# Patient Record
Sex: FEMALE | Race: WHITE | NOT HISPANIC OR LATINO | Employment: FULL TIME | URBAN - METROPOLITAN AREA
[De-identification: names, ages, dates, MRNs, and addresses within clinical notes are randomized per-mention and may not be internally consistent; named-entity substitution may affect disease eponyms.]

---

## 2017-01-03 ENCOUNTER — CLINICAL SUPPORT (OUTPATIENT)
Dept: REHABILITATION | Facility: HOSPITAL | Age: 62
End: 2017-01-03
Attending: INTERNAL MEDICINE
Payer: COMMERCIAL

## 2017-01-03 ENCOUNTER — TELEPHONE (OUTPATIENT)
Dept: INTERNAL MEDICINE | Facility: CLINIC | Age: 62
End: 2017-01-03

## 2017-01-03 DIAGNOSIS — M79.604 LEG PAIN, INFERIOR, RIGHT: ICD-10-CM

## 2017-01-03 DIAGNOSIS — G89.29 CHRONIC MIDLINE LOW BACK PAIN WITH RIGHT-SIDED SCIATICA: ICD-10-CM

## 2017-01-03 DIAGNOSIS — M62.81 MUSCLE WEAKNESS: ICD-10-CM

## 2017-01-03 DIAGNOSIS — M54.41 CHRONIC MIDLINE LOW BACK PAIN WITH RIGHT-SIDED SCIATICA: ICD-10-CM

## 2017-01-03 DIAGNOSIS — M54.89 OTHER BACK PAIN: Primary | ICD-10-CM

## 2017-01-03 PROCEDURE — 97110 THERAPEUTIC EXERCISES: CPT | Performed by: PHYSICAL THERAPIST

## 2017-01-03 PROCEDURE — 97014 ELECTRIC STIMULATION THERAPY: CPT | Performed by: PHYSICAL THERAPIST

## 2017-01-03 NOTE — PROGRESS NOTES
Physical Therapy  Daily Note     Name: Danuta Santos  [facility] Number: [id number]  Diagnosis:   Encounter Diagnoses   Name Primary?    Chronic midline low back pain with right-sided sciatica     Leg pain, inferior, right     Muscle weakness      Physician: Erica Khan MD  Treatment Orders: PT Eval and Treat  Past Medical History   Diagnosis Date    IBS (irritable bowel syndrome)        Precautions: as per diagnosis    Evaluation Date: 12-31-15  Visit # authorized:1/1 on 1-3-17  Authorization period: 12-31-17  Plan of care expiration: 2-8-17    Subjective     Pt reports: she had a massage and the therapist pushed on her back from the side while she was on her stomach.  Pt states she pushed down and to the side and felt the muscles tighten up immediately and pain in sacral region.  Pt states she is having sciatica pain in R     Pain scale: 7/10 at start at end 4.5/10  Objective     12-29-16   Lower trap strength 3+/5 at last test 3/5 B  Hip extension strength 5/5 at last test  4/5    Lumbar active range of motion in standing is: 1-3-17  - flexion - to toes                     - extension -  50%                         - left side bending -  To knee, feels pulling on R         - right side bending -  To knee             Lumbar active range of motion in standing is: 12-29-16  - flexion - toes                     - extension -  50%                         - left side bending -  To knee         - right side bending -  To knee          .    TREATMENT:  Therapeutic exercises were performed to improve stabilization for 25  min   Pt with slight dysfunction at L5 with FSR R and able to self mobilize with trunk rotation to L and contract relax to R hip abductors  Verbally reviewed and had pt perform lumbar stretching prior to manual therapy HS Stretch x3 , piriformis stretch x 10 and SLR X 20, hip abd sidelying x 20 hip ext prone bent knee x 20 arm and leg lift prone x  20 GSS standing and soleus stretch standing also added sitting with FB and SB to stretch lumbar region with legs in abd position with one leg straight and other bent to isolate QL and lumbar paraspinals.  Performed SB stretching to LB sidelying L with contract relax.         Patient received pre-mod electrical stimulation to decrease muscle tightness and pain to lumbar paraspinals for 15 minutes with MH with cycle time: continuous, beat frequency: , CC/CV: CV.    Pt demo good understanding of the education provided. Danuta demonstrated good return demonstration of activities.     Pt. education:  · Posture reeducation, body mechanics, HEP, reinforcing performing contract relax technique at onset of symptoms Instructed pt in increased awareness of symptoms.   · No spiritual or educational barriers to learning provided  · Pt has no cultural, educational or language barriers to learning provided.    Assessment   Pt had a flare up aggravating L5 after getting a massage.  Pt was able to self mobilize the area and will have more techniques to try as she develops more independence in self management of her back symptoms. Pt will continue to benefit from skilled outpatient physical therapy to address the remaining functional deficits, provide pt/family education, and to maximize pt's level of independence in the home and community environment. .     Anticipated barriers to physical therapy: none    · Pt's spiritual, cultural and educational needs considered and pt agreeable to plan of care and goals as stated below:     GOALS:   Short Term Goals: 3 weeks  MET STG's  Increase range of motion 25%  Increase strength 1/2 muscle grade  Improve postural awareness of pelvis to independently identify dysfunction with min assist from PT  Be able to perform HEP with minimal cueing required      Long Term Goals: 6 weeks  PARTIALLY MET LTG's  Increase range of motion to 75% to 100% full   Improve muscle strength 1 muscle  grade  Improve muscle strength with MMT to 4+/5 to 5/5  Improve and stabilize proper pelvic positioning  Walking for ADL and work will be restored without increased pain  Restore ability to stand for ADL and work without increased pain  Restore ability to bend for ADL and work without increased pain  Restore ability to lift for ADL and work without increased pain  Restore ability to perform ADL's and household activities independently and without increased pain       Plan   Continue with established plan of care towards PT goals.

## 2017-01-03 NOTE — TELEPHONE ENCOUNTER
Please advise pt would like to have PT orders sent to [facility] for her back pain. She is trying to go today.

## 2017-01-03 NOTE — TELEPHONE ENCOUNTER
----- Message from Beryl Moeller sent at 1/3/2017  8:24 AM CST -----  Contact: Patient  Type: Orders Request    What orders/ testing are being requested? Physical Therapy     Is there a future appointment scheduled for the patient with PCP? yes    When? 1/31/17    Comments:  Physical Therapy at [facility].  The patient needs to go to therapy today.  The physical therapist is Lilo Escobar. The patient does not have the fax or phone number with her.  If you need, you can reach the patient at [phone].

## 2017-01-26 ENCOUNTER — CLINICAL SUPPORT (OUTPATIENT)
Dept: REHABILITATION | Facility: HOSPITAL | Age: 62
End: 2017-01-26
Attending: INTERNAL MEDICINE
Payer: COMMERCIAL

## 2017-01-26 DIAGNOSIS — M79.604 LEG PAIN, INFERIOR, RIGHT: ICD-10-CM

## 2017-01-26 DIAGNOSIS — M54.41 CHRONIC MIDLINE LOW BACK PAIN WITH RIGHT-SIDED SCIATICA: ICD-10-CM

## 2017-01-26 DIAGNOSIS — M62.81 MUSCLE WEAKNESS: ICD-10-CM

## 2017-01-26 DIAGNOSIS — G89.29 CHRONIC MIDLINE LOW BACK PAIN WITH RIGHT-SIDED SCIATICA: ICD-10-CM

## 2017-01-26 PROCEDURE — 97110 THERAPEUTIC EXERCISES: CPT | Performed by: PHYSICAL THERAPIST

## 2017-01-26 NOTE — PROGRESS NOTES
Physical Therapy  Daily Note     Name: Danuta Santos  Clinic Number: [id number]  Diagnosis:   Encounter Diagnoses   Name Primary?    Chronic midline low back pain with right-sided sciatica     Leg pain, inferior, right     Muscle weakness      Physician: Erica Khan MD  Treatment Orders: PT Eval and Treat  Past Medical History   Diagnosis Date    IBS (irritable bowel syndrome)        Precautions: as per diagnosis    Evaluation Date: 12-31-15  Visit # authorized:2/25 on 1-26-17  Authorization period: 12-31-17  Plan of care expiration: 2-8-17    Subjective     Pt reports: she has been working on exercises, stretching and has good days and bad days.  Pt states was lifting weights and aggravated back a little.  Pt states TENS unit is helping.  Pt states was not doing push/pull as much as she should have  Pain scale: N/T  Objective     12-29-16   Lower trap strength 3+/5 at last test 3/5 B  Hip extension strength 5/5 at last test  4/5    Lumbar active range of motion in standing is: 1-3-17  - flexion - to toes                     - extension -  50%                         - left side bending -  To knee, feels pulling on R         - right side bending -  To knee             Lumbar active range of motion in standing is: 12-29-16  - flexion - toes                     - extension -  50%                         - left side bending -  To knee         - right side bending -  To knee          .    TREATMENT:  Therapeutic exercises were performed to improve stabilization for 25  min   Instructed pt further in finding ways and places to self mobilize at onset of dysfunction.  Pt with slight pelvic dysfunction and able to self mobilize  Verbally reviewed and had pt perform lumbar stretching prior to manual therapy HS Stretch x3 , piriformis stretch x 10 and SLR X 20, hip abd sidelying x 20 hip ext prone bent knee x 20 arm and leg lift prone x 20 GSS standing and soleus  stretch standing also added sitting with FB and SB to stretch lumbar region with legs in abd position with one leg straight and other bent to isolate QL and lumbar paraspinals.  Performed SB stretching to LB sidelying L with contract relax.         Patient received pre-mod electrical stimulation to decrease muscle tightness and pain to lumbar paraspinals for 15 minutes with MH with cycle time: continuous, beat frequency: , CC/CV: CV.    Pt demo good understanding of the education provided. Danuta demonstrated good return demonstration of activities.     Pt. education:  · Posture reeducation, body mechanics, HEP, reinforcing performing contract relax technique at onset of symptoms Instructed pt in increased awareness of symptoms.   · No spiritual or educational barriers to learning provided  · Pt has no cultural, educational or language barriers to learning provided.    Assessment   Pt appears to have improved understanding of need to address dysfunction ASAP and will come up with find other places at school she can do self mob PRN Pt will continue to benefit from skilled outpatient physical therapy to address the remaining functional deficits, provide pt/family education, and to maximize pt's level of independence in the home and community environment. .     Anticipated barriers to physical therapy: none    · Pt's spiritual, cultural and educational needs considered and pt agreeable to plan of care and goals as stated below:     GOALS:   Short Term Goals: 3 weeks  MET STG's  Increase range of motion 25%  Increase strength 1/2 muscle grade  Improve postural awareness of pelvis to independently identify dysfunction with min assist from PT  Be able to perform HEP with minimal cueing required      Long Term Goals: 6 weeks  PARTIALLY MET LTG's  Increase range of motion to 75% to 100% full   Improve muscle strength 1 muscle grade  Improve muscle strength with MMT to 4+/5 to 5/5  Improve and stabilize proper  pelvic positioning  Walking for ADL and work will be restored without increased pain  Restore ability to stand for ADL and work without increased pain  Restore ability to bend for ADL and work without increased pain  Restore ability to lift for ADL and work without increased pain  Restore ability to perform ADL's and household activities independently and without increased pain       Plan   Continue with established plan of care towards PT goals.

## 2017-03-01 ENCOUNTER — OFFICE VISIT (OUTPATIENT)
Dept: INTERNAL MEDICINE | Facility: CLINIC | Age: 62
End: 2017-03-01
Payer: COMMERCIAL

## 2017-03-01 ENCOUNTER — CLINICAL SUPPORT (OUTPATIENT)
Dept: REHABILITATION | Facility: HOSPITAL | Age: 62
End: 2017-03-01
Attending: INTERNAL MEDICINE
Payer: COMMERCIAL

## 2017-03-01 VITALS
DIASTOLIC BLOOD PRESSURE: 65 MMHG | SYSTOLIC BLOOD PRESSURE: 119 MMHG | HEIGHT: 66 IN | WEIGHT: 133 LBS | BODY MASS INDEX: 21.38 KG/M2 | HEART RATE: 77 BPM

## 2017-03-01 DIAGNOSIS — M54.89 MIDLINE BACK PAIN, UNSPECIFIED BACK LOCATION, UNSPECIFIED CHRONICITY: ICD-10-CM

## 2017-03-01 DIAGNOSIS — E78.00 ELEVATED LDL CHOLESTEROL LEVEL: ICD-10-CM

## 2017-03-01 DIAGNOSIS — M54.41 CHRONIC MIDLINE LOW BACK PAIN WITH RIGHT-SIDED SCIATICA: ICD-10-CM

## 2017-03-01 DIAGNOSIS — K59.00 CONSTIPATION, UNSPECIFIED CONSTIPATION TYPE: Primary | ICD-10-CM

## 2017-03-01 DIAGNOSIS — M79.604 LEG PAIN, INFERIOR, RIGHT: ICD-10-CM

## 2017-03-01 DIAGNOSIS — G89.29 CHRONIC MIDLINE LOW BACK PAIN WITH RIGHT-SIDED SCIATICA: ICD-10-CM

## 2017-03-01 DIAGNOSIS — M62.81 MUSCLE WEAKNESS: ICD-10-CM

## 2017-03-01 DIAGNOSIS — Z71.84 TRAVEL ADVICE ENCOUNTER: ICD-10-CM

## 2017-03-01 PROCEDURE — 97110 THERAPEUTIC EXERCISES: CPT | Performed by: PHYSICAL THERAPIST

## 2017-03-01 PROCEDURE — 99999 PR PBB SHADOW E&M-EST. PATIENT-LVL III: CPT | Mod: PBBFAC,,, | Performed by: INTERNAL MEDICINE

## 2017-03-01 PROCEDURE — 99214 OFFICE O/P EST MOD 30 MIN: CPT | Mod: S$GLB,,, | Performed by: INTERNAL MEDICINE

## 2017-03-01 NOTE — PROGRESS NOTES
Physical Therapy  Discharge Note     Name: Danuta Santos  Clinic Number: [id number]  Diagnosis:   Encounter Diagnoses   Name Primary?    Chronic midline low back pain with right-sided sciatica     Leg pain, inferior, right     Muscle weakness      Physician: Erica Khan MD  Treatment Orders: PT Eval and Treat  Past Medical History:   Diagnosis Date    IBS (irritable bowel syndrome)        Precautions: as per diagnosis    Evaluation Date: 12-31-15  Visit # authorized:3/25 on 3-1-17  Authorization period: 12-31-17  Plan of care expiration: 2-8-17    Subjective     Pt reports: has been doing ok.  Pt did ex this AM and had some pain.  Pt states she has been sick, so back was secondary.  Pain scale: 4/10 burning pain, which is normal pain  Objective       Hip extension strength 5/5 previous  4/5    Lumbar active range of motion in standing is: 3-1-17  - flexion - to toes                     - extension -  75%                         - left side bending -  To knee,      - right side bending -  To knee             Lumbar active range of motion in standing is: 12-29-16  - flexion - toes                     - extension -  50%                         - left side bending -  To knee         - right side bending -  To knee          .    TREATMENT:  Therapeutic exercises were performed to improve stabilization for 25  min   Pt with level pelvis to start Verbally reviewed and had pt perform lumbar stretching prior to manual therapy HS Stretch x3 , piriformis stretch x 10 and SLR X 20, hip abd sidelying x 20 hip ext prone bent knee x 20 arm and leg lift prone x 20 GSS standing and soleus stretch standing also added sitting with FB and SB to stretch lumbar region with legs in abd position with one leg straight and other bent to isolate QL and lumbar paraspinals.      Provided pt with Home TENS/IFC unit for home use.  Instructed pt in use, care and precautions.        Pt  adri good understanding of the education provided. Danuta demonstrated good return demonstration of activities.     Pt. education:  · Posture reeducation, body mechanics, HEP, reinforcing performing contract relax technique at onset of symptoms Instructed pt in increased awareness of symptoms.   · No spiritual or educational barriers to learning provided  · Pt has no cultural, educational or language barriers to learning provided.    Assessment   Patient demonstrates improvement in range of motion, strength, stabilization and function.  Patient appears independent in the prescribed HEP and ready for discharge after fully achieving the established goals.      Pt appears to have improved understanding of need to address dysfunction ASAP and will come up with find other places at school she can do self mob PRN      GOALS:   Short Term Goals: 3 weeks  MET STG's  Increase range of motion 25%  Increase strength 1/2 muscle grade  Improve postural awareness of pelvis to independently identify dysfunction with min assist from PT  Be able to perform HEP with minimal cueing required      Long Term Goals: 6 weeks   MET LTG's  Increase range of motion to 75% to 100% full   Improve muscle strength 1 muscle grade  Improve muscle strength with MMT to 4+/5 to 5/5  Improve and stabilize proper pelvic positioning  Walking for ADL and work will be restored without increased pain  Restore ability to stand for ADL and work without increased pain  Restore ability to bend for ADL and work without increased pain  Restore ability to lift for ADL and work without increased pain  Restore ability to perform ADL's and household activities independently and without increased pain       Plan   Patient is discharged from physical therapy after fully achieving the established goals.  Thank you for allowing us to assist in the care of your patient.

## 2017-03-01 NOTE — MR AVS SNAPSHOT
[facility]  [address]  [facility] [address]  Phone: [phone]  Fax: [phone]                  Danuta Santos   3/1/2017 1:30 PM   Office Visit    Description:  Female : 1955   Provider:  Erica Khan MD   Department:  [facility]           Reason for Visit     Follow-up           Diagnoses this Visit        Comments    Constipation, unspecified constipation type    -  Primary     Travel advice encounter         Elevated LDL cholesterol level         Midline back pain, unspecified back location, unspecified chronicity                To Do List           Future Appointments        Provider Department Dept Phone    3/2/2017 10:00 AM NOM MAMMO2 SCREEN [facility] [phone]      Goals (5 Years of Data)     None      Follow-Up and Disposition     Return in about 6 months (around 2017) for Annual exam.      [facility]     [facility] Care [facility] -  Assistance  Registered nurses in the [facility] provide clinical advisement, health education, appointment booking, and other advisory services.  Call for this free service at [phone].             Medications           Message regarding Medications     Verify the changes and/or additions to your medication regime listed below are the same as discussed with your clinician today.  If any of these changes or additions are incorrect, please notify your healthcare provider.             Verify that the below list of medications is an accurate representation of the medications you are currently taking.  If none reported, the list may be blank. If incorrect, please contact your healthcare provider. Carry this list with you in case of emergency.           Current Medications     cyanocobalamin (VITAMIN B-12) 500 MCG tablet Take 1,000 mcg by mouth once daily.      estradiol (VAGIFEM) 10 mcg Tab Place 1 tablet (10 mcg total) vaginally twice a week.     flaxseed oil 1,000 mg Cap Take by mouth.      LACTOBACILLUS COMBO NO.6 (PROBIOTIC COMPLEX ORAL) Take 1 capsule by mouth once.     oxymetazoline (AFRIN) 0.05 % nasal spray 2 sprays by Nasal route 2 (two) times daily.      vitamin D 185 MG Tab Take 2,000 mg by mouth once daily.            Clinical Reference Information           Your Vitals Were     BP                   119/65           Blood Pressure          Most Recent Value    BP  119/65      Allergies as of 3/1/2017     Wheat Containing Prod    Alcohol    Milk Containing Products      Immunizations Administered on Date of Encounter - 3/1/2017     None      Orders Placed During Today's Visit      Normal Orders This Visit    Ambulatory consult to Infectious Disease     Future Labs/Procedures Expected by Expires    CBC auto differential  3/1/2017 4/30/2017    Comprehensive metabolic panel  3/1/2017 (Approximate) 4/30/2017    Lipid panel  3/1/2017 (Approximate) 4/30/2017    TSH  3/1/2017 (Approximate) 4/30/2017      Language Assistance Services     ATTENTION: Language assistance services are available, free of charge. Please call [phone].      ATENCIÓN: Si habla betina, tiene a blackmon disposición servicios gratuitos de asistencia lingüística. Llame al [phone].     SHEILA Ý: N?u b?n nói Ti?ng Vi?t, có các d?ch v? h? tr? ngôn ng? mi?n phí dành cho b?n. G?i s? [phone].         [facility] complies with applicable Federal civil rights laws and does not discriminate on the basis of race, color, national origin, age, disability, or sex.

## 2017-03-01 NOTE — PROGRESS NOTES
Subjective:       Patient ID: Danuta Santos is a 61 y.o. female.    Chief Complaint: Follow-up    HPI   Patient has had what is suspected to be 2 viral infections that are now resolving.    Patient is in physical therapy for back pain:mechanical back pain that ay any time she may need PT.She goes to [facility] 1-2 times a weeks for 6 weeks and will need this periodically throughout the year.    Patient is going to [place] in April: discussed ID consults    Constipation: Takes Miralax daily, Fleets daily and 2 ducolax prn; Colonoscopies are done by Dr. Cordova.    Trial of 4 oz of Miralax 4 times a day and then 400 mg magnesium once per day and reduce fleets enema.    Scheduled for mammogram:tomorrow    Review of Systems   Constitutional: Negative for chills, fever and unexpected weight change.   HENT: Negative for trouble swallowing.    Respiratory: Negative for cough, shortness of breath and wheezing.    Cardiovascular: Negative for chest pain and palpitations.   Gastrointestinal: Positive for constipation. Negative for abdominal distention, abdominal pain, blood in stool and vomiting.   Musculoskeletal: Negative for back pain.       Objective:      Physical Exam   Constitutional: She is oriented to person, place, and time. She appears well-developed and well-nourished. No distress.   Neck: Carotid bruit is not present. No thyromegaly present.   Cardiovascular: Normal rate, regular rhythm and normal heart sounds.  PMI is not displaced.    Pulmonary/Chest: Effort normal and breath sounds normal. No respiratory distress.   Abdominal: Soft. Bowel sounds are normal. She exhibits no distension. There is no tenderness.   Musculoskeletal: She exhibits no edema.   Neurological: She is alert and oriented to person, place, and time.       Assessment:       1. Constipation, unspecified constipation type    2. Travel advice encounter    3. Elevated LDL cholesterol level    4. Midline back pain, unspecified back  location, unspecified chronicity        Plan:       Danuta was seen today for follow-up.    Diagnoses and all orders for this visit:    Constipation, unspecified constipation type  -     CBC auto differential; Future  -     Comprehensive metabolic panel; Future  -     TSH; Future    Travel advice encounter  -     Ambulatory consult to Infectious Disease    Elevated LDL cholesterol level  -     Lipid panel; Future    Midline back pain, unspecified back location, unspecified chronicity      Return in about 6 months (around 9/1/2017) for Annual exam.    New Prescriptions    No medications on file       Modified Medications    No medications on file       Orders Placed This Encounter   Procedures    CBC auto differential     Standing Status:   Future     Standing Expiration Date:   4/30/2017    Comprehensive metabolic panel     Standing Status:   Future     Standing Expiration Date:   4/30/2017    TSH     Standing Status:   Future     Standing Expiration Date:   4/30/2017    Lipid panel     Standing Status:   Future     Standing Expiration Date:   4/30/2017    Ambulatory consult to Infectious Disease     Referral Priority:   Routine     Referral Type:   Consultation     Referral Reason:   Specialty Services Required     Requested Specialty:   Infectious Diseases     Number of Visits Requested:   1       Labs, studies and consults associated with this visit were reviewed

## 2017-03-02 ENCOUNTER — HOSPITAL ENCOUNTER (OUTPATIENT)
Dept: RADIOLOGY | Facility: HOSPITAL | Age: 62
Discharge: HOME | End: 2017-03-02
Attending: INTERNAL MEDICINE
Payer: COMMERCIAL

## 2017-03-02 ENCOUNTER — TELEPHONE (OUTPATIENT)
Dept: INTERNAL MEDICINE | Facility: CLINIC | Age: 62
End: 2017-03-02

## 2017-03-02 DIAGNOSIS — Z12.31 ENCOUNTER FOR SCREENING MAMMOGRAM FOR BREAST CANCER: ICD-10-CM

## 2017-03-02 PROCEDURE — 77063 BREAST TOMOSYNTHESIS BI: CPT | Mod: 26,,, | Performed by: RADIOLOGY

## 2017-03-02 PROCEDURE — 77067 SCR MAMMO BI INCL CAD: CPT | Mod: TC

## 2017-03-02 PROCEDURE — 77067 SCR MAMMO BI INCL CAD: CPT | Mod: 26,,, | Performed by: RADIOLOGY

## 2017-03-02 NOTE — TELEPHONE ENCOUNTER
----- Message from Elizabeth Stephens sent at 3/2/2017  9:33 AM CST -----  Contact: [facility]/Emre/[phone]    fax/ [phone]  Emre said that he is calling in regards to needing to check the status of a Medical Necessity form that he faxed to the office on yesterday he stated that all the doctor needs to do is sign and date the form and fax to the number above. Please call and advise            Thank you

## 2017-03-15 ENCOUNTER — OFFICE VISIT (OUTPATIENT)
Dept: INFECTIOUS DISEASES | Facility: CLINIC | Age: 62
End: 2017-03-15
Payer: COMMERCIAL

## 2017-03-15 VITALS
SYSTOLIC BLOOD PRESSURE: 132 MMHG | TEMPERATURE: 98 F | HEART RATE: 51 BPM | HEIGHT: 66 IN | DIASTOLIC BLOOD PRESSURE: 76 MMHG

## 2017-03-15 DIAGNOSIS — Z71.84 TRAVEL ADVICE ENCOUNTER: Primary | ICD-10-CM

## 2017-03-15 PROCEDURE — 90472 IMMUNIZATION ADMIN EACH ADD: CPT | Mod: S$GLB,,, | Performed by: INTERNAL MEDICINE

## 2017-03-15 PROCEDURE — 99402 PREV MED CNSL INDIV APPRX 30: CPT | Mod: 25,S$GLB,, | Performed by: INTERNAL MEDICINE

## 2017-03-15 PROCEDURE — 90632 HEPA VACCINE ADULT IM: CPT | Mod: S$GLB,,, | Performed by: INTERNAL MEDICINE

## 2017-03-15 PROCEDURE — 90471 IMMUNIZATION ADMIN: CPT | Mod: S$GLB,,, | Performed by: INTERNAL MEDICINE

## 2017-03-15 PROCEDURE — 99999 PR PBB SHADOW E&M-EST. PATIENT-LVL III: CPT | Mod: PBBFAC,,, | Performed by: INTERNAL MEDICINE

## 2017-03-15 PROCEDURE — 90691 TYPHOID VACCINE IM: CPT | Mod: S$GLB,,, | Performed by: INTERNAL MEDICINE

## 2017-03-15 RX ORDER — CIPROFLOXACIN 500 MG/1
500 TABLET ORAL 2 TIMES DAILY
Qty: 6 TABLET | Refills: 0 | Status: SHIPPED | OUTPATIENT
Start: 2017-03-15 | End: 2017-03-18

## 2017-03-15 RX ORDER — ACETAZOLAMIDE 125 MG/1
125 TABLET ORAL 2 TIMES DAILY
Qty: 18 TABLET | Refills: 0 | Status: SHIPPED | OUTPATIENT
Start: 2017-03-15 | End: 2017-05-11

## 2017-03-15 NOTE — PROGRESS NOTES
Subjective:      Chief Complaint:   Chief Complaint   Patient presents with    Travel Consult     [place]  April 8-April 17     History of Present Illness    Patient  61 y.o. female who presents today for routine pretravel consultation.  The patient reports no past medical history.  The patient reports the following medication allergies; aspirin/NSAIDS (pain in stomach).  The patient reports the following food allergies; vinegar (sinus), wheat (sinus), milk (sinus), alcohol (sinus).  The patient will be traveling to  [place] on April 8.  The patient will be at this destination for 9 days.  She will fly into [place] but will primarily be in [facility] and [facility].  The patient will be lodging at hotels.  The patient has travelled to the following other countries in the past; [place] countries.  The patient reports that they received all their childhood vaccinations.  The patient reports receipt of the following travel related vaccinations; hepatitis b, influenza.  The purpose of this trip is vacation.    Review of Systems   Constitutional: Negative for chills, fever, malaise/fatigue and weight loss.   HENT: Positive for congestion. Negative for hearing loss, sore throat and tinnitus.    Eyes: Negative for blurred vision.   Respiratory: Negative for cough, sputum production, shortness of breath and wheezing.    Cardiovascular: Negative for chest pain, palpitations and leg swelling.   Gastrointestinal: Positive for constipation. Negative for abdominal pain, blood in stool, diarrhea, heartburn and nausea.   Genitourinary: Negative for dysuria, flank pain, frequency, hematuria and urgency.   Musculoskeletal: Positive for back pain. Negative for joint pain, myalgias and neck pain.   Skin: Negative for itching and rash.   Neurological: Negative for dizziness, tingling, weakness and headaches.   Endo/Heme/Allergies: Does not bruise/bleed easily.   Psychiatric/Behavioral: Negative for depression and memory loss. The patient is  not nervous/anxious and does not have insomnia.        Objective:   Physical Exam   Assessment:     Pre-Travel clinic assessment    Plan:   Patient specific risks:      Patient has no medical problems that would limit her ability to travel.    Destination specific risks:      -Infectious Disease risks:       Mosquito Borne pathogens:  Reviewed basic mosquito avoidance precautions including wearing long sleeve clothing and insect repellant.  She is not traveling to a part of [place] that is endemic for malaria or yellow fever.  Risk of dengue, zika and chikungunya viruses was discussed.     Food Borne pathogens:  Reviewed basic hand, food and water sanitation precautions.  Patient instructed to take hand  on their trip.  Will give hepatitis a #1 and typhoid IM vaccines.     Blood Borne Pathogens:  She has been vaccinated for hepatitis b.     Routine:  She received Tdap in 2016.  She reports being up to date on influenza vaccination.    -Environmental risks:     Precautions to minimize risk/exposure to crime and motor vehicle accidents were reviewed with the patient.      Acetazolamide prescribed for altitude sickness.

## 2017-03-15 NOTE — MR AVS SNAPSHOT
[facility] - Infectious Diseases  [address] [facility] [facility] [address]  Phone: [phone]  Fax: [phone]                  Danuta Santos   3/15/2017 9:00 AM   Office Visit    Description:  Female : 1955   Provider:  Robel Alford MD   Department:  [facility] - Infectious Diseases           Reason for Visit     Travel Consult           Diagnoses this Visit        Comments    Travel advice encounter    -  Primary            To Do List           Future Appointments        Provider Department Dept Phone    3/29/2017 3:30 PM Jeana Maria MD [facility] - OB/GYN [address] [phone]      Goals (5 Years of Data)     None       These Medications        Disp Refills Start End    ciprofloxacin HCl (CIPRO) 500 MG tablet 6 tablet 0 3/15/2017 3/18/2017    Take 1 tablet (500 mg total) by mouth 2 (two) times daily. 1 tablet twice a day for severe diarrhea - Oral    Pharmacy: [address] Ph #: [phone]       acetaZOLAMIDE (DIAMOX) 125 MG Tab 18 tablet 0 3/15/2017 3/24/2017    Take 1 tablet (125 mg total) by mouth 2 (two) times daily. Start 1 day before going to altitude. Continue for 3 days at altitude - Oral    Pharmacy: [address] Ph #: [phone]         [facility] On Call     [facility] On Call Nurse Care Line -  Assistance  Registered nurses in the [facility] provide clinical advisement, health education, appointment booking, and other advisory services.  Call for this free service at [phone].             Medications           Message regarding Medications     Verify the changes and/or additions to your medication regime listed below are the same as discussed with your clinician today.  If any of these changes or additions are incorrect, please notify your healthcare provider.        START taking these NEW medications        Refills    ciprofloxacin HCl (CIPRO) 500 MG tablet 0    Sig: Take 1  "tablet (500 mg total) by mouth 2 (two) times daily. 1 tablet twice a day for severe diarrhea    Class: Normal    Route: Oral    acetaZOLAMIDE (DIAMOX) 125 MG Tab 0    Sig: Take 1 tablet (125 mg total) by mouth 2 (two) times daily. Start 1 day before going to altitude. Continue for 3 days at altitude    Class: Normal    Route: Oral           Verify that the below list of medications is an accurate representation of the medications you are currently taking.  If none reported, the list may be blank. If incorrect, please contact your healthcare provider. Carry this list with you in case of emergency.           Current Medications     cyanocobalamin (VITAMIN B-12) 500 MCG tablet Take 1,000 mcg by mouth once daily.      flaxseed oil 1,000 mg Cap Take by mouth.      LACTOBACILLUS COMBO NO.6 (PROBIOTIC COMPLEX ORAL) Take 1 capsule by mouth once.     oxymetazoline (AFRIN) 0.05 % nasal spray 2 sprays by Nasal route 2 (two) times daily.      vitamin D 185 MG Tab Take 2,000 mg by mouth once daily.     acetaZOLAMIDE (DIAMOX) 125 MG Tab Take 1 tablet (125 mg total) by mouth 2 (two) times daily. Start 1 day before going to altitude. Continue for 3 days at altitude    ciprofloxacin HCl (CIPRO) 500 MG tablet Take 1 tablet (500 mg total) by mouth 2 (two) times daily. 1 tablet twice a day for severe diarrhea    estradiol (VAGIFEM) 10 mcg Tab Place 1 tablet (10 mcg total) vaginally twice a week.           Clinical Reference Information           Your Vitals Were     BP Pulse Temp Height          132/76 (BP Location: Left arm, Patient Position: Sitting, BP Method: Automatic) 51 97.8 °F (36.6 °C) (Oral) 5' 6" (1.676 m)        Blood Pressure          Most Recent Value    BP  132/76      Allergies as of 3/15/2017     Wheat Containing Prod    Alcohol    Milk Containing Products      Immunizations Administered on Date of Encounter - 3/15/2017     None      Orders Placed During Today's Visit     Future Labs/Procedures Expected by Expires    " Hepatitis A Vaccine (Adult) (IM)  3/15/2017 3/15/2018    Typhoid Vaccine (ViCPs) (IM)  3/15/2017 3/15/2018      Instructions    1.  Take colace (docusate sodium) 100mg twice a day for constipation.    2.  Take immodium for simple watery diarrhea.      3.  Take mosquito repellant that contains 20 to 40% DEET.       Language Assistance Services     ATTENTION: Language assistance services are available, free of charge. Please call [phone].      ATENCIÓN: Si josiahla betina, tiene a blackmon disposición servicios gratuitos de asistencia lingüística. Llame al [phone].     SHEILA Ý: N?u b?n nói Ti?ng Vi?t, có các d?ch v? h? tr? ngôn ng? mi?n phí dành cho b?n. G?i s? [phone].         [facility] complies with applicable Federal civil rights laws and does not discriminate on the basis of race, color, national origin, age, disability, or sex.

## 2017-03-15 NOTE — PATIENT INSTRUCTIONS
1.  Take colace (docusate sodium) 100mg twice a day for constipation.    2.  Take immodium for simple watery diarrhea.      3.  Take mosquito repellant that contains 20 to 40% DEET.

## 2017-03-15 NOTE — LETTER
March 15, 2017      Erica Khan MD  [address]  [facility] [address]           [facility] - [facility]  [address]  [facility] [address]  Phone: [phone]  Fax: [phone]          Patient: Danuta Santos   MR Number: [id number]   YOB: 1955   Date of Visit: 3/15/2017       Dear Dr. Erica Khan:    Thank you for referring Danuta Santos to me for evaluation. Attached you will find relevant portions of my assessment and plan of care.    If you have questions, please do not hesitate to call me. I look forward to following Danuta Santos along with you.    Sincerely,    Krystian Klein RN    Enclosure  CC:  No Recipients    If you would like to receive this communication electronically, please contact [email or web address] or [phone] to request more information on [employer] Link access.    For providers and/or their staff who would like to refer a patient to [facility], please contact us through our one-stop-shop provider referral line, [facility], at [phone].    If you feel you have received this communication in error or would no longer like to receive these types of communications, please e-mail [email or web address]

## 2017-03-21 ENCOUNTER — TELEPHONE (OUTPATIENT)
Dept: INTERNAL MEDICINE | Facility: CLINIC | Age: 62
End: 2017-03-21

## 2017-03-21 NOTE — TELEPHONE ENCOUNTER
----- Message from Yoly Josue sent at 3/21/2017  8:46 AM CDT -----  Contact: Self/ [phone]   Type: Rx    Name of medication(s): alprazolam (XANAX) 0.25 MG tablet     Is this a refill? New rx? Refill     Who prescribed medication? Dr. Khan     Pharmacy Name, Phone, & Location: [facility]    Comments: pt is calling to have a refill on the medication above. Please call and advise     Thank you

## 2017-03-23 ENCOUNTER — CLINICAL SUPPORT (OUTPATIENT)
Dept: REHABILITATION | Facility: HOSPITAL | Age: 62
End: 2017-03-23
Attending: INTERNAL MEDICINE
Payer: COMMERCIAL

## 2017-03-23 DIAGNOSIS — M54.41 CHRONIC MIDLINE LOW BACK PAIN WITH RIGHT-SIDED SCIATICA: ICD-10-CM

## 2017-03-23 DIAGNOSIS — G89.29 CHRONIC MIDLINE LOW BACK PAIN WITH RIGHT-SIDED SCIATICA: ICD-10-CM

## 2017-03-23 DIAGNOSIS — M79.604 PAIN IN RIGHT LEG: Primary | ICD-10-CM

## 2017-03-23 PROCEDURE — 97161 PT EVAL LOW COMPLEX 20 MIN: CPT | Performed by: PHYSICAL THERAPIST

## 2017-03-23 PROCEDURE — 97140 MANUAL THERAPY 1/> REGIONS: CPT | Performed by: PHYSICAL THERAPIST

## 2017-03-23 NOTE — PROGRESS NOTES
"Name:Danuta Santos  Physician:Erica Khan MD  Date of eval:3/23/2017  Orders:  Physical Therapy evaluate and treat  Clinic: [id number]  Diagnosis:  1. Pain In Right Leg     2. Chronic midline low back pain with right-sided sciatica         Precautions: chronic low back pain with radiculopathy  Evaluation date: 3/23/2017  Visit # authorized: 1/22  Authorization period: 12-31-17  Plan of care expiration: 5-3-17  MD Follow up appt: none scheduled    Subjective     Chief complaint: R leg pain which is different from normal sciatica pain  Onset of pain : 3-22-17   Mechanism of onset :  No known injury  Last night she woke up with R leg pain and felt like she had a "charley horse, right before it would cramp"  Yesterday she did ex in AM and did not walk for exercise. Nothing abnormal at work    Radicular symptoms:to foot   Bowel and Bladder incontinence:neg    Aggravating factors: none  Easing factors: none  Sleep is not disturbed. 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    Allergies:    Review of patient's allergies indicates:   Allergen Reactions    Wheat containing prod      Sinus      Alcohol Other (See Comments)     Post nasal drip    Milk containing products Other (See Comments)     Post nasal drip       Medical history:   Past Medical History:   Diagnosis Date    IBS (irritable bowel syndrome)        Medication:   Current Outpatient Prescriptions on File Prior to Visit   Medication Sig Dispense Refill    acetaZOLAMIDE (DIAMOX) 125 MG Tab Take 1 tablet (125 mg total) by mouth 2 (two) times daily. Start 1 day before going to altitude. Continue for 3 days at altitude 18 tablet 0    cyanocobalamin " "(VITAMIN B-12) 500 MCG tablet Take 1,000 mcg by mouth once daily.        estradiol (VAGIFEM) 10 mcg Tab Place 1 tablet (10 mcg total) vaginally twice a week. 15 tablet 0    flaxseed oil 1,000 mg Cap Take by mouth.        LACTOBACILLUS COMBO NO.6 (PROBIOTIC COMPLEX ORAL) Take 1 capsule by mouth once.       oxymetazoline (AFRIN) 0.05 % nasal spray 2 sprays by Nasal route 2 (two) times daily.        vitamin D 185 MG Tab Take 2,000 mg by mouth once daily.        No current facility-administered medications on file prior to visit.      Special tests: none    Pain level with 0 being the lowest and 10 being the highest presently: 3  Pain level with 0 being the lowest and 10 being the highest at worst: 7  Pain level with 0 being the lowest and 10 being the highest at best: 3     Patient Goals: "see what I can do to get rid of leg pain and ex I could do to help"    Objective     Postural examination in standing:  - normal lumbar lordosis  - forward head  - forward shoulder  - winging scapula  - level hip high  - L shoulder high    Postural examination in sitting:   - normal lumbar lordosis  - forward head  - forward shoulders      Functional assessment:   - walking: No gait deficits were noted.  Patient able to toe and heel walk.    - sit to stand: no deficits noted  - sit to supine: no deficits       - supine to sit: no deficits  - supine to prone: no deficits    Pelvic positioning: level    Lumbar active range of motion in standing is:  - flexion - to floor                     - extension -  50%                         - left side bending -  To knee         - right side bending -  To knee           Flexibility testing:  - hamstrings:     90/90 test R 10 L 10           - gastrocnemius:   DF ankle R 5 degrees L 5 degrees  - IT Bands: + imani test      Muscle Strength  MMT R L   Hip flexion 5/5 5/5   Hip abduction 5/5 5/5   Hip extension 5/5 5/5   Hip ER 5/5 5/5   Hip IR 5/5 5/5   Knee extension 5/5 5/5   Knee flexion 5/5 " 5/5   Ankle dorsiflexion 5/5 5/5       Endurance is good .    Lumbar Special tests:  SLR neg    Palpation: very tight and tender in R IT band and peroneal region    Sensation: Intact  Reflexes: +1 ankle and knee    PT Evaluation Completed? Yes  Discussed Plan of Care with patient: Yes      TREATMENT:  Manual therapy techniques were performed to improve joint and soft tissue mobility, decrease muscle tightness and pain to R IT Band and lateral calf in peroneals  for 10 minutes.   Pt on time constraints so held cups, pt to perform at home.   Instructed pt in use of roller on IT band and use of cups along IT band into peroneal region    Pt. Education: Instructed pt. regarding:proper technique with all exercises. Pt. to demonstrate good understanding of the education provided. Danuta demonstrated good return demonstration of activities. No cultural, environmental, or spiritual barriers identified to treatment or learning.  Assessment   This is a 61 y.o. female referred to outpatient physical therapy and presents with a medical diagnosis of chronic LBP and PT diagnosis/findings of R leg pain that appears to be due to tightness from IT band demonstrating limitations as described in the problem list. Patient was in agreement with set goals and plan of care. Pt was given a written HEP along with posture education, instruction on body mechanics, activity modification/avoidance, and core/lumbar/LE strengthening regimen. Pt. verbally understood instructions and demonstrated proper form/technique. Pt was advised to perform these exercises free of pain, and discontinue use if symptoms persist/worsen. Pt will benefit from physical therapy services in order to maximize pain free functional independence. Rehab potential is good.    History  Co-morbidities and personal factors that may impact the plan of care Examination  Body Structures and Functions, activity limitations and participation restrictions that may impact the plan  of care Clinical Presentation   Decision Making/ Complexity Score   Co-morbidities:     Chronic LBP            Personal Factors:    Body Regions: LE    Body Systems:   musculoskeletal        Activity limitations:   Sitting and lying    Participation Restrictions: walking for ex         stable low       Medical necessity is demonstrated by the following IMPAIRMENTS/PROBLEM LIST:  Decreased flexibility  Unable to walk for exercise  Increased pain with prolonged sitting  Increased pain with lying down alek sidelying  Disturbed sleep    GOALS:   Short Term Goals:  3 weeks  Increase flexibility 25%  Be able to perform HEP with minimal cueing required    Long Term Goals: 6 weeks  Increase flexibility to normal  Walking for ADL and exercise will be restored without increased pain  Restore ability to sit for ADL without increased pain  Restore ability to lie on side without increased pain  Restore normal sleep habits without disturbances due to pain      Plan     Pt will be treated by physical therapy 1-3 times a week as needed for 6 weeks to include: Therapeutic exercises to increase ROM, strength and stabilization; joint and soft tissue mobilization with manual therapy techniques to decrease muscle tightness, pain and improve joint mobility; neuromuscular re-education to improve balance, coordination, kinesthetic sense and proprioception, therapeutic activities to improve coordination, strength and function, therapeutic taping to decrease pain, provide support and improve function; modalities such as moist heat, ice, ultrasound and electrical stimulation to increase circulation, decrease pain and inflammation; dry needling with manual therapy techniques to decrease pain, inflammation and swelling, increase circulation and promote healing process will be considered and utilized as needed; temporary orthotics will be considered and utilized as needed to further decrease pain in WB; aquatic physical therapy will be utilized  as needed.  Pt may be seen by PTA to carry out plan of care as part of the Rehab team.    I certify the need for these services furnished under this plan of treatment and while under my care.    ____________________________________ Physician/Referring Practitioner                                Date of Signature

## 2017-03-23 NOTE — PLAN OF CARE
"Name:Danuta Santos  Physician:Erica Khan MD  Date of eval:3/23/2017  Orders:  Physical Therapy evaluate and treat  Clinic: [id number]  Diagnosis:  1. Pain In Right Leg     2. Chronic midline low back pain with right-sided sciatica         Precautions: chronic low back pain with radiculopathy  Evaluation date: 3/23/2017  Visit # authorized: 1/22  Authorization period: 12-31-17  Plan of care expiration: 5-3-17  MD Follow up appt: none scheduled    Subjective     Chief complaint: R leg pain which is different from normal sciatica pain  Onset of pain : 3-22-17   Mechanism of onset :  No known injury  Last night she woke up with R leg pain and felt like she had a "charley horse, right before it would cramp"  Yesterday she did ex in AM and did not walk for exercise. Nothing abnormal at work    Radicular symptoms:to foot   Bowel and Bladder incontinence:neg    Aggravating factors: none  Easing factors: none  Sleep is not disturbed. 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    Allergies:    Review of patient's allergies indicates:   Allergen Reactions    Wheat containing prod      Sinus      Alcohol Other (See Comments)     Post nasal drip    Milk containing products Other (See Comments)     Post nasal drip       Medical history:   Past Medical History:   Diagnosis Date    IBS (irritable bowel syndrome)        Medication:   Current Outpatient Prescriptions on File Prior to Visit   Medication Sig Dispense Refill    acetaZOLAMIDE (DIAMOX) 125 MG Tab Take 1 tablet (125 mg total) by mouth 2 (two) times daily. Start 1 day before going to altitude. Continue for 3 days at altitude 18 tablet 0    cyanocobalamin " "(VITAMIN B-12) 500 MCG tablet Take 1,000 mcg by mouth once daily.        estradiol (VAGIFEM) 10 mcg Tab Place 1 tablet (10 mcg total) vaginally twice a week. 15 tablet 0    flaxseed oil 1,000 mg Cap Take by mouth.        LACTOBACILLUS COMBO NO.6 (PROBIOTIC COMPLEX ORAL) Take 1 capsule by mouth once.       oxymetazoline (AFRIN) 0.05 % nasal spray 2 sprays by Nasal route 2 (two) times daily.        vitamin D 185 MG Tab Take 2,000 mg by mouth once daily.        No current facility-administered medications on file prior to visit.      Special tests: none    Pain level with 0 being the lowest and 10 being the highest presently: 3  Pain level with 0 being the lowest and 10 being the highest at worst: 7  Pain level with 0 being the lowest and 10 being the highest at best: 3     Patient Goals: "see what I can do to get rid of leg pain and ex I could do to help"    Objective     Postural examination in standing:  - normal lumbar lordosis  - forward head  - forward shoulder  - winging scapula  - level hip high  - L shoulder high    Postural examination in sitting:   - normal lumbar lordosis  - forward head  - forward shoulders      Functional assessment:   - walking: No gait deficits were noted.  Patient able to toe and heel walk.    - sit to stand: no deficits noted  - sit to supine: no deficits       - supine to sit: no deficits  - supine to prone: no deficits    Pelvic positioning: level    Lumbar active range of motion in standing is:  - flexion - to floor                     - extension -  50%                         - left side bending -  To knee         - right side bending -  To knee           Flexibility testing:  - hamstrings:     90/90 test R 10 L 10           - gastrocnemius:   DF ankle R 5 degrees L 5 degrees  - IT Bands: + imani test      Muscle Strength  MMT R L   Hip flexion 5/5 5/5   Hip abduction 5/5 5/5   Hip extension 5/5 5/5   Hip ER 5/5 5/5   Hip IR 5/5 5/5   Knee extension 5/5 5/5   Knee flexion 5/5 " 5/5   Ankle dorsiflexion 5/5 5/5       Endurance is good .    Lumbar Special tests:  SLR neg    Palpation: very tight and tender in R IT band and peroneal region    Sensation: Intact  Reflexes: +1 ankle and knee    PT Evaluation Completed? Yes  Discussed Plan of Care with patient: Yes      TREATMENT:  Manual therapy techniques were performed to improve joint and soft tissue mobility, decrease muscle tightness and pain to R IT Band and lateral calf in peroneals  for 10 minutes.   Pt on time constraints so held cups, pt to perform at home.   Instructed pt in use of roller on IT band and use of cups along IT band into peroneal region    Pt. Education: Instructed pt. regarding:proper technique with all exercises. Pt. to demonstrate good understanding of the education provided. Danuta demonstrated good return demonstration of activities. No cultural, environmental, or spiritual barriers identified to treatment or learning.  Assessment   This is a 61 y.o. female referred to outpatient physical therapy and presents with a medical diagnosis of chronic LBP and PT diagnosis/findings of R leg pain that appears to be due to tightness from IT band demonstrating limitations as described in the problem list. Patient was in agreement with set goals and plan of care. Pt was given a written HEP along with posture education, instruction on body mechanics, activity modification/avoidance, and core/lumbar/LE strengthening regimen. Pt. verbally understood instructions and demonstrated proper form/technique. Pt was advised to perform these exercises free of pain, and discontinue use if symptoms persist/worsen. Pt will benefit from physical therapy services in order to maximize pain free functional independence. Rehab potential is good.    History  Co-morbidities and personal factors that may impact the plan of care Examination  Body Structures and Functions, activity limitations and participation restrictions that may impact the plan  of care Clinical Presentation   Decision Making/ Complexity Score   Co-morbidities:     Chronic LBP            Personal Factors:    Body Regions: LE    Body Systems:   musculoskeletal        Activity limitations:   Sitting and lying    Participation Restrictions: walking for ex         stable low       Medical necessity is demonstrated by the following IMPAIRMENTS/PROBLEM LIST:  Decreased flexibility  Unable to walk for exercise  Increased pain with prolonged sitting  Increased pain with lying down alek sidelying  Disturbed sleep    GOALS:   Short Term Goals:  3 weeks  Increase flexibility 25%  Be able to perform HEP with minimal cueing required    Long Term Goals: 6 weeks  Increase flexibility to normal  Walking for ADL and exercise will be restored without increased pain  Restore ability to sit for ADL without increased pain  Restore ability to lie on side without increased pain  Restore normal sleep habits without disturbances due to pain      Plan     Pt will be treated by physical therapy 1-3 times a week as needed for 6 weeks to include: Therapeutic exercises to increase ROM, strength and stabilization; joint and soft tissue mobilization with manual therapy techniques to decrease muscle tightness, pain and improve joint mobility; neuromuscular re-education to improve balance, coordination, kinesthetic sense and proprioception, therapeutic activities to improve coordination, strength and function, therapeutic taping to decrease pain, provide support and improve function; modalities such as moist heat, ice, ultrasound and electrical stimulation to increase circulation, decrease pain and inflammation; dry needling with manual therapy techniques to decrease pain, inflammation and swelling, increase circulation and promote healing process will be considered and utilized as needed; temporary orthotics will be considered and utilized as needed to further decrease pain in WB; aquatic physical therapy will be utilized  as needed.  Pt may be seen by PTA to carry out plan of care as part of the Rehab team.    I certify the need for these services furnished under this plan of treatment and while under my care.    ____________________________________ Physician/Referring Practitioner                                Date of Signature

## 2017-03-27 ENCOUNTER — TELEPHONE (OUTPATIENT)
Dept: INTERNAL MEDICINE | Facility: CLINIC | Age: 62
End: 2017-03-27

## 2017-03-27 NOTE — TELEPHONE ENCOUNTER
----- Message from Elsa Smith sent at 3/27/2017  1:16 PM CDT -----  Contact: self  [phone]  Pt states that in about 10 days she will be traveling and has a very long flight that she would like to know if you can prescribe her a very low dose of Xanax for the flight. She states she just needs a few to get her through the plane rides there and back. It can be sent to [facility] [phone] (Phone) or [phone] (Fax). Please advise if able to prescribe or not.

## 2017-03-29 RX ORDER — ALPRAZOLAM 0.25 MG/1
TABLET ORAL
Qty: 6 TABLET | Refills: 0 | Status: SHIPPED | OUTPATIENT
Start: 2017-03-29 | End: 2017-05-11

## 2017-05-03 ENCOUNTER — TELEPHONE (OUTPATIENT)
Dept: INTERNAL MEDICINE | Facility: CLINIC | Age: 62
End: 2017-05-03

## 2017-05-03 ENCOUNTER — CLINICAL SUPPORT (OUTPATIENT)
Dept: REHABILITATION | Facility: HOSPITAL | Age: 62
End: 2017-05-03
Attending: INTERNAL MEDICINE
Payer: COMMERCIAL

## 2017-05-03 DIAGNOSIS — M79.604 PAIN IN RIGHT LEG: ICD-10-CM

## 2017-05-03 DIAGNOSIS — G89.29 CHRONIC MIDLINE LOW BACK PAIN WITH RIGHT-SIDED SCIATICA: Primary | ICD-10-CM

## 2017-05-03 DIAGNOSIS — M54.41 CHRONIC MIDLINE LOW BACK PAIN WITH RIGHT-SIDED SCIATICA: Primary | ICD-10-CM

## 2017-05-03 PROCEDURE — 97140 MANUAL THERAPY 1/> REGIONS: CPT | Performed by: PHYSICAL THERAPIST

## 2017-05-03 PROCEDURE — 97110 THERAPEUTIC EXERCISES: CPT | Performed by: PHYSICAL THERAPIST

## 2017-05-03 PROCEDURE — 97014 ELECTRIC STIMULATION THERAPY: CPT | Performed by: PHYSICAL THERAPIST

## 2017-05-03 NOTE — PROGRESS NOTES
Physical Therapy Progress Note     Name: Danuta Santos  Clinic Number: [id number]  Diagnosis:   Encounter Diagnoses   Name Primary?    Chronic midline low back pain with right-sided sciatica Yes    Pain in right leg      Physician: Erica Khan MD  Treatment Orders: PT Eval and Treat  Past Medical History:   Diagnosis Date    IBS (irritable bowel syndrome)        Precautions: chronic low back pain with radiculopathy  Evaluation date: 3/23/2017  Visit # authorized: 2/22 on 5/3/2017  Authorization period: 12-31-17  Plan of care expiration: 6-13-17  MD Follow up appt: none schedule    Subjective     Pt reports: generally been doing well, went out of town and is planning another trip and back has been more tight.  Pt states feeling more and more tight and feeling a little sciatica down R leg    Pain Scale: before treatment: 5-6 currently; after proper stretching 3/10 after treatment: feels better    Objective     Pt has only needed to be seen 1 time in past 6 weeks since last visit  Pt with improved Imani testing and decreased pain with min-mod tightness Mod tightness lumbar region    Lumbar active range of motion in standing is: 5-3-17  - flexion - to floor                     - extension -  75%                         - left side bending -  To knee         - right side bending -  To knee            Lumbar active range of motion in standing is: INITIAL EVAL  - flexion - to floor   - extension -  50%   - left side bending - To knee   - right side bending -  To knee       Flexibility testing: same as IE  - hamstrings: 90/90 test R 10 L 10   - gastrocnemius: DF ankle R 5 degrees L 5 degrees  - IT Bands: - imani at IE + imani test       Muscle Strength same as IE  MMT R L   Hip flexion 5/5 5/5   Hip abduction 5/5 5/5   Hip extension 5/5 5/5   Hip ER 5/5 5/5   Hip IR 5/5 5/5   Knee extension 5/5 5/5   Knee flexion 5/5 5/5   Ankle dorsiflexion 5/5 5/5           TREATMENT  Therapeutic exercise: Danuta teodora  therapeutic exercises to develop strength, endurance, flexibility and core stabilization for 25 minutes including:   Worked with pt on proper performance of contract relax to gluts.    Pt was not relaxing between contractions.  Instructed pt in other techniques to improve relaxation  Reviewed HEP and pt continues with good strength    Manual therapy: Danuta  received the following manual therapy techniques x 15 min. to include soft tissue and joint mobilization were applied to the: low back and gluteals, contract relax with SB stretch sidelying L also performed to improve pelvic stability to include: Vacuum/cupping STM with manual therapy techniques was performed to R gluteals and low back to decrease muscle tightness, increase circulation and promote healing process.  The pt's skin was monitored for redness adjusting pressure as needed. The pt was instructed in possible side effects of bruising and/or soreness.       Patient received pre-mod electrical stimulation to decrease muscle tightness and pain to B lumbar paraspinals for 15 minutes with MH with cycle time: continuous, beat frequency: , CC/CV: CV.     Written Home Exercises Provided: none today  Pt demo good understanding of the education provided. Danuta demonstrated good return demonstration of activities.     Pt. education:  · Posture reeducation, body mechanics, HEP,   · No spiritual or educational barriers to learning provided  · Pt has no cultural, educational or language barriers to learning provided.    Assessment     Pt had developed poor technique with contract relax and under stress with all travel.  Pt appears to understands relaxation techniques.  Pt with decreased symptoms and level pelvis after treatment.  Pt has been doing a good job generally maintaining level pelvis I and continuing focus on HEP.     Pt will continue to benefit from skilled outpatient physical therapy to address the remaining functional deficits, provide  pt/family education, and to maximize pt's level of independence in the home and community environment      GOALS:   Short Term Goals: 3 weeks MET STG'S  Increase flexibility 25%  Be able to perform HEP with minimal cueing required     Long Term Goals: 6 weeks  60% IMPROVED  Increase flexibility to normal  Walking for ADL and exercise will be restored without increased pain  Restore ability to sit for ADL without increased pain  Restore ability to lie on side without increased pain  Restore normal sleep habits without disturbances due to pain    Anticipated barriers to physical therapy: none  Pt's spiritual, cultural and educational needs considered and pt agreeable to plan of care and goals        Plan   If you concur, I recommend patient continue with physical therapy 1-2 times a week as needed  for 6 weeks.  Please advise us of your  recommendations. Thank you for allowing us to assist in the care of your patient.      Jacy Escobar, MS, PT          I certify the need for these services furnished under this plan of treatment and while under my care.    ____________________________________ Physician/Referring Practitioner                                Date of Signature

## 2017-05-03 NOTE — PLAN OF CARE
Physical Therapy Progress Note     Name: Danuta Santos  Clinic Number: [id number]  Diagnosis:   Encounter Diagnoses   Name Primary?    Chronic midline low back pain with right-sided sciatica Yes    Pain in right leg      Physician: Erica Khan MD  Treatment Orders: PT Eval and Treat  Past Medical History:   Diagnosis Date    IBS (irritable bowel syndrome)        Precautions: chronic low back pain with radiculopathy  Evaluation date: 3/23/2017  Visit # authorized: 2/22 on 5/3/2017  Authorization period: 12-31-17  Plan of care expiration: 6-13-17  MD Follow up appt: none schedule    Subjective     Pt reports: generally been doing well, went out of town and is planning another trip and back has been more tight.  Pt states feeling more and more tight and feeling a little sciatica down R leg    Pain Scale: before treatment: 5-6 currently; after proper stretching 3/10 after treatment: feels better    Objective     Pt has only needed to be seen 1 time in past 6 weeks since last visit  Pt with improved Imani testing and decreased pain with min-mod tightness Mod tightness lumbar region    Lumbar active range of motion in standing is: 5-3-17  - flexion - to floor                     - extension -  75%                         - left side bending -  To knee         - right side bending -  To knee            Lumbar active range of motion in standing is: INITIAL EVAL  - flexion - to floor   - extension -  50%   - left side bending - To knee   - right side bending -  To knee       Flexibility testing: same as IE  - hamstrings: 90/90 test R 10 L 10   - gastrocnemius: DF ankle R 5 degrees L 5 degrees  - IT Bands: - imani at IE + imani test       Muscle Strength same as IE  MMT R L   Hip flexion 5/5 5/5   Hip abduction 5/5 5/5   Hip extension 5/5 5/5   Hip ER 5/5 5/5   Hip IR 5/5 5/5   Knee extension 5/5 5/5   Knee flexion 5/5 5/5   Ankle dorsiflexion 5/5 5/5           TREATMENT  Therapeutic exercise: Danuta teodora  therapeutic exercises to develop strength, endurance, flexibility and core stabilization for 25 minutes including:   Worked with pt on proper performance of contract relax to gluts.    Pt was not relaxing between contractions.  Instructed pt in other techniques to improve relaxation  Reviewed HEP and pt continues with good strength    Manual therapy: Danuta  received the following manual therapy techniques x 15 min. to include soft tissue and joint mobilization were applied to the: low back and gluteals, contract relax with SB stretch sidelying L also performed to improve pelvic stability to include: Vacuum/cupping STM with manual therapy techniques was performed to R gluteals and low back to decrease muscle tightness, increase circulation and promote healing process.  The pt's skin was monitored for redness adjusting pressure as needed. The pt was instructed in possible side effects of bruising and/or soreness.       Patient received pre-mod electrical stimulation to decrease muscle tightness and pain to B lumbar paraspinals for 15 minutes with MH with cycle time: continuous, beat frequency: , CC/CV: CV.     Written Home Exercises Provided: none today  Pt demo good understanding of the education provided. Danuta demonstrated good return demonstration of activities.     Pt. education:  · Posture reeducation, body mechanics, HEP,   · No spiritual or educational barriers to learning provided  · Pt has no cultural, educational or language barriers to learning provided.    Assessment     Pt had developed poor technique with contract relax and under stress with all travel.  Pt appears to understands relaxation techniques.  Pt with decreased symptoms and level pelvis after treatment.  Pt has been doing a good job generally maintaining level pelvis I and continuing focus on HEP.     Pt will continue to benefit from skilled outpatient physical therapy to address the remaining functional deficits, provide  pt/family education, and to maximize pt's level of independence in the home and community environment      GOALS:   Short Term Goals: 3 weeks MET STG'S  Increase flexibility 25%  Be able to perform HEP with minimal cueing required     Long Term Goals: 6 weeks  60% IMPROVED  Increase flexibility to normal  Walking for ADL and exercise will be restored without increased pain  Restore ability to sit for ADL without increased pain  Restore ability to lie on side without increased pain  Restore normal sleep habits without disturbances due to pain    Anticipated barriers to physical therapy: none  Pt's spiritual, cultural and educational needs considered and pt agreeable to plan of care and goals        Plan   If you concur, I recommend patient continue with physical therapy 1-2 times a week as needed  for 6 weeks.  Please advise us of your  recommendations. Thank you for allowing us to assist in the care of your patient.      Jacy Escobar, MS, PT          I certify the need for these services furnished under this plan of treatment and while under my care.    ____________________________________ Physician/Referring Practitioner                                Date of Signature

## 2017-05-03 NOTE — TELEPHONE ENCOUNTER
----- Message from Elizabeth Stephens sent at 5/2/2017  3:16 PM CDT -----  Contact: Self/[phone]  Patient is calling regards to needing an order for physical therapy. Please call and advise.          Thank you.

## 2017-05-11 ENCOUNTER — OFFICE VISIT (OUTPATIENT)
Dept: OBSTETRICS AND GYNECOLOGY | Facility: CLINIC | Age: 62
End: 2017-05-11
Attending: OBSTETRICS & GYNECOLOGY
Payer: COMMERCIAL

## 2017-05-11 VITALS — SYSTOLIC BLOOD PRESSURE: 130 MMHG | HEIGHT: 66 IN | DIASTOLIC BLOOD PRESSURE: 60 MMHG

## 2017-05-11 DIAGNOSIS — Z01.419 WELL FEMALE EXAM WITH ROUTINE GYNECOLOGICAL EXAM: Primary | ICD-10-CM

## 2017-05-11 PROCEDURE — 99396 PREV VISIT EST AGE 40-64: CPT | Mod: S$GLB,,, | Performed by: OBSTETRICS & GYNECOLOGY

## 2017-05-11 PROCEDURE — 88175 CYTOPATH C/V AUTO FLUID REDO: CPT

## 2017-05-11 PROCEDURE — 99999 PR PBB SHADOW E&M-EST. PATIENT-LVL III: CPT | Mod: PBBFAC,,, | Performed by: OBSTETRICS & GYNECOLOGY

## 2017-05-11 NOTE — MR AVS SNAPSHOT
[facility] - [facility] [address]  [address] [address]  Phone: [phone]                  Danuta Santos   2017 3:45 PM   Office Visit    Description:  Female : 1955   Provider:  Jeana Maria MD   Department:  [facility] - [facility] [address]           Reason for Visit     Well Woman           Diagnoses this Visit        Comments    Well female exam with routine gynecological exam    -  Primary            To Do List           Goals (5 Years of Data)     None      [facility]     [facility] -  Assistance  Unless otherwise directed by your provider, please contact [facility], our nurse care line that is available for  assistance.     Registered nurses in the [facility] Center provide: appointment scheduling, clinical advisement, health education, and other advisory services.  Call: [phone] (toll free)               Medications           Message regarding Medications     Verify the changes and/or additions to your medication regime listed below are the same as discussed with your clinician today.  If any of these changes or additions are incorrect, please notify your healthcare provider.        STOP taking these medications     estradiol (VAGIFEM) 10 mcg Tab Place 1 tablet (10 mcg total) vaginally twice a week.    alprazolam (XANAX) 0.25 MG tablet One tablet as needed for airplane flight    acetaZOLAMIDE (DIAMOX) 125 MG Tab Take 1 tablet (125 mg total) by mouth 2 (two) times daily. Start 1 day before going to altitude. Continue for 3 days at altitude           Verify that the below list of medications is an accurate representation of the medications you are currently taking.  If none reported, the list may be blank. If incorrect, please contact your healthcare provider. Carry this list with you in case of emergency.           Current Medications     cyanocobalamin (VITAMIN B-12) 500 MCG tablet Take 1,000 mcg by mouth once daily.    "   flaxseed oil 1,000 mg Cap Take by mouth.      LACTOBACILLUS COMBO NO.6 (PROBIOTIC COMPLEX ORAL) Take 1 capsule by mouth once.     oxymetazoline (AFRIN) 0.05 % nasal spray 2 sprays by Nasal route 2 (two) times daily.      vitamin D 185 MG Tab Take 2,000 mg by mouth once daily.            Clinical Reference Information           Your Vitals Were     BP Height                130/60 (BP Location: Left arm, Patient Position: Sitting, BP Method: Manual) 5' 6" (1.676 m)          Blood Pressure          Most Recent Value    BP  130/60      Allergies as of 5/11/2017     Wheat Containing Prod    Alcohol    Aspirin    Milk Containing Products      Immunizations Administered on Date of Encounter - 5/11/2017     None      Language Assistance Services     ATTENTION: Language assistance services are available, free of charge. Please call [phone].      ATENCIÓN: Si josiahla betina, tiene a blackmon disposición servicios gratuitos de asistencia lingüística. Llame al [phone].     CHÚ Ý: N?u b?n nói Ti?ng Vi?t, có các d?ch v? h? tr? ngôn ng? mi?n phí dành cho b?n. G?i s? [phone].         [facility] [address] complies with applicable Federal civil rights laws and does not discriminate on the basis of race, color, national origin, age, disability, or sex.        "

## 2017-05-12 NOTE — PROGRESS NOTES
SUBJECTIVE:   61 y.o. female   for routine gyn exam. No LMP recorded. Patient is postmenopausal..  She has no unusual complaints.  No PMB.  No HRT         Past Medical History:   Diagnosis Date    IBS (irritable bowel syndrome)      Past Surgical History:   Procedure Laterality Date     SECTION      COSMETIC SURGERY      rhinoplasty    NOSE SURGERY       Social History     Social History    Marital status:      Spouse name: N/A    Number of children: N/A    Years of education: N/A     Occupational History    Not on file.     Social History Main Topics    Smoking status: Never Smoker    Smokeless tobacco: Not on file    Alcohol use No    Drug use: No    Sexual activity: Yes     Partners: Male     Birth control/ protection: None     Other Topics Concern    Not on file     Social History Narrative     Family History   Problem Relation Age of Onset    Cancer Father     Stroke Maternal Grandfather     Breast cancer Neg Hx     Colon cancer Neg Hx     Diabetes Neg Hx     Eclampsia Neg Hx     Hypertension Neg Hx     Miscarriages / Stillbirths Neg Hx     Ovarian cancer Neg Hx      labor Neg Hx      OB History    Para Term  AB SAB TAB Ectopic Multiple Living   2 2        2      # Outcome Date GA Lbr Gio/2nd Weight Sex Delivery Anes PTL Lv   2 Para            1 Para                     Current Outpatient Prescriptions   Medication Sig Dispense Refill    cyanocobalamin (VITAMIN B-12) 500 MCG tablet Take 1,000 mcg by mouth once daily.        flaxseed oil 1,000 mg Cap Take by mouth.        LACTOBACILLUS COMBO NO.6 (PROBIOTIC COMPLEX ORAL) Take 1 capsule by mouth once.       oxymetazoline (AFRIN) 0.05 % nasal spray 2 sprays by Nasal route 2 (two) times daily.        vitamin D 185 MG Tab Take 2,000 mg by mouth once daily.        No current facility-administered medications for this visit.      Allergies: Wheat containing prod; Alcohol; Aspirin; and Milk containing  "products     ROS:  Constitutional: no weight loss, weight gain, fever, fatigue  Eyes:  No vision changes, glasses/contacts  ENT/Mouth: No ulcers, sinus problems, ears ringing, headache  Cardiovascular: No inability to lie flat, chest pain, exercise intolerance, swelling, heart palpitations  Respiratory: No wheezing, coughing blood, shortness of breath, or cough  Gastrointestinal: No diarrhea, bloody stool, nausea/vomiting, constipation, gas, hemorrhoids  Genitourinary: No blood in urine, painful urination, urgency of urination, frequency of urination, incomplete emptying, incontinence, abnormal bleeding, painful periods, heavy periods, vaginal discharge, vaginal odor, painful intercourse, sexual problems, bleeding after intercourse.  Musculoskeletal: No muscle weakness  Skin/Breast: No painful breasts, nipple discharge, masses, rash, ulcers  Neurological: No passing out, seizures, numbness, headache  Endocrine: No diabetes, hypothyroid, hyperthyroid, hot flashes, hair loss, abnormal hair growth, ance  Psychiatric: No depression, crying  Hematologic: No bruises, bleeding, swollen lymph nodes, anemia.      OBJECTIVE:   The patient appears well, alert, oriented x 3, in no distress.  /60 (BP Location: Left arm, Patient Position: Sitting, BP Method: Manual)  Ht 5' 6" (1.676 m)  NECK: no thyromegaly, trachea midline  SKIN: no acne, striae, hirsutism  CHEST: CTAB  CV: RRR  BREAST EXAM: breasts appear normal, no suspicious masses, no skin or nipple changes or axillary nodes  ABDOMEN: no hernias, masses, or hepatosplenomegaly  GENITALIA: normal external genitalia, no erythema, no discharge  URETHRA: normal urethra, normal urethral meatus  VAGINA: Normal  CERVIX: no lesions or cervical motion tenderness  UTERUS: normal size, contour, position, consistency, mobility, non-tender  ADNEXA: no mass, fullness, tenderness      ASSESSMENT:   1. Well female exam with routine gynecological exam  Liquid-based pap smear, screening "       PLAN:   Orders Placed This Encounter    Liquid-based pap smear, screening     Return to clinic in 1 year

## 2017-05-31 ENCOUNTER — CLINICAL SUPPORT (OUTPATIENT)
Dept: REHABILITATION | Facility: HOSPITAL | Age: 62
End: 2017-05-31
Attending: INTERNAL MEDICINE
Payer: COMMERCIAL

## 2017-05-31 DIAGNOSIS — G89.29 CHRONIC MIDLINE LOW BACK PAIN WITH RIGHT-SIDED SCIATICA: Primary | ICD-10-CM

## 2017-05-31 DIAGNOSIS — M54.41 CHRONIC MIDLINE LOW BACK PAIN WITH RIGHT-SIDED SCIATICA: Primary | ICD-10-CM

## 2017-05-31 DIAGNOSIS — M79.604 PAIN IN RIGHT LEG: ICD-10-CM

## 2017-05-31 PROCEDURE — 97140 MANUAL THERAPY 1/> REGIONS: CPT | Performed by: PHYSICAL THERAPIST

## 2017-05-31 PROCEDURE — 97014 ELECTRIC STIMULATION THERAPY: CPT | Performed by: PHYSICAL THERAPIST

## 2017-05-31 NOTE — PROGRESS NOTES
Physical Therapy Progress Note     Name: Danuta Santos  Clinic Number: [id number]  Diagnosis:   Encounter Diagnoses   Name Primary?    Chronic midline low back pain with right-sided sciatica Yes    Pain in right leg      Physician: Erica Khan MD  Treatment Orders: PT Eval and Treat  Past Medical History:   Diagnosis Date    IBS (irritable bowel syndrome)        Precautions: chronic low back pain with radiculopathy  Evaluation date: 3/23/2017  Visit # authorized: 3/22 on 5/31/2017  Authorization period: 12-31-17  Plan of care expiration: 6-13-17  MD Follow up appt: none schedule    Subjective     Pt reports: she has been doing well overall.  Pt states yesterday she helped someone who had fallen and felt increased back pain.  Pt states did exercises and still some pain.  Last night she had some nerve pain down R LE, which has been much better lately.  Pt states no leg pain presently    Pain Scale: before treatment: 5currently; after proper stretching 3/10 after treatment: feels better    Objective     Slight pelvic dysfunction, mod-severe muscle tightness LB paraspinals R       Lumbar active range of motion in standing is: 5-3-17  - flexion - to floor                     - extension -  75%                         - left side bending -  To knee         - right side bending -  To knee            Lumbar active range of motion in standing is: INITIAL EVAL  - flexion - to floor   - extension -  50%   - left side bending - To knee   - right side bending -  To knee       Flexibility testing: same as IE  - hamstrings: 90/90 test R 10 L 10   - gastrocnemius: DF ankle R 5 degrees L 5 degrees  - IT Bands: - imani at IE + imani test       Muscle Strength same as IE  MMT R L   Hip flexion 5/5 5/5   Hip abduction 5/5 5/5   Hip extension 5/5 5/5   Hip ER 5/5 5/5   Hip IR 5/5 5/5   Knee extension 5/5 5/5   Knee flexion 5/5 5/5   Ankle dorsiflexion 5/5 5/5           TREATMENT  (NP)Therapeutic exercise: Danuta received  therapeutic exercises to develop strength, endurance, flexibility and core stabilization for 0 minutes including:     Manual therapy: Danuta  received the following manual therapy techniques x 25 min. to include soft tissue and joint mobilization were applied to the:STM and P/A mob lumbar region,  low back and gluteals, contract relax with SB stretch sidelying L also performed to improve pelvic stability to include: Vacuum/cupping STM with manual therapy techniques was performed to R gluteals and low back to decrease muscle tightness, increase circulation and promote healing process.  The pt's skin was monitored for redness adjusting pressure as needed. The pt was instructed in possible side effects of bruising and/or soreness.       Patient received pre-mod electrical stimulation to decrease muscle tightness and pain to B lumbar paraspinals for 15 minutes with  with cycle time: continuous, beat frequency: , CC/CV: CV.     Written Home Exercises Provided: none today  Pt demo good understanding of the education provided. Danuta demonstrated good return demonstration of activities.     Pt. education:  · Posture reeducation, body mechanics, HEP,   · No spiritual or educational barriers to learning provided  · Pt has no cultural, educational or language barriers to learning provided.    Assessment     Pt had strained back helping lift someone from ground. Pt with improved symptoms after treatment   Pt will continue to benefit from skilled outpatient physical therapy to address the remaining functional deficits, provide pt/family education, and to maximize pt's level of independence in the home and community environment      GOALS:   Short Term Goals: 3 weeks MET STG'S  Increase flexibility 25%  Be able to perform HEP with minimal cueing required     Long Term Goals: 6 weeks  60% IMPROVED  Increase flexibility to normal  Walking for ADL and exercise will be restored without increased pain  Restore ability  to sit for ADL without increased pain  Restore ability to lie on side without increased pain  Restore normal sleep habits without disturbances due to pain    Anticipated barriers to physical therapy: none  Pt's spiritual, cultural and educational needs considered and pt agreeable to plan of care and goals        Plan   Continue with established plan of care towards PT goals.  See pt as needed

## 2017-06-23 ENCOUNTER — TELEPHONE (OUTPATIENT)
Dept: INTERNAL MEDICINE | Facility: CLINIC | Age: 62
End: 2017-06-23

## 2017-06-23 NOTE — TELEPHONE ENCOUNTER
----- Message from Yoly Josue sent at 6/23/2017 10:40 AM CDT -----  Contact: Self/ [phone]   Pt is calling to have more PT orders. Please call and advise     Thank you

## 2017-06-26 ENCOUNTER — CLINICAL SUPPORT (OUTPATIENT)
Dept: REHABILITATION | Facility: HOSPITAL | Age: 62
End: 2017-06-26
Attending: INTERNAL MEDICINE
Payer: COMMERCIAL

## 2017-06-26 DIAGNOSIS — G89.29 CHRONIC MIDLINE LOW BACK PAIN WITH RIGHT-SIDED SCIATICA: Primary | ICD-10-CM

## 2017-06-26 DIAGNOSIS — M79.604 PAIN IN RIGHT LEG: ICD-10-CM

## 2017-06-26 DIAGNOSIS — M54.41 CHRONIC MIDLINE LOW BACK PAIN WITH RIGHT-SIDED SCIATICA: Primary | ICD-10-CM

## 2017-06-26 PROCEDURE — 97110 THERAPEUTIC EXERCISES: CPT | Performed by: PHYSICAL THERAPIST

## 2017-06-26 PROCEDURE — 97014 ELECTRIC STIMULATION THERAPY: CPT | Performed by: PHYSICAL THERAPIST

## 2017-06-26 PROCEDURE — 97140 MANUAL THERAPY 1/> REGIONS: CPT | Performed by: PHYSICAL THERAPIST

## 2017-06-26 NOTE — PROGRESS NOTES
Physical Therapy Progress Note     Name: Danuta Santos  Clinic Number: [id number]  Diagnosis:   Encounter Diagnoses   Name Primary?    Chronic midline low back pain with right-sided sciatica Yes    Pain in right leg      Physician: Erica Khan MD  Treatment Orders: PT Eval and Treat  Past Medical History:   Diagnosis Date    IBS (irritable bowel syndrome)        Precautions: chronic low back pain with radiculopathy  Evaluation date: 3/23/2017  Visit # authorized: 4/22 on 6/26/2017  Authorization period: 12-31-17  Plan of care expiration: 8-6-17  MD Follow up appt: none schedule    Subjective     Pt reports: she went out of town and had to do a lot of sitting and walking and she got very tight and unable to self mobilize as easily since she has returned from out of town.  Pt states little better since return, but still concerned Generally, has been able to manage symptoms I  Pain Scale: before treatment: 5 currently; after proper stretching 3/10 after treatment: feels better    Objective     Level pelvis, mod-severe muscle tightness LB paraspinals R     Lumbar active range of motion in standing is: 6-26-17  - flexion - to floor                     - extension -  100%                         - left side bending -  To knee         - right side bending -  To knee              Lumbar active range of motion in standing is: 5-3-17  - flexion - to floor                     - extension -  75%                         - left side bending -  To knee         - right side bending -  To knee            Lumbar active range of motion in standing is: INITIAL EVAL  - flexion - to floor   - extension -  50%   - left side bending - To knee   - right side bending -  To knee       Flexibility testing: same as IE  - hamstrings: 90/90 test R 10 L 10   - gastrocnemius: DF ankle R 5 degrees L 5 degrees  - IT Bands: - imani at IE + imani test       Muscle Strength same as IE  MMT R L   Hip flexion 5/5 5/5   Hip abduction 5/5 5/5   Hip  extension 5/5 5/5   Hip ER 5/5 5/5   Hip IR 5/5 5/5   Knee extension 5/5 5/5   Knee flexion 5/5 5/5   Ankle dorsiflexion 5/5 5/5           TREATMENT  Therapeutic exercise: Danuta received therapeutic exercises to develop strength, endurance, flexibility and core stabilization for 15 minutes including:   Contract relax to R lumbar paraspinals sidelying L  Review of HEP and instruction on continuing HEP. Discussed ways to help symptoms while out of town.    Manual therapy: Danuta  received the following manual therapy techniques x 25 min. to include soft tissue and joint mobilization were applied to the:STM and P/A mob lumbar region,  low back and gluteals, contract relax with SB stretch sidelying L also performed to improve pelvic stability to include: Vacuum/cupping STM with manual therapy techniques was performed to R gluteals and low back to decrease muscle tightness, increase circulation and promote healing process.  The pt's skin was monitored for redness adjusting pressure as needed. The pt was instructed in possible side effects of bruising and/or soreness.       Patient received pre-mod electrical stimulation to decrease muscle tightness and pain to B lumbar paraspinals for 15 minutes with  with cycle time: continuous, beat frequency: , CC/CV: CV.     Written Home Exercises Provided: none today  Pt demo good understanding of the education provided. Danuta demonstrated good return demonstration of activities.     Pt. education:  · Posture reeducation, body mechanics, HEP,   · No spiritual or educational barriers to learning provided  · Pt has no cultural, educational or language barriers to learning provided.    Assessment   Pt was seen for 2 visits since last progress report.  Pt has generally been able to manage symptoms independently, but was aggravated while out of town with excessive sitting and walking.     Pt will continue to benefit from skilled outpatient physical therapy to address  the remaining functional deficits, provide pt/family education, and to maximize pt's level of independence in the home and community environment      GOALS:   Short Term Goals: 3 weeks MET STG'S  Increase flexibility 25%  Be able to perform HEP with minimal cueing required     Long Term Goals: 6 weeks  70% IMPROVED  Increase flexibility to normal  Walking for ADL and exercise will be restored without increased pain  Restore ability to sit for ADL without increased pain  Restore ability to lie on side without increased pain  Restore normal sleep habits without disturbances due to pain    Anticipated barriers to physical therapy: none  Pt's spiritual, cultural and educational needs considered and pt agreeable to plan of care and goals        Plan   If you concur, I recommend patient continue with physical therapy 1-2 times a week as needed  for 6 weeks.  Please advise us of your  recommendations. Thank you for allowing us to assist in the care of your patient.      Jacy Escobar, MS, PT          I certify the need for these services furnished under this plan of treatment and while under my care.    ____________________________________ Physician/Referring Practitioner                                Date of Signature

## 2017-06-26 NOTE — PLAN OF CARE
Physical Therapy Progress Note     Name: Danuta Santos  Clinic Number: [id number]  Diagnosis:   Encounter Diagnoses   Name Primary?    Chronic midline low back pain with right-sided sciatica Yes    Pain in right leg      Physician: Erica Khan MD  Treatment Orders: PT Eval and Treat  Past Medical History:   Diagnosis Date    IBS (irritable bowel syndrome)        Precautions: chronic low back pain with radiculopathy  Evaluation date: 3/23/2017  Visit # authorized: 4/22 on 6/26/2017  Authorization period: 12-31-17  Plan of care expiration: 8-6-17  MD Follow up appt: none schedule    Subjective     Pt reports: she went out of town and had to do a lot of sitting and walking and she got very tight and unable to self mobilize as easily since she has returned from out of town.  Pt states little better since return, but still concerned Generally, has been able to manage symptoms I  Pain Scale: before treatment: 5 currently; after proper stretching 3/10 after treatment: feels better    Objective     Level pelvis, mod-severe muscle tightness LB paraspinals R     Lumbar active range of motion in standing is: 6-26-17  - flexion - to floor                     - extension -  100%                         - left side bending -  To knee         - right side bending -  To knee              Lumbar active range of motion in standing is: 5-3-17  - flexion - to floor                     - extension -  75%                         - left side bending -  To knee         - right side bending -  To knee            Lumbar active range of motion in standing is: INITIAL EVAL  - flexion - to floor   - extension -  50%   - left side bending - To knee   - right side bending -  To knee       Flexibility testing: same as IE  - hamstrings: 90/90 test R 10 L 10   - gastrocnemius: DF ankle R 5 degrees L 5 degrees  - IT Bands: - imani at IE + imani test       Muscle Strength same as IE  MMT R L   Hip flexion 5/5 5/5   Hip abduction 5/5 5/5   Hip  extension 5/5 5/5   Hip ER 5/5 5/5   Hip IR 5/5 5/5   Knee extension 5/5 5/5   Knee flexion 5/5 5/5   Ankle dorsiflexion 5/5 5/5           TREATMENT  Therapeutic exercise: Danuta received therapeutic exercises to develop strength, endurance, flexibility and core stabilization for 15 minutes including:   Contract relax to R lumbar paraspinals sidelying L  Review of HEP and instruction on continuing HEP. Discussed ways to help symptoms while out of town.    Manual therapy: Danuta  received the following manual therapy techniques x 25 min. to include soft tissue and joint mobilization were applied to the:STM and P/A mob lumbar region,  low back and gluteals, contract relax with SB stretch sidelying L also performed to improve pelvic stability to include: Vacuum/cupping STM with manual therapy techniques was performed to R gluteals and low back to decrease muscle tightness, increase circulation and promote healing process.  The pt's skin was monitored for redness adjusting pressure as needed. The pt was instructed in possible side effects of bruising and/or soreness.       Patient received pre-mod electrical stimulation to decrease muscle tightness and pain to B lumbar paraspinals for 15 minutes with  with cycle time: continuous, beat frequency: , CC/CV: CV.     Written Home Exercises Provided: none today  Pt demo good understanding of the education provided. Danuta demonstrated good return demonstration of activities.     Pt. education:  · Posture reeducation, body mechanics, HEP,   · No spiritual or educational barriers to learning provided  · Pt has no cultural, educational or language barriers to learning provided.    Assessment   Pt was seen for 2 visits since last progress report.  Pt has generally been able to manage symptoms independently, but was aggravated while out of town with excessive sitting and walking.     Pt will continue to benefit from skilled outpatient physical therapy to address  the remaining functional deficits, provide pt/family education, and to maximize pt's level of independence in the home and community environment      GOALS:   Short Term Goals: 3 weeks MET STG'S  Increase flexibility 25%  Be able to perform HEP with minimal cueing required     Long Term Goals: 6 weeks  70% IMPROVED  Increase flexibility to normal  Walking for ADL and exercise will be restored without increased pain  Restore ability to sit for ADL without increased pain  Restore ability to lie on side without increased pain  Restore normal sleep habits without disturbances due to pain    Anticipated barriers to physical therapy: none  Pt's spiritual, cultural and educational needs considered and pt agreeable to plan of care and goals        Plan   If you concur, I recommend patient continue with physical therapy 1-2 times a week as needed  for 6 weeks.  Please advise us of your  recommendations. Thank you for allowing us to assist in the care of your patient.      Jacy Escobar, MS, PT          I certify the need for these services furnished under this plan of treatment and while under my care.    ____________________________________ Physician/Referring Practitioner                                Date of Signature

## 2017-07-05 ENCOUNTER — CLINICAL SUPPORT (OUTPATIENT)
Dept: REHABILITATION | Facility: HOSPITAL | Age: 62
End: 2017-07-05
Attending: INTERNAL MEDICINE
Payer: COMMERCIAL

## 2017-07-05 DIAGNOSIS — M79.604 PAIN IN RIGHT LEG: ICD-10-CM

## 2017-07-05 DIAGNOSIS — G89.29 CHRONIC MIDLINE LOW BACK PAIN WITH RIGHT-SIDED SCIATICA: Primary | ICD-10-CM

## 2017-07-05 DIAGNOSIS — M54.41 CHRONIC MIDLINE LOW BACK PAIN WITH RIGHT-SIDED SCIATICA: Primary | ICD-10-CM

## 2017-07-05 PROCEDURE — 97140 MANUAL THERAPY 1/> REGIONS: CPT | Performed by: PHYSICAL THERAPIST

## 2017-07-05 PROCEDURE — 97014 ELECTRIC STIMULATION THERAPY: CPT | Performed by: PHYSICAL THERAPIST

## 2017-07-05 PROCEDURE — 97110 THERAPEUTIC EXERCISES: CPT | Performed by: PHYSICAL THERAPIST

## 2017-07-05 PROCEDURE — 97010 HOT OR COLD PACKS THERAPY: CPT | Performed by: PHYSICAL THERAPIST

## 2017-07-05 NOTE — PROGRESS NOTES
Physical Therapy Daily Note     Name: Danuta Santos  Clinic Number: [id number]  Diagnosis:   Encounter Diagnoses   Name Primary?    Chronic midline low back pain with right-sided sciatica Yes    Pain in right leg      Physician: Erica Khan MD  Treatment Orders: PT Eval and Treat  Past Medical History:   Diagnosis Date    IBS (irritable bowel syndrome)        Precautions: chronic low back pain with radiculopathy  Evaluation date: 3/23/2017  Visit # authorized: 5/22 on 7/5/2017  Authorization period: 12-31-17  Plan of care expiration: 8-6-17  MD Follow up appt: none schedule    Subjective     Pt reports: she was helping a cat last night and was up and down a lot and disturbed sleep, leading to increased pain.  She tried push/pull, but did not work.  Pain Scale: before treatment: 4 at rest with movement 6 and feeling very stiff currently; after proper stretching 3/10 after treatment: feels better    Objective       AR R pelvis, mod-severe muscle tightness LB paraspinals R     Lumbar active range of motion in standing is: 6-26-17  - flexion - to floor                     - extension -  100%                         - left side bending -  To knee         - right side bending -  To knee         Lumbar active range of motion in standing is: INITIAL EVAL  - flexion - to floor   - extension -  50%   - left side bending - To knee   - right side bending -  To knee       Flexibility testing: same as IE  - hamstrings: 90/90 test R 10 L 10   - gastrocnemius: DF ankle R 5 degrees L 5 degrees  - IT Bands: - imani at IE + imani test       Muscle Strength same as IE  MMT R L   Hip flexion 5/5 5/5   Hip abduction 5/5 5/5   Hip extension 5/5 5/5   Hip ER 5/5 5/5   Hip IR 5/5 5/5   Knee extension 5/5 5/5   Knee flexion 5/5 5/5   Ankle dorsiflexion 5/5 5/5           TREATMENT  Therapeutic exercise: Danuta received therapeutic exercises to develop strength, endurance, flexibility and core stabilization for 15 minutes including:    Contract relax to R glut, did not work, performed sidelying mob and realigned.  lumbar paraspinals sidelying L  Further instructed activities such as what was involved in caring for cat and how she could possibly modify or realize not able to do some of those activities.    Manual therapy: Danuta  received the following manual therapy techniques x 25 min. to include soft tissue and joint mobilization were applied to the:STM and P/A mob lumbar region,  low back and gluteals, contract relax with SB stretch sidelying L also performed to improve pelvic stability to include: Vacuum/cupping STM with manual therapy techniques was performed to R gluteals and low back to decrease muscle tightness, increase circulation and promote healing process.  The pt's skin was monitored for redness adjusting pressure as needed. The pt was instructed in possible side effects of bruising and/or soreness.       Patient received pre-mod electrical stimulation to decrease muscle tightness and pain to B lumbar paraspinals for 15 minutes with MH with cycle time: continuous, beat frequency: , CC/CV: CV.     Written Home Exercises Provided: none today  Pt demo good understanding of the education provided. Danuta demonstrated good return demonstration of activities.     Pt. education:  · Posture reeducation, body mechanics, HEP,   · No spiritual or educational barriers to learning provided  · Pt has no cultural, educational or language barriers to learning provided.    Assessment   Pt realizes she is unable to do that much squatting and unable to lie on mat on floor for sleep without leading to increased muscle tightness that led to inability to self mobilize   Pt will continue to benefit from skilled outpatient physical therapy to address the remaining functional deficits, provide pt/family education, and to maximize pt's level of independence in the home and community environment      GOALS:   Short Term Goals: 3 weeks MET  STG'S  Increase flexibility 25%  Be able to perform HEP with minimal cueing required     Long Term Goals: 6 weeks  70% IMPROVED  Increase flexibility to normal  Walking for ADL and exercise will be restored without increased pain  Restore ability to sit for ADL without increased pain  Restore ability to lie on side without increased pain  Restore normal sleep habits without disturbances due to pain    Anticipated barriers to physical therapy: none  Pt's spiritual, cultural and educational needs considered and pt agreeable to plan of care and goals        Plan   Continue with established plan of care towards PT goals.

## 2017-07-20 ENCOUNTER — CLINICAL SUPPORT (OUTPATIENT)
Dept: REHABILITATION | Facility: HOSPITAL | Age: 62
End: 2017-07-20
Attending: INTERNAL MEDICINE
Payer: COMMERCIAL

## 2017-07-20 DIAGNOSIS — M54.41 CHRONIC MIDLINE LOW BACK PAIN WITH RIGHT-SIDED SCIATICA: Primary | ICD-10-CM

## 2017-07-20 DIAGNOSIS — M79.604 PAIN IN RIGHT LEG: ICD-10-CM

## 2017-07-20 DIAGNOSIS — G89.29 CHRONIC MIDLINE LOW BACK PAIN WITH RIGHT-SIDED SCIATICA: Primary | ICD-10-CM

## 2017-07-20 PROCEDURE — 97140 MANUAL THERAPY 1/> REGIONS: CPT | Performed by: PHYSICAL THERAPIST

## 2017-07-20 PROCEDURE — 97010 HOT OR COLD PACKS THERAPY: CPT | Performed by: PHYSICAL THERAPIST

## 2017-07-20 PROCEDURE — 97014 ELECTRIC STIMULATION THERAPY: CPT | Performed by: PHYSICAL THERAPIST

## 2017-07-20 PROCEDURE — 97110 THERAPEUTIC EXERCISES: CPT | Performed by: PHYSICAL THERAPIST

## 2017-07-20 NOTE — PROGRESS NOTES
Physical Therapy Daily Note     Name: Danuta Santos  Clinic Number: [id number]  Diagnosis:   Encounter Diagnoses   Name Primary?    Chronic midline low back pain with right-sided sciatica Yes    Pain in right leg      Physician: Erica Khan MD  Treatment Orders: PT Eval and Treat  Past Medical History:   Diagnosis Date    IBS (irritable bowel syndrome)        Precautions: chronic low back pain with radiculopathy  Evaluation date: 3/23/2017  Visit # authorized: 6/22 on 7/20/2017  Authorization period: 12-31-17  Plan of care expiration: 8-6-17  MD Follow up appt: none schedule    Subjective     Pt reports: she has been walking and exercising and has been doing ok, but is preparing for going out of town and did more FB than normal for packing and felt increased tightness and not sure if level for continuing with some pain  Pain Scale: before treatment: 4 at rest with movement 6 and feeling very stiff currently; after proper stretching 3/10 after treatment: feels better    Objective       AR R pelvis, mod-severe muscle tightness LB paraspinals R     Lumbar active range of motion in standing is: 6-26-17  - flexion - to floor                     - extension -  100%                         - left side bending -  To knee         - right side bending -  To knee         Lumbar active range of motion in standing is: INITIAL EVAL  - flexion - to floor   - extension -  50%   - left side bending - To knee   - right side bending -  To knee       Flexibility testing: same as IE  - hamstrings: 90/90 test R 10 L 10   - gastrocnemius: DF ankle R 5 degrees L 5 degrees  - IT Bands: - imani at IE + imani test       Muscle Strength same as IE  MMT R L   Hip flexion 5/5 5/5   Hip abduction 5/5 5/5   Hip extension 5/5 5/5   Hip ER 5/5 5/5   Hip IR 5/5 5/5   Knee extension 5/5 5/5   Knee flexion 5/5 5/5   Ankle dorsiflexion 5/5 5/5           TREATMENT  Therapeutic exercise: Danuta received therapeutic exercises to develop  "strength, endurance, flexibility and core stabilization for 15 minutes including:   Contract relax to R glut, did work, but did not yet do modalities such as heat, TENS or cups for was very busy and was concerned she was not getting pelvis level for could not tell for sure, so made appt to see PT.    Instructed pt in need to increase awareness of posture when packing       Manual therapy: Danuta  received the following manual therapy techniques x 25 min. to include soft tissue and joint mobilization were applied to the:STM and P/A mob lumbar region,  low back and gluteals, contract relax with SB stretch sidelying L also performed to improve pelvic stability to include: Vacuum/cupping STM with manual therapy techniques was performed to R gluteals and low back to decrease muscle tightness, increase circulation and promote healing process.  The pt's skin was monitored for redness adjusting pressure as needed. The pt was instructed in possible side effects of bruising and/or soreness.      Kinesiotape with 6" star for pain relief was performed over the R lumbar paraspinals.  Instructed pt in use, care and precautions with tape.         Patient received pre-mod electrical stimulation to decrease muscle tightness and pain to B lumbar paraspinals for 15 minutes with  with cycle time: continuous, beat frequency: , CC/CV: CV.     Written Home Exercises Provided: none today  Pt demo good understanding of the education provided. Danuta demonstrated good return demonstration of activities.     Pt. education:  · Posture reeducation, body mechanics, HEP,   · No spiritual or educational barriers to learning provided  · Pt has no cultural, educational or language barriers to learning provided.    Assessment   Pt with increased muscle tightness with packing for trip, but able to self mobilize.  Pt understands to make sure and use modalities at home also if continue to feel soreness after ex and contract relax.    Pt " will continue to benefit from skilled outpatient physical therapy to address the remaining functional deficits, provide pt/family education, and to maximize pt's level of independence in the home and community environment      GOALS:   Short Term Goals: 3 weeks MET STG'S  Increase flexibility 25%  Be able to perform HEP with minimal cueing required     Long Term Goals: 6 weeks  70% IMPROVED  Increase flexibility to normal  Walking for ADL and exercise will be restored without increased pain  Restore ability to sit for ADL without increased pain  Restore ability to lie on side without increased pain  Restore normal sleep habits without disturbances due to pain    Anticipated barriers to physical therapy: none  Pt's spiritual, cultural and educational needs considered and pt agreeable to plan of care and goals        Plan   Continue with established plan of care towards PT goals.

## 2017-08-03 ENCOUNTER — CLINICAL SUPPORT (OUTPATIENT)
Dept: REHABILITATION | Facility: HOSPITAL | Age: 62
End: 2017-08-03
Attending: INTERNAL MEDICINE
Payer: COMMERCIAL

## 2017-08-03 DIAGNOSIS — G89.29 CHRONIC MIDLINE LOW BACK PAIN WITH RIGHT-SIDED SCIATICA: Primary | ICD-10-CM

## 2017-08-03 DIAGNOSIS — M54.41 CHRONIC MIDLINE LOW BACK PAIN WITH RIGHT-SIDED SCIATICA: Primary | ICD-10-CM

## 2017-08-03 DIAGNOSIS — M79.604 PAIN IN RIGHT LEG: ICD-10-CM

## 2017-08-03 PROCEDURE — 97014 ELECTRIC STIMULATION THERAPY: CPT | Performed by: PHYSICAL THERAPIST

## 2017-08-03 PROCEDURE — 97110 THERAPEUTIC EXERCISES: CPT | Performed by: PHYSICAL THERAPIST

## 2017-08-03 PROCEDURE — 97140 MANUAL THERAPY 1/> REGIONS: CPT | Performed by: PHYSICAL THERAPIST

## 2017-08-03 NOTE — PLAN OF CARE
Physical Therapy Progress Note     Name: Danuta Santos  Clinic Number: [id number]  Diagnosis:   Encounter Diagnoses   Name Primary?    Chronic midline low back pain with right-sided sciatica Yes    Pain in right leg      Physician: Eirca Khan MD  Treatment Orders: PT Eval and Treat  Past Medical History:   Diagnosis Date    IBS (irritable bowel syndrome)        Precautions: chronic low back pain with radiculopathy  Evaluation date: 3/23/2017  Visit # authorized: 7/22 on 8/3/2017  Authorization period: 12-31-17  Plan of care expiration: 9-13-17  MD Follow up appt: none schedule    Subjective     Pt reports:  Had been traveling and had to carry a big back pack and brought TENS and would stretch.  Pt states back tightened up after carrying heavy bags.   Pain Scale: before treatment: 5-6 at rest after contract relax 3 currently; after proper stretching 3/10 at best 1-2 after treatment: feels better    Objective     Pt has been seen for 3 visits since last progress report  AR R pelvis, mod-severe muscle tightness LB paraspinals R     Lumbar active range of motion in standing is: 8-3-17  - flexion - to knee                     - extension -  50%                         - left side bending -  To knee         - right side bending -  To knee         Lumbar active range of motion in standing is: INITIAL EVAL  - flexion - to floor   - extension -  50%   - left side bending - To knee   - right side bending -  To knee       Flexibility testing: same as IE  - hamstrings: 90/90 test R 10 L 10   - gastrocnemius: DF ankle R 5 degrees L 5 degrees  - IT Bands: - imani at IE + imani test       Muscle Strength same as IE  MMT R L   Hip flexion 5/5 5/5   Hip abduction 5/5 5/5   Hip extension 5/5 5/5   Hip ER 5/5 5/5   Hip IR 5/5 5/5   Knee extension 5/5 5/5   Knee flexion 5/5 5/5   Ankle dorsiflexion 5/5 5/5           TREATMENT  Therapeutic exercise: Danuta received therapeutic exercises to develop strength, endurance,  "flexibility and core stabilization for 10 minutes including:   Contract relax to R glut, did work,  And able to feel difference still feels tight though Continue to work on strengthening and flexibility ex         Manual therapy: Danuta  received the following manual therapy techniques x 15 min. to include soft tissue and joint mobilization were applied to the:STM and P/A mob lumbar region(NP),  low back and gluteals, contract relax with SB stretch sidelying L also performed to improve pelvic stability to include: Vacuum/cupping STM with manual therapy techniques was performed to R gluteals and low back to decrease muscle tightness, increase circulation and promote healing process.  The pt's skin was monitored for redness adjusting pressure as needed. The pt was instructed in possible side effects of bruising and/or soreness.      Kinesiotape with 6" star for pain relief was performed over the R lumbar paraspinals.  Instructed pt in use, care and precautions with tape.         Patient received pre-mod electrical stimulation to decrease muscle tightness and pain to B lumbar paraspinals for 15 minutes with  with cycle time: continuous, beat frequency: , CC/CV: CV.     Written Home Exercises Provided: none today  Pt demo good understanding of the education provided. Danuta demonstrated good return demonstration of activities.     Pt. education:  · Posture reeducation, body mechanics, HEP,   · No spiritual or educational barriers to learning provided  · Pt has no cultural, educational or language barriers to learning provided.    Assessment   Pt with increased muscle tightness after trip, but understands to continue work on all modalities and ex once she returns home.  Decreased trunk ROM due to increased tightness and pt understands to move and stretch as able after activities that lead to increased symptoms requiring more strength and endurance that she has and is unaccustomed to   Pt will continue to " benefit from skilled outpatient physical therapy to address the remaining functional deficits, provide pt/family education, and to maximize pt's level of independence in the home and community environment      GOALS:   Short Term Goals: 3 weeks MET STG'S  Increase flexibility 25%  Be able to perform HEP with minimal cueing required     Long Term Goals: 6 weeks  75% IMPROVED  Increase flexibility to normal  Walking for ADL and exercise will be restored without increased pain  Restore ability to sit for ADL without increased pain  Restore ability to lie on side without increased pain  Restore normal sleep habits without disturbances due to pain    Anticipated barriers to physical therapy: none  Pt's spiritual, cultural and educational needs considered and pt agreeable to plan of care and goals        Plan   If you concur, I recommend patient continue with physical therapy 1-2 times a week PRN  for 6 weeks.  Please advise us of your  recommendations. Thank you for allowing us to assist in the care of your patient.      Jacy Escobar, MS, PT          I certify the need for these services furnished under this plan of treatment and while under my care.    ____________________________________ Physician/Referring Practitioner                                Date of Signature

## 2017-08-03 NOTE — PROGRESS NOTES
Physical Therapy Progress Note     Name: Daunta Santos  Clinic Number: [id number]  Diagnosis:   Encounter Diagnoses   Name Primary?    Chronic midline low back pain with right-sided sciatica Yes    Pain in right leg      Physician: Erica Khan MD  Treatment Orders: PT Eval and Treat  Past Medical History:   Diagnosis Date    IBS (irritable bowel syndrome)        Precautions: chronic low back pain with radiculopathy  Evaluation date: 3/23/2017  Visit # authorized: 7/22 on 8/3/2017  Authorization period: 12-31-17  Plan of care expiration: 9-13-17  MD Follow up appt: none schedule    Subjective     Pt reports:  Had been traveling and had to carry a big back pack and brought TENS and would stretch.  Pt states back tightened up after carrying heavy bags.   Pain Scale: before treatment: 5-6 at rest after contract relax 3 currently; after proper stretching 3/10 at best 1-2 after treatment: feels better    Objective     Pt has been seen for 3 visits since last progress report  AR R pelvis, mod-severe muscle tightness LB paraspinals R     Lumbar active range of motion in standing is: 8-3-17  - flexion - to knee                     - extension -  50%                         - left side bending -  To knee         - right side bending -  To knee         Lumbar active range of motion in standing is: INITIAL EVAL  - flexion - to floor   - extension -  50%   - left side bending - To knee   - right side bending -  To knee       Flexibility testing: same as IE  - hamstrings: 90/90 test R 10 L 10   - gastrocnemius: DF ankle R 5 degrees L 5 degrees  - IT Bands: - imani at IE + imani test       Muscle Strength same as IE  MMT R L   Hip flexion 5/5 5/5   Hip abduction 5/5 5/5   Hip extension 5/5 5/5   Hip ER 5/5 5/5   Hip IR 5/5 5/5   Knee extension 5/5 5/5   Knee flexion 5/5 5/5   Ankle dorsiflexion 5/5 5/5           TREATMENT  Therapeutic exercise: Danuta received therapeutic exercises to develop strength, endurance,  "flexibility and core stabilization for 10 minutes including:   Contract relax to R glut, did work,  And able to feel difference still feels tight though Continue to work on strengthening and flexibility ex         Manual therapy: Danuta  received the following manual therapy techniques x 15 min. to include soft tissue and joint mobilization were applied to the:STM and P/A mob lumbar region(NP),  low back and gluteals, contract relax with SB stretch sidelying L also performed to improve pelvic stability to include: Vacuum/cupping STM with manual therapy techniques was performed to R gluteals and low back to decrease muscle tightness, increase circulation and promote healing process.  The pt's skin was monitored for redness adjusting pressure as needed. The pt was instructed in possible side effects of bruising and/or soreness.      Kinesiotape with 6" star for pain relief was performed over the R lumbar paraspinals.  Instructed pt in use, care and precautions with tape.         Patient received pre-mod electrical stimulation to decrease muscle tightness and pain to B lumbar paraspinals for 15 minutes with  with cycle time: continuous, beat frequency: , CC/CV: CV.     Written Home Exercises Provided: none today  Pt demo good understanding of the education provided. Danuta demonstrated good return demonstration of activities.     Pt. education:  · Posture reeducation, body mechanics, HEP,   · No spiritual or educational barriers to learning provided  · Pt has no cultural, educational or language barriers to learning provided.    Assessment   Pt with increased muscle tightness after trip, but understands to continue work on all modalities and ex once she returns home.  Decreased trunk ROM due to increased tightness and pt understands to move and stretch as able after activities that lead to increased symptoms requiring more strength and endurance that she has and is unaccustomed to   Pt will continue to " benefit from skilled outpatient physical therapy to address the remaining functional deficits, provide pt/family education, and to maximize pt's level of independence in the home and community environment      GOALS:   Short Term Goals: 3 weeks MET STG'S  Increase flexibility 25%  Be able to perform HEP with minimal cueing required     Long Term Goals: 6 weeks  75% IMPROVED  Increase flexibility to normal  Walking for ADL and exercise will be restored without increased pain  Restore ability to sit for ADL without increased pain  Restore ability to lie on side without increased pain  Restore normal sleep habits without disturbances due to pain    Anticipated barriers to physical therapy: none  Pt's spiritual, cultural and educational needs considered and pt agreeable to plan of care and goals        Plan   If you concur, I recommend patient continue with physical therapy 1-2 times a week PRN  for 6 weeks.  Please advise us of your  recommendations. Thank you for allowing us to assist in the care of your patient.      Jacy Escobar, MS, PT          I certify the need for these services furnished under this plan of treatment and while under my care.    ____________________________________ Physician/Referring Practitioner                                Date of Signature

## 2017-08-22 ENCOUNTER — TELEPHONE (OUTPATIENT)
Dept: INFECTIOUS DISEASES | Facility: HOSPITAL | Age: 62
End: 2017-08-22

## 2017-08-22 DIAGNOSIS — Z23 NEED FOR VACCINATION: Primary | ICD-10-CM

## 2017-08-22 NOTE — TELEPHONE ENCOUNTER
Left message for patient asking for her to return our call to schedule appt.  Dr. Alford did want the patient to receive the hepatitis A vaccination series.  Order in, patient due for second injection after September 15th.

## 2017-09-19 ENCOUNTER — CLINICAL SUPPORT (OUTPATIENT)
Dept: INFECTIOUS DISEASES | Facility: CLINIC | Age: 62
End: 2017-09-19
Payer: COMMERCIAL

## 2017-09-19 DIAGNOSIS — Z23 NEED FOR VACCINATION: ICD-10-CM

## 2017-09-19 PROCEDURE — 90471 IMMUNIZATION ADMIN: CPT | Mod: S$GLB,,, | Performed by: INTERNAL MEDICINE

## 2017-09-19 PROCEDURE — 90632 HEPA VACCINE ADULT IM: CPT | Mod: S$GLB,,, | Performed by: INTERNAL MEDICINE

## 2017-09-19 NOTE — PROGRESS NOTES
Pt received the final dose of her Hepatitis A vaccination. Pt refused vaccination handout. Pt tolerated injection well. Pt left the unit in NAD.

## 2017-09-21 ENCOUNTER — CLINICAL SUPPORT (OUTPATIENT)
Dept: REHABILITATION | Facility: HOSPITAL | Age: 62
End: 2017-09-21
Attending: INTERNAL MEDICINE
Payer: COMMERCIAL

## 2017-09-21 DIAGNOSIS — M79.604 PAIN IN RIGHT LEG: ICD-10-CM

## 2017-09-21 DIAGNOSIS — M54.41 CHRONIC MIDLINE LOW BACK PAIN WITH RIGHT-SIDED SCIATICA: Primary | ICD-10-CM

## 2017-09-21 DIAGNOSIS — G89.29 CHRONIC MIDLINE LOW BACK PAIN WITH RIGHT-SIDED SCIATICA: Primary | ICD-10-CM

## 2017-09-21 PROCEDURE — 97014 ELECTRIC STIMULATION THERAPY: CPT | Performed by: PHYSICAL THERAPIST

## 2017-09-21 PROCEDURE — 97140 MANUAL THERAPY 1/> REGIONS: CPT | Performed by: PHYSICAL THERAPIST

## 2017-09-21 PROCEDURE — 97110 THERAPEUTIC EXERCISES: CPT | Performed by: PHYSICAL THERAPIST

## 2017-09-21 NOTE — PROGRESS NOTES
Physical Therapy Progress Note     Name: Danuta Santos  Clinic Number: [id number]  Diagnosis:   No diagnosis found.  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  Pt states using her modalities to help and exercises and still having some pain.  Though not as much ex this week.. CC is R LB QL region tightness  Pain Scale: before treatment: 6 and feel tightness down post thigh to knee at rest after contract relax and stretching 5  currently;     Objective     Pt returns today for 1st visit since last progress report  AR R pelvis, mod-severe muscle tightness LB paraspinals R     Lumbar active range of motion in standing is:9-21-17  - flexion - floor                     - extension -  50%                         - left side bending -  To knee         - right side bending -  To knee            Lumbar active range of motion in standing is: 8-3-17  - flexion - to knee                   - extension -  50%                         - left side bending -  To knee         - right side bending -  To knee         Lumbar active range of motion in standing is: INITIAL EVAL  - flexion - to floor   - extension -  50%   - left side bending - To knee   - right side bending -  To knee       Flexibility testing: same as IE  - hamstrings: 90/90 test R 10 L 10   - gastrocnemius: DF ankle R 5 degrees L 5 degrees  - IT Bands: - imani at IE + imani test           Muscle Strength   MMT R L   Hip flexion 5/5 5/5   Hip abduction 5/5 5/5   Hip  extension 5/5 5/5   Hip ER 5/5 5/5   Hip IR 5/5 5/5   Knee extension 5/5 5/5   Knee flexion 5/5 5/5   Ankle dorsiflexion 5/5 5/5           TREATMENT  Therapeutic exercise: Danuta received therapeutic exercises to develop strength, endurance, flexibility and core stabilization for 25 minutes including:   Contract relax to R glut, did work,    Instructed pt in SB stretch B to stretch lumbar paraspinals        Manual therapy: Danuta  received the following manual therapy techniques x 15 min. to include soft tissue and joint mobilization were applied to the:sidebend L contract relax performed to improve pelvic stability and to include: Vacuum/cupping STM with manual therapy techniques was performed to R gluteals and low back to decrease muscle tightness, increase circulation and promote healing process.  The pt's skin was monitored for redness adjusting pressure as needed. The pt was instructed in possible side effects of bruising and/or soreness.             Patient received pre-mod electrical stimulation to decrease muscle tightness and pain to B lumbar paraspinals for 15 minutes with  with cycle time: continuous, beat frequency: , CC/CV: CV.     Written Home Exercises Provided: lateral side end stretch  Pt demo good understanding of the education provided. Danuta demonstrated good return demonstration of activities.     Pt. education:  · Posture reeducation, body mechanics, HEP,   · No spiritual or educational barriers to learning provided  · Pt has no cultural, educational or language barriers to learning provided.    Assessment   Pt with increased muscle tightness with increased sitting and school work required.  Pt still able to self mobilize and may benefit from additional stretching and had not been doing as much cupping for STM and understands how this can also help    Pt will continue to benefit from skilled outpatient physical therapy to address the remaining functional deficits, provide  pt/family education, and to maximize pt's level of independence in the home and community environment      GOALS:   Short Term Goals: 3 weeks MET STG'S  Increase flexibility 25%  Be able to perform HEP with minimal cueing required     Long Term Goals: 6 weeks  75% IMPROVED  Increase flexibility to normal  Walking for ADL and exercise will be restored without increased pain  Restore ability to sit for ADL without increased pain  Restore ability to lie on side without increased pain  Restore normal sleep habits without disturbances due to pain    Anticipated barriers to physical therapy: none  Pt's spiritual, cultural and educational needs considered and pt agreeable to plan of care and goals        Plan   If you concur, I recommend patient continue with physical therapy 1-2 times a week PRN  for 6 weeks.  Please advise us of your  recommendations. Thank you for allowing us to assist in the care of your patient.      Jacy Escobar, MS, PT          I certify the need for these services furnished under this plan of treatment and while under my care.    ____________________________________ Physician/Referring Practitioner                                Date of Signature

## 2017-09-22 NOTE — PLAN OF CARE
Physical Therapy Progress Note     Name: Danuta Santos  Clinic Number: [id number]  Diagnosis:   No diagnosis found.  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  Pt states using her modalities to help and exercises and still having some pain.  Though not as much ex this week.. CC is R LB QL region tightness  Pain Scale: before treatment: 6 and feel tightness down post thigh to knee at rest after contract relax and stretching 5  currently;     Objective     Pt returns today for 1st visit since last progress report  AR R pelvis, mod-severe muscle tightness LB paraspinals R     Lumbar active range of motion in standing is:9-21-17  - flexion - floor                     - extension -  50%                         - left side bending -  To knee         - right side bending -  To knee            Lumbar active range of motion in standing is: 8-3-17  - flexion - to knee                   - extension -  50%                         - left side bending -  To knee         - right side bending -  To knee         Lumbar active range of motion in standing is: INITIAL EVAL  - flexion - to floor   - extension -  50%   - left side bending - To knee   - right side bending -  To knee       Flexibility testing: same as IE  - hamstrings: 90/90 test R 10 L 10   - gastrocnemius: DF ankle R 5 degrees L 5 degrees  - IT Bands: - imani at IE + imani test           Muscle Strength   MMT R L   Hip flexion 5/5 5/5   Hip abduction 5/5 5/5   Hip  extension 5/5 5/5   Hip ER 5/5 5/5   Hip IR 5/5 5/5   Knee extension 5/5 5/5   Knee flexion 5/5 5/5   Ankle dorsiflexion 5/5 5/5           TREATMENT  Therapeutic exercise: Danuta received therapeutic exercises to develop strength, endurance, flexibility and core stabilization for 25 minutes including:   Contract relax to R glut, did work,    Instructed pt in SB stretch B to stretch lumbar paraspinals        Manual therapy: Danuta  received the following manual therapy techniques x 15 min. to include soft tissue and joint mobilization were applied to the:sidebend L contract relax performed to improve pelvic stability and to include: Vacuum/cupping STM with manual therapy techniques was performed to R gluteals and low back to decrease muscle tightness, increase circulation and promote healing process.  The pt's skin was monitored for redness adjusting pressure as needed. The pt was instructed in possible side effects of bruising and/or soreness.             Patient received pre-mod electrical stimulation to decrease muscle tightness and pain to B lumbar paraspinals for 15 minutes with  with cycle time: continuous, beat frequency: , CC/CV: CV.     Written Home Exercises Provided: lateral side end stretch  Pt demo good understanding of the education provided. Danuta demonstrated good return demonstration of activities.     Pt. education:  · Posture reeducation, body mechanics, HEP,   · No spiritual or educational barriers to learning provided  · Pt has no cultural, educational or language barriers to learning provided.    Assessment   Pt with increased muscle tightness with increased sitting and school work required.  Pt still able to self mobilize and may benefit from additional stretching and had not been doing as much cupping for STM and understands how this can also help    Pt will continue to benefit from skilled outpatient physical therapy to address the remaining functional deficits, provide  pt/family education, and to maximize pt's level of independence in the home and community environment      GOALS:   Short Term Goals: 3 weeks MET STG'S  Increase flexibility 25%  Be able to perform HEP with minimal cueing required     Long Term Goals: 6 weeks  75% IMPROVED  Increase flexibility to normal  Walking for ADL and exercise will be restored without increased pain  Restore ability to sit for ADL without increased pain  Restore ability to lie on side without increased pain  Restore normal sleep habits without disturbances due to pain    Anticipated barriers to physical therapy: none  Pt's spiritual, cultural and educational needs considered and pt agreeable to plan of care and goals        Plan   If you concur, I recommend patient continue with physical therapy 1-2 times a week PRN  for 6 weeks.  Please advise us of your  recommendations. Thank you for allowing us to assist in the care of your patient.      Jacy Escobar, MS, PT          I certify the need for these services furnished under this plan of treatment and while under my care.    ____________________________________ Physician/Referring Practitioner                                Date of Signature

## 2017-09-29 ENCOUNTER — CLINICAL SUPPORT (OUTPATIENT)
Dept: REHABILITATION | Facility: HOSPITAL | Age: 62
End: 2017-09-29
Attending: INTERNAL MEDICINE
Payer: COMMERCIAL

## 2017-09-29 DIAGNOSIS — M79.604 PAIN IN RIGHT LEG: ICD-10-CM

## 2017-09-29 DIAGNOSIS — M54.41 CHRONIC MIDLINE LOW BACK PAIN WITH RIGHT-SIDED SCIATICA: Primary | ICD-10-CM

## 2017-09-29 DIAGNOSIS — G89.29 CHRONIC MIDLINE LOW BACK PAIN WITH RIGHT-SIDED SCIATICA: Primary | ICD-10-CM

## 2017-09-29 PROCEDURE — 97140 MANUAL THERAPY 1/> REGIONS: CPT | Performed by: PHYSICAL THERAPIST

## 2017-09-29 NOTE — PROGRESS NOTES
Physical Therapy Daily Note     Name: Danuta Santos  Clinic Number: [id number]  Diagnosis:   Encounter Diagnoses   Name Primary?    Chronic midline low back pain with right-sided sciatica Yes    Pain in right leg      Physician: Erica Khan MD  Treatment Orders: PT Eval and Treat  Past Medical History:   Diagnosis Date    IBS (irritable bowel syndrome)        Precautions: chronic low back pain with radiculopathy  Evaluation date: 3/23/2017  Visit # authorized: 9/22 on 9/29/2017  Authorization period: 12-31-17  Plan of care expiration: 11-1-17  MD Follow up appt: none schedule    Subjective     Pt reports:  She tried Feldenkrais class which led to dysfunction and she was unable to self mobilize.  Pain Scale: before treatment: 6 and feel tightness down post thigh to knee at rest after contract relax and stretching 5  currently;     Objective       TREATMENT  Therapeutic exercise: Danuta received therapeutic exercises to develop strength, endurance, flexibility and core stabilization for 5minutes including:   Contract relax to R glut, did not work,        Manual therapy: Danuta  received the following manual therapy techniques x 10 min. to include soft tissue and joint mobilization were applied to the:sidebend L contract relax performed to improve pelvic stability and to include: instruction in use of theracane for point stim.        Written Home Exercises Provided: none today  Pt demo good understanding of the education provided. Danuta demonstrated good return demonstration of activities.     Pt. education:  · Posture reeducation, body mechanics, HEP,   · No spiritual or educational barriers to learning provided  · Pt has no cultural, educational or language barriers to learning provided.    Assessment   Pt with increased muscle tightness leading to pelvic dysfunction which she could not correct after trying new ex program.  Pt understands to focus on her own HEP for now Pt may benefit from  theracane for self point STM areas  Pt will continue to benefit from skilled outpatient physical therapy to address the remaining functional deficits, provide pt/family education, and to maximize pt's level of independence in the home and community environment      GOALS:   Short Term Goals: 3 weeks MET STG'S  Increase flexibility 25%  Be able to perform HEP with minimal cueing required     Long Term Goals: 6 weeks  75% IMPROVED  Increase flexibility to normal  Walking for ADL and exercise will be restored without increased pain  Restore ability to sit for ADL without increased pain  Restore ability to lie on side without increased pain  Restore normal sleep habits without disturbances due to pain    Anticipated barriers to physical therapy: none  Pt's spiritual, cultural and educational needs considered and pt agreeable to plan of care and goals        Plan   Continue with established plan of care towards PT goals as needed

## 2017-10-27 ENCOUNTER — CLINICAL SUPPORT (OUTPATIENT)
Dept: REHABILITATION | Facility: HOSPITAL | Age: 62
End: 2017-10-27
Attending: INTERNAL MEDICINE
Payer: COMMERCIAL

## 2017-10-27 DIAGNOSIS — G89.29 CHRONIC MIDLINE LOW BACK PAIN WITH RIGHT-SIDED SCIATICA: Primary | ICD-10-CM

## 2017-10-27 DIAGNOSIS — M54.41 CHRONIC MIDLINE LOW BACK PAIN WITH RIGHT-SIDED SCIATICA: Primary | ICD-10-CM

## 2017-10-27 DIAGNOSIS — M79.604 PAIN IN RIGHT LEG: ICD-10-CM

## 2017-10-27 PROCEDURE — 97140 MANUAL THERAPY 1/> REGIONS: CPT | Performed by: PHYSICAL THERAPIST

## 2017-10-27 PROCEDURE — 97014 ELECTRIC STIMULATION THERAPY: CPT | Performed by: PHYSICAL THERAPIST

## 2017-10-27 PROCEDURE — 97110 THERAPEUTIC EXERCISES: CPT | Performed by: PHYSICAL THERAPIST

## 2017-11-14 ENCOUNTER — CLINICAL SUPPORT (OUTPATIENT)
Dept: REHABILITATION | Facility: HOSPITAL | Age: 62
End: 2017-11-14
Attending: INTERNAL MEDICINE
Payer: COMMERCIAL

## 2017-11-14 DIAGNOSIS — G89.29 CHRONIC MIDLINE LOW BACK PAIN WITH RIGHT-SIDED SCIATICA: Primary | ICD-10-CM

## 2017-11-14 DIAGNOSIS — M54.41 CHRONIC MIDLINE LOW BACK PAIN WITH RIGHT-SIDED SCIATICA: Primary | ICD-10-CM

## 2017-11-14 DIAGNOSIS — M79.604 PAIN IN RIGHT LEG: ICD-10-CM

## 2017-11-14 PROCEDURE — 97140 MANUAL THERAPY 1/> REGIONS: CPT | Performed by: PHYSICAL THERAPIST

## 2017-11-14 PROCEDURE — 97014 ELECTRIC STIMULATION THERAPY: CPT | Performed by: PHYSICAL THERAPIST

## 2017-11-14 PROCEDURE — 97110 THERAPEUTIC EXERCISES: CPT | Performed by: PHYSICAL THERAPIST

## 2017-11-14 NOTE — PROGRESS NOTES
Physical Therapy Daily  Note     Name: Danuta Santos  Clinic Number: [id number]  Diagnosis:   Encounter Diagnoses   Name Primary?    Chronic midline low back pain with right-sided sciatica Yes    Pain in right leg      Physician: Erica Khan MD  Treatment Orders: PT Eval and Treat  Past Medical History:   Diagnosis Date    IBS (irritable bowel syndrome)        Precautions: chronic low back pain with radiculopathy  Evaluation date: 3/23/2017  Visit # authorized: 11//22 on 11/14/2017  Authorization period: 12-31-17  Plan of care expiration: 12-7--17  MD Follow up appt: none schedule    Subjective     Pt reports:  She had a class where she had to sit in bleachers and tried all activities  Pain Scale: before treatment: 7 and down R post leg to mid calf and feel tightness  after contract relax and stretching 5  currently; after treatment 3/10    Objective   Level pelvis coming into clinic, after stretching AR R and then able to self mobilize  Severe tightness paraspinals lumbar region and gluteals    TREATMENT  Therapeutic exercise: Danuta received therapeutic exercises to develop strength, endurance, flexibility and core stabilization for 15 minutes including:   Reviewed all stretching Pt was not performing below   Sitting FB stretch over straight leg, reviewed stretching and worked with pt on variation of child pose to more isolate R paraspinals      Manual therapy: Danuta  received the following manual therapy techniques x 25 min. to include soft tissue and joint mobilization were applied to the:sidebend L contract relax performed to improve pelvic stability and to include: instruction in use of theracane for point stim.STM to lumbar region along with P/A mob to lumbar region with [place] techniques  Vacuum/cupping STM with manual therapy techniques was performed to lumbar region to decrease muscle tightness, increase circulation and promote healing process.  The pt's skin was monitored for redness  adjusting pressure as needed. The pt was instructed in possible side effects of bruising and/or soreness.      Patient received pre-mod electrical stimulation to decrease muscle tightness and pain to lumbar paraspinals for 15 minutes with MH with cycle time: continuous, beat frequency: , CC/CV: CV.       Written Home Exercises Provided: none today  Pt demo good understanding of the education provided. Danuta demonstrated good return demonstration of activities.     Pt. education:  · Posture reeducation, body mechanics, HEP,   · No spiritual or educational barriers to learning provided  · Pt has no cultural, educational or language barriers to learning provided.    Assessment   Pt with increased muscle tightness and appears to understand use of FB stretching to help isolate parapsinals and QL R    Pt will continue to benefit from skilled outpatient physical therapy to address the remaining functional deficits, provide pt/family education, and to maximize pt's level of independence in the home and community environment      GOALS:   Short Term Goals: 3 weeks MET STG'S  Increase flexibility 25%  Be able to perform HEP with minimal cueing required     Long Term Goals: 6 weeks  75% IMPROVED  Increase flexibility to normal  Walking for ADL and exercise will be restored without increased pain  Restore ability to sit for ADL without increased pain  Restore ability to lie on side without increased pain  Restore normal sleep habits without disturbances due to pain    Anticipated barriers to physical therapy: none  Pt's spiritual, cultural and educational needs considered and pt agreeable to plan of care and goals        Plan   Continue with established plan of care towards PT goals.

## 2017-11-15 NOTE — PLAN OF CARE
Physical Therapy Progress  Note     Name: Danuta Santos  Clinic Number: [id number]  Diagnosis:   Encounter Diagnoses   Name Primary?    Chronic midline low back pain with right-sided sciatica Yes    Pain in right leg      Physician: Erica Khan MD  Treatment Orders: PT Eval and Treat  Past Medical History:   Diagnosis Date    IBS (irritable bowel syndrome)        Precautions: chronic low back pain with radiculopathy  Evaluation date: 3/23/2017  Visit # authorized: 10//22 on 10/27/2017  Authorization period: 12-31-17  Plan of care expiration: 12-7--17  MD Follow up appt: none schedule    Subjective     Pt reports:  She has been having to sit a lot due to work and unable to exercise as she should though has tried to walk.  Pt states she did stretch and did self STM and did TENS and heat and did not balance over all the sitting which aggravated symptoms and now feels she is unable to self mobilize.  Pt did some of her work sitting at dining room table in dining room chair with pillow   Pain Scale: before treatment: 7 and down R post leg to mid calf and feel tightness  after contract relax and stretching 5  currently; after treatment 3/10    Objective   AR R and unable to self mobilize  Severe tightness paraspinals lumbar region and gluteals    TREATMENT  Therapeutic exercise: Danuta received therapeutic exercises to develop strength, endurance, flexibility and core stabilization for 25minutes including:   Contract relax to R glut, did not work,  Performed contract relax to piriformis and then contract relax to gluts and realigned  Worked with pt on proper stretching and strengthening and awareness of sitting and to always use office chair for prolonged sitting  Instructed pt further on pelvic girdle anatomy and effect of ex. And sitting      Manual therapy: Danuta  received the following manual therapy techniques x 15 min. to include soft tissue and joint mobilization were applied to the:roshan L  contract relax performed to improve pelvic stability and to include: instruction in use of theracane for point stim.  Vacuum/cupping STM with manual therapy techniques was performed to 12-7- to decrease muscle tightness, increase circulation and promote healing process.  The pt's skin was monitored for redness adjusting pressure as needed. The pt was instructed in possible side effects of bruising and/or soreness.      Patient received pre-mod electrical stimulation to decrease muscle tightness and pain to lumbar paraspinals for 15 minutes with MH with cycle time: continuous, beat frequency: , CC/CV: CV.       Written Home Exercises Provided: none today  Pt demo good understanding of the education provided. Danuta demonstrated good return demonstration of activities.     Pt. education:  · Posture reeducation, body mechanics, HEP,   · No spiritual or educational barriers to learning provided  · Pt has no cultural, educational or language barriers to learning provided.    Assessment   Pt unable to self mobilize and was able to mobilize after contract relax and understands performance of ex.  Pt understands to use proper chair for all sitting  Pt was only seen for one other visit during this period, so pt is improving in ability to self manage back issues.    Pt will continue to benefit from skilled outpatient physical therapy to address the remaining functional deficits, provide pt/family education, and to maximize pt's level of independence in the home and community environment      GOALS:   Short Term Goals: 3 weeks MET STG'S  Increase flexibility 25%  Be able to perform HEP with minimal cueing required     Long Term Goals: 6 weeks  75% IMPROVED  Increase flexibility to normal  Walking for ADL and exercise will be restored without increased pain  Restore ability to sit for ADL without increased pain  Restore ability to lie on side without increased pain  Restore normal sleep habits without disturbances due  to pain    Anticipated barriers to physical therapy: none  Pt's spiritual, cultural and educational needs considered and pt agreeable to plan of care and goals        Plan   If you concur, I recommend patient continue with physical therapy 1-2 times a week as needed for 6 weeks.  Please advise us of your  recommendations. Thank you for allowing us to assist in the care of your patient.      Jacy Escobar, MS, PT          I certify the need for these services furnished under this plan of treatment and while under my care.    ____________________________________ Physician/Referring Practitioner                                Date of Signature

## 2017-12-07 ENCOUNTER — CLINICAL SUPPORT (OUTPATIENT)
Dept: REHABILITATION | Facility: HOSPITAL | Age: 62
End: 2017-12-07
Attending: INTERNAL MEDICINE
Payer: COMMERCIAL

## 2017-12-07 DIAGNOSIS — G89.29 CHRONIC MIDLINE LOW BACK PAIN WITH RIGHT-SIDED SCIATICA: Primary | ICD-10-CM

## 2017-12-07 DIAGNOSIS — M79.604 PAIN IN RIGHT LEG: ICD-10-CM

## 2017-12-07 DIAGNOSIS — M54.41 CHRONIC MIDLINE LOW BACK PAIN WITH RIGHT-SIDED SCIATICA: Primary | ICD-10-CM

## 2017-12-07 PROCEDURE — 97110 THERAPEUTIC EXERCISES: CPT | Performed by: PHYSICAL THERAPIST

## 2017-12-07 PROCEDURE — 97014 ELECTRIC STIMULATION THERAPY: CPT | Performed by: PHYSICAL THERAPIST

## 2017-12-07 PROCEDURE — 97140 MANUAL THERAPY 1/> REGIONS: CPT | Performed by: PHYSICAL THERAPIST

## 2017-12-08 NOTE — PLAN OF CARE
Physical Therapy Progress Note     Name: Danuta Santos  Clinic Number: [id number]  Diagnosis:   Encounter Diagnoses   Name Primary?    Chronic midline low back pain with right-sided sciatica Yes    Pain in right leg      Physician: Erica Khan MD  Treatment Orders: PT Eval and Treat  Past Medical History:   Diagnosis Date    IBS (irritable bowel syndrome)        Precautions: chronic low back pain with radiculopathy  Evaluation date: 3/23/2017  Visit # authorized: 12/22 on 12/7/2017  Authorization period: 12-31-17  Plan of care expiration: 1-17-18  MD Follow up appt: none schedule    Subjective     Pt reports:  2 days ago she had dysfunction, hard getting back in, finally got pelvis level, was better, but still some discomfort, and seems to keep going out  Pain Scale: before treatment: 5 and no leg pain after contract relax and stretching 2-3  currently; after treatment 2/10    Objective   Pt with slight AR R Severe tightness paraspinals lumbar region and gluteals  5/5 glut max and hip ext    TREATMENT  Therapeutic exercise: Danuta received therapeutic exercises to develop strength, endurance, flexibility and core stabilization for 30 minutes including:   Reviewed all stretching Pt was not performing piriformis stretch correctly Reviewed length of time to stretch Pt was not stretching L and was instructed to start L also with sitting FB stretch    Sitting FB stretch over straight leg, reviewed stretching and worked with pt on variation of child pose to more isolate R paraspinals Pt forgot to reach over to isolate sides with child's pose      Manual therapy: Danuta  received the following manual therapy techniques x 25 min. to include soft tissue and joint mobilization were applied to the:sidebend L contract relax performed to improve pelvic stability and to include: instruction in use of theracane for point stim.STM to lumbar region along with P/A mob to lumbar region with [place] techniques   Vacuum/cupping STM with manual therapy techniques was performed to lumbar region to decrease muscle tightness, increase circulation and promote healing process.  Cups remained in place 5 min The pt's skin was monitored for redness adjusting pressure as needed. The pt was instructed in possible side effects of bruising and/or soreness.      Patient received pre-mod electrical stimulation to decrease muscle tightness and pain to lumbar paraspinals for 15 minutes with MH with cycle time: continuous, beat frequency: , CC/CV: CV.       Written Home Exercises Provided: child's pose  Pt demo good understanding of the education provided. Danuta demonstrated good return demonstration of activities.     Pt. education:  · Posture reeducation, body mechanics, HEP,   · No spiritual or educational barriers to learning provided  · Pt has no cultural, educational or language barriers to learning provided.    Assessment   Pt with difficulty maintaining pelvic positioning.  Strength is good, but may be related to not performing stretches quite right.  This is only pt's 2nd visit during this POC and has overall been able to manage symptoms independently. Pt appears to have better understanding of proper stretching    Pt will continue to benefit from skilled outpatient physical therapy to address the remaining functional deficits, provide pt/family education, and to maximize pt's level of independence in the home and community environment      GOALS:   Short Term Goals: 3 weeks MET STG'S  Increase flexibility 25%  Be able to perform HEP with minimal cueing required     Long Term Goals: 6 weeks  80% IMPROVED  Increase flexibility to normal  Walking for ADL and exercise will be restored without increased pain  Restore ability to sit for ADL without increased pain  Restore ability to lie on side without increased pain  Restore normal sleep habits without disturbances due to pain    Anticipated barriers to physical therapy:  none  Pt's spiritual, cultural and educational needs considered and pt agreeable to plan of care and goals        Plan   If you concur, I recommend patient continue with physical therapy 1-2 times a week as needed  for 4-6 weeks.  Please advise us of your  recommendations. Thank you for allowing us to assist in the care of your patient.      Jacy Escobar, MS, PT          I certify the need for these services furnished under this plan of treatment and while under my care.    ____________________________________ Physician/Referring Practitioner                                Date of Signature

## 2018-01-22 ENCOUNTER — TELEPHONE (OUTPATIENT)
Dept: INTERNAL MEDICINE | Facility: CLINIC | Age: 63
End: 2018-01-22

## 2018-01-22 DIAGNOSIS — M53.9 DISORDER CHARACTERIZED BY BACK PAIN: Primary | ICD-10-CM

## 2018-01-22 NOTE — TELEPHONE ENCOUNTER
Patient Requesting PT order Refill for her    Chronic midline low back pain with right-sided sciatica     Pain in right leg      At [facility] in [place] . Please advise.

## 2018-01-22 NOTE — TELEPHONE ENCOUNTER
----- Message from Naima Preciado sent at 1/22/2018  8:49 AM CST -----  Contact: Patient [phone]  Need PT orders refilled. Would like to go this week.    Please call and advise.    Thank You

## 2018-02-15 ENCOUNTER — DOCUMENTATION ONLY (OUTPATIENT)
Dept: REHABILITATION | Facility: HOSPITAL | Age: 63
End: 2018-02-15

## 2018-02-15 ENCOUNTER — CLINICAL SUPPORT (OUTPATIENT)
Dept: REHABILITATION | Facility: HOSPITAL | Age: 63
End: 2018-02-15
Attending: INTERNAL MEDICINE
Payer: COMMERCIAL

## 2018-02-15 DIAGNOSIS — G89.29 CHRONIC BILATERAL LOW BACK PAIN WITH RIGHT-SIDED SCIATICA: Primary | ICD-10-CM

## 2018-02-15 DIAGNOSIS — M62.81 MUSCLE WEAKNESS: ICD-10-CM

## 2018-02-15 DIAGNOSIS — M54.41 CHRONIC MIDLINE LOW BACK PAIN WITH RIGHT-SIDED SCIATICA: Primary | ICD-10-CM

## 2018-02-15 DIAGNOSIS — M79.604 PAIN IN RIGHT LEG: ICD-10-CM

## 2018-02-15 DIAGNOSIS — M54.41 CHRONIC BILATERAL LOW BACK PAIN WITH RIGHT-SIDED SCIATICA: Primary | ICD-10-CM

## 2018-02-15 DIAGNOSIS — G89.29 CHRONIC MIDLINE LOW BACK PAIN WITH RIGHT-SIDED SCIATICA: Primary | ICD-10-CM

## 2018-02-15 PROCEDURE — 97014 ELECTRIC STIMULATION THERAPY: CPT | Mod: PN | Performed by: PHYSICAL THERAPIST

## 2018-02-15 PROCEDURE — 97140 MANUAL THERAPY 1/> REGIONS: CPT | Mod: PN | Performed by: PHYSICAL THERAPIST

## 2018-02-15 PROCEDURE — 97164 PT RE-EVAL EST PLAN CARE: CPT | Mod: PN | Performed by: PHYSICAL THERAPIST

## 2018-02-15 NOTE — PLAN OF CARE
PHYSICAL THERAPY RE- EVALUATION    Name:Danuta Santos  Physician:Erica Khan MD  Date of eval:2/15/2018  Orders:  Physical Therapy evaluate and treat  Clinic: [id number]  Diagnosis:  1. Chronic bilateral low back pain with right-sided sciatica     2. Muscle weakness         Precautions: as per diagnosis  Evaluation date: 2/15/2018  Visit # authorized: 1/20  Authorization period: 12-31-18  Plan of care expiration: 3-28-18  MD Follow up appt: none scheduled    Subjective     Chief complaint: Low back pain radiating down leg posterior to knee  Onset of pain : Mid January   Mechanism of onset :  No known injury  Noticed increased pain and not being successful with contract relax to R glut.  Pt states she then developed upper respir infection and was on a lot of bed rest.  Pt states while she was ill she did a lot of coughing and continued with back pain.  Pt states some upper back pain after intense coughing    Radicular symptoms:R LE    Bowel and Bladder incontinence:neg     Aggravating factors: coughing, sitting, lying down with disturbed sleep  Easing factors, walking  Sleep is disturbed. 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    Allergies:    Review of patient's allergies indicates:   Allergen Reactions    Wheat containing prod      Sinus      Alcohol Other (See Comments)     Post nasal drip    Aspirin Nausea And Vomiting    Milk containing products Other (See Comments)     Post nasal drip       Medical history:   Past Medical History:   Diagnosis Date    IBS (irritable bowel syndrome)        Medication:   Current Outpatient Prescriptions on File  "Prior to Visit   Medication Sig Dispense Refill    cyanocobalamin (VITAMIN B-12) 500 MCG tablet Take 1,000 mcg by mouth once daily.        flaxseed oil 1,000 mg Cap Take by mouth.        LACTOBACILLUS COMBO NO.6 (PROBIOTIC COMPLEX ORAL) Take 1 capsule by mouth once.       oxymetazoline (AFRIN) 0.05 % nasal spray 2 sprays by Nasal route 2 (two) times daily.        vitamin D 185 MG Tab Take 2,000 mg by mouth once daily.        No current facility-administered medications on file prior to visit.      MRI: none           Xray: none    Pain level with 0 being the lowest and 10 being the highest presently: 5  Pain level with 0 being the lowest and 10 being the highest at worst: 7  Pain level with 0 being the lowest and 10 being the highest at best: 3     Patient Goals: "stay at 2-3 pain level"    Objective     Postural examination in standing:  - slightly flattened lumbar lordosis  - forward head  - forward shoulders  - R hip high  - L shoulder high    Postural examination in sitting:   - normal lumbar lordosis  - forward head  - forward shoulders      Functional assessment: no deficits noted in areas below  - walking:   - sit to stand:   - sit to supine:        - supine to sit:   - supine to prone:     Pelvic positioning: AR R    Lumbar active range of motion in standing is:  - flexion - to toes, pain upon return                     - extension -  25%                         - left side bending -  To knee         - right side bending -  To knee           Flexibility testing:  - hamstrings:     90/90 test R 5 L 5           - gastrocnemius:   DF ankle R 10 degrees L 10 degrees     Muscle Strength  MMT R L   Hip flexion 4/5 4/5   Hip abduction 4-/5 4/5   Hip extension 4-/5 4/5   Glut max 3+/5 4-/5        Knee extension 5/5 5/5   Knee flexion 5/5 5/5       Endurance is  fair  Lumbar Special tests:  SLR neg    Palpation: Mod to severe tightness R QL and R lumbar paraspinals, min-mod L    Joint mobility: severe decreased " P/A glide lumbar L5 SBR R    Sensation: Intact    TREATMENT:    Manual therapy: Danuta  received the following manual therapy techniques x 15 min. to include soft tissue and joint mobilization were applied to the: lumbar region to include:  Contract relax to L5 paraspinals sidelying L  STM to lumbar paraspinals R and R QL and to L lumbar paraspinals gentle P/A and rot mob lumbar region  Vacuum/cupping STM with manual therapy techniques was performed to low back region to decrease muscle tightness, increase circulation and promote healing process.  The pt's skin was monitored for redness adjusting pressure as needed. The pt was instructed in possible side effects of bruising and/or soreness.        Patient received pre-mod electrical stimulation to decrease muscle tightness and pain to B lumbar paraspinals for 15 minutes with MH with cycle time: continuous, beat frequency: , CC/CV: CV.        Pt. Education: Instructed pt. regarding:proper technique with all exercises. Pt. to demonstrate good understanding of the education provided. Danuta demonstrated good return demonstration of activities. No cultural, environmental, or spiritual barriers identified to treatment or learning.  Assessment   This is a 62 y.o. female referred to outpatient physical therapy and presents with a medical diagnosis of back pain and PT diagnosis/findings of LBP with pelvic dysfunction with R radicular symptoms with loss of ROM and strength demonstrating limitations as described in the problem list. Patient was in agreement with set goals and plan of care. Pt was given a written HEP along with posture education, instruction on body mechanics, activity modification/avoidance, and core/lumbar/LE strengthening regimen. Pt. verbally understood instructions and demonstrated proper form/technique. Pt was advised to perform these exercises free of pain, and discontinue use if symptoms persist/worsen. Pt will benefit from physical therapy  services in order to maximize pain free functional independence. Rehab potential is good.      Medical necessity is demonstrated by the following IMPAIRMENTS/PROBLEM LIST:  Decreased range of motion  Decreased strength  Pelvic dysfunction  Increased pain with prolonged sitting  Disturbed sleep  ADL and household activities lead to increased pain and are limited    GOALS:   Short Term Goals:  3 weeks  Increase range of motion 25%  Increase strength 1/2 muscle grade  Improve postural awareness of pelvis to independently identify dysfunction with min assist from PT  Be able to perform HEP with minimal cueing required    Long Term Goals: 6 weeks  Increase range of motion to 75% to 100% full   Improve muscle strength 1 muscle grade  Improve muscle strength with MMT to 4+/5 to 5/5  Improve and stabilize proper pelvic positioning  Restore ability to sit for ADL and work with min to 0 pain  Restore normal sleep habits without disturbances due to pain  Restore ability to perform ADL's and household activities independently with min to 0 pain    Plan     Pt will be treated by physical therapy 1-3 times a week for 6 weeks to include: Therapeutic exercises to increase ROM, strength and stabilization; joint and soft tissue mobilization with manual therapy techniques to decrease muscle tightness, pain and improve joint mobility; neuromuscular re-education to improve balance, coordination, kinesthetic sense and proprioception, therapeutic activities to improve coordination, strength and function, therapeutic taping to decrease pain, provide support and improve function; modalities such as moist heat, ice, ultrasound and electrical stimulation to increase circulation, decrease pain and inflammation; dry needling with manual therapy techniques to decrease pain, inflammation and swelling, increase circulation and promote healing process will be considered and utilized as needed; temporary orthotics will be considered and utilized as  needed to further decrease pain in WB.  Pt may be seen by PTA to carry out plan of care as part of the Rehab team.    I certify the need for these services furnished under this plan of treatment and while under my care.    ____________________________________ Physician/Referring Practitioner                                Date of Signature

## 2018-02-15 NOTE — PROGRESS NOTES
PHYSICAL THERAPY DISCHARGE SUMMARY    Name: Danuta Santos  Referring Provider: Erica Khan MD  PT Order: PT evaluate and treat   Clinical #: [id number]  Discharge Summary Date: 2/15/2018  Diagnosis:   1. Chronic midline low back pain with right-sided sciatica     2. Pain in right leg         Patient was seen for 12 OP PT visits from 3-23-17 to 12-7-17. Pt cancelled/no show visit 3 scheduled sessions. Treatment included: evaluation, HEP, pt education, joint and soft tissue mobilizations, ther ex, and moist heat with electrical stimulation. Pt had been noticing improvement in symptoms and better able to manage symptoms I.  PT unable to fully assess goal achievement as pt did not return for follow up sessions/did not reschedule follow up visits. This patient is discharged from OP PT Services.

## 2018-02-23 ENCOUNTER — LAB VISIT (OUTPATIENT)
Dept: LAB | Facility: HOSPITAL | Age: 63
End: 2018-02-23
Attending: INTERNAL MEDICINE
Payer: COMMERCIAL

## 2018-02-23 ENCOUNTER — TELEPHONE (OUTPATIENT)
Dept: INTERNAL MEDICINE | Facility: CLINIC | Age: 63
End: 2018-02-23

## 2018-02-23 DIAGNOSIS — Z13.9 ENCOUNTER FOR SCREENING, UNSPECIFIED: ICD-10-CM

## 2018-02-23 LAB
ALBUMIN SERPL BCP-MCNC: 4.1 G/DL
ALP SERPL-CCNC: 56 U/L
ALT SERPL W/O P-5'-P-CCNC: 22 U/L
ANION GAP SERPL CALC-SCNC: 6 MMOL/L
AST SERPL-CCNC: 24 U/L
BASOPHILS # BLD AUTO: 0.04 K/UL
BASOPHILS NFR BLD: 0.8 %
BILIRUB SERPL-MCNC: 0.4 MG/DL
BUN SERPL-MCNC: 19 MG/DL
CALCIUM SERPL-MCNC: 9.9 MG/DL
CHLORIDE SERPL-SCNC: 98 MMOL/L
CO2 SERPL-SCNC: 28 MMOL/L
CREAT SERPL-MCNC: 0.8 MG/DL
DIFFERENTIAL METHOD: ABNORMAL
EOSINOPHIL # BLD AUTO: 0 K/UL
EOSINOPHIL NFR BLD: 0.6 %
ERYTHROCYTE [DISTWIDTH] IN BLOOD BY AUTOMATED COUNT: 12.7 %
EST. GFR  (AFRICAN AMERICAN): >60 ML/MIN/1.73 M^2
EST. GFR  (NON AFRICAN AMERICAN): >60 ML/MIN/1.73 M^2
GLUCOSE SERPL-MCNC: 94 MG/DL
HCT VFR BLD AUTO: 35 %
HGB BLD-MCNC: 11.3 G/DL
IRON SERPL-MCNC: 50 UG/DL
LYMPHOCYTES # BLD AUTO: 1.3 K/UL
LYMPHOCYTES NFR BLD: 26.1 %
MCH RBC QN AUTO: 29.4 PG
MCHC RBC AUTO-ENTMCNC: 32.3 G/DL
MCV RBC AUTO: 91 FL
MONOCYTES # BLD AUTO: 0.4 K/UL
MONOCYTES NFR BLD: 7.1 %
NEUTROPHILS # BLD AUTO: 3.2 K/UL
NEUTROPHILS NFR BLD: 65.2 %
NRBC BLD-RTO: 0 /100 WBC
PLATELET # BLD AUTO: 198 K/UL
PMV BLD AUTO: 11 FL
POTASSIUM SERPL-SCNC: 4.3 MMOL/L
PROT SERPL-MCNC: 7.1 G/DL
RBC # BLD AUTO: 3.85 M/UL
SATURATED IRON: 13 %
SODIUM SERPL-SCNC: 132 MMOL/L
TOTAL IRON BINDING CAPACITY: 380 UG/DL
TRANSFERRIN SERPL-MCNC: 257 MG/DL
VIT B12 SERPL-MCNC: 918 PG/ML
WBC # BLD AUTO: 4.9 K/UL

## 2018-02-23 PROCEDURE — 85025 COMPLETE CBC W/AUTO DIFF WBC: CPT

## 2018-02-23 PROCEDURE — 80053 COMPREHEN METABOLIC PANEL: CPT

## 2018-02-23 PROCEDURE — 86706 HEP B SURFACE ANTIBODY: CPT

## 2018-02-23 PROCEDURE — 83540 ASSAY OF IRON: CPT

## 2018-02-23 PROCEDURE — 82607 VITAMIN B-12: CPT

## 2018-02-23 PROCEDURE — 36415 COLL VENOUS BLD VENIPUNCTURE: CPT

## 2018-02-26 ENCOUNTER — TELEPHONE (OUTPATIENT)
Dept: INTERNAL MEDICINE | Facility: CLINIC | Age: 63
End: 2018-02-26

## 2018-02-27 ENCOUNTER — CLINICAL SUPPORT (OUTPATIENT)
Dept: REHABILITATION | Facility: HOSPITAL | Age: 63
End: 2018-02-27
Attending: INTERNAL MEDICINE
Payer: COMMERCIAL

## 2018-02-27 DIAGNOSIS — M54.41 CHRONIC BILATERAL LOW BACK PAIN WITH RIGHT-SIDED SCIATICA: Primary | ICD-10-CM

## 2018-02-27 DIAGNOSIS — G89.29 CHRONIC BILATERAL LOW BACK PAIN WITH RIGHT-SIDED SCIATICA: Primary | ICD-10-CM

## 2018-02-27 DIAGNOSIS — M62.81 MUSCLE WEAKNESS: ICD-10-CM

## 2018-02-27 LAB
HEP. B SURF AB, QUAL: POSITIVE
HEP. B SURF AB, QUANT.: 191 MIU/ML

## 2018-02-27 PROCEDURE — 97014 ELECTRIC STIMULATION THERAPY: CPT | Mod: PN | Performed by: PHYSICAL THERAPIST

## 2018-02-27 PROCEDURE — 97140 MANUAL THERAPY 1/> REGIONS: CPT | Mod: PN | Performed by: PHYSICAL THERAPIST

## 2018-02-27 PROCEDURE — 97110 THERAPEUTIC EXERCISES: CPT | Mod: PN | Performed by: PHYSICAL THERAPIST

## 2018-02-27 NOTE — PROGRESS NOTES
Physical Therapy Daily Note     Name: Danuta Santos  Clinic Number: [id number]  Diagnosis:   Encounter Diagnoses   Name Primary?    Chronic bilateral low back pain with right-sided sciatica Yes    Muscle weakness      Physician: Erica Khna MD  Treatment Orders: PT Eval and Treat  Past Medical History:   Diagnosis Date    IBS (irritable bowel syndrome)        Precautions: as per diagnosis    Evaluation date: 2/15/2018  Visit # authorized: 2/20 on 2/27/2018  Authorization period: 12-31-18  Plan of care expiration: 3-28-18  MD Follow up appt: none scheduled    Subjective     Pt reports: has been better, but a little off and unable to get quite level.  Pt states still coughing, not as severe, but still will go into dysfunction with coughs     Pain Scale: before treatment: 4.5-5 in LB and no leg pain today currently; after treatment: feels better    Objective     AR R       TREATMENT  Therapeutic exercise: Danuta received therapeutic exercises to develop strength, endurance, ROM, flexibility and core stabilization for 25 minutes including:   Performed contract relax R glut and improved but not fully.  Performed contract relax to piriformis and then contract relax glut and realigned  Instructed pt in spinal and pelvic girdle anatomy and biomechanics and how muscles effect SI joint  HS stretch supine x 10  Glut stretch x 10  Piriformis stretch x 10  SLR x 10  Pelvic tilt x 10  Partial sit up x 10  Hip abd sidelying x 10  Arm and leg lift prone x 10  Hip ext prone with bent knee x 10  Contract relax R glut x 3    Manual therapy: Danuta  received the following manual therapy techniques x 30 min. to include soft tissue and joint mobilization were applied to the: low back and gluteals to include: STM to lumbar region, QL, piriformis, P/A mob to lumbar region Grade 1-2  Vacuum/cupping STM with manual therapy techniques was performed to back and gluts to decrease muscle tightness, increase circulation and  promote healing process.  The pt's skin was monitored for redness adjusting pressure as needed. The pt was instructed in possible side effects of bruising and/or soreness.        Patient received pre-mod electrical stimulation to decrease muscle tightness and pain to B lumbar paraspinals for 15 minutes with MH with cycle time: continuous, beat frequency: , CC/CV: CV.        Written Home Exercises Provided: none today.  Pt reports she still has HEP from previous session  Pt demo good understanding of the education provided. Danuta demonstrated good return demonstration of activities.     Pt. education:  · Posture reeducation, body mechanics, HEP,   · No spiritual or educational barriers to learning provided  · Pt has no cultural, educational or language barriers to learning provided.    Assessment     Pt was ill and had to stop exercising and developed increased dysfunction.  Pt appears to understand use of contract relax piriformis if regular self mob does not work   Pt will continue to benefit from skilled outpatient physical therapy to address the remaining functional deficits, provide pt/family education, and to maximize pt's level of independence in the home and community environment. .     GOALS:   Short Term Goals:  3 weeks  Increase range of motion 25%  Increase strength 1/2 muscle grade  Improve postural awareness of pelvis to independently identify dysfunction with min assist from PT  Be able to perform HEP with minimal cueing required     Long Term Goals: 6 weeks  Increase range of motion to 75% to 100% full   Improve muscle strength 1 muscle grade  Improve muscle strength with MMT to 4+/5 to 5/5  Improve and stabilize proper pelvic positioning  Restore ability to sit for ADL and work with min to 0 pain  Restore normal sleep habits without disturbances due to pain  Restore ability to perform ADL's and household activities independently with min to 0 pain    Anticipated barriers to physical  therapy: none  Pt's spiritual, cultural and educational needs considered and pt agreeable to plan of care and goals        Plan   Continue with established Plan of Care towards PT goals.

## 2018-02-28 ENCOUNTER — HOSPITAL ENCOUNTER (OUTPATIENT)
Dept: RADIOLOGY | Facility: HOSPITAL | Age: 63
Discharge: HOME | End: 2018-02-28
Attending: INTERNAL MEDICINE
Payer: COMMERCIAL

## 2018-02-28 ENCOUNTER — OFFICE VISIT (OUTPATIENT)
Dept: INTERNAL MEDICINE | Facility: CLINIC | Age: 63
End: 2018-02-28
Payer: COMMERCIAL

## 2018-02-28 ENCOUNTER — TELEPHONE (OUTPATIENT)
Dept: INTERNAL MEDICINE | Facility: CLINIC | Age: 63
End: 2018-02-28

## 2018-02-28 VITALS — BODY MASS INDEX: 21.69 KG/M2 | HEIGHT: 66 IN | HEART RATE: 61 BPM | OXYGEN SATURATION: 98 % | WEIGHT: 135 LBS

## 2018-02-28 DIAGNOSIS — R05.9 COUGH: Primary | ICD-10-CM

## 2018-02-28 DIAGNOSIS — R93.89 ABNORMAL CXR: Primary | ICD-10-CM

## 2018-02-28 DIAGNOSIS — R05.3 COUGH, PERSISTENT: ICD-10-CM

## 2018-02-28 DIAGNOSIS — R05.9 COUGH: ICD-10-CM

## 2018-02-28 PROCEDURE — 71046 X-RAY EXAM CHEST 2 VIEWS: CPT | Mod: TC

## 2018-02-28 PROCEDURE — 99213 OFFICE O/P EST LOW 20 MIN: CPT | Mod: S$GLB,,, | Performed by: INTERNAL MEDICINE

## 2018-02-28 PROCEDURE — 99999 PR PBB SHADOW E&M-EST. PATIENT-LVL III: CPT | Mod: PBBFAC,,, | Performed by: INTERNAL MEDICINE

## 2018-02-28 PROCEDURE — 71046 X-RAY EXAM CHEST 2 VIEWS: CPT | Mod: 26,,, | Performed by: RADIOLOGY

## 2018-02-28 RX ORDER — ALBUTEROL SULFATE 90 UG/1
1-2 AEROSOL, METERED RESPIRATORY (INHALATION) EVERY 6 HOURS PRN
Qty: 18 G | Refills: 1 | Status: SHIPPED | OUTPATIENT
Start: 2018-02-28 | End: 2018-10-18 | Stop reason: SDUPTHER

## 2018-02-28 RX ORDER — BENZONATATE 100 MG/1
100 CAPSULE ORAL NIGHTLY
Qty: 30 CAPSULE | Refills: 0 | Status: SHIPPED | OUTPATIENT
Start: 2018-02-28 | End: 2018-03-10

## 2018-03-01 ENCOUNTER — TELEPHONE (OUTPATIENT)
Dept: INTERNAL MEDICINE | Facility: CLINIC | Age: 63
End: 2018-03-01

## 2018-03-01 ENCOUNTER — HOSPITAL ENCOUNTER (OUTPATIENT)
Dept: RADIOLOGY | Facility: HOSPITAL | Age: 63
Discharge: HOME | End: 2018-03-01
Attending: INTERNAL MEDICINE
Payer: COMMERCIAL

## 2018-03-01 DIAGNOSIS — R93.89 ABNORMAL CXR: ICD-10-CM

## 2018-03-01 DIAGNOSIS — R91.1 LUNG NODULE: ICD-10-CM

## 2018-03-01 DIAGNOSIS — R05.3 COUGH, PERSISTENT: ICD-10-CM

## 2018-03-01 PROCEDURE — 71250 CT THORAX DX C-: CPT | Mod: 26,,, | Performed by: RADIOLOGY

## 2018-03-01 PROCEDURE — 71250 CT THORAX DX C-: CPT | Mod: TC

## 2018-03-01 NOTE — TELEPHONE ENCOUNTER
Please schedule CT Chest noncontrast for 3 pm or after on Thursday. Leave message on her cell phone [phone]. She is expecting it. L

## 2018-03-02 NOTE — TELEPHONE ENCOUNTER
----- Message from Shawanda Castano sent at 3/1/2018  4:38 PM CST -----  Contact: Patient [phone]  She wants a call back today concerning her test results of her CT chest scan from today.    Thank you

## 2018-03-02 NOTE — TELEPHONE ENCOUNTER
I call the patient last night, please schedule a CT lung without contrast in 6 months and send her the appt. VINCENZOL

## 2018-03-02 NOTE — PROGRESS NOTES
"Subjective:      Patient ID: Danuta Santos is a 62 y.o. female.    Chief Complaint: Cough    HPI:  HPI   Cough: originally treated by ENT , not resolving and patient is not feeling well. She was not able to tolerated tessalon.    Patient Active Problem List   Diagnosis    Muscle weakness    Chronic bilateral low back pain with right-sided sciatica     Past Medical History:   Diagnosis Date    IBS (irritable bowel syndrome)      Past Surgical History:   Procedure Laterality Date     SECTION      COSMETIC SURGERY      rhinoplasty    NOSE SURGERY       Family History   Problem Relation Age of Onset    Cancer Father     Stroke Maternal Grandfather     Breast cancer Neg Hx     Colon cancer Neg Hx     Diabetes Neg Hx     Eclampsia Neg Hx     Hypertension Neg Hx     Miscarriages / Stillbirths Neg Hx     Ovarian cancer Neg Hx      labor Neg Hx      Review of Systems   Constitutional: Positive for fatigue. Negative for activity change, chills, fever and unexpected weight change.   Respiratory: Positive for cough. Negative for chest tightness, shortness of breath and wheezing.    Cardiovascular: Negative for chest pain, palpitations and leg swelling.     Objective:     Vitals:    18 1543   Pulse: 61   SpO2: 98%   Weight: 61.2 kg (135 lb)   Height: 5' 6" (1.676 m)   PainSc: 0-No pain     Body mass index is 21.79 kg/m².  Physical Exam   Constitutional: She appears well-developed and well-nourished.   Neck: No JVD present. No thyromegaly present.   Cardiovascular: Normal rate, normal heart sounds and intact distal pulses.    Pulmonary/Chest: Effort normal and breath sounds normal. No respiratory distress.     Assessment:     1. Cough      Plan:   Danuta was seen today for cough.    Diagnoses and all orders for this visit:    Cough  -     X-Ray Chest PA And Lateral; Future    Other orders  -     albuterol 90 mcg/actuation inhaler; Inhale 1-2 puffs into the lungs every 6 (six) hours as " needed for Wheezing. /cough  -     benzonatate (TESSALON) 100 MG capsule; Take 1 capsule (100 mg total) by mouth every evening.    Based on abnormal CXR, CT ordered which a pulmonary nodule was noted. Patient did not smoke but her mother smoked 4 packs a day.Repeat CT in 6 months  Follow-up if symptoms worsen or fail to improve.     Medication List          Accurate as of 2/28/18 11:59 PM. If you have any questions, ask your nurse or doctor.               START taking these medications    albuterol 90 mcg/actuation inhaler  Inhale 1-2 puffs into the lungs every 6 (six) hours as needed for Wheezing. /cough  Started by:  Erica Khan MD     benzonatate 100 MG capsule  Commonly known as:  TESSALON  Take 1 capsule (100 mg total) by mouth every evening.  Started by:  Erica Khan MD        CONTINUE taking these medications    flaxseed oil 1,000 mg Cap     oxymetazoline 0.05 % nasal spray  Commonly known as:  AFRIN     PROBIOTIC COMPLEX ORAL     VITAMIN B-12 500 MCG tablet  Generic drug:  cyanocobalamin     vitamin D 1000 units Tab           Where to Get Your Medications      These medications were sent to [address] [address]    Phone:  [phone]   · albuterol 90 mcg/actuation inhaler  · benzonatate 100 MG capsule

## 2018-03-02 NOTE — TELEPHONE ENCOUNTER
I have given the patient the results of her CT Scan    Please schedule a CT without contrast for 6 months and send the appt to her. I would do it about the same time.    Procedures Ordered This Visit    CT Chest Without Contrast

## 2018-03-19 ENCOUNTER — CLINICAL SUPPORT (OUTPATIENT)
Dept: REHABILITATION | Facility: HOSPITAL | Age: 63
End: 2018-03-19
Attending: INTERNAL MEDICINE
Payer: COMMERCIAL

## 2018-03-19 DIAGNOSIS — M54.41 CHRONIC BILATERAL LOW BACK PAIN WITH RIGHT-SIDED SCIATICA: Primary | ICD-10-CM

## 2018-03-19 DIAGNOSIS — G89.29 CHRONIC BILATERAL LOW BACK PAIN WITH RIGHT-SIDED SCIATICA: Primary | ICD-10-CM

## 2018-03-19 DIAGNOSIS — M62.81 MUSCLE WEAKNESS: ICD-10-CM

## 2018-03-19 PROCEDURE — 97014 ELECTRIC STIMULATION THERAPY: CPT | Mod: PN | Performed by: PHYSICAL THERAPIST

## 2018-03-19 PROCEDURE — 97140 MANUAL THERAPY 1/> REGIONS: CPT | Mod: PN | Performed by: PHYSICAL THERAPIST

## 2018-03-19 PROCEDURE — 97110 THERAPEUTIC EXERCISES: CPT | Mod: PN | Performed by: PHYSICAL THERAPIST

## 2018-03-19 NOTE — PROGRESS NOTES
Physical Therapy Daily Note     Name: Danuta Santos  Clinic Number: [id number]  Diagnosis:   Encounter Diagnoses   Name Primary?    Chronic bilateral low back pain with right-sided sciatica Yes    Muscle weakness      Physician: Erica Khan MD  Treatment Orders: PT Eval and Treat  Past Medical History:   Diagnosis Date    IBS (irritable bowel syndrome)        Precautions: as per diagnosis    Evaluation date: 2/15/2018  Visit # authorized: 3/20 on 3/19/2018  Authorization period: 12-31-18  Plan of care expiration: 3-28-18  MD Follow up appt: none scheduled    Subjective     Pt reports: tripped going up steps and landed on knees and then onto side and had a busy weekend and has not been able to level her pelvis I.   Pain Scale: before treatment:N/T  Objective     AR R       TREATMENT  Therapeutic exercise: Danuta received therapeutic exercises to develop strength, endurance, ROM, flexibility and core stabilization for 30 minutes including:   Performed contract relax R glut and improved but not fully.  Performed contract relax to piriformis and then contract relax glut and still slight dysfunction performed stretching to R QL and then contract relax and glut and realigned   Instructed pt further in spinal and pelvic girdle anatomy and biomechanics and how muscles effect SI joint alek QL      HS stretch supine x 10  Glut stretch x 10  Piriformis stretch x 10  SLR x 10  Pelvic tilt x 10  Partial sit up x 10  Hip abd sidelying x 10  Arm and leg lift prone x 10  Hip ext prone with bent knee x 10   QL stretch x 10    Manual therapy: Danuta  received the following manual therapy techniques x 25 min. to include soft tissue and joint mobilization were applied to the: low back and gluteals to include: STM to lumbar region, QL, piriformis, P/A mob to lumbar region Grade 1-2  Vacuum/cupping STM with manual therapy techniques was performed to back and gluts to decrease muscle tightness, increase circulation and  promote healing process.  The pt's skin was monitored for redness adjusting pressure as needed. The pt was instructed in possible side effects of bruising and/or soreness.        Patient received pre-mod electrical stimulation to decrease muscle tightness and pain to B lumbar paraspinals for 15 minutes with MH with cycle time: continuous, beat frequency: , CC/CV: CV.        Written Home Exercises Provided: none today.  Pt reports she still has HEP from previous session  Pt demo good understanding of the education provided. Danuta demonstrated good return demonstration of activities.     Pt. education:  · Posture reeducation, body mechanics, HEP,   · No spiritual or educational barriers to learning provided  · Pt has no cultural, educational or language barriers to learning provided.    Assessment     Pt was extra tight and needed extra stretching to finally be able to perform self mob  Pt will continue to benefit from skilled outpatient physical therapy to address the remaining functional deficits, provide pt/family education, and to maximize pt's level of independence in the home and community environment. .     GOALS:   Short Term Goals:  3 weeks  Increase range of motion 25%  Increase strength 1/2 muscle grade  Improve postural awareness of pelvis to independently identify dysfunction with min assist from PT  Be able to perform HEP with minimal cueing required     Long Term Goals: 6 weeks  Increase range of motion to 75% to 100% full   Improve muscle strength 1 muscle grade  Improve muscle strength with MMT to 4+/5 to 5/5  Improve and stabilize proper pelvic positioning  Restore ability to sit for ADL and work with min to 0 pain  Restore normal sleep habits without disturbances due to pain  Restore ability to perform ADL's and household activities independently with min to 0 pain    Anticipated barriers to physical therapy: none  Pt's spiritual, cultural and educational needs considered and pt agreeable  to plan of care and goals        Plan   Continue with established Plan of Care towards PT goals.

## 2018-03-23 ENCOUNTER — TELEPHONE (OUTPATIENT)
Dept: INTERNAL MEDICINE | Facility: CLINIC | Age: 63
End: 2018-03-23

## 2018-03-23 ENCOUNTER — PATIENT MESSAGE (OUTPATIENT)
Dept: INTERNAL MEDICINE | Facility: CLINIC | Age: 63
End: 2018-03-23

## 2018-03-23 DIAGNOSIS — R05.9 COUGH: Primary | ICD-10-CM

## 2018-03-23 DIAGNOSIS — R06.89 TROUBLE BREATHING: ICD-10-CM

## 2018-03-23 NOTE — TELEPHONE ENCOUNTER
----- Message from Sho Key sent at 3/23/2018 10:18 AM CDT -----  Contact: self   Patient would like to get a referral.  Does the patient already have the specialty clinic appointment scheduled:  No   If yes, what date is the appointment scheduled:   no  Referral to what specialty:  Pulmonary   Reason (be specific):  A lot of coughing/ trouble breathing   Does the patient want the referral with a specific physician:  no  [facility]   Comments:

## 2018-03-23 NOTE — TELEPHONE ENCOUNTER
----- Message from Samantha Oneal sent at 3/22/2018  1:16 PM CDT -----  Contact: Self Call   [phone]  Patient updating you on her cough, It has gotten worst and she do not want to see anyone but you. Please call her with an appointment or advise on if she need to see a specialist.       [facility]     [phone] (Phone) or [phone] (Fax)

## 2018-04-02 ENCOUNTER — PATIENT MESSAGE (OUTPATIENT)
Dept: INTERNAL MEDICINE | Facility: CLINIC | Age: 63
End: 2018-04-02

## 2018-04-02 DIAGNOSIS — Z12.31 ENCOUNTER FOR SCREENING MAMMOGRAM FOR BREAST CANCER: Primary | ICD-10-CM

## 2018-04-06 ENCOUNTER — CLINICAL SUPPORT (OUTPATIENT)
Dept: REHABILITATION | Facility: HOSPITAL | Age: 63
End: 2018-04-06
Attending: INTERNAL MEDICINE
Payer: COMMERCIAL

## 2018-04-06 DIAGNOSIS — M62.81 MUSCLE WEAKNESS: ICD-10-CM

## 2018-04-06 DIAGNOSIS — G89.29 CHRONIC BILATERAL LOW BACK PAIN WITH RIGHT-SIDED SCIATICA: Primary | ICD-10-CM

## 2018-04-06 DIAGNOSIS — M54.41 CHRONIC BILATERAL LOW BACK PAIN WITH RIGHT-SIDED SCIATICA: Primary | ICD-10-CM

## 2018-04-06 PROCEDURE — 97014 ELECTRIC STIMULATION THERAPY: CPT | Mod: PN | Performed by: PHYSICAL THERAPIST

## 2018-04-06 PROCEDURE — 97140 MANUAL THERAPY 1/> REGIONS: CPT | Mod: PN | Performed by: PHYSICAL THERAPIST

## 2018-04-06 PROCEDURE — 97110 THERAPEUTIC EXERCISES: CPT | Mod: PN | Performed by: PHYSICAL THERAPIST

## 2018-04-06 NOTE — PROGRESS NOTES
Physical Therapy Daily Note     Name: Danuta Santos  Clinic Number: [id number]  Diagnosis:   Encounter Diagnoses   Name Primary?    Chronic bilateral low back pain with right-sided sciatica Yes    Muscle weakness      Physician: Erica Khan MD  Treatment Orders: PT Eval and Treat  Past Medical History:   Diagnosis Date    IBS (irritable bowel syndrome)        Precautions: as per diagnosis    Evaluation date: 2/15/2018  Visit # authorized: 3/20 on 4/6/2018  Authorization period: 12-31-18  Plan of care expiration: 3-28-18  MD Follow up appt: none scheduled    Subjective     Pt reports: had a relapse with upper respiratory problems.  Pt was unable to lie down for 2 weeks to exercise and sleep.Pt states she feels very tight and stiff     Pain Scale: before treatment: 5.5-6 after exercise 3-4 after treatment 2  Objective     AR R   pelvis    TREATMENT  Therapeutic exercise: Danuta received therapeutic exercises to develop strength, endurance, ROM, flexibility and core stabilization for 30 minutes including:     HS stretch supine x 10, corrected performance of stretch able to achieve 0 degrees 90/90  Glut stretch x 10  Piriformis stretch x 10      Trunk rotation supine x 10  SLR x 20  Pelvic tilt x 20  Partial sit up x 20  Hip abd sidelying x 20  Arm and leg lift prone x 20  Hip ext prone with bent knee x 20  QL stretch x 10 gentle    Pt required much verbal and tactile cueing for proper performance.  Pt had been doing 100 arm and leg lift prones she reported and with stretching was overstretching and jerky with movements.  Pt with decreased symptoms after ex which she has not bee able to achieve on her own.      Manual therapy: Danuta  received the following manual therapy techniques x 25 min. to include soft tissue and joint mobilization were applied to the: low back and gluteals to include: STM to lumbar region, QL, piriformis, P/A mob to lumbar region Grade 1-2  Vacuum/cupping STM with manual therapy  techniques was performed to back and gluts to decrease muscle tightness, increase circulation and promote healing process.  The pt's skin was monitored for redness adjusting pressure as needed. The pt was instructed in possible side effects of bruising and/or soreness.        Patient received pre-mod electrical stimulation to decrease muscle tightness and pain to B lumbar paraspinals for 15 minutes with MH with cycle time: continuous, beat frequency: , CC/CV: CV.        Written Home Exercises Provided: trunk rotation supine  Pt demo good understanding of the education provided. Danuta demonstrated good return demonstration of activities.     Pt. education:  · Posture reeducation, body mechanics, HEP,   · No spiritual or educational barriers to learning provided  · Pt has no cultural, educational or language barriers to learning provided.    Assessment   Pt appears to understand proper performance of ex Pt with decreased symptoms at end of treatment  Pt will continue to benefit from skilled outpatient physical therapy to address the remaining functional deficits, provide pt/family education, and to maximize pt's level of independence in the home and community environment. .     GOALS:   Short Term Goals:  3 weeks  Increase range of motion 25%  Increase strength 1/2 muscle grade  Improve postural awareness of pelvis to independently identify dysfunction with min assist from PT  Be able to perform HEP with minimal cueing required     Long Term Goals: 6 weeks  Increase range of motion to 75% to 100% full   Improve muscle strength 1 muscle grade  Improve muscle strength with MMT to 4+/5 to 5/5  Improve and stabilize proper pelvic positioning  Restore ability to sit for ADL and work with min to 0 pain  Restore normal sleep habits without disturbances due to pain  Restore ability to perform ADL's and household activities independently with min to 0 pain    Anticipated barriers to physical therapy: none  Pt's  spiritual, cultural and educational needs considered and pt agreeable to plan of care and goals        Plan   Continue with established Plan of Care towards PT goals.

## 2018-04-10 ENCOUNTER — CLINICAL SUPPORT (OUTPATIENT)
Dept: REHABILITATION | Facility: HOSPITAL | Age: 63
End: 2018-04-10
Attending: INTERNAL MEDICINE
Payer: COMMERCIAL

## 2018-04-10 DIAGNOSIS — M54.41 CHRONIC BILATERAL LOW BACK PAIN WITH RIGHT-SIDED SCIATICA: Primary | ICD-10-CM

## 2018-04-10 DIAGNOSIS — G89.29 CHRONIC BILATERAL LOW BACK PAIN WITH RIGHT-SIDED SCIATICA: Primary | ICD-10-CM

## 2018-04-10 DIAGNOSIS — M62.81 MUSCLE WEAKNESS: ICD-10-CM

## 2018-04-10 PROCEDURE — 97014 ELECTRIC STIMULATION THERAPY: CPT | Mod: PN | Performed by: PHYSICAL THERAPIST

## 2018-04-10 PROCEDURE — 97140 MANUAL THERAPY 1/> REGIONS: CPT | Mod: PN | Performed by: PHYSICAL THERAPIST

## 2018-04-10 NOTE — PROGRESS NOTES
Physical Therapy Daily Note     Name: Danuta Santos  Clinic Number: [id number]  Diagnosis:   Encounter Diagnoses   Name Primary?    Chronic bilateral low back pain with right-sided sciatica Yes    Muscle weakness      Physician: Erica Khan MD  Treatment Orders: PT Eval and Treat  Past Medical History:   Diagnosis Date    IBS (irritable bowel syndrome)        Precautions: as per diagnosis    Evaluation date: 2/15/2018  Visit # authorized: 3/20 on 4/10/2018  Authorization period: 12-31-18  Plan of care expiration: 3-28-18  MD Follow up appt: none scheduled    Subjective     Pt reports: she had encounter with a large dog pulling her with walking and down the stairs trying to help a neighbor and back went out and unable to get back in on her own     Pain Scale: before treatment: 7 after mob 3-4 after treatment 2  Objective     AR R   pelvis    TREATMENT  Therapeutic exercise: Danuta received therapeutic exercises to develop strength, endurance, ROM, flexibility and core stabilization for 5 minutes including:     Instructed pt to hold ex other than contract relax for a couple of days to assist in recovery to heal  Held exercises today  HS stretch supine x 10, corrected performance of stretch able to achieve 0 degrees 90/90  Glut stretch x 10  Piriformis stretch x 10      Trunk rotation supine x 10  SLR x 20  Pelvic tilt x 20  Partial sit up x 20  Hip abd sidelying x 20  Arm and leg lift prone x 20  Hip ext prone with bent knee x 20  QL stretch x 10 gentle    Pt required much verbal and tactile cueing for proper performance.  Pt had been doing 100 arm and leg lift prones she reported and with stretching was overstretching and jerky with movements.  Pt with decreased symptoms after ex which she has not bee able to achieve on her own.      Manual therapy: Danuta  received the following manual therapy techniques x 25 min. to include soft tissue and joint mobilization were applied to the: low back and  gluteals to include: Sidelying L contract relax mob and realigned.  Discussed pt's son trying to do this technique for her and did not work Instructed pt to videotape activity next time he tries and I can review and critique.   STM to lumbar region, QL, piriformis, P/A mob to lumbar region Grade 1-2    (NP)Vacuum/cupping STM with manual therapy techniques was performed to back and gluts to decrease muscle tightness, increase circulation and promote healing process.  The pt's skin was monitored for redness adjusting pressure as needed. The pt was instructed in possible side effects of bruising and/or soreness.        Patient received pre-mod electrical stimulation to decrease muscle tightness and pain to B lumbar paraspinals for 15 minutes with MH with cycle time: continuous, beat frequency: , CC/CV: CV.        Written Home Exercises Provided: trunk rotation supine  Pt demo good understanding of the education provided. Danuta demonstrated good return demonstration of activities.     Pt. education:  · Posture reeducation, body mechanics, HEP,   · No spiritual or educational barriers to learning provided  · Pt has no cultural, educational or language barriers to learning provided.    Assessment   Pt with significant dysfunction after walking strong dogs that pulled her   Pt will continue to benefit from skilled outpatient physical therapy to address the remaining functional deficits, provide pt/family education, and to maximize pt's level of independence in the home and community environment. .     GOALS:   Short Term Goals:  3 weeks  Increase range of motion 25%  Increase strength 1/2 muscle grade  Improve postural awareness of pelvis to independently identify dysfunction with min assist from PT  Be able to perform HEP with minimal cueing required     Long Term Goals: 6 weeks  Increase range of motion to 75% to 100% full   Improve muscle strength 1 muscle grade  Improve muscle strength with MMT to 4+/5 to  5/5  Improve and stabilize proper pelvic positioning  Restore ability to sit for ADL and work with min to 0 pain  Restore normal sleep habits without disturbances due to pain  Restore ability to perform ADL's and household activities independently with min to 0 pain    Anticipated barriers to physical therapy: none  Pt's spiritual, cultural and educational needs considered and pt agreeable to plan of care and goals        Plan   Continue with established Plan of Care towards PT goals.

## 2018-04-19 ENCOUNTER — CLINICAL SUPPORT (OUTPATIENT)
Dept: REHABILITATION | Facility: HOSPITAL | Age: 63
End: 2018-04-19
Attending: INTERNAL MEDICINE
Payer: COMMERCIAL

## 2018-04-19 DIAGNOSIS — G89.29 CHRONIC BILATERAL LOW BACK PAIN WITH RIGHT-SIDED SCIATICA: Primary | ICD-10-CM

## 2018-04-19 DIAGNOSIS — M62.81 MUSCLE WEAKNESS: ICD-10-CM

## 2018-04-19 DIAGNOSIS — M54.41 CHRONIC BILATERAL LOW BACK PAIN WITH RIGHT-SIDED SCIATICA: Primary | ICD-10-CM

## 2018-04-19 PROCEDURE — 97140 MANUAL THERAPY 1/> REGIONS: CPT | Mod: PN | Performed by: PHYSICAL THERAPIST

## 2018-04-19 PROCEDURE — 97014 ELECTRIC STIMULATION THERAPY: CPT | Mod: PN | Performed by: PHYSICAL THERAPIST

## 2018-04-19 NOTE — PROGRESS NOTES
Physical Therapy Daily Note     Name: Danuta Santos  Clinic Number: [id number]  Diagnosis:   Encounter Diagnoses   Name Primary?    Chronic bilateral low back pain with right-sided sciatica Yes    Muscle weakness      Physician: Erica Khan MD  Treatment Orders: PT Eval and Treat  Past Medical History:   Diagnosis Date    IBS (irritable bowel syndrome)        Precautions: as per diagnosis    Evaluation date: 2/15/2018  Visit # authorized: 3/20 on 4/19/2018  Authorization period: 12-31-18  Plan of care expiration: 3-28-18  MD Follow up appt: none scheduled    Subjective     Pt reports: still with illness just not able to exercise for pain and unable to walk due to allergies outside Pt     Pain Scale: before treatment: 7 after mob 3-4 after treatment 2  Objective     AR R   pelvis    TREATMENT  Therapeutic exercise: Danuta received therapeutic exercises to develop strength, endurance, ROM, flexibility and core stabilization for 5 minutes including:     Instructed pt to gradually resume strengthening and to walk on treadmill at gym as tolerated  Held exercises today  HS stretch supine x 10, corrected performance of stretch able to achieve 0 degrees 90/90  Glut stretch x 10  Piriformis stretch x 10     Trunk rotation supine x 10  SLR x 20  Pelvic tilt x 20  Partial sit up x 20  Hip abd sidelying x 20  Arm and leg lift prone x 20  Hip ext prone with bent knee x 20  QL stretch x 10 gentle    Pt required much verbal and tactile cueing for proper performance.  Pt had been doing 100 arm and leg lift prones she reported and with stretching was overstretching and jerky with movements.  Pt with decreased symptoms after ex which she has not bee able to achieve on her own.      Manual therapy: Danuta  received the following manual therapy techniques x 15 min. to include soft tissue and joint mobilization were applied to the: low back and gluteals to include: Sidelying L contract relax mob and realigned.   "  Kinesiotape with 5" star for pain relief was performed over the R lumbar paraspinals.  Instructed pt in use, care and precautions with tape.  .   (NP)STM to lumbar region, QL, piriformis, P/A mob to lumbar region Grade 1-2    (NP)Vacuum/cupping STM with manual therapy techniques was performed to back and gluts to decrease muscle tightness, increase circulation and promote healing process.  The pt's skin was monitored for redness adjusting pressure as needed. The pt was instructed in possible side effects of bruising and/or soreness.        Patient received pre-mod electrical stimulation to decrease muscle tightness and pain to B lumbar paraspinals for 15 minutes with MH with cycle time: continuous, beat frequency: , CC/CV: CV.        Written Home Exercises Provided: trunk rotation supine  Pt demo good understanding of the education provided. Danuta demonstrated good return demonstration of activities.     Pt. education:  · Posture reeducation, body mechanics, HEP,   · No spiritual or educational barriers to learning provided  · Pt has no cultural, educational or language barriers to learning provided.    Assessment   Pt with significant dysfunction after walking strong dogs that pulled her   Pt will continue to benefit from skilled outpatient physical therapy to address the remaining functional deficits, provide pt/family education, and to maximize pt's level of independence in the home and community environment. .     GOALS:   Short Term Goals:  3 weeks  Increase range of motion 25%  Increase strength 1/2 muscle grade  Improve postural awareness of pelvis to independently identify dysfunction with min assist from PT  Be able to perform HEP with minimal cueing required     Long Term Goals: 6 weeks  Increase range of motion to 75% to 100% full   Improve muscle strength 1 muscle grade  Improve muscle strength with MMT to 4+/5 to 5/5  Improve and stabilize proper pelvic positioning  Restore ability to sit " for ADL and work with min to 0 pain  Restore normal sleep habits without disturbances due to pain  Restore ability to perform ADL's and household activities independently with min to 0 pain    Anticipated barriers to physical therapy: none  Pt's spiritual, cultural and educational needs considered and pt agreeable to plan of care and goals        Plan   Continue with established Plan of Care towards PT goals.

## 2018-04-24 ENCOUNTER — CLINICAL SUPPORT (OUTPATIENT)
Dept: REHABILITATION | Facility: HOSPITAL | Age: 63
End: 2018-04-24
Attending: INTERNAL MEDICINE
Payer: COMMERCIAL

## 2018-04-24 DIAGNOSIS — G89.29 CHRONIC BILATERAL LOW BACK PAIN WITH RIGHT-SIDED SCIATICA: Primary | ICD-10-CM

## 2018-04-24 DIAGNOSIS — M62.81 MUSCLE WEAKNESS: ICD-10-CM

## 2018-04-24 DIAGNOSIS — M54.41 CHRONIC BILATERAL LOW BACK PAIN WITH RIGHT-SIDED SCIATICA: Primary | ICD-10-CM

## 2018-04-24 PROCEDURE — 97530 THERAPEUTIC ACTIVITIES: CPT | Mod: PN | Performed by: PHYSICAL THERAPIST

## 2018-04-24 PROCEDURE — 97140 MANUAL THERAPY 1/> REGIONS: CPT | Mod: PN | Performed by: PHYSICAL THERAPIST

## 2018-04-24 PROCEDURE — 97110 THERAPEUTIC EXERCISES: CPT | Mod: PN | Performed by: PHYSICAL THERAPIST

## 2018-04-24 PROCEDURE — 97014 ELECTRIC STIMULATION THERAPY: CPT | Mod: PN | Performed by: PHYSICAL THERAPIST

## 2018-04-24 NOTE — PROGRESS NOTES
Physical Therapy Progress Note     Name: Danuta Santos  Clinic Number: [id number]  Diagnosis:   Encounter Diagnoses   Name Primary?    Chronic bilateral low back pain with right-sided sciatica Yes    Muscle weakness      Physician: Erica Khan MD  Treatment Orders: PT Eval and Treat  Past Medical History:   Diagnosis Date    IBS (irritable bowel syndrome)        Precautions: as per diagnosis    Evaluation date: 2/15/2018  Visit # authorized: 6/20 on 4/24/2018  Authorization period: 12-31-18  Plan of care expiration: 6-5-18  MD Follow up appt: none scheduled    Subjective     Pt reports: she has had a flare up with back and unable to be successful with self mobilization ex.  She is still going in dysfunction and unable to get herself back in and has analyzed what she does and thinks this is knocking it out with stepping up on stool.       Pain Scale: before treatment: 7 after mob 3-4 after treatment N/T  Objective     AR R   pelvis    TREATMENT  Therapeutic activity: Patient participated in dynamic functional therapeutic activities to improve functional performance for 30 minutes. Including: working with pt on analyzing stepping up on stools or higher chair that leads to dysfunction and even with reaching needs to reach with L instead of R to avoid going into dysfunction.  Pt also squats down to feed cats and instructed pt to elevate where bowls are so can avoid squatting for right now goes in dysfunction.  Instructed pt to purchase a reacher to avoid squatting for now until increase stabilization and can be effective with contract relax glut  Instructed pt further in miniskirt principles, no shifting in standing maintaining even WB       Therapeutic exercise: Danuta received therapeutic exercises to develop strength, endurance, ROM, flexibility and core stabilization for 10 minutes including:     Instructed pt to perform only    Glut set x 10   Quad set x 10 one leg at a time  Contract relax R glut    Instructed pt not to walk for exercise yet      HOLD ALL OTHER EXERCISES   Instructed pt to gradually resume strengthening and to walk on treadmill at gym as tolerated  Held exercises today  HS stretch supine x 10, corrected performance of stretch able to achieve 0 degrees 90/90  Glut stretch x 10  Piriformis stretch x 10     Trunk rotation supine x 10  SLR x 20  Pelvic tilt x 20  Partial sit up x 20  Hip abd sidelying x 20  Arm and leg lift prone x 20  Hip ext prone with bent knee x 20  QL stretch x 10 gentle    Pt required much verbal and tactile cueing for proper performance.  Pt had been doing 100 arm and leg lift prones she reported and with stretching was overstretching and jerky with movements.  Pt with decreased symptoms after ex which she has not bee able to achieve on her own.      Manual therapy: Danuta  received the following manual therapy techniques x 15 min. to include soft tissue and joint mobilization were applied to the: low back and gluteals to include: Sidelying L contract relax mob and realigned.  Vacuum/cupping STM with manual therapy techniques was performed to back and gluteals to decrease muscle tightness, increase circulation and promote healing process.  The pt's skin was monitored for redness adjusting pressure as needed. The pt was instructed in possible side effects of bruising and/or soreness.      STM to lumbar region, QL, piriformis, P/A mob to lumbar region Grade 1-2     IFC electrical stimulation for pain control and to decrease muscle tightness and was applied to R QL and paraspinals lumbar supine,   frequency = ,  @ 40% scan,  for 15 minutes with MH.     Written Home Exercises Provided: Quad set and glut set  Pt demo good understanding of the education provided. Danuta demonstrated good return demonstration of activities.     Pt. education:  · Posture reeducation, body mechanics, HEP,   · No spiritual or educational barriers to learning provided  · Pt has no  cultural, educational or language barriers to learning provided.    Assessment   Pt has had a flare up after long illness and has been unable to self mobilize recently.  With continued dysfunction that she cannot correct she appears to understands to make changes to ADL to avoid dysfunction until she can increase stability.  We have basically stopped strengthening other than simple isometrics for this week and will gradually resume strengthening and flexibility as tolerated and when pt can self mobilize again  Pt will continue to benefit from skilled outpatient physical therapy to address the remaining functional deficits, provide pt/family education, and to maximize pt's level of independence in the home and community environment. .     GOALS:   Short Term Goals:  3 weeks  Increase range of motion 25%  Increase strength 1/2 muscle grade  Improve postural awareness of pelvis to independently identify dysfunction with min assist from PT  Be able to perform HEP with minimal cueing required     Long Term Goals: 6 weeks  Increase range of motion to 75% to 100% full   Improve muscle strength 1 muscle grade  Improve muscle strength with MMT to 4+/5 to 5/5  Improve and stabilize proper pelvic positioning  Restore ability to sit for ADL and work with min to 0 pain  Restore normal sleep habits without disturbances due to pain  Restore ability to perform ADL's and household activities independently with min to 0 pain    Anticipated barriers to physical therapy: none  Pt's spiritual, cultural and educational needs considered and pt agreeable to plan of care and goals        Plan   If you concur, I recommend patient continue with physical therapy 1-2 times a week  for 6 weeks.  Please advise us of your  recommendations. Thank you for allowing us to assist in the care of your patient.      Jacy Escobar, MS, PT          I certify the need for these services furnished under this plan of treatment and while under my  care.    ____________________________________ Physician/Referring Practitioner                                Date of Signature

## 2018-04-24 NOTE — PLAN OF CARE
Physical Therapy Progress Note     Name: Danuta Santos  Clinic Number: [id number]  Diagnosis:   Encounter Diagnoses   Name Primary?    Chronic bilateral low back pain with right-sided sciatica Yes    Muscle weakness      Physician: Erica Khan MD  Treatment Orders: PT Eval and Treat  Past Medical History:   Diagnosis Date    IBS (irritable bowel syndrome)        Precautions: as per diagnosis    Evaluation date: 2/15/2018  Visit # authorized: 6/20 on 4/24/2018  Authorization period: 12-31-18  Plan of care expiration: 6-5-18  MD Follow up appt: none scheduled    Subjective     Pt reports: she has had a flare up with back and unable to be successful with self mobilization ex.  She is still going in dysfunction and unable to get herself back in and has analyzed what she does and thinks this is knocking it out with stepping up on stool.       Pain Scale: before treatment: 7 after mob 3-4 after treatment N/T  Objective     AR R   pelvis    TREATMENT  Therapeutic activity: Patient participated in dynamic functional therapeutic activities to improve functional performance for 30 minutes. Including: working with pt on analyzing stepping up on stools or higher chair that leads to dysfunction and even with reaching needs to reach with L instead of R to avoid going into dysfunction.  Pt also squats down to feed cats and instructed pt to elevate where bowls are so can avoid squatting for right now goes in dysfunction.  Instructed pt to purchase a reacher to avoid squatting for now until increase stabilization and can be effective with contract relax glut  Instructed pt further in miniskirt principles, no shifting in standing maintaining even WB       Therapeutic exercise: Danuta received therapeutic exercises to develop strength, endurance, ROM, flexibility and core stabilization for 10 minutes including:     Instructed pt to perform only    Glut set x 10   Quad set x 10 one leg at a time  Contract relax R glut    Instructed pt not to walk for exercise yet      HOLD ALL OTHER EXERCISES   Instructed pt to gradually resume strengthening and to walk on treadmill at gym as tolerated  Held exercises today  HS stretch supine x 10, corrected performance of stretch able to achieve 0 degrees 90/90  Glut stretch x 10  Piriformis stretch x 10     Trunk rotation supine x 10  SLR x 20  Pelvic tilt x 20  Partial sit up x 20  Hip abd sidelying x 20  Arm and leg lift prone x 20  Hip ext prone with bent knee x 20  QL stretch x 10 gentle    Pt required much verbal and tactile cueing for proper performance.  Pt had been doing 100 arm and leg lift prones she reported and with stretching was overstretching and jerky with movements.  Pt with decreased symptoms after ex which she has not bee able to achieve on her own.      Manual therapy: Danuta  received the following manual therapy techniques x 15 min. to include soft tissue and joint mobilization were applied to the: low back and gluteals to include: Sidelying L contract relax mob and realigned.  Vacuum/cupping STM with manual therapy techniques was performed to back and gluteals to decrease muscle tightness, increase circulation and promote healing process.  The pt's skin was monitored for redness adjusting pressure as needed. The pt was instructed in possible side effects of bruising and/or soreness.      STM to lumbar region, QL, piriformis, P/A mob to lumbar region Grade 1-2     IFC electrical stimulation for pain control and to decrease muscle tightness and was applied to R QL and paraspinals lumbar supine,   frequency = ,  @ 40% scan,  for 15 minutes with MH.     Written Home Exercises Provided: Quad set and glut set  Pt demo good understanding of the education provided. Danuta demonstrated good return demonstration of activities.     Pt. education:  · Posture reeducation, body mechanics, HEP,   · No spiritual or educational barriers to learning provided  · Pt has no  cultural, educational or language barriers to learning provided.    Assessment   Pt has had a flare up after long illness and has been unable to self mobilize recently.  With continued dysfunction that she cannot correct she appears to understands to make changes to ADL to avoid dysfunction until she can increase stability.  We have basically stopped strengthening other than simple isometrics for this week and will gradually resume strengthening and flexibility as tolerated and when pt can self mobilize again  Pt will continue to benefit from skilled outpatient physical therapy to address the remaining functional deficits, provide pt/family education, and to maximize pt's level of independence in the home and community environment. .     GOALS:   Short Term Goals:  3 weeks  Increase range of motion 25%  Increase strength 1/2 muscle grade  Improve postural awareness of pelvis to independently identify dysfunction with min assist from PT  Be able to perform HEP with minimal cueing required     Long Term Goals: 6 weeks  Increase range of motion to 75% to 100% full   Improve muscle strength 1 muscle grade  Improve muscle strength with MMT to 4+/5 to 5/5  Improve and stabilize proper pelvic positioning  Restore ability to sit for ADL and work with min to 0 pain  Restore normal sleep habits without disturbances due to pain  Restore ability to perform ADL's and household activities independently with min to 0 pain    Anticipated barriers to physical therapy: none  Pt's spiritual, cultural and educational needs considered and pt agreeable to plan of care and goals        Plan   If you concur, I recommend patient continue with physical therapy 1-2 times a week  for 6 weeks.  Please advise us of your  recommendations. Thank you for allowing us to assist in the care of your patient.      Jacy Escobar, MS, PT          I certify the need for these services furnished under this plan of treatment and while under my  care.    ____________________________________ Physician/Referring Practitioner                                Date of Signature

## 2018-04-26 ENCOUNTER — CLINICAL SUPPORT (OUTPATIENT)
Dept: REHABILITATION | Facility: HOSPITAL | Age: 63
End: 2018-04-26
Attending: INTERNAL MEDICINE
Payer: COMMERCIAL

## 2018-04-26 DIAGNOSIS — M62.81 MUSCLE WEAKNESS: ICD-10-CM

## 2018-04-26 DIAGNOSIS — M54.41 CHRONIC BILATERAL LOW BACK PAIN WITH RIGHT-SIDED SCIATICA: Primary | ICD-10-CM

## 2018-04-26 DIAGNOSIS — G89.29 CHRONIC BILATERAL LOW BACK PAIN WITH RIGHT-SIDED SCIATICA: Primary | ICD-10-CM

## 2018-04-26 PROCEDURE — 97110 THERAPEUTIC EXERCISES: CPT | Mod: PN | Performed by: PHYSICAL THERAPIST

## 2018-04-26 PROCEDURE — 97014 ELECTRIC STIMULATION THERAPY: CPT | Mod: PN | Performed by: PHYSICAL THERAPIST

## 2018-04-26 NOTE — PROGRESS NOTES
Physical Therapy Daily Note     Name: Danuta Santos  Clinic Number: [id number]  Diagnosis:   Encounter Diagnoses   Name Primary?    Chronic bilateral low back pain with right-sided sciatica Yes    Muscle weakness      Physician: Erica Khan MD  Treatment Orders: PT Eval and Treat  Past Medical History:   Diagnosis Date    IBS (irritable bowel syndrome)        Precautions: as per diagnosis    Evaluation date: 2/15/2018  Visit # authorized: 7/20 on 4/26/2018  Authorization period: 12-31-18  Plan of care expiration: 6-5-18  MD Follow up appt: none scheduled    Subjective     Pt reports: cannot keep pelvis in, having trouble getting in car      Pain Scale: before treatment: 6 after mob 3 after treatment N/T  Objective     AR R   pelvis    TREATMENT  Therapeutic activity: Patient participated in dynamic functional therapeutic activities to improve functional performance for 5 minutes. Including: analyzed getting in car and will try to approach seat from back instead    Glut stretch   Therapeutic exercise: Danuta received therapeutic exercises to develop strength, endurance, ROM, flexibility and core stabilization for 10 minutes including:    HS stretch supine    Glut stretch   Piriformis stretch   R QL stretch  Glut set x 10  Quad set x 10 one leg at a time  Contract relax R glut     Sidelying contract relax R lumbar paraspinals x 3      Instructed pt not to walk for exercise yet      HOLD ALL OTHER EXERCISES   Instructed pt to gradually resume strengthening and to walk on treadmill at gym as tolerated  Held exercises today  HS stretch supine x 10, corrected performance of stretch able to achieve 0 degrees 90/90  Glut stretch x 10  Piriformis stretch x 10     Trunk rotation supine x 10  SLR x 20  Pelvic tilt x 20  Partial sit up x 20  Hip abd sidelying x 20  Arm and leg lift prone x 20  Hip ext prone with bent knee x 20  QL stretch x 10 gentle      (NP)Manual therapy: Danuta  received the following manual  therapy techniques x 15 min. to include soft tissue and joint mobilization were applied to the: low back and gluteals to include: Sidelying L contract relax mob and realigned.  Vacuum/cupping STM with manual therapy techniques was performed to back and gluteals to decrease muscle tightness, increase circulation and promote healing process.  The pt's skin was monitored for redness adjusting pressure as needed. The pt was instructed in possible side effects of bruising and/or soreness.    STM to lumbar region, QL, piriformis, P/A mob to lumbar region Grade 1-2     IFC electrical stimulation for pain control and to decrease muscle tightness and was applied to R QL and paraspinals lumbar supine,   frequency = ,  @ 40% scan,  for 15 minutes with MH.     Written Home Exercises Provided: none today  Pt demo good understanding of the education provided. Danuta demonstrated good return demonstration of activities.     Pt. education:  · Posture reeducation, body mechanics, HEP,   · No spiritual or educational barriers to learning provided  · Pt has no cultural, educational or language barriers to learning provided.    Assessment   Pt may benefit from gentle stretching to help with pain followed by contract relax, improving ability to self correct with contract relax and understands how to get in car to avoid dysfunction  Pt will continue to benefit from skilled outpatient physical therapy to address the remaining functional deficits, provide pt/family education, and to maximize pt's level of independence in the home and community environment. .     GOALS:   Short Term Goals:  3 weeks  Increase range of motion 25%  Increase strength 1/2 muscle grade  Improve postural awareness of pelvis to independently identify dysfunction with min assist from PT  Be able to perform HEP with minimal cueing required     Long Term Goals: 6 weeks  Increase range of motion to 75% to 100% full   Improve muscle strength 1 muscle  grade  Improve muscle strength with MMT to 4+/5 to 5/5  Improve and stabilize proper pelvic positioning  Restore ability to sit for ADL and work with min to 0 pain  Restore normal sleep habits without disturbances due to pain  Restore ability to perform ADL's and household activities independently with min to 0 pain    Anticipated barriers to physical therapy: none  Pt's spiritual, cultural and educational needs considered and pt agreeable to plan of care and goals        Plan   Continue with established plan of care towards PT goals.

## 2018-05-01 ENCOUNTER — CLINICAL SUPPORT (OUTPATIENT)
Dept: REHABILITATION | Facility: HOSPITAL | Age: 63
End: 2018-05-01
Attending: INTERNAL MEDICINE
Payer: COMMERCIAL

## 2018-05-01 DIAGNOSIS — M62.81 MUSCLE WEAKNESS: ICD-10-CM

## 2018-05-01 DIAGNOSIS — M54.41 CHRONIC BILATERAL LOW BACK PAIN WITH RIGHT-SIDED SCIATICA: Primary | ICD-10-CM

## 2018-05-01 DIAGNOSIS — G89.29 CHRONIC BILATERAL LOW BACK PAIN WITH RIGHT-SIDED SCIATICA: Primary | ICD-10-CM

## 2018-05-01 PROCEDURE — 97110 THERAPEUTIC EXERCISES: CPT | Mod: PN | Performed by: PHYSICAL THERAPIST

## 2018-05-01 PROCEDURE — 97014 ELECTRIC STIMULATION THERAPY: CPT | Mod: PN | Performed by: PHYSICAL THERAPIST

## 2018-05-01 PROCEDURE — 97140 MANUAL THERAPY 1/> REGIONS: CPT | Mod: PN | Performed by: PHYSICAL THERAPIST

## 2018-05-01 NOTE — PROGRESS NOTES
Physical Therapy Daily Note     Name: Danuta Santos  Clinic Number: [id number]  Diagnosis:   Encounter Diagnoses   Name Primary?    Chronic bilateral low back pain with right-sided sciatica Yes    Muscle weakness      Physician: Erica Khan MD  Treatment Orders: PT Eval and Treat  Past Medical History:   Diagnosis Date    IBS (irritable bowel syndrome)        Precautions: as per diagnosis    Evaluation date: 2/15/2018  Visit # authorized: 8/20 on 5/1/2018  Authorization period: 12-31-18  Plan of care expiration: 6-5-18  MD Follow up appt: none scheduled    Subjective     Pt reports: modifications have helped. Still some pain, not sure if able to get herself back in Using reacher as able using step stool to feed the cats and not straining back as much which is better  Pt forgot to do stretches.  Pain Scale: before treatment: 5 after mob 3 after treatment N/T  Objective     AR R   pelvis    TREATMENT    Therapeutic exercise: Danuta received therapeutic exercises to develop strength, endurance, ROM, flexibility and core stabilization for 15 minutes including:   Performed contract relax and improved but not fully realigned  HS stretch supine   Glut stretch  Piriformis stretch  R QL stretch  Glut set x 10  Quad set x 10 one leg at a time  Contract relax R glut   Still slight dysfunction at end    Sidelying contract relax R lumbar paraspinals x 3   Realigned   Bridge x 10        Instructed pt not to walk for exercise yet      HOLD ALL OTHER EXERCISES   Instructed pt to gradually resume strengthening and to walk on treadmill at gym as tolerated  Held exercises today  HS stretch supine x 10, corrected performance of stretch able to achieve 0 degrees 90/90  Glut stretch x 10  Piriformis stretch x 10     Trunk rotation supine x 10  SLR x 20  Pelvic tilt x 20  Partial sit up x 20  Hip abd sidelying x 20  Arm and leg lift prone x 20  Hip ext prone with bent knee x 20  QL stretch x 10 gentle      Manual therapy:  Danuta  received the following manual therapy techniques x 25 min. to include soft tissue and joint mobilization were applied to the: low back and gluteals to include: Sidelying L contract relax mob and realigned.  Vacuum/cupping STM with manual therapy techniques was performed to back and gluteals to decrease muscle tightness, increase circulation and promote healing process.  The pt's skin was monitored for redness adjusting pressure as needed. The pt was instructed in possible side effects of bruising and/or soreness.    STM to lumbar region, QL, piriformis, P/A mob to lumbar region Grade 1-2     IFC electrical stimulation for pain control and to decrease muscle tightness and was applied to R QL and paraspinals lumbar supine,   frequency = ,  @ 40% scan,  for 15 minutes with MH.     Written Home Exercises Provided: none today  Pt demo good understanding of the education provided. Danuta demonstrated good return demonstration of activities.     Pt. education:  · Posture reeducation, body mechanics, HEP,   · No spiritual or educational barriers to learning provided  · Pt has no cultural, educational or language barriers to learning provided.    Assessment   Pt is improved from modifications, but forgot to start stretching and I feel stretching will assist pt in keeping muscles more relaxed to avoid dysfunction she cannot self correct.    Pt will continue to benefit from skilled outpatient physical therapy to address the remaining functional deficits, provide pt/family education, and to maximize pt's level of independence in the home and community environment. .     GOALS:   Short Term Goals:  3 weeks  Increase range of motion 25%  Increase strength 1/2 muscle grade  Improve postural awareness of pelvis to independently identify dysfunction with min assist from PT  Be able to perform HEP with minimal cueing required     Long Term Goals: 6 weeks  Increase range of motion to 75% to 100% full   Improve  muscle strength 1 muscle grade  Improve muscle strength with MMT to 4+/5 to 5/5  Improve and stabilize proper pelvic positioning  Restore ability to sit for ADL and work with min to 0 pain  Restore normal sleep habits without disturbances due to pain  Restore ability to perform ADL's and household activities independently with min to 0 pain    Anticipated barriers to physical therapy: none  Pt's spiritual, cultural and educational needs considered and pt agreeable to plan of care and goals        Plan   Continue with established plan of care towards PT goals.

## 2018-05-04 ENCOUNTER — HOSPITAL ENCOUNTER (OUTPATIENT)
Dept: RADIOLOGY | Facility: HOSPITAL | Age: 63
Discharge: HOME | End: 2018-05-04
Attending: INTERNAL MEDICINE
Payer: COMMERCIAL

## 2018-05-04 DIAGNOSIS — Z12.31 ENCOUNTER FOR SCREENING MAMMOGRAM FOR BREAST CANCER: ICD-10-CM

## 2018-05-04 PROCEDURE — 77067 SCR MAMMO BI INCL CAD: CPT | Mod: TC

## 2018-05-04 PROCEDURE — 77067 SCR MAMMO BI INCL CAD: CPT | Mod: 26,,, | Performed by: RADIOLOGY

## 2018-05-04 PROCEDURE — 77063 BREAST TOMOSYNTHESIS BI: CPT | Mod: 26,,, | Performed by: RADIOLOGY

## 2018-05-11 ENCOUNTER — CLINICAL SUPPORT (OUTPATIENT)
Dept: REHABILITATION | Facility: HOSPITAL | Age: 63
End: 2018-05-11
Attending: INTERNAL MEDICINE
Payer: COMMERCIAL

## 2018-05-11 DIAGNOSIS — G89.29 CHRONIC BILATERAL LOW BACK PAIN WITH RIGHT-SIDED SCIATICA: Primary | ICD-10-CM

## 2018-05-11 DIAGNOSIS — M62.81 MUSCLE WEAKNESS: ICD-10-CM

## 2018-05-11 DIAGNOSIS — M54.41 CHRONIC BILATERAL LOW BACK PAIN WITH RIGHT-SIDED SCIATICA: Primary | ICD-10-CM

## 2018-05-11 PROCEDURE — 97140 MANUAL THERAPY 1/> REGIONS: CPT | Mod: PN | Performed by: PHYSICAL THERAPIST

## 2018-05-11 PROCEDURE — 97110 THERAPEUTIC EXERCISES: CPT | Mod: PN | Performed by: PHYSICAL THERAPIST

## 2018-05-11 NOTE — PROGRESS NOTES
Physical Therapy Daily Note     Name: Danuta Santos  Clinic Number: [id number]  Diagnosis:   Encounter Diagnoses   Name Primary?    Chronic bilateral low back pain with right-sided sciatica Yes    Muscle weakness      Physician: Erica Khan MD  Treatment Orders: PT Eval and Treat  Past Medical History:   Diagnosis Date    IBS (irritable bowel syndrome)        Precautions: as per diagnosis    Evaluation date: 2/15/2018  Visit # authorized: 8/20 on 5/11/2018  Authorization period: 12-31-18  Plan of care expiration: 6-5-18  MD Follow up appt: none scheduled    Subjective     Pt reports: better overall but has been able to get in some.  Currently unable to self mobilize.  Pt states she tried glut stretch and then contract relax and did not work. Pt states walking for 10 min  Pain Scale: before treatment: 5 after mob 3 after treatment N/T  Objective     AR R   pelvis    TREATMENT    Therapeutic exercise: Danuta received therapeutic exercises to develop strength, endurance, ROM, flexibility and core stabilization for 15 minutes including:   Performed contract relax  And glut stretch and improved but not fully realigned  Had pt do all stretches and then contract relax R glut   HS stretch supine   Glut stretch  Piriformis stretch  B QL stretch    Bridge x 10   Plank 3 reps 10 sec each to start   SLR x 10   Hip abd sidelying x 10   Leg lift prone x 10   Arm lift prone  Contract relax R glut       Sidelying contract relax R lumbar paraspinals x 3   Realigned           HOLD ALL OTHER EXERCISES   Instructed pt to gradually resume strengthening and to walk on treadmill at gym as tolerated  Held exercises today  HS stretch supine x 10, corrected performance of stretch able to achieve 0 degrees 90/90  Glut stretch x 10  Piriformis stretch x 10     Trunk rotation supine x 10  SLR x 20  Pelvic tilt x 20  Partial sit up x 20  Hip abd sidelying x 20  Arm and leg lift prone x 20  Hip ext prone with bent knee x 20  QL  stretch x 10 gentle      Manual therapy: Danuta  received the following manual therapy techniques x 25 min. to include soft tissue and joint mobilization were applied to the: low back and gluteals to include: Sidelying L contract relax mob and realigned.  Vacuum/cupping STM with manual therapy techniques was performed to back and gluteals to decrease muscle tightness, increase circulation and promote healing process.  The pt's skin was monitored for redness adjusting pressure as needed. The pt was instructed in possible side effects of bruising and/or soreness.    STM to lumbar region, QL, piriformis, P/A mob to lumbar region Grade 1-2    (NP) IFC electrical stimulation for pain control and to decrease muscle tightness and was applied to R QL and paraspinals lumbar supine,   frequency = ,  @ 40% scan,  for 15 minutes with MH.     Written Home Exercises Provided: indented ex noted above  Pt demo good understanding of the education provided. Danuta demonstrated good return demonstration of activities.     Pt. education:  · Posture reeducation, body mechanics, HEP,   · No spiritual or educational barriers to learning provided  · Pt has no cultural, educational or language barriers to learning provided.    Assessment   Pt is improved and now understands all stretches are necessary to be successful with self mob.  Pt able to progress with strengthening  Pt will continue to benefit from skilled outpatient physical therapy to address the remaining functional deficits, provide pt/family education, and to maximize pt's level of independence in the home and community environment. .     GOALS:   Short Term Goals:  3 weeks  Increase range of motion 25%  Increase strength 1/2 muscle grade  Improve postural awareness of pelvis to independently identify dysfunction with min assist from PT  Be able to perform HEP with minimal cueing required     Long Term Goals: 6 weeks  Increase range of motion to 75% to 100% full    Improve muscle strength 1 muscle grade  Improve muscle strength with MMT to 4+/5 to 5/5  Improve and stabilize proper pelvic positioning  Restore ability to sit for ADL and work with min to 0 pain  Restore normal sleep habits without disturbances due to pain  Restore ability to perform ADL's and household activities independently with min to 0 pain    Anticipated barriers to physical therapy: none  Pt's spiritual, cultural and educational needs considered and pt agreeable to plan of care and goals        Plan   Continue with established plan of care towards PT goals.

## 2018-05-17 ENCOUNTER — CLINICAL SUPPORT (OUTPATIENT)
Dept: REHABILITATION | Facility: HOSPITAL | Age: 63
End: 2018-05-17
Attending: INTERNAL MEDICINE
Payer: COMMERCIAL

## 2018-05-17 DIAGNOSIS — M62.81 MUSCLE WEAKNESS: ICD-10-CM

## 2018-05-17 DIAGNOSIS — M54.41 CHRONIC BILATERAL LOW BACK PAIN WITH RIGHT-SIDED SCIATICA: Primary | ICD-10-CM

## 2018-05-17 DIAGNOSIS — G89.29 CHRONIC BILATERAL LOW BACK PAIN WITH RIGHT-SIDED SCIATICA: Primary | ICD-10-CM

## 2018-05-17 PROCEDURE — 97110 THERAPEUTIC EXERCISES: CPT | Mod: PN | Performed by: PHYSICAL THERAPIST

## 2018-05-17 PROCEDURE — 97014 ELECTRIC STIMULATION THERAPY: CPT | Mod: PN | Performed by: PHYSICAL THERAPIST

## 2018-05-17 NOTE — PROGRESS NOTES
Physical Therapy Daily Note     Name: Danuta Santos  Clinic Number: [id number]  Diagnosis:   Encounter Diagnoses   Name Primary?    Chronic bilateral low back pain with right-sided sciatica Yes    Muscle weakness      Physician: Erica Khan MD  Treatment Orders: PT Eval and Treat  Past Medical History:   Diagnosis Date    IBS (irritable bowel syndrome)        Precautions: as per diagnosis    Evaluation date: 2/15/2018  Visit # authorized: 8/20 on 5/17/2018  Authorization period: 12-31-18  Plan of care expiration: 6-5-18  MD Follow up appt: none scheduled    Subjective     Pt reports: has been doing well and yesterday her reacher was broken and she had to do more bending over leading to increased pain and unable to self mobilize  Pain Scale: before treatment: 5 after mob 3 after treatment N/T  Objective     AR R   pelvis    TREATMENT    Therapeutic exercise: Danuta received therapeutic exercises to develop strength, endurance, ROM, flexibility and core stabilization for 15 minutes including:   Performed contract relax sidelying L with pt     Had pt do all stretches and then contract relax R glut and she was able to self mobilize easily  HS stretch supine   Glut stretch  Piriformis stretch  B QL stretch      Pt performed SLR and then went into dysfunction.  Instructed pt to hold strengthening for now until can make sure she is able to be successful with self mob and stretches.    Hold all ex below for now  Bridge x 10   Plank 3 reps 10 sec each to start   SLR x 10   Hip abd sidelying x 10   Leg lift prone x 10   Arm lift prone  Contract relax R glut       Sidelying contract relax R lumbar paraspinals x 3   Realigned           HOLD ALL OTHER EXERCISES   Instructed pt to gradually resume strengthening and to walk on treadmill at gym as tolerated  Held exercises today  HS stretch supine x 10, corrected performance of stretch able to achieve 0 degrees 90/90  Glut stretch x 10  Piriformis stretch x  10     Trunk rotation supine x 10  SLR x 20  Pelvic tilt x 20  Partial sit up x 20  Hip abd sidelying x 20  Arm and leg lift prone x 20  Hip ext prone with bent knee x 20  QL stretch x 10 gentle      (NP)Manual therapy: Danuta  received the following manual therapy techniques x 25 min. to include soft tissue and joint mobilization were applied to the: low back and gluteals to include: Sidelying L contract relax mob and realigned.  Vacuum/cupping STM with manual therapy techniques was performed to back and gluteals to decrease muscle tightness, increase circulation and promote healing process.  The pt's skin was monitored for redness adjusting pressure as needed. The pt was instructed in possible side effects of bruising and/or soreness.    STM to lumbar region, QL, piriformis, P/A mob to lumbar region Grade 1-2    Patient received pre-mod electrical stimulation to decrease muscle tightness and pain to Lumbar paraspinals/ sacral border of gluteals B for 15 minutes with MH supine with cycle time: continuous, beat frequency: , CC/CV: CV.     Written Home Exercises Provided: indented ex noted above  Pt demo good understanding of the education provided. Danuta demonstrated good return demonstration of activities.     Pt. education:  · Posture reeducation, body mechanics, HEP,   · No spiritual or educational barriers to learning provided  · Pt has no cultural, educational or language barriers to learning provided.    Assessment   Pt had a flare up and after stretching able to self mob after PT performed contract relax to realign pelvis.  Pt understands to hold strengthening for now  Pt will continue to benefit from skilled outpatient physical therapy to address the remaining functional deficits, provide pt/family education, and to maximize pt's level of independence in the home and community environment. .     GOALS:   Short Term Goals:  3 weeks  Increase range of motion 25%  Increase strength 1/2 muscle  grade  Improve postural awareness of pelvis to independently identify dysfunction with min assist from PT  Be able to perform HEP with minimal cueing required     Long Term Goals: 6 weeks  Increase range of motion to 75% to 100% full   Improve muscle strength 1 muscle grade  Improve muscle strength with MMT to 4+/5 to 5/5  Improve and stabilize proper pelvic positioning  Restore ability to sit for ADL and work with min to 0 pain  Restore normal sleep habits without disturbances due to pain  Restore ability to perform ADL's and household activities independently with min to 0 pain    Anticipated barriers to physical therapy: none  Pt's spiritual, cultural and educational needs considered and pt agreeable to plan of care and goals        Plan   Continue with established plan of care towards PT goals.

## 2018-05-21 ENCOUNTER — CLINICAL SUPPORT (OUTPATIENT)
Dept: REHABILITATION | Facility: HOSPITAL | Age: 63
End: 2018-05-21
Attending: INTERNAL MEDICINE
Payer: COMMERCIAL

## 2018-05-21 DIAGNOSIS — M62.81 MUSCLE WEAKNESS: ICD-10-CM

## 2018-05-21 DIAGNOSIS — M54.41 CHRONIC BILATERAL LOW BACK PAIN WITH RIGHT-SIDED SCIATICA: Primary | ICD-10-CM

## 2018-05-21 DIAGNOSIS — G89.29 CHRONIC BILATERAL LOW BACK PAIN WITH RIGHT-SIDED SCIATICA: Primary | ICD-10-CM

## 2018-05-21 PROCEDURE — 97014 ELECTRIC STIMULATION THERAPY: CPT | Mod: PN | Performed by: PHYSICAL THERAPIST

## 2018-05-21 PROCEDURE — 97110 THERAPEUTIC EXERCISES: CPT | Mod: PN | Performed by: PHYSICAL THERAPIST

## 2018-05-21 NOTE — PROGRESS NOTES
Physical Therapy Daily Note     Name: Danuta Santos  Clinic Number: [id number]  Diagnosis:   Encounter Diagnoses   Name Primary?    Chronic bilateral low back pain with right-sided sciatica Yes    Muscle weakness      Physician: Erica Khan MD  Treatment Orders: PT Eval and Treat  Past Medical History:   Diagnosis Date    IBS (irritable bowel syndrome)        Precautions: as per diagnosis    Evaluation date: 2/15/2018  Visit # authorized: 9/20 on 5/21/2018  Authorization period: 12-31-18  Plan of care expiration: 6-5-18  MD Follow up appt: none scheduled    Subjective     Pt reports: she squatted to pick something up without thinking Saturday and then she has been having cramps in legs at night.  She thought due to lack of exercise and did 45 min of walking in park and felt ok.  By Sunday mid day she felt pain  She has done all exercises Pt c/o pain in R LB  Pain Scale: before treatment: 6  after mob 3 after treatment N/T  Objective     AR R   Pelvis, able to self mobilize and level     TREATMENT    Therapeutic exercise: Danuta received therapeutic exercises to develop strength, endurance, ROM, flexibility and core stabilization for 25 minutes including:   Performed contract relax sidelying L with pt Instructed pt to perform some Heel raises, GSS and HS stretch before bed to try and stop cramping    Had pt do all stretches and then contract relax R glut and she was able to self mobilize easily  HS stretch supine   Glut stretch  Piriformis stretch  B QL stretch     SLR x 10   Leg lift prone x 10  Instructed pt to do contract relax after each strengthening.  Had pt walk on treadmill which is a little different from her walk in park, but she did go in dysfunction.  Instructed pt to stop USP in park and do contract relax and note response        Hold all ex below for now  Bridge x 10   Plank 3 reps 10 sec each to start   Hip abd sidelying x 10   Arm lift prone  Contract relax R glut       Sidelying  contract relax R lumbar paraspinals x 3   Realigned           HOLD ALL OTHER EXERCISES   Instructed pt to gradually resume strengthening and to walk on treadmill at gym as tolerated  Held exercises today  HS stretch supine x 10, corrected performance of stretch able to achieve 0 degrees 90/90  Glut stretch x 10  Piriformis stretch x 10     Trunk rotation supine x 10  SLR x 20  Pelvic tilt x 20  Partial sit up x 20  Hip abd sidelying x 20  Arm and leg lift prone x 20  Hip ext prone with bent knee x 20  QL stretch x 10 gentle      (NP)Manual therapy: Danuta  received the following manual therapy techniques x 25 min. to include soft tissue and joint mobilization were applied to the: low back and gluteals to include: Sidelying L contract relax mob and realigned.  Vacuum/cupping STM with manual therapy techniques was performed to back and gluteals to decrease muscle tightness, increase circulation and promote healing process.  The pt's skin was monitored for redness adjusting pressure as needed. The pt was instructed in possible side effects of bruising and/or soreness.    STM to lumbar region, QL, piriformis, P/A mob to lumbar region Grade 1-2    Patient received pre-mod electrical stimulation to decrease muscle tightness and pain to Lumbar paraspinals/ sacral border of gluteals B for 15 minutes with MH supine with cycle time: continuous, beat frequency: , CC/CV: CV.     Written Home Exercises Provided: indented ex noted above  Pt demo good understanding of the education provided. Danuta demonstrated good return demonstration of activities.     Pt. education:  · Posture reeducation, body mechanics, HEP,   · No spiritual or educational barriers to learning provided  · Pt has no cultural, educational or language barriers to learning provided.    Assessment   Pt had a flare up and after stretching able to self mob after PT performed contract relax to realign pelvis.  Pt understands to hold strengthening for  now  Pt will continue to benefit from skilled outpatient physical therapy to address the remaining functional deficits, provide pt/family education, and to maximize pt's level of independence in the home and community environment. .     GOALS:   Short Term Goals:  3 weeks  Increase range of motion 25%  Increase strength 1/2 muscle grade  Improve postural awareness of pelvis to independently identify dysfunction with min assist from PT  Be able to perform HEP with minimal cueing required     Long Term Goals: 6 weeks  Increase range of motion to 75% to 100% full   Improve muscle strength 1 muscle grade  Improve muscle strength with MMT to 4+/5 to 5/5  Improve and stabilize proper pelvic positioning  Restore ability to sit for ADL and work with min to 0 pain  Restore normal sleep habits without disturbances due to pain  Restore ability to perform ADL's and household activities independently with min to 0 pain    Anticipated barriers to physical therapy: none  Pt's spiritual, cultural and educational needs considered and pt agreeable to plan of care and goals        Plan   Continue with established plan of care towards PT goals.

## 2018-05-24 ENCOUNTER — CLINICAL SUPPORT (OUTPATIENT)
Dept: REHABILITATION | Facility: HOSPITAL | Age: 63
End: 2018-05-24
Attending: INTERNAL MEDICINE
Payer: COMMERCIAL

## 2018-05-24 DIAGNOSIS — G89.29 CHRONIC BILATERAL LOW BACK PAIN WITH RIGHT-SIDED SCIATICA: Primary | ICD-10-CM

## 2018-05-24 DIAGNOSIS — M62.81 MUSCLE WEAKNESS: ICD-10-CM

## 2018-05-24 DIAGNOSIS — M54.41 CHRONIC BILATERAL LOW BACK PAIN WITH RIGHT-SIDED SCIATICA: Primary | ICD-10-CM

## 2018-05-24 PROCEDURE — 97014 ELECTRIC STIMULATION THERAPY: CPT | Mod: PN | Performed by: PHYSICAL THERAPIST

## 2018-05-24 PROCEDURE — 97110 THERAPEUTIC EXERCISES: CPT | Mod: PN | Performed by: PHYSICAL THERAPIST

## 2018-05-24 PROCEDURE — 97140 MANUAL THERAPY 1/> REGIONS: CPT | Mod: PN | Performed by: PHYSICAL THERAPIST

## 2018-05-24 NOTE — PROGRESS NOTES
Physical Therapy Daily Note     Name: Danuta Santos  Clinic Number: [id number]  Diagnosis:   Encounter Diagnoses   Name Primary?    Chronic bilateral low back pain with right-sided sciatica Yes    Muscle weakness      Physician: Erica Khan MD  Treatment Orders: PT Eval and Treat  Past Medical History:   Diagnosis Date    IBS (irritable bowel syndrome)        Precautions: as per diagnosis    Evaluation date: 2/15/2018  Visit # authorized: 10/20 on 5/24/2018  Authorization period: 12-31-18  Plan of care expiration: 6-5-18  MD Follow up appt: none scheduled    Subjective     Pt reports: she is feeling better.  Pain Scale: before treatment: N/T  after mob N/T after treatment N/T  Objective     AR R   Pelvis, able to self mobilize and level     TREATMENT    Therapeutic exercise: Danuta received therapeutic exercises to develop strength, endurance, ROM, flexibility and core stabilization for 10 minutes including:   Performed contract relax sidelying L with pt Instructed pt to perform some Heel raises, GSS and HS stretch before bed to try and stop cramping    Had pt do all stretches and then contract relax R glut and she was able to self mobilize easily  HS stretch supine   Glut stretch  Piriformis stretch  B QL stretch     SLR x 10   Leg lift prone x 10  I    Hold all ex below for now  Bridge x 10   Plank 3 reps 10 sec each to start   Hip abd sidelying x 10   Arm lift prone  Contract relax R glut       Sidelying contract relax R lumbar paraspinals x 3   Realigned      HOLD ALL OTHER EXERCISES   Instructed pt to gradually resume strengthening and to walk on treadmill at gym as tolerated  Held exercises today  HS stretch supine x 10, corrected performance of stretch able to achieve 0 degrees 90/90  Glut stretch x 10  Piriformis stretch x 10     Trunk rotation supine x 10  SLR x 20  Pelvic tilt x 20  Partial sit up x 20  Hip abd sidelying x 20  Arm and leg lift prone x 20  Hip ext prone with bent knee x 20  QL  stretch x 10 gentle    Manual therapy: Danuta  received the following manual therapy techniques x 15 min. to include soft tissue and joint mobilization were applied to the: low back and gluteals to include: Sidelying L contract relax mob and realigned.  Vacuum/cupping STM with manual therapy techniques was performed to back and gluteals to decrease muscle tightness, increase circulation and promote healing process.  The pt's skin was monitored for redness adjusting pressure as needed. The pt was instructed in possible side effects of bruising and/or soreness.    (NP)STM to lumbar region, QL, piriformis, P/A mob to lumbar region Grade 1-2    Patient received pre-mod electrical stimulation to decrease muscle tightness and pain to Lumbar paraspinals/ sacral border of gluteals B for 15 minutes with MH supine with cycle time: continuous, beat frequency: , CC/CV: CV.     Written Home Exercises Provided: indented ex noted above  Pt demo good understanding of the education provided. Danuta demonstrated good return demonstration of activities.     Pt. education:  · Posture reeducation, body mechanics, HEP,   · No spiritual or educational barriers to learning provided  · Pt has no cultural, educational or language barriers to learning provided.    Assessment   Pt had a flare up and after stretching able to self mob after PT performed contract relax to realign pelvis.  Pt understands to hold strengthening for now  Pt will continue to benefit from skilled outpatient physical therapy to address the remaining functional deficits, provide pt/family education, and to maximize pt's level of independence in the home and community environment. .     GOALS:   Short Term Goals:  3 weeks  Increase range of motion 25%  Increase strength 1/2 muscle grade  Improve postural awareness of pelvis to independently identify dysfunction with min assist from PT  Be able to perform HEP with minimal cueing required     Long Term Goals: 6  weeks  Increase range of motion to 75% to 100% full   Improve muscle strength 1 muscle grade  Improve muscle strength with MMT to 4+/5 to 5/5  Improve and stabilize proper pelvic positioning  Restore ability to sit for ADL and work with min to 0 pain  Restore normal sleep habits without disturbances due to pain  Restore ability to perform ADL's and household activities independently with min to 0 pain    Anticipated barriers to physical therapy: none  Pt's spiritual, cultural and educational needs considered and pt agreeable to plan of care and goals        Plan   Continue with established plan of care towards PT goals.

## 2018-05-29 ENCOUNTER — CLINICAL SUPPORT (OUTPATIENT)
Dept: REHABILITATION | Facility: HOSPITAL | Age: 63
End: 2018-05-29
Attending: INTERNAL MEDICINE
Payer: COMMERCIAL

## 2018-05-29 DIAGNOSIS — M62.81 MUSCLE WEAKNESS: ICD-10-CM

## 2018-05-29 DIAGNOSIS — G89.29 CHRONIC BILATERAL LOW BACK PAIN WITH RIGHT-SIDED SCIATICA: Primary | ICD-10-CM

## 2018-05-29 DIAGNOSIS — M54.41 CHRONIC BILATERAL LOW BACK PAIN WITH RIGHT-SIDED SCIATICA: Primary | ICD-10-CM

## 2018-05-29 PROCEDURE — 97014 ELECTRIC STIMULATION THERAPY: CPT | Mod: PN | Performed by: PHYSICAL THERAPIST

## 2018-05-29 PROCEDURE — 97140 MANUAL THERAPY 1/> REGIONS: CPT | Mod: PN | Performed by: PHYSICAL THERAPIST

## 2018-05-29 PROCEDURE — 97110 THERAPEUTIC EXERCISES: CPT | Mod: PN | Performed by: PHYSICAL THERAPIST

## 2018-05-29 NOTE — PROGRESS NOTES
Physical Therapy Daily Note     Name: Danuta Santos  Clinic Number: [id number]  Diagnosis:   Encounter Diagnoses   Name Primary?    Chronic bilateral low back pain with right-sided sciatica Yes    Muscle weakness      Physician: Erica Khan MD  Treatment Orders: PT Eval and Treat  Past Medical History:   Diagnosis Date    IBS (irritable bowel syndrome)        Precautions: as per diagnosis    Evaluation date: 2/15/2018  Visit # authorized: 11/20 on 5/29/2018  Authorization period: 12-31-18  Plan of care expiration: 6-5-18  MD Follow up appt: none scheduled    Subjective     Pt reports: she did some traveling and had to carry bags and back kept going out and was ok. Pt states she did a lot of walking while out of town.  Pt states today she had to stand and walk in school for an active shooter drill and led to some increase in pain.  Pain Scale: before treatment: N/T  after mob N/T after treatment N/T  Objective     AR R   Pelvis, able to self mobilize and level Severe muscle tightness lumbar paraspinals R    TREATMENT    Therapeutic exercise: Danuta received therapeutic exercises to develop strength, endurance, ROM, flexibility and core stabilization for 25 minutes including:   Had pt do all stretches and then contract relax R glut and she was able to self mobilize easily  HS stretch supine   Glut stretch  Piriformis stretch  B QL stretch    SLR x 10 R led to dysfunction L was no dysfunction   Bridge x 10  Leg lift prone x 10     Hip abd sidelying x 10    Hold all ex below for now  Plank 3 reps 10 sec each to start   Arm lift prone  Trunk rotation supine x 10  SLR x 20  Pelvic tilt x 20  Partial sit up x 20  Hip abd sidelying x 20  Arm and leg lift prone x 20  Hip ext prone with bent knee x 20    Manual therapy: Danuta  received the following manual therapy techniques x 15 min. to include soft tissue and joint mobilization were applied to the: low back and gluteals to include: Sidelying L contract  relax mob   Vacuum/cupping STM with manual therapy techniques was performed to back and gluteals to decrease muscle tightness, increase circulation and promote healing process.  The pt's skin was monitored for redness adjusting pressure as needed. The pt was instructed in possible side effects of bruising and/or soreness.    STM to lumbar region, QL, piriformis, P/A mob to lumbar region Grade 1-2    Patient received pre-mod electrical stimulation to decrease muscle tightness and pain to Lumbar paraspinals/ sacral border of gluteals B for 15 minutes with MH supine with cycle time: continuous, beat frequency: , CC/CV: CV.     Written Home Exercises Provided: indented ex noted above  Pt demo good understanding of the education provided. Danuta demonstrated good return demonstration of activities.     Pt. education:  · Posture reeducation, body mechanics, HEP,   · No spiritual or educational barriers to learning provided  · Pt has no cultural, educational or language barriers to learning provided.    Assessment   Pt was out of town and even though fatigued from work and standing and walking today at school she was able to self mobilize and progress some of strengthening and self mobilize after treatment.  Decreased muscle tightness and pain after treatment  Pt will continue to benefit from skilled outpatient physical therapy to address the remaining functional deficits, provide pt/family education, and to maximize pt's level of independence in the home and community environment. .     GOALS:   Short Term Goals:  3 weeks  Increase range of motion 25%  Increase strength 1/2 muscle grade  Improve postural awareness of pelvis to independently identify dysfunction with min assist from PT  Be able to perform HEP with minimal cueing required     Long Term Goals: 6 weeks  Increase range of motion to 75% to 100% full   Improve muscle strength 1 muscle grade  Improve muscle strength with MMT to 4+/5 to 5/5  Improve and  stabilize proper pelvic positioning  Restore ability to sit for ADL and work with min to 0 pain  Restore normal sleep habits without disturbances due to pain  Restore ability to perform ADL's and household activities independently with min to 0 pain    Anticipated barriers to physical therapy: none  Pt's spiritual, cultural and educational needs considered and pt agreeable to plan of care and goals        Plan   Continue with established plan of care towards PT goals.

## 2018-06-04 ENCOUNTER — CLINICAL SUPPORT (OUTPATIENT)
Dept: REHABILITATION | Facility: HOSPITAL | Age: 63
End: 2018-06-04
Attending: INTERNAL MEDICINE
Payer: COMMERCIAL

## 2018-06-04 DIAGNOSIS — G89.29 CHRONIC BILATERAL LOW BACK PAIN WITH RIGHT-SIDED SCIATICA: Primary | ICD-10-CM

## 2018-06-04 DIAGNOSIS — M54.41 CHRONIC BILATERAL LOW BACK PAIN WITH RIGHT-SIDED SCIATICA: Primary | ICD-10-CM

## 2018-06-04 DIAGNOSIS — M62.81 MUSCLE WEAKNESS: ICD-10-CM

## 2018-06-04 PROCEDURE — 97110 THERAPEUTIC EXERCISES: CPT | Mod: PN | Performed by: PHYSICAL THERAPIST

## 2018-06-04 PROCEDURE — 97014 ELECTRIC STIMULATION THERAPY: CPT | Mod: PN | Performed by: PHYSICAL THERAPIST

## 2018-06-04 NOTE — PLAN OF CARE
Physical Therapy Progress Note     Name: Danuta Santos  Clinic Number: [id number]  Diagnosis:   Encounter Diagnoses   Name Primary?    Chronic bilateral low back pain with right-sided sciatica Yes    Muscle weakness      Physician: Erica Khan MD  Treatment Orders: PT Eval and Treat  Past Medical History:   Diagnosis Date    IBS (irritable bowel syndrome)        Precautions: as per diagnosis    Evaluation date: 2/15/2018  Visit # authorized: 12/20 on 6/4/2018  Authorization period: 12-31-18  Plan of care expiration: 7-16-18  MD Follow up appt: none scheduled    Subjective     Pt reports: she has been doing pretty well, has been better at self mobilizing.  Pt states she goes in dysfunction with walking but able to do self mobilize.  Pt states still not back to previous level, but improving overall.    Pain Scale: before treatment: N/T  after mob N/T after treatment N/T  Objective     AR R   Pelvis, able to self mobilize and level Severe muscle tightness lumbar paraspinals R    Lumbar active range of motion in standing is: 6-4-18  - flexion - toes                     - extension -  50%                         - left side bending -  To knee mod decreased C curve         - right side bending -  To knee slight decreased C curve   Pt able to move actively with ROM and not go in dysfunction today    Muscle Strength 6-4-18  MMT R L   Hip flexion 4-/5 4+/5   Hip abduction 4-/5 3+/5   Hip extension 4-/5 4-/5   Glut max 3+/5 3+/5        Knee extension 5/5 5/5   Knee flexion 5/5 5/5            90/90 0 B      TREATMENT    Therapeutic exercise: Danuta received therapeutic exercises to develop strength, endurance, ROM, flexibility and core stabilization for 40 minutes including:   Had pt do all stretches and then contract relax R glut and she was able to self mobilize easily  HS stretch supine   Glut stretch  Piriformis stretch  B QL stretch     Hip flexion sitting on Red physioball Also did some bouncing on ball and  did well. Pt will work on active sitting hip flexion for now      SLR x 10 R led to dysfunction L was no dysfunction, decreased height of lift on R and stayed level  Bridge x 10  Arm and Leg lift prone x 10    Hip abd sidelying x 10 kept small plane R to avoid dysfunction    Plank 3 reps 10 sec each to start  Reviewed with pt verbally ex to hold for now    Hold all ex below for now  Trunk rotation supine x 10  SLR x 20  Pelvic tilt x 20  Partial sit up x 20  Hip ext prone with bent knee x 20    Manual therapy: Danuta  received the following manual therapy techniques x 5 min. to include soft tissue and joint mobilization were applied to the: low back and gluteals to include: Sidelying L contract relax mob P/A mob to lumbar region Grade 1-2 (NP) Vacuum/cupping STM with manual therapy techniques was performed to back and gluteals to decrease muscle tightness, increase circulation and promote healing process.  The pt's skin was monitored for redness adjusting pressure as needed. The pt was instructed in possible side effects of bruising and/or soreness.    (NP)STM to lumbar region, QL, piriformis,     Patient received pre-mod electrical stimulation to decrease muscle tightness and pain to Lumbar paraspinals/ sacral border of gluteals B for 15 minutes with MH supine with cycle time: continuous, beat frequency: , CC/CV: CV.     Written Home Exercises Provided: indented ex noted above  Pt demo good understanding of the education provided. Danuta demonstrated good return demonstration of activities.     Pt. education:  · Posture reeducation, body mechanics, HEP,   · No spiritual or educational barriers to learning provided  · Pt has no cultural, educational or language barriers to learning provided.    Assessment   Pt had developed severe upper respiratory issues and developed increased dysfunction and pain after prolonged inability to exercise and spending a lot of time in bed due to illness.  Pt has been  gradually progressing with strengthening and mobility and recently able to self mobilize with greater results.  Pt needs further strengthening and stability to restore previous level of function   Pt will continue to benefit from skilled outpatient physical therapy to address the remaining functional deficits, provide pt/family education, and to maximize pt's level of independence in the home and community environment. .     GOALS:   Short Term Goals:  3 weeks  Increase range of motion 25%  Increase strength 1/2 muscle grade  Improve postural awareness of pelvis to independently identify dysfunction with min assist from PT  Be able to perform HEP with minimal cueing required     Long Term Goals: 6 weeks  Increase range of motion to 75% to 100% full   Improve muscle strength 1 muscle grade  Improve muscle strength with MMT to 4+/5 to 5/5  Improve and stabilize proper pelvic positioning  Restore ability to sit for ADL and work with min to 0 pain  Restore normal sleep habits without disturbances due to pain  Restore ability to perform ADL's and household activities independently with min to 0 pain    Anticipated barriers to physical therapy: none  Pt's spiritual, cultural and educational needs considered and pt agreeable to plan of care and goals        Plan   If you concur, I recommend patient continue with physical therapy 1-2 times a week  for 4-6 weeks.  Please advise us of your  recommendations. Thank you for allowing us to assist in the care of your patient.      Jacy Escobar, MS, PT          I certify the need for these services furnished under this plan of treatment and while under my care.    ____________________________________ Physician/Referring Practitioner                                Date of Signature

## 2018-06-04 NOTE — PROGRESS NOTES
Physical Therapy Progress Note     Name: Danuta Santos  Clinic Number: [id number]  Diagnosis:   Encounter Diagnoses   Name Primary?    Chronic bilateral low back pain with right-sided sciatica Yes    Muscle weakness      Physician: Erica Khan MD  Treatment Orders: PT Eval and Treat  Past Medical History:   Diagnosis Date    IBS (irritable bowel syndrome)        Precautions: as per diagnosis    Evaluation date: 2/15/2018  Visit # authorized: 12/20 on 6/4/2018  Authorization period: 12-31-18  Plan of care expiration: 7-16-18  MD Follow up appt: none scheduled    Subjective     Pt reports: she has been doing pretty well, has been better at self mobilizing.  Pt states she goes in dysfunction with walking but able to do self mobilize.  Pt states still not back to previous level, but improving overall.    Pain Scale: before treatment: N/T  after mob N/T after treatment N/T  Objective     AR R   Pelvis, able to self mobilize and level Severe muscle tightness lumbar paraspinals R    Lumbar active range of motion in standing is: 6-4-18  - flexion - toes                     - extension -  50%                         - left side bending -  To knee mod decreased C curve         - right side bending -  To knee slight decreased C curve   Pt able to move actively with ROM and not go in dysfunction today    Muscle Strength 6-4-18  MMT R L   Hip flexion 4-/5 4+/5   Hip abduction 4-/5 3+/5   Hip extension 4-/5 4-/5   Glut max 3+/5 3+/5        Knee extension 5/5 5/5   Knee flexion 5/5 5/5            90/90 0 B      TREATMENT    Therapeutic exercise: Danuta received therapeutic exercises to develop strength, endurance, ROM, flexibility and core stabilization for 40 minutes including:   Had pt do all stretches and then contract relax R glut and she was able to self mobilize easily  HS stretch supine   Glut stretch  Piriformis stretch  B QL stretch     Hip flexion sitting on Red physioball Also did some bouncing on ball and  did well. Pt will work on active sitting hip flexion for now      SLR x 10 R led to dysfunction L was no dysfunction, decreased height of lift on R and stayed level  Bridge x 10  Arm and Leg lift prone x 10    Hip abd sidelying x 10 kept small plane R to avoid dysfunction    Plank 3 reps 10 sec each to start  Reviewed with pt verbally ex to hold for now    Hold all ex below for now  Trunk rotation supine x 10  SLR x 20  Pelvic tilt x 20  Partial sit up x 20  Hip ext prone with bent knee x 20    Manual therapy: Danuta  received the following manual therapy techniques x 5 min. to include soft tissue and joint mobilization were applied to the: low back and gluteals to include: Sidelying L contract relax mob P/A mob to lumbar region Grade 1-2 (NP) Vacuum/cupping STM with manual therapy techniques was performed to back and gluteals to decrease muscle tightness, increase circulation and promote healing process.  The pt's skin was monitored for redness adjusting pressure as needed. The pt was instructed in possible side effects of bruising and/or soreness.    (NP)STM to lumbar region, QL, piriformis,     Patient received pre-mod electrical stimulation to decrease muscle tightness and pain to Lumbar paraspinals/ sacral border of gluteals B for 15 minutes with MH supine with cycle time: continuous, beat frequency: , CC/CV: CV.     Written Home Exercises Provided: indented ex noted above  Pt demo good understanding of the education provided. Danuta demonstrated good return demonstration of activities.     Pt. education:  · Posture reeducation, body mechanics, HEP,   · No spiritual or educational barriers to learning provided  · Pt has no cultural, educational or language barriers to learning provided.    Assessment   Pt had developed severe upper respiratory issues and developed increased dysfunction and pain after prolonged inability to exercise and spending a lot of time in bed due to illness.  Pt has been  gradually progressing with strengthening and mobility and recently able to self mobilize with greater results.  Pt needs further strengthening and stability to restore previous level of function   Pt will continue to benefit from skilled outpatient physical therapy to address the remaining functional deficits, provide pt/family education, and to maximize pt's level of independence in the home and community environment. .     GOALS:   Short Term Goals:  3 weeks  Increase range of motion 25%  Increase strength 1/2 muscle grade  Improve postural awareness of pelvis to independently identify dysfunction with min assist from PT  Be able to perform HEP with minimal cueing required     Long Term Goals: 6 weeks  Increase range of motion to 75% to 100% full   Improve muscle strength 1 muscle grade  Improve muscle strength with MMT to 4+/5 to 5/5  Improve and stabilize proper pelvic positioning  Restore ability to sit for ADL and work with min to 0 pain  Restore normal sleep habits without disturbances due to pain  Restore ability to perform ADL's and household activities independently with min to 0 pain    Anticipated barriers to physical therapy: none  Pt's spiritual, cultural and educational needs considered and pt agreeable to plan of care and goals        Plan   If you concur, I recommend patient continue with physical therapy 1-2 times a week  for 4-6 weeks.  Please advise us of your  recommendations. Thank you for allowing us to assist in the care of your patient.      Jacy Escobar, MS, PT          I certify the need for these services furnished under this plan of treatment and while under my care.    ____________________________________ Physician/Referring Practitioner                                Date of Signature

## 2018-06-08 ENCOUNTER — CLINICAL SUPPORT (OUTPATIENT)
Dept: REHABILITATION | Facility: HOSPITAL | Age: 63
End: 2018-06-08
Attending: INTERNAL MEDICINE
Payer: COMMERCIAL

## 2018-06-08 DIAGNOSIS — G89.29 CHRONIC BILATERAL LOW BACK PAIN WITH RIGHT-SIDED SCIATICA: Primary | ICD-10-CM

## 2018-06-08 DIAGNOSIS — M62.81 MUSCLE WEAKNESS: ICD-10-CM

## 2018-06-08 DIAGNOSIS — M54.41 CHRONIC BILATERAL LOW BACK PAIN WITH RIGHT-SIDED SCIATICA: Primary | ICD-10-CM

## 2018-06-08 PROCEDURE — 97110 THERAPEUTIC EXERCISES: CPT | Mod: PN | Performed by: PHYSICAL THERAPIST

## 2018-06-08 PROCEDURE — 97014 ELECTRIC STIMULATION THERAPY: CPT | Mod: PN | Performed by: PHYSICAL THERAPIST

## 2018-06-08 NOTE — PROGRESS NOTES
Physical Therapy Daily Note     Name: Danuta Santos  Clinic Number: [id number]  Diagnosis:   Encounter Diagnoses   Name Primary?    Chronic bilateral low back pain with right-sided sciatica Yes    Muscle weakness      Physician: Erica Khan MD  Treatment Orders: PT Eval and Treat  Past Medical History:   Diagnosis Date    IBS (irritable bowel syndrome)        Precautions: as per diagnosis    Evaluation date: 2/15/2018  Visit # authorized: 12/20 on 6/8/2018  Authorization period: 12-31-18  Plan of care expiration: 7-16-18  MD Follow up appt: none scheduled    Subjective     Pt reports: she did some lifting and moving items, but tried to stay neutral position.  Pt states she has not been able to self mobilize  Pain Scale: before treatment: N/T  after mob N/T after treatment N/T  Objective     AR R   Pelvis, , unable to self mobilize      TREATMENT    Therapeutic exercise: Danuta received therapeutic exercises to develop strength, endurance, ROM, flexibility and core stabilization for 15 minutes including:  Performed sidelying L contract relax L5-S1 region and able to level pelvis     Had pt do all stretches and then contract relax R glut and she was able to self mobilize   HS stretch supine   Glut stretch  Piriformis stretch  B QL stretch    SLR x 10 R  Bridge x 10  Hip abd sidelying x 10 kept small plane R to avoid dysfunction  Arm and Leg lift prone x 10    Had pt self mobilize after all exercises and was able level herself      (NP)Hip flexion sitting on Red physioball Also did some bouncing on ball and did well. Pt will work on active sitting hip flexion for now        Plank 3 reps 10 sec each to start  Reviewed with pt verbally ex to hold for now    Hold all ex below for now  Trunk rotation supine x 10  SLR x 20  Pelvic tilt x 20  Partial sit up x 20  Hip ext prone with bent knee x 20    (NP)Manual therapy: Danuta  received the following manual therapy techniques x 0 min. to include soft tissue  and joint mobilization were applied to the: low back and gluteals to include: Sidelying L contract relax mob P/A mob to lumbar region Grade 1-2 (NP) Vacuum/cupping STM with manual therapy techniques was performed to back and gluteals to decrease muscle tightness, increase circulation and promote healing process.  The pt's skin was monitored for redness adjusting pressure as needed. The pt was instructed in possible side effects of bruising and/or soreness.    (NP)STM to lumbar region, QL, piriformis,     Patient received pre-mod electrical stimulation to decrease muscle tightness and pain to Lumbar paraspinals/ sacral border of gluteals B for 15 minutes with MH supine with cycle time: continuous, beat frequency: , CC/CV: CV.     Written Home Exercises Provided: indented ex noted above  Pt demo good understanding of the education provided. Danuta demonstrated good return demonstration of activities.     Pt. education:  · Posture reeducation, body mechanics, HEP,   · No spiritual or educational barriers to learning provided  · Pt has no cultural, educational or language barriers to learning provided.    Assessment   Pt irritated low back with activities at home.  Pt able to self mobilize after treatment.  Pt understands to take it easy this weekend and if try activities to do contract relax at sooner time for might be in dysfunction and not realize until pain and dysfunction gets more severe.  Pt will continue to benefit from skilled outpatient physical therapy to address the remaining functional deficits, provide pt/family education, and to maximize pt's level of independence in the home and community environment. .     GOALS:   Short Term Goals:  3 weeks  Increase range of motion 25%  Increase strength 1/2 muscle grade  Improve postural awareness of pelvis to independently identify dysfunction with min assist from PT  Be able to perform HEP with minimal cueing required     Long Term Goals: 6 weeks  Increase  range of motion to 75% to 100% full   Improve muscle strength 1 muscle grade  Improve muscle strength with MMT to 4+/5 to 5/5  Improve and stabilize proper pelvic positioning  Restore ability to sit for ADL and work with min to 0 pain  Restore normal sleep habits without disturbances due to pain  Restore ability to perform ADL's and household activities independently with min to 0 pain    Anticipated barriers to physical therapy: none  Pt's spiritual, cultural and educational needs considered and pt agreeable to plan of care and goals        Plan   Continue with established plan of care towards PT goals.

## 2018-06-15 ENCOUNTER — CLINICAL SUPPORT (OUTPATIENT)
Dept: REHABILITATION | Facility: HOSPITAL | Age: 63
End: 2018-06-15
Attending: INTERNAL MEDICINE
Payer: COMMERCIAL

## 2018-06-15 DIAGNOSIS — G89.29 CHRONIC BILATERAL LOW BACK PAIN WITH RIGHT-SIDED SCIATICA: Primary | ICD-10-CM

## 2018-06-15 DIAGNOSIS — M54.41 CHRONIC BILATERAL LOW BACK PAIN WITH RIGHT-SIDED SCIATICA: Primary | ICD-10-CM

## 2018-06-15 DIAGNOSIS — M62.81 MUSCLE WEAKNESS: ICD-10-CM

## 2018-06-15 PROCEDURE — 97014 ELECTRIC STIMULATION THERAPY: CPT | Mod: PN | Performed by: PHYSICAL THERAPIST

## 2018-06-15 PROCEDURE — 97110 THERAPEUTIC EXERCISES: CPT | Mod: PN | Performed by: PHYSICAL THERAPIST

## 2018-06-15 PROCEDURE — 97140 MANUAL THERAPY 1/> REGIONS: CPT | Mod: PN | Performed by: PHYSICAL THERAPIST

## 2018-06-15 NOTE — PROGRESS NOTES
Physical Therapy Daily Note     Name: Danuta Santos  Clinic Number: [id number]  Diagnosis:   Encounter Diagnoses   Name Primary?    Chronic bilateral low back pain with right-sided sciatica Yes    Muscle weakness      Physician: Erica Khan MD  Treatment Orders: PT Eval and Treat  Past Medical History:   Diagnosis Date    IBS (irritable bowel syndrome)        Precautions: as per diagnosis    Evaluation date: 2/15/2018  Visit # authorized: 13/20 on 6/15/2018  Authorization period: 12-31-18  Plan of care expiration: 7-16-18  MD Follow up appt: none scheduled    Subjective     Pt reports: she has been going in and out and able to self mobilize at times, but still feels not where she was before she got sick  Pain Scale: before treatment: N/T  after mob N/T after treatment N/T  Objective     AR R   Pelvis, , able to self mobilize  Severe lumbar and QL muscle tightness and decreased P/A mob      TREATMENT    Therapeutic exercise: Danuta received therapeutic exercises to develop strength, endurance, ROM, flexibility and core stabilization for 15 minutes including:  Performed sidelying L contract relax L5-S1 region and able to level pelvis     Had pt do all stretches and then contract relax R glut and she was able to self mobilize   HS stretch supine   Glut stretch  Piriformis stretch  B QL stretch    Instructed pt to continue with current level of strengthening progressing reps as tolerated    SLR x 10 R  Bridge x 10  Hip abd sidelying x 10 kept small plane R to avoid dysfunction  Arm and Leg lift prone x 10    Had pt self mobilize after all exercises and was able level herself      (NP)Hip flexion sitting on Red physioball Also did some bouncing on ball and did well. Pt will work on active sitting hip flexion for now        Plank 3 reps 10 sec each to start  Reviewed with pt verbally ex to hold for now    Hold all ex below for now  Trunk rotation supine x 10  SLR x 20  Pelvic tilt x 20  Partial sit up x  20  Hip ext prone with bent knee x 20    Manual therapy: Danuta  received the following manual therapy techniques x 25 min. to include soft tissue and joint mobilization were applied to the: low back and gluteals to include: Sidelying L contract relax mob P/A mob to lumbar region Grade 1-2 (NP) Vacuum/cupping STM with manual therapy techniques was performed to back and gluteals to decrease muscle tightness, increase circulation and promote healing process.  The pt's skin was monitored for redness adjusting pressure as needed. The pt was instructed in possible side effects of bruising and/or soreness.    STM to lumbar region, QL, piriformis,     Patient received pre-mod electrical stimulation to decrease muscle tightness and pain to Lumbar paraspinals/ sacral border of gluteals B for 15 minutes with MH supine with cycle time: continuous, beat frequency: , CC/CV: CV.     Written Home Exercises Provided: indented ex noted above  Pt demo good understanding of the education provided. Danuta demonstrated good return demonstration of activities.     Pt. education:  · Posture reeducation, body mechanics, HEP,   · No spiritual or educational barriers to learning provided  · Pt has no cultural, educational or language barriers to learning provided.    Assessment   Pt progressing slowly with stabilization.  Pt with severe tightness today and improved after manual therapy  .  Pt will continue to benefit from skilled outpatient physical therapy to address the remaining functional deficits, provide pt/family education, and to maximize pt's level of independence in the home and community environment. .     GOALS:   Short Term Goals:  3 weeks  Increase range of motion 25%  Increase strength 1/2 muscle grade  Improve postural awareness of pelvis to independently identify dysfunction with min assist from PT  Be able to perform HEP with minimal cueing required     Long Term Goals: 6 weeks  Increase range of motion to 75%  to 100% full   Improve muscle strength 1 muscle grade  Improve muscle strength with MMT to 4+/5 to 5/5  Improve and stabilize proper pelvic positioning  Restore ability to sit for ADL and work with min to 0 pain  Restore normal sleep habits without disturbances due to pain  Restore ability to perform ADL's and household activities independently with min to 0 pain    Anticipated barriers to physical therapy: none  Pt's spiritual, cultural and educational needs considered and pt agreeable to plan of care and goals        Plan   Continue with established plan of care towards PT goals.

## 2018-06-20 ENCOUNTER — CLINICAL SUPPORT (OUTPATIENT)
Dept: REHABILITATION | Facility: HOSPITAL | Age: 63
End: 2018-06-20
Attending: INTERNAL MEDICINE
Payer: COMMERCIAL

## 2018-06-20 DIAGNOSIS — G89.29 CHRONIC BILATERAL LOW BACK PAIN WITH RIGHT-SIDED SCIATICA: Primary | ICD-10-CM

## 2018-06-20 DIAGNOSIS — M62.81 MUSCLE WEAKNESS: ICD-10-CM

## 2018-06-20 DIAGNOSIS — M54.41 CHRONIC BILATERAL LOW BACK PAIN WITH RIGHT-SIDED SCIATICA: Primary | ICD-10-CM

## 2018-06-20 PROCEDURE — 97110 THERAPEUTIC EXERCISES: CPT | Mod: PN | Performed by: PHYSICAL THERAPIST

## 2018-06-20 PROCEDURE — 97140 MANUAL THERAPY 1/> REGIONS: CPT | Mod: PN | Performed by: PHYSICAL THERAPIST

## 2018-06-20 PROCEDURE — 97014 ELECTRIC STIMULATION THERAPY: CPT | Mod: PN | Performed by: PHYSICAL THERAPIST

## 2018-06-20 NOTE — PROGRESS NOTES
Physical Therapy Daily Note     Name: Danuta Santos  Clinic Number: [id number]  Diagnosis:   Encounter Diagnoses   Name Primary?    Chronic bilateral low back pain with right-sided sciatica Yes    Muscle weakness      Physician: Erica Khan MD  Treatment Orders: PT Eval and Treat  Past Medical History:   Diagnosis Date    IBS (irritable bowel syndrome)        Precautions: as per diagnosis    Evaluation date: 2/15/2018  Visit # authorized: 14/20 on 6/20/2018  Authorization period: 12-31-18  Plan of care expiration: 7-16-18  MD Follow up appt: none scheduled    Subjective     Pt reports: she had out of [employer] and is scheduled for a trip.  Pt states having some success with contract relax, feels like not going all the way in.  Pt scheduled to leave for [place] on [place] for [place]  and has not packed yet    Pain Scale: before treatment: 6 after mob N/T after treatment N/T  Objective     AR R   Pelvis, , able to self mobilize  Mod lumbar and QL muscle tightness and decreased P/A mob      TREATMENT    Therapeutic exercise: Danuta received therapeutic exercises to develop strength, endurance, ROM, flexibility and core stabilization for 15 minutes including:  Performed sidelying L contract relax L5-S1 region and able to level pelvis     Had pt do all stretches and then contract relax R glut and she was able to self mobilize   HS stretch supine   Glut stretch  Piriformis stretch  B QL stretch    Instructed pt to continue with current level of strengthening progressing reps as tolerated    SLR x 10 Instructed pt to do contract relax after SLR R   Bridge x 10  Hip abd sidelying x 10 kept small plane R to avoid dysfunction  Arm and Leg lift prone x 10    Had pt self mobilize after all exercises and was able level herself      (NP)Hip flexion sitting on Red physioball Also did some bouncing on ball and did well. Pt will work on active sitting hip flexion for now        Plank 3 reps 10 sec each to  start  Reviewed with pt verbally ex to hold for now    Hold all ex below for now  Trunk rotation supine x 10  SLR x 20  Pelvic tilt x 20  Partial sit up x 20  Hip ext prone with bent knee x 20    Manual therapy: Danuta  received the following manual therapy techniques x 25 min. to include soft tissue and joint mobilization were applied to the: low back and gluteals to include: Sidelying L contract relax mob P/A mob to lumbar region Grade 1-2  Vacuum/cupping STM with manual therapy techniques was performed to back and gluteals to decrease muscle tightness, increase circulation and promote healing process.  The pt's skin was monitored for redness adjusting pressure as needed. The pt was instructed in possible side effects of bruising and/or soreness.    STM to lumbar region, QL, piriformis,     Patient received pre-mod electrical stimulation to decrease muscle tightness and pain to Lumbar paraspinals/ sacral border of gluteals B for 15 minutes with MH supine with cycle time: continuous, beat frequency: , CC/CV: CV.     Written Home Exercises Provided: indented ex noted above  Pt demo good understanding of the education provided. Danuta demonstrated good return demonstration of activities.     Pt. education:  · Posture reeducation, body mechanics, HEP,   · No spiritual or educational barriers to learning provided  · Pt has no cultural, educational or language barriers to learning provided.    Assessment   Pt progressing slowly with stabilization.  Pt with mod tightness today, going on a trip and should be able to manage symptoms I  .  Pt will continue to benefit from skilled outpatient physical therapy to address the remaining functional deficits, provide pt/family education, and to maximize pt's level of independence in the home and community environment. .     GOALS:   Short Term Goals:  3 weeks  Increase range of motion 25%  Increase strength 1/2 muscle grade  Improve postural awareness of pelvis to  independently identify dysfunction with min assist from PT  Be able to perform HEP with minimal cueing required     Long Term Goals: 6 weeks  Increase range of motion to 75% to 100% full   Improve muscle strength 1 muscle grade  Improve muscle strength with MMT to 4+/5 to 5/5  Improve and stabilize proper pelvic positioning  Restore ability to sit for ADL and work with min to 0 pain  Restore normal sleep habits without disturbances due to pain  Restore ability to perform ADL's and household activities independently with min to 0 pain    Anticipated barriers to physical therapy: none  Pt's spiritual, cultural and educational needs considered and pt agreeable to plan of care and goals        Plan   Continue with established plan of care towards PT goals.

## 2018-06-21 ENCOUNTER — CLINICAL SUPPORT (OUTPATIENT)
Dept: REHABILITATION | Facility: HOSPITAL | Age: 63
End: 2018-06-21
Attending: INTERNAL MEDICINE
Payer: COMMERCIAL

## 2018-06-21 DIAGNOSIS — M62.81 MUSCLE WEAKNESS: ICD-10-CM

## 2018-06-21 DIAGNOSIS — G89.29 CHRONIC BILATERAL LOW BACK PAIN WITH RIGHT-SIDED SCIATICA: Primary | ICD-10-CM

## 2018-06-21 DIAGNOSIS — M54.41 CHRONIC BILATERAL LOW BACK PAIN WITH RIGHT-SIDED SCIATICA: Primary | ICD-10-CM

## 2018-06-21 PROCEDURE — 97140 MANUAL THERAPY 1/> REGIONS: CPT | Mod: PN | Performed by: PHYSICAL THERAPIST

## 2018-06-21 PROCEDURE — 97014 ELECTRIC STIMULATION THERAPY: CPT | Mod: PN | Performed by: PHYSICAL THERAPIST

## 2018-06-21 NOTE — PROGRESS NOTES
Physical Therapy Daily Note     Name: Danuta Santos  Clinic Number: [id number]  Diagnosis:   Encounter Diagnoses   Name Primary?    Chronic bilateral low back pain with right-sided sciatica Yes    Muscle weakness      Physician: Erica Khan MD  Treatment Orders: PT Eval and Treat  Past Medical History:   Diagnosis Date    IBS (irritable bowel syndrome)        Precautions: as per diagnosis    Evaluation date: 2/15/2018  Visit # authorized: 15/20 on 6/21/2018  Authorization period: 12-31-18  Plan of care expiration: 7-16-18  MD Follow up appt: none scheduled    Subjective     Pt reports: she felt good and more relaxed after last treatment.  Pt states she packed this AM and was feeling some pain so returned to PT due to leaving to go out of town tomorrow    Pain Scale: before treatment: N/T after treatment N/T  Objective     AR R   Pelvis, , able to self mobilize  Mod lumbar and QL muscle tightness       TREATMENT    Therapeutic exercise: Danuta received therapeutic exercises to develop strength, endurance, ROM, flexibility and core stabilization for 10 minutes including:  Performed sidelying L contract relax L5-S1 region and able to level pelvis     Had pt do all stretches and then contract relax R glut and she was able to self mobilize   HS stretch supine   Glut stretch  Piriformis stretch  B QL stretch    Instructed pt to continue with current level of strengthening progressing reps as tolerated    SLR x 10 Instructed pt to do contract relax after SLR R   Bridge x 10  Hip abd sidelying x 10 kept small plane R to avoid dysfunction  Arm and Leg lift prone x 10    Had pt self mobilize after all exercises and was able level herself      (NP)Hip flexion sitting on Red physioball Also did some bouncing on ball and did well. Pt will work on active sitting hip flexion for now        Plank 3 reps 10 sec each to start  Reviewed with pt verbally ex to hold for now    Hold all ex below for now  Trunk rotation  supine x 10  SLR x 20  Pelvic tilt x 20  Partial sit up x 20  Hip ext prone with bent knee x 20    (NP)Manual therapy: Danuta  received the following manual therapy techniques x 25 min. to include soft tissue and joint mobilization were applied to the: low back and gluteals to include: Sidelying L contract relax mob P/A mob to lumbar region Grade 1-2  Vacuum/cupping STM with manual therapy techniques was performed to back and gluteals to decrease muscle tightness, increase circulation and promote healing process.  The pt's skin was monitored for redness adjusting pressure as needed. The pt was instructed in possible side effects of bruising and/or soreness.    STM to lumbar region, QL, piriformis,     Patient received pre-mod electrical stimulation to decrease muscle tightness and pain to Lumbar paraspinals/ sacral border of gluteals B for 15 minutes with MH supine with cycle time: continuous, beat frequency: , CC/CV: CV.     Written Home Exercises Provided: indented ex noted above  Pt demo good understanding of the education provided. Danuta demonstrated good return demonstration of activities.     Pt. education:  · Posture reeducation, body mechanics, HEP,   · No spiritual or educational barriers to learning provided  · Pt has no cultural, educational or language barriers to learning provided.    Assessment   Pt with improved symptoms after treatment   .  Pt will continue to benefit from skilled outpatient physical therapy to address the remaining functional deficits, provide pt/family education, and to maximize pt's level of independence in the home and community environment. .     GOALS:   Short Term Goals:  3 weeks  Increase range of motion 25%  Increase strength 1/2 muscle grade  Improve postural awareness of pelvis to independently identify dysfunction with min assist from PT  Be able to perform HEP with minimal cueing required     Long Term Goals: 6 weeks  Increase range of motion to 75% to 100%  full   Improve muscle strength 1 muscle grade  Improve muscle strength with MMT to 4+/5 to 5/5  Improve and stabilize proper pelvic positioning  Restore ability to sit for ADL and work with min to 0 pain  Restore normal sleep habits without disturbances due to pain  Restore ability to perform ADL's and household activities independently with min to 0 pain    Anticipated barriers to physical therapy: none  Pt's spiritual, cultural and educational needs considered and pt agreeable to plan of care and goals        Plan   Continue with established plan of care towards PT goals.       Nausea, vomiting, and diarrhea

## 2018-07-09 ENCOUNTER — CLINICAL SUPPORT (OUTPATIENT)
Dept: REHABILITATION | Facility: HOSPITAL | Age: 63
End: 2018-07-09
Attending: INTERNAL MEDICINE
Payer: COMMERCIAL

## 2018-07-09 DIAGNOSIS — M62.81 MUSCLE WEAKNESS: ICD-10-CM

## 2018-07-09 DIAGNOSIS — M54.41 CHRONIC BILATERAL LOW BACK PAIN WITH RIGHT-SIDED SCIATICA: Primary | ICD-10-CM

## 2018-07-09 DIAGNOSIS — G89.29 CHRONIC BILATERAL LOW BACK PAIN WITH RIGHT-SIDED SCIATICA: Primary | ICD-10-CM

## 2018-07-09 PROCEDURE — 97110 THERAPEUTIC EXERCISES: CPT | Mod: PN | Performed by: PHYSICAL THERAPIST

## 2018-07-09 PROCEDURE — 97014 ELECTRIC STIMULATION THERAPY: CPT | Mod: PN | Performed by: PHYSICAL THERAPIST

## 2018-07-09 PROCEDURE — 97140 MANUAL THERAPY 1/> REGIONS: CPT | Mod: PN | Performed by: PHYSICAL THERAPIST

## 2018-07-09 NOTE — PROGRESS NOTES
Physical Therapy Daily Note     Name: Danuta Santos  Clinic Number: [id number]  Diagnosis:   Encounter Diagnoses   Name Primary?    Chronic bilateral low back pain with right-sided sciatica Yes    Muscle weakness      Physician: Erica Khan MD  Treatment Orders: PT Eval and Treat  Past Medical History:   Diagnosis Date    IBS (irritable bowel syndrome)        Precautions: as per diagnosis    Evaluation date: 2/15/2018  Visit # authorized: 16/20 on 7/9/2018  Authorization period: 12-31-18  Plan of care expiration: 7-16-18  MD Follow up appt: none scheduled    Subjective     Pt reports: she was out of town and worked on self mob as able Generally, able to get pelvis level but did so much walking and stair climbing and would do stretches generally once every 2 days, did not strengthen due so much activity    Pain Scale: before treatment: N/T after treatment N/T  Objective     AR R   Pelvis, , able to self mobilize  Min-od lumbar and QL muscle tightness       TREATMENT    Therapeutic exercise: Danuta received therapeutic exercises to develop strength, endurance, ROM, flexibility and core stabilization for 25 minutes including:  Performed sidelying L contract relax L5-S1 region and able to level pelvis     Had pt do all stretches and then contract relax R glut and she was able to self mobilize   HS stretch supine   Glut stretch  Piriformis stretch  B QL stretch        SLR x 10 Instructed pt to do contract relax after SLR R   Bridge x 10  Hip abd sidelying x 10 kept small plane R to avoid dysfunction  Arm and Leg lift prone x 10    Plank 3 reps 10 sec each to start    Instructed pt to increase reps of strengthening and not add other ex at this point    Had pt self mobilize after all exercises and was able level herself      (NP)Hip flexion sitting on Red [employer] Also did some bouncing on ball and did well. Pt will work on active sitting hip flexion for now          Reviewed with pt verbally ex to hold for  now    Hold all ex below for now  Trunk rotation supine x 10  Pelvic tilt x 20  Partial sit up x 20  Hip ext prone with bent knee x 20    Manual therapy: Danuta  received the following manual therapy techniques x 15 min. to include soft tissue and joint mobilization were applied to the: low back and gluteals to include: Sidelying L contract relax mob P/A mob to lumbar region Grade 1-2  Vacuum/cupping STM with manual therapy techniques was performed to back and gluteals to decrease muscle tightness, increase circulation and promote healing process.  The pt's skin was monitored for redness adjusting pressure as needed. The pt was instructed in possible side effects of bruising and/or soreness.    STM to lumbar region, QL, piriformis,     Patient received pre-mod electrical stimulation to decrease muscle tightness and pain to Lumbar paraspinals/ sacral border of gluteals B for 15 minutes with MH supine with cycle time: continuous, beat frequency: , CC/CV: CV.     Written Home Exercises Provided: indented ex noted above  Pt demo good understanding of the education provided. Danuta demonstrated good return demonstration of activities.     Pt. education:  · Posture reeducation, body mechanics, HEP,   · No spiritual or educational barriers to learning provided  · Pt has no cultural, educational or language barriers to learning provided.    Assessment   Pt with improved symptoms after treatment and not quite as tight with STM with all the walking possibly.    .  Pt will continue to benefit from skilled outpatient physical therapy to address the remaining functional deficits, provide pt/family education, and to maximize pt's level of independence in the home and community environment. .     GOALS:   Short Term Goals:  3 weeks  Increase range of motion 25%  Increase strength 1/2 muscle grade  Improve postural awareness of pelvis to independently identify dysfunction with min assist from PT  Be able to perform HEP  with minimal cueing required     Long Term Goals: 6 weeks  Increase range of motion to 75% to 100% full   Improve muscle strength 1 muscle grade  Improve muscle strength with MMT to 4+/5 to 5/5  Improve and stabilize proper pelvic positioning  Restore ability to sit for ADL and work with min to 0 pain  Restore normal sleep habits without disturbances due to pain  Restore ability to perform ADL's and household activities independently with min to 0 pain    Anticipated barriers to physical therapy: none  Pt's spiritual, cultural and educational needs considered and pt agreeable to plan of care and goals        Plan   Continue with established plan of care towards PT goals.

## 2018-07-20 ENCOUNTER — CLINICAL SUPPORT (OUTPATIENT)
Dept: REHABILITATION | Facility: HOSPITAL | Age: 63
End: 2018-07-20
Attending: INTERNAL MEDICINE
Payer: COMMERCIAL

## 2018-07-20 DIAGNOSIS — M62.81 MUSCLE WEAKNESS: ICD-10-CM

## 2018-07-20 DIAGNOSIS — M54.41 CHRONIC BILATERAL LOW BACK PAIN WITH RIGHT-SIDED SCIATICA: Primary | ICD-10-CM

## 2018-07-20 DIAGNOSIS — G89.29 CHRONIC BILATERAL LOW BACK PAIN WITH RIGHT-SIDED SCIATICA: Primary | ICD-10-CM

## 2018-07-20 PROCEDURE — 97110 THERAPEUTIC EXERCISES: CPT | Mod: PN | Performed by: PHYSICAL THERAPIST

## 2018-07-20 PROCEDURE — 97014 ELECTRIC STIMULATION THERAPY: CPT | Mod: PN | Performed by: PHYSICAL THERAPIST

## 2018-07-20 PROCEDURE — 97140 MANUAL THERAPY 1/> REGIONS: CPT | Mod: PN | Performed by: PHYSICAL THERAPIST

## 2018-07-20 NOTE — PROGRESS NOTES
Physical Therapy Progress Note     Name: Danuta Santos  Clinic Number: [id number]  Diagnosis:   Encounter Diagnoses   Name Primary?    Chronic bilateral low back pain with right-sided sciatica Yes    Muscle weakness      Physician: Erica Khan MD  Treatment Orders: PT Eval and Treat  Past Medical History:   Diagnosis Date    IBS (irritable bowel syndrome)        Precautions: as per diagnosis    Evaluation date: 2/15/2018  Visit # authorized: 17/20 on 7/20/2018  Authorization period: 12-31-18  Plan of care expiration: 8-31-18  MD Follow up appt: none scheduled    Subjective     Pt reports: able to get pelvis in better, still difficulty at times.  Pt states she started a little work out yesterday with a small stepper 10 min  and treadmill 15 min and performed upper body machines with light weight  Pt states she is feeling tight after the work out yesterday Pt states she will rest today from work out and wants to work out tomorrow    Pain Scale: before treatment: 4-5 after treatment feels better 3  Objective     AR R   Pelvis, , able to self mobilize  Min-mod lumbar and QL muscle tightness     Muscle Strength 7-20-18  MMT R L   Hip flexion 4-/5 4+/5   Hip abduction 4+/5 4/5   Hip extension 4/5 4/5   Glut max 3+/5 3+/5        Knee extension 5/5 5/5   Knee flexion 5/5 5/5           Muscle Strength 6-4-18  MMT R L   Hip flexion 4-/5 4+/5   Hip abduction 4-/5 3+/5   Hip extension 4-/5 4-/5   Glut max 3+/5 3+/5           Knee extension 5/5 5/5   Knee flexion 5/5 5/5          TREATMENT    Therapeutic exercise: Danuta received therapeutic exercises to develop strength, endurance, ROM, flexibility and core stabilization for 25 minutes including:  Performed sidelying L contract relax L5-S1 region and able to level pelvis     Had pt do all stretches and then contract relax R glut and she was able to self mobilize   HS stretch supine   Glut stretch  Piriformis stretch  B QL stretch       Pelvic tilt x 10  SLR x 10  Instructed pt to do contract relax after SLR R   Bridge x 10  Hip abd sidelying x 10 kept small plane R to avoid dysfunction  Arm and Leg lift prone x 20   Hip flexion sitting led to dysfunction so hold    Plank 3 reps 15 sec each     Instructed pt to increase reps of strengthening and not add other ex at this point    Had pt self mobilize after all exercises and was able level herself      (NP)Hip flexion sitting on Red physioball Also did some bouncing on ball and did well. Pt will work on active sitting hip flexion for now      Reviewed with pt verbally ex to hold for now    Hold all ex below for now  Trunk rotation supine x 10    Partial sit up x 20  Hip ext prone with bent knee x 20    Manual therapy: Danuta  received the following manual therapy techniques x 15 min. to include soft tissue and joint mobilization were applied to the: low back and gluteals to include: Sidelying L contract relax mob P/A mob to lumbar region Grade 1-2  Vacuum/cupping STM with manual therapy techniques was performed to back and gluteals to decrease muscle tightness, increase circulation and promote healing process.  The pt's skin was monitored for redness adjusting pressure as needed. The pt was instructed in possible side effects of bruising and/or soreness.    STM to lumbar region, QL, piriformis,     Patient received pre-mod electrical stimulation to decrease muscle tightness and pain to Lumbar paraspinals/ sacral border of gluteals B for 15 minutes with MH supine with cycle time: continuous, beat frequency: , CC/CV: CV.     Written Home Exercises Provided: indented ex noted above  Pt demo good understanding of the education provided. Danuta demonstrated good return demonstration of activities.     Pt. education:  · Posture reeducation, body mechanics, HEP,   · No spiritual or educational barriers to learning provided  · Pt has no cultural, educational or language barriers to learning provided.    Assessment   Patient  demonstrates improvement in range of motion, strength, stabilization and function.     Pt will continue to benefit from skilled outpatient physical therapy to address the remaining functional deficits, provide pt/family education, and to maximize pt's level of independence in the home and community environment. .     GOALS:   Short Term Goals:  3 weeks MET STG's  Increase range of motion 25%  Increase strength 1/2 muscle grade  Improve postural awareness of pelvis to independently identify dysfunction with min assist from PT  Be able to perform HEP with minimal cueing required     Long Term Goals: 6 weeks PARTIALLY MET LTG'S  Increase range of motion to 75% to 100% full   Improve muscle strength 1 muscle grade  Improve muscle strength with MMT to 4+/5 to 5/5  Improve and stabilize proper pelvic positioning  Restore ability to sit for ADL and work with min to 0 pain  Restore normal sleep habits without disturbances due to pain  Restore ability to perform ADL's and household activities independently with min to 0 pain    Anticipated barriers to physical therapy: none  Pt's spiritual, cultural and educational needs considered and pt agreeable to plan of care and goals        Plan   If you concur, I recommend patient continue with physical therapy 1-2 times a week  for 6 weeks.  Please advise us of your  recommendations. Thank you for allowing us to assist in the care of your patient.      Jacy Escobar, MS, PT          I certify the need for these services furnished under this plan of treatment and while under my care.    ____________________________________ Physician/Referring Practitioner                                Date of Signature

## 2018-07-20 NOTE — PLAN OF CARE
Physical Therapy Progress Note     Name: Danuta Santos  Clinic Number: [id number]  Diagnosis:   Encounter Diagnoses   Name Primary?    Chronic bilateral low back pain with right-sided sciatica Yes    Muscle weakness      Physician: Erica Khan MD  Treatment Orders: PT Eval and Treat  Past Medical History:   Diagnosis Date    IBS (irritable bowel syndrome)        Precautions: as per diagnosis    Evaluation date: 2/15/2018  Visit # authorized: 17/20 on 7/20/2018  Authorization period: 12-31-18  Plan of care expiration: 8-31-18  MD Follow up appt: none scheduled    Subjective     Pt reports: able to get pelvis in better, still difficulty at times.  Pt states she started a little work out yesterday with a small stepper 10 min  and treadmill 15 min and performed upper body machines with light weight  Pt states she is feeling tight after the work out yesterday Pt states she will rest today from work out and wants to work out tomorrow    Pain Scale: before treatment: 4-5 after treatment feels better 3  Objective     AR R   Pelvis, , able to self mobilize  Min-mod lumbar and QL muscle tightness     Muscle Strength 7-20-18  MMT R L   Hip flexion 4-/5 4+/5   Hip abduction 4+/5 4/5   Hip extension 4/5 4/5   Glut max 3+/5 3+/5        Knee extension 5/5 5/5   Knee flexion 5/5 5/5           Muscle Strength 6-4-18  MMT R L   Hip flexion 4-/5 4+/5   Hip abduction 4-/5 3+/5   Hip extension 4-/5 4-/5   Glut max 3+/5 3+/5           Knee extension 5/5 5/5   Knee flexion 5/5 5/5          TREATMENT    Therapeutic exercise: Danuta received therapeutic exercises to develop strength, endurance, ROM, flexibility and core stabilization for 25 minutes including:  Performed sidelying L contract relax L5-S1 region and able to level pelvis     Had pt do all stretches and then contract relax R glut and she was able to self mobilize   HS stretch supine   Glut stretch  Piriformis stretch  B QL stretch       Pelvic tilt x 10  SLR x 10  Instructed pt to do contract relax after SLR R   Bridge x 10  Hip abd sidelying x 10 kept small plane R to avoid dysfunction  Arm and Leg lift prone x 20   Hip flexion sitting led to dysfunction so hold    Plank 3 reps 15 sec each     Instructed pt to increase reps of strengthening and not add other ex at this point    Had pt self mobilize after all exercises and was able level herself      (NP)Hip flexion sitting on Red physioball Also did some bouncing on ball and did well. Pt will work on active sitting hip flexion for now      Reviewed with pt verbally ex to hold for now    Hold all ex below for now  Trunk rotation supine x 10    Partial sit up x 20  Hip ext prone with bent knee x 20    Manual therapy: Danuta  received the following manual therapy techniques x 15 min. to include soft tissue and joint mobilization were applied to the: low back and gluteals to include: Sidelying L contract relax mob P/A mob to lumbar region Grade 1-2  Vacuum/cupping STM with manual therapy techniques was performed to back and gluteals to decrease muscle tightness, increase circulation and promote healing process.  The pt's skin was monitored for redness adjusting pressure as needed. The pt was instructed in possible side effects of bruising and/or soreness.    STM to lumbar region, QL, piriformis,     Patient received pre-mod electrical stimulation to decrease muscle tightness and pain to Lumbar paraspinals/ sacral border of gluteals B for 15 minutes with MH supine with cycle time: continuous, beat frequency: , CC/CV: CV.     Written Home Exercises Provided: indented ex noted above  Pt demo good understanding of the education provided. Danuta demonstrated good return demonstration of activities.     Pt. education:  · Posture reeducation, body mechanics, HEP,   · No spiritual or educational barriers to learning provided  · Pt has no cultural, educational or language barriers to learning provided.    Assessment   Patient  demonstrates improvement in range of motion, strength, stabilization and function.     Pt will continue to benefit from skilled outpatient physical therapy to address the remaining functional deficits, provide pt/family education, and to maximize pt's level of independence in the home and community environment. .     GOALS:   Short Term Goals:  3 weeks MET STG's  Increase range of motion 25%  Increase strength 1/2 muscle grade  Improve postural awareness of pelvis to independently identify dysfunction with min assist from PT  Be able to perform HEP with minimal cueing required     Long Term Goals: 6 weeks PARTIALLY MET LTG'S  Increase range of motion to 75% to 100% full   Improve muscle strength 1 muscle grade  Improve muscle strength with MMT to 4+/5 to 5/5  Improve and stabilize proper pelvic positioning  Restore ability to sit for ADL and work with min to 0 pain  Restore normal sleep habits without disturbances due to pain  Restore ability to perform ADL's and household activities independently with min to 0 pain    Anticipated barriers to physical therapy: none  Pt's spiritual, cultural and educational needs considered and pt agreeable to plan of care and goals        Plan   If you concur, I recommend patient continue with physical therapy 1-2 times a week  for 6 weeks.  Please advise us of your  recommendations. Thank you for allowing us to assist in the care of your patient.      Jacy Escobar, MS, PT          I certify the need for these services furnished under this plan of treatment and while under my care.    ____________________________________ Physician/Referring Practitioner                                Date of Signature

## 2018-07-23 ENCOUNTER — PATIENT MESSAGE (OUTPATIENT)
Dept: INTERNAL MEDICINE | Facility: CLINIC | Age: 63
End: 2018-07-23

## 2018-07-23 ENCOUNTER — TELEPHONE (OUTPATIENT)
Dept: INTERNAL MEDICINE | Facility: CLINIC | Age: 63
End: 2018-07-23

## 2018-07-23 DIAGNOSIS — Z00.00 WELLNESS EXAMINATION: Primary | ICD-10-CM

## 2018-07-23 NOTE — TELEPHONE ENCOUNTER
I did not see this before 9:30 am today and please remind her that I do not work on Fridays so I am not sure it was sent to me. I have put the following lab in. She may go anytime and have it done.:    Outpatient Procedures Ordered This Visit    CBC auto differential              Comprehensive metabolic panel              Ferritin              Iron and TIBC              Lipid panel              TSH              Vitamin B12

## 2018-07-23 NOTE — TELEPHONE ENCOUNTER
----- Message from Luann Odell sent at 7/20/2018  3:37 PM CDT -----  Contact: self/[phone]  Patient called in regards needing to place order for her annual lab. ASAP. Patient is scheduled for her physical on 07/30/18.   Patient stated that if not full labs, at least the one for the anemic, patient stated that for the past 2 weeks she has been feeling tired, she is going to sleep early, she is been told that she look pale. She is worry because she has suffer from anemia in the past. Please call and advise. thank you

## 2018-07-26 ENCOUNTER — CLINICAL SUPPORT (OUTPATIENT)
Dept: REHABILITATION | Facility: HOSPITAL | Age: 63
End: 2018-07-26
Attending: INTERNAL MEDICINE
Payer: COMMERCIAL

## 2018-07-26 DIAGNOSIS — M54.41 CHRONIC BILATERAL LOW BACK PAIN WITH RIGHT-SIDED SCIATICA: Primary | ICD-10-CM

## 2018-07-26 DIAGNOSIS — M62.81 MUSCLE WEAKNESS: ICD-10-CM

## 2018-07-26 DIAGNOSIS — G89.29 CHRONIC BILATERAL LOW BACK PAIN WITH RIGHT-SIDED SCIATICA: Primary | ICD-10-CM

## 2018-07-26 PROCEDURE — 97014 ELECTRIC STIMULATION THERAPY: CPT | Mod: PN | Performed by: PHYSICAL THERAPIST

## 2018-07-26 PROCEDURE — 97110 THERAPEUTIC EXERCISES: CPT | Mod: PN | Performed by: PHYSICAL THERAPIST

## 2018-07-26 PROCEDURE — 97140 MANUAL THERAPY 1/> REGIONS: CPT | Mod: PN | Performed by: PHYSICAL THERAPIST

## 2018-07-26 NOTE — PROGRESS NOTES
Physical Therapy Daily Note     Name: Danuta Santos  Clinic Number: [id number]  Diagnosis:   Encounter Diagnoses   Name Primary?    Chronic bilateral low back pain with right-sided sciatica Yes    Muscle weakness      Physician: Erica Khan MD  Treatment Orders: PT Eval and Treat  Past Medical History:   Diagnosis Date    IBS (irritable bowel syndrome)        Precautions: as per diagnosis    Evaluation date: 2/15/2018  Visit # authorized: 18/20 on 7/26/2018  Authorization period: 12-31-18  Plan of care expiration: 8-31-18  MD Follow up appt: none scheduled    Subjective     Pt reports: been able to work on exercises and feeling better   Pain Scale: before treatment: 3.5-4 after treatment feels better N/T  Objective     AR R   Pelvis, , able to self mobilize without needing to do all stretches first  Min-mod lumbar and QL muscle tightness     Muscle Strength 7-20-18  MMT R L   Hip flexion 4-/5 4+/5   Hip abduction 4+/5 4/5   Hip extension 4/5 4/5   Glut max 3+/5 3+/5        Knee extension 5/5 5/5   Knee flexion 5/5 5/5           Muscle Strength 6-4-18  MMT R L   Hip flexion 4-/5 4+/5   Hip abduction 4-/5 3+/5   Hip extension 4-/5 4-/5   Glut max 3+/5 3+/5           Knee extension 5/5 5/5   Knee flexion 5/5 5/5          TREATMENT    Therapeutic exercise: Danuta received therapeutic exercises to develop strength, endurance, ROM, flexibility and core stabilization for 15 minutes including:  Contract relax glut and realigned without needing to stretch prior to contract relax ex  Pt doing 15 min on stepper and 15 min on treadmill and doing light weight with bicep curls and abd circles small amount     Had pt do all stretches and then contract relax R glut   HS stretch supine   Glut stretch  Piriformis stretch  B QL stretch      Pelvic tilt x 10  SLR x 10 Instructed pt to do contract relax after SLR R   Bridge x 10  Hip abd sidelying x 10 kept small plane R to avoid dysfunction  Arm and Leg lift prone x  20   Hip flexion sitting x 10 from elevated seat  Plank 4 reps 15 sec each     Instructed pt to increase reps of strengthening and not add other ex at this point    (NP)Hip flexion sitting on Red physioball Also did some bouncing on ball and did well. Pt will work on active sitting hip flexion for now      Reviewed with pt verbally ex to hold for now    Hold all ex below for now  Trunk rotation supine x 10    Partial sit up x 20  Hip ext prone with bent knee x 20    Manual therapy: Danuta  received the following manual therapy techniques x 10 min. to include soft tissue and joint mobilization were applied to the: low back and gluteals to include: Sidelying L contract relax mob P/A mob to lumbar region Grade 1-2  Contract relax QL sidelying    (NP)Vacuum/cupping STM with manual therapy techniques was performed to back and gluteals to decrease muscle tightness, increase circulation and promote healing process.  The pt's skin was monitored for redness adjusting pressure as needed. The pt was instructed in possible side effects of bruising and/or soreness.    STM to lumbar region, QL, piriformis,     Patient received pre-mod electrical stimulation to decrease muscle tightness and pain to Lumbar paraspinals/ sacral border of gluteals B for 15 minutes with MH supine with cycle time: continuous, beat frequency: , CC/CV: CV.     Written Home Exercises Provided: indented ex noted above  Pt demo good understanding of the education provided. Danuta demonstrated good return demonstration of activities.     Pt. education:  · Posture reeducation, body mechanics, HEP,   · No spiritual or educational barriers to learning provided  · Pt has no cultural, educational or language barriers to learning provided.    Assessment   Patient able to self mobilize again with more success.  Pt gradually progressing with ex and able to do 30 min cardio  Pt to return to school on Aug 6.  She may have increased difficulty with return to  school and will monitor   Pt will continue to benefit from skilled outpatient physical therapy to address the remaining functional deficits, provide pt/family education, and to maximize pt's level of independence in the home and community environment. .     GOALS:   Short Term Goals:  3 weeks MET STG's  Increase range of motion 25%  Increase strength 1/2 muscle grade  Improve postural awareness of pelvis to independently identify dysfunction with min assist from PT  Be able to perform HEP with minimal cueing required     Long Term Goals: 6 weeks PARTIALLY MET LTG'S  Increase range of motion to 75% to 100% full   Improve muscle strength 1 muscle grade  Improve muscle strength with MMT to 4+/5 to 5/5  Improve and stabilize proper pelvic positioning  Restore ability to sit for ADL and work with min to 0 pain  Restore normal sleep habits without disturbances due to pain  Restore ability to perform ADL's and household activities independently with min to 0 pain    Anticipated barriers to physical therapy: none  Pt's spiritual, cultural and educational needs considered and pt agreeable to plan of care and goals        Plan   Continue with established plan of care towards PT goals.

## 2018-07-30 ENCOUNTER — HOSPITAL ENCOUNTER (OUTPATIENT)
Dept: RADIOLOGY | Facility: OTHER | Age: 63
Discharge: HOME | End: 2018-07-30
Attending: INTERNAL MEDICINE
Payer: COMMERCIAL

## 2018-07-30 ENCOUNTER — OFFICE VISIT (OUTPATIENT)
Dept: INTERNAL MEDICINE | Facility: CLINIC | Age: 63
End: 2018-07-30
Payer: COMMERCIAL

## 2018-07-30 VITALS
DIASTOLIC BLOOD PRESSURE: 78 MMHG | OXYGEN SATURATION: 98 % | SYSTOLIC BLOOD PRESSURE: 120 MMHG | HEART RATE: 56 BPM | HEIGHT: 66 IN

## 2018-07-30 DIAGNOSIS — Z00.00 PHYSICAL EXAM: Primary | ICD-10-CM

## 2018-07-30 DIAGNOSIS — J45.909 REACTIVE AIRWAY DISEASE WITHOUT COMPLICATION, UNSPECIFIED ASTHMA SEVERITY, UNSPECIFIED WHETHER PERSISTENT: ICD-10-CM

## 2018-07-30 DIAGNOSIS — M81.0 POSTMENOPAUSAL BONE LOSS: ICD-10-CM

## 2018-07-30 DIAGNOSIS — M54.41 CHRONIC BILATERAL LOW BACK PAIN WITH RIGHT-SIDED SCIATICA: ICD-10-CM

## 2018-07-30 DIAGNOSIS — G89.29 CHRONIC BILATERAL LOW BACK PAIN WITH RIGHT-SIDED SCIATICA: ICD-10-CM

## 2018-07-30 PROCEDURE — 99999 PR PBB SHADOW E&M-EST. PATIENT-LVL III: CPT | Mod: PBBFAC,,, | Performed by: INTERNAL MEDICINE

## 2018-07-30 PROCEDURE — 99396 PREV VISIT EST AGE 40-64: CPT | Mod: S$GLB,,, | Performed by: INTERNAL MEDICINE

## 2018-07-30 PROCEDURE — 77080 DXA BONE DENSITY AXIAL: CPT | Mod: TC

## 2018-07-30 PROCEDURE — 77080 DXA BONE DENSITY AXIAL: CPT | Mod: 26,,, | Performed by: RADIOLOGY

## 2018-07-30 RX ORDER — FLUTICASONE PROPIONATE 50 MCG
1 SPRAY, SUSPENSION (ML) NASAL DAILY
COMMUNITY
End: 2018-09-28

## 2018-07-30 RX ORDER — FLUTICASONE FUROATE AND VILANTEROL 100; 25 UG/1; UG/1
1 POWDER RESPIRATORY (INHALATION) DAILY
COMMUNITY
End: 2019-04-01

## 2018-07-30 RX ORDER — MONTELUKAST SODIUM 10 MG/1
10 TABLET ORAL NIGHTLY
COMMUNITY
End: 2019-04-01

## 2018-07-30 RX ORDER — ALBUTEROL SULFATE 0.83 MG/ML
2.5 SOLUTION RESPIRATORY (INHALATION) EVERY 6 HOURS PRN
COMMUNITY
End: 2019-05-29

## 2018-07-30 NOTE — PATIENT INSTRUCTIONS
New shingles vaccine: SHINGRIX ( 2018) not live, 90%,  2 shots, one at day zero and the 2nd at 2-6 months: any pharmacy can give it.

## 2018-07-30 NOTE — PROGRESS NOTES
Subjective:      Patient ID: Danuta Santos is a 63 y.o. female.    Chief Complaint: Annual Exam    HPI:  HPI     Patient has continues to have chronic constipation:intolerant to Linzess and Amitiza may cause headaches  Patient has dental issues: she has been taken off sugar  Allergy problems: allergic to wheat and dairy and alcohol  Back problems: PT: its a mechanical issue    Recent appt with Dr. Mark Lizama Pulmonary: induced reactive airway ( flu x 2)  Breo : daily through Sept  Albuterol inhaler daily  Montelukast: daily through Sep  Fluticasone: daily   Will discuss with Dr. Mark Lizama     Labs:        Annual exam:2018  Colonoscopy : , no polyps normal and also had BE Dr. Guider: will find out  Mammogram: 5/3/2018  Gyn: Dr. Maria next week  Bone Density: 2014 adequate:   Optho:Dr. Kd Monroy  Retinal specialist  [facility]   Flu: due in the fall  Tetanus: up to date  Shingles: Shingrix   Pneumovax:due at age 64  Hep B  Hep A: [employer]  Tested for Hep C     Consultants  Dr. Candace Pratt, Zeina Pina    Patient Active Problem List   Diagnosis    Chronic bilateral low back pain with right-sided sciatica    Reactive airway disease without complication     Past Medical History:   Diagnosis Date    IBS (irritable bowel syndrome)      Past Surgical History:   Procedure Laterality Date     SECTION      COSMETIC SURGERY      rhinoplasty    NOSE SURGERY       Family History   Problem Relation Age of Onset    Cancer Father     Stroke Maternal Grandfather     Breast cancer Neg Hx     Colon cancer Neg Hx     Diabetes Neg Hx     Eclampsia Neg Hx     Hypertension Neg Hx     Miscarriages / Stillbirths Neg Hx     Ovarian cancer Neg Hx      labor Neg Hx      Review of Systems   Constitutional: Negative for chills, fever and unexpected weight change.   HENT: Negative for trouble swallowing.    Respiratory: Negative for cough, shortness of breath and  "wheezing.    Cardiovascular: Negative for chest pain and palpitations.   Gastrointestinal: Negative for abdominal distention, abdominal pain, blood in stool and vomiting.   Musculoskeletal: Negative for back pain.     Objective:     Vitals:    07/30/18 0949   BP: 120/78   Pulse: (!) 56   SpO2: 98%   Height: 5' 6" (1.676 m)   PainSc: 0-No pain     There is no height or weight on file to calculate BMI.  Physical Exam   Constitutional: She is oriented to person, place, and time. She appears well-developed and well-nourished. No distress.   Neck: Carotid bruit is not present. No thyromegaly present.   Cardiovascular: Normal rate, regular rhythm and normal heart sounds.  PMI is not displaced.    Pulmonary/Chest: Effort normal and breath sounds normal. No respiratory distress.   Abdominal: Soft. Bowel sounds are normal. She exhibits no distension. There is no tenderness.   Musculoskeletal: She exhibits no edema.   Neurological: She is alert and oriented to person, place, and time.     Assessment:     1. Physical exam    2. Reactive airway disease without complication, unspecified asthma severity, unspecified whether persistent    3. Chronic bilateral low back pain with right-sided sciatica      Plan:   Danuta was seen today for annual exam.    Diagnoses and all orders for this visit:    Physical exam: updated and reviewed    Reactive airway disease without complication, unspecified asthma severity, unspecified whether persistent: follows with Dr. Lizama and is stable    Chronic bilateral low back pain with right-sided sciatica: patient seen in PT    Other orders  -     DXA Bone Density Spine And Hip; Future      Follow-up in about 1 year (around 7/30/2019) for Annual exam.     Medication List          Accurate as of 7/30/18 10:16 AM. If you have any questions, ask your nurse or doctor.               CONTINUE taking these medications    * albuterol 2.5 mg /3 mL (0.083 %) nebulizer solution  Commonly known as:  " PROVENTIL     * albuterol 90 mcg/actuation inhaler  Inhale 1-2 puffs into the lungs every 6 (six) hours as needed for Wheezing. /cough     fluticasone 50 mcg/actuation nasal spray  Commonly known as:  FLONASE     fluticasone-vilanterol 100-25 mcg/dose diskus inhaler  Commonly known as:  BREO     montelukast 10 mg tablet  Commonly known as:  SINGULAIR     oxymetazoline 0.05 % nasal spray  Commonly known as:  AFRIN     PROBIOTIC COMPLEX ORAL     VITAMIN B-12 500 MCG tablet  Generic drug:  cyanocobalamin     vitamin D 1000 units Tab        * This list has 2 medication(s) that are the same as other medications prescribed for you. Read the directions carefully, and ask your doctor or other care provider to review them with you.            STOP taking these medications    flaxseed oil 1,000 mg Cap  Stopped by:  Erica Khan MD

## 2018-08-06 ENCOUNTER — OFFICE VISIT (OUTPATIENT)
Dept: OBSTETRICS AND GYNECOLOGY | Facility: CLINIC | Age: 63
End: 2018-08-06
Attending: OBSTETRICS & GYNECOLOGY
Payer: COMMERCIAL

## 2018-08-06 VITALS — SYSTOLIC BLOOD PRESSURE: 120 MMHG | HEIGHT: 66 IN | DIASTOLIC BLOOD PRESSURE: 62 MMHG

## 2018-08-06 DIAGNOSIS — Z01.419 WELL FEMALE EXAM WITH ROUTINE GYNECOLOGICAL EXAM: Primary | ICD-10-CM

## 2018-08-06 PROCEDURE — 99396 PREV VISIT EST AGE 40-64: CPT | Mod: S$GLB,,, | Performed by: OBSTETRICS & GYNECOLOGY

## 2018-08-06 PROCEDURE — 99999 PR PBB SHADOW E&M-EST. PATIENT-LVL III: CPT | Mod: PBBFAC,,, | Performed by: OBSTETRICS & GYNECOLOGY

## 2018-08-06 NOTE — PROGRESS NOTES
SUBJECTIVE:   63 y.o. female   for routine gyn exam. No LMP recorded. Patient is postmenopausal..  She has no unusual complaints.  No PMB.  No HRT.         Past Medical History:   Diagnosis Date    IBS (irritable bowel syndrome)      Past Surgical History:   Procedure Laterality Date     SECTION      COSMETIC SURGERY      rhinoplasty    NOSE SURGERY       Social History     Social History    Marital status:      Spouse name: N/A    Number of children: N/A    Years of education: N/A     Occupational History    Not on file.     Social History Main Topics    Smoking status: Never Smoker    Smokeless tobacco: Never Used    Alcohol use No    Drug use: No    Sexual activity: Yes     Partners: Male     Birth control/ protection: None, Post-menopausal     Other Topics Concern    Not on file     Social History Narrative    No narrative on file     Family History   Problem Relation Age of Onset    Cancer Father     Stroke Maternal Grandfather     Breast cancer Neg Hx     Colon cancer Neg Hx     Diabetes Neg Hx     Eclampsia Neg Hx     Hypertension Neg Hx     Miscarriages / Stillbirths Neg Hx     Ovarian cancer Neg Hx      labor Neg Hx      OB History    Para Term  AB Living   2 2 2     2   SAB TAB Ectopic Multiple Live Births                  # Outcome Date GA Lbr Gio/2nd Weight Sex Delivery Anes PTL Lv   2 Term            1 Term                     Current Outpatient Prescriptions   Medication Sig Dispense Refill    albuterol (PROVENTIL) 2.5 mg /3 mL (0.083 %) nebulizer solution Take 2.5 mg by nebulization every 6 (six) hours as needed for Wheezing. Rescue      albuterol 90 mcg/actuation inhaler Inhale 1-2 puffs into the lungs every 6 (six) hours as needed for Wheezing. /cough 18 g 1    cyanocobalamin (VITAMIN B-12) 500 MCG tablet Take 1,000 mcg by mouth once daily.        fluticasone (FLONASE) 50 mcg/actuation nasal spray 1 spray by Each Nare route once  "daily.      fluticasone-vilanterol (BREO) 100-25 mcg/dose diskus inhaler Inhale 1 puff into the lungs once daily. Controller      LACTOBACILLUS COMBO NO.6 (PROBIOTIC COMPLEX ORAL) Take 1 capsule by mouth once.       montelukast (SINGULAIR) 10 mg tablet Take 10 mg by mouth every evening.      oxymetazoline (AFRIN) 0.05 % nasal spray 2 sprays by Nasal route 2 (two) times daily.        vitamin D 185 MG Tab Take 2,000 mg by mouth once daily.        No current facility-administered medications for this visit.      Allergies: Wheat containing prod; Advil [ibuprofen]; Alcohol; Aspirin; and Milk containing products     ROS:  Constitutional: no weight loss, weight gain, fever, fatigue  Eyes:  No vision changes, glasses/contacts  ENT/Mouth: No ulcers, sinus problems, ears ringing, headache  Cardiovascular: No inability to lie flat, chest pain, exercise intolerance, swelling, heart palpitations  Respiratory: No wheezing, coughing blood, shortness of breath, or cough  Gastrointestinal: No diarrhea, bloody stool, nausea/vomiting, constipation, gas, hemorrhoids  Genitourinary: No blood in urine, painful urination, urgency of urination, frequency of urination, incomplete emptying, incontinence, abnormal bleeding, painful periods, heavy periods, vaginal discharge, vaginal odor, painful intercourse, sexual problems, bleeding after intercourse.  Musculoskeletal: No muscle weakness  Skin/Breast: No painful breasts, nipple discharge, masses, rash, ulcers  Neurological: No passing out, seizures, numbness, headache  Endocrine: No diabetes, hypothyroid, hyperthyroid, hot flashes, hair loss, abnormal hair growth, ance  Psychiatric: No depression, crying  Hematologic: No bruises, bleeding, swollen lymph nodes, anemia.      OBJECTIVE:   The patient appears well, alert, oriented x 3, in no distress.  /62 (BP Location: Left arm, Patient Position: Sitting, BP Method: Medium (Manual))   Ht 5' 6" (1.676 m)   NECK: no thyromegaly, " trachea midline  SKIN: no acne, striae, hirsutism  CHEST: CTAB  CV: RRR  BREAST EXAM: breasts appear normal, no suspicious masses, no skin or nipple changes or axillary nodes  ABDOMEN: no hernias, masses, or hepatosplenomegaly  GENITALIA: normal external genitalia, no erythema, no discharge  URETHRA: normal urethra, normal urethral meatus  VAGINA: Normal  CERVIX: no lesions or cervical motion tenderness  UTERUS: normal size, contour, position, consistency, mobility, non-tender  ADNEXA: no mass, fullness, tenderness      ASSESSMENT:   1. Well female exam with routine gynecological exam         PLAN:       Discussed osteopenia- Evista, calcium, vitamin D, exercise.  Consult ENT and allergy for nasal/ pulm issues  Return to clinic in 1 year

## 2018-08-07 ENCOUNTER — CLINICAL SUPPORT (OUTPATIENT)
Dept: REHABILITATION | Facility: HOSPITAL | Age: 63
End: 2018-08-07
Attending: INTERNAL MEDICINE
Payer: COMMERCIAL

## 2018-08-07 DIAGNOSIS — M54.41 CHRONIC BILATERAL LOW BACK PAIN WITH RIGHT-SIDED SCIATICA: Primary | ICD-10-CM

## 2018-08-07 DIAGNOSIS — G89.29 CHRONIC BILATERAL LOW BACK PAIN WITH RIGHT-SIDED SCIATICA: Primary | ICD-10-CM

## 2018-08-07 PROCEDURE — 97140 MANUAL THERAPY 1/> REGIONS: CPT | Mod: PN | Performed by: PHYSICAL THERAPIST

## 2018-08-07 PROCEDURE — 97014 ELECTRIC STIMULATION THERAPY: CPT | Mod: PN | Performed by: PHYSICAL THERAPIST

## 2018-08-07 PROCEDURE — 97110 THERAPEUTIC EXERCISES: CPT | Mod: PN | Performed by: PHYSICAL THERAPIST

## 2018-08-07 NOTE — PROGRESS NOTES
Physical Therapy Daily Note     Name: Danuta Santos  Clinic Number: [id number]  Diagnosis:   Encounter Diagnosis   Name Primary?    Chronic bilateral low back pain with right-sided sciatica Yes     Physician: Erica Khan MD  Treatment Orders: PT Eval and Treat  Past Medical History:   Diagnosis Date    IBS (irritable bowel syndrome)        Precautions: as per diagnosis    Evaluation date: 2/15/2018  Visit # authorized: 19/20 on 8/7/2018  Authorization period: 12-31-18  Plan of care expiration: 8-31-18  MD Follow up appt: none scheduled    Subjective     Pt reports: she is feeling tight She has been climbing a ladder cleaning blinds Pt states walking helps her back.  Pt states she has been trying to exercising which has helped  Pain Scale: before treatment: 3 after treatment feels better N/T  Objective     AR R   Pelvis, , able to self mobilize without needing to do all stretches first  Min-mod lumbar and QL muscle tightness     Muscle Strength 7-20-18  MMT R L   Hip flexion 4-/5 4+/5   Hip abduction 4+/5 4/5   Hip extension 4/5 4/5   Glut max 3+/5 3+/5        Knee extension 5/5 5/5   Knee flexion 5/5 5/5           Muscle Strength 6-4-18  MMT R L   Hip flexion 4-/5 4+/5   Hip abduction 4-/5 3+/5   Hip extension 4-/5 4-/5   Glut max 3+/5 3+/5           Knee extension 5/5 5/5   Knee flexion 5/5 5/5          TREATMENT    Therapeutic exercise: Danuta received therapeutic exercises to develop strength, endurance, ROM, flexibility and core stabilization for 30 minutes including:  Contract relax glut and realigned without needing to stretch prior to contract relax ex  Pt doing 15 min on stepper and 15 min on treadmill and doing light weight with bicep curls and abd circles small amount walking and stepper daily and varies type of exercises daily     Had pt do all stretches and then contract relax R glut   HS stretch supine   Glut stretch  Piriformis stretch  B QL stretch    Pt doing 20 reps for HEP, did this AM,  had pt do 10 reps of each to note response on pelvis Pt had forgotten to add hip flexion sitting so will add again today    Pelvic tilt x 10  SLR x 10 Instructed pt to do contract relax after SLR R   Bridge x 10  Hip abd sidelying x 10 kept small plane R to avoid dysfunction  Arm and Leg lift prone x 20   Hip ext prone with bent knee x 10   Hip flexion sitting x 10 from elevated seat  Plank 4 reps 15 sec each     Instructed pt to increase reps of strengthening and not add other ex at this point    (NP)Hip flexion sitting on Red physioball Also did some bouncing on ball and did well. Pt will work on active sitting hip flexion for now      Reviewed with pt verbally ex to hold for now    Hold all ex below for now  Trunk rotation supine x 10    Partial sit up x 20      Manual therapy: Danuta  received the following manual therapy techniques x 10 min. to include soft tissue and joint mobilization were applied to the: low back and gluteals to include: Sidelying L contract relax mob P/A mob to lumbar region Grade 1-2  Contract relax QL sidelying    (NP)Vacuum/cupping STM with manual therapy techniques was performed to back and gluteals to decrease muscle tightness, increase circulation and promote healing process.  The pt's skin was monitored for redness adjusting pressure as needed. The pt was instructed in possible side effects of bruising and/or soreness.    STM to lumbar region, QL, piriformis,     Patient received pre-mod electrical stimulation to decrease muscle tightness and pain to Lumbar paraspinals/ sacral border of gluteals B for 15 minutes with MH supine with cycle time: continuous, beat frequency: , CC/CV: CV.     Written Home Exercises Provided: indented ex noted above  Pt demo good understanding of the education provided. Danuta demonstrated good return demonstration of activities.     Pt. education:  · Posture reeducation, body mechanics, HEP,   · No spiritual or educational barriers to learning  provided  · Pt has no cultural, educational or language barriers to learning provided.    Assessment   Patient able to self mobilize again with more success.  Pt gradually progressing with ex and able to do 30 min cardio  Pt to return to school on Aug 8.  She may have increased difficulty with return to school and will monitor   Pt will continue to benefit from skilled outpatient physical therapy to address the remaining functional deficits, provide pt/family education, and to maximize pt's level of independence in the home and community environment. .     GOALS:   Short Term Goals:  3 weeks MET STG's  Increase range of motion 25%  Increase strength 1/2 muscle grade  Improve postural awareness of pelvis to independently identify dysfunction with min assist from PT  Be able to perform HEP with minimal cueing required     Long Term Goals: 6 weeks PARTIALLY MET LTG'S  Increase range of motion to 75% to 100% full   Improve muscle strength 1 muscle grade  Improve muscle strength with MMT to 4+/5 to 5/5  Improve and stabilize proper pelvic positioning  Restore ability to sit for ADL and work with min to 0 pain  Restore normal sleep habits without disturbances due to pain  Restore ability to perform ADL's and household activities independently with min to 0 pain    Anticipated barriers to physical therapy: none  Pt's spiritual, cultural and educational needs considered and pt agreeable to plan of care and goals        Plan   Continue with established plan of care towards PT goals.

## 2018-08-09 ENCOUNTER — OFFICE VISIT (OUTPATIENT)
Dept: INTERNAL MEDICINE | Facility: CLINIC | Age: 63
End: 2018-08-09
Payer: COMMERCIAL

## 2018-08-09 VITALS
HEART RATE: 61 BPM | SYSTOLIC BLOOD PRESSURE: 122 MMHG | DIASTOLIC BLOOD PRESSURE: 78 MMHG | HEIGHT: 66 IN | OXYGEN SATURATION: 98 %

## 2018-08-09 DIAGNOSIS — J34.9 SINUS DISORDER: ICD-10-CM

## 2018-08-09 DIAGNOSIS — I25.10 ASCVD (ARTERIOSCLEROTIC CARDIOVASCULAR DISEASE): ICD-10-CM

## 2018-08-09 DIAGNOSIS — M85.80 OSTEOPENIA, UNSPECIFIED LOCATION: ICD-10-CM

## 2018-08-09 DIAGNOSIS — R51.9 HEADACHE DISORDER: Primary | ICD-10-CM

## 2018-08-09 PROCEDURE — 99214 OFFICE O/P EST MOD 30 MIN: CPT | Mod: S$GLB,,, | Performed by: INTERNAL MEDICINE

## 2018-08-09 PROCEDURE — 99999 PR PBB SHADOW E&M-EST. PATIENT-LVL IV: CPT | Mod: PBBFAC,,, | Performed by: INTERNAL MEDICINE

## 2018-08-09 NOTE — PROGRESS NOTES
"Subjective:      Patient ID: Danuta Santos is a 63 y.o. female.    Chief Complaint: Follow-up (headache )    HPI:  Headache    This is a recurrent problem. Episode onset: Always had a headaches every 3-4 weeks controlled by one extra strength excedrin, but it hurts her stomach. Episode frequency: Now she has headaches up to 5 days in a row and takes [id number] tylenol daily, headaches much worse if it starts at night. Progression since onset: frequency is more. Pain location: left orbital greated than right orbital and stays in the mask of the face. Quality: pure pain. Associated symptoms comments: No associated symptoms. The symptoms are aggravated by emotional stress, fatigue and hunger. Treatments tried: She is going to see an allergist. The treatment provided no relief.      1. Hep C was negative  2. Patient gets headaches  Bone Density: osteopenia, calcium 600   Lipid profile: 3.9%  Patient Active Problem List   Diagnosis    Chronic bilateral low back pain with right-sided sciatica    Reactive airway disease without complication    ASCVD (arteriosclerotic cardiovascular disease) 3.9%     Past Medical History:   Diagnosis Date    IBS (irritable bowel syndrome)      Past Surgical History:   Procedure Laterality Date     SECTION      COSMETIC SURGERY      rhinoplasty    NOSE SURGERY       Family History   Problem Relation Age of Onset    Cancer Father     Stroke Maternal Grandfather     Breast cancer Neg Hx     Colon cancer Neg Hx     Diabetes Neg Hx     Eclampsia Neg Hx     Hypertension Neg Hx     Miscarriages / Stillbirths Neg Hx     Ovarian cancer Neg Hx      labor Neg Hx      Review of Systems   Neurological: Positive for headaches.     Objective:     Vitals:    18 0808   BP: 122/78   Pulse: 61   SpO2: 98%   Height: 5' 6" (1.676 m)   PainSc: 0-No pain     There is no height or weight on file to calculate BMI.  Physical Exam discussion only  Assessment:     1. Headache " disorder    2. Sinus disorder    3. ASCVD (arteriosclerotic cardiovascular disease) 3.9%    4. Osteopenia, unspecified location      Plan:   Danuta was seen today for follow-up.    Diagnoses and all orders for this visit:    Headache disorder: further evaluation  -     Ambulatory consult to ENT  -     Ambulatory consult to Allergy    Sinus disorder: further eval  -     Ambulatory consult to ENT  -     Ambulatory consult to Allergy    ASCVD (arteriosclerotic cardiovascular disease) 3.9%: no medications indicated    Osteopenia, unspecified location: exercise, calcium      No Follow-up on file.     Medication List          Accurate as of 8/9/18  8:44 AM. If you have any questions, ask your nurse or doctor.               CONTINUE taking these medications    * albuterol 2.5 mg /3 mL (0.083 %) nebulizer solution  Commonly known as:  PROVENTIL     * albuterol 90 mcg/actuation inhaler  Inhale 1-2 puffs into the lungs every 6 (six) hours as needed for Wheezing. /cough     fluticasone 50 mcg/actuation nasal spray  Commonly known as:  FLONASE     fluticasone-vilanterol 100-25 mcg/dose diskus inhaler  Commonly known as:  BREO     montelukast 10 mg tablet  Commonly known as:  SINGULAIR     oxymetazoline 0.05 % nasal spray  Commonly known as:  AFRIN     PROBIOTIC COMPLEX ORAL     VITAMIN B-12 500 MCG tablet  Generic drug:  cyanocobalamin     vitamin D 1000 units Tab        * This list has 2 medication(s) that are the same as other medications prescribed for you. Read the directions carefully, and ask your doctor or other care provider to review them with you.

## 2018-08-14 ENCOUNTER — CLINICAL SUPPORT (OUTPATIENT)
Dept: REHABILITATION | Facility: HOSPITAL | Age: 63
End: 2018-08-14
Attending: INTERNAL MEDICINE
Payer: COMMERCIAL

## 2018-08-14 DIAGNOSIS — M54.41 CHRONIC BILATERAL LOW BACK PAIN WITH RIGHT-SIDED SCIATICA: Primary | ICD-10-CM

## 2018-08-14 DIAGNOSIS — G89.29 CHRONIC BILATERAL LOW BACK PAIN WITH RIGHT-SIDED SCIATICA: Primary | ICD-10-CM

## 2018-08-14 PROCEDURE — 97014 ELECTRIC STIMULATION THERAPY: CPT | Mod: PN | Performed by: PHYSICAL THERAPIST

## 2018-08-14 PROCEDURE — 97110 THERAPEUTIC EXERCISES: CPT | Mod: PN | Performed by: PHYSICAL THERAPIST

## 2018-08-14 NOTE — PROGRESS NOTES
Physical Therapy Daily Note     Name: Danuta Santos  Clinic Number: [id number]  Diagnosis:   Encounter Diagnosis   Name Primary?    Chronic bilateral low back pain with right-sided sciatica Yes     Physician: Erica Khan MD  Treatment Orders: PT Eval and Treat  Past Medical History:   Diagnosis Date    IBS (irritable bowel syndrome)        Precautions: as per diagnosis    Evaluation date: 2/15/2018  Visit # authorized: 19/20 on 8/14/2018  Authorization period: 12-31-18  Plan of care expiration: 8-31-18  MD Follow up appt: none scheduled    Subjective     Pt reports: she is feeling tight she has been trying to increase exercise with walking up to 40 min since dx of osteopenia.  Pt states with extra walking she is not sure if she is getting herself all the way level  Pain Scale: before treatment: N/T after treatment feels better N/T  Objective     AR R   Pelvis, ,required sidelying contract relax mob      Muscle Strength 7-20-18  MMT R L   Hip flexion 4-/5 4+/5   Hip abduction 4+/5 4/5   Hip extension 4/5 4/5   Glut max 3+/5 3+/5        Knee extension 5/5 5/5   Knee flexion 5/5 5/5           Muscle Strength 6-4-18  MMT R L   Hip flexion 4-/5 4+/5   Hip abduction 4-/5 3+/5   Hip extension 4-/5 4-/5   Glut max 3+/5 3+/5           Knee extension 5/5 5/5   Knee flexion 5/5 5/5          TREATMENT    Therapeutic exercise: Danuta received therapeutic exercises to develop strength, endurance, ROM, flexibility and core stabilization for 25 minutes including:  Contract relax glut and realigned without needing to stretch prior to contract relax ex  Pt doing 15 min on stepper and 15 min on treadmill and doing light weight with bicep curls and abd circles small amount walking and stepper daily and varies type of exercises daily     Had pt do all stretches and then contract relax R glut   HS stretch supine   Glut stretch  Piriformis stretch  B QL stretch    SLR x 20  Pelvic tilt x 20  Bridge x 20   Hip add sidelying L x  10  Hip abd sidelying x 20 kept small plane R to avoid dysfunction  Arm and Leg lift prone x 10 with 1# arm and leg    Hip ext prone with bent knee x 10 with 1#   (NP) hold due to being able to do SLR Hip flexion sitting x 10 from elevated seat  Plank 4 reps 30 sec each     Instructed pt to decrease walking to 30 min and see how she does  Pt required sidelying contract relax with PT 2 more times during treatment for pt was not able to get level I with contract relax.    (NP)Hip flexion sitting on Red physioball Also did some bouncing on ball and did well. Pt will work on active sitting hip flexion for now      Reviewed with pt verbally ex to hold for now    Hold all ex below for now  Trunk rotation supine x 10    Partial sit up x 20      (NP)Manual therapy: Danuta  received the following manual therapy techniques x 10 min. to include soft tissue and joint mobilization were applied to the: low back and gluteals to include: Sidelying L contract relax mob P/A mob to lumbar region Grade 1-2  Contract relax QL sidelying    (NP)Vacuum/cupping STM with manual therapy techniques was performed to back and gluteals to decrease muscle tightness, increase circulation and promote healing process.  The pt's skin was monitored for redness adjusting pressure as needed. The pt was instructed in possible side effects of bruising and/or soreness.    STM to lumbar region, QL, piriformis,     Patient received pre-mod electrical stimulation to decrease muscle tightness and pain to Lumbar paraspinals/ sacral border of gluteals B for 15 minutes with MH supine with cycle time: continuous, beat frequency: , CC/CV: CV.     Written Home Exercises Provided: indented ex noted above  Pt demo good understanding of the education provided. Danuta demonstrated good return demonstration of activities.     Pt. education:  · Posture reeducation, body mechanics, HEP,   · No spiritual or educational barriers to learning provided  · Pt has no  cultural, educational or language barriers to learning provided.    Assessment   With increased walking and ex she was getting too tight and not able to self mobilize.  Pt understands she needs to avoid overdoing or she will not be able to self mobilize.   Pt will continue to benefit from skilled outpatient physical therapy to address the remaining functional deficits, provide pt/family education, and to maximize pt's level of independence in the home and community environment. .     GOALS:   Short Term Goals:  3 weeks MET STG's  Increase range of motion 25%  Increase strength 1/2 muscle grade  Improve postural awareness of pelvis to independently identify dysfunction with min assist from PT  Be able to perform HEP with minimal cueing required     Long Term Goals: 6 weeks PARTIALLY MET LTG'S  Increase range of motion to 75% to 100% full   Improve muscle strength 1 muscle grade  Improve muscle strength with MMT to 4+/5 to 5/5  Improve and stabilize proper pelvic positioning  Restore ability to sit for ADL and work with min to 0 pain  Restore normal sleep habits without disturbances due to pain  Restore ability to perform ADL's and household activities independently with min to 0 pain    Anticipated barriers to physical therapy: none  Pt's spiritual, cultural and educational needs considered and pt agreeable to plan of care and goals        Plan   Continue with established plan of care towards PT goals.

## 2018-08-23 ENCOUNTER — CLINICAL SUPPORT (OUTPATIENT)
Dept: REHABILITATION | Facility: HOSPITAL | Age: 63
End: 2018-08-23
Attending: INTERNAL MEDICINE
Payer: COMMERCIAL

## 2018-08-23 DIAGNOSIS — G89.29 CHRONIC BILATERAL LOW BACK PAIN WITH RIGHT-SIDED SCIATICA: Primary | ICD-10-CM

## 2018-08-23 DIAGNOSIS — M54.41 CHRONIC BILATERAL LOW BACK PAIN WITH RIGHT-SIDED SCIATICA: Primary | ICD-10-CM

## 2018-08-23 PROCEDURE — 97110 THERAPEUTIC EXERCISES: CPT | Mod: PN | Performed by: PHYSICAL THERAPIST

## 2018-08-23 PROCEDURE — 97014 ELECTRIC STIMULATION THERAPY: CPT | Mod: PN | Performed by: PHYSICAL THERAPIST

## 2018-08-23 PROCEDURE — 97140 MANUAL THERAPY 1/> REGIONS: CPT | Mod: PN | Performed by: PHYSICAL THERAPIST

## 2018-08-23 NOTE — PROGRESS NOTES
Physical Therapy Daily Note     Name: Danuta Santos  Clinic Number: [id number]  Diagnosis:   Encounter Diagnosis   Name Primary?    Chronic bilateral low back pain with right-sided sciatica Yes     Physician: Erica Khan MD  Treatment Orders: PT Eval and Treat  Past Medical History:   Diagnosis Date    IBS (irritable bowel syndrome)        Precautions: as per diagnosis    Evaluation date: 2/15/2018  Visit # authorized: 20/20 on 8/23/2018  Authorization period: 12-31-18  Plan of care expiration: 8-31-18  MD Follow up appt: none scheduled    Subjective     Pt reports: ended up helping someone carry her child to the car.  Pt states she has been able to keep pretty level up until this event.    Pain Scale: before treatment: N/T after treatment feels better N/T  Objective     AR R   Pelvis, ,required sidelying contract relax mob      Muscle Strength 7-20-18  MMT R L   Hip flexion 4-/5 4+/5   Hip abduction 4+/5 4/5   Hip extension 4/5 4/5   Glut max 3+/5 3+/5        Knee extension 5/5 5/5   Knee flexion 5/5 5/5           Muscle Strength 6-4-18  MMT R L   Hip flexion 4-/5 4+/5   Hip abduction 4-/5 3+/5   Hip extension 4-/5 4-/5   Glut max 3+/5 3+/5           Knee extension 5/5 5/5   Knee flexion 5/5 5/5          TREATMENT    Therapeutic exercise: Danuta received therapeutic exercises to develop strength, endurance, ROM, flexibility and core stabilization for 15 minutes including:    Pt doing 30 min on treadmill and doing light weight with bicep curls and abd circles small amount      Had pt do all stretches and then contract relax R glut   HS stretch supine   Glut stretch  Piriformis stretch  B QL stretch    SLR x 20  Pelvic tilt  Pt performs 5 reps holding 10 sec each  Bridge x 20  Hip add sidelying L x 10  Hip abd sidelying x 20 kept small plane R to avoid dysfunction  Arm and Leg lift prone x 10 with 1# arm and leg   Hip ext prone with bent knee x 10 with 1#   (NP) hold due to being able to do SLR Hip  flexion sitting x 10 from elevated seat  Plank 4 reps 30 sec each   After planks, had to do sidelying mob to level pelvis    Hold all ex below for now  Trunk rotation supine x 10    Partial sit up x 20      Manual therapy: Danuta  received the following manual therapy techniques x 10 min. to include soft tissue and joint mobilization were applied to the: low back and gluteals to include: Sidelying L contract relax mob P/A mob to lumbar region Grade 1-2  Contract relax QL sidelying    Vacuum/cupping STM with manual therapy techniques was performed to back and gluteals to decrease muscle tightness, increase circulation and promote healing process.  The pt's skin was monitored for redness adjusting pressure as needed. The pt was instructed in possible side effects of bruising and/or soreness.    STM to lumbar region, QL, piriformis,     Patient received pre-mod electrical stimulation to decrease muscle tightness and pain to Lumbar paraspinals/ sacral border of gluteals B for 15 minutes with MH supine with cycle time: continuous, beat frequency: , CC/CV: CV.     Written Home Exercises Provided: indented ex noted above  Pt demo good understanding of the education provided. Danuta demonstrated good return demonstration of activities.     Pt. education:  · Posture reeducation, body mechanics, HEP,   · No spiritual or educational barriers to learning provided  · Pt has no cultural, educational or language barriers to learning provided.    Assessment   Pt adjusting to school.  At end of session pt had difficulty with self correction, but overall improving.     Pt will continue to benefit from skilled outpatient physical therapy to address the remaining functional deficits, provide pt/family education, and to maximize pt's level of independence in the home and community environment. .     GOALS:   Short Term Goals:  3 weeks MET STG's  Increase range of motion 25%  Increase strength 1/2 muscle grade  Improve  postural awareness of pelvis to independently identify dysfunction with min assist from PT  Be able to perform HEP with minimal cueing required     Long Term Goals: 6 weeks PARTIALLY MET LTG'S  Increase range of motion to 75% to 100% full   Improve muscle strength 1 muscle grade  Improve muscle strength with MMT to 4+/5 to 5/5  Improve and stabilize proper pelvic positioning  Restore ability to sit for ADL and work with min to 0 pain  Restore normal sleep habits without disturbances due to pain  Restore ability to perform ADL's and household activities independently with min to 0 pain    Anticipated barriers to physical therapy: none  Pt's spiritual, cultural and educational needs considered and pt agreeable to plan of care and goals        Plan   Continue with established plan of care towards PT goals.

## 2018-09-06 ENCOUNTER — CLINICAL SUPPORT (OUTPATIENT)
Dept: REHABILITATION | Facility: HOSPITAL | Age: 63
End: 2018-09-06
Attending: INTERNAL MEDICINE
Payer: COMMERCIAL

## 2018-09-06 DIAGNOSIS — G89.29 CHRONIC BILATERAL LOW BACK PAIN WITH RIGHT-SIDED SCIATICA: Primary | ICD-10-CM

## 2018-09-06 DIAGNOSIS — M54.41 CHRONIC BILATERAL LOW BACK PAIN WITH RIGHT-SIDED SCIATICA: Primary | ICD-10-CM

## 2018-09-06 PROCEDURE — 97110 THERAPEUTIC EXERCISES: CPT | Mod: PN | Performed by: PHYSICAL THERAPIST

## 2018-09-06 PROCEDURE — 97014 ELECTRIC STIMULATION THERAPY: CPT | Mod: PN | Performed by: PHYSICAL THERAPIST

## 2018-09-06 NOTE — PROGRESS NOTES
Physical Therapy Progress Note     Name: Danuta Santos  Clinic Number: [id number]  Diagnosis:   Encounter Diagnosis   Name Primary?   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  Pain Scale: before treatment: N/T after treatment feels better N/T  Objective     AR R   Pelvis, improving ability to self mobilize, increased muscle tightness    Muscle Strength 9-6-18  MMT R L   Hip flexion 4/5 5-/5   Hip abduction 5-/5 4+/5   Hip extension 4+/5 4+/5   Glut max 4-/5 4-/5        Knee extension 5/5 5/5   Knee flexion 5/5 5/5             Muscle Strength 7-20-18  MMT R L   Hip flexion 4-/5 4+/5   Hip abduction 4+/5 4/5   Hip extension 4/5 4/5   Glut max 3+/5 3+/5        Knee extension 5/5 5/5   Knee flexion 5/5 5/5           Muscle Strength 6-4-18  MMT R L   Hip flexion 4-/5 4+/5   Hip abduction 4-/5 3+/5   Hip extension 4-/5 4-/5   Glut max 3+/5 3+/5           Knee extension 5/5 5/5   Knee flexion 5/5 5/5          TREATMENT    Therapeutic exercise: Danuta received therapeutic exercises to develop strength, endurance, ROM, flexibility and core stabilization for 15 direct  minutes including:    Had pt do all stretches and then contract relax R glut and pt able to realign pelvis I  HS stretch supine   Glut stretch  Piriformis stretch  B QL stretch    SLR x 20  Pelvic tilt  Pt performs 5 reps holding 10 sec each  Bridge x 20  Hip add sidelying L x 10  Hip abd sidelying x 20 kept small plane R to avoid dysfunction  Arm and Leg  lift prone x 10 with 1# arm and leg   Hip ext prone with bent knee x 10 with 1#   (NP) hold due to being able to do SLR Hip flexion sitting x 10 from elevated seat  Plank 4 reps 30 sec each   After planks, had to do sidelying mob to level pelvis    Sidelying L contract relax mob to L5-S1 region to level pelvis    Hold all ex below for now  Trunk rotation supine x 10    Partial sit up x 20      (NP)Manual therapy: Danuta  received the following manual therapy techniques x 10 min. to include soft tissue and joint mobilization were applied to the: low back and gluteals to include: Sidelying L contract relax mob P/A mob to lumbar region Grade 1-2  Contract relax QL sidelying    (NP)Vacuum/cupping STM with manual therapy techniques was performed to back and gluteals to decrease muscle tightness, increase circulation and promote healing process.  The pt's skin was monitored for redness adjusting pressure as needed. The pt was instructed in possible side effects of bruising and/or soreness.    STM to lumbar region, QL, piriformis,     Patient received pre-mod electrical stimulation to decrease muscle tightness and pain to Lumbar paraspinals/ sacral border of gluteals B for 15 minutes with MH supine with cycle time: continuous, beat frequency: , CC/CV: CV.     Written Home Exercises Provided: none provided today  Pt demo good understanding of the education provided. Danuta demonstrated good return demonstration of activities.     Pt. education:  · Posture reeducation, body mechanics, HEP,   · No spiritual or educational barriers to learning provided  · Pt has no cultural, educational or language barriers to learning provided.    Assessment   Pt with increased muscle tightness and pain due to returning to school activities, but is improving in ability to self mobilize, though still benefit from sidelying contract relax stretch    Pt will continue to benefit from skilled outpatient physical therapy to address the  remaining functional deficits, provide pt/family education, and to maximize pt's level of independence in the home and community environment. .     GOALS:   Short Term Goals:  3 weeks MET STG's  Increase range of motion 25%  Increase strength 1/2 muscle grade  Improve postural awareness of pelvis to independently identify dysfunction with min assist from PT  Be able to perform HEP with minimal cueing required     Long Term Goals: 6 weeks PARTIALLY MET LTG'S  Increase range of motion to 75% to 100% full   Improve muscle strength 1 muscle grade  Improve muscle strength with MMT to 4+/5 to 5/5  Improve and stabilize proper pelvic positioning  Restore ability to sit for ADL and work with min to 0 pain  Restore normal sleep habits without disturbances due to pain  Restore ability to perform ADL's and household activities independently with min to 0 pain    Anticipated barriers to physical therapy: none  Pt's spiritual, cultural and educational needs considered and pt agreeable to plan of care and goals        Plan   If you concur, I recommend patient continue with physical therapy 1-2 times a week as needed   for 6 weeks.  Please advise us of your  recommendations. Thank you for allowing us to assist in the care of your patient.      Jacy Escobar, MS, PT          I certify the need for these services furnished under this plan of treatment and while under my care.    ____________________________________ Physician/Referring Practitioner                                Date of Signature

## 2018-09-06 NOTE — PLAN OF CARE
Physical Therapy Daily Note     Name: Danuta Santos  Clinic Number: [id number]  Diagnosis:   Encounter Diagnosis   Name Primary?   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  Pain Scale: before treatment: N/T after treatment feels better N/T  Objective     AR R   Pelvis, improving ability to self mobilize, increased muscle tightness    Muscle Strength 9-6-18  MMT R L   Hip flexion 4/5 5-/5   Hip abduction 5-/5 4+/5   Hip extension 4+/5 4+/5   Glut max 4-/5 4-/5        Knee extension 5/5 5/5   Knee flexion 5/5 5/5             Muscle Strength 7-20-18  MMT R L   Hip flexion 4-/5 4+/5   Hip abduction 4+/5 4/5   Hip extension 4/5 4/5   Glut max 3+/5 3+/5        Knee extension 5/5 5/5   Knee flexion 5/5 5/5           Muscle Strength 6-4-18  MMT R L   Hip flexion 4-/5 4+/5   Hip abduction 4-/5 3+/5   Hip extension 4-/5 4-/5   Glut max 3+/5 3+/5           Knee extension 5/5 5/5   Knee flexion 5/5 5/5          TREATMENT    Therapeutic exercise: Danuta received therapeutic exercises to develop strength, endurance, ROM, flexibility and core stabilization for 15 direct  minutes including:    Had pt do all stretches and then contract relax R glut and pt able to realign pelvis I  HS stretch supine   Glut stretch  Piriformis stretch  B QL stretch    SLR x 20  Pelvic tilt  Pt performs 5 reps holding 10 sec each  Bridge x 20  Hip add sidelying L x 10  Hip abd sidelying x 20 kept small plane R to avoid dysfunction  Arm and Leg  lift prone x 10 with 1# arm and leg   Hip ext prone with bent knee x 10 with 1#   (NP) hold due to being able to do SLR Hip flexion sitting x 10 from elevated seat  Plank 4 reps 30 sec each   After planks, had to do sidelying mob to level pelvis    Sidelying L contract relax mob to L5-S1 region to level pelvis    Hold all ex below for now  Trunk rotation supine x 10    Partial sit up x 20      (NP)Manual therapy: Danuta  received the following manual therapy techniques x 10 min. to include soft tissue and joint mobilization were applied to the: low back and gluteals to include: Sidelying L contract relax mob P/A mob to lumbar region Grade 1-2  Contract relax QL sidelying    (NP)Vacuum/cupping STM with manual therapy techniques was performed to back and gluteals to decrease muscle tightness, increase circulation and promote healing process.  The pt's skin was monitored for redness adjusting pressure as needed. The pt was instructed in possible side effects of bruising and/or soreness.    STM to lumbar region, QL, piriformis,     Patient received pre-mod electrical stimulation to decrease muscle tightness and pain to Lumbar paraspinals/ sacral border of gluteals B for 15 minutes with MH supine with cycle time: continuous, beat frequency: , CC/CV: CV.     Written Home Exercises Provided: none provided today  Pt demo good understanding of the education provided. Danuta demonstrated good return demonstration of activities.     Pt. education:  · Posture reeducation, body mechanics, HEP,   · No spiritual or educational barriers to learning provided  · Pt has no cultural, educational or language barriers to learning provided.    Assessment   Pt with increased muscle tightness and pain due to returning to school activities, but is improving in ability to self mobilize, though still benefit from sidelying contract relax stretch    Pt will continue to benefit from skilled outpatient physical therapy to address the  remaining functional deficits, provide pt/family education, and to maximize pt's level of independence in the home and community environment. .     GOALS:   Short Term Goals:  3 weeks MET STG's  Increase range of motion 25%  Increase strength 1/2 muscle grade  Improve postural awareness of pelvis to independently identify dysfunction with min assist from PT  Be able to perform HEP with minimal cueing required     Long Term Goals: 6 weeks PARTIALLY MET LTG'S  Increase range of motion to 75% to 100% full   Improve muscle strength 1 muscle grade  Improve muscle strength with MMT to 4+/5 to 5/5  Improve and stabilize proper pelvic positioning  Restore ability to sit for ADL and work with min to 0 pain  Restore normal sleep habits without disturbances due to pain  Restore ability to perform ADL's and household activities independently with min to 0 pain    Anticipated barriers to physical therapy: none  Pt's spiritual, cultural and educational needs considered and pt agreeable to plan of care and goals        Plan   If you concur, I recommend patient continue with physical therapy 1-2 times a week as needed   for 6 weeks.  Please advise us of your  recommendations. Thank you for allowing us to assist in the care of your patient.      Jacy Escobar, MS, PT          I certify the need for these services furnished under this plan of treatment and while under my care.    ____________________________________ Physician/Referring Practitioner                                Date of Signature

## 2018-09-10 ENCOUNTER — CLINICAL SUPPORT (OUTPATIENT)
Dept: REHABILITATION | Facility: HOSPITAL | Age: 63
End: 2018-09-10
Attending: INTERNAL MEDICINE
Payer: COMMERCIAL

## 2018-09-10 DIAGNOSIS — G89.29 CHRONIC BILATERAL LOW BACK PAIN WITH RIGHT-SIDED SCIATICA: Primary | ICD-10-CM

## 2018-09-10 DIAGNOSIS — M54.41 CHRONIC BILATERAL LOW BACK PAIN WITH RIGHT-SIDED SCIATICA: Primary | ICD-10-CM

## 2018-09-10 PROCEDURE — 97014 ELECTRIC STIMULATION THERAPY: CPT | Mod: PN | Performed by: PHYSICAL THERAPIST

## 2018-09-10 PROCEDURE — 97110 THERAPEUTIC EXERCISES: CPT | Mod: PN | Performed by: PHYSICAL THERAPIST

## 2018-09-10 PROCEDURE — 97140 MANUAL THERAPY 1/> REGIONS: CPT | Mod: PN | Performed by: PHYSICAL THERAPIST

## 2018-09-10 NOTE — PROGRESS NOTES
Physical Therapy Daily Note     Name: Danuta Santos  Clinic Number: [id number]  Diagnosis:   Encounter Diagnosis   Name Primary?    Chronic bilateral low back pain with right-sided sciatica Yes     Physician: Erica Khan MD  Treatment Orders: PT Eval and Treat  Past Medical History:   Diagnosis Date    IBS (irritable bowel syndrome)        Precautions: as per diagnosis    Evaluation date: 2/15/2018  Visit # authorized: 22/40 on 9/10/2018  Authorization period: 12-31-18  Plan of care expiration: 10-18-18  MD Follow up appt: none scheduled    Subjective     Pt reports: still feeling tight, but thinks has been able to get pelvis in, just goes out again quickly  Pain Scale: before treatment: N/T after treatment feels better N/T  Objective     AR R   Pelvis, improving ability to self mobilize, increased muscle tightness    Muscle Strength 9-6-18  MMT R L   Hip flexion 4/5 5-/5   Hip abduction 5-/5 4+/5   Hip extension 4+/5 4+/5   Glut max 4-/5 4-/5        Knee extension 5/5 5/5   Knee flexion 5/5 5/5             Muscle Strength 7-20-18  MMT R L   Hip flexion 4-/5 4+/5   Hip abduction 4+/5 4/5   Hip extension 4/5 4/5   Glut max 3+/5 3+/5        Knee extension 5/5 5/5   Knee flexion 5/5 5/5           Muscle Strength 6-4-18  MMT R L   Hip flexion 4-/5 4+/5   Hip abduction 4-/5 3+/5   Hip extension 4-/5 4-/5   Glut max 3+/5 3+/5           Knee extension 5/5 5/5   Knee flexion 5/5 5/5          TREATMENT    Therapeutic exercise: Danuta received therapeutic exercises to develop strength, endurance, ROM, flexibility and core stabilization for 10 direct  minutes including:    Had pt do all stretches and then contract relax R glut and pt able to realign pelvis I  HS stretch supine   Glut stretch  Piriformis stretch  B QL stretch    SLR x 20  Pelvic tilt  Pt performs 5 reps holding 10 sec each  Bridge x 20  Hip add sidelying L x 10  Hip abd sidelying x 20 kept small plane R to avoid dysfunction  Arm and Leg lift prone x  10 with 1# arm and leg   Hip ext prone with bent knee x 10 with 1#   (NP) hold due to being able to do SLR Hip flexion sitting x 10 from elevated seat  Plank 4 reps 30 sec each   After planks, had to do sidelying mob to level pelvis    Sidelying L contract relax mob to L5-S1 region to level pelvis    Pt still able to self correct, but fatigue at end of ex    Hold all ex below for now  Trunk rotation supine x 10    Partial sit up x 20      Manual therapy: Danuta  received the following manual therapy techniques x 15 min. to include soft tissue and joint mobilization were applied to the: low back and gluteals to include: Sidelying L contract relax mob P/A mob to lumbar region Grade 1-2  Contract relax QL sidelying    Vacuum/cupping STM with manual therapy techniques was performed to back and gluteals to decrease muscle tightness, increase circulation and promote healing process.  The pt's skin was monitored for redness adjusting pressure as needed. The pt was instructed in possible side effects of bruising and/or soreness.    STM to lumbar region, QL, piriformis,     Patient received pre-mod electrical stimulation to decrease muscle tightness and pain to Lumbar paraspinals/ sacral border of gluteals B for 15 minutes with MH supine with cycle time: continuous, beat frequency: , CC/CV: CV.     Written Home Exercises Provided: none provided today  Pt demo good understanding of the education provided. Danuta demonstrated good return demonstration of activities.     Pt. education:  · Posture reeducation, body mechanics, HEP,   · No spiritual or educational barriers to learning provided  · Pt has no cultural, educational or language barriers to learning provided.    Assessment   Pt continues with some tightness, but is able to self correct.  Fatigue at end of current level of ex still.  Pt still adjusting to return to school with increased activity   Pt will continue to benefit from skilled outpatient physical  therapy to address the remaining functional deficits, provide pt/family education, and to maximize pt's level of independence in the home and community environment. .     GOALS:   Short Term Goals:  3 weeks MET STG's  Increase range of motion 25%  Increase strength 1/2 muscle grade  Improve postural awareness of pelvis to independently identify dysfunction with min assist from PT  Be able to perform HEP with minimal cueing required     Long Term Goals: 6 weeks PARTIALLY MET LTG'S  Increase range of motion to 75% to 100% full   Improve muscle strength 1 muscle grade  Improve muscle strength with MMT to 4+/5 to 5/5  Improve and stabilize proper pelvic positioning  Restore ability to sit for ADL and work with min to 0 pain  Restore normal sleep habits without disturbances due to pain  Restore ability to perform ADL's and household activities independently with min to 0 pain    Anticipated barriers to physical therapy: none  Pt's spiritual, cultural and educational needs considered and pt agreeable to plan of care and goals        Plan   Continue with established plan of care towards PT goals.

## 2018-09-12 ENCOUNTER — TELEPHONE (OUTPATIENT)
Dept: OTOLARYNGOLOGY | Facility: CLINIC | Age: 63
End: 2018-09-12

## 2018-09-18 ENCOUNTER — CLINICAL SUPPORT (OUTPATIENT)
Dept: REHABILITATION | Facility: HOSPITAL | Age: 63
End: 2018-09-18
Attending: INTERNAL MEDICINE
Payer: COMMERCIAL

## 2018-09-18 DIAGNOSIS — M54.41 CHRONIC BILATERAL LOW BACK PAIN WITH RIGHT-SIDED SCIATICA: Primary | ICD-10-CM

## 2018-09-18 DIAGNOSIS — G89.29 CHRONIC BILATERAL LOW BACK PAIN WITH RIGHT-SIDED SCIATICA: Primary | ICD-10-CM

## 2018-09-18 PROCEDURE — 97014 ELECTRIC STIMULATION THERAPY: CPT | Mod: PN | Performed by: PHYSICAL THERAPIST

## 2018-09-18 PROCEDURE — 97110 THERAPEUTIC EXERCISES: CPT | Mod: PN | Performed by: PHYSICAL THERAPIST

## 2018-09-18 PROCEDURE — 97140 MANUAL THERAPY 1/> REGIONS: CPT | Mod: PN | Performed by: PHYSICAL THERAPIST

## 2018-09-18 NOTE — PROGRESS NOTES
Physical Therapy Daily Note     Name: Danuta Santos  Clinic Number: [id number]  Diagnosis:   Encounter Diagnosis   Name Primary?    Chronic bilateral low back pain with right-sided sciatica Yes     Physician: Erica Khan MD  Treatment Orders: PT Eval and Treat  Past Medical History:   Diagnosis Date    IBS (irritable bowel syndrome)        Precautions: as per diagnosis    Evaluation date: 2/15/2018  Visit # authorized: 23/40 on 9/18/2018  Authorization period: 12-31-18  Plan of care expiration: 10-18-18  MD Follow up appt: none scheduled    Subjective     Pt reports: still feeling tight, but thinks has been able to get pelvis in, just goes out again quickly   Pain Scale: before treatment: N/T after treatment feels better N/T  Objective     AR R   Pelvis, improving ability to self mobilize, increased muscle tightness    Muscle Strength 9-6-18  MMT R L   Hip flexion 4/5 5-/5   Hip abduction 5-/5 4+/5   Hip extension 4+/5 4+/5   Glut max 4-/5 4-/5        Knee extension 5/5 5/5   Knee flexion 5/5 5/5             Muscle Strength 7-20-18  MMT R L   Hip flexion 4-/5 4+/5   Hip abduction 4+/5 4/5   Hip extension 4/5 4/5   Glut max 3+/5 3+/5        Knee extension 5/5 5/5   Knee flexion 5/5 5/5           Muscle Strength 6-4-18  MMT R L   Hip flexion 4-/5 4+/5   Hip abduction 4-/5 3+/5   Hip extension 4-/5 4-/5   Glut max 3+/5 3+/5           Knee extension 5/5 5/5   Knee flexion 5/5 5/5          TREATMENT    Therapeutic exercise: Danuta received therapeutic exercises to develop strength, endurance, ROM, flexibility and core stabilization for 10 direct  minutes including:    Had pt do all stretches and then contract relax R glut and pt able to realign pelvis I  HS stretch supine   Glut stretch  Piriformis stretch  B QL stretch      Instructed pt to do QL stretch BID  Added child's pose x 5 daily  Pt tried trunk rotation, did not feel much stretch      SLR x 20  Pelvic tilt  Pt performs 5 reps holding 10 sec  "each  Bridge x 20  Hip add sidelying L x 10  Hip abd sidelying x 20 kept small plane R to avoid dysfunction  Arm and Leg lift prone x 10 with 1# arm and leg   Hip ext prone with bent knee x 10 with 1#   (NP) hold due to being able to do SLR Hip flexion sitting x 10 from elevated seat  Plank 4 reps 30 sec each   After planks, had to do sidelying mob to level pelvis    Sidelying L contract relax mob to L5-S1 region to level pelvis    Pt still able to self correct, but fatigue at end of ex    Hold all ex below for now  Trunk rotation supine x 10    Partial sit up x 20      Manual therapy: Danuta  received the following manual therapy  techniques x 15 min. to include soft tissue and joint mobilization were applied to the: low back and gluteals to include: Sidelying L contract relax mob P/A mob to lumbar region Grade 1-2  Contract relax QL sidelying    Vacuum/cupping STM with manual therapy techniques was performed to back and gluteals to decrease muscle tightness, increase circulation and promote healing process.  The pt's skin was monitored for redness adjusting pressure as needed. The pt was instructed in possible side effects of bruising and/or soreness.    STM to lumbar region, QL, piriformis,     Patient received pre-mod electrical stimulation to decrease muscle tightness and pain to Lumbar paraspinals/ sacral border of gluteals B for 15 minutes with MH supine with cycle time: continuous, beat frequency: , CC/CV: CV.     Kinesiotape with 6" star for pain relief was performed over the R lumbar paraspinals.  Instructed pt in use, care and precautions with tape.      Written Home Exercises Provided: none provided today  Pt demo good understanding of the education provided. Danuta demonstrated good return demonstration of activities.     Pt. education:  · Posture reeducation, body mechanics, HEP,   · No spiritual or educational barriers to learning provided  · Pt has no cultural, educational or language " barriers to learning provided.    Assessment   Pt continues with increased tightness with starting back to school and may benefit from additional stretching and KT tape.  Pt is able to self mobilize, but feels unable to tell for sure due to increased tightness.   Pt will continue to benefit from skilled outpatient physical therapy to address the remaining functional deficits, provide pt/family education, and to maximize pt's level of independence in the home and community environment. .     GOALS:   Short Term Goals:  3 weeks MET STG's  Increase range of motion 25%  Increase strength 1/2 muscle grade  Improve postural awareness of pelvis to independently identify dysfunction with min assist from PT  Be able to perform HEP with minimal cueing required     Long Term Goals: 6 weeks PARTIALLY MET LTG'S  Increase range of motion to 75% to 100% full   Improve muscle strength 1 muscle grade  Improve muscle strength with MMT to 4+/5 to 5/5  Improve and stabilize proper pelvic positioning  Restore ability to sit for ADL and work with min to 0 pain  Restore normal sleep habits without disturbances due to pain  Restore ability to perform ADL's and household activities independently with min to 0 pain    Anticipated barriers to physical therapy: none  Pt's spiritual, cultural and educational needs considered and pt agreeable to plan of care and goals        Plan   Continue with established plan of care towards PT goals.

## 2018-09-19 ENCOUNTER — HOSPITAL ENCOUNTER (OUTPATIENT)
Dept: RADIOLOGY | Facility: HOSPITAL | Age: 63
Discharge: HOME | End: 2018-09-19
Attending: INTERNAL MEDICINE
Payer: COMMERCIAL

## 2018-09-19 DIAGNOSIS — R91.1 LUNG NODULE: ICD-10-CM

## 2018-09-19 PROCEDURE — 71250 CT THORAX DX C-: CPT | Mod: TC

## 2018-09-19 PROCEDURE — 71250 CT THORAX DX C-: CPT | Mod: 26,,, | Performed by: RADIOLOGY

## 2018-09-28 ENCOUNTER — OFFICE VISIT (OUTPATIENT)
Dept: ALLERGY | Facility: CLINIC | Age: 63
End: 2018-09-28
Payer: COMMERCIAL

## 2018-09-28 VITALS — WEIGHT: 130 LBS | HEIGHT: 66 IN | HEART RATE: 58 BPM | BODY MASS INDEX: 20.89 KG/M2 | OXYGEN SATURATION: 99 %

## 2018-09-28 DIAGNOSIS — J31.0 CHRONIC RHINITIS: Primary | ICD-10-CM

## 2018-09-28 DIAGNOSIS — Z91.018 FOOD ALLERGY: ICD-10-CM

## 2018-09-28 DIAGNOSIS — J45.30 MILD PERSISTENT REACTIVE AIRWAY DISEASE WITHOUT COMPLICATION: ICD-10-CM

## 2018-09-28 PROCEDURE — 99999 PR PBB SHADOW E&M-EST. PATIENT-LVL III: CPT | Mod: PBBFAC,,, | Performed by: ALLERGY & IMMUNOLOGY

## 2018-09-28 PROCEDURE — 95004 PERQ TESTS W/ALRGNC XTRCS: CPT | Mod: S$GLB,,, | Performed by: ALLERGY & IMMUNOLOGY

## 2018-09-28 PROCEDURE — 99244 OFF/OP CNSLTJ NEW/EST MOD 40: CPT | Mod: 25,S$GLB,, | Performed by: ALLERGY & IMMUNOLOGY

## 2018-09-28 RX ORDER — AZELASTINE 1 MG/ML
2 SPRAY, METERED NASAL 2 TIMES DAILY PRN
Qty: 30 ML | Refills: 12 | Status: SHIPPED | OUTPATIENT
Start: 2018-09-28 | End: 2019-04-01

## 2018-09-28 NOTE — LETTER
September 28, 2018      Erica Khan MD  [address]  [place] [address]           [place] [facility]  [address] [facility]  [place] [address]  Phone: [phone]          Patient: Danuta Santos   MR Number: [id number]   YOB: 1955   Date of Visit: 9/28/2018       Dear Dr. Erica Khan:    Thank you for referring Danuta Santos to me for evaluation. Attached you will find relevant portions of my assessment and plan of care.    If you have questions, please do not hesitate to call me. I look forward to following Danuta Santos along with you.    Sincerely,    Gloria Self MD    Enclosure  CC:  No Recipients    If you would like to receive this communication electronically, please contact [email or web address] or [phone] to request more information on [facility] Link access.    For providers and/or their staff who would like to refer a patient to [facility], please contact us through our one-stop-shop provider referral line, [facility], at [phone].    If you feel you have received this communication in error or would no longer like to receive these types of communications, please e-mail [email or web address]

## 2018-09-28 NOTE — PROGRESS NOTES
Subjective:       Patient ID: Danuta Santos is a 63 y.o. female.    Chief Complaint:  Other (allergies, sinus issues. )      64 yo woman presents for consult from Dr Erica Khan for possible allergies. She sates she has had allergy issues lifelong. She saw Dr Nelson 30 years ago and had test and did shots. They helped at first then sopped working so stopped going. She seems like symptoms are getting little worse. She has food triggers - dairy causes very bad PND. Wheat and alcohol and vinegar cause congestion to pint cant breathe through nose. She also has congestion, pressure, PND, occ sneeze but no runny nose off and on. No itchy nose or eyes. She has this is around feathers. Then every February gets flare and ends up sick. This year was very sick in feb, had flu twice and ended up SOB and told RAD> she is still on breo 100 daily and albuterol neb daily. She is seeing pulmonary who is talking about weaning her off. She is also on montelukast daily. She has limited diet because had very sensitive stomach. No insect or latex allergy. She is worse in AM but if off diet worse at night. If she is down on sleep she feels more chest tightness and SOB. otherwise has no chest symptoms on Breo. She did have sinus surgery about 25 years ago to fix deviated septum from broken nose. She sees and ENT and recently started Xhance fluticasone spray. This seems to be helping.         Environmental History: see history section for home environment  Review of Systems   Constitutional: Negative for appetite change, chills, fatigue and fever.   HENT: Positive for congestion, postnasal drip and sinus pressure. Negative for ear discharge, ear pain, facial swelling, nosebleeds, rhinorrhea, sneezing, sore throat, trouble swallowing and voice change.    Eyes: Positive for discharge. Negative for redness, itching and visual disturbance.   Respiratory: Positive for cough and chest tightness. Negative for choking, shortness of breath and  wheezing.    Cardiovascular: Negative for chest pain, palpitations and leg swelling.   Gastrointestinal: Positive for abdominal distention, abdominal pain and nausea. Negative for constipation, diarrhea and vomiting.   Genitourinary: Negative for difficulty urinating.   Musculoskeletal: Negative for arthralgias, gait problem, joint swelling and myalgias.   Skin: Negative for color change and rash.   Neurological: Negative for dizziness, syncope, weakness, light-headedness and headaches.   Hematological: Negative for adenopathy. Does not bruise/bleed easily.   Psychiatric/Behavioral: Negative for agitation, behavioral problems, confusion and sleep disturbance. The patient is not nervous/anxious.         Objective:      Physical Exam   Constitutional: She is oriented to person, place, and time. She appears well-developed and well-nourished. No distress.   HENT:   Head: Normocephalic and atraumatic.   Right Ear: Hearing, tympanic membrane, external ear and ear canal normal.   Left Ear: Hearing, tympanic membrane, external ear and ear canal normal.   Nose: No mucosal edema, rhinorrhea, sinus tenderness or septal deviation. No epistaxis. Right sinus exhibits no maxillary sinus tenderness and no frontal sinus tenderness. Left sinus exhibits no maxillary sinus tenderness and no frontal sinus tenderness.   Mouth/Throat: Uvula is midline, oropharynx is clear and moist and mucous membranes are normal. No uvula swelling.   Eyes: Conjunctivae are normal. Right eye exhibits no discharge. Left eye exhibits no discharge.   Neck: Normal range of motion. No thyromegaly present.   Cardiovascular: Normal rate, regular rhythm and normal heart sounds.   No murmur heard.  Pulmonary/Chest: Effort normal and breath sounds normal. No respiratory distress. She has no wheezes.   Abdominal: Soft. She exhibits no distension. There is no tenderness.   Musculoskeletal: Normal range of motion. She exhibits no edema or tenderness.    Lymphadenopathy:     She has no cervical adenopathy.   Neurological: She is alert and oriented to person, place, and time.   Skin: Skin is warm and dry. No rash noted. No erythema.   Psychiatric: She has a normal mood and affect. Her behavior is normal. Judgment and thought content normal.   Nursing note and vitals reviewed.      Laboratory:   Percutaneous Skin Testing: prick skint est inhalants #60, 9/28/18: 3+ histamine and remainder negative, see flow sheet  Assessment:       1. Chronic rhinitis    2. Mild persistent reactive airway disease without complication    3. Food allergy         Plan:       1. Advised pt that no evidence of IgE mediated allergy to inhalants, more chronic non allergic rhinitis, will start on azelastine 2 SEN BID as needed  2. For asthma continue montelukast daily and Breo daily and f/u with pulmonary for management  3. Dr Khan notified of completed consult via [employer]

## 2018-10-02 ENCOUNTER — CLINICAL SUPPORT (OUTPATIENT)
Dept: REHABILITATION | Facility: HOSPITAL | Age: 63
End: 2018-10-02
Attending: INTERNAL MEDICINE
Payer: COMMERCIAL

## 2018-10-02 DIAGNOSIS — M54.41 CHRONIC BILATERAL LOW BACK PAIN WITH RIGHT-SIDED SCIATICA: Primary | ICD-10-CM

## 2018-10-02 DIAGNOSIS — G89.29 CHRONIC BILATERAL LOW BACK PAIN WITH RIGHT-SIDED SCIATICA: Primary | ICD-10-CM

## 2018-10-02 PROCEDURE — 97110 THERAPEUTIC EXERCISES: CPT | Mod: PN | Performed by: PHYSICAL THERAPIST

## 2018-10-02 PROCEDURE — 97140 MANUAL THERAPY 1/> REGIONS: CPT | Mod: PN | Performed by: PHYSICAL THERAPIST

## 2018-10-02 PROCEDURE — 97014 ELECTRIC STIMULATION THERAPY: CPT | Mod: PN | Performed by: PHYSICAL THERAPIST

## 2018-10-02 NOTE — PROGRESS NOTES
Physical Therapy Daily Note     Name: Danuta Santos  Clinic Number: [id number]  Diagnosis:   Encounter Diagnosis   Name Primary?   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   Pain Scale: before treatment: N/T after treatment feels better N/T  Objective     AR R   Unable to self mobilize    Muscle Strength 9-6-18  MMT R L   Hip flexion 4/5 5-/5   Hip abduction 5-/5 4+/5   Hip extension 4+/5 4+/5   Glut max 4-/5 4-/5        Knee extension 5/5 5/5   Knee flexion 5/5 5/5             Muscle Strength 7-20-18  MMT R L   Hip flexion 4-/5 4+/5   Hip abduction 4+/5 4/5   Hip extension 4/5 4/5   Glut max 3+/5 3+/5        Knee extension 5/5 5/5   Knee flexion 5/5 5/5           Muscle Strength 6-4-18  MMT R L   Hip flexion 4-/5 4+/5   Hip abduction 4-/5 3+/5   Hip extension 4-/5 4-/5   Glut max 3+/5 3+/5           Knee extension 5/5 5/5   Knee flexion 5/5 5/5          TREATMENT    Therapeutic exercise: Danuta received therapeutic exercises to develop strength, endurance, ROM, flexibility and core stabilization for 10 direct  minutes including:    Pt unable to self mobilize.  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            HS stretch supine   Glut stretch  Piriformis stretch  B QL stretch  Continue with stretching for now  Held  "strengthening  SLR x 20  Pelvic tilt  Pt performs 5 reps holding 10 sec each  Bridge x 20  Hip add sidelying L x 10  Hip abd sidelying x 20 kept small plane R to avoid dysfunction  Arm and Leg lift prone x 10 with 1# arm and leg   Hip ext prone with bent knee x 10 with 1#   (NP) hold due to being able to do SLR Hip flexion sitting x 10 from elevated seat  Plank 4 reps 30 sec each   After planks, had to do sidelying mob to level pelvis    Sidelying L contract relax mob to L5-S1 region to level pelvis    Pt still able to self correct, but fatigue at end of ex    Hold all ex below for now    Partial sit up x 20      Manual therapy: Danuta  received the following manual therapy  techniques x 15 min. to include soft tissue and joint mobilization were applied to the: low back and gluteals to include: Sidelying L contract relax mob P/A mob to lumbar region Grade 1-2  Contract relax QL sidelying     Vacuum/cupping STM with manual therapy techniques was performed to back and gluteals to decrease muscle tightness, increase circulation and promote healing process.  The pt's skin was monitored for redness adjusting pressure as needed. The pt was instructed in possible side effects of bruising and/or soreness.    STM to lumbar region, QL, piriformis,       Kinesiotape with 6" star for pain relief was performed over the R lumbar paraspinals.  Instructed pt in use, care and precautions with tape.      Patient received pre-mod electrical stimulation to decrease muscle tightness and pain to Lumbar paraspinals/ sacral border of gluteals B for 15 minutes with MH supine with cycle time: continuous, beat frequency: , CC/CV: CV.       Written Home Exercises Provided: none provided today  Pt demo good understanding of the education provided. Danuta demonstrated good return demonstration of activities.     Pt. education:  · Posture reeducation, body mechanics, HEP,   · No spiritual or educational barriers to learning " provided  · Pt has no cultural, educational or language barriers to learning provided.    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  Pt understands need to avoid this position.  Pt was unable to self mobilize due to pain and severity of dysfunction  Pt with improved symptoms after treatment and level pelvis   Pt will continue to benefit from skilled outpatient physical therapy to address the remaining functional deficits, provide pt/family education, and to maximize pt's level of independence in the home and community environment. .     GOALS:   Short Term Goals:  3 weeks MET STG's  Increase range of motion 25%  Increase strength 1/2 muscle grade  Improve postural awareness of pelvis to independently identify dysfunction with min assist from PT  Be able to perform HEP with minimal cueing required     Long Term Goals: 6 weeks PARTIALLY MET LTG'S  Increase range of motion to 75% to 100% full   Improve muscle strength 1 muscle grade  Improve muscle strength with MMT to 4+/5 to 5/5  Improve and stabilize proper pelvic positioning  Restore ability to sit for ADL and work with min to 0 pain  Restore normal sleep habits without disturbances due to pain  Restore ability to perform ADL's and household activities independently with min to 0 pain    Anticipated barriers to physical therapy: none  Pt's spiritual, cultural and educational needs considered and pt agreeable to plan of care and goals        Plan   Continue with established plan of care towards PT goals.

## 2018-10-16 ENCOUNTER — CLINICAL SUPPORT (OUTPATIENT)
Dept: REHABILITATION | Facility: HOSPITAL | Age: 63
End: 2018-10-16
Attending: INTERNAL MEDICINE
Payer: COMMERCIAL

## 2018-10-16 DIAGNOSIS — M54.41 CHRONIC BILATERAL LOW BACK PAIN WITH RIGHT-SIDED SCIATICA: Primary | ICD-10-CM

## 2018-10-16 DIAGNOSIS — G89.29 CHRONIC BILATERAL LOW BACK PAIN WITH RIGHT-SIDED SCIATICA: Primary | ICD-10-CM

## 2018-10-16 PROCEDURE — 97014 ELECTRIC STIMULATION THERAPY: CPT | Mod: PN | Performed by: PHYSICAL THERAPIST

## 2018-10-16 PROCEDURE — 97110 THERAPEUTIC EXERCISES: CPT | Mod: PN | Performed by: PHYSICAL THERAPIST

## 2018-10-16 PROCEDURE — 97140 MANUAL THERAPY 1/> REGIONS: CPT | Mod: PN | Performed by: PHYSICAL THERAPIST

## 2018-10-16 NOTE — PLAN OF CARE
Physical Therapy Progress Note     Name: Danuta Santos  Clinic Number: [id number]  Diagnosis:   Encounter Diagnosis   Name Primary?    Chronic bilateral low back pain with right-sided sciatica Yes     Physician: Erica Khan MD  Treatment Orders: PT Eval and Treat  Past Medical History:   Diagnosis Date    IBS (irritable bowel syndrome)        Precautions: as per diagnosis    Evaluation date: 2/15/2018  Visit # authorized: 25/40 on 10/16/2018  Authorization period: 12-31-18  Plan of care expiration: 11-27-18  MD Follow up appt: none scheduled    Subjective     Pt reports: began with increased pain this weekend.  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  Pt states she thought she  unable to keep pelvis level  Pt states she is going out of town this weekend  Pain Scale: before treatment: 3  At worst 5-6 after treatment N/T  Objective     AR R  Able to self mobilize     Muscle Strength 10-16-18  MMT R L   Hip flexion 4+/5 5/5   Hip abduction 5/5 5-/5   Hip extension 5/5 5/5   Glut max 4-/5 4-/5        Knee extension 5/5 5/5   Knee flexion 5/5 5/5         Muscle Strength 9-6-18  MMT R L   Hip flexion 4/5 5-/5   Hip abduction 5-/5 4+/5   Hip extension 4+/5 4+/5   Glut max 4-/5 4-/5        Knee extension 5/5 5/5   Knee flexion 5/5 5/5             Muscle Strength 7-20-18  MMT R L   Hip flexion 4-/5 4+/5   Hip abduction 4+/5 4/5   Hip extension 4/5 4/5   Glut max 3+/5 3+/5        Knee extension 5/5 5/5   Knee flexion 5/5 5/5           Muscle Strength 6-4-18  MMT R L   Hip flexion 4-/5 4+/5   Hip abduction 4-/5 3+/5   Hip extension 4-/5 4-/5   Glut max 3+/5 3+/5           Knee extension 5/5 5/5   Knee flexion 5/5 5/5          TREATMENT    Therapeutic exercise: Danuta received therapeutic exercises to develop strength, endurance, ROM, flexibility and core stabilization for 10 direct  minutes including:  Pt able to self mobilize after stretching, reiterated stretching QL BID.  Instructed pt to add partial sit  "up and verbally reviewed current program    HS stretch supine   Glut stretch  Piriformis stretch  B QL stretch  Continue with stretching for now    SLR x 20  Pelvic tilt  Pt performs 5 reps holding 10 sec each  Bridge x 20   Partial sit up x 20    Hip abd sidelying x 20 kept small plane R to avoid dysfunction  Arm and Leg lift prone x 10 with 1# arm and leg   Hip ext prone with bent knee x 10 with 1#     Plank 4 reps 30 sec each   After planks, had to do sidelying mob to level pelvis    Sidelying L contract relax mob to L5-S1 region to level pelvis          Manual therapy: Danuta  received the following manual therapy  techniques x 15 min. to include soft tissue and joint mobilization were applied to the: low back and gluteals to include: Sidelying L contract relax mob P/A mob to lumbar region Grade 1-2  Contract relax QL sidelying     Vacuum/cupping STM with manual therapy techniques was performed to back and gluteals to decrease muscle tightness, increase circulation and promote healing process.  The pt's skin was monitored for redness adjusting pressure as needed. The pt was instructed in possible side effects of bruising and/or soreness.    STM to lumbar region, QL, piriformis,       (NP)Kinesiotape with 6" star for pain relief was performed over the R lumbar paraspinals.  Instructed pt in use, care and precautions with tape.      Patient received pre-mod electrical stimulation to decrease muscle tightness and pain to Lumbar paraspinals/ sacral border of gluteals B for 15 minutes with MH supine with cycle time: continuous, beat frequency: , CC/CV: CV.       Written Home Exercises Provided: none provided today  Pt demo good understanding of the education provided. Danuta demonstrated good return demonstration of activities.     Pt. education:  · Posture reeducation, body mechanics, HEP,   · No spiritual or educational barriers to learning provided  · Pt has no cultural, educational or language " barriers to learning provided.    Assessment   Pt is slowly progressing with strengthening though needs more glut max strengthening and understands to prioritize these ex and is improving in ability to self mobilize   Pt will continue to benefit from skilled outpatient physical therapy to address the remaining functional deficits, provide pt/family education, and to maximize pt's level of independence in the home and community environment. .     GOALS:   Short Term Goals:  3 weeks MET STG's  Increase range of motion 25%  Increase strength 1/2 muscle grade  Improve postural awareness of pelvis to independently identify dysfunction with min assist from PT  Be able to perform HEP with minimal cueing required     Long Term Goals: 6 weeks PARTIALLY MET LTG'S  Increase range of motion to 75% to 100% full   Improve muscle strength 1 muscle grade  Improve muscle strength with MMT to 4+/5 to 5/5  Improve and stabilize proper pelvic positioning  Restore ability to sit for ADL and work with min to 0 pain  Restore normal sleep habits without disturbances due to pain  Restore ability to perform ADL's and household activities independently with min to 0 pain    Anticipated barriers to physical therapy: none  Pt's spiritual, cultural and educational needs considered and pt agreeable to plan of care and goals        Plan   If you concur, I recommend patient continue with physical therapy 1-2 times a week  for 4-6 weeks.  Please advise us of your  recommendations. Thank you for allowing us to assist in the care of your patient.      Jacy Escobar, MS, PT          I certify the need for these services furnished under this plan of treatment and while under my care.    ____________________________________ Physician/Referring Practitioner                                Date of Signature

## 2018-10-16 NOTE — PROGRESS NOTES
Physical Therapy Progress Note     Name: Danuta Santos  Clinic Number: [id number]  Diagnosis:   Encounter Diagnosis   Name Primary?    Chronic bilateral low back pain with right-sided sciatica Yes     Physician: Erica Khan MD  Treatment Orders: PT Eval and Treat  Past Medical History:   Diagnosis Date    IBS (irritable bowel syndrome)        Precautions: as per diagnosis    Evaluation date: 2/15/2018  Visit # authorized: 25/40 on 10/16/2018  Authorization period: 12-31-18  Plan of care expiration: 11-27-18  MD Follow up appt: none scheduled    Subjective     Pt reports: began with increased pain this weekend.  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  Pt states she thought she  unable to keep pelvis level  Pt states she is going out of town this weekend  Pain Scale: before treatment: 3  At worst 5-6 after treatment N/T  Objective     AR R  Able to self mobilize     Muscle Strength 10-16-18  MMT R L   Hip flexion 4+/5 5/5   Hip abduction 5/5 5-/5   Hip extension 5/5 5/5   Glut max 4-/5 4-/5        Knee extension 5/5 5/5   Knee flexion 5/5 5/5         Muscle Strength 9-6-18  MMT R L   Hip flexion 4/5 5-/5   Hip abduction 5-/5 4+/5   Hip extension 4+/5 4+/5   Glut max 4-/5 4-/5        Knee extension 5/5 5/5   Knee flexion 5/5 5/5             Muscle Strength 7-20-18  MMT R L   Hip flexion 4-/5 4+/5   Hip abduction 4+/5 4/5   Hip extension 4/5 4/5   Glut max 3+/5 3+/5        Knee extension 5/5 5/5   Knee flexion 5/5 5/5           Muscle Strength 6-4-18  MMT R L   Hip flexion 4-/5 4+/5   Hip abduction 4-/5 3+/5   Hip extension 4-/5 4-/5   Glut max 3+/5 3+/5           Knee extension 5/5 5/5   Knee flexion 5/5 5/5          TREATMENT    Therapeutic exercise: Danuta received therapeutic exercises to develop strength, endurance, ROM, flexibility and core stabilization for 10 direct  minutes including:  Pt able to self mobilize after stretching, reiterated stretching QL BID.  Instructed pt to add partial sit  "up and verbally reviewed current program    HS stretch supine   Glut stretch  Piriformis stretch  B QL stretch  Continue with stretching for now    SLR x 20  Pelvic tilt  Pt performs 5 reps holding 10 sec each  Bridge x 20   Partial sit up x 20    Hip abd sidelying x 20 kept small plane R to avoid dysfunction  Arm and Leg lift prone x 10 with 1# arm and leg   Hip ext prone with bent knee x 10 with 1#     Plank 4 reps 30 sec each   After planks, had to do sidelying mob to level pelvis    Sidelying L contract relax mob to L5-S1 region to level pelvis          Manual therapy: Danuta  received the following manual therapy  techniques x 15 min. to include soft tissue and joint mobilization were applied to the: low back and gluteals to include: Sidelying L contract relax mob P/A mob to lumbar region Grade 1-2  Contract relax QL sidelying     Vacuum/cupping STM with manual therapy techniques was performed to back and gluteals to decrease muscle tightness, increase circulation and promote healing process.  The pt's skin was monitored for redness adjusting pressure as needed. The pt was instructed in possible side effects of bruising and/or soreness.    STM to lumbar region, QL, piriformis,       (NP)Kinesiotape with 6" star for pain relief was performed over the R lumbar paraspinals.  Instructed pt in use, care and precautions with tape.      Patient received pre-mod electrical stimulation to decrease muscle tightness and pain to Lumbar paraspinals/ sacral border of gluteals B for 15 minutes with MH supine with cycle time: continuous, beat frequency: , CC/CV: CV.       Written Home Exercises Provided: none provided today  Pt demo good understanding of the education provided. Danuta demonstrated good return demonstration of activities.     Pt. education:  · Posture reeducation, body mechanics, HEP,   · No spiritual or educational barriers to learning provided  · Pt has no cultural, educational or language " barriers to learning provided.    Assessment   Pt is slowly progressing with strengthening though needs more glut max strengthening and understands to prioritize these ex and is improving in ability to self mobilize   Pt will continue to benefit from skilled outpatient physical therapy to address the remaining functional deficits, provide pt/family education, and to maximize pt's level of independence in the home and community environment. .     GOALS:   Short Term Goals:  3 weeks MET STG's  Increase range of motion 25%  Increase strength 1/2 muscle grade  Improve postural awareness of pelvis to independently identify dysfunction with min assist from PT  Be able to perform HEP with minimal cueing required     Long Term Goals: 6 weeks PARTIALLY MET LTG'S  Increase range of motion to 75% to 100% full   Improve muscle strength 1 muscle grade  Improve muscle strength with MMT to 4+/5 to 5/5  Improve and stabilize proper pelvic positioning  Restore ability to sit for ADL and work with min to 0 pain  Restore normal sleep habits without disturbances due to pain  Restore ability to perform ADL's and household activities independently with min to 0 pain    Anticipated barriers to physical therapy: none  Pt's spiritual, cultural and educational needs considered and pt agreeable to plan of care and goals        Plan   If you concur, I recommend patient continue with physical therapy 1-2 times a week  for 4-6 weeks.  Please advise us of your  recommendations. Thank you for allowing us to assist in the care of your patient.      Jacy Escobar, MS, PT          I certify the need for these services furnished under this plan of treatment and while under my care.    ____________________________________ Physician/Referring Practitioner                                Date of Signature

## 2018-10-18 RX ORDER — ALBUTEROL SULFATE 90 UG/1
AEROSOL, METERED RESPIRATORY (INHALATION)
Qty: 8.5 G | Refills: 6 | Status: SHIPPED | OUTPATIENT
Start: 2018-10-18 | End: 2019-08-12

## 2018-10-24 ENCOUNTER — CLINICAL SUPPORT (OUTPATIENT)
Dept: REHABILITATION | Facility: HOSPITAL | Age: 63
End: 2018-10-24
Attending: INTERNAL MEDICINE
Payer: COMMERCIAL

## 2018-10-24 DIAGNOSIS — M54.41 CHRONIC BILATERAL LOW BACK PAIN WITH RIGHT-SIDED SCIATICA: Primary | ICD-10-CM

## 2018-10-24 DIAGNOSIS — G89.29 CHRONIC BILATERAL LOW BACK PAIN WITH RIGHT-SIDED SCIATICA: Primary | ICD-10-CM

## 2018-10-24 PROCEDURE — 97014 ELECTRIC STIMULATION THERAPY: CPT | Mod: PN | Performed by: PHYSICAL THERAPIST

## 2018-10-24 PROCEDURE — 97140 MANUAL THERAPY 1/> REGIONS: CPT | Mod: PN | Performed by: PHYSICAL THERAPIST

## 2018-10-24 PROCEDURE — 97110 THERAPEUTIC EXERCISES: CPT | Mod: PN | Performed by: PHYSICAL THERAPIST

## 2018-10-24 NOTE — PROGRESS NOTES
Physical Therapy Progress Note     Name: Danuta Santos  Clinic Number: [id number]  Diagnosis:   Encounter Diagnosis   Name Primary?    Chronic bilateral low back pain with right-sided sciatica Yes     Physician: Erica Khan MD  Treatment Orders: PT Eval and Treat  Past Medical History:   Diagnosis Date    IBS (irritable bowel syndrome)        Precautions: as per diagnosis    Evaluation date: 2/15/2018  Visit # authorized: 26/40 on 10/24/2018  Authorization period: 12-31-18  Plan of care expiration: 11-27-18  MD Follow up appt: none scheduled    Subjective     Pt reports:travelled this past weekend and not much sleep, back was ok while out of town, but when got back noticed increased pain  Pt states she is having more leg pain and feeling tightness in back and did full routine and self modalities,but still pain.  Pain Scale: before treatment: 6-7 with sitting. Standing is better,  after treatment 4-5  Objective     AR R  Unable to self mobilize, severe dysfunction    Muscle Strength 10-16-18  MMT R L   Hip flexion 4+/5 5/5   Hip abduction 5/5 5-/5   Hip extension 5/5 5/5   Glut max 4-/5 4-/5        Knee extension 5/5 5/5   Knee flexion 5/5 5/5         Muscle Strength 9-6-18  MMT R L   Hip flexion 4/5 5-/5   Hip abduction 5-/5 4+/5   Hip extension 4+/5 4+/5   Glut max 4-/5 4-/5        Knee extension 5/5 5/5   Knee flexion 5/5 5/5             Muscle Strength 7-20-18  MMT R L   Hip flexion 4-/5 4+/5   Hip abduction 4+/5 4/5   Hip extension 4/5 4/5   Glut max 3+/5 3+/5        Knee extension 5/5 5/5   Knee flexion 5/5 5/5           Muscle Strength 6-4-18  MMT R L   Hip flexion 4-/5 4+/5   Hip abduction 4-/5 3+/5   Hip extension 4-/5 4-/5   Glut max 3+/5 3+/5           Knee extension 5/5 5/5   Knee flexion 5/5 5/5          TREATMENT    Therapeutic exercise: Danuta received therapeutic exercises to develop strength, endurance, ROM, flexibility and core stabilization for 15 direct  minutes including:  After  "review of out of town activities, lifting a moderately heavy suitcase may have led to increased dysfunction and muscle tightness.  Instructed pt to put items into 2 suitcases instead of one for next trip  Reviewed pt program at home and she was not successful.  Performed sidelying mob x 2 and realigned.  Instructed pt to resume strengthening as tolerated and continue with stretches      HS stretch supine   Glut stretch  Piriformis stretch  B QL stretch  Continue with stretching for now    SLR x 20  Pelvic tilt  Pt performs 5 reps holding 10 sec each  Bridge x 20   Partial sit up x 20    Hip abd sidelying x 20 kept small plane R to avoid dysfunction  Arm and Leg lift prone x 10 with 1# arm and leg   Hip ext prone with bent knee x 10 with 1#     Plank 4 reps 30 sec each   After planks, had to do sidelying mob to level pelvis    Sidelying L contract relax mob to L5-S1 region to level pelvis          Manual therapy: Danuta  received the following manual therapy  techniques x 15 min. to include soft tissue and joint mobilization were applied to the: low back and gluteals to include: Sidelying L contract relax mob P/A mob to lumbar region Grade 1-2  Contract relax QL sidelying     Vacuum/cupping STM with manual therapy techniques was performed to back and gluteals to decrease muscle tightness, increase circulation and promote healing process.  The pt's skin was monitored for redness adjusting pressure as needed. The pt was instructed in possible side effects of bruising and/or soreness.    STM to lumbar region, QL, piriformis,       Kinesiotape with 6" star for pain relief was performed over the R lumbar paraspinals.  Instructed pt in use, care and precautions with tape.      Patient received pre-mod electrical stimulation to decrease muscle tightness and pain to Lumbar paraspinals/ sacral border of gluteals B for 10 minutes with MH supine with cycle time: continuous, beat frequency: , CC/CV: CV.  Pt on personal " time constraints so did 10 min today      Written Home Exercises Provided: none provided today  Pt demo good understanding of the education provided. Danuta demonstrated good return demonstration of activities.     Pt. education:  · Posture reeducation, body mechanics, HEP,   · No spiritual or educational barriers to learning provided  · Pt has no cultural, educational or language barriers to learning provided.    Assessment   Pt had gone out of town and lifting a suitcase that may have been too heavy for her may have led to increased dysfunction and pain   Pt will continue to benefit from skilled outpatient physical therapy to address the remaining functional deficits, provide pt/family education, and to maximize pt's level of independence in the home and community environment. .     GOALS:   Short Term Goals:  3 weeks MET STG's  Increase range of motion 25%  Increase strength 1/2 muscle grade  Improve postural awareness of pelvis to independently identify dysfunction with min assist from PT  Be able to perform HEP with minimal cueing required     Long Term Goals: 6 weeks PARTIALLY MET LTG'S  Increase range of motion to 75% to 100% full   Improve muscle strength 1 muscle grade  Improve muscle strength with MMT to 4+/5 to 5/5  Improve and stabilize proper pelvic positioning  Restore ability to sit for ADL and work with min to 0 pain  Restore normal sleep habits without disturbances due to pain  Restore ability to perform ADL's and household activities independently with min to 0 pain    Anticipated barriers to physical therapy: none  Pt's spiritual, cultural and educational needs considered and pt agreeable to plan of care and goals        Plan   Continue with established plan of care towards PT goals.

## 2018-10-30 ENCOUNTER — CLINICAL SUPPORT (OUTPATIENT)
Dept: REHABILITATION | Facility: HOSPITAL | Age: 63
End: 2018-10-30
Attending: INTERNAL MEDICINE
Payer: COMMERCIAL

## 2018-10-30 DIAGNOSIS — G89.29 CHRONIC BILATERAL LOW BACK PAIN WITH RIGHT-SIDED SCIATICA: Primary | ICD-10-CM

## 2018-10-30 DIAGNOSIS — M54.41 CHRONIC BILATERAL LOW BACK PAIN WITH RIGHT-SIDED SCIATICA: Primary | ICD-10-CM

## 2018-10-30 PROCEDURE — 97110 THERAPEUTIC EXERCISES: CPT | Mod: PN | Performed by: PHYSICAL THERAPIST

## 2018-10-30 NOTE — PROGRESS NOTES
Physical Therapy Daily Note     Name: Danuta Santos  Clinic Number: [id number]  Diagnosis:   Encounter Diagnosis   Name Primary?    Chronic bilateral low back pain with right-sided sciatica Yes     Physician: Erica Khan MD  Treatment Orders: PT Eval and Treat  Past Medical History:   Diagnosis Date    IBS (irritable bowel syndrome)        Precautions: as per diagnosis    Evaluation date: 2/15/2018  Visit # authorized: 27/40 on 10/30/2018  Authorization period: 12-31-18  Plan of care expiration: 11-27-18  MD Follow up appt: none scheduled    Subjective     Pt reports: she was not having too much pain today and feels the tape helped.  Pt states she is also getting cramping in her L foot during the night  Pain Scale: before treatment: 2.5 with sitting. Standing is better,  after treatment N/T  Objective     Level to start Pt thinks she got pelvis in this AM for yesterday had some pain    Muscle Strength 10-16-18  MMT R L   Hip flexion 4+/5 5/5   Hip abduction 5/5 5-/5   Hip extension 5/5 5/5   Glut max 4-/5 4-/5        Knee extension 5/5 5/5   Knee flexion 5/5 5/5         Muscle Strength 9-6-18  MMT R L   Hip flexion 4/5 5-/5   Hip abduction 5-/5 4+/5   Hip extension 4+/5 4+/5   Glut max 4-/5 4-/5        Knee extension 5/5 5/5   Knee flexion 5/5 5/5             Muscle Strength 7-20-18  MMT R L   Hip flexion 4-/5 4+/5   Hip abduction 4+/5 4/5   Hip extension 4/5 4/5   Glut max 3+/5 3+/5        Knee extension 5/5 5/5   Knee flexion 5/5 5/5           Muscle Strength 6-4-18  MMT R L   Hip flexion 4-/5 4+/5   Hip abduction 4-/5 3+/5   Hip extension 4-/5 4-/5   Glut max 3+/5 3+/5           Knee extension 5/5 5/5   Knee flexion 5/5 5/5          TREATMENT    Therapeutic exercise: Danuta received therapeutic exercises to develop strength, endurance, ROM, flexibility and core stabilization for 15 direct  minutes including:  Discussed cramping wht pt and she may be dehydrated and will drink more    Pt able to self  "mobilize and will go through ex and see if she is able to continue to self mobilize with ex.      HS stretch supine   Glut stretch  Piriformis stretch  B QL stretch  Continue with stretching for now    SLR x 20  Pelvic tilt  Pt performs 5 reps holding 10 sec each  Bridge x 20   Partial sit up x 20    Hip abd sidelying x 20 kept small plane R to avoid dysfunction  Arm and Leg lift prone x 10 with 1# arm and leg   Hip ext prone with bent knee x 10 with 1#     Plank 4 reps 30 sec each   After planks, had to do sidelying mob to level pelvis    Sidelying L contract relax mob to L5-S1 region to level pelvis    Kinesiotape with 6" star for pain relief was performed over the R lumbar paraspinals.  Instructed pt in use, care and precautions with tape.           (NP)Manual therapy: Danuta  received the following manual therapy  techniques x 0 min. to include soft tissue and joint mobilization were applied to the: low back and gluteals to include: Sidelying L contract relax mob P/A mob to lumbar region Grade 1-2  Contract relax QL sidelying     Vacuum/cupping STM with manual therapy techniques was performed to back and gluteals to decrease muscle tightness, increase circulation and promote healing process.  The pt's skin was monitored for redness adjusting pressure as needed. The pt was instructed in possible side effects of bruising and/or soreness.    STM to lumbar region, QL, piriformis,            (NP) Patient received pre-mod electrical stimulation to decrease muscle tightness and pain to Lumbar paraspinals/ sacral border of gluteals B for 0 minutes with MH supine with cycle time: continuous, beat frequency: , CC/CV: CV.  Pt on personal time constraints so did 10 min today      Written Home Exercises Provided: none provided today  Pt demo good understanding of the education provided. Danuta demonstrated good return demonstration of activities.     Pt. education:  · Posture reeducation, body mechanics, HEP, "   · No spiritual or educational barriers to learning provided  · Pt has no cultural, educational or language barriers to learning provided.    Assessment   Pt with improved symptoms and able to self mobilize.  Pt retaped to help further decrease pain and muscle tightness      Pt will continue to benefit from skilled outpatient physical therapy to address the remaining functional deficits, provide pt/family education, and to maximize pt's level of independence in the home and community environment. .     GOALS:   Short Term Goals:  3 weeks MET STG's  Increase range of motion 25%  Increase strength 1/2 muscle grade  Improve postural awareness of pelvis to independently identify dysfunction with min assist from PT  Be able to perform HEP with minimal cueing required     Long Term Goals: 6 weeks PARTIALLY MET LTG'S  Increase range of motion to 75% to 100% full   Improve muscle strength 1 muscle grade  Improve muscle strength with MMT to 4+/5 to 5/5  Improve and stabilize proper pelvic positioning  Restore ability to sit for ADL and work with min to 0 pain  Restore normal sleep habits without disturbances due to pain  Restore ability to perform ADL's and household activities independently with min to 0 pain    Anticipated barriers to physical therapy: none  Pt's spiritual, cultural and educational needs considered and pt agreeable to plan of care and goals        Plan   Continue with established plan of care towards PT goals.

## 2018-11-08 ENCOUNTER — CLINICAL SUPPORT (OUTPATIENT)
Dept: REHABILITATION | Facility: HOSPITAL | Age: 63
End: 2018-11-08
Attending: INTERNAL MEDICINE
Payer: COMMERCIAL

## 2018-11-08 DIAGNOSIS — G89.29 CHRONIC BILATERAL LOW BACK PAIN WITH RIGHT-SIDED SCIATICA: Primary | ICD-10-CM

## 2018-11-08 DIAGNOSIS — M54.41 CHRONIC BILATERAL LOW BACK PAIN WITH RIGHT-SIDED SCIATICA: Primary | ICD-10-CM

## 2018-11-08 PROCEDURE — 97140 MANUAL THERAPY 1/> REGIONS: CPT | Mod: PN | Performed by: PHYSICAL THERAPIST

## 2018-11-08 PROCEDURE — 97110 THERAPEUTIC EXERCISES: CPT | Mod: PN | Performed by: PHYSICAL THERAPIST

## 2018-11-08 NOTE — PROGRESS NOTES
Physical Therapy Daily Note     Name: Danuta Santos  Clinic Number: [id number]  Diagnosis:   Encounter Diagnosis   Name Primary?    Chronic bilateral low back pain with right-sided sciatica Yes     Physician: Erica Khan MD  Treatment Orders: PT Eval and Treat  Past Medical History:   Diagnosis Date    IBS (irritable bowel syndrome)        Precautions: as per diagnosis    Evaluation date: 2/15/2018  Visit # authorized: 27/40 on 11/8/2018  Authorization period: 12-31-18  Plan of care expiration: 11-27-18  MD Follow up appt: none scheduled    Time In:  3:50  Time Out:  4:42  Billable time:  25 min    Subjective     Pt reports: was doing ok recently and unable to self mobilize  Pain Scale: before treatment: 2.5 with sitting. Standing is better,  after treatment N/T  Objective     AR R       Muscle Strength 10-16-18  MMT R L   Hip flexion 4+/5 5/5   Hip abduction 5/5 5-/5   Hip extension 5/5 5/5   Glut max 4-/5 4-/5        Knee extension 5/5 5/5   Knee flexion 5/5 5/5         Muscle Strength 9-6-18  MMT R L   Hip flexion 4/5 5-/5   Hip abduction 5-/5 4+/5   Hip extension 4+/5 4+/5   Glut max 4-/5 4-/5        Knee extension 5/5 5/5   Knee flexion 5/5 5/5             Muscle Strength 7-20-18  MMT R L   Hip flexion 4-/5 4+/5   Hip abduction 4+/5 4/5   Hip extension 4/5 4/5   Glut max 3+/5 3+/5        Knee extension 5/5 5/5   Knee flexion 5/5 5/5           Muscle Strength 6-4-18  MMT R L   Hip flexion 4-/5 4+/5   Hip abduction 4-/5 3+/5   Hip extension 4-/5 4-/5   Glut max 3+/5 3+/5           Knee extension 5/5 5/5   Knee flexion 5/5 5/5          TREATMENT    Therapeutic exercise: Danuta received therapeutic exercises to develop strength, endurance, ROM, flexibility and core stabilization for 10 direct  minutes including:      Pt able to self mobilize and then after ex unable to self mobilize      HS stretch supine   Glut stretch  Piriformis stretch  B QL stretch  Continue with stretching for now      Instructed  "pt to keep strengthening to 10 for now  Pelvic tilt  Pt performs 5 reps holding 10 sec each  Bridge x 20    Arm and Leg lift prone x 10 with 1# arm and leg   Hip ext prone with bent knee x 10 with 1#     Plank 4 reps 30 sec each   After planks, had to do sidelying mob to level pelvis    Sidelying L contract relax mob to L5-S1 region to level pelvis    HOLD FOR NOW  SLR x 20  Partial sit up x 20  Hip abd sidelying x 20 kept small plane R to avoid dysfunction    Kinesiotape with 6" star for pain relief was performed over the R lumbar paraspinals.  Instructed pt in use, care and precautions with tape.           Manual therapy: Danuta  received the following manual therapy  techniques x 15 min. to include soft tissue and joint mobilization were applied to the: low back and gluteals to include: Sidelying L contract relax mob P/A mob to lumbar region Grade 1-2  Contract relax QL sidelying     Vacuum/cupping STM with manual therapy techniques was performed to back and gluteals to decrease muscle tightness, increase circulation and promote healing process.  The pt's skin was monitored for redness adjusting pressure as needed. The pt was instructed in possible side effects of bruising and/or soreness.    STM to lumbar region, QL, piriformis,            (NP) Patient received pre-mod electrical stimulation to decrease muscle tightness and pain to Lumbar paraspinals/ sacral border of gluteals B for 0 minutes with MH supine with cycle time: continuous, beat frequency: , CC/CV: CV.  Pt on personal time constraints so held today      Written Home Exercises Provided: none provided today  Pt demo good understanding of the education provided. Danuta demonstrated good return demonstration of activities.     Pt. education:  · Posture reeducation, body mechanics, HEP,   · No spiritual or educational barriers to learning provided  · Pt has no cultural, educational or language barriers to learning provided.    Assessment   Pt " with increased inability to self mobilize this week so may need to modify HEP again after irritation to back after plane trip.  Pt with level pelvis at end of treatment      Pt will continue to benefit from skilled outpatient physical therapy to address the remaining functional deficits, provide pt/family education, and to maximize pt's level of independence in the home and community environment. .     GOALS:   Short Term Goals:  3 weeks MET STG's  Increase range of motion 25%  Increase strength 1/2 muscle grade  Improve postural awareness of pelvis to independently identify dysfunction with min assist from PT  Be able to perform HEP with minimal cueing required     Long Term Goals: 6 weeks PARTIALLY MET LTG'S  Increase range of motion to 75% to 100% full   Improve muscle strength 1 muscle grade  Improve muscle strength with MMT to 4+/5 to 5/5  Improve and stabilize proper pelvic positioning  Restore ability to sit for ADL and work with min to 0 pain  Restore normal sleep habits without disturbances due to pain  Restore ability to perform ADL's and household activities independently with min to 0 pain    Anticipated barriers to physical therapy: none  Pt's spiritual, cultural and educational needs considered and pt agreeable to plan of care and goals        Plan   Continue with established plan of care towards PT goals.

## 2018-11-15 ENCOUNTER — CLINICAL SUPPORT (OUTPATIENT)
Dept: REHABILITATION | Facility: HOSPITAL | Age: 63
End: 2018-11-15
Attending: INTERNAL MEDICINE
Payer: COMMERCIAL

## 2018-11-15 DIAGNOSIS — G89.29 CHRONIC BILATERAL LOW BACK PAIN WITH RIGHT-SIDED SCIATICA: Primary | ICD-10-CM

## 2018-11-15 DIAGNOSIS — M54.41 CHRONIC BILATERAL LOW BACK PAIN WITH RIGHT-SIDED SCIATICA: Primary | ICD-10-CM

## 2018-11-15 PROCEDURE — 97014 ELECTRIC STIMULATION THERAPY: CPT | Mod: PN | Performed by: PHYSICAL THERAPIST

## 2018-11-15 PROCEDURE — 97110 THERAPEUTIC EXERCISES: CPT | Mod: PN | Performed by: PHYSICAL THERAPIST

## 2018-11-15 NOTE — PROGRESS NOTES
Physical Therapy Daily Note     Name: Danuta Santos  Clinic Number: [id number]  Diagnosis:   Encounter Diagnosis   Name Primary?    Chronic bilateral low back pain with right-sided sciatica Yes     Physician: Erica Khan MD  Treatment Orders: PT Eval and Treat  Past Medical History:   Diagnosis Date    IBS (irritable bowel syndrome)        Precautions: as per diagnosis    Evaluation date: 2/15/2018  Visit # authorized: 27/40 on 11/15/2018  Authorization period: 12-31-18  Plan of care expiration: 11-27-18  MD Follow up appt: none scheduled    Time In:  2:20  Time Out:  3:20  Billable time:  40 min    Subjective     Pt reports: lot of activity feeling tight   Pain Scale: before treatment: 5with sitting. 3.5 after modified push/pull Standing is better,  after treatment N/T  Objective     AR R       Muscle Strength 10-16-18  MMT R L   Hip flexion 4+/5 5/5   Hip abduction 5/5 5-/5   Hip extension 5/5 5/5   Glut max 4-/5 4-/5        Knee extension 5/5 5/5   Knee flexion 5/5 5/5         Muscle Strength 9-6-18  MMT R L   Hip flexion 4/5 5-/5   Hip abduction 5-/5 4+/5   Hip extension 4+/5 4+/5   Glut max 4-/5 4-/5        Knee extension 5/5 5/5   Knee flexion 5/5 5/5             Muscle Strength 7-20-18  MMT R L   Hip flexion 4-/5 4+/5   Hip abduction 4+/5 4/5   Hip extension 4/5 4/5   Glut max 3+/5 3+/5        Knee extension 5/5 5/5   Knee flexion 5/5 5/5           Muscle Strength 6-4-18  MMT R L   Hip flexion 4-/5 4+/5   Hip abduction 4-/5 3+/5   Hip extension 4-/5 4-/5   Glut max 3+/5 3+/5           Knee extension 5/5 5/5   Knee flexion 5/5 5/5          TREATMENT    Therapeutic exercise: Danuta received therapeutic exercises to develop strength, endurance, ROM, flexibility and core stabilization for 25 direct  minutes including:      Pt able to self mobilize and then after ex unable to self mobilize      HS stretch supine   Glut stretch  Piriformis stretch  B QL stretch  Continue with stretching for  "now      Instructed pt to keep strengthening to 10 for now  Pelvic tilt  Pt performs 5 reps holding 10 sec each  Bridge x 10    Arm and Leg lift prone x 10 arm and leg   Hip ext prone with bent knee x 10      Plank 4 reps 30 sec each   After planks, had to do sidelying mob to level pelvis    Sidelying L contract relax mob to L5-S1 region to level pelvis    Attempted sidelying hip abd x 5 led to dysfunction and unable to correct    HOLD FOR NOW  SLR x 20  Partial sit up x 20  Hip abd sidelying x 20 kept small plane R to avoid dysfunction    (NP) skin irritatedKinesiotape with 6" star for pain relief was performed over the R lumbar paraspinals.  Instructed pt in use, care and precautions with tape.           (NP)Manual therapy: Danuta  received the following manual therapy  techniques x 15 min. to include soft tissue and joint mobilization were applied to the: low back and gluteals to include: Sidelying L contract relax mob P/A mob to lumbar region Grade 1-2  Contract relax QL sidelying     (NP)Vacuum/cupping STM with manual therapy techniques was performed to back and gluteals to decrease muscle tightness, increase circulation and promote healing process.  The pt's skin was monitored for redness adjusting pressure as needed. The pt was instructed in possible side effects of bruising and/or soreness.    STM to lumbar region, QL, piriformis,          Patient received pre-mod electrical stimulation to decrease muscle tightness and pain to Lumbar paraspinals/ sacral border of gluteals B for 15 minutes with MH supine with cycle time: continuous, beat frequency: , CC/CV: CV.  Pt on personal time constraints so held today      Written Home Exercises Provided: none provided today  Pt demo good understanding of the education provided. Danuta demonstrated good return demonstration of activities.     Pt. education:  · Posture reeducation, body mechanics, HEP,   · No spiritual or educational barriers to learning " provided  · Pt has no cultural, educational or language barriers to learning provided.    Assessment   Pt with increased activity leading to increased tightness and slowly able to progress again with strengthening but not ready for hip abd yet Decreased symptoms at end of treatment   Pt will continue to benefit from skilled outpatient physical therapy to address the remaining functional deficits, provide pt/family education, and to maximize pt's level of independence in the home and community environment. .     GOALS:   Short Term Goals:  3 weeks MET STG's  Increase range of motion 25%  Increase strength 1/2 muscle grade  Improve postural awareness of pelvis to independently identify dysfunction with min assist from PT  Be able to perform HEP with minimal cueing required     Long Term Goals: 6 weeks PARTIALLY MET LTG'S  Increase range of motion to 75% to 100% full   Improve muscle strength 1 muscle grade  Improve muscle strength with MMT to 4+/5 to 5/5  Improve and stabilize proper pelvic positioning  Restore ability to sit for ADL and work with min to 0 pain  Restore normal sleep habits without disturbances due to pain  Restore ability to perform ADL's and household activities independently with min to 0 pain    Anticipated barriers to physical therapy: none  Pt's spiritual, cultural and educational needs considered and pt agreeable to plan of care and goals        Plan   Continue with established plan of care towards PT goals.

## 2018-11-20 ENCOUNTER — CLINICAL SUPPORT (OUTPATIENT)
Dept: REHABILITATION | Facility: HOSPITAL | Age: 63
End: 2018-11-20
Attending: INTERNAL MEDICINE
Payer: COMMERCIAL

## 2018-11-20 DIAGNOSIS — G89.29 CHRONIC BILATERAL LOW BACK PAIN WITH RIGHT-SIDED SCIATICA: Primary | ICD-10-CM

## 2018-11-20 DIAGNOSIS — M54.41 CHRONIC BILATERAL LOW BACK PAIN WITH RIGHT-SIDED SCIATICA: Primary | ICD-10-CM

## 2018-11-20 PROCEDURE — 97140 MANUAL THERAPY 1/> REGIONS: CPT | Mod: PN | Performed by: PHYSICAL THERAPIST

## 2018-11-20 PROCEDURE — 97014 ELECTRIC STIMULATION THERAPY: CPT | Mod: PN | Performed by: PHYSICAL THERAPIST

## 2018-11-20 PROCEDURE — 97110 THERAPEUTIC EXERCISES: CPT | Mod: PN | Performed by: PHYSICAL THERAPIST

## 2018-11-20 NOTE — PROGRESS NOTES
Physical Therapy Daily Note     Name: Danuta Santos  Clinic Number: [id number]  Diagnosis:   Encounter Diagnosis   Name Primary?    Chronic bilateral low back pain with right-sided sciatica Yes     Physician: Erica Khan MD  Treatment Orders: PT Eval and Treat  Past Medical History:   Diagnosis Date    IBS (irritable bowel syndrome)        Precautions: as per diagnosis    Evaluation date: 2/15/2018  Visit # authorized: 28/40 on 11/20/2018  Authorization period: 12-31-18  Plan of care expiration: 11-27-18  MD Follow up appt: none scheduled    Time In:  8:00  Time Out:  9:00  Billable time:  40 min    Subjective     Pt reports: she had an active day.  Pt states the day was hectic and she was not able to do her full routine.  Pt states she has increased back pain and not sure if able to self mobilize successfully  Pain Scale: before treatment:N/T  after treatment N/T  Objective     AR R       Muscle Strength 10-16-18  MMT R L   Hip flexion 4+/5 5/5   Hip abduction 5/5 5-/5   Hip extension 5/5 5/5   Glut max 4-/5 4-/5        Knee extension 5/5 5/5   Knee flexion 5/5 5/5         Muscle Strength 9-6-18  MMT R L   Hip flexion 4/5 5-/5   Hip abduction 5-/5 4+/5   Hip extension 4+/5 4+/5   Glut max 4-/5 4-/5        Knee extension 5/5 5/5   Knee flexion 5/5 5/5             Muscle Strength 7-20-18  MMT R L   Hip flexion 4-/5 4+/5   Hip abduction 4+/5 4/5   Hip extension 4/5 4/5   Glut max 3+/5 3+/5        Knee extension 5/5 5/5   Knee flexion 5/5 5/5           Muscle Strength 6-4-18  MMT R L   Hip flexion 4-/5 4+/5   Hip abduction 4-/5 3+/5   Hip extension 4-/5 4-/5   Glut max 3+/5 3+/5           Knee extension 5/5 5/5   Knee flexion 5/5 5/5          TREATMENT    Therapeutic exercise: Danuta received therapeutic exercises to develop strength, endurance, ROM, flexibility and core stabilization for 15 direct  minutes including:      Pt able to self mobilize and then after ex able to self mobilize      HS stretch  "supine   Glut stretch  Piriformis stretch  B QL stretch  Continue with stretching for now      Instructed pt to keep strengthening to 10 for now  Pelvic tilt  Pt performs 5 reps holding 10 sec each  Bridge x 10    Arm and Leg lift prone x 10 arm and leg   Hip ext prone with bent knee x 10      Plank 4 reps 30 sec each   After planks, had to do sidelying mob to level pelvis    Sidelying L contract relax mob to L5-S1 region to level pelvis    At last visit, so held today due to concerns about ability to self mobilize with holidayAttempted sidelying hip abd x 5 led to dysfunction and unable to correct    HOLD FOR NOW  SLR x 20  Partial sit up x 20  Hip abd sidelying x 20 kept small plane R to avoid dysfunction    (NP) skin irritatedKinesiotape with 6" star for pain relief was performed over the R lumbar paraspinals.  Instructed pt in use, care and precautions with tape.     Manual therapy: Danuta  received the following manual therapy  techniques x 10 min. to include soft tissue and joint mobilization were applied to the: low back and gluteals to include: Sidelying L contract relax mob P/A mob to lumbar region Grade 1-2  Contract relax QL sidelying     Kinesiotape with 6" star for pain relief was performed over the R lumbar paraspinals.L3-4 region  Instructed pt in use, care and precautions with tape.        (NP)Vacuum/cupping STM with manual therapy techniques was performed to back and gluteals to decrease muscle tightness, increase circulation and promote healing process.  The pt's skin was monitored for redness adjusting pressure as needed. The pt was instructed in possible side effects of bruising and/or soreness.    STM to lumbar region, QL, piriformis,          Patient received pre-mod electrical stimulation to decrease muscle tightness and pain to Lumbar paraspinals/ sacral border of gluteals B for 15 minutes with MH supine with cycle time: continuous, beat frequency: , CC/CV: CV.  Pt on personal time " constraints so held today      Written Home Exercises Provided: none provided today  Pt demo good understanding of the education provided. Danuta demonstrated good return demonstration of activities.     Pt. education:  · Posture reeducation, body mechanics, HEP,   · No spiritual or educational barriers to learning provided  · Pt has no cultural, educational or language barriers to learning provided.    Assessment   Pt with increased activity leading to increased tightness and slowly able to progress again with strengthening but not ready for hip abd yet Decreased symptoms at end of treatment   Pt will continue to benefit from skilled outpatient physical therapy to address the remaining functional deficits, provide pt/family education, and to maximize pt's level of independence in the home and community environment. .     GOALS:   Short Term Goals:  3 weeks MET STG's  Increase range of motion 25%  Increase strength 1/2 muscle grade  Improve postural awareness of pelvis to independently identify dysfunction with min assist from PT  Be able to perform HEP with minimal cueing required     Long Term Goals: 6 weeks PARTIALLY MET LTG'S  Increase range of motion to 75% to 100% full   Improve muscle strength 1 muscle grade  Improve muscle strength with MMT to 4+/5 to 5/5  Improve and stabilize proper pelvic positioning  Restore ability to sit for ADL and work with min to 0 pain  Restore normal sleep habits without disturbances due to pain  Restore ability to perform ADL's and household activities independently with min to 0 pain    Anticipated barriers to physical therapy: none  Pt's spiritual, cultural and educational needs considered and pt agreeable to plan of care and goals        Plan   Continue with established plan of care towards PT goals.

## 2018-12-05 ENCOUNTER — CLINICAL SUPPORT (OUTPATIENT)
Dept: REHABILITATION | Facility: HOSPITAL | Age: 63
End: 2018-12-05
Attending: INTERNAL MEDICINE
Payer: COMMERCIAL

## 2018-12-05 DIAGNOSIS — M54.41 CHRONIC BILATERAL LOW BACK PAIN WITH RIGHT-SIDED SCIATICA: Primary | ICD-10-CM

## 2018-12-05 DIAGNOSIS — G89.29 CHRONIC BILATERAL LOW BACK PAIN WITH RIGHT-SIDED SCIATICA: Primary | ICD-10-CM

## 2018-12-05 PROCEDURE — 97014 ELECTRIC STIMULATION THERAPY: CPT | Mod: PN | Performed by: PHYSICAL THERAPIST

## 2018-12-05 PROCEDURE — 97110 THERAPEUTIC EXERCISES: CPT | Mod: PN | Performed by: PHYSICAL THERAPIST

## 2018-12-05 NOTE — PLAN OF CARE
Physical Therapy Progress Note     Name: Danuta Santos  Clinic Number: [id number]  Diagnosis:   Encounter Diagnosis   Name Primary?    Chronic bilateral low back pain with right-sided sciatica Yes     Physician: Erica Khan MD  Treatment Orders: PT Eval and Treat  Past Medical History:   Diagnosis Date    IBS (irritable bowel syndrome)        Precautions: as per diagnosis    Evaluation date: 2/15/2018  Visit # authorized: 29/40 on 12/5/2018  Authorization period: 12-31-18  Plan of care expiration: 1-15-19  MD Follow up appt: none scheduled    Time In:  8:05  Time Out:  9:05  Billable time:  40 min    Subjective     Pt reports: been doing fairly well,  Little rough this week.  Pt states she held strengthening due to having difficulty maintaining stability  Pain Scale: before treatment:5   Objective     AR R Pelvis     Lumbar active range of motion in standing is: 12.5-18  - flexion - toes                     - extension -  50%                         - left side bending -  To knee         - right side bending -  To knee    Muscle Strength 12-5-18  MMT R L   Hip flexion 4/5 4+/5   Hip abduction 5/5 5/5   Hip extension 4+/5 4+/5   Glut max 3+/5 3+/5        Knee extension 5/5 5/5   Knee flexion 5/5 5/5              Muscle Strength 10-16-18  MMT R L   Hip flexion 4+/5 5/5   Hip abduction 5/5 5-/5   Hip extension 5/5 5/5   Glut max 4-/5 4-/5        Knee extension 5/5 5/5   Knee flexion 5/5 5/5         Muscle Strength 9-6-18  MMT R L   Hip flexion 4/5 5-/5   Hip abduction 5-/5 4+/5   Hip extension 4+/5 4+/5   Glut max 4-/5 4-/5        Knee extension 5/5 5/5   Knee flexion 5/5 5/5           TREATMENT    Therapeutic exercise: Danuta received therapeutic exercises to develop strength, endurance, ROM, flexibility and core stabilization for 25  minutes including:    Pt able to self mobilize and then after ex able to self mobilize but required extra stretching with QL stretch to be successful      HS stretch supine  "  Glut stretch  Piriformis stretch  B QL stretch      Instructed pt to keep strengthening to 10 for now  Pelvic tilt  X 10 for 5 sec hold  Bridge x 10    Arm and Leg lift prone x 10 arm and leg   Hip ext prone with bent knee x 10      Plank 4 reps 30 sec each   After planks, had to do sidelying mob to level pelvis      ABle to realign after QL stretch     Sidelying L contract relax mob to L5-S1 region to level pelvis        HOLD FOR NOW  SLR x 20  Partial sit up x 20  Hip abd sidelying x 20 kept small plane R to avoid dysfunction    (NP) skin irritatedKinesiotape with 6" star for pain relief was performed over the R lumbar paraspinals.  Instructed pt in use, care and precautions with tape.     Manual therapy: Danuta  received the following manual therapy  techniques x 3 min. to include soft tissue and joint mobilization were applied to the: low back and gluteals to include: Sidelying L contract relax mob P/A mob to lumbar region Grade 1-2  Contract relax QL sidelying     (NP)Kinesiotape with 6" star for pain relief was performed over the R lumbar paraspinals.L3-4 region  Instructed pt in use, care and precautions with tape.        (NP)Vacuum/cupping STM with manual therapy techniques was performed to back and gluteals to decrease muscle tightness, increase circulation and promote healing process.  The pt's skin was monitored for redness adjusting pressure as needed. The pt was instructed in possible side effects of bruising and/or soreness.    STM to lumbar region, QL, piriformis,      Patient received pre-mod electrical stimulation to decrease muscle tightness and pain to Lumbar paraspinals/ sacral border of gluteals B for 15 minutes with MH supine with cycle time: continuous, beat frequency: , CC/CV: CV.  Pt on personal time constraints so held today      Written Home Exercises Provided: none provided today  Pt demo good understanding of the education provided. Danuta demonstrated good return " demonstration of activities.     Pt. education:  · Posture reeducation, body mechanics, HEP,   · No spiritual or educational barriers to learning provided  · Pt has no cultural, educational or language barriers to learning provided.    Assessment   Pt had a set back after a trip and had more difficulty self mobilizing.  Had to modify strengthening due to inability to self mobilize at times after strengthening.  Pt with slight loss in strength with lack of strengthening ex, but slowly able to progress with strengthening again     Pt will continue to benefit from skilled outpatient physical therapy to address the remaining functional deficits, provide pt/family education, and to maximize pt's level of independence in the home and community environment. .     GOALS:   Short Term Goals:  3 weeks MET STG's  Increase range of motion 25%  Increase strength 1/2 muscle grade  Improve postural awareness of pelvis to independently identify dysfunction with min assist from PT  Be able to perform HEP with minimal cueing required     Long Term Goals: 6 weeks PARTIALLY MET LTG'S  Increase range of motion to 75% to 100% full   Improve muscle strength 1 muscle grade  Improve muscle strength with MMT to 4+/5 to 5/5  Improve and stabilize proper pelvic positioning  Restore ability to sit for ADL and work with min to 0 pain  Restore normal sleep habits without disturbances due to pain  Restore ability to perform ADL's and household activities independently with min to 0 pain    Anticipated barriers to physical therapy: none  Pt's spiritual, cultural and educational needs considered and pt agreeable to plan of care and goals        Plan   If you concur, I recommend patient continue with physical therapy as needed up to 2 times a week  for 6 weeks.  Please advise us of your  recommendations. Thank you for allowing us to assist in the care of your patient.      Jacy Escobar, MS, PT          I certify the need for these services  furnished under this plan of treatment and while under my care.    ____________________________________ Physician/Referring Practitioner                                Date of Signature

## 2018-12-05 NOTE — PROGRESS NOTES
Physical Therapy Progress Note     Name: Danuta Santos  Clinic Number: [id number]  Diagnosis:   Encounter Diagnosis   Name Primary?    Chronic bilateral low back pain with right-sided sciatica Yes     Physician: Erica Khan MD  Treatment Orders: PT Eval and Treat  Past Medical History:   Diagnosis Date    IBS (irritable bowel syndrome)        Precautions: as per diagnosis    Evaluation date: 2/15/2018  Visit # authorized: 29/40 on 12/5/2018  Authorization period: 12-31-18  Plan of care expiration: 1-15-19  MD Follow up appt: none scheduled    Time In:  8:05  Time Out:  9:05  Billable time:  40 min    Subjective     Pt reports: been doing fairly well,  Little rough this week.  Pt states she held strengthening due to having difficulty maintaining stability  Pain Scale: before treatment:5   Objective     AR R Pelvis     Lumbar active range of motion in standing is: 12.5-18  - flexion - toes                     - extension -  50%                         - left side bending -  To knee         - right side bending -  To knee    Muscle Strength 12-5-18  MMT R L   Hip flexion 4/5 4+/5   Hip abduction 5/5 5/5   Hip extension 4+/5 4+/5   Glut max 3+/5 3+/5        Knee extension 5/5 5/5   Knee flexion 5/5 5/5              Muscle Strength 10-16-18  MMT R L   Hip flexion 4+/5 5/5   Hip abduction 5/5 5-/5   Hip extension 5/5 5/5   Glut max 4-/5 4-/5        Knee extension 5/5 5/5   Knee flexion 5/5 5/5         Muscle Strength 9-6-18  MMT R L   Hip flexion 4/5 5-/5   Hip abduction 5-/5 4+/5   Hip extension 4+/5 4+/5   Glut max 4-/5 4-/5        Knee extension 5/5 5/5   Knee flexion 5/5 5/5           TREATMENT    Therapeutic exercise: Danuta received therapeutic exercises to develop strength, endurance, ROM, flexibility and core stabilization for 25  minutes including:    Pt able to self mobilize and then after ex able to self mobilize but required extra stretching with QL stretch to be successful      HS stretch supine  "  Glut stretch  Piriformis stretch  B QL stretch      Instructed pt to keep strengthening to 10 for now  Pelvic tilt  X 10 for 5 sec hold  Bridge x 10    Arm and Leg lift prone x 10 arm and leg   Hip ext prone with bent knee x 10      Plank 4 reps 30 sec each   After planks, had to do sidelying mob to level pelvis      ABle to realign after QL stretch     Sidelying L contract relax mob to L5-S1 region to level pelvis        HOLD FOR NOW  SLR x 20  Partial sit up x 20  Hip abd sidelying x 20 kept small plane R to avoid dysfunction    (NP) skin irritatedKinesiotape with 6" star for pain relief was performed over the R lumbar paraspinals.  Instructed pt in use, care and precautions with tape.     Manual therapy: Danuta  received the following manual therapy  techniques x 3 min. to include soft tissue and joint mobilization were applied to the: low back and gluteals to include: Sidelying L contract relax mob P/A mob to lumbar region Grade 1-2  Contract relax QL sidelying     (NP)Kinesiotape with 6" star for pain relief was performed over the R lumbar paraspinals.L3-4 region  Instructed pt in use, care and precautions with tape.        (NP)Vacuum/cupping STM with manual therapy techniques was performed to back and gluteals to decrease muscle tightness, increase circulation and promote healing process.  The pt's skin was monitored for redness adjusting pressure as needed. The pt was instructed in possible side effects of bruising and/or soreness.    STM to lumbar region, QL, piriformis,      Patient received pre-mod electrical stimulation to decrease muscle tightness and pain to Lumbar paraspinals/ sacral border of gluteals B for 15 minutes with MH supine with cycle time: continuous, beat frequency: , CC/CV: CV.  Pt on personal time constraints so held today      Written Home Exercises Provided: none provided today  Pt demo good understanding of the education provided. Danuta demonstrated good return " demonstration of activities.     Pt. education:  · Posture reeducation, body mechanics, HEP,   · No spiritual or educational barriers to learning provided  · Pt has no cultural, educational or language barriers to learning provided.    Assessment   Pt had a set back after a trip and had more difficulty self mobilizing.  Had to modify strengthening due to inability to self mobilize at times after strengthening.  Pt with slight loss in strength with lack of strengthening ex, but slowly able to progress with strengthening again     Pt will continue to benefit from skilled outpatient physical therapy to address the remaining functional deficits, provide pt/family education, and to maximize pt's level of independence in the home and community environment. .     GOALS:   Short Term Goals:  3 weeks MET STG's  Increase range of motion 25%  Increase strength 1/2 muscle grade  Improve postural awareness of pelvis to independently identify dysfunction with min assist from PT  Be able to perform HEP with minimal cueing required     Long Term Goals: 6 weeks PARTIALLY MET LTG'S  Increase range of motion to 75% to 100% full   Improve muscle strength 1 muscle grade  Improve muscle strength with MMT to 4+/5 to 5/5  Improve and stabilize proper pelvic positioning  Restore ability to sit for ADL and work with min to 0 pain  Restore normal sleep habits without disturbances due to pain  Restore ability to perform ADL's and household activities independently with min to 0 pain    Anticipated barriers to physical therapy: none  Pt's spiritual, cultural and educational needs considered and pt agreeable to plan of care and goals        Plan   If you concur, I recommend patient continue with physical therapy as needed up to 2 times a week  for 6 weeks.  Please advise us of your  recommendations. Thank you for allowing us to assist in the care of your patient.      Jacy Escobar, MS, PT          I certify the need for these services  furnished under this plan of treatment and while under my care.    ____________________________________ Physician/Referring Practitioner                                Date of Signature

## 2018-12-11 ENCOUNTER — CLINICAL SUPPORT (OUTPATIENT)
Dept: REHABILITATION | Facility: HOSPITAL | Age: 63
End: 2018-12-11
Attending: INTERNAL MEDICINE
Payer: COMMERCIAL

## 2018-12-11 DIAGNOSIS — M54.41 CHRONIC BILATERAL LOW BACK PAIN WITH RIGHT-SIDED SCIATICA: Primary | ICD-10-CM

## 2018-12-11 DIAGNOSIS — G89.29 CHRONIC BILATERAL LOW BACK PAIN WITH RIGHT-SIDED SCIATICA: Primary | ICD-10-CM

## 2018-12-11 PROCEDURE — 97140 MANUAL THERAPY 1/> REGIONS: CPT | Mod: PN | Performed by: PHYSICAL THERAPIST

## 2018-12-11 PROCEDURE — 97110 THERAPEUTIC EXERCISES: CPT | Mod: PN | Performed by: PHYSICAL THERAPIST

## 2018-12-11 PROCEDURE — 97014 ELECTRIC STIMULATION THERAPY: CPT | Mod: PN | Performed by: PHYSICAL THERAPIST

## 2018-12-11 NOTE — PROGRESS NOTES
Physical Therapy Daily Note     Name: Danuta Santos  Clinic Number: [id number]  Diagnosis:   Encounter Diagnosis   Name Primary?    Chronic bilateral low back pain with right-sided sciatica Yes     Physician: Erica Khan MD  Treatment Orders: PT Eval and Treat  Past Medical History:   Diagnosis Date    IBS (irritable bowel syndrome)        Precautions: as per diagnosis    Evaluation date: 2/15/2018  Visit # authorized: 30/40 on 12/11/2018  Authorization period: 12-31-18  Plan of care expiration: 1-15-19  MD Follow up appt: none scheduled    Time In:  4:15  Time Out:  5:25  Billable time:  50 min    Subjective     Pt reports: been busy, carrying lots of items, increased stress, having pain and feels have not been able to get pelvis level  Pt has not been doing strengthening due to not feeling level.  Reported decreased pain after treatment    Pain Scale: before treatment:N/T  Objective     AR R Pelvis     Lumbar active range of motion in standing is: 12.5-18  - flexion - toes                     - extension -  50%                         - left side bending -  To knee         - right side bending -  To knee    Muscle Strength 12-5-18  MMT R L   Hip flexion 4/5 4+/5   Hip abduction 5/5 5/5   Hip extension 4+/5 4+/5   Glut max 3+/5 3+/5        Knee extension 5/5 5/5   Knee flexion 5/5 5/5              Muscle Strength 10-16-18  MMT R L   Hip flexion 4+/5 5/5   Hip abduction 5/5 5-/5   Hip extension 5/5 5/5   Glut max 4-/5 4-/5        Knee extension 5/5 5/5   Knee flexion 5/5 5/5         Muscle Strength 9-6-18  MMT R L   Hip flexion 4/5 5-/5   Hip abduction 5-/5 4+/5   Hip extension 4+/5 4+/5   Glut max 4-/5 4-/5        Knee extension 5/5 5/5   Knee flexion 5/5 5/5           TREATMENT    Therapeutic exercise: Danuta received therapeutic exercises to develop strength, endurance, ROM, flexibility and core stabilization for 15  minutes including:    Pt able to self mobilize and then after ex able to self  "mobilize but required extra stretching with QL stretch to be successful      HS stretch supine   Glut stretch  Piriformis stretch  B QL stretch      Instructed pt to keep strengthening to 10 for now  Pelvic tilt  X 15 for 5 sec hold  Bridge x 15    Arm and Leg lift prone x 15 arm and leg   Hip ext prone with bent knee x 15     Hip abd supine x 10 B     (NP)Plank 4 reps 30 sec each       Sidelying L contract relax mob to L5-S1 region to level pelvis        HOLD FOR NOW  SLR x 20  Partial sit up x 20  Hip abd sidelying x 20 kept small plane R to avoid dysfunction    Manual therapy: Danuta  received the following manual therapy  techniques x 20  min. to include soft tissue and joint mobilization were applied to the: low back and gluteals to include: Sidelying L contract relax mob P/A mob to lumbar region Grade 1-2  Contract relax QL sidelying     Kinesiotape with 6" star for pain relief was performed over the R lumbar paraspinals.L3-4 region  Instructed pt in use, care and precautions with tape.        Vacuum/cupping STM with manual therapy techniques was performed to back and gluteals to decrease muscle tightness, increase circulation and promote healing process.  The pt's skin was monitored for redness adjusting pressure as needed. The pt was instructed in possible side effects of bruising and/or soreness.    STM to lumbar region, QL, piriformis,      Patient received pre-mod electrical stimulation to decrease muscle tightness and pain to Lumbar paraspinals/ sacral border of gluteals B for 15 minutes with MH supine with cycle time: continuous, beat frequency: , CC/CV: CV.        Written Home Exercises Provided: hip abd supine  Pt demo good understanding of the education provided. Danuta demonstrated good return demonstration of activities.     Pt. education:  · Posture reeducation, body mechanics, HEP,   · No spiritual or educational barriers to learning provided  · Pt has no cultural, educational or " language barriers to learning provided.    Assessment   Pt unable to self mobilize, level with contract relax sidelying technique, decreased muscle tightness and pain and able to self mobilize at end of session Pt understands to work on strengthening as tolerated and continue stretching  May benefit from having KT tape and will be finished school soon, so can rest     Pt will continue to benefit from skilled outpatient physical therapy to address the remaining functional deficits, provide pt/family education, and to maximize pt's level of independence in the home and community environment. .     GOALS:   Short Term Goals:  3 weeks MET STG's  Increase range of motion 25%  Increase strength 1/2 muscle grade  Improve postural awareness of pelvis to independently identify dysfunction with min assist from PT  Be able to perform HEP with minimal cueing required     Long Term Goals: 6 weeks PARTIALLY MET LTG'S  Increase range of motion to 75% to 100% full   Improve muscle strength 1 muscle grade  Improve muscle strength with MMT to 4+/5 to 5/5  Improve and stabilize proper pelvic positioning  Restore ability to sit for ADL and work with min to 0 pain  Restore normal sleep habits without disturbances due to pain  Restore ability to perform ADL's and household activities independently with min to 0 pain    Anticipated barriers to physical therapy: none  Pt's spiritual, cultural and educational needs considered and pt agreeable to plan of care and goals        Plan   Continue with established plan of care towards PT goals.

## 2018-12-21 ENCOUNTER — CLINICAL SUPPORT (OUTPATIENT)
Dept: REHABILITATION | Facility: HOSPITAL | Age: 63
End: 2018-12-21
Attending: INTERNAL MEDICINE
Payer: COMMERCIAL

## 2018-12-21 DIAGNOSIS — M54.41 CHRONIC BILATERAL LOW BACK PAIN WITH RIGHT-SIDED SCIATICA: Primary | ICD-10-CM

## 2018-12-21 DIAGNOSIS — G89.29 CHRONIC BILATERAL LOW BACK PAIN WITH RIGHT-SIDED SCIATICA: Primary | ICD-10-CM

## 2018-12-21 PROCEDURE — 97530 THERAPEUTIC ACTIVITIES: CPT | Mod: PN | Performed by: PHYSICAL THERAPIST

## 2018-12-21 PROCEDURE — 97110 THERAPEUTIC EXERCISES: CPT | Mod: PN | Performed by: PHYSICAL THERAPIST

## 2018-12-21 PROCEDURE — 97140 MANUAL THERAPY 1/> REGIONS: CPT | Mod: PN | Performed by: PHYSICAL THERAPIST

## 2018-12-21 NOTE — PROGRESS NOTES
Physical Therapy Daily Note     Name: Danuta Santos  Clinic Number: [id number]  Diagnosis:   Encounter Diagnosis   Name Primary?    Chronic bilateral low back pain with right-sided sciatica Yes     Physician: Erica Khan MD  Treatment Orders: PT Eval and Treat  Past Medical History:   Diagnosis Date    IBS (irritable bowel syndrome)        Precautions: as per diagnosis    Evaluation date: 2/15/2018  Visit # authorized: 31/40 on 12/21/2018  Authorization period: 12-31-18  Plan of care expiration: 1-15-19  MD Follow up appt: none scheduled    Time In:  10:05  Time Out:  11:10  Billable time:  55 min    Subjective     Pt reports: she has been able to wear tennis shoes to work the past few days and feeling better than previously      Pain Scale: before treatment:N/T  Objective     AR R Pelvis     Lumbar active range of motion in standing is: 12.5-18  - flexion - toes                     - extension -  50%                         - left side bending -  To knee         - right side bending -  To knee    Muscle Strength 12-5-18  MMT R L   Hip flexion 4/5 4+/5   Hip abduction 5/5 5/5   Hip extension 4+/5 4+/5   Glut max 3+/5 3+/5        Knee extension 5/5 5/5   Knee flexion 5/5 5/5              Muscle Strength 10-16-18  MMT R L   Hip flexion 4+/5 5/5   Hip abduction 5/5 5-/5   Hip extension 5/5 5/5   Glut max 4-/5 4-/5        Knee extension 5/5 5/5   Knee flexion 5/5 5/5         Muscle Strength 9-6-18  MMT R L   Hip flexion 4/5 5-/5   Hip abduction 5-/5 4+/5   Hip extension 4+/5 4+/5   Glut max 4-/5 4-/5        Knee extension 5/5 5/5   Knee flexion 5/5 5/5     Evaluation of feet revealed more severe pronation L>R    TREATMENT    Therapeutic activity: Patient participated in dynamic functional therapeutic activities to improve functional performance for 25 minutes. Including:   Shoe evaluation does not provide correction for pronation, but may be improved support from regular dress shoes she wears.    Foot eval  "revealed forefoot varus with decreased rearfoot eversion with increased pronation in standing R>L  Provided cork 2 degree forefoot post with 1 large and 1 small medial heel wedge to improve biomechanical postioning.  This created longer leg L resulting in need to increase height for heel lift  Provided pt with a higher heel lift to try and to stay mostly in tennis shoes as much as she can.  Researched with pt some thinner orthotic supports she could possibly use for dress shoes and provided info       Therapeutic exercise: Danuta received therapeutic exercises to develop strength, endurance, ROM, flexibility and core stabilization for 15  minutes including:    Pt able to self mobilize and then after ex able to self mobilize but required extra stretching with QL stretch to be successful      HS stretch supine   Glut stretch  Piriformis stretch  B QL stretch      Instructed pt to keep strengthening to 10 for now  Pelvic tilt  X 15 for 5 sec hold  Bridge x 15    Arm and Leg lift prone x 15 arm and leg   Hip ext prone with bent knee x 15     Hip abd supine x 10 B     (NP)Plank 4 reps 30 sec each       Sidelying L contract relax mob to L5-S1 region to level pelvis        HOLD FOR NOW  SLR x 20  Partial sit up x 20  Hip abd sidelying x 20 kept small plane R to avoid dysfunction    Manual therapy: Danuta  received the following manual therapy  techniques x 15  min. to include soft tissue and joint mobilization were applied to the: low back and gluteals to include: Sidelying L contract relax mob P/A mob to lumbar region Grade 1-2  Contract relax QL sidelying     Kinesiotape with 6" star for pain relief was performed over the R lumbar paraspinals.L3-4 region  Instructed pt in use, care and precautions with tape.        Vacuum/cupping STM with manual therapy techniques was performed to back and gluteals to decrease muscle tightness, increase circulation and promote healing process.  The pt's skin was monitored for " redness adjusting pressure as needed. The pt was instructed in possible side effects of bruising and/or soreness.    STM to lumbar region, QL, piriformis,      (NP)Patient received pre-mod electrical stimulation to decrease muscle tightness and pain to Lumbar paraspinals/ sacral border of gluteals B for 15 minutes with MH supine with cycle time: continuous, beat frequency: , CC/CV: CV.        Written Home Exercises Provided: hip abd supine  Pt demo good understanding of the education provided. Danuta demonstrated good return demonstration of activities.     Pt. education:  · Posture reeducation, body mechanics, HEP,   · No spiritual or educational barriers to learning provided  · Pt has no cultural, educational or language barriers to learning provided.    Assessment   Pt with improved symptoms being in more supportive shoes.  Pt may benefit from orthotic support and will monitor effect     Pt will continue to benefit from skilled outpatient physical therapy to address the remaining functional deficits, provide pt/family education, and to maximize pt's level of independence in the home and community environment. .     GOALS:   Short Term Goals:  3 weeks MET STG's  Increase range of motion 25%  Increase strength 1/2 muscle grade  Improve postural awareness of pelvis to independently identify dysfunction with min assist from PT  Be able to perform HEP with minimal cueing required     Long Term Goals: 6 weeks PARTIALLY MET LTG'S  Increase range of motion to 75% to 100% full   Improve muscle strength 1 muscle grade  Improve muscle strength with MMT to 4+/5 to 5/5  Improve and stabilize proper pelvic positioning  Restore ability to sit for ADL and work with min to 0 pain  Restore normal sleep habits without disturbances due to pain  Restore ability to perform ADL's and household activities independently with min to 0 pain    Anticipated barriers to physical therapy: none  Pt's spiritual, cultural and educational  needs considered and pt agreeable to plan of care and goals        Plan   Continue with established plan of care towards PT goals.

## 2018-12-28 ENCOUNTER — CLINICAL SUPPORT (OUTPATIENT)
Dept: REHABILITATION | Facility: HOSPITAL | Age: 63
End: 2018-12-28
Attending: INTERNAL MEDICINE
Payer: COMMERCIAL

## 2018-12-28 DIAGNOSIS — G89.29 CHRONIC BILATERAL LOW BACK PAIN WITH RIGHT-SIDED SCIATICA: Primary | ICD-10-CM

## 2018-12-28 DIAGNOSIS — M54.41 CHRONIC BILATERAL LOW BACK PAIN WITH RIGHT-SIDED SCIATICA: Primary | ICD-10-CM

## 2018-12-28 PROCEDURE — 97014 ELECTRIC STIMULATION THERAPY: CPT | Mod: PN | Performed by: PHYSICAL THERAPIST

## 2018-12-28 PROCEDURE — 97110 THERAPEUTIC EXERCISES: CPT | Mod: PN | Performed by: PHYSICAL THERAPIST

## 2018-12-28 NOTE — PROGRESS NOTES
Physical Therapy Daily Note     Name: Danuta Santos  Clinic Number: [id number]  Diagnosis:   Encounter Diagnosis   Name Primary?    Chronic bilateral low back pain with right-sided sciatica Yes     Physician: Erica Khan MD  Treatment Orders: PT Eval and Treat  Past Medical History:   Diagnosis Date    IBS (irritable bowel syndrome)        Precautions: as per diagnosis    Evaluation date: 2/15/2018  Visit # authorized: 32/40 on 12/28/2018  Authorization period: 12-31-18  Plan of care expiration: 1-15-19  MD Follow up appt: none scheduled    Time In:  11:05  Time Out:  11:10  Billable time:  55 min    Subjective     Pt reports: doing ok.    Little pain this AM, thinking slight dysfunction Pt states feet are doing a little better with orthotic Pt states she ordered new shoes with wider toe box. Pt has been walking and stretching, but not all of ex   Pain Scale: before treatment:N/T  Objective     Slight AR R Pelvis     Lumbar active range of motion in standing is: 12.5-18  - flexion - toes                     - extension -  50%                         - left side bending -  To knee         - right side bending -  To knee    Muscle Strength 12-5-18  MMT R L   Hip flexion 4/5 4+/5   Hip abduction 5/5 5/5   Hip extension 4+/5 4+/5   Glut max 3+/5 3+/5        Knee extension 5/5 5/5   Knee flexion 5/5 5/5              Muscle Strength 10-16-18  MMT R L   Hip flexion 4+/5 5/5   Hip abduction 5/5 5-/5   Hip extension 5/5 5/5   Glut max 4-/5 4-/5        Knee extension 5/5 5/5   Knee flexion 5/5 5/5         Muscle Strength 9-6-18  MMT R L   Hip flexion 4/5 5-/5   Hip abduction 5-/5 4+/5   Hip extension 4+/5 4+/5   Glut max 4-/5 4-/5        Knee extension 5/5 5/5   Knee flexion 5/5 5/5     Evaluation of feet revealed more severe pronation L>R    TREATMENT    Therapeutic exercise: Danuta received therapeutic exercises to develop strength, endurance, ROM, flexibility and core stabilization for 25  minutes  "including:    Pt performed stretches and then push/pull but did not get level.  Performed contract relax sidelying to level pelvis After further analysis of performance of contract relax glut, pt started to not keep leg close to her, Instructed pt further with verbal and tactile cueing       HS stretch supine   Glut stretch  Piriformis stretch  B QL stretch      Instructed pt to keep strengthening to 10 for now  Pelvic tilt  X 10 for 5 sec hold  Bridge x 10      SLR x 10  Arm and Leg lift prone x 10 arm and leg   Hip ext prone with bent knee x 10    Hip abd supine x 10 B  1 plank x 30 sec held the other 3 she normally does     (NP)Plank 4 reps 30 sec each       Sidelying L contract relax mob to L5-S1 region to level pelvis      Kinesiotape with 6" star for pain relief was performed over the R lumbar paraspinals.L3-4 region  Instructed pt in use, care and precautions with tape.        HOLD FOR NOW    Partial sit up x 20  Hip abd sidelying x 20 kept small plane R to avoid dysfunction    (NP)Manual therapy: Danuta  received the following manual therapy  techniques x 15  min. to include soft tissue and joint mobilization were applied to the: low back and gluteals to include: Sidelying L contract relax mob P/A mob to lumbar region Grade 1-2  Contract relax QL sidelying       (NP)Vacuum/cupping STM with manual therapy techniques was performed to back and gluteals to decrease muscle tightness, increase circulation and promote healing process.  The pt's skin was monitored for redness adjusting pressure as needed. The pt was instructed in possible side effects of bruising and/or soreness.    STM to lumbar region, QL, piriformis,     Patient received pre-mod electrical stimulation to decrease muscle tightness and pain to Lumbar paraspinals/ sacral border of gluteals B for 15 minutes with MH supine with cycle time: continuous, beat frequency: , CC/CV: CV.        Written Home Exercises Provided: hip abd supine  Pt demo " good understanding of the education provided. Danuta demonstrated good return demonstration of activities.     Pt. education:  · Posture reeducation, body mechanics, HEP,   · No spiritual or educational barriers to learning provided  · Pt has no cultural, educational or language barriers to learning provided.    Assessment   Pt develops slight dysfunction with ADL currently and with slight modification to ex she may be more successful with contract relax to level pelvis.       Pt will continue to benefit from skilled outpatient physical therapy to address the remaining functional deficits, provide pt/family education, and to maximize pt's level of independence in the home and community environment. .     GOALS:   Short Term Goals:  3 weeks MET STG's  Increase range of motion 25%  Increase strength 1/2 muscle grade  Improve postural awareness of pelvis to independently identify dysfunction with min assist from PT  Be able to perform HEP with minimal cueing required     Long Term Goals: 6 weeks PARTIALLY MET LTG'S  Increase range of motion to 75% to 100% full   Improve muscle strength 1 muscle grade  Improve muscle strength with MMT to 4+/5 to 5/5  Improve and stabilize proper pelvic positioning  Restore ability to sit for ADL and work with min to 0 pain  Restore normal sleep habits without disturbances due to pain  Restore ability to perform ADL's and household activities independently with min to 0 pain    Anticipated barriers to physical therapy: none  Pt's spiritual, cultural and educational needs considered and pt agreeable to plan of care and goals        Plan   Continue with established plan of care towards PT goals.

## 2019-01-04 ENCOUNTER — CLINICAL SUPPORT (OUTPATIENT)
Dept: REHABILITATION | Facility: HOSPITAL | Age: 64
End: 2019-01-04
Attending: INTERNAL MEDICINE
Payer: COMMERCIAL

## 2019-01-04 DIAGNOSIS — M54.41 CHRONIC BILATERAL LOW BACK PAIN WITH RIGHT-SIDED SCIATICA: Primary | ICD-10-CM

## 2019-01-04 DIAGNOSIS — G89.29 CHRONIC BILATERAL LOW BACK PAIN WITH RIGHT-SIDED SCIATICA: Primary | ICD-10-CM

## 2019-01-04 PROCEDURE — 97140 MANUAL THERAPY 1/> REGIONS: CPT | Mod: PN | Performed by: PHYSICAL THERAPIST

## 2019-01-04 PROCEDURE — 97530 THERAPEUTIC ACTIVITIES: CPT | Mod: PN | Performed by: PHYSICAL THERAPIST

## 2019-01-04 PROCEDURE — 97014 ELECTRIC STIMULATION THERAPY: CPT | Mod: PN | Performed by: PHYSICAL THERAPIST

## 2019-01-04 NOTE — PROGRESS NOTES
Physical Therapy Daily Note     Name: Danuta Santos  Clinic Number: [id number]  Diagnosis:   Encounter Diagnosis   Name Primary?    Chronic bilateral low back pain with right-sided sciatica Yes     Physician: Erica Khan MD  Treatment Orders: PT Eval and Treat  Past Medical History:   Diagnosis Date    IBS (irritable bowel syndrome)        Precautions: as per diagnosis    Evaluation date: 2/15/2018  Visit # authorized: 32/40 on 1/4/2019  Authorization period: 12-31-18  Plan of care expiration: 1-15-19  MD Follow up appt: none scheduled    Time In:  10:20  Time Out:  11:30  Billable time:  50 min    Subjective     Pt reports: doing ok.   Pt worked out and did all her ex  Pt has shoes that she wants to see if we can modify to help with foot problem that ends up affecting back when she gets increased foot pain  Pain Scale: before treatment:N/T  Objective     Level pelvis    Lumbar active range of motion in standing is: 12.5-18  - flexion - toes                     - extension -  50%                         - left side bending -  To knee         - right side bending -  To knee    Muscle Strength 12-5-18  MMT R L   Hip flexion 4/5 4+/5   Hip abduction 5/5 5/5   Hip extension 4+/5 4+/5   Glut max 3+/5 3+/5        Knee extension 5/5 5/5   Knee flexion 5/5 5/5              Muscle Strength 10-16-18  MMT R L   Hip flexion 4+/5 5/5   Hip abduction 5/5 5-/5   Hip extension 5/5 5/5   Glut max 4-/5 4-/5        Knee extension 5/5 5/5   Knee flexion 5/5 5/5         Muscle Strength 9-6-18  MMT R L   Hip flexion 4/5 5-/5   Hip abduction 5-/5 4+/5   Hip extension 4+/5 4+/5   Glut max 4-/5 4-/5        Knee extension 5/5 5/5   Knee flexion 5/5 5/5     Evaluation of feet revealed more severe pronation L>R    TREATMENT    (NP)Therapeutic exercise: Danuta received therapeutic exercises to develop strength, endurance, ROM, flexibility and core stabilization for 5  minutes including:    Reviewed HEP      HS stretch supine   Glut  "stretch  Piriformis stretch  B QL stretch      Instructed pt to keep strengthening to 10 for now  Pelvic tilt  X 10 for 5 sec hold  Bridge x 10      SLR x 10  Arm and Leg lift prone x 10 arm and leg   Hip ext prone with bent knee x 10    Hip abd supine x 10 B  1 plank x 30 sec held the other 3 she normally does     (NP)Plank 4 reps 30 sec each       Sidelying L contract relax mob to L5-S1 region to level pelvis      (NP)Kinesiotape with 6" star for pain relief was performed over the R lumbar paraspinals.L3-4 region  Instructed pt in use, care and precautions with tape.      Therapeutic activity: Patient participated in dynamic functional therapeutic activities to improve functional performance for 15 minutes. Including: pt brought shoes for evaluation and some arch supports for evaluation.  Pt tried on shoes and provided pt some instruction on what to look for with additional shoes.  Pt forgot dress shoes for arch supports so instructed pt in what to consider with arch supports.          HOLD FOR NOW    Partial sit up x 20  Hip abd sidelying x 20 kept small plane R to avoid dysfunction    Manual therapy: Danuta  received the following manual therapy  techniques x 15  min. to include soft tissue and joint mobilization were applied to the: low back and gluteals to include: Sidelying L contract relax mob P/A mob to lumbar region Grade 1-2  Contract relax QL sidelying       Vacuum/cupping STM with manual therapy techniques was performed to back and gluteals to decrease muscle tightness, increase circulation and promote healing process.  The pt's skin was monitored for redness adjusting pressure as needed. The pt was instructed in possible side effects of bruising and/or soreness.    STM to lumbar region, QL, piriformis,     Patient received pre-mod electrical stimulation to decrease muscle tightness and pain to Lumbar paraspinals/ sacral border of gluteals B for 15 minutes with MH supine with cycle time: continuous, " beat frequency: , CC/CV: CV.        Written Home Exercises Provided: none today  Pt demo good understanding of the education provided. Danuta demonstrated good return demonstration of activities.     Pt. education:  · Posture reeducation, body mechanics, HEP,   · No spiritual or educational barriers to learning provided  · Pt has no cultural, educational or language barriers to learning provided.    Assessment   Pt may benefit from further support in shoes to help pelvic stability Pt adjusting to temp orthotics and need to address dress shoes needed for work      Pt will continue to benefit from skilled outpatient physical therapy to address the remaining functional deficits, provide pt/family education, and to maximize pt's level of independence in the home and community environment. .     GOALS:   Short Term Goals:  3 weeks MET STG's  Increase range of motion 25%  Increase strength 1/2 muscle grade  Improve postural awareness of pelvis to independently identify dysfunction with min assist from PT  Be able to perform HEP with minimal cueing required     Long Term Goals: 6 weeks PARTIALLY MET LTG'S  Increase range of motion to 75% to 100% full   Improve muscle strength 1 muscle grade  Improve muscle strength with MMT to 4+/5 to 5/5  Improve and stabilize proper pelvic positioning  Restore ability to sit for ADL and work with min to 0 pain  Restore normal sleep habits without disturbances due to pain  Restore ability to perform ADL's and household activities independently with min to 0 pain    Anticipated barriers to physical therapy: none  Pt's spiritual, cultural and educational needs considered and pt agreeable to plan of care and goals        Plan   Continue with established plan of care towards PT goals.

## 2019-01-08 ENCOUNTER — CLINICAL SUPPORT (OUTPATIENT)
Dept: REHABILITATION | Facility: HOSPITAL | Age: 64
End: 2019-01-08
Attending: INTERNAL MEDICINE
Payer: COMMERCIAL

## 2019-01-08 DIAGNOSIS — G89.29 CHRONIC BILATERAL LOW BACK PAIN WITH RIGHT-SIDED SCIATICA: Primary | ICD-10-CM

## 2019-01-08 DIAGNOSIS — M54.41 CHRONIC BILATERAL LOW BACK PAIN WITH RIGHT-SIDED SCIATICA: Primary | ICD-10-CM

## 2019-01-08 PROCEDURE — 97530 THERAPEUTIC ACTIVITIES: CPT | Mod: PN | Performed by: PHYSICAL THERAPIST

## 2019-01-08 PROCEDURE — 97140 MANUAL THERAPY 1/> REGIONS: CPT | Mod: PN | Performed by: PHYSICAL THERAPIST

## 2019-01-08 PROCEDURE — 97014 ELECTRIC STIMULATION THERAPY: CPT | Mod: PN | Performed by: PHYSICAL THERAPIST

## 2019-01-08 NOTE — PROGRESS NOTES
Physical Therapy Daily Note     Name: Danuta Santos  Clinic Number: [id number]  Diagnosis:   Encounter Diagnosis   Name Primary?    Chronic bilateral low back pain with right-sided sciatica Yes     Physician: Erica Khan MD  Treatment Orders: PT Eval and Treat  Past Medical History:   Diagnosis Date    IBS (irritable bowel syndrome)        Precautions: as per diagnosis    Evaluation date: 2/15/2018  Visit # authorized: 33/40 on 1/8/2019  Authorization period: 12-31-18  Plan of care expiration: 1-15-19  MD Follow up appt: none scheduled    Time In:  8:01  Time Out:  9:00  Billable time:  55 min    Subjective     Pt reports:was having difficulty self mobilizing but finally got level on her own after doing about an hour of TENS and some heat but still some pain and wanted me to eval her shoes for work  Pain Scale: before treatment:N/T  Objective     Level pelvis  Severe tightness R lumbar paraspinals and QL    Lumbar active range of motion in standing is: 12.5-18  - flexion - toes                     - extension -  50%                         - left side bending -  To knee         - right side bending -  To knee    Muscle Strength 12-5-18  MMT R L   Hip flexion 4/5 4+/5   Hip abduction 5/5 5/5   Hip extension 4+/5 4+/5   Glut max 3+/5 3+/5        Knee extension 5/5 5/5   Knee flexion 5/5 5/5              Muscle Strength 10-16-18  MMT R L   Hip flexion 4+/5 5/5   Hip abduction 5/5 5-/5   Hip extension 5/5 5/5   Glut max 4-/5 4-/5        Knee extension 5/5 5/5   Knee flexion 5/5 5/5         Muscle Strength 9-6-18  MMT R L   Hip flexion 4/5 5-/5   Hip abduction 5-/5 4+/5   Hip extension 4+/5 4+/5   Glut max 4-/5 4-/5        Knee extension 5/5 5/5   Knee flexion 5/5 5/5     Evaluation of feet revealed more severe pronation L>R    TREATMENT    Therapeutic activity: Patient participated in dynamic functional therapeutic activities to improve functional performance for 15 minutes. Including: further evaluation of  gait with regular shoes which are flat, non-supportive ballet type shoes.  Attempted arch support in shoes and was not comfortable and caused pat to roll out into supination more.  Without support led to increased pronation. Provided small medial heel wedge B and pt had improved support and comfort with walking.  Instructed pt in use, care and precautions with orthotic support    Pt has another pair of dress shoes, so instructed pt to bring in those shoes before she buys more of ballet type shoe    Showed pt wear on older shoes and how she was walking out of shoe's support       (NP)Therapeutic exercise: Danuta received therapeutic exercises to develop strength, endurance, ROM, flexibility and core stabilization for 0  minutes including:    Reviewed HEP      HS stretch supine   Glut stretch  Piriformis stretch  B QL stretch      Instructed pt to keep strengthening to 10 for now  Pelvic tilt  X 10 for 5 sec hold  Bridge x 10      SLR x 10  Arm and Leg lift prone x 10 arm and leg   Hip ext prone with bent knee x 10    Hip abd supine x 10 B  1 plank x 30 sec held the other 3 she normally does     (NP)Plank 4 reps 30 sec each       Sidelying L contract relax mob to L5-S1 region to level pelvis    HOLD FOR NOW    Partial sit up x 20  Hip abd sidelying x 20 kept small plane R to avoid dysfunction    Manual therapy: Danuta  received the following manual therapy  techniques x 30  min. to include soft tissue and joint mobilization were applied to the: low back and gluteals to include:STM to LB paraspinals R>L Sidelying L contract relax mob P/A mob to lumbar region Grade 1-2  Contract relax QL sidelying with more STM to this area in this position   Pt received tool-assisted massage with manual therapy techniques to R LB in sidelying with pillow at waist putting pt in SB L to trigger an inflammatory healing response and stimulate the production of new collagen and proper, more functional, less painful  "healing.    Kinesiotape with 6" star for pain relief was performed over the R lumbar paraspinals.L3-4 region  Instructed pt in use, care and precautions with tape.     Contract relax sidelying L to 2 times to help further stretch lumbar paraspinals and QL region.  Instructed pt to continue to work on QL stretch      (NP)Vacuum/cupping STM with manual therapy techniques was performed to back and gluteals to decrease muscle tightness, increase circulation and promote healing process.  The pt's skin was monitored for redness adjusting pressure as needed. The pt was instructed in possible side effects of bruising and/or soreness.    STM to lumbar region, QL, piriformis,     Patient received pre-mod electrical stimulation to decrease muscle tightness and pain to Lumbar paraspinals/ sacral border of gluteals B for 10 minutes with MH supine with cycle time: continuous, beat frequency: , CC/CV: CV.        Written Home Exercises Provided: none today  Pt demo good understanding of the education provided. Danuta demonstrated good return demonstration of activities.     Pt. education:  · Posture reeducation, body mechanics, HEP,   · No spiritual or educational barriers to learning provided  · Pt has no cultural, educational or language barriers to learning provided.    Assessment   Pt may benefit from slight orthotic support in dress shoes for work and understands to assess other shoes before buying more ballet type shoes.  Pt understands these will wear out quickly and will need to be replaced often Pt may benefit from additional manual therapy to get more flexibility of lumbar paraspinals and QL.       Pt will continue to benefit from skilled outpatient physical therapy to address the remaining functional deficits, provide pt/family education, and to maximize pt's level of independence in the home and community environment. .     GOALS:   Short Term Goals:  3 weeks MET STG's  Increase range of motion 25%  Increase " strength 1/2 muscle grade  Improve postural awareness of pelvis to independently identify dysfunction with min assist from PT  Be able to perform HEP with minimal cueing required     Long Term Goals: 6 weeks PARTIALLY MET LTG'S  Increase range of motion to 75% to 100% full   Improve muscle strength 1 muscle grade  Improve muscle strength with MMT to 4+/5 to 5/5  Improve and stabilize proper pelvic positioning  Restore ability to sit for ADL and work with min to 0 pain  Restore normal sleep habits without disturbances due to pain  Restore ability to perform ADL's and household activities independently with min to 0 pain    Anticipated barriers to physical therapy: none  Pt's spiritual, cultural and educational needs considered and pt agreeable to plan of care and goals        Plan   Continue with established plan of care towards PT goals.

## 2019-01-15 ENCOUNTER — CLINICAL SUPPORT (OUTPATIENT)
Dept: REHABILITATION | Facility: HOSPITAL | Age: 64
End: 2019-01-15
Attending: INTERNAL MEDICINE
Payer: COMMERCIAL

## 2019-01-15 DIAGNOSIS — G89.29 CHRONIC BILATERAL LOW BACK PAIN WITH RIGHT-SIDED SCIATICA: Primary | ICD-10-CM

## 2019-01-15 DIAGNOSIS — M54.41 CHRONIC BILATERAL LOW BACK PAIN WITH RIGHT-SIDED SCIATICA: Primary | ICD-10-CM

## 2019-01-15 PROCEDURE — 97014 ELECTRIC STIMULATION THERAPY: CPT | Mod: PN | Performed by: PHYSICAL THERAPIST

## 2019-01-15 PROCEDURE — 97110 THERAPEUTIC EXERCISES: CPT | Mod: PN | Performed by: PHYSICAL THERAPIST

## 2019-01-15 PROCEDURE — 97140 MANUAL THERAPY 1/> REGIONS: CPT | Mod: PN | Performed by: PHYSICAL THERAPIST

## 2019-01-15 NOTE — PROGRESS NOTES
Physical Therapy Progress Note     Name: Danuta Santos  Clinic Number: [id number]  Diagnosis:   Encounter Diagnosis   Name Primary?    Chronic bilateral low back pain with right-sided sciatica Yes     Physician: Erica Khan MD  Treatment Orders: PT Eval and Treat  Past Medical History:   Diagnosis Date    IBS (irritable bowel syndrome)        Precautions: as per diagnosis    Evaluation date: 2/15/2018  Visit # authorized: 34/40 on 1/15/2019  Authorization period: 12-31-18  Plan of care expiration: 2-26-19  MD Follow up appt: none scheduled    Time In:  3:20  Time Out:  4:15  Billable time:  50 min    Subjective     Pt reports: back is better, but her foot has been bothering and is going to look into seeing a podiatrist  Pt is recovering from a set back and had not been able to progress with strengthening.  Now that she is feeling better she hopes to start back with strengthening, but has been able to do stretching  Pain Scale: before treatment: 3/10 after treatment  Objective     Level pelvis  Severe tightness R lumbar paraspinals and QL    Lumbar active range of motion in standing is:1-15-19  - flexion - toes                     - extension -  50%                         - left side bending -  To knee         - right side bending -  To knee            Lumbar active range of motion in standing is: 12.5-18  - flexion - toes                     - extension -  50%                         - left side bending -  To knee         - right side bending -  To knee    Muscle Strength 1-15-19  MMT R L   Hip flexion 4/5 4+/5   Hip abduction 5/5 5/5   Hip extension 4+/5 4+/5   Glut max 3+/5 3+/5        Knee extension 5/5 5/5   Knee flexion 5/5 5/5           Muscle Strength 12-5-18  MMT R L   Hip flexion 4/5 4+/5   Hip abduction 5/5 5/5   Hip extension 4+/5 4+/5   Glut max 3+/5 3+/5        Knee extension 5/5 5/5   Knee flexion 5/5 5/5              Muscle Strength 10-16-18  MMT R L   Hip flexion 4+/5 5/5   Hip abduction  "5/5 5-/5   Hip extension 5/5 5/5   Glut max 4-/5 4-/5        Knee extension 5/5 5/5   Knee flexion 5/5 5/5         Muscle Strength 9-6-18  MMT R L   Hip flexion 4/5 5-/5   Hip abduction 5-/5 4+/5   Hip extension 4+/5 4+/5   Glut max 4-/5 4-/5        Knee extension 5/5 5/5   Knee flexion 5/5 5/5     Evaluation of feet revealed more severe pronation L>R    TREATMENT    Therapeutic exercise: Danuta received therapeutic exercises to develop strength, endurance, ROM, flexibility and core stabilization for 20  minutes including:    HS stretch supine   Glut stretch  Piriformis stretch  B QL stretch     Hip flexion sitting x 10 with 2#, led to dysfunction so will hold and continue to work on SLR only for now    Pelvic tilt  X 10 for 5 sec hold  Bridge x 10  SLR x 10  Arm and Leg lift prone x 10 arm and leg   Hip ext prone with bent knee x 10    Hip abd supine x 10 B  1 plank x 30 sec held the other 3 she normally does     (NP)Plank 4 reps 30 sec each       Sidelying L contract relax mob to L5-S1 region to level pelvis    HOLD FOR NOW    Partial sit up x 20  Hip abd sidelying x 20 kept small plane R to avoid dysfunction     Manual therapy: Danuta  received the following manual therapy  techniques x 15  min. to include soft tissue and joint mobilization were applied to the: low back and gluteals to include:STM to LB paraspinals R>L Sidelying L contract relax mob P/A mob to lumbar region Grade 1-2  Contract relax QL sidelying with more STM to this area in this position   Pt received tool-assisted massage with manual therapy techniques to R LB in sidelying with pillow at waist putting pt in SB L to trigger an inflammatory healing response and stimulate the production of new collagen and proper, more functional, less painful healing.    (NP) due to slight skin irritation Kinesiotape with 6" star for pain relief was performed over the R lumbar paraspinals.L3-4 region  Instructed pt in use, care and precautions with tape. "     Contract relax sidelying L to 2 times to help further stretch lumbar paraspinals and QL region.  Instructed pt to continue to work on QL stretch      (NP)Vacuum/cupping STM with manual therapy techniques was performed to back and gluteals to decrease muscle tightness, increase circulation and promote healing process.  The pt's skin was monitored for redness adjusting pressure as needed. The pt was instructed in possible side effects of bruising and/or soreness.    STM to lumbar region, QL, piriformis,     Patient received pre-mod electrical stimulation to decrease muscle tightness and pain to Lumbar paraspinals/ sacral border of gluteals B for 15 minutes with MH supine with cycle time: continuous, beat frequency: , CC/CV: CV.        Written Home Exercises Provided: none today  Pt demo good understanding of the education provided. Danuta demonstrated good return demonstration of activities.     Pt. education:  · Posture reeducation, body mechanics, HEP,   · No spiritual or educational barriers to learning provided  · Pt has no cultural, educational or language barriers to learning provided.    Assessment   Pt gradually progressing again with back after set back. Pt has not lost strength, but has not fully regained strength Pt appears to understand importance of stretching and progressing with strengthening as tolerated and will also work on addressing foot issue.  Temp orthotic provided by PT is helpful in some of her shoes which helps the back due to not limping with gait.       Pt will continue to benefit from skilled outpatient physical therapy to address the remaining functional deficits, provide pt/family education, and to maximize pt's level of independence in the home and community environment. .     GOALS:   Short Term Goals:  3 weeks MET STG's  Increase range of motion 25%  Increase strength 1/2 muscle grade  Improve postural awareness of pelvis to independently identify dysfunction with min  assist from PT  Be able to perform HEP with minimal cueing required     Long Term Goals: 6 weeks PARTIALLY MET LTG'S  Increase range of motion to 75% to 100% full   Improve muscle strength 1 muscle grade  Improve muscle strength with MMT to 4+/5 to 5/5  Improve and stabilize proper pelvic positioning  Restore ability to sit for ADL and work with min to 0 pain  Restore normal sleep habits without disturbances due to pain  Restore ability to perform ADL's and household activities independently with min to 0 pain    Anticipated barriers to physical therapy: none  Pt's spiritual, cultural and educational needs considered and pt agreeable to plan of care and goals        Plan   If you concur, I recommend patient continue with physical therapy 2 times a week as needed  for 6 weeks.  Please advise us of your  recommendations. Thank you for allowing us to assist in the care of your patient.      Jacy Escobar, MS, PT          I certify the need for these services furnished under this plan of treatment and while under my care.    ____________________________________ Physician/Referring Practitioner                                Date of Signature

## 2019-01-15 NOTE — PLAN OF CARE
Physical Therapy Progress Note     Name: Danuta Santos  Clinic Number: [id number]  Diagnosis:   Encounter Diagnosis   Name Primary?    Chronic bilateral low back pain with right-sided sciatica Yes     Physician: Erica Khan MD  Treatment Orders: PT Eval and Treat  Past Medical History:   Diagnosis Date    IBS (irritable bowel syndrome)        Precautions: as per diagnosis    Evaluation date: 2/15/2018  Visit # authorized: 34/40 on 1/15/2019  Authorization period: 12-31-18  Plan of care expiration: 2-26-19  MD Follow up appt: none scheduled    Time In:  3:20  Time Out:  4:15  Billable time:  50 min    Subjective     Pt reports: back is better, but her foot has been bothering and is going to look into seeing a podiatrist  Pt is recovering from a set back and had not been able to progress with strengthening.  Now that she is feeling better she hopes to start back with strengthening, but has been able to do stretching  Pain Scale: before treatment: 3/10 after treatment  Objective     Level pelvis  Severe tightness R lumbar paraspinals and QL    Lumbar active range of motion in standing is:1-15-19  - flexion - toes                     - extension -  50%                         - left side bending -  To knee         - right side bending -  To knee            Lumbar active range of motion in standing is: 12.5-18  - flexion - toes                     - extension -  50%                         - left side bending -  To knee         - right side bending -  To knee    Muscle Strength 1-15-19  MMT R L   Hip flexion 4/5 4+/5   Hip abduction 5/5 5/5   Hip extension 4+/5 4+/5   Glut max 3+/5 3+/5        Knee extension 5/5 5/5   Knee flexion 5/5 5/5           Muscle Strength 12-5-18  MMT R L   Hip flexion 4/5 4+/5   Hip abduction 5/5 5/5   Hip extension 4+/5 4+/5   Glut max 3+/5 3+/5        Knee extension 5/5 5/5   Knee flexion 5/5 5/5              Muscle Strength 10-16-18  MMT R L   Hip flexion 4+/5 5/5   Hip abduction  "5/5 5-/5   Hip extension 5/5 5/5   Glut max 4-/5 4-/5        Knee extension 5/5 5/5   Knee flexion 5/5 5/5         Muscle Strength 9-6-18  MMT R L   Hip flexion 4/5 5-/5   Hip abduction 5-/5 4+/5   Hip extension 4+/5 4+/5   Glut max 4-/5 4-/5        Knee extension 5/5 5/5   Knee flexion 5/5 5/5     Evaluation of feet revealed more severe pronation L>R    TREATMENT    Therapeutic exercise: Danuta received therapeutic exercises to develop strength, endurance, ROM, flexibility and core stabilization for 20  minutes including:    HS stretch supine   Glut stretch  Piriformis stretch  B QL stretch     Hip flexion sitting x 10 with 2#, led to dysfunction so will hold and continue to work on SLR only for now    Pelvic tilt  X 10 for 5 sec hold  Bridge x 10  SLR x 10  Arm and Leg lift prone x 10 arm and leg   Hip ext prone with bent knee x 10    Hip abd supine x 10 B  1 plank x 30 sec held the other 3 she normally does     (NP)Plank 4 reps 30 sec each       Sidelying L contract relax mob to L5-S1 region to level pelvis    HOLD FOR NOW    Partial sit up x 20  Hip abd sidelying x 20 kept small plane R to avoid dysfunction     Manual therapy: Danuta  received the following manual therapy  techniques x 15  min. to include soft tissue and joint mobilization were applied to the: low back and gluteals to include:STM to LB paraspinals R>L Sidelying L contract relax mob P/A mob to lumbar region Grade 1-2  Contract relax QL sidelying with more STM to this area in this position   Pt received tool-assisted massage with manual therapy techniques to R LB in sidelying with pillow at waist putting pt in SB L to trigger an inflammatory healing response and stimulate the production of new collagen and proper, more functional, less painful healing.    (NP) due to slight skin irritation Kinesiotape with 6" star for pain relief was performed over the R lumbar paraspinals.L3-4 region  Instructed pt in use, care and precautions with tape. "     Contract relax sidelying L to 2 times to help further stretch lumbar paraspinals and QL region.  Instructed pt to continue to work on QL stretch      (NP)Vacuum/cupping STM with manual therapy techniques was performed to back and gluteals to decrease muscle tightness, increase circulation and promote healing process.  The pt's skin was monitored for redness adjusting pressure as needed. The pt was instructed in possible side effects of bruising and/or soreness.    STM to lumbar region, QL, piriformis,     Patient received pre-mod electrical stimulation to decrease muscle tightness and pain to Lumbar paraspinals/ sacral border of gluteals B for 15 minutes with MH supine with cycle time: continuous, beat frequency: , CC/CV: CV.        Written Home Exercises Provided: none today  Pt demo good understanding of the education provided. Danuta demonstrated good return demonstration of activities.     Pt. education:  · Posture reeducation, body mechanics, HEP,   · No spiritual or educational barriers to learning provided  · Pt has no cultural, educational or language barriers to learning provided.    Assessment   Pt gradually progressing again with back after set back. Pt has not lost strength, but has not fully regained strength Pt appears to understand importance of stretching and progressing with strengthening as tolerated and will also work on addressing foot issue.  Temp orthotic provided by PT is helpful in some of her shoes which helps the back due to not limping with gait.       Pt will continue to benefit from skilled outpatient physical therapy to address the remaining functional deficits, provide pt/family education, and to maximize pt's level of independence in the home and community environment. .     GOALS:   Short Term Goals:  3 weeks MET STG's  Increase range of motion 25%  Increase strength 1/2 muscle grade  Improve postural awareness of pelvis to independently identify dysfunction with min  assist from PT  Be able to perform HEP with minimal cueing required     Long Term Goals: 6 weeks PARTIALLY MET LTG'S  Increase range of motion to 75% to 100% full   Improve muscle strength 1 muscle grade  Improve muscle strength with MMT to 4+/5 to 5/5  Improve and stabilize proper pelvic positioning  Restore ability to sit for ADL and work with min to 0 pain  Restore normal sleep habits without disturbances due to pain  Restore ability to perform ADL's and household activities independently with min to 0 pain    Anticipated barriers to physical therapy: none  Pt's spiritual, cultural and educational needs considered and pt agreeable to plan of care and goals        Plan   If you concur, I recommend patient continue with physical therapy 2 times a week as needed  for 6 weeks.  Please advise us of your  recommendations. Thank you for allowing us to assist in the care of your patient.      Jacy Escobar, MS, PT          I certify the need for these services furnished under this plan of treatment and while under my care.    ____________________________________ Physician/Referring Practitioner                                Date of Signature

## 2019-01-30 ENCOUNTER — OFFICE VISIT (OUTPATIENT)
Dept: INTERNAL MEDICINE | Facility: CLINIC | Age: 64
End: 2019-01-30
Payer: COMMERCIAL

## 2019-01-30 VITALS
DIASTOLIC BLOOD PRESSURE: 72 MMHG | OXYGEN SATURATION: 98 % | SYSTOLIC BLOOD PRESSURE: 132 MMHG | HEART RATE: 65 BPM | HEIGHT: 66 IN | BODY MASS INDEX: 20.98 KG/M2

## 2019-01-30 DIAGNOSIS — J98.8 RESPIRATORY INFECTION: Primary | ICD-10-CM

## 2019-01-30 DIAGNOSIS — J20.9 BRONCHITIS WITH BRONCHOSPASM: ICD-10-CM

## 2019-01-30 DIAGNOSIS — Z79.899 MEDICATION MANAGEMENT: ICD-10-CM

## 2019-01-30 PROCEDURE — 99214 OFFICE O/P EST MOD 30 MIN: CPT | Mod: S$GLB,,, | Performed by: INTERNAL MEDICINE

## 2019-01-30 PROCEDURE — 3008F BODY MASS INDEX DOCD: CPT | Mod: CPTII,S$GLB,, | Performed by: INTERNAL MEDICINE

## 2019-01-30 PROCEDURE — 99999 PR PBB SHADOW E&M-EST. PATIENT-LVL IV: ICD-10-PCS | Mod: PBBFAC,,, | Performed by: INTERNAL MEDICINE

## 2019-01-30 PROCEDURE — 99999 PR PBB SHADOW E&M-EST. PATIENT-LVL IV: CPT | Mod: PBBFAC,,, | Performed by: INTERNAL MEDICINE

## 2019-01-30 PROCEDURE — 99214 PR OFFICE/OUTPT VISIT, EST, LEVL IV, 30-39 MIN: ICD-10-PCS | Mod: S$GLB,,, | Performed by: INTERNAL MEDICINE

## 2019-01-30 PROCEDURE — 3008F PR BODY MASS INDEX (BMI) DOCUMENTED: ICD-10-PCS | Mod: CPTII,S$GLB,, | Performed by: INTERNAL MEDICINE

## 2019-01-30 NOTE — PATIENT INSTRUCTIONS
Dry the sinuses  Zyrtec  Wash the sinuses out: Arm and Hammer: nasal spray   Xhance/ Astelin?    Lungs:  Singulair  Prednisone  Breo  Rescue: if shortness of breath: switch to Nebulizer stop Breo      Infection:  Augmentin

## 2019-01-30 NOTE — PROGRESS NOTES
"Subjective:      Patient ID: Danuta Santos is a 63 y.o. female.    Chief Complaint: Follow-up (med discussion ); Cough; and Sinus Problem    HPI:  Cough   This is a new problem. The current episode started in the past 7 days. The problem has been gradually worsening. The problem occurs constantly. The cough is non-productive. Associated symptoms include nasal congestion, postnasal drip and wheezing. Pertinent negatives include no shortness of breath. The symptoms are aggravated by lying down. Treatments tried: Has been to pulmonary x3 with multiple meds. The treatment provided no relief. reactive airway     Patient Active Problem List   Diagnosis    Chronic bilateral low back pain with right-sided sciatica    Reactive airway disease without complication    ASCVD (arteriosclerotic cardiovascular disease) 3.9%     Past Medical History:   Diagnosis Date    IBS (irritable bowel syndrome)      Past Surgical History:   Procedure Laterality Date     SECTION      COSMETIC SURGERY      rhinoplasty    NOSE SURGERY       Family History   Problem Relation Age of Onset    Cancer Father     Stroke Maternal Grandfather     Breast cancer Neg Hx     Colon cancer Neg Hx     Diabetes Neg Hx     Eclampsia Neg Hx     Hypertension Neg Hx     Miscarriages / Stillbirths Neg Hx     Ovarian cancer Neg Hx      labor Neg Hx     Allergic rhinitis Neg Hx     Allergies Neg Hx     Angioedema Neg Hx     Asthma Neg Hx     Eczema Neg Hx     Immunodeficiency Neg Hx     Rhinitis Neg Hx     Urticaria Neg Hx     Atopy Neg Hx      Review of Systems   HENT: Positive for postnasal drip.    Respiratory: Positive for cough and wheezing. Negative for shortness of breath.      Objective:     Vitals:    19 0747   BP: 132/72   Pulse: 65   SpO2: 98%   Height: 5' 6" (1.676 m)   PainSc: 0-No pain     Body mass index is 20.98 kg/m².  Physical Exam   Constitutional: She is oriented to person, place, and time. She " appears well-developed and well-nourished. No distress.   HENT:   Bilateral ear fullness   Neck: Carotid bruit is not present. No thyromegaly present.   Cardiovascular: Normal rate, regular rhythm and normal heart sounds. PMI is not displaced.   Pulmonary/Chest: Effort normal. No respiratory distress. She has wheezes.   Abdominal: Soft. Bowel sounds are normal. She exhibits no distension. There is no tenderness.   Musculoskeletal: She exhibits no edema.   Neurological: She is alert and oriented to person, place, and time.     Assessment:     1. Respiratory infection    2. Bronchitis with bronchospasm    3. Medication management      Plan:   Danuta was seen today for follow-up, cough and sinus problem.    Diagnoses and all orders for this visit:    Respiratory infection    Bronchitis with bronchospasm    Medication management      Dry the sinuses  Zyrtec  Wash the sinuses out: Arm and Hammer: nasal spray   Xhance/ Astelin?    Lungs:  Singulair  Prednisone  Breo  Rescue: if shortness of breath: switch to Nebulizer stop Breo      Infection:  Augmentin    Problem List Items Addressed This Visit     None      Visit Diagnoses     Respiratory infection    -  Primary    Bronchitis with bronchospasm        Medication management            No orders of the defined types were placed in this encounter.    Follow-up in about 1 week (around 2/6/2019) for Follow up in one week.     Medication List           Accurate as of 1/30/19  8:30 AM. If you have any questions, ask your nurse or doctor.               CONTINUE taking these medications    * albuterol 2.5 mg /3 mL (0.083 %) nebulizer solution  Commonly known as:  PROVENTIL     * PROAIR HFA 90 mcg/actuation inhaler  Generic drug:  albuterol  INHALE one TO two PUFFS EVERY 6 HOURS AS NEEDED FOR COUGH and FOR WHEEZING     azelastine 137 mcg (0.1 %) nasal spray  Commonly known as:  ASTELIN  2 sprays (274 mcg total) by Nasal route 2 (two) times daily as needed for Rhinitis.      fluticasone-vilanterol 100-25 mcg/dose diskus inhaler  Commonly known as:  BREO     montelukast 10 mg tablet  Commonly known as:  SINGULAIR     oxymetazoline 0.05 % nasal spray  Commonly known as:  AFRIN     PROBIOTIC COMPLEX ORAL     VITAMIN B-12 500 MCG tablet  Generic drug:  cyanocobalamin     vitamin D 1000 units Tab  Commonly known as:  VITAMIN D3     XHANCE 93 mcg/actuation Aerb  Generic drug:  fluticasone         * This list has 2 medication(s) that are the same as other medications prescribed for you. Read the directions carefully, and ask your doctor or other care provider to review them with you.

## 2019-02-07 ENCOUNTER — OFFICE VISIT (OUTPATIENT)
Dept: INTERNAL MEDICINE | Facility: CLINIC | Age: 64
End: 2019-02-07
Payer: COMMERCIAL

## 2019-02-07 ENCOUNTER — CLINICAL SUPPORT (OUTPATIENT)
Dept: REHABILITATION | Facility: HOSPITAL | Age: 64
End: 2019-02-07
Attending: INTERNAL MEDICINE
Payer: COMMERCIAL

## 2019-02-07 VITALS
WEIGHT: 130 LBS | HEART RATE: 72 BPM | SYSTOLIC BLOOD PRESSURE: 128 MMHG | HEIGHT: 66 IN | DIASTOLIC BLOOD PRESSURE: 76 MMHG | OXYGEN SATURATION: 98 % | BODY MASS INDEX: 20.89 KG/M2

## 2019-02-07 DIAGNOSIS — M54.41 CHRONIC BILATERAL LOW BACK PAIN WITH RIGHT-SIDED SCIATICA: Primary | ICD-10-CM

## 2019-02-07 DIAGNOSIS — J98.01 BRONCHOSPASM: ICD-10-CM

## 2019-02-07 DIAGNOSIS — G89.29 CHRONIC BILATERAL LOW BACK PAIN WITH RIGHT-SIDED SCIATICA: Primary | ICD-10-CM

## 2019-02-07 DIAGNOSIS — R09.81 SINUS CONGESTION: Primary | ICD-10-CM

## 2019-02-07 PROCEDURE — 97110 THERAPEUTIC EXERCISES: CPT | Mod: PN | Performed by: PHYSICAL THERAPIST

## 2019-02-07 PROCEDURE — 97140 MANUAL THERAPY 1/> REGIONS: CPT | Mod: PN | Performed by: PHYSICAL THERAPIST

## 2019-02-07 PROCEDURE — 3008F PR BODY MASS INDEX (BMI) DOCUMENTED: ICD-10-PCS | Mod: CPTII,S$GLB,, | Performed by: INTERNAL MEDICINE

## 2019-02-07 PROCEDURE — 99213 PR OFFICE/OUTPT VISIT, EST, LEVL III, 20-29 MIN: ICD-10-PCS | Mod: S$GLB,,, | Performed by: INTERNAL MEDICINE

## 2019-02-07 PROCEDURE — 99999 PR PBB SHADOW E&M-EST. PATIENT-LVL III: ICD-10-PCS | Mod: PBBFAC,,, | Performed by: INTERNAL MEDICINE

## 2019-02-07 PROCEDURE — 99213 OFFICE O/P EST LOW 20 MIN: CPT | Mod: S$GLB,,, | Performed by: INTERNAL MEDICINE

## 2019-02-07 PROCEDURE — 3008F BODY MASS INDEX DOCD: CPT | Mod: CPTII,S$GLB,, | Performed by: INTERNAL MEDICINE

## 2019-02-07 PROCEDURE — 99999 PR PBB SHADOW E&M-EST. PATIENT-LVL III: CPT | Mod: PBBFAC,,, | Performed by: INTERNAL MEDICINE

## 2019-02-07 PROCEDURE — 97014 ELECTRIC STIMULATION THERAPY: CPT | Mod: PN | Performed by: PHYSICAL THERAPIST

## 2019-02-07 NOTE — PATIENT INSTRUCTIONS
Dry the sinuses  Zyrtec  Wash the sinuses out: Arm and Hammer: nasal spray  Gentle spray may take 5 round, sniff it)  Xhance/ Astelin? ( if it won't settle down)     Lungs:  Singulair  Prednisone: taper off  Breo: re-institute Breo during the day  Nebulizer at bedtime or for rescue

## 2019-02-07 NOTE — PROGRESS NOTES
"Subjective:      Patient ID: Danuta Santos is a 63 y.o. female.    Chief Complaint: Follow-up    HPI:  HPI   Patient states that she feel better and we are adjusting the protocol she was given last week. We reviewed and adjusted the meds.  Patient Active Problem List   Diagnosis    Chronic bilateral low back pain with right-sided sciatica    Reactive airway disease without complication    ASCVD (arteriosclerotic cardiovascular disease) 3.9%     Past Medical History:   Diagnosis Date    IBS (irritable bowel syndrome)      Past Surgical History:   Procedure Laterality Date     SECTION      COSMETIC SURGERY      rhinoplasty    NOSE SURGERY       Family History   Problem Relation Age of Onset    Cancer Father     Stroke Maternal Grandfather     Breast cancer Neg Hx     Colon cancer Neg Hx     Diabetes Neg Hx     Eclampsia Neg Hx     Hypertension Neg Hx     Miscarriages / Stillbirths Neg Hx     Ovarian cancer Neg Hx      labor Neg Hx     Allergic rhinitis Neg Hx     Allergies Neg Hx     Angioedema Neg Hx     Asthma Neg Hx     Eczema Neg Hx     Immunodeficiency Neg Hx     Rhinitis Neg Hx     Urticaria Neg Hx     Atopy Neg Hx      Review of Systems : no fevers or side effects  Objective:     Vitals:    19 0743   BP: 128/76   Pulse: 72   SpO2: 98%   Weight: 59 kg (130 lb)   Height: 5' 6" (1.676 m)   PainSc: 0-No pain     Body mass index is 20.98 kg/m².  Physical Exam   Constitutional: She appears well-developed and well-nourished.   Neck: No JVD present. No thyromegaly present.   Cardiovascular: Normal rate, normal heart sounds and intact distal pulses.   Pulmonary/Chest: Effort normal. No respiratory distress. She has wheezes.   Anterior upper airway bronchospasm     Assessment:     1. Sinus congestion    2. Bronchospasm      Plan:   Danuta was seen today for follow-up.    Diagnoses and all orders for this visit:    Sinus congestion  Comments:  Adjusted " protocol    Bronchospasm  Comments:  adjusted protocol      Dry the sinuses  Zyrtec  Wash the sinuses out: Arm and Hammer: nasal spray  Gentle spray may take 5 round, sniff it)  Xhance/ Astelin? ( if it won't settle down)     Lungs:  Singulair  Prednisone: taper off  Breo: re-institute Breo during the day  Nebulizer at bedtime or for rescue       Problem List Items Addressed This Visit     None      Visit Diagnoses     Sinus congestion    -  Primary    Adjusted protocol    Bronchospasm        adjusted protocol        No orders of the defined types were placed in this encounter.    Follow-up if symptoms worsen or fail to improve.     Medication List           Accurate as of 2/7/19  8:07 AM. If you have any questions, ask your nurse or doctor.               CONTINUE taking these medications    * albuterol 2.5 mg /3 mL (0.083 %) nebulizer solution  Commonly known as:  PROVENTIL     * PROAIR HFA 90 mcg/actuation inhaler  Generic drug:  albuterol  INHALE one TO two PUFFS EVERY 6 HOURS AS NEEDED FOR COUGH and FOR WHEEZING     azelastine 137 mcg (0.1 %) nasal spray  Commonly known as:  ASTELIN  2 sprays (274 mcg total) by Nasal route 2 (two) times daily as needed for Rhinitis.     fluticasone-vilanterol 100-25 mcg/dose diskus inhaler  Commonly known as:  BREO     montelukast 10 mg tablet  Commonly known as:  SINGULAIR     oxymetazoline 0.05 % nasal spray  Commonly known as:  AFRIN     PROBIOTIC COMPLEX ORAL     VITAMIN B-12 500 MCG tablet  Generic drug:  cyanocobalamin     vitamin D 1000 units Tab  Commonly known as:  VITAMIN D3     XHANCE 93 mcg/actuation Aerb  Generic drug:  fluticasone         * This list has 2 medication(s) that are the same as other medications prescribed for you. Read the directions carefully, and ask your doctor or other care provider to review them with you.

## 2019-02-07 NOTE — PROGRESS NOTES
Physical Therapy Progress Note     Name: Danuta Santos  Clinic Number: [id number]  Diagnosis:   Encounter Diagnosis   Name Primary?    Chronic bilateral low back pain with right-sided sciatica Yes     Physician: Erica Khan MD  Treatment Orders: PT Eval and Treat  Past Medical History:   Diagnosis Date    IBS (irritable bowel syndrome)        Precautions: as per diagnosis    Evaluation date: 2/15/2018  Visit # authorized: 35/40 on 2/7/2019  Authorization period: 12-31-18  Plan of care expiration: 2-26-19  MD Follow up appt: none scheduled    Time In:  2:10  Time Out:  3:05  Billable time:  40 min    Subjective     Pt reports: she has been sick and had to have cortisone shot which helped pain, but unable to self mobilize after being in bed 3 days and a lot of coughing which aggravated back  Pain Scale: before treatment: N/T after treatment:  Reported feeling better  Objective     Level pelvis  Severe tightness R lumbar paraspinals and QL    Lumbar active range of motion in standing is:1-15-19  - flexion - toes                     - extension -  50%                         - left side bending -  To knee         - right side bending -  To knee            Lumbar active range of motion in standing is: 12.5-18  - flexion - toes                     - extension -  50%                         - left side bending -  To knee         - right side bending -  To knee    Muscle Strength 1-15-19  MMT R L   Hip flexion 4/5 4+/5   Hip abduction 5/5 5/5   Hip extension 4+/5 4+/5   Glut max 3+/5 3+/5        Knee extension 5/5 5/5   Knee flexion 5/5 5/5           Muscle Strength 12-5-18  MMT R L   Hip flexion 4/5 4+/5   Hip abduction 5/5 5/5   Hip extension 4+/5 4+/5   Glut max 3+/5 3+/5        Knee extension 5/5 5/5   Knee flexion 5/5 5/5              Muscle Strength 10-16-18  MMT R L   Hip flexion 4+/5 5/5   Hip abduction 5/5 5-/5   Hip extension 5/5 5/5   Glut max 4-/5 4-/5        Knee extension 5/5 5/5   Knee flexion 5/5  "5/5         Muscle Strength 9-6-18  MMT R L   Hip flexion 4/5 5-/5   Hip abduction 5-/5 4+/5   Hip extension 4+/5 4+/5   Glut max 4-/5 4-/5        Knee extension 5/5 5/5   Knee flexion 5/5 5/5     Evaluation of feet revealed more severe pronation L>R    TREATMENT    Therapeutic exercise: Danuta received therapeutic exercises to develop strength, endurance, ROM, flexibility and core stabilization for 10 minutes including:    HS stretch supine   Glut stretch  Piriformis stretch  B QL stretch     Hip flexion sitting x 10 with 2#, led to dysfunction so will hold and continue to work on SLR only for now    Pelvic tilt  X 10 for 5 sec hold  Bridge x 10  SLR x 10  Arm and Leg lift prone x 10 arm and leg   Hip ext prone with bent knee x 10    Hip abd supine x 10 B  1 plank x 30 sec held the other 3 she normally does     (NP)Plank 4 reps 30 sec each       Sidelying L contract relax mob to L5-S1 region to level pelvis    HOLD FOR NOW    Partial sit up x 20  Hip abd sidelying x 20 kept small plane R to avoid dysfunction     Manual therapy: Danuta  received the following manual therapy  techniques x 15  min. to include soft tissue and joint mobilization were applied to the: low back and gluteals to include:STM to LB paraspinals R>L Sidelying L contract relax mob P/A mob to lumbar region Grade 1-2  Contract relax QL sidelying with more STM to this area in this position   Pt received tool-assisted massage with manual therapy techniques to R LB in sidelying with pillow at waist putting pt in SB L to trigger an inflammatory healing response and stimulate the production of new collagen and proper, more functional, less painful healing.     Kinesiotape with 6" star for pain relief was performed over the R lumbar paraspinals.L3-4 region  Instructed pt in use, care and precautions with tape.     Contract relax sidelying L to 2 times to help further stretch lumbar paraspinals and QL region.  Instructed pt to continue to work on QL " stretch      Vacuum/cupping STM with manual therapy techniques was performed to back and gluteals to decrease muscle tightness, increase circulation and promote healing process.  The pt's skin was monitored for redness adjusting pressure as needed. The pt was instructed in possible side effects of bruising and/or soreness.    STM to lumbar region, QL, piriformis,     Patient received pre-mod electrical stimulation to decrease muscle tightness and pain to Lumbar paraspinals/ sacral border of gluteals B for 15 minutes with MH supine with cycle time: continuous, beat frequency: , CC/CV: CV.        Written Home Exercises Provided: none today  Pt demo good understanding of the education provided. Danuta demonstrated good return demonstration of activities.     Pt. education:  · Posture reeducation, body mechanics, HEP,   · No spiritual or educational barriers to learning provided  · Pt has no cultural, educational or language barriers to learning provided.    Assessment   Pt developed another upper respiratory illness leading her to being in bed for 3 days and a lot of coughing which aggravated back.  Pt was unable to self mobilize.  Improved after treatment     Pt will continue to benefit from skilled outpatient physical therapy to address the remaining functional deficits, provide pt/family education, and to maximize pt's level of independence in the home and community environment. .     GOALS:   Short Term Goals:  3 weeks MET STG's  Increase range of motion 25%  Increase strength 1/2 muscle grade  Improve postural awareness of pelvis to independently identify dysfunction with min assist from PT  Be able to perform HEP with minimal cueing required     Long Term Goals: 6 weeks PARTIALLY MET LTG'S  Increase range of motion to 75% to 100% full   Improve muscle strength 1 muscle grade  Improve muscle strength with MMT to 4+/5 to 5/5  Improve and stabilize proper pelvic positioning  Restore ability to sit for ADL  and work with min to 0 pain  Restore normal sleep habits without disturbances due to pain  Restore ability to perform ADL's and household activities independently with min to 0 pain    Anticipated barriers to physical therapy: none  Pt's spiritual, cultural and educational needs considered and pt agreeable to plan of care and goals        Plan   Continue with established plan of care towards PT goals.

## 2019-02-18 ENCOUNTER — TELEPHONE (OUTPATIENT)
Dept: INTERNAL MEDICINE | Facility: CLINIC | Age: 64
End: 2019-02-18

## 2019-02-18 NOTE — TELEPHONE ENCOUNTER
Pneumonia:Dr.Smith Ridley  Xchance  Steroids    Cefdanir 300 bid  Zyrtec  Astelin  Nebulizer    Request appt: given

## 2019-02-18 NOTE — TELEPHONE ENCOUNTER
----- Message from Fern John sent at 2/18/2019  9:45 AM CST -----  Contact: Danuta Santos   The patient would like to notify Dr. Khan of her recent hospitalization due to pneumonia. The patient has scheduled an follow up appointment with Dr. Khan.

## 2019-03-08 ENCOUNTER — CLINICAL SUPPORT (OUTPATIENT)
Dept: REHABILITATION | Facility: HOSPITAL | Age: 64
End: 2019-03-08
Attending: INTERNAL MEDICINE
Payer: COMMERCIAL

## 2019-03-08 DIAGNOSIS — M54.41 CHRONIC BILATERAL LOW BACK PAIN WITH RIGHT-SIDED SCIATICA: Primary | ICD-10-CM

## 2019-03-08 DIAGNOSIS — G89.29 CHRONIC BILATERAL LOW BACK PAIN WITH RIGHT-SIDED SCIATICA: Primary | ICD-10-CM

## 2019-03-08 PROCEDURE — 97014 ELECTRIC STIMULATION THERAPY: CPT | Mod: PN | Performed by: PHYSICAL THERAPIST

## 2019-03-08 PROCEDURE — 97140 MANUAL THERAPY 1/> REGIONS: CPT | Mod: PN | Performed by: PHYSICAL THERAPIST

## 2019-03-08 PROCEDURE — 97110 THERAPEUTIC EXERCISES: CPT | Mod: PN | Performed by: PHYSICAL THERAPIST

## 2019-03-08 NOTE — PLAN OF CARE
"Physical Therapy Progress Note     Name: Danuta Santos  Clinic Number: [id number]  Diagnosis:   Encounter Diagnosis   Name Primary?    Chronic bilateral low back pain with right-sided sciatica Yes     Physician: Erica Khan MD  Treatment Orders: PT Eval and Treat  Past Medical History:   Diagnosis Date    IBS (irritable bowel syndrome)        Precautions: as per diagnosis    Evaluation date: 2/15/2018  Visit # authorized: 35/40 on 3/8/2019  Authorization period: 12-31-18  Plan of care expiration: 4-19-19  MD Follow up appt: none scheduled    Time In:  11:10  Time Out:  12:10  Billable time:  40 min    Subjective     Pt reports:she had developed sinus infection that then led to bronchitis then pnemonia and severe dehydration.  Pt recovering but feeling very fatigued.  Pt not working this week, but will start Monday.   Pt states back is "manageable" but only recently able to lie flat to try much of ex, has to still sleep elevated on pillows. Pt states she started her stretches this week, but no strengthening    Pain Scale: before treatment: N/T after treatment:  Reported feeling better  Objective     Pelvic dysfunction noted   mod tightness R lumbar paraspinals and QL, was severe at last visit    Lumbar active range of motion in standing is:3-8-19  - flexion - toes                     - extension -  50%                         - left side bending -  To knee         - right side bending -  To knee            Lumbar active range of motion in standing is: 12.5-18  - flexion - toes                     - extension -  50%                         - left side bending -  To knee         - right side bending -  To knee    Muscle Strength 3-8-19  MMT R L   Hip flexion 4-/5 4-/5   Hip abduction 4/5 4/5   Hip extension 4-/5 4-/5   Glut max 3/5 3/5        Knee extension 5/5 5/5   Knee flexion 5/5 5/5           Muscle Strength 12-5-18  MMT R L   Hip flexion 4/5 4+/5   Hip abduction 5/5 5/5   Hip extension 4+/5 4+/5   Glut " "max 3+/5 3+/5        Knee extension 5/5 5/5   Knee flexion 5/5 5/5              Muscle Strength 10-16-18  MMT R L   Hip flexion 4+/5 5/5   Hip abduction 5/5 5-/5   Hip extension 5/5 5/5   Glut max 4-/5 4-/5        Knee extension 5/5 5/5   Knee flexion 5/5 5/5         Muscle Strength 9-6-18  MMT R L   Hip flexion 4/5 5-/5   Hip abduction 5-/5 4+/5   Hip extension 4+/5 4+/5   Glut max 4-/5 4-/5        Knee extension 5/5 5/5   Knee flexion 5/5 5/5     Evaluation of feet revealed more severe pronation L>R    TREATMENT    Therapeutic exercise: Danuta received therapeutic exercises to develop strength, endurance, ROM, flexibility and core stabilization for 10 minutes including:    HS stretch supine   Glut stretch  Piriformis stretch  B QL stretch    Bridge x 10      HOLD FOR NOW  Pelvic tilt  X 10 for 5 sec hold    SLR x 10  Arm and Leg lift prone x 10 arm and leg   Hip ext prone with bent knee x 10    Hip abd supine x 10 B  1 plank x 30 sec held the other 3 she normally does     (NP)Plank 4 reps 30 sec each       Sidelying L contract relax mob to L5-S1 region to level pelvis    HOLD FOR NOW from previous episode     Partial sit up x 20  Hip abd sidelying x 20 kept small plane R to avoid dysfunction     Manual therapy: Danuta  received the following manual therapy  techniques x 15  min. to include soft tissue and joint mobilization were applied to the: low back and gluteals to include:STM to LB paraspinals R>L Sidelying L contract relax mob P/A mob to lumbar region Grade 1-2  Contract relax QL sidelying with more STM to this area in this position   Pt received tool-assisted massage with manual therapy techniques to R LB in sidelying with pillow at waist putting pt in SB L to trigger an inflammatory healing response and stimulate the production of new collagen and proper, more functional, less painful healing.     (NP)Kinesiotape with 6" star for pain relief was performed over the R lumbar paraspinals.L3-4 region  " Instructed pt in use, care and precautions with tape.     Contract relax sidelying L to 2 times to help further stretch lumbar paraspinals and QL region.  Instructed pt to continue to work on QL stretch      Vacuum/cupping STM with manual therapy techniques was performed to back and gluteals to decrease muscle tightness, increase circulation and promote healing process.  The pt's skin was monitored for redness adjusting pressure as needed. The pt was instructed in possible side effects of bruising and/or soreness.    STM to lumbar region, QL, piriformis,     Patient received pre-mod electrical stimulation to decrease muscle tightness and pain to Lumbar paraspinals/ sacral border of gluteals B for 15 minutes with MH supine with cycle time: continuous, beat frequency: , CC/CV: CV.        Written Home Exercises Provided: none today  Pt demo good understanding of the education provided. Danuta demonstrated good return demonstration of activities.     Pt. education:  · Posture reeducation, body mechanics, HEP,   · No spiritual or educational barriers to learning provided  · Pt has no cultural, educational or language barriers to learning provided.    Assessment   Pt only seen for 2 visits during this POC due to illness and has lost strength again due to prolonged illness and being bed ridden Will slowly progress with strengthening again as pt tolerates     Pt will continue to benefit from skilled outpatient physical therapy to address the remaining functional deficits, provide pt/family education, and to maximize pt's level of independence in the home and community environment. .     GOALS:   Short Term Goals:  3 weeks MET STG's  Increase range of motion 25%  Increase strength 1/2 muscle grade  Improve postural awareness of pelvis to independently identify dysfunction with min assist from PT  Be able to perform HEP with minimal cueing required     Long Term Goals: 6 weeks PARTIALLY MET LTG'S  Increase range of  motion to 75% to 100% full   Improve muscle strength 1 muscle grade  Improve muscle strength with MMT to 4+/5 to 5/5  Improve and stabilize proper pelvic positioning  Restore ability to sit for ADL and work with min to 0 pain  Restore normal sleep habits without disturbances due to pain  Restore ability to perform ADL's and household activities independently with min to 0 pain    Anticipated barriers to physical therapy: none  Pt's spiritual, cultural and educational needs considered and pt agreeable to plan of care and goals        Plan   If you concur, I recommend patient continue with physical therapy 1-2 times a week as needed  for 6 weeks.  Please advise us of your  recommendations. Thank you for allowing us to assist in the care of your patient.      Jacy Escobar, MS, PT          I certify the need for these services furnished under this plan of treatment and while under my care.    ____________________________________ Physician/Referring Practitioner                                Date of Signature

## 2019-03-08 NOTE — PROGRESS NOTES
"Physical Therapy Progress Note     Name: Danuta Santos  Clinic Number: [id number]  Diagnosis:   Encounter Diagnosis   Name Primary?    Chronic bilateral low back pain with right-sided sciatica Yes     Physician: Erica Khan MD  Treatment Orders: PT Eval and Treat  Past Medical History:   Diagnosis Date    IBS (irritable bowel syndrome)        Precautions: as per diagnosis    Evaluation date: 2/15/2018  Visit # authorized: 35/40 on 3/8/2019  Authorization period: 12-31-18  Plan of care expiration: 4-19-19  MD Follow up appt: none scheduled    Time In:  11:10  Time Out:  12:10  Billable time:  40 min    Subjective     Pt reports:she had developed sinus infection that then led to bronchitis then pnemonia and severe dehydration.  Pt recovering but feeling very fatigued.  Pt not working this week, but will start Monday.   Pt states back is "manageable" but only recently able to lie flat to try much of ex, has to still sleep elevated on pillows. Pt states she started her stretches this week, but no strengthening    Pain Scale: before treatment: N/T after treatment:  Reported feeling better  Objective     Pelvic dysfunction noted   mod tightness R lumbar paraspinals and QL, was severe at last visit    Lumbar active range of motion in standing is:3-8-19  - flexion - toes                     - extension -  50%                         - left side bending -  To knee         - right side bending -  To knee            Lumbar active range of motion in standing is: 12.5-18  - flexion - toes                     - extension -  50%                         - left side bending -  To knee         - right side bending -  To knee    Muscle Strength 3-8-19  MMT R L   Hip flexion 4-/5 4-/5   Hip abduction 4/5 4/5   Hip extension 4-/5 4-/5   Glut max 3/5 3/5        Knee extension 5/5 5/5   Knee flexion 5/5 5/5           Muscle Strength 12-5-18  MMT R L   Hip flexion 4/5 4+/5   Hip abduction 5/5 5/5   Hip extension 4+/5 4+/5   Glut " "max 3+/5 3+/5        Knee extension 5/5 5/5   Knee flexion 5/5 5/5              Muscle Strength 10-16-18  MMT R L   Hip flexion 4+/5 5/5   Hip abduction 5/5 5-/5   Hip extension 5/5 5/5   Glut max 4-/5 4-/5        Knee extension 5/5 5/5   Knee flexion 5/5 5/5         Muscle Strength 9-6-18  MMT R L   Hip flexion 4/5 5-/5   Hip abduction 5-/5 4+/5   Hip extension 4+/5 4+/5   Glut max 4-/5 4-/5        Knee extension 5/5 5/5   Knee flexion 5/5 5/5     Evaluation of feet revealed more severe pronation L>R    TREATMENT    Therapeutic exercise: Danuta received therapeutic exercises to develop strength, endurance, ROM, flexibility and core stabilization for 10 minutes including:    HS stretch supine   Glut stretch  Piriformis stretch  B QL stretch    Bridge x 10      HOLD FOR NOW  Pelvic tilt  X 10 for 5 sec hold    SLR x 10  Arm and Leg lift prone x 10 arm and leg   Hip ext prone with bent knee x 10    Hip abd supine x 10 B  1 plank x 30 sec held the other 3 she normally does     (NP)Plank 4 reps 30 sec each       Sidelying L contract relax mob to L5-S1 region to level pelvis    HOLD FOR NOW from previous episode     Partial sit up x 20  Hip abd sidelying x 20 kept small plane R to avoid dysfunction     Manual therapy: Danuta  received the following manual therapy  techniques x 15  min. to include soft tissue and joint mobilization were applied to the: low back and gluteals to include:STM to LB paraspinals R>L Sidelying L contract relax mob P/A mob to lumbar region Grade 1-2  Contract relax QL sidelying with more STM to this area in this position   Pt received tool-assisted massage with manual therapy techniques to R LB in sidelying with pillow at waist putting pt in SB L to trigger an inflammatory healing response and stimulate the production of new collagen and proper, more functional, less painful healing.     (NP)Kinesiotape with 6" star for pain relief was performed over the R lumbar paraspinals.L3-4 region  " Instructed pt in use, care and precautions with tape.     Contract relax sidelying L to 2 times to help further stretch lumbar paraspinals and QL region.  Instructed pt to continue to work on QL stretch      Vacuum/cupping STM with manual therapy techniques was performed to back and gluteals to decrease muscle tightness, increase circulation and promote healing process.  The pt's skin was monitored for redness adjusting pressure as needed. The pt was instructed in possible side effects of bruising and/or soreness.    STM to lumbar region, QL, piriformis,     Patient received pre-mod electrical stimulation to decrease muscle tightness and pain to Lumbar paraspinals/ sacral border of gluteals B for 15 minutes with MH supine with cycle time: continuous, beat frequency: , CC/CV: CV.        Written Home Exercises Provided: none today  Pt demo good understanding of the education provided. Danuta demonstrated good return demonstration of activities.     Pt. education:  · Posture reeducation, body mechanics, HEP,   · No spiritual or educational barriers to learning provided  · Pt has no cultural, educational or language barriers to learning provided.    Assessment   Pt only seen for 2 visits during this POC due to illness and has lost strength again due to prolonged illness and being bed ridden Will slowly progress with strengthening again as pt tolerates     Pt will continue to benefit from skilled outpatient physical therapy to address the remaining functional deficits, provide pt/family education, and to maximize pt's level of independence in the home and community environment. .     GOALS:   Short Term Goals:  3 weeks MET STG's  Increase range of motion 25%  Increase strength 1/2 muscle grade  Improve postural awareness of pelvis to independently identify dysfunction with min assist from PT  Be able to perform HEP with minimal cueing required     Long Term Goals: 6 weeks PARTIALLY MET LTG'S  Increase range of  motion to 75% to 100% full   Improve muscle strength 1 muscle grade  Improve muscle strength with MMT to 4+/5 to 5/5  Improve and stabilize proper pelvic positioning  Restore ability to sit for ADL and work with min to 0 pain  Restore normal sleep habits without disturbances due to pain  Restore ability to perform ADL's and household activities independently with min to 0 pain    Anticipated barriers to physical therapy: none  Pt's spiritual, cultural and educational needs considered and pt agreeable to plan of care and goals        Plan   If you concur, I recommend patient continue with physical therapy 1-2 times a week as needed  for 6 weeks.  Please advise us of your  recommendations. Thank you for allowing us to assist in the care of your patient.      Jacy Escobar, MS, PT          I certify the need for these services furnished under this plan of treatment and while under my care.    ____________________________________ Physician/Referring Practitioner                                Date of Signature

## 2019-03-26 ENCOUNTER — CLINICAL SUPPORT (OUTPATIENT)
Dept: REHABILITATION | Facility: HOSPITAL | Age: 64
End: 2019-03-26
Attending: INTERNAL MEDICINE
Payer: COMMERCIAL

## 2019-03-26 DIAGNOSIS — M54.41 CHRONIC BILATERAL LOW BACK PAIN WITH RIGHT-SIDED SCIATICA: Primary | ICD-10-CM

## 2019-03-26 DIAGNOSIS — G89.29 CHRONIC BILATERAL LOW BACK PAIN WITH RIGHT-SIDED SCIATICA: Primary | ICD-10-CM

## 2019-03-26 PROCEDURE — 97110 THERAPEUTIC EXERCISES: CPT | Mod: PN | Performed by: PHYSICAL THERAPIST

## 2019-03-26 PROCEDURE — 97014 ELECTRIC STIMULATION THERAPY: CPT | Mod: PN | Performed by: PHYSICAL THERAPIST

## 2019-03-26 PROCEDURE — 97140 MANUAL THERAPY 1/> REGIONS: CPT | Mod: PN | Performed by: PHYSICAL THERAPIST

## 2019-03-26 NOTE — PROGRESS NOTES
Physical Therapy Daily Note     Name: Danuta Santos  Clinic Number: [id number]  Diagnosis:   Encounter Diagnosis   Name Primary?    Chronic bilateral low back pain with right-sided sciatica Yes     Physician: Erica Khan MD  Treatment Orders: PT Eval and Treat  Past Medical History:   Diagnosis Date    IBS (irritable bowel syndrome)        Precautions: as per diagnosis    Evaluation date: 2/15/2018  Visit # authorized: 35/40 on 3/26/2019  Authorization period: 12-31-18  Plan of care expiration: 4-19-19  MD Follow up appt: none scheduled    Time In:  11:13  Time Out:  12:25  Billable time:  55 min    Subjective     Pt reports: she had sinus surgery and has been having trouble sleeping.  Pt states back was doing ok, until recently.  Pt states has been unable to self mobilize. Pt states uncomfortable sleeping last night     Pain Scale: before treatment: 7 after treatment:  Reported feeling better  Objective     Pelvic dysfunction noted   mod tightness R lumbar paraspinals and QL, was severe at last visit    Lumbar active range of motion in standing is:3-8-19  - flexion - toes                     - extension -  50%                         - left side bending -  To knee         - right side bending -  To knee            Lumbar active range of motion in standing is: 12.5-18  - flexion - toes                     - extension -  50%                         - left side bending -  To knee         - right side bending -  To knee    Muscle Strength 3-8-19  MMT R L   Hip flexion 4-/5 4-/5   Hip abduction 4/5 4/5   Hip extension 4-/5 4-/5   Glut max 3/5 3/5        Knee extension 5/5 5/5   Knee flexion 5/5 5/5           Muscle Strength 12-5-18  MMT R L   Hip flexion 4/5 4+/5   Hip abduction 5/5 5/5   Hip extension 4+/5 4+/5   Glut max 3+/5 3+/5        Knee extension 5/5 5/5   Knee flexion 5/5 5/5              Muscle Strength 10-16-18  MMT R L   Hip flexion 4+/5 5/5   Hip abduction 5/5 5-/5   Hip extension 5/5 5/5   Glut  "max 4-/5 4-/5        Knee extension 5/5 5/5   Knee flexion 5/5 5/5         Muscle Strength 9-6-18  MMT R L   Hip flexion 4/5 5-/5   Hip abduction 5-/5 4+/5   Hip extension 4+/5 4+/5   Glut max 4-/5 4-/5        Knee extension 5/5 5/5   Knee flexion 5/5 5/5     Evaluation of feet revealed more severe pronation L>R    TREATMENT    Therapeutic exercise: Danuta received therapeutic exercises to develop strength, endurance, ROM, flexibility and core stabilization for 15 minutes including:  Initially,Unable to self mobilize, performed sidelying contract relax 2 times before correction occurred.        Performed after manual therapy and after stretching able to self mobilize    HS stretch supine   Glut stretch  Piriformis stretch  B QL stretch    Contract relax glut for self mob of pelvis    (NP)Bridge x 10      HOLD FOR NOW  Pelvic tilt  X 10 for 5 sec hold    SLR x 10  Arm and Leg lift prone x 10 arm and leg   Hip ext prone with bent knee x 10    Hip abd supine x 10 B  1 plank x 30 sec held the other 3 she normally does     (NP)Plank 4 reps 30 sec each       Sidelying L contract relax mob to L5-S1 region to level pelvis    HOLD FOR NOW from previous episode     Partial sit up x 20  Hip abd sidelying x 20 kept small plane R to avoid dysfunction     Manual therapy: Danuta  received the following manual therapy  techniques x 25  min. to include soft tissue and joint mobilization were applied to the: low back and gluteals to include:STM to LB paraspinals R>L Sidelying L contract relax mob P/A mob to lumbar region Grade 1-2    (NP)  Pt received tool-assisted massage with manual therapy techniques to R LB in sidelying with pillow at waist putting pt in SB L to trigger an inflammatory healing response and stimulate the production of new collagen and proper, more functional, less painful healing.     (NP)Kinesiotape with 6" star for pain relief was performed over the R lumbar paraspinals.L3-4 region  Instructed pt in use, " care and precautions with tape.     Vacuum/cupping STM with manual therapy techniques was performed to back and gluteals to decrease muscle tightness, increase circulation and promote healing process.  The pt's skin was monitored for redness adjusting pressure as needed. The pt was instructed in possible side effects of bruising and/or soreness.    STM to lumbar region, QL, piriformis,     Patient received pre-mod electrical stimulation to decrease muscle tightness and pain to Lumbar paraspinals/ sacral border of gluteals B for 15 minutes with MH supine with cycle time: continuous, beat frequency: , CC/CV: CV.        Written Home Exercises Provided: none today  Pt demo good understanding of the education provided. Danuta demonstrated good return demonstration of activities.     Pt. education:  · Posture reeducation, body mechanics, HEP,   · No spiritual or educational barriers to learning provided  · Pt has no cultural, educational or language barriers to learning provided.    Assessment   Pt recently had sinus surgery and is just now getting more active, but not started ex yet. Initially very tight with mob and unable to mobilize. Pt with improved soft tissue mobility and by end of treatment able to self mobilize after stretching     Pt will continue to benefit from skilled outpatient physical therapy to address the remaining functional deficits, provide pt/family education, and to maximize pt's level of independence in the home and community environment. .     GOALS:   Short Term Goals:  3 weeks MET STG's  Increase range of motion 25%  Increase strength 1/2 muscle grade  Improve postural awareness of pelvis to independently identify dysfunction with min assist from PT  Be able to perform HEP with minimal cueing required     Long Term Goals: 6 weeks PARTIALLY MET LTG'S  Increase range of motion to 75% to 100% full   Improve muscle strength 1 muscle grade  Improve muscle strength with MMT to 4+/5 to  5/5  Improve and stabilize proper pelvic positioning  Restore ability to sit for ADL and work with min to 0 pain  Restore normal sleep habits without disturbances due to pain  Restore ability to perform ADL's and household activities independently with min to 0 pain    Anticipated barriers to physical therapy: none  Pt's spiritual, cultural and educational needs considered and pt agreeable to plan of care and goals        Plan   Continue with established plan of care towards PT goals.  Resume strengthening as tolerated

## 2019-04-01 ENCOUNTER — OFFICE VISIT (OUTPATIENT)
Dept: INTERNAL MEDICINE | Facility: CLINIC | Age: 64
End: 2019-04-01
Payer: COMMERCIAL

## 2019-04-01 VITALS
WEIGHT: 128 LBS | HEART RATE: 66 BPM | HEIGHT: 66 IN | DIASTOLIC BLOOD PRESSURE: 70 MMHG | OXYGEN SATURATION: 99 % | SYSTOLIC BLOOD PRESSURE: 128 MMHG | BODY MASS INDEX: 20.57 KG/M2

## 2019-04-01 DIAGNOSIS — J98.9 REACTIVE AIRWAY DISEASE THAT IS NOT ASTHMA: ICD-10-CM

## 2019-04-01 DIAGNOSIS — J34.89 NASAL OBSTRUCTION: Primary | ICD-10-CM

## 2019-04-01 PROCEDURE — 3008F PR BODY MASS INDEX (BMI) DOCUMENTED: ICD-10-PCS | Mod: CPTII,S$GLB,, | Performed by: INTERNAL MEDICINE

## 2019-04-01 PROCEDURE — 99999 PR PBB SHADOW E&M-EST. PATIENT-LVL III: CPT | Mod: PBBFAC,,, | Performed by: INTERNAL MEDICINE

## 2019-04-01 PROCEDURE — 3008F BODY MASS INDEX DOCD: CPT | Mod: CPTII,S$GLB,, | Performed by: INTERNAL MEDICINE

## 2019-04-01 PROCEDURE — 99999 PR PBB SHADOW E&M-EST. PATIENT-LVL III: ICD-10-PCS | Mod: PBBFAC,,, | Performed by: INTERNAL MEDICINE

## 2019-04-01 PROCEDURE — 99213 OFFICE O/P EST LOW 20 MIN: CPT | Mod: S$GLB,,, | Performed by: INTERNAL MEDICINE

## 2019-04-01 PROCEDURE — 99213 PR OFFICE/OUTPT VISIT, EST, LEVL III, 20-29 MIN: ICD-10-PCS | Mod: S$GLB,,, | Performed by: INTERNAL MEDICINE

## 2019-04-01 RX ORDER — IPRATROPIUM BROMIDE AND ALBUTEROL SULFATE 2.5; .5 MG/3ML; MG/3ML
SOLUTION RESPIRATORY (INHALATION)
Refills: 3 | COMMUNITY
Start: 2019-02-25 | End: 2019-05-29

## 2019-04-01 NOTE — PROGRESS NOTES
"Subjective:      Patient ID: Danuta Santos is a 63 y.o. female.    Chief Complaint: Follow-up    HPI:  HPI   Patient wanted to discuss her pulmonary symptoms. Hospitalization in 2019 for pneumonia. She has been found to have nasal obstruction and is followed by Dr. Tran who has completed a procedure on hr nasal passages. She also has seen her pulmonologist Dr. Lizama. She has had an immune evaluation with final results pending. Currently she is only on zyrtec and DUO -NEB and has been given Robitussin AC.  Patient Active Problem List   Diagnosis    Chronic bilateral low back pain with right-sided sciatica    Reactive airway disease without complication    ASCVD (arteriosclerotic cardiovascular disease) 3.9%     Past Medical History:   Diagnosis Date    IBS (irritable bowel syndrome)      Past Surgical History:   Procedure Laterality Date     SECTION      COSMETIC SURGERY      rhinoplasty    NOSE SURGERY       Family History   Problem Relation Age of Onset    Cancer Father     Stroke Maternal Grandfather     Breast cancer Neg Hx     Colon cancer Neg Hx     Diabetes Neg Hx     Eclampsia Neg Hx     Hypertension Neg Hx     Miscarriages / Stillbirths Neg Hx     Ovarian cancer Neg Hx      labor Neg Hx     Allergic rhinitis Neg Hx     Allergies Neg Hx     Angioedema Neg Hx     Asthma Neg Hx     Eczema Neg Hx     Immunodeficiency Neg Hx     Rhinitis Neg Hx     Urticaria Neg Hx     Atopy Neg Hx      Review of Systems   Constitutional: Negative for activity change, chills, fever and unexpected weight change.   Respiratory: Positive for shortness of breath. Negative for cough, chest tightness and wheezing.    Cardiovascular: Negative for chest pain, palpitations and leg swelling.     Objective:     Vitals:    19 1555   BP: 128/70   Pulse: 66   SpO2: 99%   Weight: 58.1 kg (128 lb)   Height: 5' 6" (1.676 m)   PainSc:   4     Body mass index is 20.66 kg/m².  Physical Exam "   Constitutional: She appears well-developed and well-nourished.   Neck: No JVD present. No thyromegaly present.   Cardiovascular: Normal rate, normal heart sounds and intact distal pulses.   Pulmonary/Chest: Effort normal. No respiratory distress. She has wheezes.     Assessment:     1. Nasal obstruction    2. Reactive airway disease that is not asthma      Plan:   Danuta was seen today for follow-up.    Diagnoses and all orders for this visit:    Nasal obstruction    Reactive airway disease that is not asthma    Patient has return appts with Dr. Tran and Dr. Lizama within the next week.  She will need to determine whether she has an asthma problem.    Problem List Items Addressed This Visit     None      Visit Diagnoses     Nasal obstruction    -  Primary    Reactive airway disease that is not asthma            No orders of the defined types were placed in this encounter.    Follow up if symptoms worsen or fail to improve.     Medication List           Accurate as of 4/1/19  5:55 PM. If you have any questions, ask your nurse or doctor.               CONTINUE taking these medications    * albuterol 2.5 mg /3 mL (0.083 %) nebulizer solution  Commonly known as:  PROVENTIL     * PROAIR HFA 90 mcg/actuation inhaler  Generic drug:  albuterol  INHALE one TO two PUFFS EVERY 6 HOURS AS NEEDED FOR COUGH and FOR WHEEZING     albuterol-ipratropium 2.5 mg-0.5 mg/3 mL nebulizer solution  Commonly known as:  DUO-NEB     PROBIOTIC COMPLEX ORAL     VITAMIN B-12 500 MCG tablet  Generic drug:  cyanocobalamin     vitamin D 1000 units Tab  Commonly known as:  VITAMIN D3         * This list has 2 medication(s) that are the same as other medications prescribed for you. Read the directions carefully, and ask your doctor or other care provider to review them with you.            STOP taking these medications    azelastine 137 mcg (0.1 %) nasal spray  Commonly known as:  ASTELIN  Stopped by:  Erica Khan MD      fluticasone-vilanterol 100-25 mcg/dose diskus inhaler  Commonly known as:  BREO  Stopped by:  Erica Khan MD     montelukast 10 mg tablet  Commonly known as:  SINGULAIR  Stopped by:  Erica Khan MD     oxymetazoline 0.05 % nasal spray  Commonly known as:  AFRIN  Stopped by:  Erica Khan MD     XHANCE 93 mcg/actuation Aerb  Generic drug:  fluticasone  Stopped by:  Erica Khan MD

## 2019-04-02 ENCOUNTER — TELEPHONE (OUTPATIENT)
Dept: INTERNAL MEDICINE | Facility: CLINIC | Age: 64
End: 2019-04-02

## 2019-04-02 ENCOUNTER — CLINICAL SUPPORT (OUTPATIENT)
Dept: REHABILITATION | Facility: HOSPITAL | Age: 64
End: 2019-04-02
Attending: INTERNAL MEDICINE
Payer: COMMERCIAL

## 2019-04-02 DIAGNOSIS — M54.41 CHRONIC BILATERAL LOW BACK PAIN WITH RIGHT-SIDED SCIATICA: Primary | ICD-10-CM

## 2019-04-02 DIAGNOSIS — G89.29 CHRONIC BILATERAL LOW BACK PAIN WITH RIGHT-SIDED SCIATICA: Primary | ICD-10-CM

## 2019-04-02 PROCEDURE — 97140 MANUAL THERAPY 1/> REGIONS: CPT | Mod: PN | Performed by: PHYSICAL THERAPIST

## 2019-04-02 PROCEDURE — 97110 THERAPEUTIC EXERCISES: CPT | Mod: PN | Performed by: PHYSICAL THERAPIST

## 2019-04-02 PROCEDURE — 97014 ELECTRIC STIMULATION THERAPY: CPT | Mod: PN | Performed by: PHYSICAL THERAPIST

## 2019-04-02 NOTE — TELEPHONE ENCOUNTER
I spoke to the patient. She will take several days off of work,  Use tessalon 200 mg tid and use 1/2 teaspoon of codeine cough syrup at bedtime. GML

## 2019-04-02 NOTE — TELEPHONE ENCOUNTER
Attempted to call Pt to gather more info. Did not receive an answer. Left VM for Pt to call clinic back. See message below. Please advise

## 2019-04-02 NOTE — TELEPHONE ENCOUNTER
Pt called back and stated that her side affects included a headache, grogginess, and she become constipated. Pt would like to know if can switch medication or should she take an extended leave from work and deal with the side affects. Pt also stated that if she does do that she would like something to help with constipation. Please advise I informed the Pt I would call her back after I receive a response.      Debbie

## 2019-04-02 NOTE — TELEPHONE ENCOUNTER
----- Message from Jama Huang sent at 4/2/2019 12:09 PM CDT -----  Contact: Patient [phone]  Patient is returning a phone call.  Who left a message for the patient: Ledya  Does patient know what this is regarding:  Not sure of call  Comments: stating dropped phone when tried to answer, would like a call back please, if need for patient to come in can come in any time today.    Please call an advise  Thank you

## 2019-04-02 NOTE — TELEPHONE ENCOUNTER
----- Message from Batsheva Ovalle sent at 4/2/2019 11:09 AM CDT -----  Contact: self/[phone]  Pt is calling to speak with someone in regards to her prescription given to her from her ENT. Pt states that she is having very weird side effects to the meds and does not think she can continue to take the meds. She would like to see if there is something else that can be prescribed for her. Pt states that she will come in if she has to. Please call and advise.          Thank You

## 2019-04-02 NOTE — PROGRESS NOTES
Physical Therapy Daily Note     Name: Danuta Santos  Clinic Number: [id number]  Diagnosis:   Encounter Diagnosis   Name Primary?    Chronic bilateral low back pain with right-sided sciatica Yes     Physician: Erica Khan MD  Treatment Orders: PT Eval and Treat  Past Medical History:   Diagnosis Date    IBS (irritable bowel syndrome)        Precautions: as per diagnosis    Evaluation date: 2/15/2018  Visit # authorized: 36/40 on 4/2/2019  Authorization period: 12-31-18  Plan of care expiration: 4-19-19  MD Follow up appt: none scheduled    Time In:  10:09  Time Out:  12:25  Billable time:  40 min    Subjective     Pt reports: she has had a lot of sinus drainage making lying down uncomfortable so having trouble doing her exercises and having trouble getting pelvis level      Pain Scale: before treatment: 7 after treatment:  Reported feeling better  Objective     Pelvic dysfunction noted   mod tightness R lumbar paraspinals and QL, was severe at last visit    Lumbar active range of motion in standing is:3-8-19  - flexion - toes                     - extension -  50%                         - left side bending -  To knee         - right side bending -  To knee            Lumbar active range of motion in standing is: 12.5-18  - flexion - toes                     - extension -  50%                         - left side bending -  To knee         - right side bending -  To knee    Muscle Strength 3-8-19  MMT R L   Hip flexion 4-/5 4-/5   Hip abduction 4/5 4/5   Hip extension 4-/5 4-/5   Glut max 3/5 3/5        Knee extension 5/5 5/5   Knee flexion 5/5 5/5           Muscle Strength 12-5-18  MMT R L   Hip flexion 4/5 4+/5   Hip abduction 5/5 5/5   Hip extension 4+/5 4+/5   Glut max 3+/5 3+/5        Knee extension 5/5 5/5   Knee flexion 5/5 5/5              Muscle Strength 10-16-18  MMT R L   Hip flexion 4+/5 5/5   Hip abduction 5/5 5-/5   Hip extension 5/5 5/5   Glut max 4-/5 4-/5        Knee extension 5/5 5/5   Knee  "flexion 5/5 5/5         Muscle Strength 9-6-18  MMT R L   Hip flexion 4/5 5-/5   Hip abduction 5-/5 4+/5   Hip extension 4+/5 4+/5   Glut max 4-/5 4-/5        Knee extension 5/5 5/5   Knee flexion 5/5 5/5     Evaluation of feet revealed more severe pronation L>R    TREATMENT    Therapeutic exercise: Danuta received therapeutic exercises to develop strength, endurance, ROM, flexibility and core stabilization for 15 minutes including:    Switched stretching to sitting for HS, piriformis glut    Performed after manual therapy and after stretching able to self mobilize    HS stretch supine   Glut stretch  Piriformis stretch  B QL stretch    Contract relax glut for self mob of pelvis    Provided standing ex for strengthening also since unable to lie down.     Heel raises standing x 15   minisquat x 10   Step up x10        (NP)Bridge x 10      HOLD FOR NOW  Pelvic tilt  X 10 for 5 sec hold    SLR x 10  Arm and Leg lift prone x 10 arm and leg   Hip ext prone with bent knee x 10    Hip abd supine x 10 B  1 plank x 30 sec held the other 3 she normally does     (NP)Plank 4 reps 30 sec each       Sidelying L contract relax mob to L5-S1 region to level pelvis    HOLD FOR NOW from previous episode     Partial sit up x 20  Hip abd sidelying x 20 kept small plane R to avoid dysfunction     Manual therapy: Danuta  received the following manual therapy  techniques x 15  min. to include soft tissue and joint mobilization were applied to the: low back and gluteals to include:(NP)STM to LB paraspinals R>L  (NP) P/A mob to lumbar region Grade 1-2        (NP)  Pt received tool-assisted massage with manual therapy techniques to R LB in sidelying with pillow at waist putting pt in SB L to trigger an inflammatory healing response and stimulate the production of new collagen and proper, more functional, less painful healing.     (NP)Kinesiotape with 6" star for pain relief was performed over the R lumbar paraspinals.L3-4 region  " Instructed pt in use, care and precautions with tape.     Vacuum/cupping STM with manual therapy techniques was performed to back and gluteals to decrease muscle tightness, increase circulation and promote healing process.  The pt's skin was monitored for redness adjusting pressure as needed. The pt was instructed in possible side effects of bruising and/or soreness.        Patient received pre-mod electrical stimulation to decrease muscle tightness and pain to Lumbar paraspinals/ sacral border of gluteals B for 15 minutes with MH supine with cycle time: continuous, beat frequency: , CC/CV: CV.        Written Home Exercises Provided: standing strengthening ex see Pt instructions  Pt demo good understanding of the education provided. Danuta demonstrated good return demonstration of activities.     Pt. education:  · Posture reeducation, body mechanics, HEP,   · No spiritual or educational barriers to learning provided  · Pt has no cultural, educational or language barriers to learning provided.    Assessment   Pt is having a lot of drainage from sinus surgery and will need to modify ex for now.  After modified stretching able to self mobilize     Pt will continue to benefit from skilled outpatient physical therapy to address the remaining functional deficits, provide pt/family education, and to maximize pt's level of independence in the home and community environment. .     GOALS:   Short Term Goals:  3 weeks MET STG's  Increase range of motion 25%  Increase strength 1/2 muscle grade  Improve postural awareness of pelvis to independently identify dysfunction with min assist from PT  Be able to perform HEP with minimal cueing required     Long Term Goals: 6 weeks PARTIALLY MET LTG'S  Increase range of motion to 75% to 100% full   Improve muscle strength 1 muscle grade  Improve muscle strength with MMT to 4+/5 to 5/5  Improve and stabilize proper pelvic positioning  Restore ability to sit for ADL and work with  min to 0 pain  Restore normal sleep habits without disturbances due to pain  Restore ability to perform ADL's and household activities independently with min to 0 pain    Anticipated barriers to physical therapy: none  Pt's spiritual, cultural and educational needs considered and pt agreeable to plan of care and goals        Plan   Continue with established plan of care towards PT goals.  Resume strengthening as tolerated

## 2019-04-04 ENCOUNTER — TELEPHONE (OUTPATIENT)
Dept: INTERNAL MEDICINE | Facility: CLINIC | Age: 64
End: 2019-04-04

## 2019-04-04 DIAGNOSIS — R05.3 COUGH, PERSISTENT: ICD-10-CM

## 2019-04-04 NOTE — TELEPHONE ENCOUNTER
Spoke to pt and she stated you wanted her to call today to let you know how the medicine have been working . Pt states she's been unable to work. Please advise

## 2019-04-04 NOTE — TELEPHONE ENCOUNTER
[4/4/2019 4:24 PM]  Malena Wang:    ok, Thank you! would someone possibly be able to call in and speak with a nurse? They pended it due to th ept having a ct chest done recently      [4/4/2019 4:24 PM]    what nurse do we have to contact     [4/4/2019 4:26 PM]  Malena Wang:    Lyssa @ [phone] Case # [id number]

## 2019-04-04 NOTE — TELEPHONE ENCOUNTER
I spoke to the patient, she had recent hospitalization for pulmonary illness with continued coughing and severe paroxysms of coughing. Will do CT lung without contrast.   Patient will schedule the appt.  GML

## 2019-04-04 NOTE — TELEPHONE ENCOUNTER
----- Message from Luann Odell sent at 4/4/2019  1:59 PM CDT -----  Contact: self/[phone]  Patient called in regards needing for PCP to f/u with [facility]. needing information to past it to   # [phone]. She want order to be sent to them ASAP. Thank you

## 2019-04-11 ENCOUNTER — CLINICAL SUPPORT (OUTPATIENT)
Dept: REHABILITATION | Facility: HOSPITAL | Age: 64
End: 2019-04-11
Attending: INTERNAL MEDICINE
Payer: COMMERCIAL

## 2019-04-11 DIAGNOSIS — G89.29 CHRONIC BILATERAL LOW BACK PAIN WITH RIGHT-SIDED SCIATICA: Primary | ICD-10-CM

## 2019-04-11 DIAGNOSIS — M54.41 CHRONIC BILATERAL LOW BACK PAIN WITH RIGHT-SIDED SCIATICA: Primary | ICD-10-CM

## 2019-04-11 PROCEDURE — 97110 THERAPEUTIC EXERCISES: CPT | Mod: PN | Performed by: PHYSICAL THERAPIST

## 2019-04-11 PROCEDURE — 97014 ELECTRIC STIMULATION THERAPY: CPT | Mod: PN | Performed by: PHYSICAL THERAPIST

## 2019-04-11 PROCEDURE — 97140 MANUAL THERAPY 1/> REGIONS: CPT | Mod: PN | Performed by: PHYSICAL THERAPIST

## 2019-04-11 NOTE — PROGRESS NOTES
Physical Therapy Daily Note     Name: Danuta Santos  Clinic Number: [id number]  Diagnosis:   Encounter Diagnosis   Name Primary?    Chronic bilateral low back pain with right-sided sciatica Yes     Physician: Erica Khan MD  Treatment Orders: PT Eval and Treat  Past Medical History:   Diagnosis Date    IBS (irritable bowel syndrome)        Precautions: as per diagnosis    Evaluation date: 2/15/2018  Visit # authorized: 36/40 on 4/11/2019  Authorization period: 12-31-18  Plan of care expiration: 4-19-19  MD Follow up appt: none scheduled    Time In:  4:37  Time Out:  5:55  Billable time:  55 min    Subjective     Pt reports: pt had a partial collapse of lung and when cough got worse she started doing breathing ex and is better Able to walk and do some of exercises  Pt is walking 45 min in park 2 miles and stretching      Pain Scale: before treatment: 7 after treatment:  Reported feeling better  Objective     Pelvic dysfunction noted   mod tightness R lumbar paraspinals and QL, was severe at last visit    Lumbar active range of motion in standing is:3-8-19  - flexion - toes                     - extension -  50%                         - left side bending -  To knee         - right side bending -  To knee            Lumbar active range of motion in standing is: 12.5-18  - flexion - toes                     - extension -  50%                         - left side bending -  To knee         - right side bending -  To knee    Muscle Strength 3-8-19  MMT R L   Hip flexion 4-/5 4-/5   Hip abduction 4/5 4/5   Hip extension 4-/5 4-/5   Glut max 3/5 3/5        Knee extension 5/5 5/5   Knee flexion 5/5 5/5           Muscle Strength 12-5-18  MMT R L   Hip flexion 4/5 4+/5   Hip abduction 5/5 5/5   Hip extension 4+/5 4+/5   Glut max 3+/5 3+/5        Knee extension 5/5 5/5   Knee flexion 5/5 5/5              Muscle Strength 10-16-18  MMT R L   Hip flexion 4+/5 5/5   Hip abduction 5/5 5-/5   Hip extension 5/5 5/5   Glut max  "4-/5 4-/5        Knee extension 5/5 5/5   Knee flexion 5/5 5/5         Muscle Strength 9-6-18  MMT R L   Hip flexion 4/5 5-/5   Hip abduction 5-/5 4+/5   Hip extension 4+/5 4+/5   Glut max 4-/5 4-/5        Knee extension 5/5 5/5   Knee flexion 5/5 5/5     Evaluation of feet revealed more severe pronation L>R    TREATMENT    Therapeutic exercise: Danuta received therapeutic exercises to develop strength, endurance, ROM, flexibility and core stabilization for 25 minutes including:    HS stretch supine   Glut stretch  Piriformis stretch  B QL stretch       Bridge x 10   Pelvic tilt  X 10 for 5 sec hold   Hip abd supine x 10 B    Contract relax glut for self mob of pelvis    Heel raises standing x 15  minisquat x 10  Step up x10    HOLD FOR NOW      SLR x 10  Arm and Leg lift prone x 10 arm and leg   Hip ext prone with bent knee x 10      1 plank x 30 sec held the other 3 she normally does     (NP)Plank 4 reps 30 sec each       Sidelying L contract relax mob to L5-S1 region to level pelvis    HOLD FOR NOW from previous episode     Partial sit up x 20  Hip abd sidelying x 20 kept small plane R to avoid dysfunction     Manual therapy: Danuta  received the following manual therapy  techniques x 15  min. to include soft tissue and joint mobilization were applied to the: low back and gluteals to include:(NP)STM to LB paraspinals R>L  (NP) P/A mob to lumbar region Grade 1-2      (NP)  Pt received tool-assisted massage with manual therapy techniques to R LB in sidelying with pillow at waist putting pt in SB L to trigger an inflammatory healing response and stimulate the production of new collagen and proper, more functional, less painful healing.    Kinesiotape with 6" star for pain relief was performed over the R lumbar paraspinals.L3-4 region  Instructed pt in use, care and precautions with tape.     Vacuum/cupping STM with manual therapy techniques was performed to back and gluteals to decrease muscle tightness, " increase circulation and promote healing process.  The pt's skin was monitored for redness adjusting pressure as needed. The pt was instructed in possible side effects of bruising and/or soreness.        Patient received pre-mod electrical stimulation to decrease muscle tightness and pain to Lumbar paraspinals/ sacral border of gluteals B for 15 minutes with MH supine with cycle time: continuous, beat frequency: , CC/CV: CV.        Written Home Exercises Provided: bridge, pelvic tilt and hip abd/add supine ex see Pt instructions  Pt demo good understanding of the education provided. Danuta demonstrated good return demonstration of activities.     Pt. education:  · Posture reeducation, body mechanics, HEP,   · No spiritual or educational barriers to learning provided  · Pt has no cultural, educational or language barriers to learning provided.    Assessment   Pt able to progress with strengthening.  Pt with improved health overall and doing better with walking and able to self mobilize after adding strengthening.  Pt going out of town for 10 days and added KT tape to help provide additional support with travel.       Pt will continue to benefit from skilled outpatient physical therapy to address the remaining functional deficits, provide pt/family education, and to maximize pt's level of independence in the home and community environment. .     GOALS:   Short Term Goals:  3 weeks MET STG's  Increase range of motion 25%  Increase strength 1/2 muscle grade  Improve postural awareness of pelvis to independently identify dysfunction with min assist from PT  Be able to perform HEP with minimal cueing required     Long Term Goals: 6 weeks PARTIALLY MET LTG'S  Increase range of motion to 75% to 100% full   Improve muscle strength 1 muscle grade  Improve muscle strength with MMT to 4+/5 to 5/5  Improve and stabilize proper pelvic positioning  Restore ability to sit for ADL and work with min to 0 pain  Restore normal  sleep habits without disturbances due to pain  Restore ability to perform ADL's and household activities independently with min to 0 pain    Anticipated barriers to physical therapy: none  Pt's spiritual, cultural and educational needs considered and pt agreeable to plan of care and goals        Plan   Continue with established plan of care towards PT goals.  Further progress strengthening as tolerated

## 2019-04-26 ENCOUNTER — TELEPHONE (OUTPATIENT)
Dept: DERMATOLOGY | Facility: CLINIC | Age: 64
End: 2019-04-26

## 2019-04-26 NOTE — TELEPHONE ENCOUNTER
----- Message from Velma Rangel sent at 4/26/2019  9:12 AM CDT -----  Contact: pt   [facility] Same Day Appointment Request    Was an appointment with another provider offered? No appts are available for today   Reason for [facility] appt.: pt is calling to speak with the nurse pt said her eyes are puffy pt said she doesn't have hives and cheeks itch and are little bit swollen pt said her face gets dry and flaky around the eyes and cheeks area   Communication Preference: can you please call pt at [phone]  Additional Information: pt is requesting to be seen today     MICHELLE

## 2019-04-26 NOTE — TELEPHONE ENCOUNTER
I called and spoke to the patient and apologized that I did not have any appointments to offer to her today. I did offer her an appointment on Tuesday but, she stated that she has an appointment with her regular dermatologist on that day. She also stated that she will go to urgent care if she needs to but, she did not want to go there because she thinks that they may want to give her a steroid and she does not want that. She did thank me for the call.

## 2019-05-02 ENCOUNTER — CLINICAL SUPPORT (OUTPATIENT)
Dept: REHABILITATION | Facility: HOSPITAL | Age: 64
End: 2019-05-02
Attending: INTERNAL MEDICINE
Payer: COMMERCIAL

## 2019-05-02 DIAGNOSIS — G89.29 CHRONIC BILATERAL LOW BACK PAIN WITH RIGHT-SIDED SCIATICA: Primary | ICD-10-CM

## 2019-05-02 DIAGNOSIS — M54.41 CHRONIC BILATERAL LOW BACK PAIN WITH RIGHT-SIDED SCIATICA: Primary | ICD-10-CM

## 2019-05-02 PROCEDURE — 97140 MANUAL THERAPY 1/> REGIONS: CPT | Mod: PN | Performed by: PHYSICAL THERAPIST

## 2019-05-02 PROCEDURE — 97110 THERAPEUTIC EXERCISES: CPT | Mod: PN | Performed by: PHYSICAL THERAPIST

## 2019-05-02 PROCEDURE — 97014 ELECTRIC STIMULATION THERAPY: CPT | Mod: PN | Performed by: PHYSICAL THERAPIST

## 2019-05-02 NOTE — PLAN OF CARE
Physical Therapy Progress Note     Name: Danuta Santos  Clinic Number: [id number]  Diagnosis:   Encounter Diagnosis   Name Primary?    Chronic bilateral low back pain with right-sided sciatica Yes     Physician: Erica Khan MD  Treatment Orders: PT Eval and Treat  Past Medical History:   Diagnosis Date    IBS (irritable bowel syndrome)        Precautions: as per diagnosis    Evaluation date: 2/15/2018  Visit # authorized: 37/40 on 5/2/2019  Authorization period: 12-31-18  Plan of care expiration: 6-13-19  MD Follow up appt: none scheduled    Time In:  4:11  Time Out:  5:15  Billable time:  55 min    Subjective     Pt reports:she has been doing a lot of walking and feeling tight today.  Generally able to self mobilize, but getting more tight and going out more often.  Pt has not been able to get someone to cup her back at home.  Unable to self mobilize this AM.  Pt states she was running late, so maybe did not stretch as should have      Pain Scale: before treatment: 5 after treatment:  Reported feeling better feeling less tight  Objective     Pelvic dysfunction noted   mod tightness R lumbar paraspinals and QL, was severe at last visit    Lumbar active range of motion in standing is:5-2-19  - flexion - toes                     - extension -  75%                         - left side bending -  To knee         - right side bending -  To knee            Lumbar active range of motion in standing is: 12.5-18  - flexion - toes                     - extension -  50%                         - left side bending -  To knee         - right side bending -  To knee    Muscle Strength 5-2-19  MMT R L   Hip flexion 4/5 4/5   Hip abduction 4+/5 4+/5   Hip extension 4/5 4/5   Glut max 3+/5 3+/5        Knee extension 5/5 5/5   Knee flexion 5/5 5/5       Muscle Strength 12-5-18  MMT R L   Hip flexion 4/5 4+/5   Hip abduction 5/5 5/5   Hip extension 4+/5 4+/5   Glut max 3+/5 3+/5        Knee extension 5/5 5/5   Knee flexion 5/5  "5/5              Muscle Strength 10-16-18  MMT R L   Hip flexion 4+/5 5/5   Hip abduction 5/5 5-/5   Hip extension 5/5 5/5   Glut max 4-/5 4-/5        Knee extension 5/5 5/5   Knee flexion 5/5 5/5         Muscle Strength 9-6-18  MMT R L   Hip flexion 4/5 5-/5   Hip abduction 5-/5 4+/5   Hip extension 4+/5 4+/5   Glut max 4-/5 4-/5        Knee extension 5/5 5/5   Knee flexion 5/5 5/5     Evaluation of feet revealed more severe pronation L>R    TREATMENT    Therapeutic exercise: Danuta received therapeutic exercises to develop strength, endurance, ROM, flexibility and core stabilization for 25 minutes including:    HS stretch supine   Glut stretch  Piriformis stretch  B QL stretch      Bridge x 10  Pelvic tilt  X 10 for 5 sec hold  Hip abd supine x 10 B    Contract relax glut for self mob of pelvis  HOLD FOR NOW  Heel raises standing x 15  minisquat x 10  Step up x10  SLR x 10  Arm and Leg lift prone x 10 arm and leg   Hip ext prone with bent knee x 10      1 plank x 30 sec held the other 3 she normally does     (NP)Plank 4 reps 30 sec each       Sidelying L contract relax mob to L5-S1 region to level pelvis    HOLD FOR NOW from previous episode     Partial sit up x 20  Hip abd sidelying x 20 kept small plane R to avoid dysfunction     Manual therapy: Danuta  received the following manual therapy  techniques x 15  min. to include soft tissue and joint mobilization were applied to the: low back and gluteals to include:STM to LB paraspinals R>L  P/A mob to lumbar region Grade 1-2      (NP)  Pt received tool-assisted massage with manual therapy techniques to R LB in sidelying with pillow at waist putting pt in SB L to trigger an inflammatory healing response and stimulate the production of new collagen and proper, more functional, less painful healing.    (NP)Kinesiotape with 6" star for pain relief was performed over the R lumbar paraspinals.L3-4 region  Instructed pt in use, care and precautions with tape. "     Vacuum/cupping STM with manual therapy techniques was performed to back and gluteals to decrease muscle tightness, increase circulation and promote healing process.  The pt's skin was monitored for redness adjusting pressure as needed. The pt was instructed in possible side effects of bruising and/or soreness.        Patient received pre-mod electrical stimulation to decrease muscle tightness and pain to Lumbar paraspinals/ sacral border of gluteals B for 15 minutes with MH supine with cycle time: continuous, beat frequency: , CC/CV: CV.        Written Home Exercises Provided: bridge, pelvic tilt and hip abd/add supine ex see Pt instructions  Pt demo good understanding of the education provided. Danuta demonstrated good return demonstration of activities.     Pt. education:  · Posture reeducation, body mechanics, HEP,   · No spiritual or educational barriers to learning provided  · Pt has no cultural, educational or language barriers to learning provided.    Assessment   Patient demonstrates improvement in range of motion, strength, stabilization and function.  Pt continues to recover from illness and will need PT to help progress back with full strengthening program..  Needs to get back to further strengthening to help improve stability and function       Pt will continue to benefit from skilled outpatient physical therapy to address the remaining functional deficits, provide pt/family education, and to maximize pt's level of independence in the home and community environment. .     GOALS:   Short Term Goals:  3 weeks MET STG's  Increase range of motion 25%  Increase strength 1/2 muscle grade  Improve postural awareness of pelvis to independently identify dysfunction with min assist from PT  Be able to perform HEP with minimal cueing required     Long Term Goals: 6 weeks PARTIALLY MET LTG'S  Increase range of motion to 75% to 100% full   Improve muscle strength 1 muscle grade  Improve muscle strength  with MMT to 4+/5 to 5/5  Improve and stabilize proper pelvic positioning  Restore ability to sit for ADL and work with min to 0 pain  Restore normal sleep habits without disturbances due to pain  Restore ability to perform ADL's and household activities independently with min to 0 pain    Anticipated barriers to physical therapy: none  Pt's spiritual, cultural and educational needs considered and pt agreeable to plan of care and goals        Plan   If you concur, I recommend patient continue with physical therapy as needed for up to 2 times a week  for 6 weeks.  Please advise us of your  recommendations. Thank you for allowing us to assist in the care of your patient.      Jacy Escobar, MS, PT          I certify the need for these services furnished under this plan of treatment and while under my care.    ____________________________________ Physician/Referring Practitioner                                Date of Signature

## 2019-05-02 NOTE — PROGRESS NOTES
Physical Therapy Progress Note     Name: Danuta Santos  Clinic Number: [id number]  Diagnosis:   Encounter Diagnosis   Name Primary?    Chronic bilateral low back pain with right-sided sciatica Yes     Physician: Erica Khan MD  Treatment Orders: PT Eval and Treat  Past Medical History:   Diagnosis Date    IBS (irritable bowel syndrome)        Precautions: as per diagnosis    Evaluation date: 2/15/2018  Visit # authorized: 37/40 on 5/2/2019  Authorization period: 12-31-18  Plan of care expiration: 6-13-19  MD Follow up appt: none scheduled    Time In:  4:11  Time Out:  5:15  Billable time:  55 min    Subjective     Pt reports:she has been doing a lot of walking and feeling tight today.  Generally able to self mobilize, but getting more tight and going out more often.  Pt has not been able to get someone to cup her back at home.  Unable to self mobilize this AM.  Pt states she was running late, so maybe did not stretch as should have      Pain Scale: before treatment: 5 after treatment:  Reported feeling better feeling less tight  Objective     Pelvic dysfunction noted   mod tightness R lumbar paraspinals and QL, was severe at last visit    Lumbar active range of motion in standing is:5-2-19  - flexion - toes                     - extension -  75%                         - left side bending -  To knee         - right side bending -  To knee            Lumbar active range of motion in standing is: 12.5-18  - flexion - toes                     - extension -  50%                         - left side bending -  To knee         - right side bending -  To knee    Muscle Strength 5-2-19  MMT R L   Hip flexion 4/5 4/5   Hip abduction 4+/5 4+/5   Hip extension 4/5 4/5   Glut max 3+/5 3+/5        Knee extension 5/5 5/5   Knee flexion 5/5 5/5       Muscle Strength 12-5-18  MMT R L   Hip flexion 4/5 4+/5   Hip abduction 5/5 5/5   Hip extension 4+/5 4+/5   Glut max 3+/5 3+/5        Knee extension 5/5 5/5   Knee flexion 5/5  "5/5              Muscle Strength 10-16-18  MMT R L   Hip flexion 4+/5 5/5   Hip abduction 5/5 5-/5   Hip extension 5/5 5/5   Glut max 4-/5 4-/5        Knee extension 5/5 5/5   Knee flexion 5/5 5/5         Muscle Strength 9-6-18  MMT R L   Hip flexion 4/5 5-/5   Hip abduction 5-/5 4+/5   Hip extension 4+/5 4+/5   Glut max 4-/5 4-/5        Knee extension 5/5 5/5   Knee flexion 5/5 5/5     Evaluation of feet revealed more severe pronation L>R    TREATMENT    Therapeutic exercise: Danuta received therapeutic exercises to develop strength, endurance, ROM, flexibility and core stabilization for 25 minutes including:    HS stretch supine   Glut stretch  Piriformis stretch  B QL stretch      Bridge x 10  Pelvic tilt  X 10 for 5 sec hold  Hip abd supine x 10 B    Contract relax glut for self mob of pelvis  HOLD FOR NOW  Heel raises standing x 15  minisquat x 10  Step up x10  SLR x 10  Arm and Leg lift prone x 10 arm and leg   Hip ext prone with bent knee x 10      1 plank x 30 sec held the other 3 she normally does     (NP)Plank 4 reps 30 sec each       Sidelying L contract relax mob to L5-S1 region to level pelvis    HOLD FOR NOW from previous episode     Partial sit up x 20  Hip abd sidelying x 20 kept small plane R to avoid dysfunction     Manual therapy: Danuta  received the following manual therapy  techniques x 15  min. to include soft tissue and joint mobilization were applied to the: low back and gluteals to include:STM to LB paraspinals R>L  P/A mob to lumbar region Grade 1-2      (NP)  Pt received tool-assisted massage with manual therapy techniques to R LB in sidelying with pillow at waist putting pt in SB L to trigger an inflammatory healing response and stimulate the production of new collagen and proper, more functional, less painful healing.    (NP)Kinesiotape with 6" star for pain relief was performed over the R lumbar paraspinals.L3-4 region  Instructed pt in use, care and precautions with tape. "     Vacuum/cupping STM with manual therapy techniques was performed to back and gluteals to decrease muscle tightness, increase circulation and promote healing process.  The pt's skin was monitored for redness adjusting pressure as needed. The pt was instructed in possible side effects of bruising and/or soreness.        Patient received pre-mod electrical stimulation to decrease muscle tightness and pain to Lumbar paraspinals/ sacral border of gluteals B for 15 minutes with MH supine with cycle time: continuous, beat frequency: , CC/CV: CV.        Written Home Exercises Provided: bridge, pelvic tilt and hip abd/add supine ex see Pt instructions  Pt demo good understanding of the education provided. Danuta demonstrated good return demonstration of activities.     Pt. education:  · Posture reeducation, body mechanics, HEP,   · No spiritual or educational barriers to learning provided  · Pt has no cultural, educational or language barriers to learning provided.    Assessment   Patient demonstrates improvement in range of motion, strength, stabilization and function.  Pt continues to recover from illness and will need PT to help progress back with full strengthening program..  Needs to get back to further strengthening to help improve stability and function       Pt will continue to benefit from skilled outpatient physical therapy to address the remaining functional deficits, provide pt/family education, and to maximize pt's level of independence in the home and community environment. .     GOALS:   Short Term Goals:  3 weeks MET STG's  Increase range of motion 25%  Increase strength 1/2 muscle grade  Improve postural awareness of pelvis to independently identify dysfunction with min assist from PT  Be able to perform HEP with minimal cueing required     Long Term Goals: 6 weeks PARTIALLY MET LTG'S  Increase range of motion to 75% to 100% full   Improve muscle strength 1 muscle grade  Improve muscle strength  with MMT to 4+/5 to 5/5  Improve and stabilize proper pelvic positioning  Restore ability to sit for ADL and work with min to 0 pain  Restore normal sleep habits without disturbances due to pain  Restore ability to perform ADL's and household activities independently with min to 0 pain    Anticipated barriers to physical therapy: none  Pt's spiritual, cultural and educational needs considered and pt agreeable to plan of care and goals        Plan   If you concur, I recommend patient continue with physical therapy as needed for up to 2 times a week  for 6 weeks.  Please advise us of your  recommendations. Thank you for allowing us to assist in the care of your patient.      Jacy Escobar, MS, PT          I certify the need for these services furnished under this plan of treatment and while under my care.    ____________________________________ Physician/Referring Practitioner                                Date of Signature

## 2019-05-23 ENCOUNTER — TELEPHONE (OUTPATIENT)
Dept: ORTHOPEDICS | Facility: CLINIC | Age: 64
End: 2019-05-23

## 2019-05-23 NOTE — TELEPHONE ENCOUNTER
Left voice mail for pt to return my call regarding rescheduling her 7/5 appointment with Dr Davis.   Will offer pt 7/1 or 7/8 appointment when she returns my call.

## 2019-05-23 NOTE — TELEPHONE ENCOUNTER
Received a return call from pt.  Rescheduled pt's 7/5 appointment with Dr Davis to 7/1    Added pt to wait list in case a sooner appointment becomes available.

## 2019-05-27 ENCOUNTER — CLINICAL SUPPORT (OUTPATIENT)
Dept: REHABILITATION | Facility: HOSPITAL | Age: 64
End: 2019-05-27
Attending: INTERNAL MEDICINE
Payer: COMMERCIAL

## 2019-05-27 DIAGNOSIS — G89.29 CHRONIC BILATERAL LOW BACK PAIN WITH RIGHT-SIDED SCIATICA: Primary | ICD-10-CM

## 2019-05-27 DIAGNOSIS — M54.41 CHRONIC BILATERAL LOW BACK PAIN WITH RIGHT-SIDED SCIATICA: Primary | ICD-10-CM

## 2019-05-27 PROCEDURE — 97035 APP MDLTY 1+ULTRASOUND EA 15: CPT | Mod: PN | Performed by: PHYSICAL THERAPIST

## 2019-05-27 PROCEDURE — 97110 THERAPEUTIC EXERCISES: CPT | Mod: PN | Performed by: PHYSICAL THERAPIST

## 2019-05-27 PROCEDURE — 97140 MANUAL THERAPY 1/> REGIONS: CPT | Mod: PN | Performed by: PHYSICAL THERAPIST

## 2019-05-27 PROCEDURE — 97014 ELECTRIC STIMULATION THERAPY: CPT | Mod: PN | Performed by: PHYSICAL THERAPIST

## 2019-05-27 NOTE — PROGRESS NOTES
Physical Therapy Daily Note     Name: Danuta Santos  Clinic Number: [id number]  Diagnosis:   Encounter Diagnosis   Name Primary?    Chronic bilateral low back pain with right-sided sciatica Yes     Physician: Erica Khan MD  Treatment Orders: PT Eval and Treat  Past Medical History:   Diagnosis Date    IBS (irritable bowel syndrome)        Precautions: as per diagnosis    Evaluation date: 2/15/2018  Visit # authorized: 38/40 on 5/27/2019  Authorization period: 12-31-18  Plan of care expiration: 6-13-19  MD Follow up appt: none scheduled    Time In:  1:06  Time Out:  2:35  Billable time:  68 min    Subjective     Pt reports:doing ex, walking and feel a little dysfunction   Pain Scale: before treatment: 5 after treatment:  Reported feeling better feeling less tight  Objective     Pelvic dysfunction noted unable to self mobilize  severe tightness R lumbar paraspinals and QL,     Lumbar active range of motion in standing is:5-2-19  - flexion - toes                     - extension -  75%                         - left side bending -  To knee         - right side bending -  To knee            Lumbar active range of motion in standing is: 12.5-18  - flexion - toes                     - extension -  50%                         - left side bending -  To knee         - right side bending -  To knee    Muscle Strength 5-2-19  MMT R L   Hip flexion 4/5 4/5   Hip abduction 4+/5 4+/5   Hip extension 4/5 4/5   Glut max 3+/5 3+/5        Knee extension 5/5 5/5   Knee flexion 5/5 5/5       Muscle Strength 12-5-18  MMT R L   Hip flexion 4/5 4+/5   Hip abduction 5/5 5/5   Hip extension 4+/5 4+/5   Glut max 3+/5 3+/5        Knee extension 5/5 5/5   Knee flexion 5/5 5/5              Muscle Strength 10-16-18  MMT R L   Hip flexion 4+/5 5/5   Hip abduction 5/5 5-/5   Hip extension 5/5 5/5   Glut max 4-/5 4-/5        Knee extension 5/5 5/5   Knee flexion 5/5 5/5         Muscle Strength 9-6-18  MMT R L   Hip flexion 4/5 5-/5   Hip  "abduction 5-/5 4+/5   Hip extension 4+/5 4+/5   Glut max 4-/5 4-/5        Knee extension 5/5 5/5   Knee flexion 5/5 5/5     Evaluation of feet revealed more severe pronation L>R    TREATMENT    Therapeutic exercise: Danuta received therapeutic exercises to develop strength, endurance, ROM, flexibility and core stabilization for 15 minutes including:    Sidelying L contract relax mob to L5-S1 region to level pelvis    HS stretch supine   Glut stretch  Piriformis stretch  B QL stretch      Bridge x 10  Pelvic tilt  X 15 for 5 sec hold  Hip abd supine x 15 B   SLR x 10   Arm and Leg lift prone x 10 arm and leg    Contract relax glut for self mob of pelvis    HOLD FOR NOW  Heel raises standing x 15  minisquat x 10  Step up x10   Hip ext prone with bent knee x 10    1 plank x 30 sec held the other 3 she normally does     (NP)Plank 4 reps 30 sec each           HOLD FOR NOW from previous episode     Partial sit up x 20  Hip abd sidelying x 20 kept small plane R to avoid dysfunction     Manual therapy: Danuta  received the following manual therapy  techniques x 30  min. to include soft tissue and joint mobilization were applied to the: low back and gluteals to include:STM to LB paraspinals R>L  P/A mob to lumbar region Grade 1-2      (NP)  Pt received tool-assisted massage with manual therapy techniques to R LB in sidelying with pillow at waist putting pt in SB L to trigger an inflammatory healing response and stimulate the production of new collagen and proper, more functional, less painful healing.    (NP)Kinesiotape with 6" star for pain relief was performed over the R lumbar paraspinals.L3-4 region  Instructed pt in use, care and precautions with tape.     Vacuum/cupping STM with manual therapy techniques was performed to back and gluteals to decrease muscle tightness, increase circulation and promote healing process.  The pt's skin was monitored for redness adjusting pressure as needed. The pt was instructed in " possible side effects of bruising and/or soreness.      Ultrasound  for pain control and to decrease inflammation @ continuous duty cycle, 1 Mhz, applied to R lumbar paraspinals, intensity = 1.8 w/cm2 for 8 minutes.        Patient received pre-mod electrical stimulation to decrease muscle tightness and pain to Lumbar paraspinals/ sacral border of gluteals B for 15 minutes with MH supine with cycle time: continuous, beat frequency: , CC/CV: CV.        Written Home Exercises Provided: none today  Pt demo good understanding of the education provided. Danuta demonstrated good return demonstration of activities.     Pt. education:  · Posture reeducation, body mechanics, HEP,   · No spiritual or educational barriers to learning provided  · Pt has no cultural, educational or language barriers to learning provided.    Assessment     Severe tight muscles led to inability to self mobilize,  Decreased muscle tightness after treatment.  Decreased symptoms.   Pt able to progress slightly with strengthening.     Pt will continue to benefit from skilled outpatient physical therapy to address the remaining functional deficits, provide pt/family education, and to maximize pt's level of independence in the home and community environment. .     GOALS:   Short Term Goals:  3 weeks MET STG's  Increase range of motion 25%  Increase strength 1/2 muscle grade  Improve postural awareness of pelvis to independently identify dysfunction with min assist from PT  Be able to perform HEP with minimal cueing required     Long Term Goals: 6 weeks PARTIALLY MET LTG'S  Increase range of motion to 75% to 100% full   Improve muscle strength 1 muscle grade  Improve muscle strength with MMT to 4+/5 to 5/5  Improve and stabilize proper pelvic positioning  Restore ability to sit for ADL and work with min to 0 pain  Restore normal sleep habits without disturbances due to pain  Restore ability to perform ADL's and household activities independently  with min to 0 pain    Anticipated barriers to physical therapy: none  Pt's spiritual, cultural and educational needs considered and pt agreeable to plan of care and goals        Plan   Continue with established plan of care towards PT goals.

## 2019-05-28 ENCOUNTER — TELEPHONE (OUTPATIENT)
Dept: INTERNAL MEDICINE | Facility: CLINIC | Age: 64
End: 2019-05-28

## 2019-05-28 DIAGNOSIS — Z12.31 ENCOUNTER FOR SCREENING MAMMOGRAM FOR MALIGNANT NEOPLASM OF BREAST: Primary | ICD-10-CM

## 2019-05-29 ENCOUNTER — TELEPHONE (OUTPATIENT)
Dept: INTERNAL MEDICINE | Facility: CLINIC | Age: 64
End: 2019-05-29

## 2019-05-29 ENCOUNTER — OFFICE VISIT (OUTPATIENT)
Dept: OBSTETRICS AND GYNECOLOGY | Facility: CLINIC | Age: 64
End: 2019-05-29
Attending: OBSTETRICS & GYNECOLOGY
Payer: COMMERCIAL

## 2019-05-29 VITALS — BODY MASS INDEX: 20.66 KG/M2 | HEIGHT: 66 IN | SYSTOLIC BLOOD PRESSURE: 120 MMHG | DIASTOLIC BLOOD PRESSURE: 70 MMHG

## 2019-05-29 DIAGNOSIS — N94.10 DYSPAREUNIA, FEMALE: Primary | ICD-10-CM

## 2019-05-29 DIAGNOSIS — N95.2 ATROPHIC VAGINITIS: ICD-10-CM

## 2019-05-29 PROCEDURE — 3008F PR BODY MASS INDEX (BMI) DOCUMENTED: ICD-10-PCS | Mod: CPTII,S$GLB,, | Performed by: OBSTETRICS & GYNECOLOGY

## 2019-05-29 PROCEDURE — 99213 PR OFFICE/OUTPT VISIT, EST, LEVL III, 20-29 MIN: ICD-10-PCS | Mod: S$GLB,,, | Performed by: OBSTETRICS & GYNECOLOGY

## 2019-05-29 PROCEDURE — 3008F BODY MASS INDEX DOCD: CPT | Mod: CPTII,S$GLB,, | Performed by: OBSTETRICS & GYNECOLOGY

## 2019-05-29 PROCEDURE — 99999 PR PBB SHADOW E&M-EST. PATIENT-LVL III: ICD-10-PCS | Mod: PBBFAC,,, | Performed by: OBSTETRICS & GYNECOLOGY

## 2019-05-29 PROCEDURE — 99213 OFFICE O/P EST LOW 20 MIN: CPT | Mod: S$GLB,,, | Performed by: OBSTETRICS & GYNECOLOGY

## 2019-05-29 PROCEDURE — 99999 PR PBB SHADOW E&M-EST. PATIENT-LVL III: CPT | Mod: PBBFAC,,, | Performed by: OBSTETRICS & GYNECOLOGY

## 2019-05-29 NOTE — PROGRESS NOTES
SUBJECTIVE:   63 y.o. female   for vaginal dryness. No LMP recorded. Patient is postmenopausal..  Reports painful intercourse due to dryness.  Has used OTC lubricants without success.  No PMB.  No HRT.  Has used vagifem in the past (2015) without great success, sydney admits to no-compliance with dosing at times.         Past Medical History:   Diagnosis Date    IBS (irritable bowel syndrome)     Pneumonia 2019     Past Surgical History:   Procedure Laterality Date     SECTION      NOSE SURGERY      SINUS SURGERY  2019     Social History     Socioeconomic History    Marital status:      Spouse name: Not on file    Number of children: Not on file    Years of education: Not on file    Highest education level: Not on file   Occupational History    Not on file   Social Needs    Financial resource strain: Not on file    Food insecurity:     Worry: Not on file     Inability: Not on file    Transportation needs:     Medical: Not on file     Non-medical: Not on file   Tobacco Use    Smoking status: Never Smoker    Smokeless tobacco: Never Used   Substance and Sexual Activity    Alcohol use: No    Drug use: No    Sexual activity: Yes     Partners: Male     Birth control/protection: None, Post-menopausal   Lifestyle    Physical activity:     Days per week: Not on file     Minutes per session: Not on file    Stress: Not on file   Relationships    Social connections:     Talks on phone: Not on file     Gets together: Not on file     Attends Shinto service: Not on file     Active member of club or organization: Not on file     Attends meetings of clubs or organizations: Not on file     Relationship status: Not on file   Other Topics Concern    Not on file   Social History Narrative    Not on file     Family History   Problem Relation Age of Onset    Cancer Father     Stroke Maternal Grandfather     Breast cancer Neg Hx     Colon cancer Neg Hx     Diabetes Neg Hx      Eclampsia Neg Hx     Hypertension Neg Hx     Miscarriages / Stillbirths Neg Hx     Ovarian cancer Neg Hx      labor Neg Hx     Allergic rhinitis Neg Hx     Allergies Neg Hx     Angioedema Neg Hx     Asthma Neg Hx     Eczema Neg Hx     Immunodeficiency Neg Hx     Rhinitis Neg Hx     Urticaria Neg Hx     Atopy Neg Hx      OB History    Para Term  AB Living   2 2 2     2   SAB TAB Ectopic Multiple Live Births                  # Outcome Date GA Lbr Gio/2nd Weight Sex Delivery Anes PTL Lv   2 Term            1 Term                  Current Outpatient Medications   Medication Sig Dispense Refill    cyanocobalamin (VITAMIN B-12) 500 MCG tablet Take 1,000 mcg by mouth once daily.        LACTOBACILLUS COMBO NO.6 (PROBIOTIC COMPLEX ORAL) Take 1 capsule by mouth once.       PROAIR HFA 90 mcg/actuation inhaler INHALE one TO two PUFFS EVERY 6 HOURS AS NEEDED FOR COUGH and FOR WHEEZING 8.5 g 6    vitamin D 185 MG Tab Take 2,000 mg by mouth once daily.       prasterone, dhea, (INTRAROSA) 6.5 mg Inst Place 6.5 mg vaginally every evening. BIN# [id number] PCN# [id number]# [id number] ID# [id number] each 5     No current facility-administered medications for this visit.      Allergies: Wheat containing prod; Advil [ibuprofen]; Alcohol; Aspirin; and Milk containing products     ROS:  Constitutional: no weight loss, weight gain, fever, fatigue  Eyes:  No vision changes, glasses/contacts  ENT/Mouth: No ulcers, sinus problems, ears ringing, headache  Cardiovascular: No inability to lie flat, chest pain, exercise intolerance, swelling, heart palpitations  Respiratory: No wheezing, coughing blood, shortness of breath, or cough  Gastrointestinal: No diarrhea, bloody stool, nausea/vomiting, constipation, gas, hemorrhoids  Genitourinary: No blood in urine, painful urination, urgency of urination, frequency of urination, incomplete emptying, incontinence, abnormal bleeding, painful periods, heavy periods,  "vaginal discharge, vaginal odor, +painful intercourse, sexual problems, bleeding after intercourse.  Musculoskeletal: No muscle weakness  Skin/Breast: No painful breasts, nipple discharge, masses, rash, ulcers  Neurological: No passing out, seizures, numbness, headache  Endocrine: No diabetes, hypothyroid, hyperthyroid, hot flashes, hair loss, abnormal hair growth, ance  Psychiatric: No depression, crying  Hematologic: No bruises, bleeding, swollen lymph nodes, anemia.      OBJECTIVE:   The patient appears well, alert, oriented x 3, in no distress.  /70 (BP Location: Right arm, Patient Position: Sitting, BP Method: Medium (Manual))   Ht 5' 6" (1.676 m)   BMI 20.66 kg/m²     ASSESSMENT:   1. Dyspareunia, female     2. Atrophic vaginitis         PLAN:   Orders Placed This Encounter    prasterone, dhea, (INTRAROSA) 6.5 mg Inst     Discussed dyspareunia, vaginal dryness, mgt options.  Discussed risks/ SE, etc.  Trial of Intrarosa.  If no success will try Premarin cream  Return to clinic 3 months  "

## 2019-05-29 NOTE — TELEPHONE ENCOUNTER
----- Message from Erica Khan MD sent at 5/28/2019  5:33 PM CDT -----  Please give the colonoscopy report to Ms. Flores to put in the chart.

## 2019-05-31 ENCOUNTER — HOSPITAL ENCOUNTER (OUTPATIENT)
Dept: RADIOLOGY | Facility: HOSPITAL | Age: 64
Discharge: HOME | End: 2019-05-31
Attending: INTERNAL MEDICINE
Payer: COMMERCIAL

## 2019-05-31 DIAGNOSIS — Z12.31 ENCOUNTER FOR SCREENING MAMMOGRAM FOR MALIGNANT NEOPLASM OF BREAST: ICD-10-CM

## 2019-05-31 PROCEDURE — 77063 BREAST TOMOSYNTHESIS BI: CPT | Mod: 26,,, | Performed by: RADIOLOGY

## 2019-05-31 PROCEDURE — 77067 SCR MAMMO BI INCL CAD: CPT | Mod: 26,,, | Performed by: RADIOLOGY

## 2019-05-31 PROCEDURE — 77067 MAMMO DIGITAL SCREENING BILAT WITH TOMOSYNTHESIS_CAD: ICD-10-PCS | Mod: 26,,, | Performed by: RADIOLOGY

## 2019-05-31 PROCEDURE — 77067 SCR MAMMO BI INCL CAD: CPT | Mod: TC

## 2019-05-31 PROCEDURE — 77063 MAMMO DIGITAL SCREENING BILAT WITH TOMOSYNTHESIS_CAD: ICD-10-PCS | Mod: 26,,, | Performed by: RADIOLOGY

## 2019-06-06 NOTE — PROGRESS NOTES
Physical Therapy Daily Note     Name: Danuta Santos  Clinic Number: [id number]  Diagnosis:   Encounter Diagnosis   Name Primary?    Chronic bilateral low back pain with right-sided sciatica Yes     Physician: Erica Khan MD  Treatment Orders: PT Eval and Treat  Past Medical History:   Diagnosis Date    IBS (irritable bowel syndrome)     Pneumonia 02/2019       Precautions: as per diagnosis    Evaluation date: 2/15/2018  Visit # authorized: 39/40 on 6/6/2019  Authorization period: 12-31-18  Plan of care expiration: 6-13-19  MD Follow up appt: none scheduled    Time In:  11:15  Time Out:  12:20  Billable time:  55 min    Subjective     Pt reports: back has been feeling tight, but not as painful, Pt states she has done more working out and able to since school is out  Pain Scale: before treatment: 3 after treatment:1  Objective     Pelvic dysfunction noted unable to self mobilize  Mod- severe tightness R lumbar paraspinals and QL,     Lumbar active range of motion in standing is:5-2-19  - flexion - toes                     - extension -  75%                         - left side bending -  To knee         - right side bending -  To knee            Lumbar active range of motion in standing is: 12.5-18  - flexion - toes                     - extension -  50%                         - left side bending -  To knee         - right side bending -  To knee    Muscle Strength 5-2-19  MMT R L   Hip flexion 4/5 4/5   Hip abduction 4+/5 4+/5   Hip extension 4/5 4/5   Glut max 3+/5 3+/5        Knee extension 5/5 5/5   Knee flexion 5/5 5/5       Muscle Strength 12-5-18  MMT R L   Hip flexion 4/5 4+/5   Hip abduction 5/5 5/5   Hip extension 4+/5 4+/5   Glut max 3+/5 3+/5        Knee extension 5/5 5/5   Knee flexion 5/5 5/5              Muscle Strength 10-16-18  MMT R L   Hip flexion 4+/5 5/5   Hip abduction 5/5 5-/5   Hip extension 5/5 5/5   Glut max 4-/5 4-/5        Knee extension 5/5 5/5   Knee flexion 5/5 5/5  "        Muscle Strength 9-6-18  MMT R L   Hip flexion 4/5 5-/5   Hip abduction 5-/5 4+/5   Hip extension 4+/5 4+/5   Glut max 4-/5 4-/5        Knee extension 5/5 5/5   Knee flexion 5/5 5/5     Evaluation of feet revealed more severe pronation L>R    TREATMENT    Therapeutic exercise: Danuta received therapeutic exercises to develop strength, endurance, ROM, flexibility and core stabilization for 15 minutes including:    Pt work out has consisted of   Walking 45 min  Current PT program  Upper body light weight    HS stretch supine   Glut stretch  Piriformis stretch  B QL stretch      Bridge x 15  Pelvic tilt  X 15 for 5 sec hold  Hip abd supine x 15 B  SLR x 10  Arm and Leg lift prone x 20     Heel raises standing x 15    Contract relax glut for self mob of pelvis    HOLD FOR NOW    Step up x10   Hip ext prone with bent knee x 10    1 plank x 30 sec held the other 3 she normally does     (NP)Plank 4 reps 30 sec each           HOLD FOR NOW from previous episode    minisquat x 10    Partial sit up x 20  Hip abd sidelying x 20 kept small plane R to avoid dysfunction     Manual therapy: Danuta  received the following manual therapy  techniques x 30  min. to include soft tissue and joint mobilization were applied to the: low back and gluteals to include:STM to LB paraspinals R>L  P/A mob to lumbar region Grade 1-2  Sidelying L contract relax mob to L5-S1 region to level pelvis    (NP)  Pt received tool-assisted massage with manual therapy techniques to R LB in sidelying with pillow at waist putting pt in SB L to trigger an inflammatory healing response and stimulate the production of new collagen and proper, more functional, less painful healing.    Kinesiotape with 6" star for pain relief was performed over the R lumbar paraspinals.L3-4 region  Instructed pt in use, care and precautions with tape.     Vacuum/cupping STM with manual therapy techniques was performed to back and gluteals to decrease muscle tightness, " increase circulation and promote healing process.  The pt's skin was monitored for redness adjusting pressure as needed. The pt was instructed in possible side effects of bruising and/or soreness.      (NP)Ultrasound  for pain control and to decrease inflammation @ continuous duty cycle, 1 Mhz, applied to R lumbar paraspinals, intensity = 1.8 w/cm2 for 8 minutes.        Patient received pre-mod electrical stimulation to decrease muscle tightness and pain to Lumbar paraspinals/ sacral border of gluteals B for 10 minutes with MH supine with cycle time: continuous, beat frequency: , CC/CV: CV.        Written Home Exercises Provided: none today  Pt demo good understanding of the education provided. Danuta demonstrated good return demonstration of activities.     Pt. education:  · Posture reeducation, body mechanics, HEP,   · No spiritual or educational barriers to learning provided  · Pt has no cultural, educational or language barriers to learning provided.    Assessment    Pt progressing with strengthening ex and improved pelvic stability and decreased pain.  Pt to be going out of town for 2 weeks and provided taping for additional support during travel      Pt will continue to benefit from skilled outpatient physical therapy to address the remaining functional deficits, provide pt/family education, and to maximize pt's level of independence in the home and community environment. .     GOALS:   Short Term Goals:  3 weeks MET STG's  Increase range of motion 25%  Increase strength 1/2 muscle grade  Improve postural awareness of pelvis to independently identify dysfunction with min assist from PT  Be able to perform HEP with minimal cueing required     Long Term Goals: 6 weeks PARTIALLY MET LTG'S  Increase range of motion to 75% to 100% full   Improve muscle strength 1 muscle grade  Improve muscle strength with MMT to 4+/5 to 5/5  Improve and stabilize proper pelvic positioning  Restore ability to sit for ADL  and work with min to 0 pain  Restore normal sleep habits without disturbances due to pain  Restore ability to perform ADL's and household activities independently with min to 0 pain    Anticipated barriers to physical therapy: none  Pt's spiritual, cultural and educational needs considered and pt agreeable to plan of care and goals        Plan   Continue with established plan of care towards PT goals.

## 2019-06-07 ENCOUNTER — CLINICAL SUPPORT (OUTPATIENT)
Dept: REHABILITATION | Facility: HOSPITAL | Age: 64
End: 2019-06-07
Attending: INTERNAL MEDICINE
Payer: COMMERCIAL

## 2019-06-07 DIAGNOSIS — M54.41 CHRONIC BILATERAL LOW BACK PAIN WITH RIGHT-SIDED SCIATICA: Primary | ICD-10-CM

## 2019-06-07 DIAGNOSIS — G89.29 CHRONIC BILATERAL LOW BACK PAIN WITH RIGHT-SIDED SCIATICA: Primary | ICD-10-CM

## 2019-06-07 PROCEDURE — 97110 THERAPEUTIC EXERCISES: CPT | Mod: PN | Performed by: PHYSICAL THERAPIST

## 2019-06-07 PROCEDURE — 97014 ELECTRIC STIMULATION THERAPY: CPT | Mod: PN | Performed by: PHYSICAL THERAPIST

## 2019-06-07 PROCEDURE — 97140 MANUAL THERAPY 1/> REGIONS: CPT | Mod: PN | Performed by: PHYSICAL THERAPIST

## 2019-06-25 ENCOUNTER — CLINICAL SUPPORT (OUTPATIENT)
Dept: REHABILITATION | Facility: HOSPITAL | Age: 64
End: 2019-06-25
Attending: INTERNAL MEDICINE
Payer: COMMERCIAL

## 2019-06-25 DIAGNOSIS — M54.41 CHRONIC BILATERAL LOW BACK PAIN WITH RIGHT-SIDED SCIATICA: Primary | ICD-10-CM

## 2019-06-25 DIAGNOSIS — G89.29 CHRONIC BILATERAL LOW BACK PAIN WITH RIGHT-SIDED SCIATICA: Primary | ICD-10-CM

## 2019-06-25 PROCEDURE — 97110 THERAPEUTIC EXERCISES: CPT | Mod: PN | Performed by: PHYSICAL THERAPIST

## 2019-06-25 PROCEDURE — 97140 MANUAL THERAPY 1/> REGIONS: CPT | Mod: PN | Performed by: PHYSICAL THERAPIST

## 2019-06-25 PROCEDURE — 97014 ELECTRIC STIMULATION THERAPY: CPT | Mod: PN | Performed by: PHYSICAL THERAPIST

## 2019-06-25 NOTE — PROGRESS NOTES
Physical Therapy Daily Note     Name: Danuta Santos  Clinic Number: [id number]  Diagnosis:   Encounter Diagnosis   Name Primary?    Chronic bilateral low back pain with right-sided sciatica Yes     Physician: Erica Khan MD  Treatment Orders: PT Eval and Treat  Past Medical History:   Diagnosis Date    IBS (irritable bowel syndrome)     Pneumonia 02/2019       Precautions: as per diagnosis    Evaluation date: 2/15/2018  Visit # authorized: 40/40 on 6/25/2019  Authorization period: 12-31-18  Plan of care expiration: 6-13-19  MD Follow up appt: none scheduled    Time In:  2:05  Time Out:  3:15  Billable time:  55 min    Subjective     Pt reports: back is out has been doing ok but going out often and feeling tight.  Pt has not seen massage therapist recently.  Pain Scale: before treatment: 4.5 after treatment:1  Objective     Pelvic dysfunction noted unable to self mobilize  Mod- severe tightness R lumbar paraspinals and QL,     Lumbar active range of motion in standing is:5-2-19  - flexion - toes                     - extension -  75%                         - left side bending -  To knee         - right side bending -  To knee            Lumbar active range of motion in standing is: 12.5-18  - flexion - toes                     - extension -  50%                         - left side bending -  To knee         - right side bending -  To knee    Muscle Strength 5-2-19  MMT R L   Hip flexion 4/5 4/5   Hip abduction 4+/5 4+/5   Hip extension 4/5 4/5   Glut max 3+/5 3+/5        Knee extension 5/5 5/5   Knee flexion 5/5 5/5       Muscle Strength 12-5-18  MMT R L   Hip flexion 4/5 4+/5   Hip abduction 5/5 5/5   Hip extension 4+/5 4+/5   Glut max 3+/5 3+/5        Knee extension 5/5 5/5   Knee flexion 5/5 5/5              Muscle Strength 10-16-18  MMT R L   Hip flexion 4+/5 5/5   Hip abduction 5/5 5-/5   Hip extension 5/5 5/5   Glut max 4-/5 4-/5        Knee extension 5/5 5/5   Knee flexion 5/5 5/5         Muscle  "Strength 9-6-18  MMT R L   Hip flexion 4/5 5-/5   Hip abduction 5-/5 4+/5   Hip extension 4+/5 4+/5   Glut max 4-/5 4-/5        Knee extension 5/5 5/5   Knee flexion 5/5 5/5     Evaluation of feet revealed more severe pronation L>R    TREATMENT    Therapeutic exercise: Danuta received therapeutic exercises to develop strength, endurance, ROM, flexibility and core stabilization for 10 minutes including:    Verbal review of HEP and able to progress back to hip ext prone with bent knee      Pt work out has consisted of   Walking 45 min  Current PT program  Upper body light weight    HS stretch supine   Glut stretch  Piriformis stretch  B QL stretch      Bridge x 15  Pelvic tilt  X 15 for 5 sec hold  Hip abd supine x 15 B  SLR x 10  Arm and Leg lift prone x 20   Hip ext prone with bent knee x 10    Heel raises standing x 15    Contract relax glut for self mob of pelvis    HOLD FOR NOW    Step up x10   Hip ext prone with bent knee x 10    1 plank x 30 sec held the other 3 she normally does     (NP)Plank 4 reps 30 sec each           HOLD FOR NOW from previous episode    minisquat x 10    Partial sit up x 20  Hip abd sidelying x 20 kept small plane R to avoid dysfunction     Manual therapy: Danuta  received the following manual therapy  techniques x 15  min. to include soft tissue and joint mobilization were applied to the: low back and gluteals to include:STM to LB paraspinals R>L  P/A mob to lumbar region Grade 1-2  Sidelying L contract relax mob to L5-S1 region to level pelvis    (NP)  Pt received tool-assisted massage with manual therapy techniques to R LB in sidelying with pillow at waist putting pt in SB L to trigger an inflammatory healing response and stimulate the production of new collagen and proper, more functional, less painful healing.    (NP)Kinesiotape with 6" star for pain relief was performed over the R lumbar paraspinals.L3-4 region  Instructed pt in use, care and precautions with tape. "     (NP)Vacuum/cupping STM with manual therapy techniques was performed to back and gluteals to decrease muscle tightness, increase circulation and promote healing process.  The pt's skin was monitored for redness adjusting pressure as needed. The pt was instructed in possible side effects of bruising and/or soreness.      (NP)Ultrasound  for pain control and to decrease inflammation @ continuous duty cycle, 1 Mhz, applied to R lumbar paraspinals, intensity = 1.8 w/cm2 for 8 minutes.        Patient received pre-mod electrical stimulation to decrease muscle tightness and pain to Lumbar paraspinals/ sacral border of gluteals B for 15 minutes with MH supine with cycle time: continuous, beat frequency: , CC/CV: CV.        Written Home Exercises Provided: none today  Pt demo good understanding of the education provided. Danuta demonstrated good return demonstration of activities.     Pt. education:  · Posture reeducation, body mechanics, HEP,   · No spiritual or educational barriers to learning provided  · Pt has no cultural, educational or language barriers to learning provided.    Assessment   Pt able to further progress with strengthening and able to self mobilize.  Pt with increased muscle tightness     Pt will continue to benefit from skilled outpatient physical therapy to address the remaining functional deficits, provide pt/family education, and to maximize pt's level of independence in the home and community environment. .     GOALS:   Short Term Goals:  3 weeks MET STG's  Increase range of motion 25%  Increase strength 1/2 muscle grade  Improve postural awareness of pelvis to independently identify dysfunction with min assist from PT  Be able to perform HEP with minimal cueing required     Long Term Goals: 6 weeks PARTIALLY MET LTG'S  Increase range of motion to 75% to 100% full   Improve muscle strength 1 muscle grade  Improve muscle strength with MMT to 4+/5 to 5/5  Improve and stabilize proper pelvic  positioning  Restore ability to sit for ADL and work with min to 0 pain  Restore normal sleep habits without disturbances due to pain  Restore ability to perform ADL's and household activities independently with min to 0 pain    Anticipated barriers to physical therapy: none  Pt's spiritual, cultural and educational needs considered and pt agreeable to plan of care and goals        Plan   Continue with established plan of care towards PT goals.

## 2019-07-02 ENCOUNTER — CLINICAL SUPPORT (OUTPATIENT)
Dept: REHABILITATION | Facility: HOSPITAL | Age: 64
End: 2019-07-02
Attending: INTERNAL MEDICINE
Payer: COMMERCIAL

## 2019-07-02 DIAGNOSIS — M54.41 CHRONIC BILATERAL LOW BACK PAIN WITH RIGHT-SIDED SCIATICA: Primary | ICD-10-CM

## 2019-07-02 DIAGNOSIS — G89.29 CHRONIC BILATERAL LOW BACK PAIN WITH RIGHT-SIDED SCIATICA: Primary | ICD-10-CM

## 2019-07-02 PROCEDURE — 97110 THERAPEUTIC EXERCISES: CPT | Mod: PN | Performed by: PHYSICAL THERAPIST

## 2019-07-02 PROCEDURE — 97140 MANUAL THERAPY 1/> REGIONS: CPT | Mod: PN | Performed by: PHYSICAL THERAPIST

## 2019-07-02 PROCEDURE — 97014 ELECTRIC STIMULATION THERAPY: CPT | Mod: PN | Performed by: PHYSICAL THERAPIST

## 2019-07-02 NOTE — PROGRESS NOTES
Physical Therapy Progress Note     Name: Danuta Santos  Clinic Number: [id number]  Diagnosis:   Encounter Diagnosis   Name Primary?    Chronic bilateral low back pain with right-sided sciatica Yes     Physician: Erica Khan MD  Treatment Orders: PT Eval and Treat  Past Medical History:   Diagnosis Date    IBS (irritable bowel syndrome)     Pneumonia 02/2019       Precautions: as per diagnosis    Evaluation date: 2/15/2018  Visit # authorized: 41/60 on 7/2/2019  Authorization period: 12-31-18  Plan of care expiration: 8-13 -19  MD Follow up appt: none scheduled    Time In:  2:07  Time Out:  3:00  Billable time:  40 min    Subjective     Pt reports: back is out has been doing ok but going out often and feeling tight.  Pt has not seen massage therapist recently.  Pain Scale: before treatment: 4.5 after treatment:1  Objective     Pelvic dysfunction noted unable to self mobilize  Mod tightness at last visit Mod- severe tightness R lumbar paraspinals and QL,     Lumbar active range of motion in standing is:7-2-19  - flexion - toes                     - extension -  75%                         - left side bending -  To knee         - right side bending -  To knee            Lumbar active range of motion in standing is: 12.5-18  - flexion - toes                     - extension -  50%                         - left side bending -  To knee         - right side bending -  To knee    Muscle Strength 7-2-19  MMT R L   Hip flexion 4/5 4/5   Hip abduction 4+/5 4+/5   Hip extension 4+/5 4+/5   Glut max 4-/5 4-/5        Knee extension 5/5 5/5   Knee flexion 5/5 5/5       Muscle Strength 12-5-18  MMT R L   Hip flexion 4/5 4+/5   Hip abduction 5/5 5/5   Hip extension 4+/5 4+/5   Glut max 3+/5 3+/5        Knee extension 5/5 5/5   Knee flexion 5/5 5/5              Muscle Strength 10-16-18  MMT R L   Hip flexion 4+/5 5/5   Hip abduction 5/5 5-/5   Hip extension 5/5 5/5   Glut max 4-/5 4-/5        Knee extension 5/5 5/5   Knee  "flexion 5/5 5/5         Muscle Strength 9-6-18  MMT R L   Hip flexion 4/5 5-/5   Hip abduction 5-/5 4+/5   Hip extension 4+/5 4+/5   Glut max 4-/5 4-/5        Knee extension 5/5 5/5   Knee flexion 5/5 5/5         TREATMENT    Therapeutic exercise: Danuta received therapeutic exercises to develop strength, endurance, ROM, flexibility and core stabilization for 10 minutes including:    Verbal review of HEP Pt is to be going out of town for a conference with a lot of sitting and did not want to further add ex at this time.  Pt was able to self mobilize today in clinic      Pt work out has consisted of   Walking 45 min  Current PT program  Upper body light weight    HS stretch supine   Glut stretch  Piriformis stretch  B QL stretch      Bridge x 15  Pelvic tilt  X 15 for 5 sec hold  Hip abd supine x 15 B  SLR x 10  Arm and Leg lift prone x 20  Hip ext prone with bent knee x 10    Heel raises standing x 15    Contract relax glut for self mob of pelvis    HOLD FOR NOW    Step up x10   Hip ext prone with bent knee x 10    1 plank x 30 sec held the other 3 she normally does     (NP)Plank 4 reps 30 sec each           HOLD FOR NOW from previous episode    minisquat x 10    Partial sit up x 20  Hip abd sidelying x 20 kept small plane R to avoid dysfunction     Manual therapy: Danuta  received the following manual therapy  techniques x 15  min. to include soft tissue and joint mobilization were applied to the: low back and gluteals to include:STM to LB paraspinals R>L  P/A mob to lumbar region Grade 1-2  Sidelying L contract relax mob to L5-S1 region to level pelvis    (NP)  Pt received tool-assisted massage with manual therapy techniques to R LB in sidelying with pillow at waist putting pt in SB L to trigger an inflammatory healing response and stimulate the production of new collagen and proper, more functional, less painful healing.    (NP)Kinesiotape with 6" star for pain relief was performed over the R lumbar " paraspinals.L3-4 region  Instructed pt in use, care and precautions with tape.     Vacuum/cupping STM with manual therapy techniques was performed to back and gluteals to decrease muscle tightness, increase circulation and promote healing process.  The pt's skin was monitored for redness adjusting pressure as needed. The pt was instructed in possible side effects of bruising and/or soreness.      (NP)Ultrasound  for pain control and to decrease inflammation @ continuous duty cycle, 1 Mhz, applied to R lumbar paraspinals, intensity = 1.8 w/cm2 for 8 minutes.        Patient received pre-mod electrical stimulation to decrease muscle tightness and pain to Lumbar paraspinals/ sacral border of gluteals B for 15 minutes with MH supine with cycle time: continuous, beat frequency: , CC/CV: CV.        Written Home Exercises Provided: none today  Pt demo good understanding of the education provided. Danuta demonstrated good return demonstration of activities.     Pt. education:  · Posture reeducation, body mechanics, HEP,   · No spiritual or educational barriers to learning provided  · Pt has no cultural, educational or language barriers to learning provided.    Assessment   Pt gradually able to resume strengthening and showing slight increases in strength and improving ability to self mobilize.  Pt has still not been able to fully restore program and continues with dysfunction at times requiring assistance from PT.       Pt will continue to benefit from skilled outpatient physical therapy to address the remaining functional deficits, provide pt/family education, and to maximize pt's level of independence in the home and community environment. .     GOALS:   Short Term Goals:  3 weeks MET STG's  Increase range of motion 25%  Increase strength 1/2 muscle grade  Improve postural awareness of pelvis to independently identify dysfunction with min assist from PT  Be able to perform HEP with minimal cueing required     Long  Term Goals: 6 weeks PARTIALLY MET LTG'S  Increase range of motion to 75% to 100% full   Improve muscle strength 1 muscle grade  Improve muscle strength with MMT to 4+/5 to 5/5  Improve and stabilize proper pelvic positioning  Restore ability to sit for ADL and work with min to 0 pain  Restore normal sleep habits without disturbances due to pain  Restore ability to perform ADL's and household activities independently with min to 0 pain    Anticipated barriers to physical therapy: none  Pt's spiritual, cultural and educational needs considered and pt agreeable to plan of care and goals        Plan   If you concur, I recommend patient continue with physical therapy 1-2 times a week as needed  for 6 weeks.  Please advise us of your  recommendations. Thank you for allowing us to assist in the care of your patient.      Jacy Escobar, MS, PT          I certify the need for these services furnished under this plan of treatment and while under my care.    ____________________________________ Physician/Referring Practitioner                                Date of Signature

## 2019-07-02 NOTE — PLAN OF CARE
Physical Therapy Progress Note     Name: Danuta Santos  Clinic Number: [id number]  Diagnosis:   Encounter Diagnosis   Name Primary?    Chronic bilateral low back pain with right-sided sciatica Yes     Physician: Erica Khan MD  Treatment Orders: PT Eval and Treat  Past Medical History:   Diagnosis Date    IBS (irritable bowel syndrome)     Pneumonia 02/2019       Precautions: as per diagnosis    Evaluation date: 2/15/2018  Visit # authorized: 41/60 on 7/2/2019  Authorization period: 12-31-18  Plan of care expiration: 8-13 -19  MD Follow up appt: none scheduled    Time In:  2:07  Time Out:  3:00  Billable time:  40 min    Subjective     Pt reports: back is out has been doing ok but going out often and feeling tight.  Pt has not seen massage therapist recently.  Pain Scale: before treatment: 4.5 after treatment:1  Objective     Pelvic dysfunction noted unable to self mobilize  Mod tightness at last visit Mod- severe tightness R lumbar paraspinals and QL,     Lumbar active range of motion in standing is:7-2-19  - flexion - toes                     - extension -  75%                         - left side bending -  To knee         - right side bending -  To knee            Lumbar active range of motion in standing is: 12.5-18  - flexion - toes                     - extension -  50%                         - left side bending -  To knee         - right side bending -  To knee    Muscle Strength 7-2-19  MMT R L   Hip flexion 4/5 4/5   Hip abduction 4+/5 4+/5   Hip extension 4+/5 4+/5   Glut max 4-/5 4-/5        Knee extension 5/5 5/5   Knee flexion 5/5 5/5       Muscle Strength 12-5-18  MMT R L   Hip flexion 4/5 4+/5   Hip abduction 5/5 5/5   Hip extension 4+/5 4+/5   Glut max 3+/5 3+/5        Knee extension 5/5 5/5   Knee flexion 5/5 5/5              Muscle Strength 10-16-18  MMT R L   Hip flexion 4+/5 5/5   Hip abduction 5/5 5-/5   Hip extension 5/5 5/5   Glut max 4-/5 4-/5        Knee extension 5/5 5/5   Knee  "flexion 5/5 5/5         Muscle Strength 9-6-18  MMT R L   Hip flexion 4/5 5-/5   Hip abduction 5-/5 4+/5   Hip extension 4+/5 4+/5   Glut max 4-/5 4-/5        Knee extension 5/5 5/5   Knee flexion 5/5 5/5         TREATMENT    Therapeutic exercise: Danuta received therapeutic exercises to develop strength, endurance, ROM, flexibility and core stabilization for 10 minutes including:    Verbal review of HEP Pt is to be going out of town for a conference with a lot of sitting and did not want to further add ex at this time.  Pt was able to self mobilize today in clinic      Pt work out has consisted of   Walking 45 min  Current PT program  Upper body light weight    HS stretch supine   Glut stretch  Piriformis stretch  B QL stretch      Bridge x 15  Pelvic tilt  X 15 for 5 sec hold  Hip abd supine x 15 B  SLR x 10  Arm and Leg lift prone x 20  Hip ext prone with bent knee x 10    Heel raises standing x 15    Contract relax glut for self mob of pelvis    HOLD FOR NOW    Step up x10   Hip ext prone with bent knee x 10    1 plank x 30 sec held the other 3 she normally does     (NP)Plank 4 reps 30 sec each           HOLD FOR NOW from previous episode    minisquat x 10    Partial sit up x 20  Hip abd sidelying x 20 kept small plane R to avoid dysfunction     Manual therapy: Danuta  received the following manual therapy  techniques x 15  min. to include soft tissue and joint mobilization were applied to the: low back and gluteals to include:STM to LB paraspinals R>L  P/A mob to lumbar region Grade 1-2  Sidelying L contract relax mob to L5-S1 region to level pelvis    (NP)  Pt received tool-assisted massage with manual therapy techniques to R LB in sidelying with pillow at waist putting pt in SB L to trigger an inflammatory healing response and stimulate the production of new collagen and proper, more functional, less painful healing.    (NP)Kinesiotape with 6" star for pain relief was performed over the R lumbar " paraspinals.L3-4 region  Instructed pt in use, care and precautions with tape.     Vacuum/cupping STM with manual therapy techniques was performed to back and gluteals to decrease muscle tightness, increase circulation and promote healing process.  The pt's skin was monitored for redness adjusting pressure as needed. The pt was instructed in possible side effects of bruising and/or soreness.      (NP)Ultrasound  for pain control and to decrease inflammation @ continuous duty cycle, 1 Mhz, applied to R lumbar paraspinals, intensity = 1.8 w/cm2 for 8 minutes.        Patient received pre-mod electrical stimulation to decrease muscle tightness and pain to Lumbar paraspinals/ sacral border of gluteals B for 15 minutes with MH supine with cycle time: continuous, beat frequency: , CC/CV: CV.        Written Home Exercises Provided: none today  Pt demo good understanding of the education provided. Danuta demonstrated good return demonstration of activities.     Pt. education:  · Posture reeducation, body mechanics, HEP,   · No spiritual or educational barriers to learning provided  · Pt has no cultural, educational or language barriers to learning provided.    Assessment   Pt gradually able to resume strengthening and showing slight increases in strength and improving ability to self mobilize.  Pt has still not been able to fully restore program and continues with dysfunction at times requiring assistance from PT.       Pt will continue to benefit from skilled outpatient physical therapy to address the remaining functional deficits, provide pt/family education, and to maximize pt's level of independence in the home and community environment. .     GOALS:   Short Term Goals:  3 weeks MET STG's  Increase range of motion 25%  Increase strength 1/2 muscle grade  Improve postural awareness of pelvis to independently identify dysfunction with min assist from PT  Be able to perform HEP with minimal cueing required     Long  Term Goals: 6 weeks PARTIALLY MET LTG'S  Increase range of motion to 75% to 100% full   Improve muscle strength 1 muscle grade  Improve muscle strength with MMT to 4+/5 to 5/5  Improve and stabilize proper pelvic positioning  Restore ability to sit for ADL and work with min to 0 pain  Restore normal sleep habits without disturbances due to pain  Restore ability to perform ADL's and household activities independently with min to 0 pain    Anticipated barriers to physical therapy: none  Pt's spiritual, cultural and educational needs considered and pt agreeable to plan of care and goals        Plan   If you concur, I recommend patient continue with physical therapy 1-2 times a week as needed  for 6 weeks.  Please advise us of your  recommendations. Thank you for allowing us to assist in the care of your patient.      Jacy Escobar, MS, PT          I certify the need for these services furnished under this plan of treatment and while under my care.    ____________________________________ Physician/Referring Practitioner                                Date of Signature

## 2019-07-23 ENCOUNTER — CLINICAL SUPPORT (OUTPATIENT)
Dept: REHABILITATION | Facility: HOSPITAL | Age: 64
End: 2019-07-23
Attending: INTERNAL MEDICINE
Payer: COMMERCIAL

## 2019-07-23 DIAGNOSIS — G89.29 CHRONIC BILATERAL LOW BACK PAIN WITH RIGHT-SIDED SCIATICA: Primary | ICD-10-CM

## 2019-07-23 DIAGNOSIS — M54.41 CHRONIC BILATERAL LOW BACK PAIN WITH RIGHT-SIDED SCIATICA: Primary | ICD-10-CM

## 2019-07-23 PROCEDURE — 97014 ELECTRIC STIMULATION THERAPY: CPT | Mod: PN | Performed by: PHYSICAL THERAPIST

## 2019-07-23 PROCEDURE — 97140 MANUAL THERAPY 1/> REGIONS: CPT | Mod: PN | Performed by: PHYSICAL THERAPIST

## 2019-07-23 NOTE — PROGRESS NOTES
Physical Therapy Daily Note     Name: Danuta Santos  Clinic Number: [id number]  Diagnosis:   Encounter Diagnosis   Name Primary?    Chronic bilateral low back pain with right-sided sciatica Yes     Physician: Erica Khan MD  Treatment Orders: PT Eval and Treat  Past Medical History:   Diagnosis Date    IBS (irritable bowel syndrome)     Pneumonia 02/2019       Precautions: as per diagnosis    Evaluation date: 2/15/2018  Visit # authorized: 41/60 on 7/23/2019  Authorization period: 12-31-18  Plan of care expiration: 8-13 -19  MD Follow up appt: none scheduled    Time In:  10:00  Time Out: 11:00  Billable time:  40 min    Subjective     Pt reports: doing a lot of exercise and cleaning but does seem to go back in but feeling really tight  Pain Scale: before treatment: 4  after treatment:1  Objective     Pelvic dysfunction noted unable to self mobilize  Mod tightness at last visit Mod- severe tightness R lumbar paraspinals and QL,     Lumbar active range of motion in standing is:7-2-19  - flexion - toes                     - extension -  75%                         - left side bending -  To knee         - right side bending -  To knee            Lumbar active range of motion in standing is: 12.5-18  - flexion - toes                     - extension -  50%                         - left side bending -  To knee         - right side bending -  To knee    Muscle Strength 7-2-19  MMT R L   Hip flexion 4/5 4/5   Hip abduction 4+/5 4+/5   Hip extension 4+/5 4+/5   Glut max 4-/5 4-/5        Knee extension 5/5 5/5   Knee flexion 5/5 5/5       Muscle Strength 12-5-18  MMT R L   Hip flexion 4/5 4+/5   Hip abduction 5/5 5/5   Hip extension 4+/5 4+/5   Glut max 3+/5 3+/5        Knee extension 5/5 5/5   Knee flexion 5/5 5/5              Muscle Strength 10-16-18  MMT R L   Hip flexion 4+/5 5/5   Hip abduction 5/5 5-/5   Hip extension 5/5 5/5   Glut max 4-/5 4-/5        Knee extension 5/5 5/5   Knee flexion 5/5 5/5  "        Muscle Strength 9-6-18  MMT R L   Hip flexion 4/5 5-/5   Hip abduction 5-/5 4+/5   Hip extension 4+/5 4+/5   Glut max 4-/5 4-/5        Knee extension 5/5 5/5   Knee flexion 5/5 5/5         TREATMENT    Therapeutic exercise: Danuta received therapeutic exercises to develop strength, endurance, ROM, flexibility and core stabilization for 5 minutes including:    Verbal review of HEP Pt is to be going out of town for a conference with a lot of sitting and did not want to further add ex at this time.  Pt was able to self mobilize today in clinic      Pt work out has consisted of   Walking 45 min  Current PT program  Upper body light weight    HS stretch supine   Glut stretch  Piriformis stretch  B QL stretch      Bridge x 15  Pelvic tilt  X 15 for 5 sec hold  Hip abd supine x 15 B  SLR x 10  Arm and Leg lift prone x 20  Hip ext prone with bent knee x 10    Heel raises standing x 15    Contract relax glut for self mob of pelvis    HOLD FOR NOW    Step up x10     1 plank x 30 sec held the other 3 she normally does     (NP)Plank 4 reps 30 sec each           HOLD FOR NOW from previous episode    minisquat x 10    Partial sit up x 20  Hip abd sidelying x 20 kept small plane R to avoid dysfunction     Manual therapy: Danuta  received the following manual therapy  techniques x 25  min. to include soft tissue and joint mobilization were applied to the: low back and gluteals to include:STM to LB paraspinals R>L  P/A mob to lumbar region Grade 1-2  Sidelying L contract relax mob to L5-S1 region to level pelvis      Pt received tool-assisted massage with manual therapy techniques to R LB in sidelying with pillow at waist putting pt in SB L to trigger an inflammatory healing response and stimulate the production of new collagen and proper, more functional, less painful healing.    Kinesiotape with 6" star for pain relief was performed over the R lumbar paraspinals.L3-4 region  Instructed pt in use, care and precautions " with tape.     Vacuum/cupping STM with manual therapy techniques was performed to back and gluteals to decrease muscle tightness, increase circulation and promote healing process.  The pt's skin was monitored for redness adjusting pressure as needed. The pt was instructed in possible side effects of bruising and/or soreness.      (NP)Ultrasound  for pain control and to decrease inflammation @ continuous duty cycle, 1 Mhz, applied to R lumbar paraspinals, intensity = 1.8 w/cm2 for 8 minutes.        Patient received pre-mod electrical stimulation to decrease muscle tightness and pain to Lumbar paraspinals/ sacral border of gluteals B for 15 minutes with MH supine with cycle time: continuous, beat frequency: , CC/CV: CV.        Written Home Exercises Provided: none today  Pt demo good understanding of the education provided. Danuta demonstrated good return demonstration of activities.     Pt. education:  · Posture reeducation, body mechanics, HEP,   · No spiritual or educational barriers to learning provided  · Pt has no cultural, educational or language barriers to learning provided.    Assessment   Pt able to continue with strengthening and progress reps as tolerated and able to self mobilize despite increased activity with cleaning      Pt will continue to benefit from skilled outpatient physical therapy to address the remaining functional deficits, provide pt/family education, and to maximize pt's level of independence in the home and community environment. .     GOALS:   Short Term Goals:  3 weeks MET STG's  Increase range of motion 25%  Increase strength 1/2 muscle grade  Improve postural awareness of pelvis to independently identify dysfunction with min assist from PT  Be able to perform HEP with minimal cueing required     Long Term Goals: 6 weeks PARTIALLY MET LTG'S  Increase range of motion to 75% to 100% full   Improve muscle strength 1 muscle grade  Improve muscle strength with MMT to 4+/5 to  5/5  Improve and stabilize proper pelvic positioning  Restore ability to sit for ADL and work with min to 0 pain  Restore normal sleep habits without disturbances due to pain  Restore ability to perform ADL's and household activities independently with min to 0 pain    Anticipated barriers to physical therapy: none  Pt's spiritual, cultural and educational needs considered and pt agreeable to plan of care and goals        Plan   Continue with established plan of care towards PT goals.

## 2019-07-29 ENCOUNTER — CLINICAL SUPPORT (OUTPATIENT)
Dept: REHABILITATION | Facility: HOSPITAL | Age: 64
End: 2019-07-29
Attending: INTERNAL MEDICINE
Payer: COMMERCIAL

## 2019-07-29 DIAGNOSIS — M54.41 CHRONIC BILATERAL LOW BACK PAIN WITH RIGHT-SIDED SCIATICA: Primary | ICD-10-CM

## 2019-07-29 DIAGNOSIS — G89.29 CHRONIC BILATERAL LOW BACK PAIN WITH RIGHT-SIDED SCIATICA: Primary | ICD-10-CM

## 2019-07-29 PROCEDURE — 97110 THERAPEUTIC EXERCISES: CPT | Mod: PN | Performed by: PHYSICAL THERAPIST

## 2019-07-29 PROCEDURE — 97014 ELECTRIC STIMULATION THERAPY: CPT | Mod: PN | Performed by: PHYSICAL THERAPIST

## 2019-07-29 NOTE — PROGRESS NOTES
Physical Therapy Daily Note     Name: Danuta Santos  Clinic Number: [id number]  Diagnosis:   Encounter Diagnosis   Name Primary?    Chronic bilateral low back pain with right-sided sciatica Yes     Physician: Erica Khan MD  Treatment Orders: PT Eval and Treat  Past Medical History:   Diagnosis Date    IBS (irritable bowel syndrome)     Pneumonia 02/2019       Precautions: as per diagnosis    Evaluation date: 2/15/2018  Visit # authorized: 41/60 on 7/29/2019  Authorization period: 12-31-18  Plan of care expiration: 8-13 -19  MD Follow up appt: none scheduled    Time In:  10:05  Time Out: 11:15  Billable time:  45 min    Subjective     Pt reports: back feeling tight still, better but still tight and has been doing stuff at home with cleaning and organizing Pt states the tape helped her get through   Pain Scale: before treatment: 4  after treatment:1  Objective     Pelvic dysfunction noted unable to self mobilize  Mod tightness at last visit Mod- severe tightness R lumbar paraspinals and QL,     Lumbar active range of motion in standing is:7-2-19  - flexion - toes                     - extension -  75%                         - left side bending -  To knee         - right side bending -  To knee            Lumbar active range of motion in standing is: 12.5-18  - flexion - toes                     - extension -  50%                         - left side bending -  To knee         - right side bending -  To knee    Muscle Strength 7-2-19  MMT R L   Hip flexion 4/5 4/5   Hip abduction 4+/5 4+/5   Hip extension 4+/5 4+/5   Glut max 4-/5 4-/5        Knee extension 5/5 5/5   Knee flexion 5/5 5/5       Muscle Strength 12-5-18  MMT R L   Hip flexion 4/5 4+/5   Hip abduction 5/5 5/5   Hip extension 4+/5 4+/5   Glut max 3+/5 3+/5        Knee extension 5/5 5/5   Knee flexion 5/5 5/5              Muscle Strength 10-16-18  MMT R L   Hip flexion 4+/5 5/5   Hip abduction 5/5 5-/5   Hip extension 5/5 5/5   Glut max 4-/5 4-/5  "       Knee extension 5/5 5/5   Knee flexion 5/5 5/5         Muscle Strength 9-6-18  MMT R L   Hip flexion 4/5 5-/5   Hip abduction 5-/5 4+/5   Hip extension 4+/5 4+/5   Glut max 4-/5 4-/5        Knee extension 5/5 5/5   Knee flexion 5/5 5/5         TREATMENT    Therapeutic exercise: Danuta received therapeutic exercises to develop strength, endurance, ROM, flexibility and core stabilization for 25 minutes including:    Verbal review of HEP Pt is to be going out of town for a conference with a lot of sitting and did not want to further add ex at this time.  Pt was able to self mobilize today in clinic      Pt work out has consisted of   Walking 45 min  Current PT program  Upper body light weight    HS stretch supine   Glut stretch  Piriformis stretch  B QL stretch      Bridge x 15  Pelvic tilt  X 15 for 5 sec hold   Partial sit up x 10   Hip abd sidelying x 10 B  SLR x 15  Arm and Leg lift prone x 20  Hip ext prone with bent knee x 10    Heel raises standing x 15    Contract relax glut for self mob of pelvis  Did not realign so did stretching again and then contract relax     HOLD FOR NOW    Step up x10     1 plank x 30 sec held the other 3 she normally does     (NP)Plank 4 reps 30 sec each           HOLD FOR NOW from previous episode    minisquat x 10    Partial sit up x 20       Manual therapy: Danuta  received the following manual therapy  techniques x 5  min. to include soft tissue and joint mobilization were applied to the: low back and gluteals to include:STM to LB paraspinals R>L  P/A mob to lumbar region Grade 1-2  Sidelying L contract relax mob to L5-S1 region to level pelvis      (NP)Pt received tool-assisted massage with manual therapy techniques to R LB in sidelying with pillow at waist putting pt in SB L to trigger an inflammatory healing response and stimulate the production of new collagen and proper, more functional, less painful healing.    Kinesiotape with 6" star for pain relief was " performed over the R lumbar paraspinals.L3-4 region  Instructed pt in use, care and precautions with tape.     (NP)Vacuum/cupping STM with manual therapy techniques was performed to back and gluteals to decrease muscle tightness, increase circulation and promote healing process.  The pt's skin was monitored for redness adjusting pressure as needed. The pt was instructed in possible side effects of bruising and/or soreness.      (NP)Ultrasound  for pain control and to decrease inflammation @ continuous duty cycle, 1 Mhz, applied to R lumbar paraspinals, intensity = 1.8 w/cm2 for 8 minutes.        Patient received pre-mod electrical stimulation to decrease muscle tightness and pain to Lumbar paraspinals/ sacral border of gluteals B for 15 minutes with MH supine with cycle time: continuous, beat frequency: , CC/CV: CV.        Written Home Exercises Provided: none today  Pt demo good understanding of the education provided. Danuta demonstrated good return demonstration of activities.     Pt. education:  · Posture reeducation, body mechanics, HEP,   · No spiritual or educational barriers to learning provided  · Pt has no cultural, educational or language barriers to learning provided.    Assessment   Pt continues with increased activity with cleaning house and continued stiffness in back, may benefit from resuming further strengthening     Pt will continue to benefit from skilled outpatient physical therapy to address the remaining functional deficits, provide pt/family education, and to maximize pt's level of independence in the home and community environment. .     GOALS:   Short Term Goals:  3 weeks MET STG's  Increase range of motion 25%  Increase strength 1/2 muscle grade  Improve postural awareness of pelvis to independently identify dysfunction with min assist from PT  Be able to perform HEP with minimal cueing required     Long Term Goals: 6 weeks PARTIALLY MET LTG'S  Increase range of motion to 75% to  100% full   Improve muscle strength 1 muscle grade  Improve muscle strength with MMT to 4+/5 to 5/5  Improve and stabilize proper pelvic positioning  Restore ability to sit for ADL and work with min to 0 pain  Restore normal sleep habits without disturbances due to pain  Restore ability to perform ADL's and household activities independently with min to 0 pain    Anticipated barriers to physical therapy: none  Pt's spiritual, cultural and educational needs considered and pt agreeable to plan of care and goals        Plan   Continue with established plan of care towards PT goals.

## 2019-08-08 ENCOUNTER — CLINICAL SUPPORT (OUTPATIENT)
Dept: REHABILITATION | Facility: HOSPITAL | Age: 64
End: 2019-08-08
Attending: INTERNAL MEDICINE
Payer: COMMERCIAL

## 2019-08-08 DIAGNOSIS — G89.29 CHRONIC BILATERAL LOW BACK PAIN WITH RIGHT-SIDED SCIATICA: Primary | ICD-10-CM

## 2019-08-08 DIAGNOSIS — M54.41 CHRONIC BILATERAL LOW BACK PAIN WITH RIGHT-SIDED SCIATICA: Primary | ICD-10-CM

## 2019-08-08 PROCEDURE — 97014 ELECTRIC STIMULATION THERAPY: CPT | Mod: PN | Performed by: PHYSICAL THERAPIST

## 2019-08-08 PROCEDURE — 97110 THERAPEUTIC EXERCISES: CPT | Mod: PN | Performed by: PHYSICAL THERAPIST

## 2019-08-08 PROCEDURE — 97140 MANUAL THERAPY 1/> REGIONS: CPT | Mod: PN | Performed by: PHYSICAL THERAPIST

## 2019-08-08 NOTE — PROGRESS NOTES
Physical Therapy Daily Note     Name: Danuta Santos  Clinic Number: [id number]  Diagnosis:   Encounter Diagnosis   Name Primary?    Chronic bilateral low back pain with right-sided sciatica Yes     Physician: Erica Khan MD  Treatment Orders: PT Eval and Treat  Past Medical History:   Diagnosis Date    IBS (irritable bowel syndrome)     Pneumonia 02/2019       Precautions: as per diagnosis    Evaluation date: 2/15/2018  Visit # authorized: 43/60 on 8/8/2019  Authorization period: 12-31-18  Plan of care expiration: 8-13 -19  MD Follow up appt: none scheduled    Time In:  3:20  Time Out: 4:40  Billable time:  55 min    Subjective     Pt reports: has been busy and unable to work on ex, feeling tight  Pain Scale: before treatment: 4  after treatment:1  Objective     Pelvic dysfunction noted unable to self mobilize  Mod tightness  Mod- severe tightness R lumbar paraspinals and QL,     Lumbar active range of motion in standing is:7-2-19  - flexion - toes                     - extension -  75%                         - left side bending -  To knee         - right side bending -  To knee            Lumbar active range of motion in standing is: 12.5-18  - flexion - toes                     - extension -  50%                         - left side bending -  To knee         - right side bending -  To knee    Muscle Strength 7-2-19  MMT R L   Hip flexion 4/5 4/5   Hip abduction 4+/5 4+/5   Hip extension 4+/5 4+/5   Glut max 4-/5 4-/5        Knee extension 5/5 5/5   Knee flexion 5/5 5/5       Muscle Strength 12-5-18  MMT R L   Hip flexion 4/5 4+/5   Hip abduction 5/5 5/5   Hip extension 4+/5 4+/5   Glut max 3+/5 3+/5        Knee extension 5/5 5/5   Knee flexion 5/5 5/5              Muscle Strength 10-16-18  MMT R L   Hip flexion 4+/5 5/5   Hip abduction 5/5 5-/5   Hip extension 5/5 5/5   Glut max 4-/5 4-/5        Knee extension 5/5 5/5   Knee flexion 5/5 5/5         Muscle Strength 9-6-18  MMT R L   Hip flexion 4/5 5-/5  "  Hip abduction 5-/5 4+/5   Hip extension 4+/5 4+/5   Glut max 4-/5 4-/5        Knee extension 5/5 5/5   Knee flexion 5/5 5/5         TREATMENT    Therapeutic exercise: Danuta received therapeutic exercises to develop strength, endurance, ROM, flexibility and core stabilization for 25 minutes including:    Verbal review of HEP Pt is to be going out of town for a conference with a lot of sitting and did not want to further add ex at this time.  Pt was able to self mobilize today in clinic      Pt work out has consisted of   Walking 45 min  Current PT program  Upper body light weight    HS stretch supine   Glut stretch  Piriformis stretch  B QL stretch      Bridge x 15  Pelvic tilt  X 15 for 5 sec hold  Partial sit up x 20  Hip abd sidelying x 20 B  SLR x 15  Arm and Leg lift prone x 20  Hip ext prone with bent knee x 20    Heel raises standing x 15    Contract relax glut for self mob of pelvis  Did not realign so did stretching again and then contract relax     HOLD FOR NOW    Step up x10     1 plank x 30 sec held the other 3 she normally does     (NP)Plank 4 reps 30 sec each           HOLD FOR NOW from previous episode    minisquat x 10       Manual therapy: Danuta  received the following manual therapy  techniques x 15  min. to include soft tissue and joint mobilization were applied to the: low back and gluteals to include:STM to LB paraspinals R>L  P/A mob to lumbar region Grade 1-2  Sidelying L contract relax mob to L5-S1 region to level pelvis      (NP)Pt received tool-assisted massage with manual therapy techniques to R LB in sidelying with pillow at waist putting pt in SB L to trigger an inflammatory healing response and stimulate the production of new collagen and proper, more functional, less painful healing.    Kinesiotape with 6" star for pain relief was performed over the R lumbar paraspinals.L3-4 region  Instructed pt in use, care and precautions with tape.     Vacuum/cupping STM with manual " therapy techniques was performed to back and gluteals to decrease muscle tightness, increase circulation and promote healing process.  The pt's skin was monitored for redness adjusting pressure as needed. The pt was instructed in possible side effects of bruising and/or soreness.      (NP)Ultrasound  for pain control and to decrease inflammation @ continuous duty cycle, 1 Mhz, applied to R lumbar paraspinals, intensity = 1.8 w/cm2 for 8 minutes.        Patient received pre-mod electrical stimulation to decrease muscle tightness and pain to Lumbar paraspinals/ sacral border of gluteals B for 15 minutes with MH supine with cycle time: continuous, beat frequency: , CC/CV: CV.        Written Home Exercises Provided: none today  Pt demo good understanding of the education provided. Danuta demonstrated good return demonstration of activities.     Pt. education:  · Posture reeducation, body mechanics, HEP,   · No spiritual or educational barriers to learning provided  · Pt has no cultural, educational or language barriers to learning provided.    Assessment   Pt able to complete program and able to self mobilize after manual therapy decreased muscle tightness after treatment   Pt will continue to benefit from skilled outpatient physical therapy to address the remaining functional deficits, provide pt/family education, and to maximize pt's level of independence in the home and community environment. .     GOALS:   Short Term Goals:  3 weeks MET STG's  Increase range of motion 25%  Increase strength 1/2 muscle grade  Improve postural awareness of pelvis to independently identify dysfunction with min assist from PT  Be able to perform HEP with minimal cueing required     Long Term Goals: 6 weeks PARTIALLY MET LTG'S  Increase range of motion to 75% to 100% full   Improve muscle strength 1 muscle grade  Improve muscle strength with MMT to 4+/5 to 5/5  Improve and stabilize proper pelvic positioning  Restore ability to  sit for ADL and work with min to 0 pain  Restore normal sleep habits without disturbances due to pain  Restore ability to perform ADL's and household activities independently with min to 0 pain    Anticipated barriers to physical therapy: none  Pt's spiritual, cultural and educational needs considered and pt agreeable to plan of care and goals        Plan   Continue with established plan of care towards PT goals.  Reassess next visit

## 2019-08-12 ENCOUNTER — LAB VISIT (OUTPATIENT)
Dept: LAB | Facility: OTHER | Age: 64
End: 2019-08-12
Attending: OBSTETRICS & GYNECOLOGY
Payer: COMMERCIAL

## 2019-08-12 ENCOUNTER — OFFICE VISIT (OUTPATIENT)
Dept: OBSTETRICS AND GYNECOLOGY | Facility: CLINIC | Age: 64
End: 2019-08-12
Attending: OBSTETRICS & GYNECOLOGY
Payer: COMMERCIAL

## 2019-08-12 VITALS — DIASTOLIC BLOOD PRESSURE: 62 MMHG | SYSTOLIC BLOOD PRESSURE: 120 MMHG

## 2019-08-12 DIAGNOSIS — Z11.3 SCREENING FOR VENEREAL DISEASE: ICD-10-CM

## 2019-08-12 DIAGNOSIS — Z01.419 WELL FEMALE EXAM WITH ROUTINE GYNECOLOGICAL EXAM: Primary | ICD-10-CM

## 2019-08-12 PROCEDURE — 87340 HEPATITIS B SURFACE AG IA: CPT

## 2019-08-12 PROCEDURE — 86592 SYPHILIS TEST NON-TREP QUAL: CPT

## 2019-08-12 PROCEDURE — 99999 PR PBB SHADOW E&M-EST. PATIENT-LVL II: ICD-10-PCS | Mod: PBBFAC,,, | Performed by: OBSTETRICS & GYNECOLOGY

## 2019-08-12 PROCEDURE — 86696 HERPES SIMPLEX TYPE 2 TEST: CPT

## 2019-08-12 PROCEDURE — 99999 PR PBB SHADOW E&M-EST. PATIENT-LVL II: CPT | Mod: PBBFAC,,, | Performed by: OBSTETRICS & GYNECOLOGY

## 2019-08-12 PROCEDURE — 87661 TRICHOMONAS VAGINALIS AMPLIF: CPT

## 2019-08-12 PROCEDURE — 99397 PR PREVENTIVE VISIT,EST,65 & OVER: ICD-10-PCS | Mod: S$GLB,,, | Performed by: OBSTETRICS & GYNECOLOGY

## 2019-08-12 PROCEDURE — 99397 PER PM REEVAL EST PAT 65+ YR: CPT | Mod: S$GLB,,, | Performed by: OBSTETRICS & GYNECOLOGY

## 2019-08-12 PROCEDURE — 87481 CANDIDA DNA AMP PROBE: CPT | Mod: 59

## 2019-08-12 PROCEDURE — 36415 COLL VENOUS BLD VENIPUNCTURE: CPT

## 2019-08-12 PROCEDURE — 88175 CYTOPATH C/V AUTO FLUID REDO: CPT

## 2019-08-12 PROCEDURE — 87801 DETECT AGNT MULT DNA AMPLI: CPT

## 2019-08-12 PROCEDURE — 87491 CHLMYD TRACH DNA AMP PROBE: CPT

## 2019-08-12 PROCEDURE — 86703 HIV-1/HIV-2 1 RESULT ANTBDY: CPT

## 2019-08-13 LAB
BACTERIAL VAGINOSIS DNA: NEGATIVE
C TRACH DNA SPEC QL NAA+PROBE: NOT DETECTED
CANDIDA GLABRATA DNA: NEGATIVE
CANDIDA KRUSEI DNA: NEGATIVE
CANDIDA RRNA VAG QL PROBE: NEGATIVE
HBV SURFACE AG SERPL QL IA: NEGATIVE
HIV 1+2 AB+HIV1 P24 AG SERPL QL IA: NEGATIVE
HSV1 IGG SERPL QL IA: POSITIVE
HSV2 IGG SERPL QL IA: NEGATIVE
N GONORRHOEA DNA SPEC QL NAA+PROBE: NOT DETECTED
RPR SER QL: NORMAL
T VAGINALIS RRNA GENITAL QL PROBE: NEGATIVE

## 2019-08-15 NOTE — PROGRESS NOTES
SUBJECTIVE:   64 y.o. female   for routine gyn exam. No LMP recorded. Patient is postmenopausal..  She desires STD screen as  is having an affair.  Coping well.  No PMB.  No HRT.          Past Medical History:   Diagnosis Date    IBS (irritable bowel syndrome)     Pneumonia 2019     Past Surgical History:   Procedure Laterality Date     SECTION      NOSE SURGERY      SINUS SURGERY  2019     Social History     Socioeconomic History    Marital status:      Spouse name: Not on file    Number of children: Not on file    Years of education: Not on file    Highest education level: Not on file   Occupational History    Not on file   Social Needs    Financial resource strain: Not on file    Food insecurity:     Worry: Not on file     Inability: Not on file    Transportation needs:     Medical: Not on file     Non-medical: Not on file   Tobacco Use    Smoking status: Never Smoker    Smokeless tobacco: Never Used   Substance and Sexual Activity    Alcohol use: No    Drug use: No    Sexual activity: Yes     Partners: Male     Birth control/protection: None, Post-menopausal   Lifestyle    Physical activity:     Days per week: Not on file     Minutes per session: Not on file    Stress: Not on file   Relationships    Social connections:     Talks on phone: Not on file     Gets together: Not on file     Attends [employer] service: Not on file     Active member of club or organization: Not on file     Attends meetings of clubs or organizations: Not on file     Relationship status: Not on file   Other Topics Concern    Not on file   Social History Narrative    Not on file     Family History   Problem Relation Age of Onset    Cancer Father     Stroke Maternal Grandfather     Breast cancer Neg Hx     Colon cancer Neg Hx     Diabetes Neg Hx     Eclampsia Neg Hx     Hypertension Neg Hx     Miscarriages / Stillbirths Neg Hx     Ovarian cancer Neg Hx      labor  Neg Hx     Allergic rhinitis Neg Hx     Allergies Neg Hx     Angioedema Neg Hx     Asthma Neg Hx     Eczema Neg Hx     Immunodeficiency Neg Hx     Rhinitis Neg Hx     Urticaria Neg Hx     Atopy Neg Hx      OB History    Para Term  AB Living   2 2 2     2   SAB TAB Ectopic Multiple Live Births                  # Outcome Date GA Lbr Gio/2nd Weight Sex Delivery Anes PTL Lv   2 Term            1 Term                  Current Outpatient Medications   Medication Sig Dispense Refill    cyanocobalamin (VITAMIN B-12) 500 MCG tablet Take 1,000 mcg by mouth once daily.        LACTOBACILLUS COMBO NO.6 (PROBIOTIC COMPLEX ORAL) Take 1 capsule by mouth once.       vitamin D 185 MG Tab Take 2,000 mg by mouth once daily.        No current facility-administered medications for this visit.      Allergies: Wheat containing prod; Advil [ibuprofen]; Alcohol; Aspirin; and Milk containing products     ROS:  Constitutional: no weight loss, weight gain, fever, fatigue  Eyes:  No vision changes, glasses/contacts  ENT/Mouth: No ulcers, sinus problems, ears ringing, headache  Cardiovascular: No inability to lie flat, chest pain, exercise intolerance, swelling, heart palpitations  Respiratory: No wheezing, coughing blood, shortness of breath, or cough  Gastrointestinal: No diarrhea, bloody stool, nausea/vomiting, constipation, gas, hemorrhoids  Genitourinary: No blood in urine, painful urination, urgency of urination, frequency of urination, incomplete emptying, incontinence, abnormal bleeding, painful periods, heavy periods, vaginal discharge, vaginal odor, painful intercourse, sexual problems, bleeding after intercourse.  Musculoskeletal: No muscle weakness  Skin/Breast: No painful breasts, nipple discharge, masses, rash, ulcers  Neurological: No passing out, seizures, numbness, headache  Endocrine: No diabetes, hypothyroid, hyperthyroid, hot flashes, hair loss, abnormal hair growth, ance  Psychiatric: No depression,  crying  Hematologic: No bruises, bleeding, swollen lymph nodes, anemia.      OBJECTIVE:   The patient appears well, alert, oriented x 3, in no distress.  /62 (BP Location: Right arm, Patient Position: Sitting, BP Method: Medium (Manual))   NECK: no thyromegaly, trachea midline  SKIN: no acne, striae, hirsutism  CHEST: CTAB  CV: RRR  BREAST EXAM: breasts appear normal, no suspicious masses, no skin or nipple changes or axillary nodes  ABDOMEN: no hernias, masses, or hepatosplenomegaly  GENITALIA: normal external genitalia, no erythema, no discharge  URETHRA: normal urethra, normal urethral meatus  VAGINA: Normal  CERVIX: no lesions or cervical motion tenderness  UTERUS: normal size, contour, position, consistency, mobility, non-tender  ADNEXA: no mass, fullness, tenderness      ASSESSMENT:   1. Well female exam with routine gynecological exam  Liquid-based pap smear, screening   2. Screening for venereal disease  C. trachomatis/N. gonorrhoeae by AMP DNA    Vaginosis Screen by DNA Probe    HIV 1/2 Ag/Ab (4th Gen)    RPR    Hepatitis B surface antigen    Herpes Simplex Virus (HSV) Types 1 & 2, IgG, Herpes Titer       PLAN:   Orders Placed This Encounter    C. trachomatis/N. gonorrhoeae by AMP DNA    Vaginosis Screen by DNA Probe    HIV 1/2 Ag/Ab (4th Gen)    RPR    Hepatitis B surface antigen    Herpes Simplex Virus (HSV) Types 1 & 2, IgG, Herpes Titer    Liquid-based pap smear, screening     Return to clinic in 1 year

## 2019-08-20 ENCOUNTER — CLINICAL SUPPORT (OUTPATIENT)
Dept: REHABILITATION | Facility: HOSPITAL | Age: 64
End: 2019-08-20
Attending: INTERNAL MEDICINE
Payer: COMMERCIAL

## 2019-08-20 DIAGNOSIS — G89.29 CHRONIC BILATERAL LOW BACK PAIN WITH RIGHT-SIDED SCIATICA: Primary | ICD-10-CM

## 2019-08-20 DIAGNOSIS — M54.41 CHRONIC BILATERAL LOW BACK PAIN WITH RIGHT-SIDED SCIATICA: Primary | ICD-10-CM

## 2019-08-20 PROCEDURE — 97014 ELECTRIC STIMULATION THERAPY: CPT | Mod: PN | Performed by: PHYSICAL THERAPIST

## 2019-08-20 PROCEDURE — 97110 THERAPEUTIC EXERCISES: CPT | Mod: PN | Performed by: PHYSICAL THERAPIST

## 2019-08-20 PROCEDURE — 97140 MANUAL THERAPY 1/> REGIONS: CPT | Mod: PN | Performed by: PHYSICAL THERAPIST

## 2019-08-20 NOTE — PLAN OF CARE
Physical Therapy Progress Note     Name: Danuta Santos  Clinic Number: [id number]  Diagnosis:   Encounter Diagnosis   Name Primary?    Chronic bilateral low back pain with right-sided sciatica Yes     Physician: Erica Khan MD  Treatment Orders: PT Eval and Treat  Past Medical History:   Diagnosis Date    IBS (irritable bowel syndrome)     Pneumonia 02/2019       Precautions: as per diagnosis    Evaluation date: 2/15/2018  Visit # authorized: 44/60 on 8/20/2019  Authorization period: 12-31-18  Plan of care expiration: 10-1-19  MD Follow up appt: none scheduled    Time In:  4:24  Time Out: 5:25  Billable time:  50 min    Subjective     Pt reports: was out of town and slept in cabins and having difficulty getting level with ex  Pain Scale: before treatment: 5  after treatment:1  Objective     Pelvic dysfunction noted unable to self mobilize performed contract relax with pt sidelying left and realigned.    Mod tightness  Mod- severe tightness R lumbar paraspinals and QL,     Lumbar active range of motion in standing is:8-20-19  - flexion - toes                     - extension -  75%                         - left side bending -  To knee         - right side bending -  To knee            Lumbar active range of motion in standing is: 12.5-18  - flexion - toes                     - extension -  50%                         - left side bending -  To knee         - right side bending -  To knee    Muscle Strength 8-20-19  MMT R L   Hip flexion 4+/5 4+/5   Hip abduction 4+/5 4+/5   Hip extension 4+/5 4+/5   Glut max 4/5 4/5        Knee extension 5/5 5/5   Knee flexion 5/5 5/5       Muscle Strength 12-5-18  MMT R L   Hip flexion 4/5 4+/5   Hip abduction 5/5 5/5   Hip extension 4+/5 4+/5   Glut max 3+/5 3+/5        Knee extension 5/5 5/5   Knee flexion 5/5 5/5              Muscle Strength 10-16-18  MMT R L   Hip flexion 4+/5 5/5   Hip abduction 5/5 5-/5   Hip extension 5/5 5/5   Glut max 4-/5 4-/5        Knee  "extension 5/5 5/5   Knee flexion 5/5 5/5         Muscle Strength 9-6-18  MMT R L   Hip flexion 4/5 5-/5   Hip abduction 5-/5 4+/5   Hip extension 4+/5 4+/5   Glut max 4-/5 4-/5        Knee extension 5/5 5/5   Knee flexion 5/5 5/5         TREATMENT    Therapeutic exercise: Danuta received therapeutic exercises to develop strength, endurance, ROM, flexibility and core stabilization for 10 minutes including:    Verbal review of HEP and with return to school and undergoing personal issues has not been able to exercise as much as she would like.        Pt work out has consisted of   Walking 45 min  Current PT program  Upper body light weight    HS stretch supine   Glut stretch  Piriformis stretch  B QL stretch      Bridge x 15  Pelvic tilt  X 15 for 5 sec hold  Partial sit up x 20  Hip abd sidelying x 20 B  SLR x 15  Arm and Leg lift prone x 20  Hip ext prone with bent knee x 20    Heel raises standing x 15    Contract relax glut for self mob of pelvis  Did not realign so did stretching again and then contract relax     HOLD FOR NOW    Step up x10     1 plank x 30 sec held the other 3 she normally does     (NP)Plank 4 reps 30 sec each           HOLD FOR NOW from previous episode    minisquat x 10       Manual therapy: Danuta  received the following manual therapy  techniques x 15  min. to include soft tissue and joint mobilization were applied to the: low back and gluteals to include:STM to LB paraspinals R>L  P/A mob to lumbar region Grade 1-2  Sidelying L contract relax mob to L5-S1 region to level pelvis    Pt received tool-assisted massage with manual therapy techniques to R LB in sidelying with pillow at waist putting pt in SB L to trigger an inflammatory healing response and stimulate the production of new collagen and proper, more functional, less painful healing.    (NP)Kinesiotape with 6" star for pain relief was performed over the R lumbar paraspinals.L3-4 region  Instructed pt in use, care and " precautions with tape.     Vacuum/cupping STM with manual therapy techniques was performed to back and gluteals to decrease muscle tightness, increase circulation and promote healing process.  The pt's skin was monitored for redness adjusting pressure as needed. The pt was instructed in possible side effects of bruising and/or soreness.      (NP)Ultrasound  for pain control and to decrease inflammation @ continuous duty cycle, 1 Mhz, applied to R lumbar paraspinals, intensity = 1.8 w/cm2 for 8 minutes.        Patient received pre-mod electrical stimulation to decrease muscle tightness and pain to Lumbar paraspinals/ sacral border of gluteals B for 15 minutes with MH supine with cycle time: continuous, beat frequency: , CC/CV: CV.        Written Home Exercises Provided: none today  Pt demo good understanding of the education provided. Danuta demonstrated good return demonstration of activities.     Pt. education:  · Posture reeducation, body mechanics, HEP,   · No spiritual or educational barriers to learning provided  · Pt has no cultural, educational or language barriers to learning provided.    Assessment   Pt having difficulty with self mob with back to school and not able to work on exercises as she should.  Pt understands need to work on HEP to improve ability to self mobilize.     Pt will continue to benefit from skilled outpatient physical therapy to address the remaining functional deficits, provide pt/family education, and to maximize pt's level of independence in the home and community environment. .     GOALS:   Short Term Goals:  3 weeks MET STG's  Increase range of motion 25%  Increase strength 1/2 muscle grade  Improve postural awareness of pelvis to independently identify dysfunction with min assist from PT  Be able to perform HEP with minimal cueing required     Long Term Goals: 6 weeks PARTIALLY MET LTG'S  Increase range of motion to 75% to 100% full   Improve muscle strength 1 muscle  grade  Improve muscle strength with MMT to 4+/5 to 5/5  Improve and stabilize proper pelvic positioning  Restore ability to sit for ADL and work with min to 0 pain  Restore normal sleep habits without disturbances due to pain  Restore ability to perform ADL's and household activities independently with min to 0 pain    Anticipated barriers to physical therapy: none  Pt's spiritual, cultural and educational needs considered and pt agreeable to plan of care and goals        Plan   If you concur, I recommend patient continue with physical therapy 1-2 times a week  for 6 weeks.  Please advise us of your  recommendations. Thank you for allowing us to assist in the care of your patient.      Jacy Escobar, MS, PT          I certify the need for these services furnished under this plan of treatment and while under my care.    ____________________________________ Physician/Referring Practitioner                                Date of Signature

## 2019-08-20 NOTE — PROGRESS NOTES
Physical Therapy Progress Note     Name: Danuta Santos  Clinic Number: [id number]  Diagnosis:   Encounter Diagnosis   Name Primary?    Chronic bilateral low back pain with right-sided sciatica Yes     Physician: Erica Khan MD  Treatment Orders: PT Eval and Treat  Past Medical History:   Diagnosis Date    IBS (irritable bowel syndrome)     Pneumonia 02/2019       Precautions: as per diagnosis    Evaluation date: 2/15/2018  Visit # authorized: 44/60 on 8/20/2019  Authorization period: 12-31-18  Plan of care expiration: 10-1-19  MD Follow up appt: none scheduled    Time In:  4:24  Time Out: 5:25  Billable time:  50 min    Subjective     Pt reports: was out of town and slept in cabins and having difficulty getting level with ex  Pain Scale: before treatment: 5  after treatment:1  Objective     Pelvic dysfunction noted unable to self mobilize performed contract relax with pt sidelying left and realigned.    Mod tightness  Mod- severe tightness R lumbar paraspinals and QL,     Lumbar active range of motion in standing is:8-20-19  - flexion - toes                     - extension -  75%                         - left side bending -  To knee         - right side bending -  To knee            Lumbar active range of motion in standing is: 12.5-18  - flexion - toes                     - extension -  50%                         - left side bending -  To knee         - right side bending -  To knee    Muscle Strength 8-20-19  MMT R L   Hip flexion 4+/5 4+/5   Hip abduction 4+/5 4+/5   Hip extension 4+/5 4+/5   Glut max 4/5 4/5        Knee extension 5/5 5/5   Knee flexion 5/5 5/5       Muscle Strength 12-5-18  MMT R L   Hip flexion 4/5 4+/5   Hip abduction 5/5 5/5   Hip extension 4+/5 4+/5   Glut max 3+/5 3+/5        Knee extension 5/5 5/5   Knee flexion 5/5 5/5              Muscle Strength 10-16-18  MMT R L   Hip flexion 4+/5 5/5   Hip abduction 5/5 5-/5   Hip extension 5/5 5/5   Glut max 4-/5 4-/5        Knee  "extension 5/5 5/5   Knee flexion 5/5 5/5         Muscle Strength 9-6-18  MMT R L   Hip flexion 4/5 5-/5   Hip abduction 5-/5 4+/5   Hip extension 4+/5 4+/5   Glut max 4-/5 4-/5        Knee extension 5/5 5/5   Knee flexion 5/5 5/5         TREATMENT    Therapeutic exercise: Danuta received therapeutic exercises to develop strength, endurance, ROM, flexibility and core stabilization for 10 minutes including:    Verbal review of HEP and with return to school and undergoing personal issues has not been able to exercise as much as she would like.        Pt work out has consisted of   Walking 45 min  Current PT program  Upper body light weight    HS stretch supine   Glut stretch  Piriformis stretch  B QL stretch      Bridge x 15  Pelvic tilt  X 15 for 5 sec hold  Partial sit up x 20  Hip abd sidelying x 20 B  SLR x 15  Arm and Leg lift prone x 20  Hip ext prone with bent knee x 20    Heel raises standing x 15    Contract relax glut for self mob of pelvis  Did not realign so did stretching again and then contract relax     HOLD FOR NOW    Step up x10     1 plank x 30 sec held the other 3 she normally does     (NP)Plank 4 reps 30 sec each           HOLD FOR NOW from previous episode    minisquat x 10       Manual therapy: Danuta  received the following manual therapy  techniques x 15  min. to include soft tissue and joint mobilization were applied to the: low back and gluteals to include:STM to LB paraspinals R>L  P/A mob to lumbar region Grade 1-2  Sidelying L contract relax mob to L5-S1 region to level pelvis    Pt received tool-assisted massage with manual therapy techniques to R LB in sidelying with pillow at waist putting pt in SB L to trigger an inflammatory healing response and stimulate the production of new collagen and proper, more functional, less painful healing.    (NP)Kinesiotape with 6" star for pain relief was performed over the R lumbar paraspinals.L3-4 region  Instructed pt in use, care and " precautions with tape.     Vacuum/cupping STM with manual therapy techniques was performed to back and gluteals to decrease muscle tightness, increase circulation and promote healing process.  The pt's skin was monitored for redness adjusting pressure as needed. The pt was instructed in possible side effects of bruising and/or soreness.      (NP)Ultrasound  for pain control and to decrease inflammation @ continuous duty cycle, 1 Mhz, applied to R lumbar paraspinals, intensity = 1.8 w/cm2 for 8 minutes.        Patient received pre-mod electrical stimulation to decrease muscle tightness and pain to Lumbar paraspinals/ sacral border of gluteals B for 15 minutes with MH supine with cycle time: continuous, beat frequency: , CC/CV: CV.        Written Home Exercises Provided: none today  Pt demo good understanding of the education provided. Danuta demonstrated good return demonstration of activities.     Pt. education:  · Posture reeducation, body mechanics, HEP,   · No spiritual or educational barriers to learning provided  · Pt has no cultural, educational or language barriers to learning provided.    Assessment   Pt having difficulty with self mob with back to school and not able to work on exercises as she should.  Pt understands need to work on HEP to improve ability to self mobilize.     Pt will continue to benefit from skilled outpatient physical therapy to address the remaining functional deficits, provide pt/family education, and to maximize pt's level of independence in the home and community environment. .     GOALS:   Short Term Goals:  3 weeks MET STG's  Increase range of motion 25%  Increase strength 1/2 muscle grade  Improve postural awareness of pelvis to independently identify dysfunction with min assist from PT  Be able to perform HEP with minimal cueing required     Long Term Goals: 6 weeks PARTIALLY MET LTG'S  Increase range of motion to 75% to 100% full   Improve muscle strength 1 muscle  grade  Improve muscle strength with MMT to 4+/5 to 5/5  Improve and stabilize proper pelvic positioning  Restore ability to sit for ADL and work with min to 0 pain  Restore normal sleep habits without disturbances due to pain  Restore ability to perform ADL's and household activities independently with min to 0 pain    Anticipated barriers to physical therapy: none  Pt's spiritual, cultural and educational needs considered and pt agreeable to plan of care and goals        Plan   If you concur, I recommend patient continue with physical therapy 1-2 times a week  for 6 weeks.  Please advise us of your  recommendations. Thank you for allowing us to assist in the care of your patient.      Jacy Escobar, MS, PT          I certify the need for these services furnished under this plan of treatment and while under my care.    ____________________________________ Physician/Referring Practitioner                                Date of Signature

## 2019-08-27 ENCOUNTER — CLINICAL SUPPORT (OUTPATIENT)
Dept: REHABILITATION | Facility: HOSPITAL | Age: 64
End: 2019-08-27
Attending: INTERNAL MEDICINE
Payer: COMMERCIAL

## 2019-08-27 ENCOUNTER — TELEPHONE (OUTPATIENT)
Dept: INTERNAL MEDICINE | Facility: CLINIC | Age: 64
End: 2019-08-27

## 2019-08-27 DIAGNOSIS — Z00.00 WELLNESS EXAMINATION: Primary | ICD-10-CM

## 2019-08-27 DIAGNOSIS — M54.41 CHRONIC BILATERAL LOW BACK PAIN WITH RIGHT-SIDED SCIATICA: Primary | ICD-10-CM

## 2019-08-27 DIAGNOSIS — G89.29 CHRONIC BILATERAL LOW BACK PAIN WITH RIGHT-SIDED SCIATICA: Primary | ICD-10-CM

## 2019-08-27 PROCEDURE — 97110 THERAPEUTIC EXERCISES: CPT | Mod: PN | Performed by: PHYSICAL THERAPIST

## 2019-08-27 PROCEDURE — 97140 MANUAL THERAPY 1/> REGIONS: CPT | Mod: PN | Performed by: PHYSICAL THERAPIST

## 2019-08-27 PROCEDURE — 97014 ELECTRIC STIMULATION THERAPY: CPT | Mod: PN | Performed by: PHYSICAL THERAPIST

## 2019-08-27 NOTE — TELEPHONE ENCOUNTER
----- Message from Julia S Chris sent at 8/27/2019  8:52 AM CDT -----  Contact: Pt self Mobile [phone] [facility] [phone]  Doctor appointment and lab have been scheduled.  Please link lab orders to the lab appointment.  Date of doctor appointment:  09/18/2019  Date of lab appointment:  09/14/2019  Physical or EP: Physical

## 2019-08-27 NOTE — PROGRESS NOTES
Physical Therapy Daily Note     Name: Danuta Santos  Clinic Number: [id number]  Diagnosis:   Encounter Diagnosis   Name Primary?    Chronic bilateral low back pain with right-sided sciatica Yes     Physician: Erica Khan MD  Treatment Orders: PT Eval and Treat  Past Medical History:   Diagnosis Date    IBS (irritable bowel syndrome)     Pneumonia 02/2019       Precautions: as per diagnosis    Evaluation date: 2/15/2018  Visit # authorized: 45/60 on 8/27/2019  Authorization period: 12-31-18  Plan of care expiration: 10-1-19  MD Follow up appt: none scheduled    Time In:  4:00  Time Out: 5:15  Billable time:  55 min    Subjective     Pt reports: back is out and unable to self mobilize. Pt has been under stress and has a sinus infection and has not been able to exercise like she should  Pain Scale: before treatment: 5  after treatment:1  Objective     Pelvic dysfunction noted  TREATMENT    Therapeutic exercise: Danuta received therapeutic exercises to develop strength, endurance, ROM, flexibility and core stabilization for 15  minutes including:    HS stretch supine   Glut stretch  Piriformis stretch  B QL stretch    Contract relax R glut x 3  Pt was able to self mobilize today    Bridge x 15  Pelvic tilt  X 15 for 5 sec hold  Partial sit up x 20  Hip abd sidelying x 20 B  SLR x 15  Arm and Leg lift prone x 20  Hip ext prone with bent knee x 20        HOLD FOR NOW  Heel raises standing x 15    Step up x10     1 plank x 30 sec held the other 3 she normally does     (NP)Plank 4 reps 30 sec each          Manual therapy: Danuta  received the following manual therapy  techniques x 25  min. to include soft tissue and joint mobilization were applied to the: low back and gluteals to include:STM to LB paraspinals R>L  P/A mob to lumbar region Grade 1-2  Sidelying L contract relax mob to L5-S1 region to level pelvis    Pt received tool-assisted massage with manual therapy techniques to R LB in sidelying with  "pillow at waist putting pt in SB L to trigger an inflammatory healing response and stimulate the production of new collagen and proper, more functional, less painful healing.    Kinesiotape with 6" star for pain relief was performed over the R lumbar paraspinals.L3-4 region  Instructed pt in use, care and precautions with tape.     Vacuum/cupping STM with manual therapy techniques was performed to back and gluteals to decrease muscle tightness, increase circulation and promote healing process.  The pt's skin was monitored for redness adjusting pressure as needed. The pt was instructed in possible side effects of bruising and/or soreness.      (NP)Ultrasound  for pain control and to decrease inflammation @ continuous duty cycle, 1 Mhz, applied to R lumbar paraspinals, intensity = 1.8 w/cm2 for 8 minutes.        Patient received pre-mod electrical stimulation to decrease muscle tightness and pain to Lumbar paraspinals/ sacral border of gluteals B for 15 minutes with MH supine with cycle time: continuous, beat frequency: , CC/CV: CV.        Written Home Exercises Provided: none today  Pt demo good understanding of the education provided. Danuta demonstrated good return demonstration of activities.     Pt. education:  · Posture reeducation, body mechanics, HEP,   · No spiritual or educational barriers to learning provided  · Pt has no cultural, educational or language barriers to learning provided.    Assessment   Pt able to self mobilize today.  Pt continues with muscle tightness and understands need to get back to exercise as tolerated. Pt able to complete therex as above and will progress as tolerated     Pt will continue to benefit from skilled outpatient physical therapy to address the remaining functional deficits, provide pt/family education, and to maximize pt's level of independence in the home and community environment. .     GOALS:   Short Term Goals:  3 weeks MET STG's  Increase range of motion " 25%  Increase strength 1/2 muscle grade  Improve postural awareness of pelvis to independently identify dysfunction with min assist from PT  Be able to perform HEP with minimal cueing required     Long Term Goals: 6 weeks PARTIALLY MET LTG'S  Increase range of motion to 75% to 100% full   Improve muscle strength 1 muscle grade  Improve muscle strength with MMT to 4+/5 to 5/5  Improve and stabilize proper pelvic positioning  Restore ability to sit for ADL and work with min to 0 pain  Restore normal sleep habits without disturbances due to pain  Restore ability to perform ADL's and household activities independently with min to 0 pain    Anticipated barriers to physical therapy: none  Pt's spiritual, cultural and educational needs considered and pt agreeable to plan of care and goals        Plan   Continue with established plan of care towards PT goals.  See pt as needed

## 2019-09-11 ENCOUNTER — CLINICAL SUPPORT (OUTPATIENT)
Dept: OTHER | Facility: CLINIC | Age: 64
End: 2019-09-11
Payer: COMMERCIAL

## 2019-09-11 DIAGNOSIS — Z00.8 ENCOUNTER FOR OTHER GENERAL EXAMINATION: ICD-10-CM

## 2019-09-11 PROCEDURE — 80061 LIPID PANEL: CPT | Mod: QW,S$GLB,, | Performed by: INTERNAL MEDICINE

## 2019-09-11 PROCEDURE — 99401 PREV MED CNSL INDIV APPRX 15: CPT | Mod: S$GLB,,, | Performed by: INTERNAL MEDICINE

## 2019-09-11 PROCEDURE — 80061 PR  LIPID PANEL: ICD-10-PCS | Mod: QW,S$GLB,, | Performed by: INTERNAL MEDICINE

## 2019-09-11 PROCEDURE — 82947 PR  ASSAY QUANTITATIVE,BLOOD GLUCOSE: ICD-10-PCS | Mod: QW,S$GLB,, | Performed by: INTERNAL MEDICINE

## 2019-09-11 PROCEDURE — 99401 PR PREVENT COUNSEL,INDIV,15 MIN: ICD-10-PCS | Mod: S$GLB,,, | Performed by: INTERNAL MEDICINE

## 2019-09-11 PROCEDURE — 82947 ASSAY GLUCOSE BLOOD QUANT: CPT | Mod: QW,S$GLB,, | Performed by: INTERNAL MEDICINE

## 2019-09-12 VITALS — HEIGHT: 66 IN | BODY MASS INDEX: 20.66 KG/M2

## 2019-09-12 LAB
GLUCOSE SERPL-MCNC: 88 MG/DL (ref 60–140)
HDLC SERPL-MCNC: 71 MG/DL
POC CHOLESTEROL, LDL (DOCK): 123 MG/DL
POC CHOLESTEROL, TOTAL: 214 MG/DL
TRIGL SERPL-MCNC: 104 MG/DL

## 2019-09-16 ENCOUNTER — PATIENT OUTREACH (OUTPATIENT)
Dept: ADMINISTRATIVE | Facility: HOSPITAL | Age: 64
End: 2019-09-16

## 2019-09-19 ENCOUNTER — CLINICAL SUPPORT (OUTPATIENT)
Dept: REHABILITATION | Facility: HOSPITAL | Age: 64
End: 2019-09-19
Attending: INTERNAL MEDICINE
Payer: COMMERCIAL

## 2019-09-19 DIAGNOSIS — G89.29 CHRONIC BILATERAL LOW BACK PAIN WITH RIGHT-SIDED SCIATICA: Primary | ICD-10-CM

## 2019-09-19 DIAGNOSIS — M54.41 CHRONIC BILATERAL LOW BACK PAIN WITH RIGHT-SIDED SCIATICA: Primary | ICD-10-CM

## 2019-09-19 PROCEDURE — 97014 ELECTRIC STIMULATION THERAPY: CPT | Mod: PN | Performed by: PHYSICAL THERAPIST

## 2019-09-19 PROCEDURE — 97140 MANUAL THERAPY 1/> REGIONS: CPT | Mod: PN | Performed by: PHYSICAL THERAPIST

## 2019-09-19 NOTE — PROGRESS NOTES
Physical Therapy Daily Note     Name: Danuta Santos  Clinic Number: [id number]  Diagnosis:   Encounter Diagnosis   Name Primary?    Chronic bilateral low back pain with right-sided sciatica Yes     Physician: Erica Khan MD  Treatment Orders: PT Eval and Treat  Past Medical History:   Diagnosis Date    IBS (irritable bowel syndrome)     Pneumonia 02/2019       Precautions: as per diagnosis    Evaluation date: 2/15/2018  Visit # authorized: 46/60 on 9/19/2019  Authorization period: 12-31-18  Plan of care expiration: 10-1-19  MD Follow up appt: none scheduled    Time In:  4:20 pt late  Time Out: 5:15  Billable time:  35 min    Subjective     Pt reports:she pulled down attic door and felt pull in back and unable to self mobilize she felt   Pain Scale: before treatment: 6 after treatment:feels better, did not provide #  Objective     Pelvic dysfunction noted  TREATMENT    Therapeutic exercise: Danuta received therapeutic exercises to develop strength, endurance, ROM, flexibility and core stabilization for 5  minutes including:    HS stretch supine   Glut stretch  Piriformis stretch  B QL stretch    Contract relax R glut x 3  Pt was unable to self mobilize today  Did not perform in clinic due to time constraints  Bridge x 15  Pelvic tilt  X 15 for 5 sec hold  Partial sit up x 20  Hip abd sidelying x 20 B  SLR x 15  Arm and Leg lift prone x 20  Hip ext prone with bent knee x 20        HOLD FOR NOW  Heel raises standing x 15    Step up x10     1 plank x 30 sec held the other 3 she normally does     (NP)Plank 4 reps 30 sec each          Manual therapy: Danuta  received the following manual therapy  techniques x 15  min. to include soft tissue and joint mobilization were applied to the: low back and gluteals to include:STM to LB paraspinals R>L  P/A mob to lumbar region Grade 1-2  Sidelying L contract relax mob to L5-S1 region to level pelvis    (NP)Pt received tool-assisted massage with manual therapy  "techniques to R LB in sidelying with pillow at waist putting pt in SB L to trigger an inflammatory healing response and stimulate the production of new collagen and proper, more functional, less painful healing.    Kinesiotape with 6" star for pain relief was performed over the R lumbar paraspinals.L3-4 region  Instructed pt in use, care and precautions with tape.     Vacuum/cupping STM with manual therapy techniques was performed to back and gluteals to decrease muscle tightness, increase circulation and promote healing process.  The pt's skin was monitored for redness adjusting pressure as needed. The pt was instructed in possible side effects of bruising and/or soreness.      (NP)Ultrasound  for pain control and to decrease inflammation @ continuous duty cycle, 1 Mhz, applied to R lumbar paraspinals, intensity = 1.8 w/cm2 for 8 minutes.        Patient received pre-mod electrical stimulation to decrease muscle tightness and pain to Lumbar paraspinals/ sacral border of gluteals B for 15 minutes with MH supine with cycle time: continuous, beat frequency: , CC/CV: CV.        Written Home Exercises Provided: none today  Pt demo good understanding of the education provided. Danuta demonstrated good return demonstration of activities.     Pt. education:  · Posture reeducation, body mechanics, HEP,   · No spiritual or educational barriers to learning provided  · Pt has no cultural, educational or language barriers to learning provided.    Assessment   Pt unable to self mobilize today.  Pt aggravated back getting into attic.    Pt late for appt so will work on HEP at home.  Pt with level pelvis after treatment and will benefit from KT tape to keep muscle relaxed     Pt will continue to benefit from skilled outpatient physical therapy to address the remaining functional deficits, provide pt/family education, and to maximize pt's level of independence in the home and community environment. .     GOALS:   Short Term " Goals:  3 weeks MET STG's  Increase range of motion 25%  Increase strength 1/2 muscle grade  Improve postural awareness of pelvis to independently identify dysfunction with min assist from PT  Be able to perform HEP with minimal cueing required     Long Term Goals: 6 weeks PARTIALLY MET LTG'S  Increase range of motion to 75% to 100% full   Improve muscle strength 1 muscle grade  Improve muscle strength with MMT to 4+/5 to 5/5  Improve and stabilize proper pelvic positioning  Restore ability to sit for ADL and work with min to 0 pain  Restore normal sleep habits without disturbances due to pain  Restore ability to perform ADL's and household activities independently with min to 0 pain    Anticipated barriers to physical therapy: none  Pt's spiritual, cultural and educational needs considered and pt agreeable to plan of care and goals        Plan   Continue with established plan of care towards PT goals.  See pt as needed

## 2019-09-20 ENCOUNTER — LAB VISIT (OUTPATIENT)
Dept: LAB | Facility: HOSPITAL | Age: 64
End: 2019-09-20
Attending: INTERNAL MEDICINE
Payer: COMMERCIAL

## 2019-09-20 DIAGNOSIS — Z00.00 WELLNESS EXAMINATION: ICD-10-CM

## 2019-09-20 LAB
25(OH)D3+25(OH)D2 SERPL-MCNC: 31 NG/ML (ref 30–96)
ALBUMIN SERPL BCP-MCNC: 4.3 G/DL (ref 3.5–5.2)
ALP SERPL-CCNC: 56 U/L (ref 55–135)
ALT SERPL W/O P-5'-P-CCNC: 29 U/L (ref 10–44)
ANION GAP SERPL CALC-SCNC: 6 MMOL/L (ref 8–16)
AST SERPL-CCNC: 24 U/L (ref 10–40)
BASOPHILS # BLD AUTO: 0.02 K/UL (ref 0–0.2)
BASOPHILS NFR BLD: 0.8 % (ref 0–1.9)
BILIRUB SERPL-MCNC: 0.8 MG/DL (ref 0.1–1)
BUN SERPL-MCNC: 17 MG/DL (ref 8–23)
CALCIUM SERPL-MCNC: 10 MG/DL (ref 8.7–10.5)
CHLORIDE SERPL-SCNC: 100 MMOL/L (ref 95–110)
CHOLEST SERPL-MCNC: 179 MG/DL (ref 120–199)
CHOLEST/HDLC SERPL: 2.5 {RATIO} (ref 2–5)
CO2 SERPL-SCNC: 29 MMOL/L (ref 23–29)
CREAT SERPL-MCNC: 0.8 MG/DL (ref 0.5–1.4)
DIFFERENTIAL METHOD: ABNORMAL
EOSINOPHIL # BLD AUTO: 0.1 K/UL (ref 0–0.5)
EOSINOPHIL NFR BLD: 2.4 % (ref 0–8)
ERYTHROCYTE [DISTWIDTH] IN BLOOD BY AUTOMATED COUNT: 12.6 % (ref 11.5–14.5)
EST. GFR  (AFRICAN AMERICAN): >60 ML/MIN/1.73 M^2
EST. GFR  (NON AFRICAN AMERICAN): >60 ML/MIN/1.73 M^2
GLUCOSE SERPL-MCNC: 91 MG/DL (ref 70–110)
HCT VFR BLD AUTO: 36.7 % (ref 37–48.5)
HDLC SERPL-MCNC: 72 MG/DL (ref 40–75)
HDLC SERPL: 40.2 % (ref 20–50)
HGB BLD-MCNC: 12.4 G/DL (ref 12–16)
IRON SERPL-MCNC: 118 UG/DL (ref 30–160)
LDLC SERPL CALC-MCNC: 95.4 MG/DL (ref 63–159)
LYMPHOCYTES # BLD AUTO: 1 K/UL (ref 1–4.8)
LYMPHOCYTES NFR BLD: 38.5 % (ref 18–48)
MCH RBC QN AUTO: 29.7 PG (ref 27–31)
MCHC RBC AUTO-ENTMCNC: 33.8 G/DL (ref 32–36)
MCV RBC AUTO: 88 FL (ref 82–98)
MONOCYTES # BLD AUTO: 0.2 K/UL (ref 0.3–1)
MONOCYTES NFR BLD: 8.1 % (ref 4–15)
NEUTROPHILS # BLD AUTO: 1.2 K/UL (ref 1.8–7.7)
NEUTROPHILS NFR BLD: 50.2 % (ref 38–73)
NONHDLC SERPL-MCNC: 107 MG/DL
PLATELET # BLD AUTO: 185 K/UL (ref 150–350)
PMV BLD AUTO: 10.9 FL (ref 9.2–12.9)
POTASSIUM SERPL-SCNC: 4.5 MMOL/L (ref 3.5–5.1)
PROT SERPL-MCNC: 6.7 G/DL (ref 6–8.4)
RBC # BLD AUTO: 4.17 M/UL (ref 4–5.4)
SATURATED IRON: 28 % (ref 20–50)
SODIUM SERPL-SCNC: 135 MMOL/L (ref 136–145)
TOTAL IRON BINDING CAPACITY: 417 UG/DL (ref 250–450)
TRANSFERRIN SERPL-MCNC: 282 MG/DL (ref 200–375)
TRIGL SERPL-MCNC: 58 MG/DL (ref 30–150)
TSH SERPL DL<=0.005 MIU/L-ACNC: 1.11 UIU/ML (ref 0.4–4)
WBC # BLD AUTO: 2.47 K/UL (ref 3.9–12.7)

## 2019-09-20 PROCEDURE — 85025 COMPLETE CBC W/AUTO DIFF WBC: CPT

## 2019-09-20 PROCEDURE — 83540 ASSAY OF IRON: CPT

## 2019-09-20 PROCEDURE — 36415 COLL VENOUS BLD VENIPUNCTURE: CPT

## 2019-09-20 PROCEDURE — 82306 VITAMIN D 25 HYDROXY: CPT

## 2019-09-20 PROCEDURE — 84443 ASSAY THYROID STIM HORMONE: CPT

## 2019-09-20 PROCEDURE — 80061 LIPID PANEL: CPT

## 2019-09-20 PROCEDURE — 80053 COMPREHEN METABOLIC PANEL: CPT

## 2019-09-26 ENCOUNTER — CLINICAL SUPPORT (OUTPATIENT)
Dept: REHABILITATION | Facility: HOSPITAL | Age: 64
End: 2019-09-26
Attending: INTERNAL MEDICINE
Payer: COMMERCIAL

## 2019-09-26 DIAGNOSIS — M54.41 CHRONIC BILATERAL LOW BACK PAIN WITH RIGHT-SIDED SCIATICA: Primary | ICD-10-CM

## 2019-09-26 DIAGNOSIS — G89.29 CHRONIC BILATERAL LOW BACK PAIN WITH RIGHT-SIDED SCIATICA: Primary | ICD-10-CM

## 2019-09-26 PROCEDURE — 97014 ELECTRIC STIMULATION THERAPY: CPT | Mod: PN | Performed by: PHYSICAL THERAPIST

## 2019-09-26 PROCEDURE — 97110 THERAPEUTIC EXERCISES: CPT | Mod: PN | Performed by: PHYSICAL THERAPIST

## 2019-09-26 PROCEDURE — 97140 MANUAL THERAPY 1/> REGIONS: CPT | Mod: PN | Performed by: PHYSICAL THERAPIST

## 2019-09-26 NOTE — PROGRESS NOTES
Physical Therapy Daily Note     Name: Danuta Santos  Clinic Number: [id number]  Diagnosis:   Encounter Diagnosis   Name Primary?    Chronic bilateral low back pain with right-sided sciatica Yes     Physician: Erica Khan MD  Treatment Orders: PT Eval and Treat  Past Medical History:   Diagnosis Date    IBS (irritable bowel syndrome)     Pneumonia 02/2019       Precautions: as per diagnosis    Evaluation date: 2/15/2018  Visit # authorized: 47/60 on 9/26/2019  Authorization period: 12-31-18  Plan of care expiration: 10-1-19  MD Follow up appt: none scheduled    Time In:  4:23 pt late  Time Out: 5:25  Billable time:  55 min    Subjective     Pt reports: she has been doing fairly well, but unable to self mobilize this AM>    Pain Scale: before treatment: 6 after treatment:feels better, did not provide #  Objective     Pelvic dysfunction noted  TREATMENT    Therapeutic exercise: Danuta received therapeutic exercises to develop strength, endurance, ROM, flexibility and core stabilization for 15  minutes including:    HS stretch supine   Glut stretch  Piriformis stretch  B QL stretch    Contract relax R glut x 3  Pt was unable to self mobilize today  Did not perform in clinic due to time constraints  Bridge x 15  Pelvic tilt  X 15 for 5 sec hold  Partial sit up x 20  Hip abd sidelying x 20 B  SLR x 15  Arm and Leg lift prone x 20  Hip ext prone with bent knee x 20         HOLD FOR NOW  Heel raises standing x 15    Step up x10     1 plank x 30 sec held the other 3 she normally does     (NP)Plank 4 reps 30 sec each          Manual therapy: Danuta  received the following manual therapy  techniques x 25  min. to include soft tissue and joint mobilization were applied to the: low back and gluteals to include:STM to LB paraspinals R>L  P/A mob to lumbar region Grade 1-2  Sidelying L contract relax mob to L5-S1 region to level pelvis    Pt received tool-assisted massage with manual therapy techniques to R LB in  "sidelying with pillow at waist putting pt in SB L to trigger an inflammatory healing response and stimulate the production of new collagen and proper, more functional, less painful healing.    Kinesiotape with 6" star for pain relief was performed over the R lumbar paraspinals.L3-4 region  Instructed pt in use, care and precautions with tape.     Vacuum/cupping STM with manual therapy techniques was performed to back and gluteals to decrease muscle tightness, increase circulation and promote healing process.  The pt's skin was monitored for redness adjusting pressure as needed. The pt was instructed in possible side effects of bruising and/or soreness.      (NP)Ultrasound  for pain control and to decrease inflammation @ continuous duty cycle, 1 Mhz, applied to R lumbar paraspinals, intensity = 1.8 w/cm2 for 8 minutes.        Patient received pre-mod electrical stimulation to decrease muscle tightness and pain to Lumbar paraspinals/ sacral border of gluteals B for 15 minutes with MH supine with cycle time: continuous, beat frequency: , CC/CV: CV.        Written Home Exercises Provided: none today  Pt demo good understanding of the education provided. Danuta demonstrated good return demonstration of activities.     Pt. education:  · Posture reeducation, body mechanics, HEP,   · No spiritual or educational barriers to learning provided  · Pt has no cultural, educational or language barriers to learning provided.    Assessment   Pt unable to self mobilize today again  Improved after treatment and able to self mobilize at end after performing ex.       Pt with level pelvis after treatment and will benefit from KT tape to keep muscle relaxed     Pt will continue to benefit from skilled outpatient physical therapy to address the remaining functional deficits, provide pt/family education, and to maximize pt's level of independence in the home and community environment. .     GOALS:   Short Term Goals:  3 weeks MET " STG's  Increase range of motion 25%  Increase strength 1/2 muscle grade  Improve postural awareness of pelvis to independently identify dysfunction with min assist from PT  Be able to perform HEP with minimal cueing required     Long Term Goals: 6 weeks PARTIALLY MET LTG'S  Increase range of motion to 75% to 100% full   Improve muscle strength 1 muscle grade  Improve muscle strength with MMT to 4+/5 to 5/5  Improve and stabilize proper pelvic positioning  Restore ability to sit for ADL and work with min to 0 pain  Restore normal sleep habits without disturbances due to pain  Restore ability to perform ADL's and household activities independently with min to 0 pain    Anticipated barriers to physical therapy: none  Pt's spiritual, cultural and educational needs considered and pt agreeable to plan of care and goals        Plan   Continue with established plan of care towards PT goals.  See pt as needed

## 2019-09-30 ENCOUNTER — IMMUNIZATION (OUTPATIENT)
Dept: PHARMACY | Facility: CLINIC | Age: 64
End: 2019-09-30
Payer: COMMERCIAL

## 2019-09-30 ENCOUNTER — OFFICE VISIT (OUTPATIENT)
Dept: INTERNAL MEDICINE | Facility: CLINIC | Age: 64
End: 2019-09-30
Payer: COMMERCIAL

## 2019-09-30 VITALS
HEIGHT: 66 IN | DIASTOLIC BLOOD PRESSURE: 70 MMHG | BODY MASS INDEX: 21.21 KG/M2 | SYSTOLIC BLOOD PRESSURE: 132 MMHG | WEIGHT: 132 LBS

## 2019-09-30 DIAGNOSIS — Z00.00 PHYSICAL EXAM: Primary | ICD-10-CM

## 2019-09-30 DIAGNOSIS — D72.819 LEUKOPENIA, UNSPECIFIED TYPE: ICD-10-CM

## 2019-09-30 DIAGNOSIS — Z00.00 WELLNESS EXAMINATION: ICD-10-CM

## 2019-09-30 PROCEDURE — 99999 PR PBB SHADOW E&M-EST. PATIENT-LVL IV: CPT | Mod: PBBFAC,,, | Performed by: INTERNAL MEDICINE

## 2019-09-30 PROCEDURE — 99397 PER PM REEVAL EST PAT 65+ YR: CPT | Mod: S$GLB,,, | Performed by: INTERNAL MEDICINE

## 2019-09-30 PROCEDURE — 99999 PR PBB SHADOW E&M-EST. PATIENT-LVL IV: ICD-10-PCS | Mod: PBBFAC,,, | Performed by: INTERNAL MEDICINE

## 2019-09-30 PROCEDURE — 99397 PR PREVENTIVE VISIT,EST,65 & OVER: ICD-10-PCS | Mod: S$GLB,,, | Performed by: INTERNAL MEDICINE

## 2019-09-30 RX ORDER — AZELASTINE 1 MG/ML
2 SPRAY, METERED NASAL NIGHTLY
COMMUNITY
End: 2021-05-24

## 2019-09-30 RX ORDER — ALPRAZOLAM 0.25 MG/1
0.25 TABLET ORAL NIGHTLY PRN
Qty: 30 TABLET | Refills: 0 | Status: SHIPPED | OUTPATIENT
Start: 2019-09-30 | End: 2019-10-30

## 2019-09-30 RX ORDER — CETIRIZINE HYDROCHLORIDE 10 MG/1
10 TABLET ORAL DAILY
COMMUNITY
End: 2021-05-24

## 2019-09-30 NOTE — PROGRESS NOTES
Subjective:      Patient ID: Danuta Santos is a 64 y.o. female.    Chief Complaint: Annual Exam    HPI:  HPI   Patient is here for her annual exam:    ENT: Patient is seeing Dr. Tran for sinus rinses ( completed a complicated rinse)     Pulmonary Dr. Mark Lizama        Annual exam:2019  Colonoscopy : , no polyps normal and also had BE Dr. Guider: will call   Mammogram: 2019  Gyn: Dr. Maria  Bone Density: 2014 adequate:   Optho:Dr. Lezama  Retinal specialist  Dr. Wei Leader   Flu: ( at school)  Tetanus: up to date  Shingles: Shingrix done  Pneumovax:done  Prevnar 13 today  Hep B  Hep A: [employer]  Tested for Hep C     Consultants  Dr. Candace Pratt, Zeina Pina         Patient Active Problem List   Diagnosis    Chronic bilateral low back pain with right-sided sciatica    Reactive airway disease without complication    ASCVD (arteriosclerotic cardiovascular disease) 3.9%     Past Medical History:   Diagnosis Date    IBS (irritable bowel syndrome)     Pneumonia 2019     Past Surgical History:   Procedure Laterality Date     SECTION      NOSE SURGERY      SINUS SURGERY  2019     Family History   Problem Relation Age of Onset    Cancer Father     Stroke Maternal Grandfather     Breast cancer Neg Hx     Colon cancer Neg Hx     Diabetes Neg Hx     Eclampsia Neg Hx     Hypertension Neg Hx     Miscarriages / Stillbirths Neg Hx     Ovarian cancer Neg Hx      labor Neg Hx     Allergic rhinitis Neg Hx     Allergies Neg Hx     Angioedema Neg Hx     Asthma Neg Hx     Eczema Neg Hx     Immunodeficiency Neg Hx     Rhinitis Neg Hx     Urticaria Neg Hx     Atopy Neg Hx      Review of Systems   Constitutional: Negative for chills, fever and unexpected weight change.   HENT: Negative for trouble swallowing.    Respiratory: Negative for cough, shortness of breath and wheezing.    Cardiovascular: Negative for chest pain and palpitations.  "  Gastrointestinal: Negative for abdominal distention, abdominal pain, blood in stool and vomiting.   Musculoskeletal: Negative for back pain.     Objective:     Vitals:    09/30/19 1153 09/30/19 1213   BP: (!) 158/70 132/70   Weight: 59.9 kg (132 lb)    Height: 5' 6" (1.676 m)    PainSc: 0-No pain      Body mass index is 21.31 kg/m².  Physical Exam   Constitutional: She is oriented to person, place, and time. She appears well-developed and well-nourished. No distress.   Neck: Carotid bruit is not present. No thyromegaly present.   Cardiovascular: Normal rate, regular rhythm and normal heart sounds. PMI is not displaced.   Pulmonary/Chest: Effort normal and breath sounds normal. No respiratory distress.   Abdominal: Soft. Bowel sounds are normal. She exhibits no distension. There is no tenderness.   Musculoskeletal: She exhibits no edema.   Neurological: She is alert and oriented to person, place, and time.     Assessment:     1. Physical exam    2. Wellness examination    3. Leukopenia, unspecified type      Plan:   Danuta was seen today for annual exam.    Diagnoses and all orders for this visit:    Physical exam  Comments:  No new findings    Wellness examination    Leukopenia, unspecified type  Comments:  consult  Orders:  -     Ambulatory consult to Hematology    Other orders  -     ALPRAZolam (XANAX) 0.25 MG tablet; Take 1 tablet (0.25 mg total) by mouth nightly as needed (headache).        Problem List Items Addressed This Visit     None      Visit Diagnoses     Physical exam    -  Primary    No new findings    Wellness examination        Leukopenia, unspecified type        consult    Relevant Orders    Ambulatory consult to Hematology        Orders Placed This Encounter   Procedures    Ambulatory consult to Hematology     Referral Priority:   Routine     Referral Type:   Consultation     Referral Reason:   Specialty Services Required     Requested Specialty:   Hematology     Number of Visits Requested:   " 1     No follow-ups on file.     Medication List           Accurate as of September 30, 2019 12:34 PM. If you have any questions, ask your nurse or doctor.               START taking these medications    ALPRAZolam 0.25 MG tablet  Commonly known as:  XANAX  Take 1 tablet (0.25 mg total) by mouth nightly as needed (headache).  Started by:  Erica Khan MD        CONTINUE taking these medications    azelastine 137 mcg (0.1 %) nasal spray  Commonly known as:  ASTELIN     cetirizine 10 MG tablet  Commonly known as:  ZYRTEC     PROBIOTIC COMPLEX ORAL     VITAMIN B-12 500 MCG tablet  Generic drug:  cyanocobalamin     vitamin D 1000 units Tab  Commonly known as:  VITAMIN D3           Where to Get Your Medications      Information about where to get these medications is not yet available    Ask your nurse or doctor about these medications  · ALPRAZolam 0.25 MG tablet

## 2019-10-01 ENCOUNTER — TELEPHONE (OUTPATIENT)
Dept: HEMATOLOGY/ONCOLOGY | Facility: CLINIC | Age: 64
End: 2019-10-01

## 2019-10-02 ENCOUNTER — PATIENT MESSAGE (OUTPATIENT)
Dept: INTERNAL MEDICINE | Facility: CLINIC | Age: 64
End: 2019-10-02

## 2019-10-02 ENCOUNTER — TELEPHONE (OUTPATIENT)
Dept: HEMATOLOGY/ONCOLOGY | Facility: CLINIC | Age: 64
End: 2019-10-02

## 2019-10-03 ENCOUNTER — TELEPHONE (OUTPATIENT)
Dept: INTERNAL MEDICINE | Facility: CLINIC | Age: 64
End: 2019-10-03

## 2019-10-03 DIAGNOSIS — Z28.39 LAPSED IMMUNIZATION SCHEDULE STATUS: Primary | ICD-10-CM

## 2019-10-03 NOTE — TELEPHONE ENCOUNTER
----- Message from Jama Huang sent at 10/3/2019  4:18 PM CDT -----  Contact: Patient [phone]  Type: Orders Request    What orders/ testing are being requested? HEP B     Is there a future appointment scheduled for the patient with PCP? No    When?    Comments:  Patient stating thinks forgot to get the third shot.   Please call an advise  Thank you

## 2019-10-07 ENCOUNTER — INITIAL CONSULT (OUTPATIENT)
Dept: HEMATOLOGY/ONCOLOGY | Facility: CLINIC | Age: 64
End: 2019-10-07
Payer: COMMERCIAL

## 2019-10-07 VITALS
HEART RATE: 59 BPM | RESPIRATION RATE: 18 BRPM | SYSTOLIC BLOOD PRESSURE: 132 MMHG | BODY MASS INDEX: 20.89 KG/M2 | HEIGHT: 66 IN | WEIGHT: 130 LBS | DIASTOLIC BLOOD PRESSURE: 69 MMHG | TEMPERATURE: 98 F | OXYGEN SATURATION: 98 %

## 2019-10-07 DIAGNOSIS — D70.8 OTHER NEUTROPENIA: ICD-10-CM

## 2019-10-07 DIAGNOSIS — J45.20 MILD INTERMITTENT REACTIVE AIRWAY DISEASE WITHOUT COMPLICATION: Primary | ICD-10-CM

## 2019-10-07 PROCEDURE — 1101F PT FALLS ASSESS-DOCD LE1/YR: CPT | Mod: CPTII,S$GLB,, | Performed by: INTERNAL MEDICINE

## 2019-10-07 PROCEDURE — 3008F PR BODY MASS INDEX (BMI) DOCUMENTED: ICD-10-PCS | Mod: CPTII,S$GLB,, | Performed by: INTERNAL MEDICINE

## 2019-10-07 PROCEDURE — 99999 PR PBB SHADOW E&M-EST. PATIENT-LVL III: CPT | Mod: PBBFAC,,, | Performed by: INTERNAL MEDICINE

## 2019-10-07 PROCEDURE — 99205 OFFICE O/P NEW HI 60 MIN: CPT | Mod: S$GLB,,, | Performed by: INTERNAL MEDICINE

## 2019-10-07 PROCEDURE — 3008F BODY MASS INDEX DOCD: CPT | Mod: CPTII,S$GLB,, | Performed by: INTERNAL MEDICINE

## 2019-10-07 PROCEDURE — 99999 PR PBB SHADOW E&M-EST. PATIENT-LVL III: ICD-10-PCS | Mod: PBBFAC,,, | Performed by: INTERNAL MEDICINE

## 2019-10-07 PROCEDURE — 99205 PR OFFICE/OUTPT VISIT, NEW, LEVL V, 60-74 MIN: ICD-10-PCS | Mod: S$GLB,,, | Performed by: INTERNAL MEDICINE

## 2019-10-07 PROCEDURE — 1101F PR PT FALLS ASSESS DOC 0-1 FALLS W/OUT INJ PAST YR: ICD-10-PCS | Mod: CPTII,S$GLB,, | Performed by: INTERNAL MEDICINE

## 2019-10-07 NOTE — LETTER
October 7, 2019      Erica Khan MD  [address]  [facility] [address]           [facility]  [address]  [facility] [address]  Phone: [phone]          Patient: Danuta Santos   MR Number: [id number]   YOB: 1955   Date of Visit: 10/7/2019       Dear Dr. Erica Khan:    Thank you for referring Danuta Santos to me for evaluation. Attached you will find relevant portions of my assessment and plan of care.    If you have questions, please do not hesitate to call me. I look forward to following Danuta Santos along with you.    Sincerely,    Carlito Browne MD    Enclosure  CC:  No Recipients    If you would like to receive this communication electronically, please contact [email or web address] or [phone] to request more information on [employer] access.    For providers and/or their staff who would like to refer a patient to [facility], please contact us through our one-stop-shop provider referral line, [facility], [place], at [phone].    If you feel you have received this communication in error or would no longer like to receive these types of communications, please e-mail [email or web address]

## 2019-10-07 NOTE — Clinical Note
-cbc, ldh, uric acid, hepatitis C antibody, hep[atitis B surface antigen, hep B core antibody, b12, folate, KIMO, SPEP, immunofixation, f/u in 4 wks

## 2019-10-07 NOTE — PROGRESS NOTES
CC: Anemia, leukopenia, initial visit    HPI: , 64, is here for hematology consultation. She has IBS. She has chronic anemia, leukopenia dating back to , mild absolute neutropenia dating back to . WBC count was 3.78, ANC 2.1, in 2006. Platelets were normal. Hemoglobin was 11.8, normocytic. In May 2011, WBC count was 3.72, ANC 1.7, hemoglobin 11.7. WBC count was 2.47 and ANC was 1.2 in 2019. Hemoglobin was 12.4.    No fever, weight loss, change in appetite, or infections. No recent change in medications. She does not take herbal medications / supplements.       Review of Systems   Constitutional: Positive for malaise/fatigue. Negative for chills, fever and weight loss.   HENT: Negative for congestion, ear discharge, ear pain, nosebleeds and sinus pain.    Eyes: Negative for photophobia and pain.   Respiratory: Negative for cough and sputum production.    Cardiovascular: Negative for chest pain, palpitations and orthopnea.   Gastrointestinal: Negative for abdominal pain, constipation, diarrhea and heartburn.   Genitourinary: Negative for dysuria, frequency and urgency.   Musculoskeletal: Negative for back pain, joint pain and myalgias.   Neurological: Negative for dizziness, tremors, sensory change, speech change and focal weakness.   Endo/Heme/Allergies: Negative for environmental allergies. Does not bruise/bleed easily.   Psychiatric/Behavioral: Negative for depression, hallucinations and substance abuse. The patient is not nervous/anxious.             Past Medical History:   Diagnosis Date    IBS (irritable bowel syndrome)     Pneumonia 2019         Past Surgical History:   Procedure Laterality Date     SECTION      NOSE SURGERY      SINUS SURGERY  2019         Social History     Socioeconomic History    Marital status:      Spouse name: Not on file    Number of children: Not on file    Years of education: Not on file    Highest education level: Not on  file   Occupational History    Not on file   Social Needs    Financial resource strain: Not on file    Food insecurity:     Worry: Not on file     Inability: Not on file    Transportation needs:     Medical: Not on file     Non-medical: Not on file   Tobacco Use    Smoking status: Never Smoker    Smokeless tobacco: Never Used   Substance and Sexual Activity    Alcohol use: No    Drug use: No    Sexual activity: Yes     Partners: Male     Birth control/protection: None, Post-menopausal   Lifestyle    Physical activity:     Days per week: Not on file     Minutes per session: Not on file    Stress: Not on file   Relationships    Social connections:     Talks on phone: Not on file     Gets together: Not on file     Attends Baptism service: Not on file     Active member of club or organization: Not on file     Attends meetings of clubs or organizations: Not on file     Relationship status: Not on file   Other Topics Concern    Not on file   Social History Narrative    Not on file         Family History   Problem Relation Age of Onset    Cancer of Pancreas Father 54    Stroke Maternal Grandfather     Breast cancer Neg Hx     Colon cancer Neg Hx     Diabetes Neg Hx     Eclampsia Neg Hx     Hypertension Neg Hx     Blood cancer Maternal grandmother 50s           Review of patient's allergies indicates:   Allergen Reactions    Wheat containing prod      Sinus      Advil [ibuprofen]     Alcohol Other (See Comments)     Post nasal drip    Aspirin Nausea And Vomiting    Milk containing products Other (See Comments)     Post nasal drip       Current Outpatient Medications   Medication Sig    ALPRAZolam (XANAX) 0.25 MG tablet Take 1 tablet (0.25 mg total) by mouth nightly as needed (headache).    azelastine (ASTELIN) 137 mcg (0.1 %) nasal spray 1 spray by Nasal route 2 (two) times daily.    cetirizine (ZYRTEC) 10 MG tablet Take 10 mg by mouth once daily.    cyanocobalamin (VITAMIN B-12) 500 MCG  tablet Take 1,000 mcg by mouth once daily.      LACTOBACILLUS COMBO NO.6 (PROBIOTIC COMPLEX ORAL) Take 1 capsule by mouth once.     vitamin D 185 MG Tab Take 2,000 mg by mouth once daily.      No current facility-administered medications for this visit.      Vitals:    10/07/19 1715   BP: 132/69   Pulse: (!) 59   Resp: 18   Temp: 98 °F (36.7 °C)         Physical Exam   Constitutional: She is oriented to person, place, and time. She appears well-developed.   HENT:   Head: Normocephalic.   Mouth/Throat: No oropharyngeal exudate.   Eyes: No scleral icterus.   Pulmonary/Chest: Effort normal and breath sounds normal. No respiratory distress. She has no wheezes.   Abdominal: Soft. She exhibits no distension. There is no tenderness. There is no rebound.   Lymphadenopathy:     She has no cervical adenopathy.   Neurological: She is alert and oriented to person, place, and time.   Skin: Skin is warm.     Component      Latest Ref Rng & Units 9/20/2019 8/12/2019 7/30/2018 2/23/2018   Iron      30 - 160 ug/dL 118      Transferrin      200 - 375 mg/dL 282      TIBC      250 - 450 ug/dL 417      Saturated Iron      20 - 50 % 28      Hep. B Surf Ab, Qual          POSITIVE   Hep. B Surf Ab, Quant.      mIU/mL    191   HSV 1 IgG      Negative  Positive (A)     HSV 2 IgG      Negative  Negative     Vitamin B-12      210 - 950 pg/mL   618    HIV 1/2 Ag/Ab      Negative  Negative     Hepatitis B Surface Ag        Negative     TSH      0.400 - 4.000 uIU/mL 1.110        Component      Latest Ref Rng & Units 9/20/2019 8/12/2019   WBC      3.90 - 12.70 K/uL 2.47 (L)    RBC      4.00 - 5.40 M/uL 4.17    Hemoglobin      12.0 - 16.0 g/dL 12.4    Hematocrit      37.0 - 48.5 % 36.7 (L)    MCV      82 - 98 fL 88    MCH      27.0 - 31.0 pg 29.7    MCHC      32.0 - 36.0 g/dL 33.8    RDW      11.5 - 14.5 % 12.6    Platelets      150 - 350 K/uL 185    MPV      9.2 - 12.9 fL 10.9    Gran # (ANC)      1.8 - 7.7 K/uL 1.2 (L)    Lymph #      1.0 - 4.8  K/uL 1.0    Mono #      0.3 - 1.0 K/uL 0.2 (L)    Eos #      0.0 - 0.5 K/uL 0.1    Baso #      0.00 - 0.20 K/uL 0.02    Gran%      38.0 - 73.0 % 50.2    Lymph%      18.0 - 48.0 % 38.5    Mono%      4.0 - 15.0 % 8.1    Eosinophil%      0.0 - 8.0 % 2.4    Basophil%      0.0 - 1.9 % 0.8    Differential Method       Automated    Sodium      136 - 145 mmol/L 135 (L)    Potassium      3.5 - 5.1 mmol/L 4.5    Chloride      95 - 110 mmol/L 100    CO2      23 - 29 mmol/L 29    Glucose      70 - 110 mg/dL 91    BUN, Bld      8 - 23 mg/dL 17    Creatinine      0.5 - 1.4 mg/dL 0.8    Calcium      8.7 - 10.5 mg/dL 10.0    PROTEIN TOTAL      6.0 - 8.4 g/dL 6.7    Albumin      3.5 - 5.2 g/dL 4.3    BILIRUBIN TOTAL      0.1 - 1.0 mg/dL 0.8    Alkaline Phosphatase      55 - 135 U/L 56    AST      10 - 40 U/L 24    ALT      10 - 44 U/L 29    Anion Gap      8 - 16 mmol/L 6 (L)    eGFR if African American      >60 mL/min/1.73 m:2 >60    eGFR if non African American      >60 mL/min/1.73 m:2 >60    Iron      30 - 160 ug/dL 118    Transferrin      200 - 375 mg/dL 282    TIBC      250 - 450 ug/dL 417    Saturated Iron      20 - 50 % 28    Hepatitis B Surface Ag        Negative   TSH      0.400 - 4.000 uIU/mL 1.110        Assessment:    1. Chronic leukopenia  2. Mild absolute neutropenia        Plan:    1,2: Noted since at least since 2006. Most recent WBC  Count is 2.47, ANC  1.2. Platelets, Hgb/HCT all normal.  Differental diagnoses: constitutional neutropenia / auto immune neutropenia / cyclic neutropenia. She is asymptomatic. No B symptoms, mouth sores, infections, diarrhea, rash.

## 2019-10-08 ENCOUNTER — CLINICAL SUPPORT (OUTPATIENT)
Dept: REHABILITATION | Facility: HOSPITAL | Age: 64
End: 2019-10-08
Attending: INTERNAL MEDICINE
Payer: COMMERCIAL

## 2019-10-08 DIAGNOSIS — M54.41 CHRONIC BILATERAL LOW BACK PAIN WITH RIGHT-SIDED SCIATICA: Primary | ICD-10-CM

## 2019-10-08 DIAGNOSIS — G89.29 CHRONIC BILATERAL LOW BACK PAIN WITH RIGHT-SIDED SCIATICA: Primary | ICD-10-CM

## 2019-10-08 DIAGNOSIS — D70.8 OTHER NEUTROPENIA: Primary | ICD-10-CM

## 2019-10-08 PROCEDURE — 97110 THERAPEUTIC EXERCISES: CPT | Mod: PN | Performed by: PHYSICAL THERAPIST

## 2019-10-08 PROCEDURE — 97014 ELECTRIC STIMULATION THERAPY: CPT | Mod: PN | Performed by: PHYSICAL THERAPIST

## 2019-10-08 PROCEDURE — 97140 MANUAL THERAPY 1/> REGIONS: CPT | Mod: PN | Performed by: PHYSICAL THERAPIST

## 2019-10-14 ENCOUNTER — TELEPHONE (OUTPATIENT)
Dept: INTERNAL MEDICINE | Facility: CLINIC | Age: 64
End: 2019-10-14

## 2019-10-15 ENCOUNTER — CLINICAL SUPPORT (OUTPATIENT)
Dept: REHABILITATION | Facility: HOSPITAL | Age: 64
End: 2019-10-15
Payer: COMMERCIAL

## 2019-10-15 DIAGNOSIS — G89.29 CHRONIC BILATERAL LOW BACK PAIN WITH RIGHT-SIDED SCIATICA: Primary | ICD-10-CM

## 2019-10-15 DIAGNOSIS — M54.41 CHRONIC BILATERAL LOW BACK PAIN WITH RIGHT-SIDED SCIATICA: Primary | ICD-10-CM

## 2019-10-15 PROCEDURE — 97014 ELECTRIC STIMULATION THERAPY: CPT | Mod: PN | Performed by: PHYSICAL THERAPIST

## 2019-10-15 PROCEDURE — 97110 THERAPEUTIC EXERCISES: CPT | Mod: PN | Performed by: PHYSICAL THERAPIST

## 2019-10-15 NOTE — PLAN OF CARE
Physical Therapy Progress Note     Name: Danuta Santos  Clinic Number: [id number]  Diagnosis:   Chronic low back pain  Physician: Erica Khan MD  Treatment Orders: PT Eval and Treat  Past Medical History:   Diagnosis Date    Allergy     IBS (irritable bowel syndrome)     Pneumonia 02/2019       Precautions: as per diagnosis    Evaluation date: 2/15/2018  Visit # authorized: 49/60 on 10/15/2019  Authorization period: 12-31-18  Plan of care expiration: 11-26-19  MD Follow up appt: none scheduled    Time In:  4:15   Time Out: 5:00  Billable time:  30  min    Subjective     Pt reports: she moved garbage can and felt increased tightness R LB and has been unable to self mobilize  Pain Scale: before treatment: N/T after treatment:feels better, did not provide #  Objective     Pelvic dysfunction noted unable to self mobilize performed contract relax with pt sidelying left and realigned.    On 10-15-19 severe tightness R lumbar paraspinals and QL Previously, Mod tightness  Mod- severe tightness R lumbar paraspinals and QL,      Lumbar active range of motion in standing is: same as 8-20-19  - flexion - toes                     - extension -  75%                         - left side bending -  To knee         - right side bending -  To knee              Lumbar active range of motion in standing is: 12.5-18  - flexion - toes                     - extension -  50%                         - left side bending -  To knee         - right side bending -  To knee    Muscle Strength 10-15-19  MMT R L   Hip flexion 4+/5 4+/5   Hip abduction 5-/5 5-/5   Hip extension 4+/5 4+/5   Glut max 4/5 4/5        Knee extension 5/5 5/5   Knee flexion 5/5 5/5            Muscle Strength 8-20-19  MMT R L   Hip flexion 4+/5 4+/5   Hip abduction 4+/5 4+/5   Hip extension 4+/5 4+/5   Glut max 4/5 4/5           Knee extension 5/5 5/5   Knee flexion 5/5 5/5        TREATMENT  MH to LB 10 min prior to exercise    Therapeutic exercise: Danuta  "received therapeutic exercises to develop strength, endurance, ROM, flexibility and core stabilization for 15  minutes including:    HS stretch supine   Glut stretch  Piriformis stretch  B QL stretch    Contract relax R glut x 3  Pt was unable to self mobilize today    Sidelying L contract relax mob to L5-S1 region to level pelvis    Kinesiotape with 6" star for pain relief was performed over the R lumbar paraspinals.L3-4 region  Instructed pt in use, care and precautions with tape.       Did not perform in clinic due to time constraints  Bridge x 15  Pelvic tilt  X 15 for 5 sec hold  Partial sit up x 20  Hip abd sidelying x 20 B  SLR x 15  Arm and Leg lift prone x 20  Hip ext prone with bent knee x 20         HOLD FOR NOW  Heel raises standing x 15    Step up x10     1 plank x 30 sec held the other 3 she normally does     (NP)Plank 4 reps 30 sec each         (NP) Manual therapy: Danuta  received the following manual therapy  techniques x 15  min. to include soft tissue and joint mobilization were applied to the: low back and gluteals to include:STM to LB paraspinals R>L  P/A mob to lumbar region Grade 1-2      (NP)Pt received tool-assisted massage with manual therapy techniques to R LB in sidelying with pillow at waist putting pt in SB L to trigger an inflammatory healing response and stimulate the production of new collagen and proper, more functional, less painful healing.        (NP)Vacuum/cupping STM with manual therapy techniques was performed to back and gluteals to decrease muscle tightness, increase circulation and promote healing process.  The pt's skin was monitored for redness adjusting pressure as needed. The pt was instructed in possible side effects of bruising and/or soreness.      (NP)Ultrasound  for pain control and to decrease inflammation @ continuous duty cycle, 1 Mhz, applied to R lumbar paraspinals, intensity = 1.8 w/cm2 for 8 minutes.        Patient received pre-mod electrical stimulation " to decrease muscle tightness and pain to Lumbar paraspinals/ sacral border of gluteals B for 15 minutes with MH supine with cycle time: continuous, beat frequency: , CC/CV: CV.        Written Home Exercises Provided: none today  Pt demo good understanding of the education provided. Danuta demonstrated good return demonstration of activities.     Pt. education:  · Posture reeducation, body mechanics, HEP,   · No spiritual or educational barriers to learning provided  · Pt has no cultural, educational or language barriers to learning provided.    Assessment   Pt unable to self mobilize today Pt with increased muscle tightness with activity and also under personal stress.  Pt with decreased pain and level pelvis after treatment       Pt will continue to benefit from skilled outpatient physical therapy to address the remaining functional deficits, provide pt/family education, and to maximize pt's level of independence in the home and community environment. .     GOALS:   Short Term Goals:  3 weeks MET STG's  Increase range of motion 25%  Increase strength 1/2 muscle grade  Improve postural awareness of pelvis to independently identify dysfunction with min assist from PT  Be able to perform HEP with minimal cueing required     Long Term Goals: 6 weeks PARTIALLY MET LTG'S  Increase range of motion to 75% to 100% full   Improve muscle strength 1 muscle grade  Improve muscle strength with MMT to 4+/5 to 5/5  Improve and stabilize proper pelvic positioning  Restore ability to sit for ADL and work with min to 0 pain  Restore normal sleep habits without disturbances due to pain  Restore ability to perform ADL's and household activities independently with min to 0 pain    Anticipated barriers to physical therapy: none  Pt's spiritual, cultural and educational needs considered and pt agreeable to plan of care and goals        Plan   If you concur, I recommend patient continue with physical therapy 1-2 times a week  for  6 weeks as needed.  Please advise us of your  recommendations. Thank you for allowing us to assist in the care of your patient.      Jacy Escobar, MS, PT          I certify the need for these services furnished under this plan of treatment and while under my care.    ____________________________________ Physician/Referring Practitioner                                Date of Signature

## 2019-10-15 NOTE — PROGRESS NOTES
Physical Therapy Progress Note     Name: Danuta Santos  Clinic Number: [id number]  Diagnosis:   Encounter Diagnosis   Name Primary?    Chronic bilateral low back pain with right-sided sciatica Yes     Physician: Erica Khan MD  Treatment Orders: PT Eval and Treat  Past Medical History:   Diagnosis Date    Allergy     IBS (irritable bowel syndrome)     Pneumonia 02/2019       Precautions: as per diagnosis    Evaluation date: 2/15/2018  Visit # authorized: 49/60 on 10/15/2019  Authorization period: 12-31-18  Plan of care expiration: 11-26-19  MD Follow up appt: none scheduled    Time In:  4:15   Time Out: 5:00  Billable time:  30  min    Subjective     Pt reports: she moved garbage can and felt increased tightness R LB and has been unable to self mobilize  Pain Scale: before treatment: N/T after treatment:feels better, did not provide #  Objective     Pelvic dysfunction noted unable to self mobilize performed contract relax with pt sidelying left and realigned.    On 10-15-19 severe tightness R lumbar paraspinals and QL Previously, Mod tightness  Mod- severe tightness R lumbar paraspinals and QL,      Lumbar active range of motion in standing is: same as 8-20-19  - flexion - toes                     - extension -  75%                         - left side bending -  To knee         - right side bending -  To knee              Lumbar active range of motion in standing is: 12.5-18  - flexion - toes                     - extension -  50%                         - left side bending -  To knee         - right side bending -  To knee    Muscle Strength 10-15-19  MMT R L   Hip flexion 4+/5 4+/5   Hip abduction 5-/5 5-/5   Hip extension 4+/5 4+/5   Glut max 4/5 4/5        Knee extension 5/5 5/5   Knee flexion 5/5 5/5            Muscle Strength 8-20-19  MMT R L   Hip flexion 4+/5 4+/5   Hip abduction 4+/5 4+/5   Hip extension 4+/5 4+/5   Glut max 4/5 4/5           Knee extension 5/5 5/5   Knee flexion 5/5 5/5  "       TREATMENT  MH to LB 10 min prior to exercise    Therapeutic exercise: Danuta received therapeutic exercises to develop strength, endurance, ROM, flexibility and core stabilization for 15  minutes including:    HS stretch supine   Glut stretch  Piriformis stretch  B QL stretch    Contract relax R glut x 3  Pt was unable to self mobilize today    Sidelying L contract relax mob to L5-S1 region to level pelvis    Kinesiotape with 6" star for pain relief was performed over the R lumbar paraspinals.L3-4 region  Instructed pt in use, care and precautions with tape.       Did not perform in clinic due to time constraints  Bridge x 15  Pelvic tilt  X 15 for 5 sec hold  Partial sit up x 20  Hip abd sidelying x 20 B  SLR x 15  Arm and Leg lift prone x 20  Hip ext prone with bent knee x 20         HOLD FOR NOW  Heel raises standing x 15    Step up x10     1 plank x 30 sec held the other 3 she normally does     (NP)Plank 4 reps 30 sec each         (NP) Manual therapy: Danuta  received the following manual therapy  techniques x 15  min. to include soft tissue and joint mobilization were applied to the: low back and gluteals to include:STM to LB paraspinals R>L  P/A mob to lumbar region Grade 1-2      (NP)Pt received tool-assisted massage with manual therapy techniques to R LB in sidelying with pillow at waist putting pt in SB L to trigger an inflammatory healing response and stimulate the production of new collagen and proper, more functional, less painful healing.        (NP)Vacuum/cupping STM with manual therapy techniques was performed to back and gluteals to decrease muscle tightness, increase circulation and promote healing process.  The pt's skin was monitored for redness adjusting pressure as needed. The pt was instructed in possible side effects of bruising and/or soreness.      (NP)Ultrasound  for pain control and to decrease inflammation @ continuous duty cycle, 1 Mhz, applied to R lumbar paraspinals, " intensity = 1.8 w/cm2 for 8 minutes.        Patient received pre-mod electrical stimulation to decrease muscle tightness and pain to Lumbar paraspinals/ sacral border of gluteals B for 15 minutes with MH supine with cycle time: continuous, beat frequency: , CC/CV: CV.        Written Home Exercises Provided: none today  Pt demo good understanding of the education provided. Danuta demonstrated good return demonstration of activities.     Pt. education:  · Posture reeducation, body mechanics, HEP,   · No spiritual or educational barriers to learning provided  · Pt has no cultural, educational or language barriers to learning provided.    Assessment   Pt unable to self mobilize today Pt with increased muscle tightness with activity and also under personal stress.  Pt with decreased pain and level pelvis after treatment       Pt will continue to benefit from skilled outpatient physical therapy to address the remaining functional deficits, provide pt/family education, and to maximize pt's level of independence in the home and community environment. .     GOALS:   Short Term Goals:  3 weeks MET STG's  Increase range of motion 25%  Increase strength 1/2 muscle grade  Improve postural awareness of pelvis to independently identify dysfunction with min assist from PT  Be able to perform HEP with minimal cueing required     Long Term Goals: 6 weeks PARTIALLY MET LTG'S  Increase range of motion to 75% to 100% full   Improve muscle strength 1 muscle grade  Improve muscle strength with MMT to 4+/5 to 5/5  Improve and stabilize proper pelvic positioning  Restore ability to sit for ADL and work with min to 0 pain  Restore normal sleep habits without disturbances due to pain  Restore ability to perform ADL's and household activities independently with min to 0 pain    Anticipated barriers to physical therapy: none  Pt's spiritual, cultural and educational needs considered and pt agreeable to plan of care and goals         Plan   If you concur, I recommend patient continue with physical therapy 1-2 times a week  for 6 weeks as needed.  Please advise us of your  recommendations. Thank you for allowing us to assist in the care of your patient.      Jacy Escobar, MS, PT          I certify the need for these services furnished under this plan of treatment and while under my care.    ____________________________________ Physician/Referring Practitioner                                Date of Signature

## 2019-10-24 ENCOUNTER — CLINICAL SUPPORT (OUTPATIENT)
Dept: REHABILITATION | Facility: HOSPITAL | Age: 64
End: 2019-10-24
Attending: INTERNAL MEDICINE
Payer: COMMERCIAL

## 2019-10-24 DIAGNOSIS — M54.41 CHRONIC BILATERAL LOW BACK PAIN WITH RIGHT-SIDED SCIATICA: Primary | ICD-10-CM

## 2019-10-24 DIAGNOSIS — G89.29 CHRONIC BILATERAL LOW BACK PAIN WITH RIGHT-SIDED SCIATICA: Primary | ICD-10-CM

## 2019-10-24 PROCEDURE — 97140 MANUAL THERAPY 1/> REGIONS: CPT | Mod: PN | Performed by: PHYSICAL THERAPIST

## 2019-10-24 PROCEDURE — 97014 ELECTRIC STIMULATION THERAPY: CPT | Mod: PN | Performed by: PHYSICAL THERAPIST

## 2019-10-24 NOTE — PROGRESS NOTES
"Physical Therapy Daily Treatment Note     Name: Danuta Santos  Clinic Number: [id number]  Diagnosis:   Encounter Diagnosis   Name Primary?    Chronic bilateral low back pain with right-sided sciatica Yes     Physician: Erica Khan MD  Treatment Orders: PT Eval and Treat  Past Medical History:   Diagnosis Date    Allergy     IBS (irritable bowel syndrome)     Pneumonia 02/2019       Precautions: as per diagnosis    Evaluation date: 2/15/2018  Visit # authorized: 50/60 on 10/24/2019  Authorization period: 12-31-18  Plan of care expiration: 11-26-19  MD Follow up appt: none scheduled    Time In:  2:30  Time Out: 3:23  Billable time:  30  min    Subjective     Pt reports: back feels tight and is working on packing up to be able to sell her house.    Pain Scale: before treatment: N/T after treatment:feels better, did not provide #  Objective     TREATMENT  MH to LB 10 min prior to exercise    Therapeutic exercise: Danuta received therapeutic exercises to develop strength, endurance, ROM, flexibility and core stabilization for 5 direct minutes including:    HS stretch supine   Glut stretch  Piriformis stretch  B QL stretch    Contract relax R glut x 3  Pt was able to self mobilize today    Bridge x 15  Pelvic tilt  X 20 for 5 sec hold  Partial sit up x 20  Hip abd sidelying x 20 B  SLR x 20  Arm and Leg lift prone x 2/10  Hip ext prone with bent knee x 2/10  Contract relax R glut    Sidelying L contract relax mob to L5-S1 region to level pelvis       HOLD FOR NOW  Heel raises standing x 15    Step up x10     1 plank x 30 sec held the other 3 she normally does     (NP)Plank 4 reps 30 sec each          Manual therapy: Danuta  received the following manual therapy  techniques x 10  min. to include soft tissue and joint mobilization were applied to the: low back and gluteals to include:STM to LB paraspinals R>L  P/A mob to lumbar region Grade 1-2      (NP)Kinesiotape with 6" star for pain relief was performed " over the R lumbar paraspinals.L3-4 region  Instructed pt in use, care and precautions with tape.     (NP)Pt received tool-assisted massage with manual therapy techniques to R LB in sidelying with pillow at waist putting pt in SB L to trigger an inflammatory healing response and stimulate the production of new collagen and proper, more functional, less painful healing.        (NP)Vacuum/cupping STM with manual therapy techniques was performed to back and gluteals to decrease muscle tightness, increase circulation and promote healing process.  The pt's skin was monitored for redness adjusting pressure as needed. The pt was instructed in possible side effects of bruising and/or soreness.      (NP)Ultrasound  for pain control and to decrease inflammation @ continuous duty cycle, 1 Mhz, applied to R lumbar paraspinals, intensity = 1.8 w/cm2 for 8 minutes.        Patient received pre-mod electrical stimulation to decrease muscle tightness and pain to Lumbar paraspinals/ sacral border of gluteals B for 15 minutes with MH supine with cycle time: continuous, beat frequency: , CC/CV: CV.        Written Home Exercises Provided: none today  Pt demo good understanding of the education provided. Danuta demonstrated good return demonstration of activities.     Pt. education:  · Posture reeducation, body mechanics, HEP,   · No spiritual or educational barriers to learning provided  · Pt has no cultural, educational or language barriers to learning provided.    Assessment   Pt able to self mobilize today Pt able to progress with strengthening and decreased pain after treatment      Pt will continue to benefit from skilled outpatient physical therapy to address the remaining functional deficits, provide pt/family education, and to maximize pt's level of independence in the home and community environment. .     GOALS:   Short Term Goals:  3 weeks MET STG's  Increase range of motion 25%  Increase strength 1/2 muscle  grade  Improve postural awareness of pelvis to independently identify dysfunction with min assist from PT  Be able to perform HEP with minimal cueing required     Long Term Goals: 6 weeks PARTIALLY MET LTG'S  Increase range of motion to 75% to 100% full   Improve muscle strength 1 muscle grade  Improve muscle strength with MMT to 4+/5 to 5/5  Improve and stabilize proper pelvic positioning  Restore ability to sit for ADL and work with min to 0 pain  Restore normal sleep habits without disturbances due to pain  Restore ability to perform ADL's and household activities independently with min to 0 pain    Anticipated barriers to physical therapy: none  Pt's spiritual, cultural and educational needs considered and pt agreeable to plan of care and goals        Plan   Continue with established plan of care towards PT goals.

## 2019-11-05 ENCOUNTER — CLINICAL SUPPORT (OUTPATIENT)
Dept: REHABILITATION | Facility: HOSPITAL | Age: 64
End: 2019-11-05
Attending: INTERNAL MEDICINE
Payer: COMMERCIAL

## 2019-11-05 DIAGNOSIS — M54.41 CHRONIC BILATERAL LOW BACK PAIN WITH RIGHT-SIDED SCIATICA: Primary | ICD-10-CM

## 2019-11-05 DIAGNOSIS — G89.29 CHRONIC BILATERAL LOW BACK PAIN WITH RIGHT-SIDED SCIATICA: Primary | ICD-10-CM

## 2019-11-05 PROCEDURE — 97140 MANUAL THERAPY 1/> REGIONS: CPT | Mod: PN | Performed by: PHYSICAL THERAPIST

## 2019-11-05 PROCEDURE — 97110 THERAPEUTIC EXERCISES: CPT | Mod: PN | Performed by: PHYSICAL THERAPIST

## 2019-11-05 PROCEDURE — 97014 ELECTRIC STIMULATION THERAPY: CPT | Mod: PN | Performed by: PHYSICAL THERAPIST

## 2019-11-05 NOTE — PROGRESS NOTES
Physical Therapy Daily Treatment Note     Name: Danuta Santos  Clinic Number: [id number]  Diagnosis:   Encounter Diagnosis   Name Primary?    Chronic bilateral low back pain with right-sided sciatica Yes     Physician: Erica Khan MD  Treatment Orders: PT Eval and Treat  Past Medical History:   Diagnosis Date    Allergy     IBS (irritable bowel syndrome)     Pneumonia 02/2019       Precautions: as per diagnosis    Evaluation date: 2/15/2018  Visit # authorized: 50/60 on 11/5/2019  Authorization period: 12-31-18  Plan of care expiration: 11-26-19  MD Follow up appt: none scheduled    Time In:  4:15  Time Out: 5:15  Billable time:  40  min    Subjective     Pt reports: been ok, has had company since last Sunday so more hectic.  Pt states got more tight Sunday so more trouble with self mob   Pain Scale: before treatment:  5/10after treatment:feels better, did not provide #  Objective     TREATMENT    Therapeutic exercise: Danuta received therapeutic exercises to develop strength, endurance, ROM, flexibility and core stabilization for 10 minutes including:    HS stretch supine   Glut stretch  Piriformis stretch  B QL stretch    Contract relax R glut x 3  Pt was unable to self mobilize today, performed sidelying mob with contract relax, realigned and noted + change in symptoms    Bridge x 15  Pelvic tilt  X 20 for 5 sec hold  Partial sit up x 20  Hip abd sidelying x 20 B  SLR x 20  Arm and Leg lift prone x 2/10  Hip ext prone with bent knee x 2/10  Contract relax R glut    Sidelying L contract relax mob to L5-S1 region to level pelvis       HOLD FOR NOW  Heel raises standing x 15    Step up x10     1 plank x 30 sec held the other 3 she normally does     (NP)Plank 4 reps 30 sec each          Manual therapy: Danuta  received the following manual therapy  techniques x 20  min. to include soft tissue and joint mobilization were applied to the: low back and gluteals to include:STM to LB paraspinals R>L  P/A  "mob to lumbar region Grade 1-2      Kinesiotape with 6" star for pain relief was performed over the R lumbar paraspinals.L3-4 region  Instructed pt in use, care and precautions with tape.     Pt received tool-assisted massage with manual therapy techniques to R LB in sidelying with pillow at waist putting pt in SB L to trigger an inflammatory healing response and stimulate the production of new collagen and proper, more functional, less painful healing.        Vacuum/cupping STM with manual therapy techniques was performed to back and gluteals to decrease muscle tightness, increase circulation and promote healing process.  The pt's skin was monitored for redness adjusting pressure as needed. The pt was instructed in possible side effects of bruising and/or soreness.      (NP)Ultrasound  for pain control and to decrease inflammation @ continuous duty cycle, 1 Mhz, applied to R lumbar paraspinals, intensity = 1.8 w/cm2 for 8 minutes.        Patient received pre-mod electrical stimulation to decrease muscle tightness and pain to Lumbar paraspinals/ sacral border of gluteals B for 10 minutes with MH supine with cycle time: continuous, beat frequency: , CC/CV: CV.        Written Home Exercises Provided: none today  Pt demo good understanding of the education provided. Danuta demonstrated good return demonstration of activities.     Pt. education:  · Posture reeducation, body mechanics, HEP,   · No spiritual or educational barriers to learning provided  · Pt has no cultural, educational or language barriers to learning provided.    Assessment   Pt unable to self mobilize today Pt with increased tightness, responded well to manual therapy and improved symptoms at end of treatment     Pt will continue to benefit from skilled outpatient physical therapy to address the remaining functional deficits, provide pt/family education, and to maximize pt's level of independence in the home and community environment. . "     GOALS:   Short Term Goals:  3 weeks MET STG's  Increase range of motion 25%  Increase strength 1/2 muscle grade  Improve postural awareness of pelvis to independently identify dysfunction with min assist from PT  Be able to perform HEP with minimal cueing required     Long Term Goals: 6 weeks PARTIALLY MET LTG'S  Increase range of motion to 75% to 100% full   Improve muscle strength 1 muscle grade  Improve muscle strength with MMT to 4+/5 to 5/5  Improve and stabilize proper pelvic positioning  Restore ability to sit for ADL and work with min to 0 pain  Restore normal sleep habits without disturbances due to pain  Restore ability to perform ADL's and household activities independently with min to 0 pain    Anticipated barriers to physical therapy: none  Pt's spiritual, cultural and educational needs considered and pt agreeable to plan of care and goals        Plan   Continue with established plan of care towards PT goals.

## 2019-11-13 ENCOUNTER — TELEPHONE (OUTPATIENT)
Dept: HEMATOLOGY/ONCOLOGY | Facility: CLINIC | Age: 64
End: 2019-11-13

## 2019-11-13 NOTE — TELEPHONE ENCOUNTER
Patient called to notify nurse that she has a sinus infection and is taking antibiotics, not sure if she should still come to her appointment. Nurse advised her to wear a mask when she comes to her appointment and that if her blood counts are elevated at all we will take her sinus infection into consideration.

## 2019-11-14 ENCOUNTER — LAB VISIT (OUTPATIENT)
Dept: LAB | Facility: HOSPITAL | Age: 64
End: 2019-11-14
Attending: INTERNAL MEDICINE
Payer: COMMERCIAL

## 2019-11-14 ENCOUNTER — OFFICE VISIT (OUTPATIENT)
Dept: HEMATOLOGY/ONCOLOGY | Facility: CLINIC | Age: 64
End: 2019-11-14
Payer: COMMERCIAL

## 2019-11-14 VITALS
SYSTOLIC BLOOD PRESSURE: 129 MMHG | OXYGEN SATURATION: 99 % | DIASTOLIC BLOOD PRESSURE: 71 MMHG | BODY MASS INDEX: 20.98 KG/M2 | HEIGHT: 66 IN | HEART RATE: 56 BPM | TEMPERATURE: 98 F

## 2019-11-14 DIAGNOSIS — R53.82 CHRONIC FATIGUE: ICD-10-CM

## 2019-11-14 DIAGNOSIS — D70.8 OTHER NEUTROPENIA: Primary | ICD-10-CM

## 2019-11-14 DIAGNOSIS — D70.8 OTHER NEUTROPENIA: ICD-10-CM

## 2019-11-14 LAB
BASOPHILS # BLD AUTO: 0.02 K/UL (ref 0–0.2)
BASOPHILS NFR BLD: 0.5 % (ref 0–1.9)
DIFFERENTIAL METHOD: ABNORMAL
EOSINOPHIL # BLD AUTO: 0 K/UL (ref 0–0.5)
EOSINOPHIL NFR BLD: 0.8 % (ref 0–8)
ERYTHROCYTE [DISTWIDTH] IN BLOOD BY AUTOMATED COUNT: 12.8 % (ref 11.5–14.5)
HCT VFR BLD AUTO: 35.7 % (ref 37–48.5)
HGB BLD-MCNC: 11.9 G/DL (ref 12–16)
LYMPHOCYTES # BLD AUTO: 1.1 K/UL (ref 1–4.8)
LYMPHOCYTES NFR BLD: 29.6 % (ref 18–48)
MCH RBC QN AUTO: 29.5 PG (ref 27–31)
MCHC RBC AUTO-ENTMCNC: 33.3 G/DL (ref 32–36)
MCV RBC AUTO: 88 FL (ref 82–98)
MONOCYTES # BLD AUTO: 0.3 K/UL (ref 0.3–1)
MONOCYTES NFR BLD: 8.3 % (ref 4–15)
NEUTROPHILS # BLD AUTO: 2.3 K/UL (ref 1.8–7.7)
NEUTROPHILS NFR BLD: 60.8 % (ref 38–73)
PLATELET # BLD AUTO: 204 K/UL (ref 150–350)
PMV BLD AUTO: 11 FL (ref 9.2–12.9)
RBC # BLD AUTO: 4.04 M/UL (ref 4–5.4)
WBC # BLD AUTO: 3.75 K/UL (ref 3.9–12.7)

## 2019-11-14 PROCEDURE — 99214 PR OFFICE/OUTPT VISIT, EST, LEVL IV, 30-39 MIN: ICD-10-PCS | Mod: S$GLB,,, | Performed by: INTERNAL MEDICINE

## 2019-11-14 PROCEDURE — 1101F PR PT FALLS ASSESS DOC 0-1 FALLS W/OUT INJ PAST YR: ICD-10-PCS | Mod: CPTII,S$GLB,, | Performed by: INTERNAL MEDICINE

## 2019-11-14 PROCEDURE — 85025 COMPLETE CBC W/AUTO DIFF WBC: CPT

## 2019-11-14 PROCEDURE — 3008F BODY MASS INDEX DOCD: CPT | Mod: CPTII,S$GLB,, | Performed by: INTERNAL MEDICINE

## 2019-11-14 PROCEDURE — 99999 PR PBB SHADOW E&M-EST. PATIENT-LVL III: CPT | Mod: PBBFAC,,, | Performed by: INTERNAL MEDICINE

## 2019-11-14 PROCEDURE — 3008F PR BODY MASS INDEX (BMI) DOCUMENTED: ICD-10-PCS | Mod: CPTII,S$GLB,, | Performed by: INTERNAL MEDICINE

## 2019-11-14 PROCEDURE — 36415 COLL VENOUS BLD VENIPUNCTURE: CPT

## 2019-11-14 PROCEDURE — 99999 PR PBB SHADOW E&M-EST. PATIENT-LVL III: ICD-10-PCS | Mod: PBBFAC,,, | Performed by: INTERNAL MEDICINE

## 2019-11-14 PROCEDURE — 1101F PT FALLS ASSESS-DOCD LE1/YR: CPT | Mod: CPTII,S$GLB,, | Performed by: INTERNAL MEDICINE

## 2019-11-14 PROCEDURE — 99214 OFFICE O/P EST MOD 30 MIN: CPT | Mod: S$GLB,,, | Performed by: INTERNAL MEDICINE

## 2019-11-14 RX ORDER — CEFDINIR 300 MG/1
300 CAPSULE ORAL
COMMUNITY
End: 2021-02-07

## 2019-11-14 NOTE — PROGRESS NOTES
CC: Anemia, leukopenia, follow up       HPI: , 64, is here for followup. She was seen here for anemia and leukopenia,.  She has IBS.   She has chronic anemia, leukopenia dating back to 2006, mild absolute neutropenia dating back to 2009. WBC count was 3.78, ANC 2.1, in March 2006. Platelets were normal. Hemoglobin was 11.8, normocytic.   In May 2011, WBC count was 3.72, ANC 1.7, hemoglobin 11.7.   WBC count was 2.47 and ANC was 1.2 in Sept 2019. Hemoglobin was 12.4.    No fever, weight loss, change in appetite, or infections. No recent change in medications. She does not take herbal medications / supplements.    Interval history: She is here for a follow up visit. She is afebrile,. Has no mouth sores or recent fevers.     Review of Systems   Constitutional: Positive for malaise/fatigue. Negative for chills, fever and weight loss.   Eyes: Negative for blurred vision, double vision, photophobia and discharge.   Respiratory: Negative for cough and sputum production.    Cardiovascular: Negative for chest pain and claudication.   Gastrointestinal: Negative for abdominal pain, diarrhea, nausea and vomiting.   Genitourinary: Negative for dysuria, frequency and hematuria.   Musculoskeletal: Negative for myalgias and neck pain.   Neurological: Negative for dizziness, sensory change, speech change and headaches.   Endo/Heme/Allergies: Does not bruise/bleed easily.   Psychiatric/Behavioral: Negative for depression, hallucinations, substance abuse and suicidal ideas.         Current Outpatient Medications   Medication Sig    ALPRAZolam (XANAX) 0.25 MG tablet Take 1 tablet (0.25 mg total) by mouth nightly as needed (headache).    azelastine (ASTELIN) 137 mcg (0.1 %) nasal spray 1 spray by Nasal route 2 (two) times daily.    cetirizine (ZYRTEC) 10 MG tablet Take 10 mg by mouth once daily.    cyanocobalamin (VITAMIN B-12) 500 MCG tablet Take 1,000 mcg by mouth once daily.      LACTOBACILLUS COMBO NO.6 (PROBIOTIC  COMPLEX ORAL) Take 1 capsule by mouth once.     vitamin D 185 MG Tab Take 2,000 mg by mouth once daily.      No current facility-administered medications for this visit.        Physical Exam   Constitutional: She appears well-developed.   HENT:   Head: Normocephalic and atraumatic.   Eyes: No scleral icterus.   Cardiovascular: Normal rate.   No murmur heard.  Pulmonary/Chest: Effort normal. No respiratory distress. She has no wheezes. She has no rales.   Abdominal: Soft. She exhibits no distension. There is no tenderness. There is no rebound.   Musculoskeletal: She exhibits no edema.   Lymphadenopathy:     She has no cervical adenopathy.   Neurological: She is alert. No cranial nerve deficit.   Skin: Skin is warm.        Component      Latest Ref Rng & Units 10/7/2019   WBC      3.90 - 12.70 K/uL 4.92   RBC      4.00 - 5.40 M/uL 4.34   Hemoglobin      12.0 - 16.0 g/dL 12.9   Hematocrit      37.0 - 48.5 % 38.6   MCV      82 - 98 fL 89   MCH      27.0 - 31.0 pg 29.7   MCHC      32.0 - 36.0 g/dL 33.4   RDW      11.5 - 14.5 % 12.7   Platelets      150 - 350 K/uL 226   MPV      9.2 - 12.9 fL 10.5   Gran # (ANC)      1.8 - 7.7 K/uL 3.2   Lymph #      1.0 - 4.8 K/uL 1.3   Mono #      0.3 - 1.0 K/uL 0.4   Eos #      0.0 - 0.5 K/uL 0.0   Baso #      0.00 - 0.20 K/uL 0.03   Gran%      38.0 - 73.0 % 64.3   Lymph%      18.0 - 48.0 % 26.8   Mono%      4.0 - 15.0 % 7.5   Eosinophil%      0.0 - 8.0 % 0.8   Basophil%      0.0 - 1.9 % 0.6   Differential Method       Automated   EBV VCA IgG      <18.0 U/mL 497.0 (H)   EBV VCA IgM      <36.0 U/mL <10.0   EBV EARLY ANTIGEN AB, IGG      <9.0 U/mL <5.0   EBV Nuclear Ag Ab      <18.0 U/mL >600.0 (H)   IgG - Serum      650 - 1600 mg/dL 959   IgA      40 - 350 mg/dL 119   IgM      50 - 300 mg/dL 81   LD      110 - 260 U/L 206   Uric Acid      2.4 - 5.7 mg/dL 4.3   KIMO Screen      Negative <1:160 Negative <1:160   Vitamin B-12      210 - 950 pg/mL 1096 (H)   Folate      4.0 - 24.0 ng/mL  10.5   Hepatitis C Ab      Negative Negative   Hep B Core Total Ab       Negative   Hepatitis B Surface Ag      Negative Negative     10/7/19 SPEP: Normal total protein, normal pattern.   10/7/19 sIFE: No monoclonal peaks identified.      Assessment:     1. Chronic leukopenia  2. Mild absolute neutropenia           Plan:     1,2: Noted since at least since 2006. Most recent WBC  Count is 4.92, ANC  3.2. Platelets, Hgb/HCT all normal on 10/7/19. b12 was high. Folate was normal. HBV, HCV serologies negative. EBV serology suggests past infection. KIMO negative. No monoclonal proteins on SPEP/SHYANN. Differental diagnoses: constitutional neutropenia / auto immune neutropenia / cyclic neutropenia. She is asymptomatic. No B symptoms, mouth sores, infections, diarrhea, rash.

## 2019-11-18 ENCOUNTER — CLINICAL SUPPORT (OUTPATIENT)
Dept: REHABILITATION | Facility: HOSPITAL | Age: 64
End: 2019-11-18
Attending: INTERNAL MEDICINE
Payer: COMMERCIAL

## 2019-11-18 DIAGNOSIS — G89.29 CHRONIC BILATERAL LOW BACK PAIN WITH RIGHT-SIDED SCIATICA: Primary | ICD-10-CM

## 2019-11-18 DIAGNOSIS — M54.41 CHRONIC BILATERAL LOW BACK PAIN WITH RIGHT-SIDED SCIATICA: Primary | ICD-10-CM

## 2019-11-18 PROCEDURE — 97014 ELECTRIC STIMULATION THERAPY: CPT | Mod: PN | Performed by: PHYSICAL THERAPIST

## 2019-11-18 PROCEDURE — 97140 MANUAL THERAPY 1/> REGIONS: CPT | Mod: PN | Performed by: PHYSICAL THERAPIST

## 2019-11-18 PROCEDURE — 97110 THERAPEUTIC EXERCISES: CPT | Mod: PN | Performed by: PHYSICAL THERAPIST

## 2019-11-26 ENCOUNTER — CLINICAL SUPPORT (OUTPATIENT)
Dept: REHABILITATION | Facility: HOSPITAL | Age: 64
End: 2019-11-26
Attending: INTERNAL MEDICINE
Payer: COMMERCIAL

## 2019-11-26 DIAGNOSIS — M54.41 CHRONIC BILATERAL LOW BACK PAIN WITH RIGHT-SIDED SCIATICA: Primary | ICD-10-CM

## 2019-11-26 DIAGNOSIS — G89.29 CHRONIC BILATERAL LOW BACK PAIN WITH RIGHT-SIDED SCIATICA: Primary | ICD-10-CM

## 2019-11-26 PROCEDURE — 97110 THERAPEUTIC EXERCISES: CPT | Mod: PN | Performed by: PHYSICAL THERAPIST

## 2019-11-26 PROCEDURE — 97140 MANUAL THERAPY 1/> REGIONS: CPT | Mod: PN | Performed by: PHYSICAL THERAPIST

## 2019-11-26 PROCEDURE — 97014 ELECTRIC STIMULATION THERAPY: CPT | Mod: PN | Performed by: PHYSICAL THERAPIST

## 2019-11-26 PROCEDURE — 97035 APP MDLTY 1+ULTRASOUND EA 15: CPT | Mod: PN | Performed by: PHYSICAL THERAPIST

## 2019-11-26 NOTE — PROGRESS NOTES
Physical Therapy Progress  Note     Name: Danuta Santos  Clinic Number: [id number]  Diagnosis:   Encounter Diagnosis   Name Primary?    Chronic bilateral low back pain with right-sided sciatica Yes     Physician: Erica Khan MD  Treatment Orders: PT Eval and Treat  Past Medical History:   Diagnosis Date    Allergy     IBS (irritable bowel syndrome)     Pneumonia 02/2019       Precautions: as per diagnosis    Evaluation date: 2/15/2018  Visit # authorized: 52/60 on 11/26/2019  Authorization period: 12-31-18  Plan of care expiration: 1-7-20  MD Follow up appt: none scheduled    Time In:  2:00  Time Out: 3:20  Billable time:  70  min    Subjective     Pt reports:she is going out of town tomorrow Pt states she is having some tightness in back Pt states she has only been doing stretches with push/pull 1 time a day  Pt felt resuming IASTM tools to treatment helped keep tightness down since last visit    Pain Scale: before treatment:  3.5-4/10 after exercises 2.5-3/10 after treatment:feels better, did not provide #   Objective     Level pelvis to start     TREATMENT  Therapeutic exercise: Danuta received therapeutic exercises to develop strength, endurance, ROM, flexibility and core stabilization for 15 minutes including:    HS stretch supine   Glut stretch  Piriformis stretch  B QL stretch  Feels better after stretching2.5-3/10  Instructed to increase to BID     Contract relax R glut x 3  Pt was able to self mobilize today,    Bridge x 15  Pelvic tilt  X 20 for 5 sec hold  Partial sit up x 20  Hip abd sidelying x 20 B  SLR x 20  Arm and Leg lift prone x 2/10  Hip ext prone with bent knee x 2/10  Contract relax R glut    Sidelying L contract relax mob to L5-S1 region to level pelvis at end       HOLD FOR NOW  Heel raises standing x 15    Step up x10     1 plank x 30 sec held the other 3 she normally does     (NP)Plank 4 reps 30 sec each          Manual therapy: Danuta  received the following manual therapy  " techniques x 25  min. to include soft tissue and joint mobilization were applied to the: low back and gluteals to include:STM to LB paraspinals and QL R>L  P/A mob to lumbar region Grade 1-2           Pt received tool-assisted massage with manual therapy techniques to R LB in sidelying with pillow at waist putting pt in SB L to trigger an inflammatory healing response and stimulate the production of new collagen and proper, more functional, less painful healing.    Vacuum/cupping STM with manual therapy techniques was performed to back and gluteals to decrease muscle tightness, increase circulation and promote healing process.  The pt's skin was monitored for redness adjusting pressure as needed. The pt was instructed in possible side effects of bruising and/or soreness.      Kinesiotape with 6" star for pain relief was performed over the R lumbar paraspinals.L3-4 region  Instructed pt in use, care and precautions with tape.    Ultrasound  for pain control and to decrease inflammation @ continuous duty cycle, 1 Mhz, applied to R lumbar paraspinals, intensity = 1.8 w/cm2 for 8 minutes.  US to piriformis 1.8 w/cm2 7 min    Patient received pre-mod electrical stimulation to decrease muscle tightness and pain to Lumbar paraspinals/ sacral border of gluteals B for 15 minutes with MH supine with cycle time: continuous, beat frequency: , CC/CV: CV.        Written Home Exercises Provided: none today  Pt demo good understanding of the education provided. Danuta demonstrated good return demonstration of activities.     Pt. education:  · Posture reeducation, body mechanics, HEP,   · No spiritual or educational barriers to learning provided  · Pt has no cultural, educational or language barriers to learning provided.    Assessment   Pt doing a better job of keeping pelvis level this past week, but continues with tightness which leads to further pain.  Pt appears to understand need to increase stretching to BID to help " maintain improved flexibility.  Increased manual therapy and modalities to include US to further decrease muscle tightness and pain.      Pt will continue to benefit from skilled outpatient physical therapy to address the remaining functional deficits, provide pt/family education, and to maximize pt's level of independence in the home and community environment. .     GOALS:   Short Term Goals:  3 weeks MET STG's  Increase range of motion 25%  Increase strength 1/2 muscle grade  Improve postural awareness of pelvis to independently identify dysfunction with min assist from PT  Be able to perform HEP with minimal cueing required     Long Term Goals: 6 weeks PARTIALLY MET LTG'S  Increase range of motion to 75% to 100% full   Improve muscle strength 1 muscle grade  Improve muscle strength with MMT to 4+/5 to 5/5  Improve and stabilize proper pelvic positioning  Restore ability to sit for ADL and work with min to 0 pain  Restore normal sleep habits without disturbances due to pain  Restore ability to perform ADL's and household activities independently with min to 0 pain    Anticipated barriers to physical therapy: none  Pt's spiritual, cultural and educational needs considered and pt agreeable to plan of care and goals        Plan   If you concur, I recommend patient continue with physical therapy 1-2 times a week  for 6 weeks.  Please advise us of your  recommendations. Thank you for allowing us to assist in the care of your patient.      Jacy Escobar, MS, PT          I certify the need for these services furnished under this plan of treatment and while under my care.    ____________________________________ Physician/Referring Practitioner                                Date of Signature       .

## 2019-11-27 NOTE — PLAN OF CARE
Physical Therapy Progress  Note     Name: Danuta Santos  Clinic Number: [id number]  Diagnosis:   Encounter Diagnosis   Name Primary?    Chronic bilateral low back pain with right-sided sciatica Yes     Physician: Erica Khan MD  Treatment Orders: PT Eval and Treat  Past Medical History:   Diagnosis Date    Allergy     IBS (irritable bowel syndrome)     Pneumonia 02/2019       Precautions: as per diagnosis    Evaluation date: 2/15/2018  Visit # authorized: 52/60 on 11/26/2019  Authorization period: 12-31-18  Plan of care expiration: 1-7-20  MD Follow up appt: none scheduled    Time In:  2:00  Time Out: 3:20  Billable time:  70  min    Subjective     Pt reports:she is going out of town tomorrow Pt states she is having some tightness in back Pt states she has only been doing stretches with push/pull 1 time a day  Pt felt resuming IASTM tools to treatment helped keep tightness down since last visit    Pain Scale: before treatment:  3.5-4/10 after exercises 2.5-3/10 after treatment:feels better, did not provide #   Objective     Level pelvis to start     TREATMENT  Therapeutic exercise: Danuta received therapeutic exercises to develop strength, endurance, ROM, flexibility and core stabilization for 15 minutes including:    HS stretch supine   Glut stretch  Piriformis stretch  B QL stretch  Feels better after stretching2.5-3/10  Instructed to increase to BID     Contract relax R glut x 3  Pt was able to self mobilize today,    Bridge x 15  Pelvic tilt  X 20 for 5 sec hold  Partial sit up x 20  Hip abd sidelying x 20 B  SLR x 20  Arm and Leg lift prone x 2/10  Hip ext prone with bent knee x 2/10  Contract relax R glut    Sidelying L contract relax mob to L5-S1 region to level pelvis at end       HOLD FOR NOW  Heel raises standing x 15    Step up x10     1 plank x 30 sec held the other 3 she normally does     (NP)Plank 4 reps 30 sec each          Manual therapy: Danuta  received the following manual therapy  " techniques x 25  min. to include soft tissue and joint mobilization were applied to the: low back and gluteals to include:STM to LB paraspinals and QL R>L  P/A mob to lumbar region Grade 1-2           Pt received tool-assisted massage with manual therapy techniques to R LB in sidelying with pillow at waist putting pt in SB L to trigger an inflammatory healing response and stimulate the production of new collagen and proper, more functional, less painful healing.    Vacuum/cupping STM with manual therapy techniques was performed to back and gluteals to decrease muscle tightness, increase circulation and promote healing process.  The pt's skin was monitored for redness adjusting pressure as needed. The pt was instructed in possible side effects of bruising and/or soreness.      Kinesiotape with 6" star for pain relief was performed over the R lumbar paraspinals.L3-4 region  Instructed pt in use, care and precautions with tape.    Ultrasound  for pain control and to decrease inflammation @ continuous duty cycle, 1 Mhz, applied to R lumbar paraspinals, intensity = 1.8 w/cm2 for 8 minutes.  US to piriformis 1.8 w/cm2 7 min    Patient received pre-mod electrical stimulation to decrease muscle tightness and pain to Lumbar paraspinals/ sacral border of gluteals B for 15 minutes with MH supine with cycle time: continuous, beat frequency: , CC/CV: CV.        Written Home Exercises Provided: none today  Pt demo good understanding of the education provided. Danuta demonstrated good return demonstration of activities.     Pt. education:  · Posture reeducation, body mechanics, HEP,   · No spiritual or educational barriers to learning provided  · Pt has no cultural, educational or language barriers to learning provided.    Assessment   Pt doing a better job of keeping pelvis level this past week, but continues with tightness which leads to further pain.  Pt appears to understand need to increase stretching to BID to help " maintain improved flexibility.  Increased manual therapy and modalities to include US to further decrease muscle tightness and pain.      Pt will continue to benefit from skilled outpatient physical therapy to address the remaining functional deficits, provide pt/family education, and to maximize pt's level of independence in the home and community environment. .     GOALS:   Short Term Goals:  3 weeks MET STG's  Increase range of motion 25%  Increase strength 1/2 muscle grade  Improve postural awareness of pelvis to independently identify dysfunction with min assist from PT  Be able to perform HEP with minimal cueing required     Long Term Goals: 6 weeks PARTIALLY MET LTG'S  Increase range of motion to 75% to 100% full   Improve muscle strength 1 muscle grade  Improve muscle strength with MMT to 4+/5 to 5/5  Improve and stabilize proper pelvic positioning  Restore ability to sit for ADL and work with min to 0 pain  Restore normal sleep habits without disturbances due to pain  Restore ability to perform ADL's and household activities independently with min to 0 pain    Anticipated barriers to physical therapy: none  Pt's spiritual, cultural and educational needs considered and pt agreeable to plan of care and goals        Plan   If you concur, I recommend patient continue with physical therapy 1-2 times a week  for 6 weeks.  Please advise us of your  recommendations. Thank you for allowing us to assist in the care of your patient.      Jacy Escobar, MS, PT          I certify the need for these services furnished under this plan of treatment and while under my care.    ____________________________________ Physician/Referring Practitioner                                Date of Signature       .

## 2019-12-06 ENCOUNTER — PATIENT OUTREACH (OUTPATIENT)
Dept: ADMINISTRATIVE | Facility: HOSPITAL | Age: 64
End: 2019-12-06

## 2019-12-19 ENCOUNTER — PATIENT OUTREACH (OUTPATIENT)
Dept: ADMINISTRATIVE | Facility: HOSPITAL | Age: 64
End: 2019-12-19

## 2019-12-19 ENCOUNTER — CLINICAL SUPPORT (OUTPATIENT)
Dept: REHABILITATION | Facility: HOSPITAL | Age: 64
End: 2019-12-19
Attending: INTERNAL MEDICINE
Payer: COMMERCIAL

## 2019-12-19 DIAGNOSIS — M54.41 CHRONIC BILATERAL LOW BACK PAIN WITH RIGHT-SIDED SCIATICA: Primary | ICD-10-CM

## 2019-12-19 DIAGNOSIS — G89.29 CHRONIC BILATERAL LOW BACK PAIN WITH RIGHT-SIDED SCIATICA: Primary | ICD-10-CM

## 2019-12-19 PROCEDURE — 97035 APP MDLTY 1+ULTRASOUND EA 15: CPT | Mod: PN | Performed by: PHYSICAL THERAPIST

## 2019-12-19 PROCEDURE — 97014 ELECTRIC STIMULATION THERAPY: CPT | Mod: PN | Performed by: PHYSICAL THERAPIST

## 2019-12-19 PROCEDURE — 97140 MANUAL THERAPY 1/> REGIONS: CPT | Mod: PN | Performed by: PHYSICAL THERAPIST

## 2019-12-19 NOTE — PROGRESS NOTES
Physical Therapy Daily Treatment  Note     Name: Danuta Santos  Clinic Number: [id number]  Diagnosis:   Encounter Diagnosis   Name Primary?    Chronic bilateral low back pain with right-sided sciatica Yes     Physician: Erica Khan MD  Treatment Orders: PT Eval and Treat  Past Medical History:   Diagnosis Date    Allergy     IBS (irritable bowel syndrome)     Pneumonia 02/2019       Precautions: as per diagnosis    Evaluation date: 2/15/2018  Visit # authorized: 52/60 on 12/19/2019  Authorization period: 12-31-18  Plan of care expiration: 1-7-20  MD Follow up appt: none scheduled    Time In:  2:10  Time Out: 3:15  Billable time:  58  min    Subjective     Pt reports: she is very tight and unable to self mobilize    Pain Scale: before treatment: 5-6/10 after treatment:feels better, did not provide #   Objective     Pelvic dysfunction to start    TREATMENT  Therapeutic exercise: Danuta received therapeutic exercises to develop strength, endurance, ROM, flexibility and core stabilization for 5 minutes including:    HS stretch supine   Glut stretch  Piriformis stretch  B QL stretch  Feels better after stretching2.5-3/10  Instructed to increase to BID     Contract relax R glut x 3  Pt was unable to self mobilize today,  HELD today  Bridge x 15  Pelvic tilt  X 20 for 5 sec hold  Partial sit up x 20  Hip abd sidelying x 20 B  SLR x 20  Arm and Leg lift prone x 2/10  Hip ext prone with bent knee x 2/10  Contract relax R glut    Sidelying L contract relax mob to L5-S1 region to level pelvis at end       HOLD FOR NOW  Heel raises standing x 15    Step up x10     1 plank x 30 sec held the other 3 she normally does     (NP)Plank 4 reps 30 sec each          Manual therapy: Danuta  received the following manual therapy  techniques x 30  min. to include soft tissue and joint mobilization were applied to the: low back and gluteals to include:STM to LB paraspinals and QL R>L  P/A mob to lumbar region Grade 1-2        "    Pt received tool-assisted massage with manual therapy techniques to R LB in sidelying with pillow at waist putting pt in SB L to trigger an inflammatory healing response and stimulate the production of new collagen and proper, more functional, less painful healing.    Vacuum/cupping STM with manual therapy techniques was performed to back and gluteals to decrease muscle tightness, increase circulation and promote healing process.  The pt's skin was monitored for redness adjusting pressure as needed. The pt was instructed in possible side effects of bruising and/or soreness.      Kinesiotape with 6" star for pain relief was performed over the R lumbar paraspinals.L3-4 region  Instructed pt in use, care and precautions with tape.    Ultrasound  for pain control and to decrease inflammation @ continuous duty cycle, 1 Mhz, applied to R lumbar paraspinals, intensity = 1.8 w/cm2 for 8 minutes.  US to piriformis 1.8 w/cm2 7 min    Patient received pre-mod electrical stimulation to decrease muscle tightness and pain to Lumbar paraspinals/ sacral border of gluteals B for 15 minutes with MH supine with cycle time: continuous, beat frequency: , CC/CV: CV.        Written Home Exercises Provided: none today  Pt demo good understanding of the education provided. Danuta demonstrated good return demonstration of activities.     Pt. education:  · Posture reeducation, body mechanics, HEP,   · No spiritual or educational barriers to learning provided  · Pt has no cultural, educational or language barriers to learning provided.    Assessment   Pt with severe muscle tightness and unable to self mobilize.  Pt with level pelvis at end of treatment and improved symptoms and decreased muscle tightness. Pt will benefit from KT tape to help keep muscles more relaxed to allow self mobilization.       Pt will continue to benefit from skilled outpatient physical therapy to address the remaining functional deficits, provide pt/family " education, and to maximize pt's level of independence in the home and community environment. .     GOALS:   Short Term Goals:  3 weeks MET STG's  Increase range of motion 25%  Increase strength 1/2 muscle grade  Improve postural awareness of pelvis to independently identify dysfunction with min assist from PT  Be able to perform HEP with minimal cueing required     Long Term Goals: 6 weeks PARTIALLY MET LTG'S  Increase range of motion to 75% to 100% full   Improve muscle strength 1 muscle grade  Improve muscle strength with MMT to 4+/5 to 5/5  Improve and stabilize proper pelvic positioning  Restore ability to sit for ADL and work with min to 0 pain  Restore normal sleep habits without disturbances due to pain  Restore ability to perform ADL's and household activities independently with min to 0 pain    Anticipated barriers to physical therapy: none  Pt's spiritual, cultural and educational needs considered and pt agreeable to plan of care and goals        Plan   Continue with established plan of care towards PT goals.        .

## 2020-01-02 ENCOUNTER — CLINICAL SUPPORT (OUTPATIENT)
Dept: REHABILITATION | Facility: HOSPITAL | Age: 65
End: 2020-01-02
Attending: INTERNAL MEDICINE
Payer: COMMERCIAL

## 2020-01-02 DIAGNOSIS — M54.41 CHRONIC BILATERAL LOW BACK PAIN WITH RIGHT-SIDED SCIATICA: Primary | ICD-10-CM

## 2020-01-02 DIAGNOSIS — G89.29 CHRONIC BILATERAL LOW BACK PAIN WITH RIGHT-SIDED SCIATICA: Primary | ICD-10-CM

## 2020-01-02 PROCEDURE — 97140 MANUAL THERAPY 1/> REGIONS: CPT | Mod: PN | Performed by: PHYSICAL THERAPIST

## 2020-01-02 PROCEDURE — 97035 APP MDLTY 1+ULTRASOUND EA 15: CPT | Mod: PN | Performed by: PHYSICAL THERAPIST

## 2020-01-02 PROCEDURE — 97014 ELECTRIC STIMULATION THERAPY: CPT | Mod: PN | Performed by: PHYSICAL THERAPIST

## 2020-01-02 PROCEDURE — 97110 THERAPEUTIC EXERCISES: CPT | Mod: PN | Performed by: PHYSICAL THERAPIST

## 2020-01-02 NOTE — PROGRESS NOTES
Physical Therapy Daily Treatment  Note     Name: Danuta Santos  Clinic Number: [id number]  Diagnosis:   Encounter Diagnosis   Name Primary?    Chronic bilateral low back pain with right-sided sciatica Yes     Physician: Erica Khan MD  Treatment Orders: PT Eval and Treat  Past Medical History:   Diagnosis Date    Allergy     IBS (irritable bowel syndrome)     Pneumonia 02/2019       Precautions: as per diagnosis    Evaluation date: 2/15/2018  Visit # authorized: 52/64 on 1/2/2020  Authorization period: 12-31-20  Plan of care expiration: 1-7-20  MD Follow up appt: none scheduled    Time In:  3:10  Time Out: 3:15  Billable time:  58  min    Subjective     Pt reports: she is felt ok when she was out of town.  Pt states carrying bags led to aggravation coming home and stress of home activities.      Pain Scale: before treatment: 5/10 after exercise 3/10 after treatment:feels better, did not provide #   Objective     Level pelvis     TREATMENT  Therapeutic exercise: Danuta received therapeutic exercises to develop strength, endurance, ROM, flexibility and core stabilization for 15 minutes including:      HS stretch supine   Glut stretch  Piriformis stretch  B QL stretch  Feels better after stretching 3/10  Instructed to increase to BID     Contract relax R glut x 3  Pt was able to self mobilize today,  HELD today  Bridge x 15  Pelvic tilt  X 20 for 5 sec hold  Partial sit up x 20  Hip abd sidelying x 20 B  SLR x 20  Arm and Leg lift prone x 2/10  Hip ext prone with bent knee x 2/10  Contract relax R glut    Sidelying L contract relax mob to L5-S1 region to level pelvis at end       HOLD FOR NOW  Heel raises standing x 15    Step up x10     1 plank x 30 sec held the other 3 she normally does     (NP)Plank 4 reps 30 sec each          Manual therapy: Danuta  received the following manual therapy  techniques x 15  min. to include soft tissue and joint mobilization were applied to the: low back and gluteals  "to include:STM to LB paraspinals and QL R>L  P/A mob to lumbar region Grade 1-2           Pt received tool-assisted massage with manual therapy techniques to R LB in sidelying with pillow at waist putting pt in SB L to trigger an inflammatory healing response and stimulate the production of new collagen and proper, more functional, less painful healing.    Vacuum/cupping STM with manual therapy techniques was performed to back and gluteals to decrease muscle tightness, increase circulation and promote healing process.  The pt's skin was monitored for redness adjusting pressure as needed. The pt was instructed in possible side effects of bruising and/or soreness.      (NP)Kinesiotape with 6" star for pain relief was performed over the R lumbar paraspinals.L3-4 region  Instructed pt in use, care and precautions with tape.    Ultrasound  for pain control and to decrease inflammation @ continuous duty cycle, 1 Mhz, applied to R lumbar paraspinals, intensity = 1.8 w/cm2 for 8 minutes.      Patient received pre-mod electrical stimulation to decrease muscle tightness and pain to Lumbar paraspinals/ sacral border of gluteals B for 15 minutes with MH supine with cycle time: continuous, beat frequency: , CC/CV: CV.        Written Home Exercises Provided: none today  Pt demo good understanding of the education provided. Danuta demonstrated good return demonstration of activities.     Pt. education:  · Posture reeducation, body mechanics, HEP,   · No spiritual or educational barriers to learning provided  · Pt has no cultural, educational or language barriers to learning provided.    Assessment   Pt with level pelvis to start.  Pt appears to understand that stretching ex alone can help tight muscles.        Pt will continue to benefit from skilled outpatient physical therapy to address the remaining functional deficits, provide pt/family education, and to maximize pt's level of independence in the home and community " environment. .     GOALS:   Short Term Goals:  3 weeks MET STG's  Increase range of motion 25%  Increase strength 1/2 muscle grade  Improve postural awareness of pelvis to independently identify dysfunction with min assist from PT  Be able to perform HEP with minimal cueing required     Long Term Goals: 6 weeks PARTIALLY MET LTG'S  Increase range of motion to 75% to 100% full   Improve muscle strength 1 muscle grade  Improve muscle strength with MMT to 4+/5 to 5/5  Improve and stabilize proper pelvic positioning  Restore ability to sit for ADL and work with min to 0 pain  Restore normal sleep habits without disturbances due to pain  Restore ability to perform ADL's and household activities independently with min to 0 pain    Anticipated barriers to physical therapy: none  Pt's spiritual, cultural and educational needs considered and pt agreeable to plan of care and goals        Plan   Continue with established plan of care towards PT goals.        .

## 2020-01-20 NOTE — PROGRESS NOTES
Physical Therapy Progress Note     Name: Danuta Santos  Clinic Number: [id number]  Diagnosis:   Encounter Diagnosis   Name Primary?    Chronic bilateral low back pain with right-sided sciatica Yes     Physician: Erica Khan MD  Treatment Orders: PT Eval and Treat  Past Medical History:   Diagnosis Date    Allergy     IBS (irritable bowel syndrome)     Pneumonia 02/2019       Precautions: as per diagnosis    Evaluation date: 2/15/2018  Visit # authorized: 53/64 on 1/20/2020  Authorization period: 12-31-20  Plan of care expiration: 3-3-20  MD Follow up appt: none scheduled    Time In:  8:10  Time Out: 9:06  Billable time:  40  min    Subjective     Pt reports: has been ok, painting at home.  Pt is climbing and squatting.  Pt states some back pain and lying on floor       Pain Scale: before treatment: 4.5/10  after treatment:feels better, did not provide #   Objective     AR R pelvis  Severe tightness lumbar paraspinals     TREATMENT  Therapeutic exercise: Danuta received therapeutic exercises to develop strength, endurance, ROM, flexibility and core stabilization for 10 minutes including:    HS stretch supine   Glut stretch  Piriformis stretch  B QL stretch      Contract relax R glut x 3  Pt was unable to self mobilize today, performed contract relax sidelying L and realigned.     HELD today  Bridge x 15  Pelvic tilt  X 20 for 5 sec hold  Partial sit up x 20  Hip abd sidelying x 20 B  SLR x 20  Arm and Leg lift prone x 2/10  Hip ext prone with bent knee x 2/10  Contract relax R glut    Sidelying L contract relax mob to L5-S1 region to level pelvis at end       HOLD FOR NOW  Heel raises standing x 15    Step up x10     1 plank x 30 sec held the other 3 she normally does     (NP)Plank 4 reps 30 sec each          Manual therapy: Danuta  received the following manual therapy  techniques x 15  min. to include soft tissue and joint mobilization were applied to the: low back and gluteals to include:STM to LB  "paraspinals and QL R>L  P/A mob to lumbar region Grade 1-2           Pt received tool-assisted massage with manual therapy techniques to R LB in sidelying with pillow at waist putting pt in SB L to trigger an inflammatory healing response and stimulate the production of new collagen and proper, more functional, less painful healing.    Vacuum/cupping STM with manual therapy techniques was performed to back and gluteals to decrease muscle tightness, increase circulation and promote healing process.  The pt's skin was monitored for redness adjusting pressure as needed. The pt was instructed in possible side effects of bruising and/or soreness.      Kinesiotape with 6" star for pain relief was performed over the R lumbar paraspinals.L3-4 region  Instructed pt in use, care and precautions with tape.    (NP)Ultrasound  for pain control and to decrease inflammation @ continuous duty cycle, 1 Mhz, applied to R lumbar paraspinals, intensity = 1.8 w/cm2 for 8 minutes.      Patient received pre-mod electrical stimulation to decrease muscle tightness and pain to Lumbar paraspinals/ sacral border of gluteals B for 15 minutes with MH supine with cycle time: continuous, beat frequency: , CC/CV: CV.        Written Home Exercises Provided: none today  Pt demo good understanding of the education provided. Danuta demonstrated good return demonstration of activities.     Pt. education:  · Posture reeducation, body mechanics, HEP,   · No spiritual or educational barriers to learning provided  · Pt has no cultural, educational or language barriers to learning provided.    Assessment   Patient was seen for 3 visits since last progress note.  Pt with increased activity with painting.  Pt with severe tightness and initially unable to self mobilize.  Decreased tightness and pain after treatment and level pelvis.  Pt will benefit from PT to assist pt in improving and stabilizing pelvic positioning alek when she is unable to perform I " on her own.  Pt is working on getting house ready to put on market to sell, so will be more active leading to tighter muscles which lead to pt not being as successful with I self mobilization.         Pt will continue to benefit from skilled outpatient physical therapy to address the remaining functional deficits, provide pt/family education, and to maximize pt's level of independence in the home and community environment. .     GOALS:   Short Term Goals:  3 weeks MET STG's  Increase range of motion 25%  Increase strength 1/2 muscle grade  Improve postural awareness of pelvis to independently identify dysfunction with min assist from PT  Be able to perform HEP with minimal cueing required     Long Term Goals: 6 weeks PARTIALLY MET LTG'S  Increase range of motion to 75% to 100% full   Improve muscle strength 1 muscle grade  Improve muscle strength with MMT to 4+/5 to 5/5  Improve and stabilize proper pelvic positioning  Restore ability to sit for ADL and work with min to 0 pain  Restore normal sleep habits without disturbances due to pain  Restore ability to perform ADL's and household activities independently with min to 0 pain    Anticipated barriers to physical therapy: none  Pt's spiritual, cultural and educational needs considered and pt agreeable to plan of care and goals        Plan   If you concur, I recommend patient continue with physical therapy 1-2 times a week as needed  for 6 weeks.  Please advise us of your  recommendations. Thank you for allowing us to assist in the care of your patient.      Jacy Escobar, MS, PT          I certify the need for these services furnished under this plan of treatment and while under my care.    ____________________________________ Physician/Referring Practitioner                                Date of Signature             .

## 2020-01-21 ENCOUNTER — CLINICAL SUPPORT (OUTPATIENT)
Dept: REHABILITATION | Facility: HOSPITAL | Age: 65
End: 2020-01-21
Payer: COMMERCIAL

## 2020-01-21 DIAGNOSIS — M54.41 CHRONIC BILATERAL LOW BACK PAIN WITH RIGHT-SIDED SCIATICA: Primary | ICD-10-CM

## 2020-01-21 DIAGNOSIS — G89.29 CHRONIC BILATERAL LOW BACK PAIN WITH RIGHT-SIDED SCIATICA: Primary | ICD-10-CM

## 2020-01-21 PROCEDURE — 97140 MANUAL THERAPY 1/> REGIONS: CPT | Mod: PN | Performed by: PHYSICAL THERAPIST

## 2020-01-21 PROCEDURE — 97014 ELECTRIC STIMULATION THERAPY: CPT | Mod: PN | Performed by: PHYSICAL THERAPIST

## 2020-01-21 PROCEDURE — 97110 THERAPEUTIC EXERCISES: CPT | Mod: PN | Performed by: PHYSICAL THERAPIST

## 2020-01-21 NOTE — PLAN OF CARE
Physical Therapy Progress Note     Name: Danuta Santos  Clinic Number: [id number]  Diagnosis:   Encounter Diagnosis   Name Primary?    Chronic bilateral low back pain with right-sided sciatica Yes     Physician: Erica Khan MD  Treatment Orders: PT Eval and Treat  Past Medical History:   Diagnosis Date    Allergy     IBS (irritable bowel syndrome)     Pneumonia 02/2019       Precautions: as per diagnosis    Evaluation date: 2/15/2018  Visit # authorized: 53/64 on 1/20/2020  Authorization period: 12-31-20  Plan of care expiration: 3-3-20  MD Follow up appt: none scheduled    Time In:  8:10  Time Out: 9:06  Billable time:  40  min    Subjective     Pt reports: has been ok, painting at home.  Pt is climbing and squatting.  Pt states some back pain and lying on floor       Pain Scale: before treatment: 4.5/10  after treatment:feels better, did not provide #   Objective     AR R pelvis  Severe tightness lumbar paraspinals     TREATMENT  Therapeutic exercise: Danuta received therapeutic exercises to develop strength, endurance, ROM, flexibility and core stabilization for 10 minutes including:    HS stretch supine   Glut stretch  Piriformis stretch  B QL stretch      Contract relax R glut x 3  Pt was unable to self mobilize today, performed contract relax sidelying L and realigned.     HELD today  Bridge x 15  Pelvic tilt  X 20 for 5 sec hold  Partial sit up x 20  Hip abd sidelying x 20 B  SLR x 20  Arm and Leg lift prone x 2/10  Hip ext prone with bent knee x 2/10  Contract relax R glut    Sidelying L contract relax mob to L5-S1 region to level pelvis at end       HOLD FOR NOW  Heel raises standing x 15    Step up x10     1 plank x 30 sec held the other 3 she normally does     (NP)Plank 4 reps 30 sec each          Manual therapy: Danuta  received the following manual therapy  techniques x 15  min. to include soft tissue and joint mobilization were applied to the: low back and gluteals to include:STM to LB  "paraspinals and QL R>L  P/A mob to lumbar region Grade 1-2           Pt received tool-assisted massage with manual therapy techniques to R LB in sidelying with pillow at waist putting pt in SB L to trigger an inflammatory healing response and stimulate the production of new collagen and proper, more functional, less painful healing.    Vacuum/cupping STM with manual therapy techniques was performed to back and gluteals to decrease muscle tightness, increase circulation and promote healing process.  The pt's skin was monitored for redness adjusting pressure as needed. The pt was instructed in possible side effects of bruising and/or soreness.      Kinesiotape with 6" star for pain relief was performed over the R lumbar paraspinals.L3-4 region  Instructed pt in use, care and precautions with tape.    (NP)Ultrasound  for pain control and to decrease inflammation @ continuous duty cycle, 1 Mhz, applied to R lumbar paraspinals, intensity = 1.8 w/cm2 for 8 minutes.      Patient received pre-mod electrical stimulation to decrease muscle tightness and pain to Lumbar paraspinals/ sacral border of gluteals B for 15 minutes with MH supine with cycle time: continuous, beat frequency: , CC/CV: CV.        Written Home Exercises Provided: none today  Pt demo good understanding of the education provided. Danuta demonstrated good return demonstration of activities.     Pt. education:  · Posture reeducation, body mechanics, HEP,   · No spiritual or educational barriers to learning provided  · Pt has no cultural, educational or language barriers to learning provided.    Assessment   Patient was seen for 3 visits since last progress note.  Pt with increased activity with painting.  Pt with severe tightness and initially unable to self mobilize.  Decreased tightness and pain after treatment and level pelvis.  Pt will benefit from PT to assist pt in improving and stabilizing pelvic positioning alek when she is unable to perform I " on her own.  Pt is working on getting house ready to put on market to sell, so will be more active leading to tighter muscles which lead to pt not being as successful with I self mobilization.         Pt will continue to benefit from skilled outpatient physical therapy to address the remaining functional deficits, provide pt/family education, and to maximize pt's level of independence in the home and community environment. .     GOALS:   Short Term Goals:  3 weeks MET STG's  Increase range of motion 25%  Increase strength 1/2 muscle grade  Improve postural awareness of pelvis to independently identify dysfunction with min assist from PT  Be able to perform HEP with minimal cueing required     Long Term Goals: 6 weeks PARTIALLY MET LTG'S  Increase range of motion to 75% to 100% full   Improve muscle strength 1 muscle grade  Improve muscle strength with MMT to 4+/5 to 5/5  Improve and stabilize proper pelvic positioning  Restore ability to sit for ADL and work with min to 0 pain  Restore normal sleep habits without disturbances due to pain  Restore ability to perform ADL's and household activities independently with min to 0 pain    Anticipated barriers to physical therapy: none  Pt's spiritual, cultural and educational needs considered and pt agreeable to plan of care and goals        Plan   If you concur, I recommend patient continue with physical therapy 1-2 times a week as needed  for 6 weeks.  Please advise us of your  recommendations. Thank you for allowing us to assist in the care of your patient.      Jacy Escobar, MS, PT          I certify the need for these services furnished under this plan of treatment and while under my care.    ____________________________________ Physician/Referring Practitioner                                Date of Signature             .

## 2020-02-04 ENCOUNTER — CLINICAL SUPPORT (OUTPATIENT)
Dept: REHABILITATION | Facility: HOSPITAL | Age: 65
End: 2020-02-04
Attending: INTERNAL MEDICINE
Payer: COMMERCIAL

## 2020-02-04 DIAGNOSIS — G89.29 CHRONIC BILATERAL LOW BACK PAIN WITH RIGHT-SIDED SCIATICA: Primary | ICD-10-CM

## 2020-02-04 DIAGNOSIS — M54.41 CHRONIC BILATERAL LOW BACK PAIN WITH RIGHT-SIDED SCIATICA: Primary | ICD-10-CM

## 2020-02-04 PROCEDURE — 97140 MANUAL THERAPY 1/> REGIONS: CPT | Mod: PN | Performed by: PHYSICAL THERAPIST

## 2020-02-04 PROCEDURE — 97014 ELECTRIC STIMULATION THERAPY: CPT | Mod: PN | Performed by: PHYSICAL THERAPIST

## 2020-02-04 NOTE — PROGRESS NOTES
Physical Therapy Daily Treatment Note     Name: Danuta Santos  Clinic Number: [id number]  Diagnosis:   Encounter Diagnosis   Name Primary?    Chronic bilateral low back pain with right-sided sciatica Yes     Physician: Erica Khan MD  Treatment Orders: PT Eval and Treat  Past Medical History:   Diagnosis Date    Allergy     IBS (irritable bowel syndrome)     Pneumonia 02/2019       Precautions: as per diagnosis    Evaluation date: 2/15/2018  Visit # authorized: 53/64 on 2/4/2020  Authorization period: 12-31-20  Plan of care expiration: 3-3-20  MD Follow up appt: none scheduled    Time In:  4:25  Time Out: 5:05  Billable time:  35  min    Subjective     Pt reports: has been doing painting and needs to take a break for upper back.  Pt states feeling tight and difficulty self mobilizing pelvis    Pain Scale: before treatment: 5.5/10  after treatment:feels better, did not provide #   Objective     AR R pelvis  Severe tightness lumbar paraspinals     TREATMENT  (NP)Therapeutic exercise: Danuta received therapeutic exercises to develop strength, endurance, ROM, flexibility and core stabilization for 0 minutes including:    HS stretch supine   Glut stretch  Piriformis stretch  B QL stretch      Contract relax R glut x 3  Pt was unable to self mobilize today, performed contract relax sidelying L and realigned.     HELD today  Bridge x 15  Pelvic tilt  X 20 for 5 sec hold  Partial sit up x 20  Hip abd sidelying x 20 B  SLR x 20  Arm and Leg lift prone x 2/10  Hip ext prone with bent knee x 2/10  Contract relax R glut    Sidelying L contract relax mob to L5-S1 region to level pelvis at end       HOLD FOR NOW  Heel raises standing x 15    Step up x10     1 plank x 30 sec held the other 3 she normally does     (NP)Plank 4 reps 30 sec each          Manual therapy: Danuta  received the following manual therapy  techniques x 20  min. to include soft tissue and joint mobilization were applied to the: low back and  "gluteals to include:STM to LB paraspinals and QL R>L  P/A mob to lumbar region Grade 1-2  Sidelying mob stretch for SI sidelying L also passive stretching to QL sidelying L waist over pillow to increase C curve of back to optimize stretch         Pt received tool-assisted massage with manual therapy techniques to R LB in sidelying with pillow at waist putting pt in SB L to trigger an inflammatory healing response and stimulate the production of new collagen and proper, more functional, less painful healing.    Vacuum/cupping STM with manual therapy techniques was performed to back and gluteals to decrease muscle tightness, increase circulation and promote healing process.  The pt's skin was monitored for redness adjusting pressure as needed. The pt was instructed in possible side effects of bruising and/or soreness.      Kinesiotape with 6" star for pain relief was performed over the R lumbar paraspinals.L3-4 region  Instructed pt in use, care and precautions with tape.    (NP)Ultrasound  for pain control and to decrease inflammation @ continuous duty cycle, 1 Mhz, applied to R lumbar paraspinals, intensity = 1.8 w/cm2 for 8 minutes.      Patient received pre-mod electrical stimulation to decrease muscle tightness and pain to Lumbar paraspinals/ sacral border of gluteals B for 15 minutes with MH supine with cycle time: continuous, beat frequency: , CC/CV: CV.        Written Home Exercises Provided: none today  Pt demo good understanding of the education provided. Danuta demonstrated good return demonstration of activities.     Pt. education:  · Posture reeducation, body mechanics, HEP,   · No spiritual or educational barriers to learning provided  · Pt has no cultural, educational or language barriers to learning provided.    Assessment   Patient with increased activity with painting leading to increased tightness and unable to self mobilize.  Pt with level pelvis after treatment and decreased symptoms " overall         Pt will continue to benefit from skilled outpatient physical therapy to address the remaining functional deficits, provide pt/family education, and to maximize pt's level of independence in the home and community environment. .     GOALS:   Short Term Goals:  3 weeks MET STG's  Increase range of motion 25%  Increase strength 1/2 muscle grade  Improve postural awareness of pelvis to independently identify dysfunction with min assist from PT  Be able to perform HEP with minimal cueing required     Long Term Goals: 6 weeks PARTIALLY MET LTG'S  Increase range of motion to 75% to 100% full   Improve muscle strength 1 muscle grade  Improve muscle strength with MMT to 4+/5 to 5/5  Improve and stabilize proper pelvic positioning  Restore ability to sit for ADL and work with min to 0 pain  Restore normal sleep habits without disturbances due to pain  Restore ability to perform ADL's and household activities independently with min to 0 pain    Anticipated barriers to physical therapy: none  Pt's spiritual, cultural and educational needs considered and pt agreeable to plan of care and goals        Plan   Continue with established plan of care towards PT goals.                       .

## 2020-02-11 ENCOUNTER — TELEPHONE (OUTPATIENT)
Dept: HEMATOLOGY/ONCOLOGY | Facility: CLINIC | Age: 65
End: 2020-02-11

## 2020-02-11 ENCOUNTER — CLINICAL SUPPORT (OUTPATIENT)
Dept: REHABILITATION | Facility: HOSPITAL | Age: 65
End: 2020-02-11
Attending: INTERNAL MEDICINE
Payer: COMMERCIAL

## 2020-02-11 DIAGNOSIS — G89.29 CHRONIC BILATERAL LOW BACK PAIN WITH RIGHT-SIDED SCIATICA: Primary | ICD-10-CM

## 2020-02-11 DIAGNOSIS — M54.41 CHRONIC BILATERAL LOW BACK PAIN WITH RIGHT-SIDED SCIATICA: Primary | ICD-10-CM

## 2020-02-11 PROCEDURE — 97110 THERAPEUTIC EXERCISES: CPT | Mod: PN | Performed by: PHYSICAL THERAPIST

## 2020-02-11 PROCEDURE — 97140 MANUAL THERAPY 1/> REGIONS: CPT | Mod: PN | Performed by: PHYSICAL THERAPIST

## 2020-02-11 PROCEDURE — 97014 ELECTRIC STIMULATION THERAPY: CPT | Mod: PN | Performed by: PHYSICAL THERAPIST

## 2020-02-11 NOTE — PROGRESS NOTES
Physical Therapy Daily Treatment Note     Name: Danuta Santos  Clinic Number: [id number]  Diagnosis:   Encounter Diagnosis   Name Primary?    Chronic bilateral low back pain with right-sided sciatica Yes     Physician: Erica Khan MD  Treatment Orders: PT Eval and Treat  Past Medical History:   Diagnosis Date    Allergy     IBS (irritable bowel syndrome)     Pneumonia 02/2019       Precautions: as per diagnosis    Evaluation date: 2/15/2018  Visit # authorized: 54/64 on 2/11/2020  Authorization period: 12-31-20  Plan of care expiration: 3-3-20  MD Follow up appt: none scheduled    Time In:  3:07  Time Out: 4:20  Billable time:  55  min    Subjective     Pt reports: she has had to do more painting and back is hurting, but about to be done with painting so hopefully will be able to manage with more ease soon  Pt states with driving she gets pain in toes and does not know how to use cruise control. Pt states she did her ex to get herself level this AM, but not sure if worked for still pain.     Pain Scale: before treatment: 5/10  after treatment:feels better, did not provide #   Objective     AR R pelvis  Severe tightness lumbar paraspinals after treatment mod tightness    TREATMENT  Therapeutic exercise: Danuta received therapeutic exercises to develop strength, endurance, ROM, flexibility and core stabilization for 15 minutes including:    HS stretch supine   Glut stretch  Piriformis stretch  B QL stretch    Pt unable to self mobilize    Contract relax R glut x 3  Pt was unable to self mobilize today, performed contract relax sidelying L and realigned.     HELD today  Bridge x 15  Pelvic tilt  X 20 for 5 sec hold  Partial sit up x 20  Hip abd sidelying x 20 B  SLR x 20  Arm and Leg lift prone x 2/10  Hip ext prone with bent knee x 2/10  Contract relax R glut    Sidelying L contract relax mob to L5-S1 region to level pelvis at end       HOLD FOR NOW  Heel raises standing x 15    Step up x10     1 plank x  "30 sec held the other 3 she normally does     (NP)Plank 4 reps 30 sec each          Manual therapy: Danuta  received the following manual therapy  techniques x 25  min. to include soft tissue and joint mobilization were applied to the: low back and gluteals to include:STM to LB paraspinals and QL R>L  P/A mob to lumbar region Grade 1-2  Sidelying mob stretch for SI sidelying L also passive stretching to QL sidelying L waist over pillow to increase C curve of back to optimize stretc       Pt received tool-assisted massage with manual therapy techniques to R LB in sidelying with pillow at waist putting pt in SB L to trigger an inflammatory healing response and stimulate the production of new collagen and proper, more functional, less painful healing.    Vacuum/cupping STM with manual therapy techniques was performed to back and gluteals to decrease muscle tightness, increase circulation and promote healing process.  The pt's skin was monitored for redness adjusting pressure as needed. The pt was instructed in possible side effects of bruising and/or soreness.      (NP due to skin irritation)Kinesiotape with 6" star for pain relief was performed over the R lumbar paraspinals.L3-4 region  Instructed pt in use, care and precautions with tape.    (NP)Ultrasound  for pain control and to decrease inflammation @ continuous duty cycle, 1 Mhz, applied to R lumbar paraspinals, intensity = 1.8 w/cm2 for 8 minutes.      Patient received pre-mod electrical stimulation to decrease muscle tightness and pain to Lumbar paraspinals/ sacral border of gluteals B for 15 minutes with MH supine with cycle time: continuous, beat frequency: , CC/CV: CV.        Written Home Exercises Provided: none today  Pt demo good understanding of the education provided. Danuta demonstrated good return demonstration of activities.     Pt. education:  · Posture reeducation, body mechanics, HEP,   · No spiritual or educational barriers to " learning provided  · Pt has no cultural, educational or language barriers to learning provided.    Assessment   Patient with increased activity continues with painting leading to increased tightness and unable to self mobilize. Pt with severe muscle tightness which was improved to mod tightness after manual therapy  Pt with level pelvis after treatment and decreased symptoms overall   Pt almost finished painting so hopefully muscles will not tighten as much allowing pt to be more successful with self mob.        Pt will continue to benefit from skilled outpatient physical therapy to address the remaining functional deficits, provide pt/family education, and to maximize pt's level of independence in the home and community environment. .     GOALS:   Short Term Goals:  3 weeks MET STG's  Increase range of motion 25%  Increase strength 1/2 muscle grade  Improve postural awareness of pelvis to independently identify dysfunction with min assist from PT  Be able to perform HEP with minimal cueing required     Long Term Goals: 6 weeks PARTIALLY MET LTG'S  Increase range of motion to 75% to 100% full   Improve muscle strength 1 muscle grade  Improve muscle strength with MMT to 4+/5 to 5/5  Improve and stabilize proper pelvic positioning  Restore ability to sit for ADL and work with min to 0 pain  Restore normal sleep habits without disturbances due to pain  Restore ability to perform ADL's and household activities independently with min to 0 pain    Anticipated barriers to physical therapy: none  Pt's spiritual, cultural and educational needs considered and pt agreeable to plan of care and goals        Plan   Continue with established plan of care towards PT goals.                       .

## 2020-02-11 NOTE — TELEPHONE ENCOUNTER
----- Message from Jacy Loaiza sent at 2/11/2020  2:21 PM CST -----  Pt can be reached at [phone]    Pt calling to reschedule her blood labs tomorrow for 2/12th 8:00 a.m. morning or 3:30 p.m.    Please contact pt.      Thank you!

## 2020-02-11 NOTE — TELEPHONE ENCOUNTER
----- Message from Parul Du sent at 2/11/2020  2:58 PM CST -----  Contact: Patient  Scheduling request    Scheduling appt :: lab    Treating provider:: Dr Browne    Do you feel you need to be seen today:: No    Contact:: [phone]    Additional info::

## 2020-02-12 ENCOUNTER — LAB VISIT (OUTPATIENT)
Dept: LAB | Facility: HOSPITAL | Age: 65
End: 2020-02-12
Attending: INTERNAL MEDICINE
Payer: COMMERCIAL

## 2020-02-12 DIAGNOSIS — D70.8 OTHER NEUTROPENIA: ICD-10-CM

## 2020-02-12 DIAGNOSIS — R53.82 CHRONIC FATIGUE: ICD-10-CM

## 2020-02-12 LAB
BASOPHILS # BLD AUTO: 0.04 K/UL (ref 0–0.2)
BASOPHILS NFR BLD: 1.2 % (ref 0–1.9)
DIFFERENTIAL METHOD: ABNORMAL
EOSINOPHIL # BLD AUTO: 0.1 K/UL (ref 0–0.5)
EOSINOPHIL NFR BLD: 1.8 % (ref 0–8)
ERYTHROCYTE [DISTWIDTH] IN BLOOD BY AUTOMATED COUNT: 12.7 % (ref 11.5–14.5)
HCT VFR BLD AUTO: 40 % (ref 37–48.5)
HGB BLD-MCNC: 12.4 G/DL (ref 12–16)
IMM GRANULOCYTES # BLD AUTO: 0 K/UL (ref 0–0.04)
IMM GRANULOCYTES NFR BLD AUTO: 0 % (ref 0–0.5)
LYMPHOCYTES # BLD AUTO: 1.3 K/UL (ref 1–4.8)
LYMPHOCYTES NFR BLD: 36.8 % (ref 18–48)
MCH RBC QN AUTO: 30 PG (ref 27–31)
MCHC RBC AUTO-ENTMCNC: 31 G/DL (ref 32–36)
MCV RBC AUTO: 97 FL (ref 82–98)
MONOCYTES # BLD AUTO: 0.3 K/UL (ref 0.3–1)
MONOCYTES NFR BLD: 7.4 % (ref 4–15)
NEUTROPHILS # BLD AUTO: 1.8 K/UL (ref 1.8–7.7)
NEUTROPHILS NFR BLD: 52.8 % (ref 38–73)
NRBC BLD-RTO: 0 /100 WBC
PLATELET # BLD AUTO: 211 K/UL (ref 150–350)
PMV BLD AUTO: 11.8 FL (ref 9.2–12.9)
RBC # BLD AUTO: 4.14 M/UL (ref 4–5.4)
WBC # BLD AUTO: 3.4 K/UL (ref 3.9–12.7)

## 2020-02-12 PROCEDURE — 85025 COMPLETE CBC W/AUTO DIFF WBC: CPT

## 2020-02-12 PROCEDURE — 83615 LACTATE (LD) (LDH) ENZYME: CPT

## 2020-02-12 PROCEDURE — 36415 COLL VENOUS BLD VENIPUNCTURE: CPT

## 2020-02-13 LAB — LDH SERPL L TO P-CCNC: 189 U/L (ref 110–260)

## 2020-02-24 ENCOUNTER — CLINICAL SUPPORT (OUTPATIENT)
Dept: REHABILITATION | Facility: HOSPITAL | Age: 65
End: 2020-02-24
Attending: INTERNAL MEDICINE
Payer: COMMERCIAL

## 2020-02-24 DIAGNOSIS — G89.29 CHRONIC BILATERAL LOW BACK PAIN WITH RIGHT-SIDED SCIATICA: Primary | ICD-10-CM

## 2020-02-24 DIAGNOSIS — M54.41 CHRONIC BILATERAL LOW BACK PAIN WITH RIGHT-SIDED SCIATICA: Primary | ICD-10-CM

## 2020-02-24 PROCEDURE — 97110 THERAPEUTIC EXERCISES: CPT | Mod: PN | Performed by: PHYSICAL THERAPIST

## 2020-02-24 PROCEDURE — 97014 ELECTRIC STIMULATION THERAPY: CPT | Mod: PN | Performed by: PHYSICAL THERAPIST

## 2020-02-24 PROCEDURE — 97140 MANUAL THERAPY 1/> REGIONS: CPT | Mod: PN | Performed by: PHYSICAL THERAPIST

## 2020-02-24 NOTE — PROGRESS NOTES
Physical Therapy Daily Treatment Note     Name: Danuta Santos  Clinic Number: [id number]  Diagnosis:   Encounter Diagnosis   Name Primary?    Chronic bilateral low back pain with right-sided sciatica Yes     Physician: Erica Khan MD  Treatment Orders: PT Eval and Treat  Past Medical History:   Diagnosis Date    Allergy     IBS (irritable bowel syndrome)     Pneumonia 02/2019       Precautions: as per diagnosis    Evaluation date: 2/15/2018  Visit # authorized: 55/64 on 2/24/2020  Authorization period: 12-31-20  Plan of care expiration: 3-3-20  MD Follow up appt: none scheduled    Time In:  8:00  Time Out: 9:10  Billable time:  60  min    Subjective     Pt reports: more moving activities going on to get house ready for market Having trouble leveling pelvis, under stress with divorce activities    Pain Scale: before treatment: 5/10  after treatment:feels better, did not provide #   Objective     AR R pelvis  Severe tightness lumbar paraspinals after treatment mod tightness    Pt brought in new tennis shoes and switched posting to new shoes with 2 degree forefoot post and only applied 1 large medial heel wedge due to a little increased supination wear noted on old shoes.  Pt kept small medial heel wedge to add back id needed     TREATMENT  Therapeutic exercise: Danuta received therapeutic exercises to develop strength, endurance, ROM, flexibility and core stabilization for 15 minutes including:    HS stretch supine   Glut stretch  Piriformis stretch  B QL stretch    Pt unable to self mobilize    Contract relax R glut x 3  Pt was unable to self mobilize today, performed contract relax sidelying L and realigned.     HELD today  Bridge x 15  Pelvic tilt  X 20 for 5 sec hold  Partial sit up x 20  Hip abd sidelying x 20 B  SLR x 20  Arm and Leg lift prone x 2/10  Hip ext prone with bent knee x 2/10  Contract relax R glut    Sidelying L contract relax mob to L5-S1 region to level pelvis at end       HOLD FOR  "NOW  Heel raises standing x 15    Step up x10     1 plank x 30 sec held the other 3 she normally does     (NP)Plank 4 reps 30 sec each          Manual therapy: Danuta  received the following manual therapy  techniques x 30  min. to include soft tissue and joint mobilization were applied to the: low back and gluteals to include:STM to LB paraspinals and QL R>L  P/A mob to lumbar region Grade 1-2  Sidelying mob stretch for SI sidelying L also passive stretching to QL sidelying L waist over pillow to increase C curve of back to optimize stretc       Pt received tool-assisted massage with manual therapy techniques to R LB in sidelying with pillow at waist putting pt in SB L to trigger an inflammatory healing response and stimulate the production of new collagen and proper, more functional, less painful healing.    Vacuum/cupping STM with manual therapy techniques was performed to back and gluteals to decrease muscle tightness, increase circulation and promote healing process.  The pt's skin was monitored for redness adjusting pressure as needed. The pt was instructed in possible side effects of bruising and/or soreness.      Kinesiotape with 6" star for pain relief was performed over the R lumbar paraspinals.L3-4 region  Instructed pt in use, care and precautions with tape.    (NP)Ultrasound  for pain control and to decrease inflammation @ continuous duty cycle, 1 Mhz, applied to R lumbar paraspinals, intensity = 1.8 w/cm2 for 8 minutes.      Patient received pre-mod electrical stimulation to decrease muscle tightness and pain to Lumbar paraspinals/ sacral border of gluteals B for 15 minutes with MH supine with cycle time: continuous, beat frequency: , CC/CV: CV.        Written Home Exercises Provided: none today  Pt demo good understanding of the education provided. Danuta demonstrated good return demonstration of activities.     Pt. education:  · Posture reeducation, body mechanics, HEP,   · No spiritual " or educational barriers to learning provided  · Pt has no cultural, educational or language barriers to learning provided.    Assessment   Patient with increased activity with moving and packing as get ready to put  house on the market leading to increased tightness and unable to self mobilize. Pt with severe muscle tightness which was improved to mod tightness after manual therapy  Pt with level pelvis after treatment and decreased symptoms overall   Pt off school this week and going out of town so rest may be helpful     Pt will continue to benefit from skilled outpatient physical therapy to address the remaining functional deficits, provide pt/family education, and to maximize pt's level of independence in the home and community environment. .     GOALS:   Short Term Goals:  3 weeks MET STG's  Increase range of motion 25%  Increase strength 1/2 muscle grade  Improve postural awareness of pelvis to independently identify dysfunction with min assist from PT  Be able to perform HEP with minimal cueing required     Long Term Goals: 6 weeks PARTIALLY MET LTG'S  Increase range of motion to 75% to 100% full   Improve muscle strength 1 muscle grade  Improve muscle strength with MMT to 4+/5 to 5/5  Improve and stabilize proper pelvic positioning  Restore ability to sit for ADL and work with min to 0 pain  Restore normal sleep habits without disturbances due to pain  Restore ability to perform ADL's and household activities independently with min to 0 pain    Anticipated barriers to physical therapy: none  Pt's spiritual, cultural and educational needs considered and pt agreeable to plan of care and goals        Plan   Continue with established plan of care towards PT goals.                       .

## 2020-03-02 ENCOUNTER — CLINICAL SUPPORT (OUTPATIENT)
Dept: REHABILITATION | Facility: HOSPITAL | Age: 65
End: 2020-03-02
Attending: INTERNAL MEDICINE
Payer: COMMERCIAL

## 2020-03-02 DIAGNOSIS — M54.41 CHRONIC BILATERAL LOW BACK PAIN WITH RIGHT-SIDED SCIATICA: Primary | ICD-10-CM

## 2020-03-02 DIAGNOSIS — G89.29 CHRONIC BILATERAL LOW BACK PAIN WITH RIGHT-SIDED SCIATICA: Primary | ICD-10-CM

## 2020-03-02 PROCEDURE — 97014 ELECTRIC STIMULATION THERAPY: CPT | Mod: PN | Performed by: PHYSICAL THERAPIST

## 2020-03-02 PROCEDURE — 97140 MANUAL THERAPY 1/> REGIONS: CPT | Mod: PN | Performed by: PHYSICAL THERAPIST

## 2020-03-02 NOTE — PROGRESS NOTES
Physical Therapy Daily Treatment Note     Name: Danuta Santos  Clinic Number: [id number]  Diagnosis:   Encounter Diagnosis   Name Primary?    Chronic bilateral low back pain with right-sided sciatica Yes     Physician: Erica Khan MD  Treatment Orders: PT Eval and Treat  Past Medical History:   Diagnosis Date    Allergy     IBS (irritable bowel syndrome)     Pneumonia 02/2019       Precautions: as per diagnosis    Evaluation date: 2/15/2018  Visit # authorized: 55/64 on 3/2/2020  Authorization period: 12-31-20  Plan of care expiration: 3-3-20  MD Follow up appt: none scheduled    Time In:  3:17  Time Out: 4:15  Billable time:  50  min    Subjective     Pt reports: just back from a trip and feeling very tight with travel.  While on trip did ok.  Pt states she has had more difficulty with self mobilization.      Pain Scale: before treatment: 5/10  after treatment:feels better, 2/10  Objective     AR R pelvis  Severe tightness lumbar paraspinals after treatment mod tightness      TREATMENT  Therapeutic exercise: Danuta received therapeutic exercises to develop strength, endurance, ROM, flexibility and core stabilization for 5 minutes including:    HS stretch supine   Glut stretch  Piriformis stretch  B QL stretch  Contract relax R glut x 3  Pt was able to self mobilize today after independent stretch, but still felt very tight and noted severe tightness as noted above     Performed contract relax sidelying L and realigned at end of treatment    HELD today  Bridge x 15  Pelvic tilt  X 20 for 5 sec hold  Partial sit up x 20  Hip abd sidelying x 20 B  SLR x 20  Arm and Leg lift prone x 2/10  Hip ext prone with bent knee x 2/10  Contract relax R glut    Sidelying L contract relax mob to L5-S1 region to level pelvis at end       HOLD FOR NOW  Heel raises standing x 15    Step up x10     1 plank x 30 sec held the other 3 she normally does     (NP)Plank 4 reps 30 sec each          Manual therapy: Danuta   "received the following manual therapy  techniques x 30  min. to include soft tissue and joint mobilization were applied to the: low back and gluteals to include:STM to LB paraspinals and QL R>L  P/A mob to lumbar region Grade 1-2  Sidelying mob stretch for SI sidelying L also passive stretching to QL sidelying L waist over pillow to increase C curve of back to optimize stretc       Pt received tool-assisted massage with manual therapy techniques to R LB in sidelying with pillow at waist putting pt in SB L to trigger an inflammatory healing response and stimulate the production of new collagen and proper, more functional, less painful healing.    Vacuum/cupping STM with manual therapy techniques was performed to back and gluteals to decrease muscle tightness, increase circulation and promote healing process.  The pt's skin was monitored for redness adjusting pressure as needed. The pt was instructed in possible side effects of bruising and/or soreness.      Kinesiotape with 6" star for pain relief was performed over the R lumbar paraspinals.L3-4 region  Instructed pt in use, care and precautions with tape.    (NP)Ultrasound  for pain control and to decrease inflammation @ continuous duty cycle, 1 Mhz, applied to R lumbar paraspinals, intensity = 1.8 w/cm2 for 8 minutes.      Patient received pre-mod electrical stimulation to decrease muscle tightness and pain to Lumbar paraspinals/ sacral border of gluteals B for 15 minutes with MH supine with cycle time: continuous, beat frequency: , CC/CV: CV.        Written Home Exercises Provided: none today  Pt demo good understanding of the education provided. Danuta demonstrated good return demonstration of activities.     Pt. education:  · Posture reeducation, body mechanics, HEP,   · No spiritual or educational barriers to learning provided  · Pt has no cultural, educational or language barriers to learning provided.    Assessment   Patient with increased activity " with moving and packing as get ready to put  house on the market leading to increased tightness and unable to self mobilize. Pt with severe muscle tightness which was improved to mod tightness after manual therapy  Pt with level pelvis after treatment and decreased symptoms overall   Pt off school this week and going out of town so rest may be helpful     Pt will continue to benefit from skilled outpatient physical therapy to address the remaining functional deficits, provide pt/family education, and to maximize pt's level of independence in the home and community environment. .     GOALS:   Short Term Goals:  3 weeks MET STG's  Increase range of motion 25%  Increase strength 1/2 muscle grade  Improve postural awareness of pelvis to independently identify dysfunction with min assist from PT  Be able to perform HEP with minimal cueing required     Long Term Goals: 6 weeks PARTIALLY MET LTG'S  Increase range of motion to 75% to 100% full   Improve muscle strength 1 muscle grade  Improve muscle strength with MMT to 4+/5 to 5/5  Improve and stabilize proper pelvic positioning  Restore ability to sit for ADL and work with min to 0 pain  Restore normal sleep habits without disturbances due to pain  Restore ability to perform ADL's and household activities independently with min to 0 pain    Anticipated barriers to physical therapy: none  Pt's spiritual, cultural and educational needs considered and pt agreeable to plan of care and goals        Plan   Continue with established plan of care towards PT goals.                       .

## 2020-03-12 ENCOUNTER — CLINICAL SUPPORT (OUTPATIENT)
Dept: REHABILITATION | Facility: HOSPITAL | Age: 65
End: 2020-03-12
Attending: INTERNAL MEDICINE
Payer: COMMERCIAL

## 2020-03-12 DIAGNOSIS — M54.41 CHRONIC BILATERAL LOW BACK PAIN WITH RIGHT-SIDED SCIATICA: Primary | ICD-10-CM

## 2020-03-12 DIAGNOSIS — G89.29 CHRONIC BILATERAL LOW BACK PAIN WITH RIGHT-SIDED SCIATICA: Primary | ICD-10-CM

## 2020-03-12 PROCEDURE — 97014 ELECTRIC STIMULATION THERAPY: CPT | Mod: PN | Performed by: PHYSICAL THERAPIST

## 2020-03-12 PROCEDURE — 97140 MANUAL THERAPY 1/> REGIONS: CPT | Mod: PN | Performed by: PHYSICAL THERAPIST

## 2020-03-12 NOTE — PROGRESS NOTES
Physical Therapy Daily Treatment Note     Name: Danuta Santos  Clinic Number: [id number]  Diagnosis:   Encounter Diagnosis   Name Primary?    Chronic bilateral low back pain with right-sided sciatica Yes     Physician: Erica Khan MD  Treatment Orders: PT Eval and Treat  Past Medical History:   Diagnosis Date    Allergy     IBS (irritable bowel syndrome)     Pneumonia 02/2019       Precautions: as per diagnosis    Evaluation date: 2/15/2018  Visit # authorized: 55/64 on 3/12/2020  Authorization period: 12-31-20  Plan of care expiration: 3-3-20  MD Follow up appt: none scheduled    Time In:  4:52  Time Out: 5:25  Billable time:  25  min    Subjective     Pt reports: feeling very tight and unable to self mobilize    Pain Scale: before treatment: 5/10  after treatment:feels better, 2/10  Objective     AR R pelvis  Severe tightness lumbar paraspinals      TREATMENT  (NP)Therapeutic exercise: Danuta received therapeutic exercises to develop strength, endurance, ROM, flexibility and core stabilization for 0 minutes including:    HS stretch supine   Glut stretch  Piriformis stretch  B QL stretch  Contract relax R glut x 3  Pt was able to self mobilize today after independent stretch, but still felt very tight and noted severe tightness as noted above     Performed contract relax sidelying L and realigned at end of treatment    HELD today  Bridge x 15  Pelvic tilt  X 20 for 5 sec hold  Partial sit up x 20  Hip abd sidelying x 20 B  SLR x 20  Arm and Leg lift prone x 2/10  Hip ext prone with bent knee x 2/10  Contract relax R glut    Sidelying L contract relax mob to L5-S1 region to level pelvis at end       HOLD FOR NOW  Heel raises standing x 15    Step up x10     1 plank x 30 sec held the other 3 she normally does     (NP)Plank 4 reps 30 sec each          Manual therapy: Danuta  received the following manual therapy  techniques x 10  min. to include soft tissue and joint mobilization were applied to the:  "low back and gluteals to include:STM to LB paraspinals and QL R>L  P/A mob to lumbar region Grade 1-2  Sidelying mob stretch for SI sidelying L also passive stretching to QL sidelying L waist over pillow to increase C curve of back to optimize stretc       (NP)Pt received tool-assisted massage with manual therapy techniques to R LB in sidelying with pillow at waist putting pt in SB L to trigger an inflammatory healing response and stimulate the production of new collagen and proper, more functional, less painful healing.    (NP)Vacuum/cupping STM with manual therapy techniques was performed to back and gluteals to decrease muscle tightness, increase circulation and promote healing process.  The pt's skin was monitored for redness adjusting pressure as needed. The pt was instructed in possible side effects of bruising and/or soreness.      (NP)Kinesiotape with 6" star for pain relief was performed over the R lumbar paraspinals.L3-4 region  Instructed pt in use, care and precautions with tape.    (NP)Ultrasound  for pain control and to decrease inflammation @ continuous duty cycle, 1 Mhz, applied to R lumbar paraspinals, intensity = 1.8 w/cm2 for 8 minutes.      Patient received pre-mod electrical stimulation to decrease muscle tightness and pain to Lumbar paraspinals/ sacral border of gluteals B for 15 minutes with MH supine with cycle time: continuous, beat frequency: , CC/CV: CV.        Written Home Exercises Provided: none today  Pt demo good understanding of the education provided. Danuta demonstrated good return demonstration of activities.     Pt. education:  · Posture reeducation, body mechanics, HEP,   · No spiritual or educational barriers to learning provided  · Pt has no cultural, educational or language barriers to learning provided.    Assessment   Pt with level pelvis after treatment and report decreased symptoms after treatment.  Pt with increased tightness and unable to self mobilize.       " Pt will continue to benefit from skilled outpatient physical therapy to address the remaining functional deficits, provide pt/family education, and to maximize pt's level of independence in the home and community environment. .     GOALS:   Short Term Goals:  3 weeks MET STG's  Increase range of motion 25%  Increase strength 1/2 muscle grade  Improve postural awareness of pelvis to independently identify dysfunction with min assist from PT  Be able to perform HEP with minimal cueing required     Long Term Goals: 6 weeks PARTIALLY MET LTG'S  Increase range of motion to 75% to 100% full   Improve muscle strength 1 muscle grade  Improve muscle strength with MMT to 4+/5 to 5/5  Improve and stabilize proper pelvic positioning  Restore ability to sit for ADL and work with min to 0 pain  Restore normal sleep habits without disturbances due to pain  Restore ability to perform ADL's and household activities independently with min to 0 pain    Anticipated barriers to physical therapy: none  Pt's spiritual, cultural and educational needs considered and pt agreeable to plan of care and goals        Plan   Continue with established plan of care towards PT goals.                       .

## 2020-03-19 ENCOUNTER — CLINICAL SUPPORT (OUTPATIENT)
Dept: REHABILITATION | Facility: HOSPITAL | Age: 65
End: 2020-03-19
Attending: INTERNAL MEDICINE
Payer: COMMERCIAL

## 2020-03-19 DIAGNOSIS — M54.41 CHRONIC BILATERAL LOW BACK PAIN WITH RIGHT-SIDED SCIATICA: Primary | ICD-10-CM

## 2020-03-19 DIAGNOSIS — G89.29 CHRONIC BILATERAL LOW BACK PAIN WITH RIGHT-SIDED SCIATICA: Primary | ICD-10-CM

## 2020-03-19 PROCEDURE — 97140 MANUAL THERAPY 1/> REGIONS: CPT | Mod: PN | Performed by: PHYSICAL THERAPIST

## 2020-03-19 NOTE — PROGRESS NOTES
Physical Therapy Daily Treatment Note     Name: Danuta Santos  Clinic Number: [id number]  Diagnosis:   Encounter Diagnosis   Name Primary?   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  She is no longer doing lots of activities in home.     Pain Scale: before treatment: 5/10  after treatment:feels better, did not provide number  Objective     AR R pelvis  Severe tightness lumbar paraspinals      TREATMENT  (NP)Therapeutic exercise: Danuta received therapeutic exercises to develop strength, endurance, ROM, flexibility and core stabilization for 0 minutes including:    HS stretch supine   Glut stretch  Piriformis stretch  B QL stretch  Contract relax R glut x 3  Pt was able to self mobilize today after independent stretch, but still felt very tight and noted severe tightness as noted above     Performed contract relax sidelying L and realigned at end of treatment    HELD today  Bridge x 15  Pelvic tilt  X 20 for 5 sec hold  Partial sit up x 20  Hip abd sidelying x 20 B  SLR x 20  Arm and Leg lift prone x 2/10  Hip ext prone with bent knee x 2/10  Contract relax R glut    Sidelying L contract relax mob to L5-S1 region to level pelvis at end       HOLD FOR NOW  Heel raises standing x 15    Step up x10     1 plank x 30 sec held the other 3 she normally does     (NP)Plank 4 reps 30 sec each          Manual  "therapy: Danuta  received the following manual therapy  techniques x 40  min. to include soft tissue and joint mobilization were applied to the: low back and gluteals to include: Due to COVID 19 precautions attempted to perform sidelying mobilization out in patient's SUV in the back.  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        (NP)Pt received tool-assisted massage with manual therapy techniques to R LB in sidelying with pillow at waist putting pt in SB L to trigger an inflammatory healing response and stimulate the production of new collagen and proper, more functional, less painful healing.    (NP)Vacuum/cupping STM with manual therapy techniques was performed to back and gluteals to decrease muscle tightness, increase circulation and promote healing process.  The pt's skin was monitored for redness adjusting pressure as needed. The pt was instructed in possible side effects of bruising and/or soreness.      Kinesiotape with 6" star for pain relief was performed over the R lumbar paraspinals.L3-4 region  Instructed pt in use, care and precautions with tape.    (NP)Ultrasound  for pain control and to decrease inflammation @ continuous duty cycle, 1 Mhz, applied to R lumbar paraspinals, intensity = 1.8 w/cm2 for 8 minutes.      Patient received pre-mod electrical stimulation to decrease muscle tightness and pain to Lumbar paraspinals/ sacral border of gluteals B for 15 minutes with MH supine with cycle time: continuous, beat " frequency: , CC/CV: CV.        Written Home Exercises Provided: none today  Pt demo good understanding of the education provided. Danuta demonstrated good return demonstration of activities.     Pt. education:  · Posture reeducation, body mechanics, HEP,   · No spiritual or educational barriers to learning provided  · Pt has no cultural, educational or language barriers to learning provided.    Assessment   Pt with level pelvis after treatment and report decreased symptoms after treatment.  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       Pt will continue to benefit from skilled outpatient physical therapy to address the remaining functional deficits, provide pt/family education, and to maximize pt's level of independence in the home and community environment. .     GOALS:   Short Term Goals:  3 weeks MET STG's  Increase range of motion 25%  Increase strength 1/2 muscle grade  Improve postural awareness of pelvis to independently identify dysfunction with min assist from PT  Be able to perform HEP with minimal cueing required     Long Term Goals: 6 weeks PARTIALLY MET LTG'S  Increase range of motion to 75% to 100% full   Improve muscle strength 1 muscle grade  Improve muscle strength with MMT to 4+/5 to 5/5  Improve and stabilize proper pelvic positioning  Restore ability to sit for ADL and work with min to 0 pain  Restore normal sleep habits without disturbances due to pain  Restore ability to perform ADL's and household activities independently with min to 0 pain    Anticipated barriers to physical therapy: none  Pt's spiritual, cultural and educational needs considered and pt agreeable to plan of care and goals        Plan   Continue with established plan of care towards PT goals.  See pt as needed for acute symptoms.                      .

## 2020-03-23 ENCOUNTER — TELEPHONE (OUTPATIENT)
Dept: REHABILITATION | Facility: HOSPITAL | Age: 65
End: 2020-03-23

## 2020-03-24 ENCOUNTER — CLINICAL SUPPORT (OUTPATIENT)
Dept: REHABILITATION | Facility: HOSPITAL | Age: 65
End: 2020-03-24
Attending: INTERNAL MEDICINE
Payer: COMMERCIAL

## 2020-03-24 ENCOUNTER — TELEPHONE (OUTPATIENT)
Dept: INTERNAL MEDICINE | Facility: CLINIC | Age: 65
End: 2020-03-24

## 2020-03-24 DIAGNOSIS — M54.41 CHRONIC BILATERAL LOW BACK PAIN WITH RIGHT-SIDED SCIATICA: Primary | ICD-10-CM

## 2020-03-24 DIAGNOSIS — G89.29 CHRONIC BILATERAL LOW BACK PAIN WITH RIGHT-SIDED SCIATICA: Primary | ICD-10-CM

## 2020-03-24 PROCEDURE — 97110 THERAPEUTIC EXERCISES: CPT | Mod: PN | Performed by: PHYSICAL THERAPIST

## 2020-03-24 PROCEDURE — 97035 APP MDLTY 1+ULTRASOUND EA 15: CPT | Mod: PN | Performed by: PHYSICAL THERAPIST

## 2020-03-24 RX ORDER — TIZANIDINE 2 MG/1
2 TABLET ORAL EVERY 8 HOURS PRN
Qty: 30 TABLET | Refills: 0 | Status: SHIPPED | OUTPATIENT
Start: 2020-03-24 | End: 2021-02-07

## 2020-03-24 NOTE — PLAN OF CARE
Physical Therapy Progress Note     Name: Danuta Santos  Clinic Number: [id number]  Diagnosis:   Encounter Diagnosis   Name Primary?    Chronic bilateral low back pain with right-sided sciatica Yes     Physician: Erica Khan MD  Treatment Orders: PT Eval and Treat  Past Medical History:   Diagnosis Date    Allergy     IBS (irritable bowel syndrome)     Pneumonia 02/2019       Precautions: as per diagnosis    Evaluation date: 2/15/2018  Visit # authorized: 57/64 on 3/24/2020  Authorization period: 12-31-20  Plan of care expiration: 5-5-20  MD Follow up appt: none scheduled    Time In:  12:50  Time Out: 1:40  Billable time:  48  min    Subjective     Pt reports: having trouble with being able to self mobilize. 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, working on HEP and trying to self mobilize and feeling that she is not being successful     Pain Scale: before treatment: 5/10  after treatment:feels better, did not provide number  Objective     AR R pelvis  Severe tightness lumbar paraspinals       TREATMENT  (NP)Therapeutic exercise: Danuta received therapeutic exercises to develop strength, endurance, ROM, flexibility and core stabilization for 0 minutes including:    HS stretch supine   Glut stretch  Piriformis stretch  B QL stretch  Contract relax R glut x 3  Pt was able to self mobilize today after independent stretch, but still felt very tight and noted severe tightness as noted above     Performed contract relax sidelying L and realigned at end of treatment    HELD today  Bridge x 15  Pelvic tilt  X 20 for 5 sec hold  Partial sit up x 20  Hip abd sidelying x 20 B  SLR x 20  Arm and Leg lift prone x 2/10  Hip ext prone with bent knee x 2/10  Contract relax R glut    Sidelying L contract relax mob to L5-S1 region to level pelvis at end       HOLD FOR NOW  Heel raises standing x 15    Step up x10     1 plank x 30 sec held the other 3 she normally  "does     (NP)Plank 4 reps 30 sec each          Manual therapy: Danuta  received the following manual therapy  techniques x 40  min. 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        Pt received tool-assisted massage with manual therapy techniques to R LB in sidelying with pillow at waist putting pt in SB L to trigger an inflammatory healing response and stimulate the production of new collagen and proper, more functional, less painful healing.    Vacuum/cupping STM with manual therapy techniques was performed to back and gluteals to decrease muscle tightness, increase circulation and promote healing process.  The pt's skin was monitored for redness adjusting pressure as needed. The pt was instructed in possible side effects of bruising and/or soreness.      Kinesiotape with 6" star for pain relief was performed over the R lumbar paraspinals.L3-4 region  Instructed pt in use, care and precautions with tape.    Ultrasound  for pain control and to decrease inflammation @ continuous duty cycle, 1 Mhz, applied to R lumbar paraspinals, intensity = 1.8 w/cm2 for 8 minutes.       (NP)Patient received pre-mod electrical stimulation to decrease muscle tightness and pain to Lumbar paraspinals/ sacral border of gluteals B for 15 minutes with MH supine with cycle time: continuous, beat frequency: , CC/CV: CV.        Written Home Exercises Provided: none today  Pt demo good understanding of the education provided. Danuta demonstrated good return demonstration of " activities.     Pt. education:  · Posture reeducation, body mechanics, HEP,   · No spiritual or educational barriers to learning provided  · Pt has no cultural, educational or language barriers to learning provided.    Assessment   Pt with level pelvis after treatment and report decreased symptoms after treatment.  Pt with increased tightness and unable to self mobilize again.  Addition of tools and US today may have decreased tightness enough to assist pt with self mobilization.  Pt also under stress with COVID-19 concerns  Pt to add MH and TENS at home more often and consider ice to further decrease tightness      Pt will continue to benefit from skilled outpatient physical therapy to address the remaining functional deficits, provide pt/family education, and to maximize pt's level of independence in the home and community environment. .     GOALS:   Short Term Goals:  3 weeks MET STG's  Increase range of motion 25%  Increase strength 1/2 muscle grade  Improve postural awareness of pelvis to independently identify dysfunction with min assist from PT  Be able to perform HEP with minimal cueing required     Long Term Goals: 6 weeks PARTIALLY MET LTG'S  Increase range of motion to 75% to 100% full   Improve muscle strength 1 muscle grade  Improve muscle strength with MMT to 4+/5 to 5/5  Improve and stabilize proper pelvic positioning  Restore ability to sit for ADL and work with min to 0 pain  Restore normal sleep habits without disturbances due to pain  Restore ability to perform ADL's and household activities independently with min to 0 pain    Anticipated barriers to physical therapy: none  Pt's spiritual, cultural and educational needs considered and pt agreeable to plan of care and goals        Plan   If you concur, I recommend patient continue with physical therapy 1-2 times a week  for 6 weeks as needed.  Please advise us of your  recommendations. Thank you for allowing us to assist in the care of your  patient.      Jacy Escobar, MS, PT          I certify the need for these services furnished under this plan of treatment and while under my care.    ____________________________________ Physician/Referring Practitioner                                Date of Signature                            .

## 2020-03-24 NOTE — PROGRESS NOTES
Physical Therapy Progress Note     Name: Danuta Santos  Clinic Number: [id number]  Diagnosis:   Encounter Diagnosis   Name Primary?    Chronic bilateral low back pain with right-sided sciatica Yes     Physician: Erica Khan MD  Treatment Orders: PT Eval and Treat  Past Medical History:   Diagnosis Date    Allergy     IBS (irritable bowel syndrome)     Pneumonia 02/2019       Precautions: as per diagnosis    Evaluation date: 2/15/2018  Visit # authorized: 57/64 on 3/24/2020  Authorization period: 12-31-20  Plan of care expiration: 5-5-20  MD Follow up appt: none scheduled    Time In:  12:50  Time Out: 1:40  Billable time:  48  min    Subjective     Pt reports: having trouble with being able to self mobilize. 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, working on HEP and trying to self mobilize and feeling that she is not being successful     Pain Scale: before treatment: 5/10  after treatment:feels better, did not provide number  Objective     AR R pelvis  Severe tightness lumbar paraspinals       TREATMENT  (NP)Therapeutic exercise: Danuta received therapeutic exercises to develop strength, endurance, ROM, flexibility and core stabilization for 0 minutes including:    HS stretch supine   Glut stretch  Piriformis stretch  B QL stretch  Contract relax R glut x 3  Pt was able to self mobilize today after independent stretch, but still felt very tight and noted severe tightness as noted above     Performed contract relax sidelying L and realigned at end of treatment    HELD today  Bridge x 15  Pelvic tilt  X 20 for 5 sec hold  Partial sit up x 20  Hip abd sidelying x 20 B  SLR x 20  Arm and Leg lift prone x 2/10  Hip ext prone with bent knee x 2/10  Contract relax R glut    Sidelying L contract relax mob to L5-S1 region to level pelvis at end       HOLD FOR NOW  Heel raises standing x 15    Step up x10     1 plank x 30 sec held the other 3 she normally  "does     (NP)Plank 4 reps 30 sec each          Manual therapy: Danuta  received the following manual therapy  techniques x 40  min. 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        Pt received tool-assisted massage with manual therapy techniques to R LB in sidelying with pillow at waist putting pt in SB L to trigger an inflammatory healing response and stimulate the production of new collagen and proper, more functional, less painful healing.    Vacuum/cupping STM with manual therapy techniques was performed to back and gluteals to decrease muscle tightness, increase circulation and promote healing process.  The pt's skin was monitored for redness adjusting pressure as needed. The pt was instructed in possible side effects of bruising and/or soreness.      Kinesiotape with 6" star for pain relief was performed over the R lumbar paraspinals.L3-4 region  Instructed pt in use, care and precautions with tape.    Ultrasound  for pain control and to decrease inflammation @ continuous duty cycle, 1 Mhz, applied to R lumbar paraspinals, intensity = 1.8 w/cm2 for 8 minutes.       (NP)Patient received pre-mod electrical stimulation to decrease muscle tightness and pain to Lumbar paraspinals/ sacral border of gluteals B for 15 minutes with MH supine with cycle time: continuous, beat frequency: , CC/CV: CV.        Written Home Exercises Provided: none today  Pt demo good understanding of the education provided. Danuta demonstrated good return demonstration of " activities.     Pt. education:  · Posture reeducation, body mechanics, HEP,   · No spiritual or educational barriers to learning provided  · Pt has no cultural, educational or language barriers to learning provided.    Assessment   Pt with level pelvis after treatment and report decreased symptoms after treatment.  Pt with increased tightness and unable to self mobilize again.  Addition of tools and US today may have decreased tightness enough to assist pt with self mobilization.  Pt also under stress with COVID-19 concerns  Pt to add MH and TENS at home more often and consider ice to further decrease tightness      Pt will continue to benefit from skilled outpatient physical therapy to address the remaining functional deficits, provide pt/family education, and to maximize pt's level of independence in the home and community environment. .     GOALS:   Short Term Goals:  3 weeks MET STG's  Increase range of motion 25%  Increase strength 1/2 muscle grade  Improve postural awareness of pelvis to independently identify dysfunction with min assist from PT  Be able to perform HEP with minimal cueing required     Long Term Goals: 6 weeks PARTIALLY MET LTG'S  Increase range of motion to 75% to 100% full   Improve muscle strength 1 muscle grade  Improve muscle strength with MMT to 4+/5 to 5/5  Improve and stabilize proper pelvic positioning  Restore ability to sit for ADL and work with min to 0 pain  Restore normal sleep habits without disturbances due to pain  Restore ability to perform ADL's and household activities independently with min to 0 pain    Anticipated barriers to physical therapy: none  Pt's spiritual, cultural and educational needs considered and pt agreeable to plan of care and goals        Plan   If you concur, I recommend patient continue with physical therapy 1-2 times a week  for 6 weeks as needed.  Please advise us of your  recommendations. Thank you for allowing us to assist in the care of your  patient.      Jacy Escobar, MS, PT          I certify the need for these services furnished under this plan of treatment and while under my care.    ____________________________________ Physician/Referring Practitioner                                Date of Signature                            .

## 2020-03-24 NOTE — TELEPHONE ENCOUNTER
----- Message from Anika Muhammad sent at 3/24/2020  2:00 PM CDT -----  Contact: self [phone]  Pt is requesting a muscle relaxer due to the physical therapy. Please call and advise.

## 2020-03-26 ENCOUNTER — OFFICE VISIT (OUTPATIENT)
Dept: INTERNAL MEDICINE | Facility: CLINIC | Age: 65
End: 2020-03-26
Payer: COMMERCIAL

## 2020-03-26 DIAGNOSIS — F41.9 ANXIETY: Primary | ICD-10-CM

## 2020-03-26 DIAGNOSIS — M62.838 MUSCLE SPASM: ICD-10-CM

## 2020-03-26 PROCEDURE — 99213 PR OFFICE/OUTPT VISIT, EST, LEVL III, 20-29 MIN: ICD-10-PCS | Mod: 95,,, | Performed by: INTERNAL MEDICINE

## 2020-03-26 PROCEDURE — 99213 OFFICE O/P EST LOW 20 MIN: CPT | Mod: 95,,, | Performed by: INTERNAL MEDICINE

## 2020-03-26 PROCEDURE — 1101F PR PT FALLS ASSESS DOC 0-1 FALLS W/OUT INJ PAST YR: ICD-10-PCS | Mod: CPTII,,, | Performed by: INTERNAL MEDICINE

## 2020-03-26 PROCEDURE — 1101F PT FALLS ASSESS-DOCD LE1/YR: CPT | Mod: CPTII,,, | Performed by: INTERNAL MEDICINE

## 2020-03-26 RX ORDER — TIZANIDINE 2 MG/1
2 TABLET ORAL EVERY 8 HOURS PRN
Qty: 21 TABLET | Refills: 0 | Status: SHIPPED | OUTPATIENT
Start: 2020-03-26 | End: 2020-04-05

## 2020-03-26 RX ORDER — ALPRAZOLAM 0.25 MG/1
0.25 TABLET ORAL DAILY PRN
Qty: 30 TABLET | Refills: 0 | Status: SHIPPED | OUTPATIENT
Start: 2020-03-26 | End: 2021-02-07

## 2020-03-26 NOTE — PROGRESS NOTES
Subjective:      Patient ID: Danuta Santos is a 64 y.o. female.    Chief Complaint: Anxiety and Back Pain    HPI:  HPI   The patient location is:home  The chief complaint leading to consultation is: Discussion of back pain and muscle tension  Visit type: Virtual visit with synchronous audio and video  Total time spent with patient: 15 min  Each patient to whom he or she provides medical services by telemedicine is:  (1) informed of the relationship between the physician and patient and the respective role of any other health care provider with respect to management of the patient; and (2) notified that he or she may decline to receive medical services by telemedicine and may withdraw from such care at any time.    Notes:    With current situation patient has had increased anxiety. She is also going through a divorce. She has used 1/2 of a xanax 0.25 in the past.    She has also had recent back spasm and it was suggested the she might try ad muscle relaxer. She would usually do PT but this is not possible    Patient has an underlying lung disorder and wanted to know the protocol if she got sick    Patient Active Problem List   Diagnosis    Chronic bilateral low back pain with right-sided sciatica    Reactive airway disease without complication    ASCVD (arteriosclerotic cardiovascular disease) 3.9%    Other neutropenia    Chronic fatigue     Past Medical History:   Diagnosis Date    Allergy     IBS (irritable bowel syndrome)     Pneumonia 2019     Past Surgical History:   Procedure Laterality Date     SECTION      NOSE SURGERY      SINUS SURGERY  2019     Family History   Problem Relation Age of Onset    Cancer Father     Stroke Maternal Grandfather     Breast cancer Neg Hx     Colon cancer Neg Hx     Diabetes Neg Hx     Eclampsia Neg Hx     Hypertension Neg Hx     Miscarriages / Stillbirths Neg Hx     Ovarian cancer Neg Hx      labor Neg Hx     Allergic rhinitis Neg  Hx     Allergies Neg Hx     Angioedema Neg Hx     Asthma Neg Hx     Eczema Neg Hx     Immunodeficiency Neg Hx     Rhinitis Neg Hx     Urticaria Neg Hx     Atopy Neg Hx      Review of Systems   Constitutional: Negative for activity change, chills, fever and unexpected weight change.   Respiratory: Negative for cough, chest tightness, shortness of breath and wheezing.    Cardiovascular: Negative for chest pain, palpitations and leg swelling.     Objective:   There were no vitals filed for this visit.  There is no height or weight on file to calculate BMI.  Physical Exam   Constitutional: She appears well-developed and well-nourished. No distress.     Assessment:     1. Anxiety    2. Muscle spasm      Plan:   Danuta was seen today for anxiety and back pain.    Diagnoses and all orders for this visit:    Anxiety  Comments:  xanax 0.25 mg patient will take 1/2 at a time  Orders:  -     ALPRAZolam (XANAX) 0.25 MG tablet; Take 1 tablet (0.25 mg total) by mouth daily as needed for Anxiety.    Muscle spasm  Comments:  Tizanidine 2mg at bedtime  Orders:  -     tiZANidine (ZANAFLEX) 2 MG tablet; Take 1 tablet (2 mg total) by mouth every 8 (eight) hours as needed. For muscle spasm: will cause drowsiness        Problem List Items Addressed This Visit     None      Visit Diagnoses     Anxiety    -  Primary    xanax 0.25 mg patient will take 1/2 at a time    Relevant Medications    ALPRAZolam (XANAX) 0.25 MG tablet    Muscle spasm        Tizanidine 2mg at bedtime    Relevant Medications    tiZANidine (ZANAFLEX) 2 MG tablet        No orders of the defined types were placed in this encounter.    No follow-ups on file.     Medication List           Accurate as of March 26, 2020  4:01 PM. If you have any questions, ask your nurse or doctor.               CHANGE how you take these medications    * ALPRAZolam 0.25 MG tablet  Commonly known as:  XANAX  Take 1 tablet (0.25 mg total) by mouth nightly as needed (headache).  What  changed:  Another medication with the same name was added. Make sure you understand how and when to take each.  Changed by:  Erica Khan MD     * ALPRAZolam 0.25 MG tablet  Commonly known as:  XANAX  Take 1 tablet (0.25 mg total) by mouth daily as needed for Anxiety.  What changed:  You were already taking a medication with the same name, and this prescription was added. Make sure you understand how and when to take each.  Changed by:  Erica Khan MD     * tiZANidine 2 MG tablet  Commonly known as:  ZANAFLEX  Take 1 tablet (2 mg total) by mouth every 8 (eight) hours as needed.  What changed:  Another medication with the same name was added. Make sure you understand how and when to take each.  Changed by:  Erica Khan MD     * tiZANidine 2 MG tablet  Commonly known as:  ZANAFLEX  Take 1 tablet (2 mg total) by mouth every 8 (eight) hours as needed. For muscle spasm: will cause drowsiness  What changed:  You were already taking a medication with the same name, and this prescription was added. Make sure you understand how and when to take each.  Changed by:  Erica Khan MD         * This list has 4 medication(s) that are the same as other medications prescribed for you. Read the directions carefully, and ask your doctor or other care provider to review them with you.            CONTINUE taking these medications    azelastine 137 mcg (0.1 %) nasal spray  Commonly known as:  ASTELIN     cefdinir 300 MG capsule  Commonly known as:  OMNICEF     cetirizine 10 MG tablet  Commonly known as:  ZYRTEC     PROBIOTIC COMPLEX ORAL     VITAMIN B-12 500 MCG tablet  Generic drug:  cyanocobalamin     vitamin D 1000 units Tab  Commonly known as:  VITAMIN D3           Where to Get Your Medications      These medications were sent to [facility]/pharmacy #[id number] - [place], [place] - [address]  [address] [id number]    Hours:  24-hours Phone:  [phone]   · ALPRAZolam 0.25 MG tablet  · tiZANidine 2 MG  tablet

## 2020-04-07 ENCOUNTER — TELEPHONE (OUTPATIENT)
Dept: HEMATOLOGY/ONCOLOGY | Facility: CLINIC | Age: 65
End: 2020-04-07

## 2020-04-07 DIAGNOSIS — R53.82 CHRONIC FATIGUE: Primary | ICD-10-CM

## 2020-04-07 DIAGNOSIS — D70.8 OTHER NEUTROPENIA: ICD-10-CM

## 2020-05-04 ENCOUNTER — CLINICAL SUPPORT (OUTPATIENT)
Dept: REHABILITATION | Facility: HOSPITAL | Age: 65
End: 2020-05-04
Payer: COMMERCIAL

## 2020-05-04 DIAGNOSIS — G89.29 CHRONIC BILATERAL LOW BACK PAIN WITH RIGHT-SIDED SCIATICA: Primary | ICD-10-CM

## 2020-05-04 DIAGNOSIS — M54.41 CHRONIC BILATERAL LOW BACK PAIN WITH RIGHT-SIDED SCIATICA: Primary | ICD-10-CM

## 2020-05-04 PROCEDURE — 97140 MANUAL THERAPY 1/> REGIONS: CPT | Mod: PN

## 2020-05-04 NOTE — PROGRESS NOTES
Physical Therapy Daily Note     Name: Danuta Santos  Clinic Number: [id number]  Diagnosis:   Encounter Diagnosis   Name Primary?    Chronic bilateral low back pain with right-sided sciatica Yes     Physician: Erica Khan MD  Treatment Orders: PT Eval and Treat  Past Medical History:   Diagnosis Date    Allergy     IBS (irritable bowel syndrome)     Pneumonia 02/2019     Precautions: as per diagnosis    Evaluation date: 2/15/2018  Visit # authorized: 57/64 on 5/4/2020  Authorization period: 12-31-20  Plan of care expiration: 5-5-20  MD Follow up appt: none scheduled    Time In:  4:04  Time Out: 4:50  Billable time:  46  min    Subjective     Pt reports: significant tightness to R lumbar spine and unable to self mobilize successfully. She reports feeling like her pelvis is not aligned.   Pain Scale: before treatment: 5/10  after treatment:feels better, did not provide number  Objective     AR R pelvis  Severe tightness lumbar paraspinals R>L    TREATMENT  (NP)Therapeutic exercise: Danuta received therapeutic exercises to develop strength, endurance, ROM, flexibility and core stabilization for 0 minutes including:    HS stretch supine   Glut stretch  Piriformis stretch  B QL stretch  Contract relax R glut x 3  Pt was able to self mobilize today after independent stretch, but still felt very tight and noted severe tightness as noted above     Performed contract relax sidelying L and realigned at end of treatment    HELD today  Bridge x 15  Pelvic tilt  X 20 for 5 sec hold  Partial sit up x 20  Hip abd sidelying x 20 B  SLR x 20  Arm and Leg lift prone x 2/10  Hip ext prone with bent knee x 2/10  Contract relax R glut    HOLD FOR NOW  Heel raises standing x 15    Step up x10     1 plank x 30 sec held the other 3 she normally does     (NP)Plank 4 reps 30 sec each     Manual therapy: Danuta  received the following manual therapy  techniques x 46  min. to include soft tissue and joint mobilization were  "applied to the: low back and gluteals to include:  Performed STM to LB paraspinals and QL R>L  P/A mob to lumbar region Grade 1-2  Sidelying mob stretch for SI sidelying L performed 2 more times and was finally successful.  Sidelying L contract relax mobilization to L5-S1 region to level pelvis at end   Pt received tool-assisted massage with manual therapy techniques to R LB in sidelying with pillow at waist putting pt in SB L to trigger an inflammatory healing response and stimulate the production of new collagen and proper, more functional, less painful healing.    Vacuum/cupping STM with manual therapy techniques was performed to back and gluteals to decrease muscle tightness, increase circulation and promote healing process.  The pt's skin was monitored for redness adjusting pressure as needed. The pt was instructed in possible side effects of bruising and/or soreness.      (NP)Kinesiotape with 6" star for pain relief was performed over the R lumbar paraspinals.L3-4 region  Instructed pt in use, care and precautions with tape.    (NP)Ultrasound  for pain control and to decrease inflammation @ continuous duty cycle, 1 Mhz, applied to R lumbar paraspinals, intensity = 1.8 w/cm2 for 8 minutes.     (NP)Patient received pre-mod electrical stimulation to decrease muscle tightness and pain to Lumbar paraspinals/ sacral border of gluteals B for 15 minutes with MH supine with cycle time: continuous, beat frequency: , CC/CV: CV.      Written Home Exercises Provided: none today  Pt demo good understanding of the education provided. Danuta demonstrated good return demonstration of activities.     Pt. education:  · Posture reeducation, body mechanics, HEP,   · No spiritual or educational barriers to learning provided  · Pt has no cultural, educational or language barriers to learning provided.    Assessment   Pt presents with significant tightness to lumbar and lower thoracic paraspinals and moderate tightness to R " gluts. She presents with anterior rotation of R innominate prior to treatment and level pelvis post treatment. Pt continues with stress with COVID-19 concerns.        Pt will continue to benefit from skilled outpatient physical therapy to address the remaining functional deficits, provide pt/family education, and to maximize pt's level of independence in the home and community environment. .     GOALS:   Short Term Goals:  3 weeks MET STG's  Increase range of motion 25%  Increase strength 1/2 muscle grade  Improve postural awareness of pelvis to independently identify dysfunction with min assist from PT  Be able to perform HEP with minimal cueing required     Long Term Goals: 6 weeks PARTIALLY MET LTG'S  Increase range of motion to 75% to 100% full   Improve muscle strength 1 muscle grade  Improve muscle strength with MMT to 4+/5 to 5/5  Improve and stabilize proper pelvic positioning  Restore ability to sit for ADL and work with min to 0 pain  Restore normal sleep habits without disturbances due to pain  Restore ability to perform ADL's and household activities independently with min to 0 pain    Anticipated barriers to physical therapy: none  Pt's spiritual, cultural and educational needs considered and pt agreeable to plan of care and goals        Plan     Pt to continue with attempting self mobilization for pain reduction. Pt to return if unable to self mobilize.                                   .

## 2020-05-25 ENCOUNTER — CLINICAL SUPPORT (OUTPATIENT)
Dept: REHABILITATION | Facility: HOSPITAL | Age: 65
End: 2020-05-25
Payer: COMMERCIAL

## 2020-05-25 DIAGNOSIS — M54.41 CHRONIC BILATERAL LOW BACK PAIN WITH RIGHT-SIDED SCIATICA: Primary | ICD-10-CM

## 2020-05-25 DIAGNOSIS — G89.29 CHRONIC BILATERAL LOW BACK PAIN WITH RIGHT-SIDED SCIATICA: Primary | ICD-10-CM

## 2020-05-25 PROCEDURE — 97110 THERAPEUTIC EXERCISES: CPT | Mod: PN | Performed by: PHYSICAL THERAPIST

## 2020-05-25 PROCEDURE — 97140 MANUAL THERAPY 1/> REGIONS: CPT | Mod: PN | Performed by: PHYSICAL THERAPIST

## 2020-05-25 PROCEDURE — 97014 ELECTRIC STIMULATION THERAPY: CPT | Mod: PN | Performed by: PHYSICAL THERAPIST

## 2020-05-25 NOTE — PLAN OF CARE
"Physical Therapy Progress Note     Name: Danuta Santos  Clinic Number: [id number]  Diagnosis:   Encounter Diagnosis   Name Primary?    Chronic bilateral low back pain with right-sided sciatica Yes     Physician: Erica Khan MD  Treatment Orders: PT Eval and Treat  Past Medical History:   Diagnosis Date    Allergy     IBS (irritable bowel syndrome)     Pneumonia 02/2019     Precautions: as per diagnosis    Evaluation date: 2/15/2018  Visit # authorized: 57/64 on 5/25/2020  Authorization period: 12-31-20  Plan of care expiration: 7-6-20  MD Follow up appt: none scheduled    Time In:  10:47  Time Out: 11:35  Billable time:  45  min    Subjective     Pt reports: has been doing some painting for house and standing on ladders and squatting and having some back pain     Pain Scale: before treatment: 5-6/10  after treatment:feels better, 2-3/10  Objective     AR R pelvis  Severe tightness lumbar paraspinals R>L    Lumbar active range of motion in standing is: 5-25-20  - flexion - ankle                     - extension -  25%                         - left side bending -  2" above knee         - right side bending -  2" above knee    Muscle Strength 5-25-20  MMT R L   Hip flexion 4-/5 4-/5   Hip abduction 4+/5 4+/5   Hip extension 3/5 3+/5   Glut max 3-/5 3/5        Knee extension 5/5 5/5   Knee flexion 5/5 5/5                 TREATMENT  Therapeutic exercise: Danuta received therapeutic exercises to develop strength, endurance, ROM, flexibility and core stabilization for 15 minutes including:    HS stretch supine   Glut stretch  Piriformis stretch  B QL stretch  Contract relax R glut x 3  Pt was unable to self mobilize today after independent stretch, but still felt very tight and noted severe tightness as noted above     Performed contract relax sidelying L and realigned at end of treatment    Instructed pt in need to get back to strengthening HEP and verbally discussed resuming SLR, bridge and arm and leg lift " "prone to start.      HELD today  Bridge x 15  Pelvic tilt  X 20 for 5 sec hold  Partial sit up x 20  Hip abd sidelying x 20 B  SLR x 20  Arm and Leg lift prone x 2/10  Hip ext prone with bent knee x 2/10  Contract relax R glut    HOLD FOR NOW  Heel raises standing x 15    Step up x10     1 plank x 30 sec held the other 3 she normally does     (NP)Plank 4 reps 30 sec each     Manual therapy: Danuta  received the following manual therapy  techniques x 20  min. to include soft tissue and joint mobilization were applied to the: low back and gluteals to include:  Performed STM to LB paraspinals and QL R>L  P/A mob to lumbar region Grade 1-2  Sidelying mob stretch for SI sidelying L performed 2 more times and was finally successful.  Sidelying L contract relax mobilization to L5-S1 region to level pelvis at end   Pt received tool-assisted massage with manual therapy techniques to R LB in prone to trigger an inflammatory healing response and stimulate the production of new collagen and proper, more functional, less painful healing.    Vacuum/cupping STM with manual therapy techniques was performed to back and gluteals to decrease muscle tightness, increase circulation and promote healing process.  The pt's skin was monitored for redness adjusting pressure as needed. The pt was instructed in possible side effects of bruising and/or soreness.      Kinesiotape with 6" star for pain relief was performed over the R lumbar paraspinals.L3-4 region  Instructed pt in use, care and precautions with tape.    (NP)Ultrasound  for pain control and to decrease inflammation @ continuous duty cycle, 1 Mhz, applied to R lumbar paraspinals, intensity = 1.8 w/cm2 for 8 minutes.     Patient received pre-mod electrical stimulation to decrease muscle tightness and pain to Lumbar paraspinals/ sacral border of gluteals B for 10 minutes with MH supine with cycle time: continuous, beat frequency: , CC/CV: CV.      Written Home Exercises " Provided: none today  Pt demo good understanding of the education provided. Danuta demonstrated good return demonstration of activities.     Pt. education:  · Posture reeducation, body mechanics, HEP,   · No spiritual or educational barriers to learning provided  · Pt has no cultural, educational or language barriers to learning provided.    Assessment   Pt presents with significant tightness to lumbar and lower thoracic paraspinals and moderate tightness to R gluts. She presents with anterior rotation of R innominate prior to treatment and level pelvis post treatment and decreased pain Pt continues with stress with COVID-19 concerns and working on house getting ready to sell.  Pt has not been as compliant with HEP and understands need to resume strengthening due to loss of strength since last testing.  Pt will benefit from continued PT as pt has increased physical and emotional demands to held with stress and decreased pain to improve functional abilities        Pt will continue to benefit from skilled outpatient physical therapy to address the remaining functional deficits, provide pt/family education, and to maximize pt's level of independence in the home and community environment. .     GOALS:   Short Term Goals:  3 weeks MET STG's  Increase range of motion 25%  Increase strength 1/2 muscle grade  Improve postural awareness of pelvis to independently identify dysfunction with min assist from PT  Be able to perform HEP with minimal cueing required     Long Term Goals: 6 weeks PARTIALLY MET LTG'S  Increase range of motion to 75% to 100% full   Improve muscle strength 1 muscle grade  Improve muscle strength with MMT to 4+/5 to 5/5  Improve and stabilize proper pelvic positioning  Restore ability to sit for ADL and work with min to 0 pain  Restore normal sleep habits without disturbances due to pain  Restore ability to perform ADL's and household activities independently with min to 0 pain    Anticipated barriers  to physical therapy: none  Pt's spiritual, cultural and educational needs considered and pt agreeable to plan of care and goals        Plan     If you concur, I recommend patient continue with physical therapy 1-2 times a week  for 6 weeks.  Please advise us of your  recommendations. Thank you for allowing us to assist in the care of your patient.      Jacy Escobar, MS, PT          I certify the need for these services furnished under this plan of treatment and while under my care.    ____________________________________ Physician/Referring Practitioner                                Date of Signature                                          .

## 2020-05-25 NOTE — PROGRESS NOTES
"Physical Therapy Progress Note     Name: Danuta Santos  Clinic Number: [id number]  Diagnosis:   Encounter Diagnosis   Name Primary?    Chronic bilateral low back pain with right-sided sciatica Yes     Physician: Erica Khan MD  Treatment Orders: PT Eval and Treat  Past Medical History:   Diagnosis Date    Allergy     IBS (irritable bowel syndrome)     Pneumonia 02/2019     Precautions: as per diagnosis    Evaluation date: 2/15/2018  Visit # authorized: 57/64 on 5/25/2020  Authorization period: 12-31-20  Plan of care expiration: 7-6-20  MD Follow up appt: none scheduled    Time In:  10:47  Time Out: 11:35  Billable time:  45  min    Subjective     Pt reports: has been doing some painting for house and standing on ladders and squatting and having some back pain     Pain Scale: before treatment: 5-6/10  after treatment:feels better, 2-3/10  Objective     AR R pelvis  Severe tightness lumbar paraspinals R>L    Lumbar active range of motion in standing is: 5-25-20  - flexion - ankle                     - extension -  25%                         - left side bending -  2" above knee         - right side bending -  2" above knee    Muscle Strength 5-25-20  MMT R L   Hip flexion 4-/5 4-/5   Hip abduction 4+/5 4+/5   Hip extension 3/5 3+/5   Glut max 3-/5 3/5        Knee extension 5/5 5/5   Knee flexion 5/5 5/5                 TREATMENT  Therapeutic exercise: Danuta received therapeutic exercises to develop strength, endurance, ROM, flexibility and core stabilization for 15 minutes including:    HS stretch supine   Glut stretch  Piriformis stretch  B QL stretch  Contract relax R glut x 3  Pt was unable to self mobilize today after independent stretch, but still felt very tight and noted severe tightness as noted above     Performed contract relax sidelying L and realigned at end of treatment    Instructed pt in need to get back to strengthening HEP and verbally discussed resuming SLR, bridge and arm and leg lift " "prone to start.      HELD today  Bridge x 15  Pelvic tilt  X 20 for 5 sec hold  Partial sit up x 20  Hip abd sidelying x 20 B  SLR x 20  Arm and Leg lift prone x 2/10  Hip ext prone with bent knee x 2/10  Contract relax R glut    HOLD FOR NOW  Heel raises standing x 15    Step up x10     1 plank x 30 sec held the other 3 she normally does     (NP)Plank 4 reps 30 sec each     Manual therapy: Danuta  received the following manual therapy  techniques x 20  min. to include soft tissue and joint mobilization were applied to the: low back and gluteals to include:  Performed STM to LB paraspinals and QL R>L  P/A mob to lumbar region Grade 1-2  Sidelying mob stretch for SI sidelying L performed 2 more times and was finally successful.  Sidelying L contract relax mobilization to L5-S1 region to level pelvis at end   Pt received tool-assisted massage with manual therapy techniques to R LB in prone to trigger an inflammatory healing response and stimulate the production of new collagen and proper, more functional, less painful healing.    Vacuum/cupping STM with manual therapy techniques was performed to back and gluteals to decrease muscle tightness, increase circulation and promote healing process.  The pt's skin was monitored for redness adjusting pressure as needed. The pt was instructed in possible side effects of bruising and/or soreness.      Kinesiotape with 6" star for pain relief was performed over the R lumbar paraspinals.L3-4 region  Instructed pt in use, care and precautions with tape.    (NP)Ultrasound  for pain control and to decrease inflammation @ continuous duty cycle, 1 Mhz, applied to R lumbar paraspinals, intensity = 1.8 w/cm2 for 8 minutes.     Patient received pre-mod electrical stimulation to decrease muscle tightness and pain to Lumbar paraspinals/ sacral border of gluteals B for 10 minutes with MH supine with cycle time: continuous, beat frequency: , CC/CV: CV.      Written Home Exercises " Provided: none today  Pt demo good understanding of the education provided. Danuta demonstrated good return demonstration of activities.     Pt. education:  · Posture reeducation, body mechanics, HEP,   · No spiritual or educational barriers to learning provided  · Pt has no cultural, educational or language barriers to learning provided.    Assessment   Pt presents with significant tightness to lumbar and lower thoracic paraspinals and moderate tightness to R gluts. She presents with anterior rotation of R innominate prior to treatment and level pelvis post treatment and decreased pain Pt continues with stress with COVID-19 concerns and working on house getting ready to sell.  Pt has not been as compliant with HEP and understands need to resume strengthening due to loss of strength since last testing.  Pt will benefit from continued PT as pt has increased physical and emotional demands to held with stress and decreased pain to improve functional abilities        Pt will continue to benefit from skilled outpatient physical therapy to address the remaining functional deficits, provide pt/family education, and to maximize pt's level of independence in the home and community environment. .     GOALS:   Short Term Goals:  3 weeks MET STG's  Increase range of motion 25%  Increase strength 1/2 muscle grade  Improve postural awareness of pelvis to independently identify dysfunction with min assist from PT  Be able to perform HEP with minimal cueing required     Long Term Goals: 6 weeks PARTIALLY MET LTG'S  Increase range of motion to 75% to 100% full   Improve muscle strength 1 muscle grade  Improve muscle strength with MMT to 4+/5 to 5/5  Improve and stabilize proper pelvic positioning  Restore ability to sit for ADL and work with min to 0 pain  Restore normal sleep habits without disturbances due to pain  Restore ability to perform ADL's and household activities independently with min to 0 pain    Anticipated barriers  to physical therapy: none  Pt's spiritual, cultural and educational needs considered and pt agreeable to plan of care and goals        Plan     If you concur, I recommend patient continue with physical therapy 1-2 times a week  for 6 weeks.  Please advise us of your  recommendations. Thank you for allowing us to assist in the care of your patient.      Jacy Escobar, MS, PT          I certify the need for these services furnished under this plan of treatment and while under my care.    ____________________________________ Physician/Referring Practitioner                                Date of Signature                                          .

## 2020-05-27 ENCOUNTER — TELEPHONE (OUTPATIENT)
Dept: INTERNAL MEDICINE | Facility: CLINIC | Age: 65
End: 2020-05-27

## 2020-05-27 ENCOUNTER — PATIENT MESSAGE (OUTPATIENT)
Dept: INTERNAL MEDICINE | Facility: CLINIC | Age: 65
End: 2020-05-27

## 2020-05-27 NOTE — TELEPHONE ENCOUNTER
Patient wanted to talk about a colonoscopy, Dr. Cordova will do it but the patient has had kinks and it was incomplete.  He is suggesting a virtual MRI colonoscopy.  She will send me the last colonoscopy.    Also the patient is going to try Amitiza, she had headaches on Linzess

## 2020-05-27 NOTE — TELEPHONE ENCOUNTER
----- Message from Julia Perez sent at 5/27/2020  7:38 AM CDT -----  Contact: Pt self Mobile [phone]  Patient would like a call back in regards to her saying that her gastroenterologist would like to do a procedure on her that she would like to discuss with you please.

## 2020-06-03 ENCOUNTER — CLINICAL SUPPORT (OUTPATIENT)
Dept: REHABILITATION | Facility: HOSPITAL | Age: 65
End: 2020-06-03
Payer: COMMERCIAL

## 2020-06-03 DIAGNOSIS — G89.29 CHRONIC BILATERAL LOW BACK PAIN WITH RIGHT-SIDED SCIATICA: Primary | ICD-10-CM

## 2020-06-03 DIAGNOSIS — M54.41 CHRONIC BILATERAL LOW BACK PAIN WITH RIGHT-SIDED SCIATICA: Primary | ICD-10-CM

## 2020-06-03 PROCEDURE — 97014 ELECTRIC STIMULATION THERAPY: CPT | Mod: PN | Performed by: PHYSICAL THERAPIST

## 2020-06-03 PROCEDURE — 97140 MANUAL THERAPY 1/> REGIONS: CPT | Mod: PN | Performed by: PHYSICAL THERAPIST

## 2020-06-03 NOTE — PROGRESS NOTES
Physical Therapy Daily Treatment Note     Name: Danuta Santos  Clinic Number: [id number]  Diagnosis:   Encounter Diagnosis   Name Primary?    Chronic bilateral low back pain with right-sided sciatica Yes     Physician: Erica Khan MD  Treatment Orders: PT Eval and Treat  Past Medical History:   Diagnosis Date    Allergy     IBS (irritable bowel syndrome)     Pneumonia 02/2019     Precautions: as per diagnosis    Evaluation date: 2/15/2018  Visit # authorized: 58/64 on 6/3/2020  Authorization period: 12-31-20  Plan of care expiration: 7-6-20  MD Follow up appt: none scheduled    Time In:  2:30  Time Out: 3:17  Billable time:  45  min    Subjective     Pt reports: has been doing some painting for house and standing on ladders and squatting and having some back pain     Pain Scale: before treatment: 5-6/10  after treatment:feels better, did not provide number  Objective     AR R pelvis  Severe tightness lumbar paraspinals R>L, mod tightness after treatment        TREATMENT  Therapeutic exercise: Danuta received therapeutic exercises to develop strength, endurance, ROM, flexibility and core stabilization for 5 minutes including:    HS stretch supine   Glut stretch  Piriformis stretch  B QL stretch  Contract relax R glut x 3  Pt was unable to self mobilize today after independent stretch, but still felt very tight and noted severe tightness as noted above     Performed contract relax sidelying L and realigned at end of treatment      HELD today  Bridge x 15  Pelvic tilt  X 20 for 5 sec hold  Partial sit up x 20  Hip abd sidelying x 20 B  SLR x 20  Arm and Leg lift prone x 2/10  Hip ext prone with bent knee x 2/10  Contract relax R glut    HOLD FOR NOW  Heel raises standing x 15    Step up x10     1 plank x 30 sec held the other 3 she normally does     (NP)Plank 4 reps 30 sec each     Manual therapy: Danuta  received the following manual therapy  techniques x 25  min. to include soft tissue and joint  "mobilization were applied to the: low back and gluteals to include:  Performed STM to LB paraspinals and QL R>L  P/A mob to lumbar region Grade 1-2  Sidelying mob stretch for SI sidelying L performed 2 more times and was finally successful.  Sidelying L contract relax mobilization to L5-S1 region to level pelvis at end   Pt received tool-assisted massage with manual therapy techniques to R LB in prone to trigger an inflammatory healing response and stimulate the production of new collagen and proper, more functional, less painful healing.    Vacuum/cupping STM with manual therapy techniques was performed to back and gluteals to decrease muscle tightness, increase circulation and promote healing process.  The pt's skin was monitored for redness adjusting pressure as needed. The pt was instructed in possible side effects of bruising and/or soreness.      (NP) due to skin slightly reddened Kinesiotape with 6" star for pain relief was performed over the R lumbar paraspinals.L3-4 region  Instructed pt in use, care and precautions with tape.    (NP)Ultrasound  for pain control and to decrease inflammation @ continuous duty cycle, 1 Mhz, applied to R lumbar paraspinals, intensity = 1.8 w/cm2 for 8 minutes.     Patient received pre-mod electrical stimulation to decrease muscle tightness and pain to Lumbar paraspinals/ sacral border of gluteals B for 10 minutes with MH supine with cycle time: continuous, beat frequency: , CC/CV: CV.      Written Home Exercises Provided: none today  Pt demo good understanding of the education provided. Danuta demonstrated good return demonstration of activities.     Pt. education:  · Posture reeducation, body mechanics, HEP,   · No spiritual or educational barriers to learning provided  · Pt has no cultural, educational or language barriers to learning provided.    Assessment   Pt continues with severe muscle tightness as she continues to work on her house with painting activities " leading to pelvic dysfunction which she is unable to self mobilize.  Pt with level pelvis after treatment and decreased symptoms.  Pt understands importance of ex and feels she is nearly finished with painting which should help symptoms and muscle tightness   Pt will continue to benefit from skilled outpatient physical therapy to address the remaining functional deficits, provide pt/family education, and to maximize pt's level of independence in the home and community environment. .     GOALS:   Short Term Goals:  3 weeks MET STG's  Increase range of motion 25%  Increase strength 1/2 muscle grade  Improve postural awareness of pelvis to independently identify dysfunction with min assist from PT  Be able to perform HEP with minimal cueing required     Long Term Goals: 6 weeks PARTIALLY MET LTG'S  Increase range of motion to 75% to 100% full   Improve muscle strength 1 muscle grade  Improve muscle strength with MMT to 4+/5 to 5/5  Improve and stabilize proper pelvic positioning  Restore ability to sit for ADL and work with min to 0 pain  Restore normal sleep habits without disturbances due to pain  Restore ability to perform ADL's and household activities independently with min to 0 pain    Anticipated barriers to physical therapy: none  Pt's spiritual, cultural and educational needs considered and pt agreeable to plan of care and goals        Plan   Continue with established plan of care towards PT goals.    Resume therex as tolerated and HEP Continue to work on self mob and continue manual therapy and modalities                                           .

## 2020-06-04 ENCOUNTER — TELEPHONE (OUTPATIENT)
Dept: HEMATOLOGY/ONCOLOGY | Facility: CLINIC | Age: 65
End: 2020-06-04

## 2020-06-05 ENCOUNTER — TELEPHONE (OUTPATIENT)
Dept: HEMATOLOGY/ONCOLOGY | Facility: CLINIC | Age: 65
End: 2020-06-05

## 2020-06-12 ENCOUNTER — CLINICAL SUPPORT (OUTPATIENT)
Dept: REHABILITATION | Facility: HOSPITAL | Age: 65
End: 2020-06-12
Payer: COMMERCIAL

## 2020-06-12 DIAGNOSIS — M54.41 CHRONIC BILATERAL LOW BACK PAIN WITH RIGHT-SIDED SCIATICA: Primary | ICD-10-CM

## 2020-06-12 DIAGNOSIS — G89.29 CHRONIC BILATERAL LOW BACK PAIN WITH RIGHT-SIDED SCIATICA: Primary | ICD-10-CM

## 2020-06-12 PROCEDURE — 97110 THERAPEUTIC EXERCISES: CPT | Mod: PN | Performed by: PHYSICAL THERAPIST

## 2020-06-12 PROCEDURE — 97140 MANUAL THERAPY 1/> REGIONS: CPT | Mod: PN | Performed by: PHYSICAL THERAPIST

## 2020-06-12 PROCEDURE — 97014 ELECTRIC STIMULATION THERAPY: CPT | Mod: PN | Performed by: PHYSICAL THERAPIST

## 2020-06-12 NOTE — PROGRESS NOTES
Physical Therapy Daily Treatment Note     Name: Danuta Santos  Clinic Number: [id number]  Diagnosis:   Encounter Diagnosis   Name Primary?    Chronic bilateral low back pain with right-sided sciatica Yes     Physician: Erica Khan MD  Treatment Orders: PT Eval and Treat  Past Medical History:   Diagnosis Date    Allergy     IBS (irritable bowel syndrome)     Pneumonia 02/2019     Precautions: as per diagnosis    Evaluation date: 2/15/2018  Visit # authorized: 59/64 on 6/12/2020  Authorization period: 12-31-20  Plan of care expiration: 7-6-20  MD Follow up appt: none scheduled    Time In:  2:50  Time Out: 3:50  Billable time:  55  min    Subjective     Pt reports:  Just finished painting, was unable to self mobilize  Pt has been stretching and walking with short strides.  Pt has not been doing core strengthening        Pain Scale: before treatment: 5-6/10  after treatment:feels better, did not provide number  Objective     AR R pelvis  Severe tightness lumbar paraspinals R>L, mod tightness after treatment        TREATMENT  Therapeutic exercise: Danuta received therapeutic exercises to develop strength, endurance, ROM, flexibility and core stabilization for 15 minutes including:    HS stretch supine   Glut stretch  Piriformis stretch  B QL stretch  Contract relax R glut x 3  Pt was unable to self mobilize today after independent stretch, but still felt very tight and noted severe tightness as noted above      Bridge x 10  Pelvic tilt  X 10 for 5 sec hold  SLR x 10  Hip abd sidelying x 10 B, fatigue at end    HOLD FOr now  Partial sit up x 20  Arm and Leg lift prone x 2/10  Hip ext prone with bent knee x 2/10  Contract relax R glut    Heel raises standing x 15    Step up x10     1 plank x 30 sec held the other 3 she normally does     (NP)Plank 4 reps 30 sec each     Manual therapy: Danuta  received the following manual therapy  techniques x 25  min. to include soft tissue and joint mobilization were  "applied to the: low back and gluteals to include:  Performed STM to LB paraspinals and QL R>L  P/A mob to lumbar region Grade 1-2     Sidelying L contract relax mobilization to L5-S1 region to level pelvis at start and at end   Pt received tool-assisted massage with manual therapy techniques to R LB in prone to trigger an inflammatory healing response and stimulate the production of new collagen and proper, more functional, less painful healing.    Vacuum/cupping STM with manual therapy techniques was performed to back and gluteals to decrease muscle tightness, increase circulation and promote healing process.  The pt's skin was monitored for redness adjusting pressure as needed. The pt was instructed in possible side effects of bruising and/or soreness.      (NP) due to skin slightly reddened Kinesiotape with 6" star for pain relief was performed over the R lumbar paraspinals.L3-4 region  Instructed pt in use, care and precautions with tape.    (NP)Ultrasound  for pain control and to decrease inflammation @ continuous duty cycle, 1 Mhz, applied to R lumbar paraspinals, intensity = 1.8 w/cm2 for 8 minutes.     Patient received pre-mod electrical stimulation to decrease muscle tightness and pain to Lumbar paraspinals/ sacral border of gluteals B for 15 minutes with MH supine with cycle time: continuous, beat frequency: , CC/CV: CV.      Written Home Exercises Provided: none today  Pt demo good understanding of the education provided. Danuta demonstrated good return demonstration of activities.     Pt. education:  · Posture reeducation, body mechanics, HEP,   · No spiritual or educational barriers to learning provided  · Pt has no cultural, educational or language barriers to learning provided.    Assessment   Pt recently completed painting which has led to increased symptoms and increased muscle tightness resulting in being unsuccessful with self mob techniques.  Initiated strengthening again and was able to " self mob after stretching and strengthening after manual therapy was performed     Pt will continue to benefit from skilled outpatient physical therapy to address the remaining functional deficits, provide pt/family education, and to maximize pt's level of independence in the home and community environment. .     GOALS:   Short Term Goals:  3 weeks MET STG's  Increase range of motion 25%  Increase strength 1/2 muscle grade  Improve postural awareness of pelvis to independently identify dysfunction with min assist from PT  Be able to perform HEP with minimal cueing required     Long Term Goals: 6 weeks PARTIALLY MET LTG'S  Increase range of motion to 75% to 100% full   Improve muscle strength 1 muscle grade  Improve muscle strength with MMT to 4+/5 to 5/5  Improve and stabilize proper pelvic positioning  Restore ability to sit for ADL and work with min to 0 pain  Restore normal sleep habits without disturbances due to pain  Restore ability to perform ADL's and household activities independently with min to 0 pain    Anticipated barriers to physical therapy: none  Pt's spiritual, cultural and educational needs considered and pt agreeable to plan of care and goals        Plan   Continue with established plan of care towards PT goals.    Progress slowly with strengthening therex as tolerated and HEP Continue to work on self mob and continue manual therapy and modalities                                           .

## 2020-06-22 ENCOUNTER — CLINICAL SUPPORT (OUTPATIENT)
Dept: REHABILITATION | Facility: HOSPITAL | Age: 65
End: 2020-06-22
Payer: COMMERCIAL

## 2020-06-22 DIAGNOSIS — G89.29 CHRONIC BILATERAL LOW BACK PAIN WITH RIGHT-SIDED SCIATICA: Primary | ICD-10-CM

## 2020-06-22 DIAGNOSIS — M54.41 CHRONIC BILATERAL LOW BACK PAIN WITH RIGHT-SIDED SCIATICA: Primary | ICD-10-CM

## 2020-06-22 PROCEDURE — 97140 MANUAL THERAPY 1/> REGIONS: CPT | Mod: PN | Performed by: PHYSICAL THERAPIST

## 2020-06-22 PROCEDURE — 97110 THERAPEUTIC EXERCISES: CPT | Mod: PN | Performed by: PHYSICAL THERAPIST

## 2020-06-22 PROCEDURE — 97014 ELECTRIC STIMULATION THERAPY: CPT | Mod: PN | Performed by: PHYSICAL THERAPIST

## 2020-06-22 NOTE — PROGRESS NOTES
Physical Therapy Daily Treatment Note     Name: Danuta Santos  Clinic Number: [id number]  Diagnosis:   Encounter Diagnosis   Name Primary?    Chronic bilateral low back pain with right-sided sciatica Yes     Physician: Erica Khan MD  Treatment Orders: PT Eval and Treat  Past Medical History:   Diagnosis Date    Allergy     IBS (irritable bowel syndrome)     Pneumonia 02/2019     Precautions: as per diagnosis    Evaluation date: 2/15/2018  Visit # authorized: 60/72 on 6/22/2020  Authorization period: 12-31-20  Plan of care expiration: 7-6-20  MD Follow up appt: none scheduled    Time In:  3:14  Time Out: 4:15  Billable time:  55  min    Subjective     Pt reports: feeling very tight.  Not as much work around house so not quite as bad as previously Pt states doing exercises daily and contract relax as needed     Pain Scale: before treatment: 5-6/10  after treatment:feels better, did not provide number  Objective     AR R pelvis  Severe tightness lumbar paraspinals R>L, min-mod tightness after treatment    TREATMENT  Therapeutic exercise: Danuta received therapeutic exercises to develop strength, endurance, ROM, flexibility and core stabilization for 15 minutes including:    HS stretch supine   Glut stretch  Piriformis stretch  B QL stretch  Contract relax R glut x 3  Pt was able to self mobilize today after independent stretch, but still felt very tight and noted severe tightness as noted above      Bridge x 10  Pelvic tilt  X 10 for 5 sec hold   Partial sit up x 10  SLR x 10  Hip abd sidelying x 10 B, less fatigue at end   Arm and leg lift prone x 10  Contract relax R glut x 3    Pt able to self mobilize with addition of 2 more ex and maintain at 10 reps for now    HOLD FOr now  Partial sit up x 20    Hip ext prone with bent knee x 2/10      Heel raises standing x 15    Step up x10     1 plank x 30 sec held the other 3 she normally does     (NP)Plank 4 reps 30 sec each     Manual therapy: Danuta   "received the following manual therapy  techniques x 25  min. to include soft tissue and joint mobilization were applied to the: low back and gluteals to include:  Performed STM to LB paraspinals and QL R>L  P/A mob to lumbar region Grade 1-2     Sidelying L contract relax mobilization to L5-S1 region to level pelvis at start and at end   Pt received tool-assisted massage with manual therapy techniques to R LB in prone to trigger an inflammatory healing response and stimulate the production of new collagen and proper, more functional, less painful healing.    Vacuum/cupping STM with manual therapy techniques was performed to back and gluteals to decrease muscle tightness, increase circulation and promote healing process.  The pt's skin was monitored for redness adjusting pressure as needed. The pt was instructed in possible side effects of bruising and/or soreness.      (NP) due to skin slightly reddened Kinesiotape with 6" star for pain relief was performed over the R lumbar paraspinals.L3-4 region  Instructed pt in use, care and precautions with tape.    (NP)Ultrasound  for pain control and to decrease inflammation @ continuous duty cycle, 1 Mhz, applied to R lumbar paraspinals, intensity = 1.8 w/cm2 for 8 minutes.     Patient received pre-mod electrical stimulation to decrease muscle tightness and pain to Lumbar paraspinals/ sacral border of gluteals B for 15 minutes with MH supine with cycle time: continuous, beat frequency: , CC/CV: CV.      Written Home Exercises Provided: none today  Pt demo good understanding of the education provided. Danuta demonstrated good return demonstration of activities.     Pt. education:  · Posture reeducation, body mechanics, HEP,   · No spiritual or educational barriers to learning provided  · Pt has no cultural, educational or language barriers to learning provided.    Assessment   Pt with improved symptoms after treatment, able to self mobilize after exercise and able " to progress slightly with strengthening     Pt will continue to benefit from skilled outpatient physical therapy to address the remaining functional deficits, provide pt/family education, and to maximize pt's level of independence in the home and community environment. .     GOALS:   Short Term Goals:  3 weeks MET STG's  Increase range of motion 25%  Increase strength 1/2 muscle grade  Improve postural awareness of pelvis to independently identify dysfunction with min assist from PT  Be able to perform HEP with minimal cueing required     Long Term Goals: 6 weeks PARTIALLY MET LTG'S  Increase range of motion to 75% to 100% full   Improve muscle strength 1 muscle grade  Improve muscle strength with MMT to 4+/5 to 5/5  Improve and stabilize proper pelvic positioning  Restore ability to sit for ADL and work with min to 0 pain  Restore normal sleep habits without disturbances due to pain  Restore ability to perform ADL's and household activities independently with min to 0 pain    Anticipated barriers to physical therapy: none  Pt's spiritual, cultural and educational needs considered and pt agreeable to plan of care and goals        Plan   Continue with established plan of care towards PT goals.    Progress slowly with strengthening therex as tolerated and HEP Continue to work on self mob and continue manual therapy and modalities                                           .

## 2020-06-24 ENCOUNTER — TELEPHONE (OUTPATIENT)
Dept: OBSTETRICS AND GYNECOLOGY | Facility: CLINIC | Age: 65
End: 2020-06-24

## 2020-06-24 DIAGNOSIS — Z12.31 VISIT FOR SCREENING MAMMOGRAM: Primary | ICD-10-CM

## 2020-06-30 NOTE — PROGRESS NOTES
Physical Therapy Daily Treatment Note     Name: Danuta Santos  Clinic Number: [id number]  Diagnosis:   Encounter Diagnosis   Name Primary?    Chronic bilateral low back pain with right-sided sciatica Yes     Physician: Erica Khan MD  Treatment Orders: PT Eval and Treat  Past Medical History:   Diagnosis Date    Allergy     IBS (irritable bowel syndrome)     Pneumonia 02/2019     Precautions: as per diagnosis    Evaluation date: 2/15/2018  Visit # authorized: 60/72 on 6/30/2020  Authorization period: 12-31-20  Plan of care expiration: 7-6-20  MD Follow up appt: none scheduled    Time In:  10:45  Time Out: 12:10  Billable time:  63  min    Subjective     Pt reports:  She has been working around house getting ready to sell .  Pt states back is hurting and feeling tight after extra work Pt states she had added partial sit up to program since last visit      Pain Scale: before treatment: 5-6/10  after treatment:feels better, did not provide number  Objective     AR R pelvis  Severe tightness lumbar paraspinals R>L, min tightness after treatment  In the area that was taped an small circular area about 2-3 mm in size was noted that looked like a blood blister but was flat.  The area was noted before as a slight sore that was thought to be a result of skin irritation from taping but has not changed appearance. Picture taken and provided to pt and she is to send to her dermatologist       TREATMENT  Therapeutic exercise: Danuta received therapeutic exercises to develop strength, endurance, ROM, flexibility and core stabilization for 15 minutes including:    HS stretch supine   Glut stretch  Piriformis stretch  B QL stretch  Contract relax R glut x 3        Bridge x 10  Pelvic tilt  X 10 for 5 sec hold  Partial sit up x 10  SLR x 10  Hip abd sidelying x 10 B, fatigue at end   Arm and Leg lift prone x 1/10    HOLD FOr now      Hip ext prone with bent knee x 2/10  Contract relax R glut    Heel raises standing x  "15    Step up x10     1 plank x 30 sec held the other 3 she normally does     (NP)Plank 4 reps 30 sec each     Manual therapy: Danuta  received the following manual therapy  techniques x 25  min. to include soft tissue and joint mobilization were applied to the: low back and gluteals to include:  Performed STM to LB paraspinals and QL R>L  P/A mob to lumbar region Grade 1-2 , sidelying L contract relax to R QL    Sidelying L contract relax mobilization to L5-S1 region to level pelvis at start and at end   Pt received tool-assisted massage with manual therapy techniques to R LB in prone to trigger an inflammatory healing response and stimulate the production of new collagen and proper, more functional, less painful healing.    Vacuum/cupping STM with manual therapy techniques was performed to back and gluteals to decrease muscle tightness, increase circulation and promote healing process.  The pt's skin was monitored for redness adjusting pressure as needed. The pt was instructed in possible side effects of bruising and/or soreness.      (NP)  Kinesiotape with 6" star for pain relief was performed over the R lumbar paraspinals.L3-4 region  Instructed pt in use, care and precautions with tape.    Ultrasound  for pain control and to decrease inflammation @ continuous duty cycle, 1 Mhz, applied to R lumbar paraspinals, intensity = 1.8 w/cm2 for 8 minutes.    Patient received pre-mod electrical stimulation to decrease muscle tightness and pain to Lumbar paraspinals/ sacral border of gluteals B for 15 minutes with MH supine with cycle time: continuous, beat frequency: , CC/CV: CV.      Written Home Exercises Provided: none today  Pt demo good understanding of the education provided. Danuta demonstrated good return demonstration of activities.     Pt. education:  · Posture reeducation, body mechanics, HEP,   · No spiritual or educational barriers to learning provided  · Pt has no cultural, educational or " language barriers to learning provided.    Assessment   Pt is trying to sell her house and with getting house ready doing extra activity that leads to increased tightness, pain and dysfunction.  US added which led to further decrease in symptoms and decreased tightness in R lumbar paraspinals  Pt continues with only 10 reps currently and able to add arm and leg lift prone today and partial sit up was added since last visit and will continue to progress with resuming ex and continue at 10 reps and will progress reps very slowly to allow accomodation.       Pt will continue to benefit from skilled outpatient physical therapy to address the remaining functional deficits, provide pt/family education, and to maximize pt's level of independence in the home and community environment. .     GOALS:   Short Term Goals:  3 weeks MET STG's  Increase range of motion 25%  Increase strength 1/2 muscle grade  Improve postural awareness of pelvis to independently identify dysfunction with min assist from PT  Be able to perform HEP with minimal cueing required     Long Term Goals: 6 weeks PARTIALLY MET LTG'S  Increase range of motion to 75% to 100% full   Improve muscle strength 1 muscle grade  Improve muscle strength with MMT to 4+/5 to 5/5  Improve and stabilize proper pelvic positioning  Restore ability to sit for ADL and work with min to 0 pain  Restore normal sleep habits without disturbances due to pain  Restore ability to perform ADL's and household activities independently with min to 0 pain    Anticipated barriers to physical therapy: none  Pt's spiritual, cultural and educational needs considered and pt agreeable to plan of care and goals        Plan   Continue with established plan of care towards PT goals.    Progress slowly with strengthening therex as tolerated and HEP Continue to work on self mob and continue manual therapy and modalities                                           .

## 2020-07-01 ENCOUNTER — CLINICAL SUPPORT (OUTPATIENT)
Dept: REHABILITATION | Facility: HOSPITAL | Age: 65
End: 2020-07-01
Payer: COMMERCIAL

## 2020-07-01 DIAGNOSIS — G89.29 CHRONIC BILATERAL LOW BACK PAIN WITH RIGHT-SIDED SCIATICA: Primary | ICD-10-CM

## 2020-07-01 DIAGNOSIS — M54.41 CHRONIC BILATERAL LOW BACK PAIN WITH RIGHT-SIDED SCIATICA: Primary | ICD-10-CM

## 2020-07-01 PROCEDURE — 97110 THERAPEUTIC EXERCISES: CPT | Mod: PN | Performed by: PHYSICAL THERAPIST

## 2020-07-01 PROCEDURE — 97014 ELECTRIC STIMULATION THERAPY: CPT | Mod: PN | Performed by: PHYSICAL THERAPIST

## 2020-07-01 PROCEDURE — 97035 APP MDLTY 1+ULTRASOUND EA 15: CPT | Mod: PN | Performed by: PHYSICAL THERAPIST

## 2020-07-01 PROCEDURE — 97140 MANUAL THERAPY 1/> REGIONS: CPT | Mod: PN | Performed by: PHYSICAL THERAPIST

## 2020-07-02 ENCOUNTER — OFFICE VISIT (OUTPATIENT)
Dept: HEMATOLOGY/ONCOLOGY | Facility: CLINIC | Age: 65
End: 2020-07-02
Payer: COMMERCIAL

## 2020-07-02 DIAGNOSIS — K58.9 IRRITABLE BOWEL SYNDROME, UNSPECIFIED TYPE: ICD-10-CM

## 2020-07-02 DIAGNOSIS — D70.8 OTHER NEUTROPENIA: ICD-10-CM

## 2020-07-02 DIAGNOSIS — R53.82 CHRONIC FATIGUE: Primary | ICD-10-CM

## 2020-07-02 PROCEDURE — 99213 OFFICE O/P EST LOW 20 MIN: CPT | Mod: 95,,, | Performed by: INTERNAL MEDICINE

## 2020-07-02 PROCEDURE — 99213 PR OFFICE/OUTPT VISIT, EST, LEVL III, 20-29 MIN: ICD-10-PCS | Mod: 95,,, | Performed by: INTERNAL MEDICINE

## 2020-07-06 ENCOUNTER — HOSPITAL ENCOUNTER (OUTPATIENT)
Dept: RADIOLOGY | Facility: HOSPITAL | Age: 65
Discharge: HOME | End: 2020-07-06
Attending: OBSTETRICS & GYNECOLOGY
Payer: COMMERCIAL

## 2020-07-06 DIAGNOSIS — Z12.31 VISIT FOR SCREENING MAMMOGRAM: ICD-10-CM

## 2020-07-06 PROCEDURE — 77067 MAMMO DIGITAL SCREENING BILAT WITH TOMOSYNTHESIS_CAD: ICD-10-PCS | Mod: 26,,, | Performed by: RADIOLOGY

## 2020-07-06 PROCEDURE — 77067 SCR MAMMO BI INCL CAD: CPT | Mod: TC,PO

## 2020-07-06 PROCEDURE — 77063 BREAST TOMOSYNTHESIS BI: CPT | Mod: 26,,, | Performed by: RADIOLOGY

## 2020-07-06 PROCEDURE — 77067 SCR MAMMO BI INCL CAD: CPT | Mod: 26,,, | Performed by: RADIOLOGY

## 2020-07-06 PROCEDURE — 77063 MAMMO DIGITAL SCREENING BILAT WITH TOMOSYNTHESIS_CAD: ICD-10-PCS | Mod: 26,,, | Performed by: RADIOLOGY

## 2020-07-10 ENCOUNTER — PATIENT MESSAGE (OUTPATIENT)
Dept: INTERNAL MEDICINE | Facility: CLINIC | Age: 65
End: 2020-07-10

## 2020-07-10 ENCOUNTER — LAB VISIT (OUTPATIENT)
Dept: LAB | Facility: HOSPITAL | Age: 65
End: 2020-07-10
Attending: INTERNAL MEDICINE
Payer: COMMERCIAL

## 2020-07-10 DIAGNOSIS — K58.9 IRRITABLE BOWEL SYNDROME, UNSPECIFIED TYPE: ICD-10-CM

## 2020-07-10 DIAGNOSIS — D70.8 OTHER NEUTROPENIA: ICD-10-CM

## 2020-07-10 DIAGNOSIS — R53.82 CHRONIC FATIGUE: ICD-10-CM

## 2020-07-10 LAB
ALBUMIN SERPL BCP-MCNC: 4.3 G/DL (ref 3.5–5.2)
ALP SERPL-CCNC: 56 U/L (ref 55–135)
ALT SERPL W/O P-5'-P-CCNC: 32 U/L (ref 10–44)
ANION GAP SERPL CALC-SCNC: 7 MMOL/L (ref 8–16)
AST SERPL-CCNC: 27 U/L (ref 10–40)
BASOPHILS # BLD AUTO: 0.04 K/UL (ref 0–0.2)
BASOPHILS NFR BLD: 1 % (ref 0–1.9)
BILIRUB SERPL-MCNC: 0.5 MG/DL (ref 0.1–1)
BUN SERPL-MCNC: 17 MG/DL (ref 8–23)
CALCIUM SERPL-MCNC: 10.1 MG/DL (ref 8.7–10.5)
CHLORIDE SERPL-SCNC: 102 MMOL/L (ref 95–110)
CO2 SERPL-SCNC: 28 MMOL/L (ref 23–29)
CREAT SERPL-MCNC: 0.8 MG/DL (ref 0.5–1.4)
DIFFERENTIAL METHOD: ABNORMAL
EOSINOPHIL # BLD AUTO: 0 K/UL (ref 0–0.5)
EOSINOPHIL NFR BLD: 1 % (ref 0–8)
ERYTHROCYTE [DISTWIDTH] IN BLOOD BY AUTOMATED COUNT: 12.2 % (ref 11.5–14.5)
EST. GFR  (AFRICAN AMERICAN): >60 ML/MIN/1.73 M^2
EST. GFR  (NON AFRICAN AMERICAN): >60 ML/MIN/1.73 M^2
GLUCOSE SERPL-MCNC: 88 MG/DL (ref 70–110)
HCT VFR BLD AUTO: 41.3 % (ref 37–48.5)
HGB BLD-MCNC: 12.9 G/DL (ref 12–16)
IMM GRANULOCYTES # BLD AUTO: 0.01 K/UL (ref 0–0.04)
IMM GRANULOCYTES NFR BLD AUTO: 0.3 % (ref 0–0.5)
LDH SERPL L TO P-CCNC: 187 U/L (ref 110–260)
LYMPHOCYTES # BLD AUTO: 1.3 K/UL (ref 1–4.8)
LYMPHOCYTES NFR BLD: 33.1 % (ref 18–48)
MCH RBC QN AUTO: 29.6 PG (ref 27–31)
MCHC RBC AUTO-ENTMCNC: 31.2 G/DL (ref 32–36)
MCV RBC AUTO: 95 FL (ref 82–98)
MONOCYTES # BLD AUTO: 0.3 K/UL (ref 0.3–1)
MONOCYTES NFR BLD: 7.8 % (ref 4–15)
NEUTROPHILS # BLD AUTO: 2.3 K/UL (ref 1.8–7.7)
NEUTROPHILS NFR BLD: 56.8 % (ref 38–73)
NRBC BLD-RTO: 0 /100 WBC
PLATELET # BLD AUTO: 201 K/UL (ref 150–350)
PMV BLD AUTO: 11.7 FL (ref 9.2–12.9)
POTASSIUM SERPL-SCNC: 4.8 MMOL/L (ref 3.5–5.1)
PROT SERPL-MCNC: 7.2 G/DL (ref 6–8.4)
RBC # BLD AUTO: 4.36 M/UL (ref 4–5.4)
SODIUM SERPL-SCNC: 137 MMOL/L (ref 136–145)
WBC # BLD AUTO: 3.99 K/UL (ref 3.9–12.7)

## 2020-07-10 PROCEDURE — 83615 LACTATE (LD) (LDH) ENZYME: CPT

## 2020-07-10 PROCEDURE — 85025 COMPLETE CBC W/AUTO DIFF WBC: CPT

## 2020-07-10 PROCEDURE — 36415 COLL VENOUS BLD VENIPUNCTURE: CPT | Mod: PO

## 2020-07-10 PROCEDURE — 80053 COMPREHEN METABOLIC PANEL: CPT

## 2020-07-16 ENCOUNTER — CLINICAL SUPPORT (OUTPATIENT)
Dept: REHABILITATION | Facility: HOSPITAL | Age: 65
End: 2020-07-16
Payer: COMMERCIAL

## 2020-07-16 ENCOUNTER — OFFICE VISIT (OUTPATIENT)
Dept: GASTROENTEROLOGY | Facility: CLINIC | Age: 65
End: 2020-07-16
Payer: COMMERCIAL

## 2020-07-16 VITALS
DIASTOLIC BLOOD PRESSURE: 80 MMHG | HEIGHT: 66 IN | HEART RATE: 49 BPM | SYSTOLIC BLOOD PRESSURE: 151 MMHG | BODY MASS INDEX: 21.38 KG/M2 | WEIGHT: 133 LBS

## 2020-07-16 DIAGNOSIS — M54.41 CHRONIC BILATERAL LOW BACK PAIN WITH RIGHT-SIDED SCIATICA: Primary | ICD-10-CM

## 2020-07-16 DIAGNOSIS — K59.04 CHRONIC IDIOPATHIC CONSTIPATION: Primary | ICD-10-CM

## 2020-07-16 DIAGNOSIS — Z12.11 COLON CANCER SCREENING: ICD-10-CM

## 2020-07-16 DIAGNOSIS — G89.29 CHRONIC BILATERAL LOW BACK PAIN WITH RIGHT-SIDED SCIATICA: Primary | ICD-10-CM

## 2020-07-16 PROCEDURE — 1101F PR PT FALLS ASSESS DOC 0-1 FALLS W/OUT INJ PAST YR: ICD-10-PCS | Mod: CPTII,S$GLB,, | Performed by: INTERNAL MEDICINE

## 2020-07-16 PROCEDURE — 99204 PR OFFICE/OUTPT VISIT, NEW, LEVL IV, 45-59 MIN: ICD-10-PCS | Mod: S$GLB,,, | Performed by: INTERNAL MEDICINE

## 2020-07-16 PROCEDURE — 97014 ELECTRIC STIMULATION THERAPY: CPT | Mod: PN | Performed by: PHYSICAL THERAPIST

## 2020-07-16 PROCEDURE — 97140 MANUAL THERAPY 1/> REGIONS: CPT | Mod: PN | Performed by: PHYSICAL THERAPIST

## 2020-07-16 PROCEDURE — 1126F PR PAIN SEVERITY QUANTIFIED, NO PAIN PRESENT: ICD-10-PCS | Mod: S$GLB,,, | Performed by: INTERNAL MEDICINE

## 2020-07-16 PROCEDURE — 99999 PR PBB SHADOW E&M-EST. PATIENT-LVL IV: CPT | Mod: PBBFAC,,, | Performed by: INTERNAL MEDICINE

## 2020-07-16 PROCEDURE — 3008F BODY MASS INDEX DOCD: CPT | Mod: CPTII,S$GLB,, | Performed by: INTERNAL MEDICINE

## 2020-07-16 PROCEDURE — 1126F AMNT PAIN NOTED NONE PRSNT: CPT | Mod: S$GLB,,, | Performed by: INTERNAL MEDICINE

## 2020-07-16 PROCEDURE — 97035 APP MDLTY 1+ULTRASOUND EA 15: CPT | Mod: PN | Performed by: PHYSICAL THERAPIST

## 2020-07-16 PROCEDURE — 99204 OFFICE O/P NEW MOD 45 MIN: CPT | Mod: S$GLB,,, | Performed by: INTERNAL MEDICINE

## 2020-07-16 PROCEDURE — 3008F PR BODY MASS INDEX (BMI) DOCUMENTED: ICD-10-PCS | Mod: CPTII,S$GLB,, | Performed by: INTERNAL MEDICINE

## 2020-07-16 PROCEDURE — 1159F PR MEDICATION LIST DOCUMENTED IN MEDICAL RECORD: ICD-10-PCS | Mod: S$GLB,,, | Performed by: INTERNAL MEDICINE

## 2020-07-16 PROCEDURE — 99999 PR PBB SHADOW E&M-EST. PATIENT-LVL IV: ICD-10-PCS | Mod: PBBFAC,,, | Performed by: INTERNAL MEDICINE

## 2020-07-16 PROCEDURE — 1101F PT FALLS ASSESS-DOCD LE1/YR: CPT | Mod: CPTII,S$GLB,, | Performed by: INTERNAL MEDICINE

## 2020-07-16 PROCEDURE — 1159F MED LIST DOCD IN RCRD: CPT | Mod: S$GLB,,, | Performed by: INTERNAL MEDICINE

## 2020-07-16 PROCEDURE — 97110 THERAPEUTIC EXERCISES: CPT | Mod: PN | Performed by: PHYSICAL THERAPIST

## 2020-07-16 RX ORDER — ASCORBIC ACID 500 MG
500 TABLET ORAL DAILY
COMMUNITY

## 2020-07-16 RX ORDER — PLECANATIDE 3 MG/1
3 TABLET ORAL DAILY
Qty: 30 TABLET | Refills: 0 | Status: SHIPPED | OUTPATIENT
Start: 2020-07-16 | End: 2020-08-15

## 2020-07-16 NOTE — LETTER
July 20, 2020      Erica Khan MD  [address]  [place] [address]           [facility]  [address]  [place] [address]  Phone: [phone]  Fax: [phone]          Patient: Danuta Santos   MR Number: [id number]   YOB: 1955   Date of Visit: 7/16/2020       Dear Dr. Erica Khan:    Thank you for referring Danuta Santos to me for evaluation. Attached you will find relevant portions of my assessment and plan of care.    If you have questions, please do not hesitate to call me. I look forward to following Danuta Santos along with you.    Sincerely,    Liam Lipscomb MD    Enclosure  CC:  No Recipients    If you would like to receive this communication electronically, please contact [email or web address] or [phone] to request more information on [facility] access.    For providers and/or their staff who would like to refer a patient to [facility], please contact us through our one-stop-shop provider referral line, [facility], at [phone].    If you feel you have received this communication in error or would no longer like to receive these types of communications, please e-mail [email or web address]

## 2020-07-16 NOTE — PROGRESS NOTES
Physical Therapy Progress Note     Name: Danuta Santos  Clinic Number: [id number]  Diagnosis:   Encounter Diagnosis   Name Primary?    Chronic bilateral low back pain with right-sided sciatica Yes     Physician: Erica Khan MD  Treatment Orders: PT Eval and Treat  Past Medical History:   Diagnosis Date    Allergy     IBS (irritable bowel syndrome)     Pneumonia 02/2019     Precautions: as per diagnosis    Evaluation date: 2/15/2018  Visit # authorized: 61/72 on 7/16/2020  Authorization period: 12-31-20  Plan of care expiration: 8-27-20  MD Follow up appt: none scheduled    Time In:  11:05  Time Out: 12:15  Billable time:  55  min    Subjective     Pt reports:  Having to do a lot of sitting for work did not get back to exercises and just walking  Pt states she is feeling extremely tight and feels that will worsen if not treated now     Pain Scale: before treatment: 5/10  after treatment:feels better, did not provide number  Objective     AR R pelvis slight   Mod-Severe tightness lumbar paraspinals R>L, min tightness after treatment  Pt had area that had the look of blood blister by dermatologist and reported that it was a wound that is healing and did have some dried blood still in wound, but was fine.      Pt was able to self mobilize today      TREATMENT  Therapeutic exercise: Danuta received therapeutic exercises to develop strength, endurance, ROM, flexibility and core stabilization for 15 minutes including:    HS stretch supine   Glut stretch  Piriformis stretch  B QL stretch  Contract relax R glut x 3        Bridge x 10  Pelvic tilt  X 10 for 5 sec hold  Partial sit up x 10  SLR x 10  Hip abd sidelying x 10 B   Arm and Leg lift prone x 1/10    HOLD FOr now      Hip ext prone with bent knee x 2/10  Contract relax R glut    Heel raises standing x 15    Step up x10     1 plank x 30 sec held the other 3 she normally does     (NP)Plank 4 reps 30 sec each     Manual therapy: Danuta  received the  "following manual therapy  techniques x 15  min. to include soft tissue and joint mobilization were applied to the: low back and gluteals to include:  Performed STM to LB paraspinals and QL R>L  P/A mob to lumbar region Grade 1-2 , sidelying L contract relax to R QL    Sidelying L contract relax mobilization to L5-S1 region to level pelvis at start and at end   Pt received tool-assisted massage with manual therapy techniques to R LB in prone to trigger an inflammatory healing response and stimulate the production of new collagen and proper, more functional, less painful healing.    Vacuum/cupping STM with manual therapy techniques was performed to back and gluteals to decrease muscle tightness, increase circulation and promote healing process.  The pt's skin was monitored for redness adjusting pressure as needed. The pt was instructed in possible side effects of bruising and/or soreness.      (NP)  Kinesiotape with 6" star for pain relief was performed over the R lumbar paraspinals.L3-4 region  Instructed pt in use, care and precautions with tape.    Ultrasound  for pain control and to decrease inflammation @ continuous duty cycle, 1 Mhz, applied to R lumbar paraspinals, intensity = 1.8 w/cm2 for 8 minutes.    Patient received pre-mod electrical stimulation to decrease muscle tightness and pain to Lumbar paraspinals/ sacral border of gluteals B for 15 minutes with MH supine with cycle time: continuous, beat frequency: , CC/CV: CV.      Written Home Exercises Provided: none today  Pt demo good understanding of the education provided. Danuta demonstrated good return demonstration of activities.     Pt. education:  · Posture reeducation, body mechanics, HEP,   · No spiritual or educational barriers to learning provided  · Pt has no cultural, educational or language barriers to learning provided.    Assessment   Pt was seen for 5 visits since last progress report.  Pt has had a lot of personal issues going on " and with school about to get back in session has had a lot of meetings via computer requiring her to sit more which will generally aggravate her back.  Pt understands that she needs to get back to strengthening exercises and feels life should start to be settling down a little that she can get back to those and will plan to progress slowly.       Pt will continue to benefit from skilled outpatient physical therapy to address the remaining functional deficits, provide pt/family education, and to maximize pt's level of independence in the home and community environment. .     GOALS:   Short Term Goals:  3 weeks MET STG's  Increase range of motion 25%  Increase strength 1/2 muscle grade  Improve postural awareness of pelvis to independently identify dysfunction with min assist from PT  Be able to perform HEP with minimal cueing required     Long Term Goals: 6 weeks PARTIALLY MET LTG'S  Increase range of motion to 75% to 100% full   Improve muscle strength 1 muscle grade  Improve muscle strength with MMT to 4+/5 to 5/5  Improve and stabilize proper pelvic positioning  Restore ability to sit for ADL and work with min to 0 pain  Restore normal sleep habits without disturbances due to pain  Restore ability to perform ADL's and household activities independently with min to 0 pain    Anticipated barriers to physical therapy: none  Pt's spiritual, cultural and educational needs considered and pt agreeable to plan of care and goals        Plan   If you concur, I recommend patient continue with physical therapy 1-2 times a week as needed  for 6 weeks.  Please advise us of your  recommendations. Thank you for allowing us to assist in the care of your patient.      Jacy Escobar, MS, PT          I certify the need for these services furnished under this plan of treatment and while under my care.    ____________________________________ Physician/Referring Practitioner                                Date of Signature                                                  .

## 2020-07-16 NOTE — H&P (VIEW-ONLY)
[facility] Consultation Note    Reason for Consult:    Chief Complaint   Patient presents with    Irritable Bowel Syndrome    History of incomplete colonoscopy       PCP:   Erica Khan    Referring MD:  Erica Khan Md  [address] [place] [address]    HPI:  Danuta Santos is a 65 y.o. female here to establish care with a history of IBS and also to discuss colon cancer screening due to prior history of incomplete colonoscopy.  She is new to our practice.  Last colonoscopy was in  by Dr. Ephraim Cordova, but this exam was incomplete having only reached the hepatic flexure.  She was told she had a long colon.  Follow-up barium enema was unremarkable.  She reports to have had a colonoscopy before that which was also incomplete.  She was turned down by her insurance for a virtual colonoscopy.  She denies family history of colon cancer.  She has a long-standing history of constipation, since childhood.  She grew up having bowel movements every 7-10 days.  She currently has a BM daily, but this is with therapy.  She uses a combination of MiraLax and Fleet's enemas.  She uses enemas regularly.  She occasionally also uses Smooth Move Tea.  She tried Linzess in the past which causes severe headaches.  Amitiza provided no relief.  She believes she is allergic to wheat, daily, alcohol, and certain fruits which cause increased gas and abdominal cramping.  She avoids gluten.          ROS:  Constitutional: No fevers, chills, No weight loss, normal appetite  ENT: No congestion, rhinorrhea, or chronic sinus problems  CV: No chest pain or palpitations  Pulm: No cough, No shortness of breath  Ophtho: No vision changes or pain  GI: see HPI      Medical History:  has a past medical history of Allergy, IBS (irritable bowel syndrome), and Pneumonia (2019).    Surgical History:  has a past surgical history that includes Nose surgery;  section; and Sinus surgery  (03/20/2019).    Family History: family history includes Cancer in her father; Stroke in her maternal grandfather.    Social History:  reports that she has never smoked. She has never used smokeless tobacco. She reports that she does not drink alcohol or use drugs.    Review of patient's allergies indicates:   Allergen Reactions    Wheat containing prod      Sinus      Advil [ibuprofen]     Aspirin Nausea And Vomiting    Lactobacillus acidoph-lactase      Other reaction(s): Other (See Comments)    Milk containing products Other (See Comments)     Post nasal drip    Nsaids (non-steroidal anti-inflammatory drug)      Other reaction(s): Other (See Comments)  Pt states she gets pain in her stomach when she takes nsaids due to IBS    Alcohol Other (See Comments) and Palpitations     Post nasal drip       Prior to Admission medications    Medication Sig Start Date End Date Taking? Authorizing Provider   ascorbic acid, vitamin C, (VITAMIN C) 500 MG tablet Take 500 mg by mouth once daily.   Yes Historical Provider, MD   azelastine (ASTELIN) 137 mcg (0.1 %) nasal spray 2 sprays by Nasal route every evening.    Yes Historical Provider, MD   cetirizine (ZYRTEC) 10 MG tablet Take 10 mg by mouth once daily.   Yes Historical Provider, MD   cyanocobalamin (VITAMIN B-12) 500 MCG tablet Take 1,000 mcg by mouth once daily.     Yes Historical Provider, MD   LACTOBACILLUS COMBO NO.6 (PROBIOTIC COMPLEX ORAL) Take 1 capsule by mouth once.    Yes Historical Provider, MD   pediatric multivitamin chewable tablet Take 2 tablets by mouth once daily.   Yes Historical Provider, MD   vitamin D 185 MG Tab Take 2,000 mg by mouth once daily.    Yes Historical Provider, MD   ALPRAZolam (XANAX) 0.25 MG tablet Take 1 tablet (0.25 mg total) by mouth daily as needed for Anxiety.  Patient not taking: Reported on 7/16/2020 3/26/20 4/25/20  Erica Khan MD   cefdinir (OMNICEF) 300 MG capsule Take 300 mg by mouth.    Historical Provider, MD  "  plecanatide (TRULANCE) 3 mg Tab Take 3 mg by mouth once daily. 7/16/20 8/15/20  Liam Lipscomb MD   tiZANidine (ZANAFLEX) 2 MG tablet Take 1 tablet (2 mg total) by mouth every 8 (eight) hours as needed.  Patient not taking: Reported on 7/16/2020 3/24/20   Erica Khan MD       Objective Findings:  Vital Signs:  BP (!) 151/80   Pulse (!) 49   Ht 5' 6" (1.676 m)   Wt 60.3 kg (133 lb)   BMI 21.47 kg/m²   Body mass index is 21.47 kg/m².    Physical Exam:  General Appearance:  Well appearing in no acute distress, appears stated age  Head:  Normocephalic, atraumatic      Labs:  Lab Results   Component Value Date    WBC 3.99 07/10/2020    HGB 12.9 07/10/2020    HCT 41.3 07/10/2020    MCV 95 07/10/2020    RDW 12.2 07/10/2020     07/10/2020    GRAN 2.3 07/10/2020    GRAN 56.8 07/10/2020    LYMPH 1.3 07/10/2020    LYMPH 33.1 07/10/2020    MONO 0.3 07/10/2020    MONO 7.8 07/10/2020    EOS 0.0 07/10/2020    BASO 0.04 07/10/2020     Lab Results   Component Value Date     07/10/2020    K 4.8 07/10/2020     07/10/2020    CO2 28 07/10/2020    GLU 88 07/10/2020    BUN 17 07/10/2020    CREATININE 0.8 07/10/2020    CALCIUM 10.1 07/10/2020    PROT 7.2 07/10/2020    ALBUMIN 4.3 07/10/2020    BILITOT 0.5 07/10/2020    ALKPHOS 56 07/10/2020    AST 27 07/10/2020    ALT 32 07/10/2020                   Assessment:  Danuta Santos is a 65 y.o. female with:  1. Chronic idiopathic constipation    2. Colon cancer screening      History of chronic constipation, likely IBS.  Has tried Linzess, which caused headaches, and did not respond to Amitiza.  Uses Miralax and enemas regularly, which provides relief.      Prior incomplete colonoscopy x 2 due to redundant colon and looping.  Last procedure only reached the hepatic flexure.  She denies family history of colon cancer.  She was turned down for virtual colonoscopy.  We had a long discussion today regarding her options for colon cancer screening which include " stool testing (FIT vs Cologuard) and re-attempting colonoscopy.  Discussed balloon-overtube assisted colonoscopy in detail.          Recommendations/Plan:  1. Will try Trulance for constipation   2. She desires colonoscopy, will plan for possible balloon-overtube assisted procedure.     Follow-up pending the above      Order summary:  Orders Placed This Encounter    plecanatide (TRULANCE) 3 mg Tab    Case request GI: COLONOSCOPY         Thank you so much for allowing me to participate in the care of Danuta Lipscomb MD

## 2020-07-16 NOTE — PLAN OF CARE
Physical Therapy Progress Note     Name: Danuta Santos  Clinic Number: [id number]  Diagnosis:   Encounter Diagnosis   Name Primary?    Chronic bilateral low back pain with right-sided sciatica Yes     Physician: Erica Khan MD  Treatment Orders: PT Eval and Treat  Past Medical History:   Diagnosis Date    Allergy     IBS (irritable bowel syndrome)     Pneumonia 02/2019     Precautions: as per diagnosis    Evaluation date: 2/15/2018  Visit # authorized: 61/72 on 7/16/2020  Authorization period: 12-31-20  Plan of care expiration: 8-27-20  MD Follow up appt: none scheduled    Time In:  11:05  Time Out: 12:15  Billable time:  55  min    Subjective     Pt reports:  Having to do a lot of sitting for work did not get back to exercises and just walking  Pt states she is feeling extremely tight and feels that will worsen if not treated now     Pain Scale: before treatment: 5/10  after treatment:feels better, did not provide number  Objective     AR R pelvis slight   Mod-Severe tightness lumbar paraspinals R>L, min tightness after treatment  Pt had area that had the look of blood blister by dermatologist and reported that it was a wound that is healing and did have some dried blood still in wound, but was fine.      Pt was able to self mobilize today      TREATMENT  Therapeutic exercise: Danuta received therapeutic exercises to develop strength, endurance, ROM, flexibility and core stabilization for 15 minutes including:    HS stretch supine   Glut stretch  Piriformis stretch  B QL stretch  Contract relax R glut x 3        Bridge x 10  Pelvic tilt  X 10 for 5 sec hold  Partial sit up x 10  SLR x 10  Hip abd sidelying x 10 B   Arm and Leg lift prone x 1/10    HOLD FOr now      Hip ext prone with bent knee x 2/10  Contract relax R glut    Heel raises standing x 15    Step up x10     1 plank x 30 sec held the other 3 she normally does     (NP)Plank 4 reps 30 sec each     Manual therapy: Danuta  received the  "following manual therapy  techniques x 15  min. to include soft tissue and joint mobilization were applied to the: low back and gluteals to include:  Performed STM to LB paraspinals and QL R>L  P/A mob to lumbar region Grade 1-2 , sidelying L contract relax to R QL    Sidelying L contract relax mobilization to L5-S1 region to level pelvis at start and at end   Pt received tool-assisted massage with manual therapy techniques to R LB in prone to trigger an inflammatory healing response and stimulate the production of new collagen and proper, more functional, less painful healing.    Vacuum/cupping STM with manual therapy techniques was performed to back and gluteals to decrease muscle tightness, increase circulation and promote healing process.  The pt's skin was monitored for redness adjusting pressure as needed. The pt was instructed in possible side effects of bruising and/or soreness.      (NP)  Kinesiotape with 6" star for pain relief was performed over the R lumbar paraspinals.L3-4 region  Instructed pt in use, care and precautions with tape.    Ultrasound  for pain control and to decrease inflammation @ continuous duty cycle, 1 Mhz, applied to R lumbar paraspinals, intensity = 1.8 w/cm2 for 8 minutes.    Patient received pre-mod electrical stimulation to decrease muscle tightness and pain to Lumbar paraspinals/ sacral border of gluteals B for 15 minutes with MH supine with cycle time: continuous, beat frequency: , CC/CV: CV.      Written Home Exercises Provided: none today  Pt demo good understanding of the education provided. Danuta demonstrated good return demonstration of activities.     Pt. education:  · Posture reeducation, body mechanics, HEP,   · No spiritual or educational barriers to learning provided  · Pt has no cultural, educational or language barriers to learning provided.    Assessment   Pt was seen for 5 visits since last progress report.  Pt has had a lot of personal issues going on " and with school about to get back in session has had a lot of meetings via computer requiring her to sit more which will generally aggravate her back.  Pt understands that she needs to get back to strengthening exercises and feels life should start to be settling down a little that she can get back to those and will plan to progress slowly.       Pt will continue to benefit from skilled outpatient physical therapy to address the remaining functional deficits, provide pt/family education, and to maximize pt's level of independence in the home and community environment. .     GOALS:   Short Term Goals:  3 weeks MET STG's  Increase range of motion 25%  Increase strength 1/2 muscle grade  Improve postural awareness of pelvis to independently identify dysfunction with min assist from PT  Be able to perform HEP with minimal cueing required     Long Term Goals: 6 weeks PARTIALLY MET LTG'S  Increase range of motion to 75% to 100% full   Improve muscle strength 1 muscle grade  Improve muscle strength with MMT to 4+/5 to 5/5  Improve and stabilize proper pelvic positioning  Restore ability to sit for ADL and work with min to 0 pain  Restore normal sleep habits without disturbances due to pain  Restore ability to perform ADL's and household activities independently with min to 0 pain    Anticipated barriers to physical therapy: none  Pt's spiritual, cultural and educational needs considered and pt agreeable to plan of care and goals        Plan   If you concur, I recommend patient continue with physical therapy 1-2 times a week as needed  for 6 weeks.  Please advise us of your  recommendations. Thank you for allowing us to assist in the care of your patient.      Jacy Escobar, MS, PT          I certify the need for these services furnished under this plan of treatment and while under my care.    ____________________________________ Physician/Referring Practitioner                                Date of Signature                                                  .

## 2020-07-16 NOTE — PROGRESS NOTES
[facility] Consultation Note    Reason for Consult:    Chief Complaint   Patient presents with    Irritable Bowel Syndrome    History of incomplete colonoscopy       PCP:   Erica Khan    Referring MD:  Erica Khan Md  [address] [place] [address]    HPI:  Danuta Santos is a 65 y.o. female here to establish care with a history of IBS and also to discuss colon cancer screening due to prior history of incomplete colonoscopy.  She is new to our practice.  Last colonoscopy was in  by Dr. Ephraim Cordova, but this exam was incomplete having only reached the hepatic flexure.  She was told she had a long colon.  Follow-up barium enema was unremarkable.  She reports to have had a colonoscopy before that which was also incomplete.  She was turned down by her insurance for a virtual colonoscopy.  She denies family history of colon cancer.  She has a long-standing history of constipation, since childhood.  She grew up having bowel movements every 7-10 days.  She currently has a BM daily, but this is with therapy.  She uses a combination of MiraLax and Fleet's enemas.  She uses enemas regularly.  She occasionally also uses Smooth Move Tea.  She tried Linzess in the past which causes severe headaches.  Amitiza provided no relief.  She believes she is allergic to wheat, daily, alcohol, and certain fruits which cause increased gas and abdominal cramping.  She avoids gluten.          ROS:  Constitutional: No fevers, chills, No weight loss, normal appetite  ENT: No congestion, rhinorrhea, or chronic sinus problems  CV: No chest pain or palpitations  Pulm: No cough, No shortness of breath  Ophtho: No vision changes or pain  GI: see HPI      Medical History:  has a past medical history of Allergy, IBS (irritable bowel syndrome), and Pneumonia (2019).    Surgical History:  has a past surgical history that includes Nose surgery;  section; and Sinus surgery  (03/20/2019).    Family History: family history includes Cancer in her father; Stroke in her maternal grandfather.    Social History:  reports that she has never smoked. She has never used smokeless tobacco. She reports that she does not drink alcohol or use drugs.    Review of patient's allergies indicates:   Allergen Reactions    Wheat containing prod      Sinus      Advil [ibuprofen]     Aspirin Nausea And Vomiting    Lactobacillus acidoph-lactase      Other reaction(s): Other (See Comments)    Milk containing products Other (See Comments)     Post nasal drip    Nsaids (non-steroidal anti-inflammatory drug)      Other reaction(s): Other (See Comments)  Pt states she gets pain in her stomach when she takes nsaids due to IBS    Alcohol Other (See Comments) and Palpitations     Post nasal drip       Prior to Admission medications    Medication Sig Start Date End Date Taking? Authorizing Provider   ascorbic acid, vitamin C, (VITAMIN C) 500 MG tablet Take 500 mg by mouth once daily.   Yes Historical Provider, MD   azelastine (ASTELIN) 137 mcg (0.1 %) nasal spray 2 sprays by Nasal route every evening.    Yes Historical Provider, MD   cetirizine (ZYRTEC) 10 MG tablet Take 10 mg by mouth once daily.   Yes Historical Provider, MD   cyanocobalamin (VITAMIN B-12) 500 MCG tablet Take 1,000 mcg by mouth once daily.     Yes Historical Provider, MD   LACTOBACILLUS COMBO NO.6 (PROBIOTIC COMPLEX ORAL) Take 1 capsule by mouth once.    Yes Historical Provider, MD   pediatric multivitamin chewable tablet Take 2 tablets by mouth once daily.   Yes Historical Provider, MD   vitamin D 185 MG Tab Take 2,000 mg by mouth once daily.    Yes Historical Provider, MD   ALPRAZolam (XANAX) 0.25 MG tablet Take 1 tablet (0.25 mg total) by mouth daily as needed for Anxiety.  Patient not taking: Reported on 7/16/2020 3/26/20 4/25/20  Erica Khan MD   cefdinir (OMNICEF) 300 MG capsule Take 300 mg by mouth.    Historical Provider, MD  "  plecanatide (TRULANCE) 3 mg Tab Take 3 mg by mouth once daily. 7/16/20 8/15/20  Liam Lipscomb MD   tiZANidine (ZANAFLEX) 2 MG tablet Take 1 tablet (2 mg total) by mouth every 8 (eight) hours as needed.  Patient not taking: Reported on 7/16/2020 3/24/20   Erica Khan MD       Objective Findings:  Vital Signs:  BP (!) 151/80   Pulse (!) 49   Ht 5' 6" (1.676 m)   Wt 60.3 kg (133 lb)   BMI 21.47 kg/m²   Body mass index is 21.47 kg/m².    Physical Exam:  General Appearance:  Well appearing in no acute distress, appears stated age  Head:  Normocephalic, atraumatic      Labs:  Lab Results   Component Value Date    WBC 3.99 07/10/2020    HGB 12.9 07/10/2020    HCT 41.3 07/10/2020    MCV 95 07/10/2020    RDW 12.2 07/10/2020     07/10/2020    GRAN 2.3 07/10/2020    GRAN 56.8 07/10/2020    LYMPH 1.3 07/10/2020    LYMPH 33.1 07/10/2020    MONO 0.3 07/10/2020    MONO 7.8 07/10/2020    EOS 0.0 07/10/2020    BASO 0.04 07/10/2020     Lab Results   Component Value Date     07/10/2020    K 4.8 07/10/2020     07/10/2020    CO2 28 07/10/2020    GLU 88 07/10/2020    BUN 17 07/10/2020    CREATININE 0.8 07/10/2020    CALCIUM 10.1 07/10/2020    PROT 7.2 07/10/2020    ALBUMIN 4.3 07/10/2020    BILITOT 0.5 07/10/2020    ALKPHOS 56 07/10/2020    AST 27 07/10/2020    ALT 32 07/10/2020                   Assessment:  Danuta Santos is a 65 y.o. female with:  1. Chronic idiopathic constipation    2. Colon cancer screening      History of chronic constipation, likely IBS.  Has tried Linzess, which caused headaches, and did not respond to Amitiza.  Uses Miralax and enemas regularly, which provides relief.      Prior incomplete colonoscopy x 2 due to redundant colon and looping.  Last procedure only reached the hepatic flexure.  She denies family history of colon cancer.  She was turned down for virtual colonoscopy.  We had a long discussion today regarding her options for colon cancer screening which include " stool testing (FIT vs Cologuard) and re-attempting colonoscopy.  Discussed balloon-overtube assisted colonoscopy in detail.          Recommendations/Plan:  1. Will try Trulance for constipation   2. She desires colonoscopy, will plan for possible balloon-overtube assisted procedure.     Follow-up pending the above      Order summary:  Orders Placed This Encounter    plecanatide (TRULANCE) 3 mg Tab    Case request GI: COLONOSCOPY         Thank you so much for allowing me to participate in the care of Danuta Lipscomb MD

## 2020-07-17 ENCOUNTER — CLINICAL SUPPORT (OUTPATIENT)
Dept: URGENT CARE | Facility: CLINIC | Age: 65
End: 2020-07-17
Payer: COMMERCIAL

## 2020-07-17 VITALS — TEMPERATURE: 98 F | HEART RATE: 58 BPM | OXYGEN SATURATION: 98 %

## 2020-07-17 DIAGNOSIS — Z01.818 PRE-OP TESTING: ICD-10-CM

## 2020-07-17 DIAGNOSIS — Z12.11 SPECIAL SCREENING FOR MALIGNANT NEOPLASMS, COLON: Primary | ICD-10-CM

## 2020-07-17 PROCEDURE — U0003 INFECTIOUS AGENT DETECTION BY NUCLEIC ACID (DNA OR RNA); SEVERE ACUTE RESPIRATORY SYNDROME CORONAVIRUS 2 (SARS-COV-2) (CORONAVIRUS DISEASE [COVID-19]), AMPLIFIED PROBE TECHNIQUE, MAKING USE OF HIGH THROUGHPUT TECHNOLOGIES AS DESCRIBED BY CMS-2020-01-R: HCPCS

## 2020-07-17 RX ORDER — POLYETHYLENE GLYCOL 3350, SODIUM SULFATE ANHYDROUS, SODIUM BICARBONATE, SODIUM CHLORIDE, POTASSIUM CHLORIDE 236; 22.74; 6.74; 5.86; 2.97 G/4L; G/4L; G/4L; G/4L; G/4L
4 POWDER, FOR SOLUTION ORAL ONCE
Qty: 4000 ML | Refills: 0 | Status: SHIPPED | OUTPATIENT
Start: 2020-07-17 | End: 2020-07-17

## 2020-07-17 NOTE — PROGRESS NOTES
Subjective:       Patient ID: Danuta Santos is a 65 y.o. female.    Chief Complaint: Preop Swab    Preop swab    ROS     Objective:      Physical Exam    Assessment:       1. Pre-op testing        Plan:                   No follow-ups on file.

## 2020-07-18 LAB — SARS-COV-2 RNA RESP QL NAA+PROBE: NOT DETECTED

## 2020-07-20 ENCOUNTER — ANESTHESIA EVENT (OUTPATIENT)
Dept: ENDOSCOPY | Facility: HOSPITAL | Age: 65
End: 2020-07-20
Payer: COMMERCIAL

## 2020-07-20 ENCOUNTER — HOSPITAL ENCOUNTER (OUTPATIENT)
Facility: HOSPITAL | Age: 65
Discharge: HOME | End: 2020-07-20
Attending: INTERNAL MEDICINE | Admitting: INTERNAL MEDICINE
Payer: COMMERCIAL

## 2020-07-20 ENCOUNTER — ANESTHESIA (OUTPATIENT)
Dept: ENDOSCOPY | Facility: HOSPITAL | Age: 65
End: 2020-07-20
Payer: COMMERCIAL

## 2020-07-20 VITALS
RESPIRATION RATE: 20 BRPM | OXYGEN SATURATION: 100 % | TEMPERATURE: 98 F | DIASTOLIC BLOOD PRESSURE: 101 MMHG | HEART RATE: 42 BPM | BODY MASS INDEX: 21.53 KG/M2 | HEIGHT: 66 IN | WEIGHT: 134 LBS | SYSTOLIC BLOOD PRESSURE: 139 MMHG

## 2020-07-20 DIAGNOSIS — Z12.11 COLON CANCER SCREENING: Primary | ICD-10-CM

## 2020-07-20 PROCEDURE — 45385 COLONOSCOPY W/LESION REMOVAL: CPT | Mod: 22,33,, | Performed by: INTERNAL MEDICINE

## 2020-07-20 PROCEDURE — 25000003 PHARM REV CODE 250: Performed by: INTERNAL MEDICINE

## 2020-07-20 PROCEDURE — 45380 COLONOSCOPY AND BIOPSY: CPT | Mod: 59,,, | Performed by: INTERNAL MEDICINE

## 2020-07-20 PROCEDURE — 88305 TISSUE EXAM BY PATHOLOGIST: ICD-10-PCS | Mod: 26,,, | Performed by: PATHOLOGY

## 2020-07-20 PROCEDURE — D9220A PRA ANESTHESIA: Mod: 33,ANES,, | Performed by: ANESTHESIOLOGY

## 2020-07-20 PROCEDURE — D9220A PRA ANESTHESIA: ICD-10-PCS | Mod: 33,CRNA,, | Performed by: NURSE ANESTHETIST, CERTIFIED REGISTERED

## 2020-07-20 PROCEDURE — 45380 PR COLONOSCOPY,BIOPSY: ICD-10-PCS | Mod: 59,,, | Performed by: INTERNAL MEDICINE

## 2020-07-20 PROCEDURE — 88305 TISSUE EXAM BY PATHOLOGIST: CPT | Performed by: PATHOLOGY

## 2020-07-20 PROCEDURE — 25000003 PHARM REV CODE 250: Performed by: NURSE ANESTHETIST, CERTIFIED REGISTERED

## 2020-07-20 PROCEDURE — 45385 PR COLONOSCOPY,REMV LESN,SNARE: ICD-10-PCS | Mod: 22,33,, | Performed by: INTERNAL MEDICINE

## 2020-07-20 PROCEDURE — 37000008 HC ANESTHESIA 1ST 15 MINUTES: Performed by: INTERNAL MEDICINE

## 2020-07-20 PROCEDURE — D9220A PRA ANESTHESIA: ICD-10-PCS | Mod: 33,ANES,, | Performed by: ANESTHESIOLOGY

## 2020-07-20 PROCEDURE — 27201012 HC FORCEPS, HOT/COLD, DISP: Performed by: INTERNAL MEDICINE

## 2020-07-20 PROCEDURE — 88305 TISSUE EXAM BY PATHOLOGIST: CPT | Mod: 26,,, | Performed by: PATHOLOGY

## 2020-07-20 PROCEDURE — 45380 COLONOSCOPY AND BIOPSY: CPT | Performed by: INTERNAL MEDICINE

## 2020-07-20 PROCEDURE — 45385 COLONOSCOPY W/LESION REMOVAL: CPT | Performed by: INTERNAL MEDICINE

## 2020-07-20 PROCEDURE — 37000009 HC ANESTHESIA EA ADD 15 MINS: Performed by: INTERNAL MEDICINE

## 2020-07-20 PROCEDURE — D9220A PRA ANESTHESIA: Mod: 33,CRNA,, | Performed by: NURSE ANESTHETIST, CERTIFIED REGISTERED

## 2020-07-20 PROCEDURE — 27201089 HC SNARE, DISP (ANY): Performed by: INTERNAL MEDICINE

## 2020-07-20 PROCEDURE — 63600175 PHARM REV CODE 636 W HCPCS: Performed by: NURSE ANESTHETIST, CERTIFIED REGISTERED

## 2020-07-20 RX ORDER — SODIUM CHLORIDE 9 MG/ML
INJECTION, SOLUTION INTRAVENOUS CONTINUOUS
Status: DISCONTINUED | OUTPATIENT
Start: 2020-07-20 | End: 2020-07-20 | Stop reason: HOSPADM

## 2020-07-20 RX ORDER — ONDANSETRON 2 MG/ML
4 INJECTION INTRAMUSCULAR; INTRAVENOUS ONCE AS NEEDED
Status: DISCONTINUED | OUTPATIENT
Start: 2020-07-20 | End: 2020-07-20 | Stop reason: HOSPADM

## 2020-07-20 RX ORDER — LIDOCAINE HYDROCHLORIDE 20 MG/ML
INJECTION INTRAVENOUS
Status: DISCONTINUED | OUTPATIENT
Start: 2020-07-20 | End: 2020-07-20

## 2020-07-20 RX ORDER — PROPOFOL 10 MG/ML
VIAL (ML) INTRAVENOUS CONTINUOUS PRN
Status: DISCONTINUED | OUTPATIENT
Start: 2020-07-20 | End: 2020-07-20

## 2020-07-20 RX ORDER — GLYCOPYRROLATE 0.2 MG/ML
INJECTION INTRAMUSCULAR; INTRAVENOUS
Status: DISCONTINUED | OUTPATIENT
Start: 2020-07-20 | End: 2020-07-20

## 2020-07-20 RX ORDER — PROPOFOL 10 MG/ML
VIAL (ML) INTRAVENOUS
Status: DISCONTINUED | OUTPATIENT
Start: 2020-07-20 | End: 2020-07-20

## 2020-07-20 RX ADMIN — PROPOFOL 70 MG: 10 INJECTION, EMULSION INTRAVENOUS at 09:07

## 2020-07-20 RX ADMIN — PROPOFOL 100 MCG/KG/MIN: 10 INJECTION, EMULSION INTRAVENOUS at 09:07

## 2020-07-20 RX ADMIN — SODIUM CHLORIDE: 0.9 INJECTION, SOLUTION INTRAVENOUS at 08:07

## 2020-07-20 RX ADMIN — LIDOCAINE HYDROCHLORIDE 50 MG: 20 INJECTION, SOLUTION INTRAVENOUS at 09:07

## 2020-07-20 RX ADMIN — GLYCOPYRROLATE 0.2 MG: 0.2 INJECTION, SOLUTION INTRAMUSCULAR; INTRAVENOUS at 09:07

## 2020-07-20 NOTE — DISCHARGE INSTRUCTIONS
Discharge Instructions: After Your Surgery  Youve just had surgery. During surgery, you were given medicine called anesthesia to keep you relaxed and free of pain. After surgery, you may have some pain or nausea. This is common. Here are some tips for feeling better and getting well after surgery.     Stay on schedule with your medicine.   Going home  Your healthcare provider will show you how to take care of yourself when you go home. He or she will also answer your questions. Have an adult family member or friend drive you home. For the first 24 hours after your surgery:  · Do not drive or use heavy equipment.  · Do not make important decisions or sign legal papers.  · Do not drink alcohol.  · Have someone stay with you, if needed. He or she can watch for problems and help keep you safe.  Be sure to go to all follow-up visits with your healthcare provider. And rest after your surgery for as long as your healthcare provider tells you to.  Coping with pain  If you have pain after surgery, pain medicine will help you feel better. Take it as told, before pain becomes severe. Also, ask your healthcare provider or pharmacist about other ways to control pain. This might be with heat, ice, or relaxation. And follow any other instructions your surgeon or nurse gives you.  Tips for taking pain medicine  To get the best relief possible, remember these points:  · Pain medicines can upset your stomach. Taking them with a little food may help.  · Most pain relievers taken by mouth need at least 20 to 30 minutes to start to work.  · Taking medicine on a schedule can help you remember to take it. Try to time your medicine so that you can take it before starting an activity. This might be before you get dressed, go for a walk, or sit down for dinner.  · Constipation is a common side effect of pain medicines. Call your healthcare provider before taking any medicines such as laxatives or stool softeners to help ease  constipation. Also ask if you should skip any foods. Drinking lots of fluids and eating foods such as fruits and vegetables that are high in fiber can also help. Remember, do not take laxatives unless your surgeon has prescribed them.  · Drinking alcohol and taking pain medicine can cause dizziness and slow your breathing. It can even be deadly. Do not drink alcohol while taking pain medicine.  · Pain medicine can make you react more slowly to things. Do not drive or run machinery while taking pain medicine.  Your healthcare provider may tell you to take acetaminophen to help ease your pain. Ask him or her how much you are supposed to take each day. Acetaminophen or other pain relievers may interact with your prescription medicines or other over-the-counter (OTC) medicines. Some prescription medicines have acetaminophen and other ingredients. Using both prescription and OTC acetaminophen for pain can cause you to overdose. Read the labels on your OTC medicines with care. This will help you to clearly know the list of ingredients, how much to take, and any warnings. It may also help you not take too much acetaminophen. If you have questions or do not understand the information, ask your pharmacist or healthcare provider to explain it to you before you take the OTC medicine.  Managing nausea  Some people have an upset stomach after surgery. This is often because of anesthesia, pain, or pain medicine, or the stress of surgery. These tips will help you handle nausea and eat healthy foods as you get better. If you were on a special food plan before surgery, ask your healthcare provider if you should follow it while you get better. These tips may help:  · Do not push yourself to eat. Your body will tell you when to eat and how much.  · Start off with clear liquids and soup. They are easier to digest.  · Next try semi-solid foods, such as mashed potatoes, applesauce, and gelatin, as you feel ready.  · Slowly move to solid  foods. Dont eat fatty, rich, or spicy foods at first.  · Do not force yourself to have 3 large meals a day. Instead eat smaller amounts more often.  · Take pain medicines with a small amount of solid food, such as crackers or toast, to avoid nausea.     Call your surgeon if  · You still have pain an hour after taking medicine. The medicine may not be strong enough.  · You feel too sleepy, dizzy, or groggy. The medicine may be too strong.  · You have side effects like nausea, vomiting, or skin changes, such as rash, itching, or hives.       If you have obstructive sleep apnea  You were given anesthesia medicine during surgery to keep you comfortable and free of pain. After surgery, you may have more apnea spells because of this medicine and other medicines you were given. The spells may last longer than usual.   At home:  · Keep using the continuous positive airway pressure (CPAP) device when you sleep. Unless your healthcare provider tells you not to, use it when you sleep, day or night. CPAP is a common device used to treat obstructive sleep apnea.  · Talk with your provider before taking any pain medicine, muscle relaxants, or sedatives. Your provider will tell you about the possible dangers of taking these medicines.  Date Last Reviewed: 12/1/2016 © 2000-2017 [employer]. [address] [place] [address]. All rights reserved. This information is not intended as a substitute for professional medical care. Always follow your healthcare professional's instructions.          Colonoscopy     A camera attached to a flexible tube with a viewing lens is used to take video pictures.     Colonoscopy is a test to view the inside of your lower digestive tract (colon and rectum). Sometimes it can show the last part of the small intestine (ileum). During the test, small pieces of tissue may be removed for testing. This is called a biopsy. Small growths, such as polyps, may also be removed.   Why is  colonoscopy done?  The test is done to help look for colon cancer. And it can help find the source of abdominal pain, bleeding, and changes in bowel habits. It may be needed once a year, depending on factors such as your:  · Age  · Health history  · Family health history  · Symptoms  · Results from any prior colonoscopy  Risks and possible complications  These include:  · Bleeding               · A puncture or tear in the colon   · Risks of anesthesia  · A cancer lesion not being seen  Getting ready   To prepare for the test:  · Talk with your healthcare provider about the risks of the test (see below). Also ask your healthcare provider about alternatives to the test.  · Tell your healthcare provider about any medicines you take. Also tell him or her about any health conditions you may have.  · Make sure your rectum and colon are empty for the test. Follow the diet and bowel prep instructions exactly. If you dont, the test may need to be rescheduled.  · Plan for a friend or family member to drive you home after the test.     Colonoscopy provides an inside view of the entire colon.     You may discuss the results with your doctor right away or at a future visit.  During the test   The test is usually done in the hospital on an outpatient basis. This means you go home the same day. The procedure takes about 30 minutes. During that time:  · You are given relaxing (sedating) medicine through an IV line. You may be drowsy, or fully asleep.  · The healthcare provider will first give you a physical exam to check for anal and rectal problems.  · Then the anus is lubricated and the scope inserted.  · If you are awake, you may have a feeling similar to needing to have a bowel movement. You may also feel pressure as air is pumped into the colon. Its OK to pass gas during the procedure.  · Biopsy, polyp removal, or other treatments may be done during the test.  After the test   You may have gas right after the test. It can  help to try to pass it to help prevent later bloating. Your healthcare provider may discuss the results with you right away. Or you may need to schedule a follow-up visit to talk about the results. After the test, you can go back to your normal eating and other activities. You may be tired from the sedation and need to rest for a few hours.  Date Last Reviewed: 11/1/2016  © 2901-4219 [employer]. [address] [place] [address], [place] [address]. All rights reserved. This information is not intended as a substitute for professional medical care. Always follow your healthcare professional's instructions.

## 2020-07-20 NOTE — ANESTHESIA PREPROCEDURE EVALUATION
07/20/2020  Danuta Santos is a 65 y.o., female.    Anesthesia Evaluation    I have reviewed the Patient Summary Reports.   I have reviewed the NPO Status.   I have reviewed the Medications.     Review of Systems  Anesthesia Hx:  No problems with previous Anesthesia Denies Hx of Anesthetic complications  History of prior surgery of interest to airway management or planning:  Denies Personal Hx of Anesthesia complications.   Hematology/Oncology:  Hematology Normal   Oncology Normal     EENT/Dental:EENT/Dental Normal   Cardiovascular:   Exercise tolerance: good  Denies Angina.        Pulmonary:   Asthma    Hepatic/GI:   Bowel Prep. Denies GERD. IBS   Musculoskeletal:  Musculoskeletal Normal    Neurological:  Neurology Normal    Endocrine:  Endocrine Normal    Dermatological:  Skin Normal    Psych:  Psychiatric Normal           Physical Exam  General:  Well nourished    Airway/Jaw/Neck:  Airway Findings: Mouth Opening: Normal Tongue: Normal       Chest/Lungs:  Chest/Lungs Findings: Normal Respiratory Rate     Heart/Vascular:  Heart Findings: Rate: Normal        Mental Status:  Mental Status Findings:  Cooperative, Alert and Oriented         Anesthesia Plan  Type of Anesthesia, risks & benefits discussed:  Anesthesia Type:  general  Patient's Preference:   Intra-op Monitoring Plan: standard ASA monitors  Intra-op Monitoring Plan Comments:   Post Op Pain Control Plan:   Post Op Pain Control Plan Comments:   Induction:   IV  Beta Blocker:  Patient is not currently on a Beta-Blocker (No further documentation required).       Informed Consent: Patient understands risks and agrees with Anesthesia plan.  Questions answered. Anesthesia consent signed with patient.  ASA Score: 2     Day of Surgery Review of History & Physical:    H&P update referred to the provider.         Ready For Surgery From Anesthesia  Perspective.

## 2020-07-20 NOTE — TRANSFER OF CARE
"Anesthesia Transfer of Care Note    Patient: Danuta Santos    Procedure(s) Performed: Procedure(s) (LRB):  COLONOSCOPY (N/A)    Patient location: [facility]    Anesthesia Type: general    Transport from OR: Transported from OR on room air with adequate spontaneous ventilation    Post pain: adequate analgesia    Post assessment: no apparent anesthetic complications and tolerated procedure well    Post vital signs: stable    Level of consciousness: alert and awake    Nausea/Vomiting: no nausea/vomiting    Complications: none    Transfer of care protocol was followed      Last vitals:   Visit Vitals  BP (!) 152/72 (Patient Position: Lying)   Pulse 60   Temp 36.7 °C (98 °F) (Temporal)   Resp 16   Ht 5' 6" (1.676 m)   Wt 60.8 kg (134 lb)   SpO2 100%   Breastfeeding No   BMI 21.63 kg/m²     "

## 2020-07-20 NOTE — INTERVAL H&P NOTE
Pre-Procedure H and P Addendum    Patient seen and examined.  History and exam unchanged from prior history and physical.      Procedure: Colonoscopy, device-assisted as needed  Indication: Colon cancer screening, prior incomplete colonoscopy  ASA Class: per anesthesiology  Airway: normal  Neck Mobility: full range of motion  Mallampatti score: per anesthesia  History of anesthesia problems: no  Family history of anesthesia problems: no  Anesthesia Plan: MAC    Anesthesia/Surgery risks, benefits and alternative options discussed and understood by patient/family.          There are no hospital problems to display for this patient.

## 2020-07-20 NOTE — PLAN OF CARE
Discharge instructions given, Pt verbalize understanding. Consents in chart. Vital Signs stable.  Respirations even and unlabored. No distress noted. Tolerating liquids.    No complaints of Nausea or Vomiting. No questions or concerns at this time. All questions answered

## 2020-07-20 NOTE — [employer] PATIENT INSTRUCTIONS
Discharge Summary/Instructions after an Endoscopic Procedure  Patient Name: Danuta Santos  Patient MRN: [id number]  Patient YOB: 1955 Monday, July 20, 2020  Liam Lipscomb MD  RESTRICTIONS:  During your procedure today, you received medications for sedation.  These   medications may affect your judgment, balance and coordination.  Therefore,   for 24 hours, you have the following restrictions:   - DO NOT drive a car, operate machinery, make legal/financial decisions,   sign important papers or drink alcohol.    ACTIVITY:  Today: no heavy lifting, straining or running due to procedural   sedation/anesthesia.  The following day: return to full activity including work.  DIET:  Eat and drink normally unless instructed otherwise.     TREATMENT FOR COMMON SIDE EFFECTS:  - Mild abdominal pain, nausea, belching, bloating or excessive gas:  rest,   eat lightly and use a heating pad.  - Sore Throat: treat with throat lozenges and/or gargle with warm salt   water.  - Because air was used during the procedure, expelling large amounts of air   from your rectum or belching is normal.  - If a bowel prep was taken, you may not have a bowel movement for 1-3 days.    This is normal.  SYMPTOMS TO WATCH FOR AND REPORT TO YOUR PHYSICIAN:  1. Abdominal pain or bloating, other than gas cramps.  2. Chest pain.  3. Back pain.  4. Signs of infection such as: chills or fever occurring within 24 hours   after the procedure.  5. Rectal bleeding, which would show as bright red, maroon, or black stools.   (A tablespoon of blood from the rectum is not serious, especially if   hemorrhoids are present.)  6. Vomiting.  7. Weakness or dizziness.  GO DIRECTLY TO THE NEAREST EMERGENCY ROOM IF YOU HAVE ANY OF THE FOLLOWING:      Difficulty breathing              Chills and/or fever over 101 F   Persistent vomiting and/or vomiting blood   Severe abdominal pain   Severe chest pain   Black, tarry stools   Bleeding- more than one  tablespoon   Any other symptom or condition that you feel may need urgent attention  Your doctor recommends these additional instructions:  If any biopsies were taken, your doctors clinic will contact you in 1 to 2   weeks with any results.  - Discharge patient to home.   - Patient has a contact number available for emergencies.  The signs and   symptoms of potential delayed complications were discussed with the   patient.  Return to normal activities tomorrow.  Written discharge   instructions were provided to the patient.   - Resume previous diet.   - Continue present medications.   - Await pathology results.   - Telephone GI clinic for pathology results in 1 week.   - Repeat colonoscopy in 7 years for surveillance.   For questions, problems or results please call your physician - Liam Lipscomb MD at Work:  [phone].  [facility], EMERGENCY ROOM PHONE NUMBER: [phone]  IF A COMPLICATION OR EMERGENCY SITUATION ARISES AND YOU ARE UNABLE TO REACH   YOUR PHYSICIAN - GO DIRECTLY TO THE EMERGENCY ROOM.  Liam Lipscomb MD  7/20/2020 10:14:21 AM  This report has been verified and signed electronically.  [employer]

## 2020-07-21 ENCOUNTER — CLINICAL SUPPORT (OUTPATIENT)
Dept: REHABILITATION | Facility: HOSPITAL | Age: 65
End: 2020-07-21
Payer: COMMERCIAL

## 2020-07-21 DIAGNOSIS — M54.41 CHRONIC BILATERAL LOW BACK PAIN WITH RIGHT-SIDED SCIATICA: Primary | ICD-10-CM

## 2020-07-21 DIAGNOSIS — G89.29 CHRONIC BILATERAL LOW BACK PAIN WITH RIGHT-SIDED SCIATICA: Primary | ICD-10-CM

## 2020-07-21 PROCEDURE — 97014 ELECTRIC STIMULATION THERAPY: CPT | Mod: PN | Performed by: PHYSICAL THERAPIST

## 2020-07-21 PROCEDURE — 97140 MANUAL THERAPY 1/> REGIONS: CPT | Mod: PN | Performed by: PHYSICAL THERAPIST

## 2020-07-21 PROCEDURE — 97035 APP MDLTY 1+ULTRASOUND EA 15: CPT | Mod: PN | Performed by: PHYSICAL THERAPIST

## 2020-07-21 NOTE — ANESTHESIA POSTPROCEDURE EVALUATION
Anesthesia Post Evaluation    Patient: Danuta Santos    Procedure(s) Performed: Procedure(s) (LRB):  COLONOSCOPY (N/A)    Final Anesthesia Type: general    Patient location during evaluation: PACU  Patient participation: Yes- Able to Participate  Level of consciousness: awake and alert and oriented  Post-procedure vital signs: reviewed and stable  Pain management: adequate  Airway patency: patent    PONV status at discharge: No PONV  Anesthetic complications: no      Cardiovascular status: blood pressure returned to baseline and hemodynamically stable  Respiratory status: unassisted, spontaneous ventilation and room air  Hydration status: euvolemic  Follow-up not needed.          Vitals Value Taken Time   /101 07/20/20 1045   Temp 36.7 °C (98.1 °F) 07/20/20 1045   Pulse 42 07/20/20 1045   Resp 20 07/20/20 1045   SpO2 100 % 07/20/20 1045         No case tracking events are documented in the log.      Pain/Savanna Score: Savanna Score: 10 (7/20/2020 10:15 AM)

## 2020-07-21 NOTE — PROGRESS NOTES
Physical Therapy Daily Treatment Note     Name: Danuta Santos  Clinic Number: [id number]  Diagnosis:   Encounter Diagnosis   Name Primary?    Chronic bilateral low back pain with right-sided sciatica Yes     Physician: Erica Khan MD  Treatment Orders: PT Eval and Treat  Past Medical History:   Diagnosis Date    Allergy     IBS (irritable bowel syndrome)     Pneumonia 02/2019     Precautions: as per diagnosis    Evaluation date: 2/15/2018  Visit # authorized: 61/72 on 7/21/2020  Authorization period: 12-31-20  Plan of care expiration: 8-27-20  MD Follow up appt: none scheduled    Time In:  12:13  Time Out: 12:53  Billable time:  35  min    Subjective     Pt reports:  Lifted a heavy package and aggravated back was 7/10 Had pain down leg kept trying to self mobilize     Pain Scale: before treatment: 5-6/10  after treatment:feels better, 2/10  Objective     AR R pelvis slight   Mod-Severe tightness lumbar paraspinals R>L, min tightness after treatment  Pt had area that had the look of blood blister by dermatologist and reported that it was a wound that is healing and did have some dried blood still in wound, but was fine.      Pt was able to self mobilize today      TREATMENT  (NP)Therapeutic exercise: Danuta received therapeutic exercises to develop strength, endurance, ROM, flexibility and core stabilization for 0 minutes including:    HS stretch supine   Glut stretch  Piriformis stretch  B QL stretch  Contract relax R glut x 3        Bridge x 10  Pelvic tilt  X 10 for 5 sec hold  Partial sit up x 10  SLR x 10  Hip abd sidelying x 10 B   Arm and Leg lift prone x 1/10    HOLD FOr now      Hip ext prone with bent knee x 2/10  Contract relax R glut    Heel raises standing x 15    Step up x10     1 plank x 30 sec held the other 3 she normally does     (NP)Plank 4 reps 30 sec each     Manual therapy: Danuta  received the following manual therapy  techniques x 10  min. to include soft tissue and joint  "mobilization were applied to the: low back and gluteals to include:  Performed STM to LB paraspinals and QL R>L  P/A mob to lumbar region Grade 1-2 , sidelying L contract relax to R QL    Sidelying L contract relax mobilization to L5-S1 region to level pelvis at start and at end   (NP)Pt received tool-assisted massage with manual therapy techniques to R LB in prone to trigger an inflammatory healing response and stimulate the production of new collagen and proper, more functional, less painful healing.    (NP)Vacuum/cupping STM with manual therapy techniques was performed to back and gluteals to decrease muscle tightness, increase circulation and promote healing process.  The pt's skin was monitored for redness adjusting pressure as needed. The pt was instructed in possible side effects of bruising and/or soreness.      (NP)  Kinesiotape with 6" star for pain relief was performed over the R lumbar paraspinals.L3-4 region  Instructed pt in use, care and precautions with tape.    Ultrasound  for pain control and to decrease inflammation @ continuous duty cycle, 1 Mhz, applied to R lumbar paraspinals, intensity = 1.8 w/cm2 for 8 minutes.    Patient received pre-mod electrical stimulation to decrease muscle tightness and pain to Lumbar paraspinals/ sacral border of gluteals B for 15 minutes with MH supine with cycle time: continuous, beat frequency: , CC/CV: CV.      Written Home Exercises Provided: none today  Pt demo good understanding of the education provided. Danuta demonstrated good return demonstration of activities.     Pt. education:  · Posture reeducation, body mechanics, HEP,   · No spiritual or educational barriers to learning provided  · Pt has no cultural, educational or language barriers to learning provided.    Assessment   Pt unable to self mobilize after lifting heavy object that she did not realize was heavy.  Pt with level pelvis at end of treatment and decreased pain     Pt will continue to " benefit from skilled outpatient physical therapy to address the remaining functional deficits, provide pt/family education, and to maximize pt's level of independence in the home and community environment. .     GOALS:   Short Term Goals:  3 weeks MET STG's  Increase range of motion 25%  Increase strength 1/2 muscle grade  Improve postural awareness of pelvis to independently identify dysfunction with min assist from PT  Be able to perform HEP with minimal cueing required     Long Term Goals: 6 weeks PARTIALLY MET LTG'S  Increase range of motion to 75% to 100% full   Improve muscle strength 1 muscle grade  Improve muscle strength with MMT to 4+/5 to 5/5  Improve and stabilize proper pelvic positioning  Restore ability to sit for ADL and work with min to 0 pain  Restore normal sleep habits without disturbances due to pain  Restore ability to perform ADL's and household activities independently with min to 0 pain    Anticipated barriers to physical therapy: none  Pt's spiritual, cultural and educational needs considered and pt agreeable to plan of care and goals        Plan   Continue with established plan of care towards PT goals.                                           .

## 2020-07-22 LAB
FINAL PATHOLOGIC DIAGNOSIS: NORMAL
GROSS: NORMAL

## 2020-07-29 ENCOUNTER — CLINICAL SUPPORT (OUTPATIENT)
Dept: REHABILITATION | Facility: HOSPITAL | Age: 65
End: 2020-07-29
Payer: COMMERCIAL

## 2020-07-29 DIAGNOSIS — M54.41 CHRONIC BILATERAL LOW BACK PAIN WITH RIGHT-SIDED SCIATICA: Primary | ICD-10-CM

## 2020-07-29 DIAGNOSIS — G89.29 CHRONIC BILATERAL LOW BACK PAIN WITH RIGHT-SIDED SCIATICA: Primary | ICD-10-CM

## 2020-07-29 PROCEDURE — 97035 APP MDLTY 1+ULTRASOUND EA 15: CPT | Mod: PN | Performed by: PHYSICAL THERAPIST

## 2020-07-29 PROCEDURE — 97140 MANUAL THERAPY 1/> REGIONS: CPT | Mod: PN | Performed by: PHYSICAL THERAPIST

## 2020-07-29 PROCEDURE — 97014 ELECTRIC STIMULATION THERAPY: CPT | Mod: PN | Performed by: PHYSICAL THERAPIST

## 2020-07-29 PROCEDURE — 97110 THERAPEUTIC EXERCISES: CPT | Mod: PN | Performed by: PHYSICAL THERAPIST

## 2020-07-29 NOTE — PROGRESS NOTES
Physical Therapy Daily Treatment Note     Name: Danuta Santos  Clinic Number: [id number]  Diagnosis:   Encounter Diagnosis   Name Primary?    Chronic bilateral low back pain with right-sided sciatica Yes     Physician: Erica Khan MD  Treatment Orders: PT Eval and Treat  Past Medical History:   Diagnosis Date    Allergy     IBS (irritable bowel syndrome)     Pneumonia 02/2019     Precautions: as per diagnosis    Evaluation date: 2/15/2018  Visit # authorized: 62/72 on 7/29/2020  Authorization period: 12-31-20  Plan of care expiration: 8-27-20  MD Follow up appt: none scheduled    Time In:  10:12  Time Out: 11:10  Billable time:  55  min    Subjective     Pt reports: had to sit at computer for about 10 hours yesterday.  Pt states she had to get ready for house showing today.       Pain Scale: before treatment: 5-6/10  after treatment:feels better, 2/10  Objective     AR R pelvis  Severe tightness lumbar paraspinals R>L, min tightness after treatment       Pt was able to self mobilize today      TREATMENT  Therapeutic exercise: Danuta received therapeutic exercises to develop strength, endurance, ROM, flexibility and core stabilization for 10 minutes including:    HS stretch supine   Glut stretch  Piriformis stretch  B QL stretch  Contract relax R glut x 3      Did not perform in clinic today  Bridge x 10  Pelvic tilt  X 10 for 5 sec hold  Partial sit up x 10  SLR x 10  Hip abd sidelying x 10 B   Arm and Leg lift prone x 1/10    HOLD FOr now      Hip ext prone with bent knee x 2/10  Contract relax R glut    Heel raises standing x 15    Step up x10     1 plank x 30 sec held the other 3 she normally does     (NP)Plank 4 reps 30 sec each     Manual therapy: Danuta  received the following manual therapy  techniques x 20  min. to include soft tissue and joint mobilization were applied to the: low back and gluteals to include:  Performed STM to LB paraspinals and QL R>L  P/A mob to lumbar region Grade 1-2 ,  "sidelying L contract relax to R QL    Sidelying L contract relax mobilization to L5-S1 region to level pelvis at start and at end   Pt received tool-assisted massage with manual therapy techniques to R LB in prone to trigger an inflammatory healing response and stimulate the production of new collagen and proper, more functional, less painful healing.    Vacuum/cupping STM with manual therapy techniques was performed to back and gluteals to decrease muscle tightness, increase circulation and promote healing process.  The pt's skin was monitored for redness adjusting pressure as needed. The pt was instructed in possible side effects of bruising and/or soreness.      (NP)  Kinesiotape with 6" star for pain relief was performed over the R lumbar paraspinals.L3-4 region  Instructed pt in use, care and precautions with tape.    Ultrasound  for pain control and to decrease inflammation @ continuous duty cycle, 1 Mhz, applied to R lumbar paraspinals, intensity = 1.8 w/cm2 for 8 minutes.    Patient received pre-mod electrical stimulation to decrease muscle tightness and pain to Lumbar paraspinals/ sacral border of gluteals B for 15 minutes with MH supine with cycle time: continuous, beat frequency: , CC/CV: CV.      Written Home Exercises Provided: none today  Pt demo good understanding of the education provided. Danuta demonstrated good return demonstration of activities.     Pt. education:  · Posture reeducation, body mechanics, HEP,   · No spiritual or educational barriers to learning provided  · Pt has no cultural, educational or language barriers to learning provided.    Assessment   Pt with increased sitting and then increased activity with cleaning house for showing.  Pt is able to self mobilize but with tight muscles after increased activity leads to increased pain and increased dysfunction  Pt with decreased pain after treatment and decreased muscle tightness noted      Pt will continue to benefit from " skilled outpatient physical therapy to address the remaining functional deficits, provide pt/family education, and to maximize pt's level of independence in the home and community environment. .     GOALS:   Short Term Goals:  3 weeks MET STG's  Increase range of motion 25%  Increase strength 1/2 muscle grade  Improve postural awareness of pelvis to independently identify dysfunction with min assist from PT  Be able to perform HEP with minimal cueing required     Long Term Goals: 6 weeks PARTIALLY MET LTG'S  Increase range of motion to 75% to 100% full   Improve muscle strength 1 muscle grade  Improve muscle strength with MMT to 4+/5 to 5/5  Improve and stabilize proper pelvic positioning  Restore ability to sit for ADL and work with min to 0 pain  Restore normal sleep habits without disturbances due to pain  Restore ability to perform ADL's and household activities independently with min to 0 pain    Anticipated barriers to physical therapy: none  Pt's spiritual, cultural and educational needs considered and pt agreeable to plan of care and goals        Plan   Continue with established plan of care towards PT goals.                                           .

## 2020-08-04 ENCOUNTER — CLINICAL SUPPORT (OUTPATIENT)
Dept: REHABILITATION | Facility: HOSPITAL | Age: 65
End: 2020-08-04
Payer: COMMERCIAL

## 2020-08-04 DIAGNOSIS — G89.29 CHRONIC BILATERAL LOW BACK PAIN WITH RIGHT-SIDED SCIATICA: Primary | ICD-10-CM

## 2020-08-04 DIAGNOSIS — M54.41 CHRONIC BILATERAL LOW BACK PAIN WITH RIGHT-SIDED SCIATICA: Primary | ICD-10-CM

## 2020-08-04 PROCEDURE — 97014 ELECTRIC STIMULATION THERAPY: CPT | Mod: PN | Performed by: PHYSICAL THERAPIST

## 2020-08-04 PROCEDURE — 97110 THERAPEUTIC EXERCISES: CPT | Mod: PN | Performed by: PHYSICAL THERAPIST

## 2020-08-04 PROCEDURE — 97035 APP MDLTY 1+ULTRASOUND EA 15: CPT | Mod: PN | Performed by: PHYSICAL THERAPIST

## 2020-08-04 NOTE — PROGRESS NOTES
Physical Therapy Daily Treatment Note     Name: Danuta Santos  Clinic Number: [id number]  Diagnosis:   Encounter Diagnosis   Name Primary?    Chronic bilateral low back pain with right-sided sciatica Yes     Physician: Erica Khan MD  Treatment Orders: PT Eval and Treat  Past Medical History:   Diagnosis Date    Allergy     IBS (irritable bowel syndrome)     Pneumonia 02/2019     Precautions: as per diagnosis    Evaluation date: 2/15/2018  Visit # authorized: 63/72 on 8/4/2020  Authorization period: 12-31-20  Plan of care expiration: 8-27-20  MD Follow up appt: none scheduled    Time In:  9:35  Time Out: 10:30  Billable time:   50 min    Subjective     Pt reports: still doing a lot of computer work and doing more sitting. Pt has to be at school for these meetings and does not have a place to stand with computer  Pt states she is seeing massage therapist this PM so will hold manual therapy tools      Pain Scale: before treatment: 5/10  after treatment:feels better, 2/10  Objective     AR R pelvis  Mod-Severe tightness lumbar paraspinals R>L,       Pt was able to self mobilize today      TREATMENT  Therapeutic exercise: Danuta received therapeutic exercises to develop strength, endurance, ROM, flexibility and core stabilization for 20 minutes including:  Discussed how pt can stack books on her desk to get her laptop higher to be able to stand    HS stretch supine   Glut stretch  Piriformis stretch  B QL stretch  Contract relax R glut x 3      Pelvic tilt  x 10 for 5 sec hold  Bridge x 10    Partial sit up x 10  SLR x 2/10  Hip abd sidelying x 10 B  Arm and Leg lift prone x 2/10   Hip ext prone with bent knee x 10  Contract relax R glut      Hold for now   Heel raises standing x 15    Step up x10     1 plank x 30 sec held the other 3 she normally does     (NP)Plank 4 reps 30 sec each     Manual therapy: Danuta  received the following manual therapy  techniques x 5  min. to include soft tissue and joint  "mobilization were applied to the: low back and gluteals to include:  Performed STM to LB paraspinals and QL R>L  P/A mob to lumbar region Grade 1-2 , sidelying L contract relax to R QL    Sidelying L contract relax mobilization to L5-S1 region to level pelvis at start and at end   (NP)Pt received tool-assisted massage with manual therapy techniques to R LB in prone to trigger an inflammatory healing response and stimulate the production of new collagen and proper, more functional, less painful healing.    (NP)Vacuum/cupping STM with manual therapy techniques was performed to back and gluteals to decrease muscle tightness, increase circulation and promote healing process.  The pt's skin was monitored for redness adjusting pressure as needed. The pt was instructed in possible side effects of bruising and/or soreness.      (NP)  Kinesiotape with 6" star for pain relief was performed over the R lumbar paraspinals.L3-4 region  Instructed pt in use, care and precautions with tape.    Ultrasound  for pain control and to decrease inflammation @ continuous duty cycle, 1 Mhz, applied to R lumbar paraspinals, intensity = 1.8 w/cm2 for 8 minutes.    Patient received pre-mod electrical stimulation to decrease muscle tightness and pain to Lumbar paraspinals/ sacral border of gluteals B for 15 minutes with MH supine with cycle time: continuous, beat frequency: , CC/CV: CV.      Written Home Exercises Provided: none today  Pt demo good understanding of the education provided. Danuta demonstrated good return demonstration of activities.     Pt. education:  · Posture reeducation, body mechanics, HEP,   · No spiritual or educational barriers to learning provided  · Pt has no cultural, educational or language barriers to learning provided.    Assessment   Pt with increased sitting with getting back to school which aggravates back leading to increased tightness.  Pt seeing massage therapist today so held aggressive manual " therapy.  Pt was able to progress with therex, performing appropriately and able to add further glut strengthening and still be able to self mobilize though muscle does get tight again requiring additional manual therapy and modalities to help pt manage symptoms.       Pt will continue to benefit from skilled outpatient physical therapy to address the remaining functional deficits, provide pt/family education, and to maximize pt's level of independence in the home and community environment. .     GOALS:   Short Term Goals:  3 weeks MET STG's  Increase range of motion 25%  Increase strength 1/2 muscle grade  Improve postural awareness of pelvis to independently identify dysfunction with min assist from PT  Be able to perform HEP with minimal cueing required     Long Term Goals: 6 weeks PARTIALLY MET LTG'S  Increase range of motion to 75% to 100% full   Improve muscle strength 1 muscle grade  Improve muscle strength with MMT to 4+/5 to 5/5  Improve and stabilize proper pelvic positioning  Restore ability to sit for ADL and work with min to 0 pain  Restore normal sleep habits without disturbances due to pain  Restore ability to perform ADL's and household activities independently with min to 0 pain    Anticipated barriers to physical therapy: none  Pt's spiritual, cultural and educational needs considered and pt agreeable to plan of care and goals        Plan   Continue with established plan of care towards PT goals.                                           .

## 2020-08-14 ENCOUNTER — CLINICAL SUPPORT (OUTPATIENT)
Dept: REHABILITATION | Facility: HOSPITAL | Age: 65
End: 2020-08-14
Payer: COMMERCIAL

## 2020-08-14 DIAGNOSIS — G89.29 CHRONIC BILATERAL LOW BACK PAIN WITH RIGHT-SIDED SCIATICA: Primary | ICD-10-CM

## 2020-08-14 DIAGNOSIS — M54.41 CHRONIC BILATERAL LOW BACK PAIN WITH RIGHT-SIDED SCIATICA: Primary | ICD-10-CM

## 2020-08-14 PROCEDURE — 97140 MANUAL THERAPY 1/> REGIONS: CPT | Mod: PN | Performed by: PHYSICAL THERAPIST

## 2020-08-14 PROCEDURE — 97035 APP MDLTY 1+ULTRASOUND EA 15: CPT | Mod: PN | Performed by: PHYSICAL THERAPIST

## 2020-08-14 PROCEDURE — 97014 ELECTRIC STIMULATION THERAPY: CPT | Mod: PN | Performed by: PHYSICAL THERAPIST

## 2020-08-14 PROCEDURE — 97110 THERAPEUTIC EXERCISES: CPT | Mod: PN | Performed by: PHYSICAL THERAPIST

## 2020-08-14 NOTE — PROGRESS NOTES
Physical Therapy Daily Treatment Note     Name: Danuta Santos  Clinic Number: [id number]  Diagnosis:   Encounter Diagnosis   Name Primary?    Chronic bilateral low back pain with right-sided sciatica Yes     Physician: Erica Khan MD  Treatment Orders: PT Eval and Treat  Past Medical History:   Diagnosis Date    Allergy     IBS (irritable bowel syndrome)     Pneumonia 02/2019     Precautions: as per diagnosis    Evaluation date: 2/15/2018  Visit # authorized: 64/72 on 8/14/2020  Authorization period: 12-31-20  Plan of care expiration: 8-27-20  MD Follow up appt: none scheduled    Time In:  12:16  Time Out: 1:15  Billable time:   50 min    Subjective     Pt reports: doing pretty well.  Pt states she has had to sit for zoom meetings for work.  Pt is doing more standing with zoom meeting and walking for ex with slow pace to avoid dysfunction      Pain Scale: before treatment: 5/10  after treatment:feels better, 2/10  Objective     AR R pelvis  Mod-Severe tightness lumbar paraspinals R, min in L  After treatment min-mod tightness noted       Pt was able to self mobilize today      TREATMENT  Therapeutic exercise: Danuta received therapeutic exercises to develop strength, endurance, ROM, flexibility and core stabilization for 10 minutes including:      HS stretch supine   Glut stretch  Piriformis stretch  B QL stretch  Contract relax R glut x 3      Verbally reviewed ex below and pt was not performing hip ext prone with bent knee, so reminded pt to add and to work on increasing reps as tolerated  Pelvic tilt  x 10 for 5 sec hold  Bridge x 10    Partial sit up x 10  SLR x 2/10  Hip abd sidelying x 10 B  Arm and Leg lift prone x 2/10   Hip ext prone with bent knee x 10  Contract relax R glut      Hold for now   Heel raises standing x 15    Step up x10     1 plank x 30 sec held the other 3 she normally does     (NP)Plank 4 reps 30 sec each     Manual therapy: Danuta  received the following manual therapy   "techniques x 15  min. to include soft tissue and joint mobilization were applied to the: low back and gluteals to include:  Performed STM to LB paraspinals and QL R>L  P/A mob to lumbar region Grade 1-2 , sidelying L contract relax to R QL    Sidelying L contract relax mobilization to L5-S1 region to level pelvis at start and at end   Pt received tool-assisted massage with manual therapy techniques to R LB in prone to trigger an inflammatory healing response and stimulate the production of new collagen and proper, more functional, less painful healing.    Vacuum/cupping STM with manual therapy techniques was performed to back and gluteals to decrease muscle tightness, increase circulation and promote healing process.  The pt's skin was monitored for redness adjusting pressure as needed. The pt was instructed in possible side effects of bruising and/or soreness.      (NP)  Kinesiotape with 6" star for pain relief was performed over the R lumbar paraspinals.L3-4 region  Instructed pt in use, care and precautions with tape.    Ultrasound  for pain control and to decrease inflammation @ continuous duty cycle, 1 Mhz, applied to R lumbar paraspinals, intensity = 1.8 w/cm2 for 8 minutes.    Patient received pre-mod electrical stimulation to decrease muscle tightness and pain to Lumbar paraspinals/ sacral border of gluteals B for 15 minutes with MH supine with cycle time: continuous, beat frequency: , CC/CV: CV.      Written Home Exercises Provided: none today  Pt demo good understanding of the education provided. Danuta demonstrated good return demonstration of activities.     Pt. education:  · Posture reeducation, body mechanics, HEP,   · No spiritual or educational barriers to learning provided  · Pt has no cultural, educational or language barriers to learning provided.    Assessment   Pt with increased sitting with getting back to school which aggravates back leading to increased tightness.  Pt seeing massage " therapist today so held aggressive manual therapy.  Pt was able to progress with therex, performing appropriately and able to add further glut strengthening and still be able to self mobilize though muscle does get tight again requiring additional manual therapy and modalities to help pt manage symptoms.       Pt will continue to benefit from skilled outpatient physical therapy to address the remaining functional deficits, provide pt/family education, and to maximize pt's level of independence in the home and community environment. .     GOALS:   Short Term Goals:  3 weeks MET STG's  Increase range of motion 25%  Increase strength 1/2 muscle grade  Improve postural awareness of pelvis to independently identify dysfunction with min assist from PT  Be able to perform HEP with minimal cueing required     Long Term Goals: 6 weeks PARTIALLY MET LTG'S  Increase range of motion to 75% to 100% full   Improve muscle strength 1 muscle grade  Improve muscle strength with MMT to 4+/5 to 5/5  Improve and stabilize proper pelvic positioning  Restore ability to sit for ADL and work with min to 0 pain  Restore normal sleep habits without disturbances due to pain  Restore ability to perform ADL's and household activities independently with min to 0 pain    Anticipated barriers to physical therapy: none  Pt's spiritual, cultural and educational needs considered and pt agreeable to plan of care and goals        Plan   Continue with established plan of care towards PT goals.                                           .

## 2020-08-18 ENCOUNTER — CLINICAL SUPPORT (OUTPATIENT)
Dept: OTHER | Facility: CLINIC | Age: 65
End: 2020-08-18
Payer: COMMERCIAL

## 2020-08-18 DIAGNOSIS — Z00.8 ENCOUNTER FOR OTHER GENERAL EXAMINATION: ICD-10-CM

## 2020-08-18 PROCEDURE — 82947 PR  ASSAY QUANTITATIVE,BLOOD GLUCOSE: ICD-10-PCS | Mod: QW,S$GLB,, | Performed by: INTERNAL MEDICINE

## 2020-08-18 PROCEDURE — 80061 LIPID PANEL: CPT | Mod: QW,S$GLB,, | Performed by: INTERNAL MEDICINE

## 2020-08-18 PROCEDURE — 80061 PR  LIPID PANEL: ICD-10-PCS | Mod: QW,S$GLB,, | Performed by: INTERNAL MEDICINE

## 2020-08-18 PROCEDURE — 99401 PREV MED CNSL INDIV APPRX 15: CPT | Mod: S$GLB,,, | Performed by: INTERNAL MEDICINE

## 2020-08-18 PROCEDURE — 99401 PR PREVENT COUNSEL,INDIV,15 MIN: ICD-10-PCS | Mod: S$GLB,,, | Performed by: INTERNAL MEDICINE

## 2020-08-18 PROCEDURE — 82947 ASSAY GLUCOSE BLOOD QUANT: CPT | Mod: QW,S$GLB,, | Performed by: INTERNAL MEDICINE

## 2020-08-19 VITALS — BODY MASS INDEX: 21.63 KG/M2 | HEIGHT: 66 IN

## 2020-08-19 LAB
HDLC SERPL-MCNC: 84 MG/DL
POC CHOLESTEROL, LDL (DOCK): 71 MG/DL
POC CHOLESTEROL, TOTAL: 164 MG/DL
POC GLUCOSE, FASTING: 77 MG/DL (ref 60–110)
TRIGL SERPL-MCNC: 48 MG/DL

## 2020-08-28 ENCOUNTER — CLINICAL SUPPORT (OUTPATIENT)
Dept: REHABILITATION | Facility: HOSPITAL | Age: 65
End: 2020-08-28
Payer: COMMERCIAL

## 2020-08-28 DIAGNOSIS — M54.41 CHRONIC BILATERAL LOW BACK PAIN WITH RIGHT-SIDED SCIATICA: Primary | ICD-10-CM

## 2020-08-28 DIAGNOSIS — G89.29 CHRONIC BILATERAL LOW BACK PAIN WITH RIGHT-SIDED SCIATICA: Primary | ICD-10-CM

## 2020-08-28 PROCEDURE — 97140 MANUAL THERAPY 1/> REGIONS: CPT | Mod: PN | Performed by: PHYSICAL THERAPIST

## 2020-08-28 PROCEDURE — 97035 APP MDLTY 1+ULTRASOUND EA 15: CPT | Mod: PN | Performed by: PHYSICAL THERAPIST

## 2020-08-28 PROCEDURE — 97014 ELECTRIC STIMULATION THERAPY: CPT | Mod: PN | Performed by: PHYSICAL THERAPIST

## 2020-08-28 PROCEDURE — 97110 THERAPEUTIC EXERCISES: CPT | Mod: PN | Performed by: PHYSICAL THERAPIST

## 2020-08-28 NOTE — PROGRESS NOTES
"Physical Therapy Progress and  Daily Treatment Note     Name: Danuta Santos  Clinic Number: [id number]  Diagnosis:   Encounter Diagnosis   Name Primary?    Chronic bilateral low back pain with right-sided sciatica Yes     Physician: Erica Khan MD  Treatment Orders: PT Eval and Treat  Past Medical History:   Diagnosis Date    Allergy     IBS (irritable bowel syndrome)     Pneumonia 02/2019     Precautions: as per diagnosis    Evaluation date: 2/15/2018  Visit # authorized: 65/72 on 8/28/2020  Authorization period: 12-31-20  Plan of care expiration: 10-9-20  MD Follow up appt: none scheduled    Time In:  8:32  Time Out: 9:35  Billable time:   60 min    Subjective     Pt reports: doing pretty well.  Pt states she has had to sit for zoom meetings for work.  Pt is doing more standing with zoom meeting and walking for ex with slow pace to avoid dysfunction      Pain Scale: before treatment: 5/10  after treatment:feels better, 2/10  Objective     AR R pelvis  Mod-Severe tightness lumbar paraspinals R, min in L  After treatment min-mod tightness noted    Lumbar active range of motion in standing is: 8-28-20  - flexion - ankle                     - extension -  50%                         - left side bending -  1" above knee         - right side bending -  1" above knee          Lumbar active range of motion in standing is: 5-25-20  - flexion - ankle                     - extension -  25%                         - left side bending -  2" above knee         - right side bending -  2" above knee    Muscle Strength 8-28-20  MMT R L   Hip flexion 4-/5 4-/5   Hip abduction 5-/5 5-/5   Hip extension 4+/5 4+/5   Glut max 4/5 4/5        Knee extension 5/5 5/5   Knee flexion 5/5 5/5            Muscle Strength 5-25-20  MMT R L   Hip flexion 4-/5 4-/5   Hip abduction 4+/5 4+/5   Hip extension 3/5 3+/5   Glut max 3-/5 3/5           Knee extension 5/5 5/5   Knee flexion           TREATMENT  Therapeutic exercise: Danuta" "received therapeutic exercises to develop strength, endurance, ROM, flexibility and core stabilization for 15 minutes including:      HS stretch supine   Glut stretch  Piriformis stretch  B QL stretch  Contract relax R glut x 3      Verbally reviewed ex below and pt was not performing hip ext prone with bent knee, so reminded pt to add and to work on increasing reps as tolerated  Pelvic tilt  x 10 for 5 sec hold  Bridge x 10    Partial sit up x 10  SLR x 2/10  Hip abd sidelying x 10 B  Arm and Leg lift prone x 2/10   Hip ext prone with bent knee x 10  Contract relax R glut      Hold for now   Heel raises standing x 15    Step up x10     1 plank x 30 sec held the other 3 she normally does     (NP)Plank 4 reps 30 sec each     Manual therapy: Danuta  received the following manual therapy  techniques x 20  min. to include soft tissue and joint mobilization were applied to the: low back and gluteals to include:  Performed STM to LB paraspinals and QL R>L  P/A mob to lumbar region Grade 1-2 , sidelying L contract relax to R QL    Sidelying L contract relax mobilization to L5-S1 region to level pelvis at start and at end   Pt received tool-assisted massage with manual therapy techniques to R LB in prone to trigger an inflammatory healing response and stimulate the production of new collagen and proper, more functional, less painful healing.    Vacuum/cupping STM with manual therapy techniques was performed to back and gluteals to decrease muscle tightness, increase circulation and promote healing process.  The pt's skin was monitored for redness adjusting pressure as needed. The pt was instructed in possible side effects of bruising and/or soreness.      (NP)  Kinesiotape with 6" star for pain relief was performed over the R lumbar paraspinals.L3-4 region  Instructed pt in use, care and precautions with tape.    Ultrasound  for pain control and to decrease inflammation @ continuous duty cycle, 1 Mhz, applied to R lumbar " paraspinals, intensity = 1.8 w/cm2 for 8 minutes.    Patient received pre-mod electrical stimulation to decrease muscle tightness and pain to Lumbar paraspinals/ sacral border of gluteals B for 15 minutes with MH supine with cycle time: continuous, beat frequency: , CC/CV: CV.      Written Home Exercises Provided: none today  Pt demo good understanding of the education provided. Danuta demonstrated good return demonstration of activities.     Pt. education:  · Posture reeducation, body mechanics, HEP,   · No spiritual or educational barriers to learning provided  · Pt has no cultural, educational or language barriers to learning provided.    Assessment   Patient demonstrates improvement in range of motion, strength.  Pt feels increased walking has helped her also have less pain with function.  Pt reports she still will get very tight and unable to effectively stretch and relief pain without physical therapy.  Pt was seen for 4 visits over the past 6 weeks     Pt will continue to benefit from skilled outpatient physical therapy to address the remaining functional deficits, provide pt/family education, and to maximize pt's level of independence in the home and community environment. .     GOALS:   Short Term Goals:  3 weeks MET STG's  Increase range of motion 25%  Increase strength 1/2 muscle grade  Improve postural awareness of pelvis to independently identify dysfunction with min assist from PT  Be able to perform HEP with minimal cueing required     Long Term Goals: 6 weeks PARTIALLY MET LTG'S  Increase range of motion to 75% to 100% full   Improve muscle strength 1 muscle grade  Improve muscle strength with MMT to 4+/5 to 5/5  Improve and stabilize proper pelvic positioning  Restore ability to sit for ADL and work with min to 0 pain  Restore normal sleep habits without disturbances due to pain  Restore ability to perform ADL's and household activities independently with min to 0 pain    Anticipated  barriers to physical therapy: none  Pt's spiritual, cultural and educational needs considered and pt agreeable to plan of care and goals        Plan   If you concur, I recommend patient continue with physical therapy 1-2 times a week  for 6 weeks as needed.  Please advise us of your  recommendations. Thank you for allowing us to assist in the care of your patient.      Jacy Escobar, MS, PT          I certify the need for these services furnished under this plan of treatment and while under my care.    ____________________________________ Physician/Referring Practitioner                                Date of Signature                                                 .

## 2020-09-04 ENCOUNTER — PATIENT OUTREACH (OUTPATIENT)
Dept: ADMINISTRATIVE | Facility: HOSPITAL | Age: 65
End: 2020-09-04

## 2020-09-09 ENCOUNTER — CLINICAL SUPPORT (OUTPATIENT)
Dept: REHABILITATION | Facility: HOSPITAL | Age: 65
End: 2020-09-09
Payer: COMMERCIAL

## 2020-09-09 DIAGNOSIS — G89.29 CHRONIC BILATERAL LOW BACK PAIN WITH RIGHT-SIDED SCIATICA: Primary | ICD-10-CM

## 2020-09-09 DIAGNOSIS — M54.41 CHRONIC BILATERAL LOW BACK PAIN WITH RIGHT-SIDED SCIATICA: Primary | ICD-10-CM

## 2020-09-09 PROCEDURE — 97014 ELECTRIC STIMULATION THERAPY: CPT | Mod: PN | Performed by: PHYSICAL THERAPIST

## 2020-09-09 PROCEDURE — 97140 MANUAL THERAPY 1/> REGIONS: CPT | Mod: PN | Performed by: PHYSICAL THERAPIST

## 2020-09-09 PROCEDURE — 97035 APP MDLTY 1+ULTRASOUND EA 15: CPT | Mod: PN | Performed by: PHYSICAL THERAPIST

## 2020-09-09 NOTE — PROGRESS NOTES
Physical Therapy  Daily Treatment Note     Name: Danuta Santos  Clinic Number: [id number]  Diagnosis:   Encounter Diagnosis   Name Primary?    Chronic bilateral low back pain with right-sided sciatica Yes     Physician: Erica Khan MD  Treatment Orders: PT Eval and Treat  Past Medical History:   Diagnosis Date    Allergy     IBS (irritable bowel syndrome)     Pneumonia 02/2019     Precautions: as per diagnosis    Evaluation date: 2/15/2018  Visit # authorized: 66/72 on 9/9/2020  Authorization period: 12-31-20  Plan of care expiration: 10-9-20  MD Follow up appt: none scheduled    Time In:  4:50  Time Out: 5:40  Billable time:   45 min    Subjective     Pt reports: feeling very tight, has not been able to get to massage therapist due to work schedule  Doing more walking and stretching.       Pain Scale: before treatment: 5/10  after treatment:feels better, 2/10  Objective     AR R pelvis  Severe tightness lumbar paraspinals R, min in L  After treatment min-mod tightness noted    TREATMENT  Therapeutic exercise: Danuta received therapeutic exercises to develop strength, endurance, ROM, flexibility and core stabilization for 5 minutes including:      HS stretch supine   Glut stretch  Piriformis stretch  B QL stretch  Contract relax R glut x 3      Verbally reviewed ex below and pt was not performing hip ext prone with bent knee, so reminded pt to add and to work on increasing reps as tolerated  Pelvic tilt  x 10 for 5 sec hold  Bridge x 10    Partial sit up x 10  SLR x 2/10  Hip abd sidelying x 10 B  Arm and Leg lift prone x 2/10   Hip ext prone with bent knee x 10  Contract relax R glut      Hold for now   Heel raises standing x 15    Step up x10     1 plank x 30 sec held the other 3 she normally does     (NP)Plank 4 reps 30 sec each     Manual therapy: Danuta  received the following manual therapy  techniques x 15  min. to include soft tissue and joint mobilization were applied to the: low back and  "gluteals to include:  Performed STM to LB paraspinals and QL R>L  P/A mob to lumbar region Grade 1-2 , sidelying L contract relax to R QL    Sidelying L contract relax mobilization to L5-S1 region to level pelvis at start and at end   Pt received tool-assisted massage with manual therapy techniques to R LB in prone to trigger an inflammatory healing response and stimulate the production of new collagen and proper, more functional, less painful healing.    Vacuum/cupping STM with manual therapy techniques was performed to back and gluteals to decrease muscle tightness, increase circulation and promote healing process.  The pt's skin was monitored for redness adjusting pressure as needed. The pt was instructed in possible side effects of bruising and/or soreness.      (NP)  Kinesiotape with 6" star for pain relief was performed over the R lumbar paraspinals.L3-4 region  Instructed pt in use, care and precautions with tape.    Ultrasound  for pain control and to decrease inflammation @ continuous duty cycle, 1 Mhz, applied to R lumbar paraspinals, intensity = 1.8 w/cm2 for 8 minutes.    Patient received pre-mod electrical stimulation to decrease muscle tightness and pain to Lumbar paraspinals/ sacral border of gluteals B for 15 minutes with MH supine with cycle time: continuous, beat frequency: , CC/CV: CV.      Written Home Exercises Provided: none today  Pt demo good understanding of the education provided. Danuta demonstrated good return demonstration of activities.     Pt. education:  · Posture reeducation, body mechanics, HEP,   · No spiritual or educational barriers to learning provided  · Pt has no cultural, educational or language barriers to learning provided.    Assessment   Patient has restarted school and displays increased muscle tightness which responded well to manual therapy and modalities.  Pt is still able to self mobilize, but unable to keep muscle tightness under control I.       Pt will " continue to benefit from skilled outpatient physical therapy to address the remaining functional deficits, provide pt/family education, and to maximize pt's level of independence in the home and community environment. .     GOALS:   Short Term Goals:  3 weeks MET STG's  Increase range of motion 25%  Increase strength 1/2 muscle grade  Improve postural awareness of pelvis to independently identify dysfunction with min assist from PT  Be able to perform HEP with minimal cueing required     Long Term Goals: 6 weeks PARTIALLY MET LTG'S  Increase range of motion to 75% to 100% full   Improve muscle strength 1 muscle grade  Improve muscle strength with MMT to 4+/5 to 5/5  Improve and stabilize proper pelvic positioning  Restore ability to sit for ADL and work with min to 0 pain  Restore normal sleep habits without disturbances due to pain  Restore ability to perform ADL's and household activities independently with min to 0 pain    Anticipated barriers to physical therapy: none  Pt's spiritual, cultural and educational needs considered and pt agreeable to plan of care and goals        Plan   Continue with established plan of care towards PT goals.                                                  .

## 2020-09-09 NOTE — PROGRESS NOTES
Physical Therapy Daily Note     Name: Danuta Santos  Clinic Number: [id number]  Diagnosis:   Encounter Diagnosis   Name Primary?    Chronic bilateral low back pain with right-sided sciatica Yes     Physician: Erica Khan MD  Treatment Orders: PT Eval and Treat  Past Medical History:   Diagnosis Date    Allergy     IBS (irritable bowel syndrome)     Pneumonia 02/2019       Precautions: as per diagnosis    Evaluation date: 2/15/2018  Visit # authorized: 48/60 on 10/8/2019  Authorization period: 12-31-18  Plan of care expiration: 10-1-19  MD Follow up appt: none scheduled    Time In:  4:30 pt late  Time Out: 5:20  Billable time:  40 min    Subjective     Pt reports: she has been exercise a little better and get level, but goes out easily Pt unable to self mobilize today she felt   Pain Scale: before treatment: N/T after treatment:feels better, did not provide #  Objective     Pelvic dysfunction noted  TREATMENT    Therapeutic exercise: Danuta received therapeutic exercises to develop strength, endurance, ROM, flexibility and core stabilization for 10  minutes including:    HS stretch supine   Glut stretch  Piriformis stretch  B QL stretch    Contract relax R glut x 3  Pt was able to self mobilize today      Did not perform in clinic due to time constraints  Bridge x 15  Pelvic tilt  X 15 for 5 sec hold  Partial sit up x 20  Hip abd sidelying x 20 B  SLR x 15  Arm and Leg lift prone x 20  Hip ext prone with bent knee x 20         HOLD FOR NOW  Heel raises standing x 15    Step up x10     1 plank x 30 sec held the other 3 she normally does     (NP)Plank 4 reps 30 sec each          Manual therapy: Danuta  received the following manual therapy  techniques x 15  min. to include soft tissue and joint mobilization were applied to the: low back and gluteals to include:STM to LB paraspinals R>L  P/A mob to lumbar region Grade 1-2  Sidelying L contract relax mob to L5-S1 region to level pelvis    (NP)Pt received  "tool-assisted massage with manual therapy techniques to R LB in sidelying with pillow at waist putting pt in SB L to trigger an inflammatory healing response and stimulate the production of new collagen and proper, more functional, less painful healing.    (NP)Kinesiotape with 6" star for pain relief was performed over the R lumbar paraspinals.L3-4 region  Instructed pt in use, care and precautions with tape.     Vacuum/cupping STM with manual therapy techniques was performed to back and gluteals to decrease muscle tightness, increase circulation and promote healing process.  The pt's skin was monitored for redness adjusting pressure as needed. The pt was instructed in possible side effects of bruising and/or soreness.      (NP)Ultrasound  for pain control and to decrease inflammation @ continuous duty cycle, 1 Mhz, applied to R lumbar paraspinals, intensity = 1.8 w/cm2 for 8 minutes.        Patient received pre-mod electrical stimulation to decrease muscle tightness and pain to Lumbar paraspinals/ sacral border of gluteals B for 15 minutes with MH supine with cycle time: continuous, beat frequency: , CC/CV: CV.        Written Home Exercises Provided: none today  Pt demo good understanding of the education provided. Danuta demonstrated good return demonstration of activities.     Pt. education:  · Posture reeducation, body mechanics, HEP,   · No spiritual or educational barriers to learning provided  · Pt has no cultural, educational or language barriers to learning provided.    Assessment   Pt able to self mobilize today and understands increased compliance with HEP helps this ability to self mobilize Continue with severe tightness lumbar paraspinals, improved after treatment           Pt will continue to benefit from skilled outpatient physical therapy to address the remaining functional deficits, provide pt/family education, and to maximize pt's level of independence in the home and community " environment. .     GOALS:   Short Term Goals:  3 weeks MET STG's  Increase range of motion 25%  Increase strength 1/2 muscle grade  Improve postural awareness of pelvis to independently identify dysfunction with min assist from PT  Be able to perform HEP with minimal cueing required     Long Term Goals: 6 weeks PARTIALLY MET LTG'S  Increase range of motion to 75% to 100% full   Improve muscle strength 1 muscle grade  Improve muscle strength with MMT to 4+/5 to 5/5  Improve and stabilize proper pelvic positioning  Restore ability to sit for ADL and work with min to 0 pain  Restore normal sleep habits without disturbances due to pain  Restore ability to perform ADL's and household activities independently with min to 0 pain    Anticipated barriers to physical therapy: none  Pt's spiritual, cultural and educational needs considered and pt agreeable to plan of care and goals        Plan   Continue with established plan of care towards PT goals.  See pt as needed     CONST: NAD  EYES: Sclera and conjunctiva clear.   ENT: No nasal discharge. Oropharynx normal appearing, no erythema or exudates. No abscess or swelling. Uvula midline.   NECK: Non-tender, no meningeal signs. normal ROM. supple   CARD: S1 S2; No jvd  RESP: Equal BS B/L, No wheezes, rhonchi or rales. No distress  GI: L cva tenderness. Soft, non-tender, non-distended. normal BS  MS: Normal ROM in all extremities. pulses 2 +. no calf tenderness or swelling  SKIN: Warm, dry, no acute rashes. Good turgor  NEURO: A&Ox3, No focal deficits. Strength 5/5 with no sensory deficits.

## 2020-09-28 ENCOUNTER — CLINICAL SUPPORT (OUTPATIENT)
Dept: REHABILITATION | Facility: HOSPITAL | Age: 65
End: 2020-09-28
Payer: COMMERCIAL

## 2020-09-28 DIAGNOSIS — M54.41 CHRONIC BILATERAL LOW BACK PAIN WITH RIGHT-SIDED SCIATICA: Primary | ICD-10-CM

## 2020-09-28 DIAGNOSIS — G89.29 CHRONIC BILATERAL LOW BACK PAIN WITH RIGHT-SIDED SCIATICA: Primary | ICD-10-CM

## 2020-09-28 PROCEDURE — 97110 THERAPEUTIC EXERCISES: CPT | Mod: PN | Performed by: PHYSICAL THERAPIST

## 2020-09-28 PROCEDURE — 97140 MANUAL THERAPY 1/> REGIONS: CPT | Mod: PN | Performed by: PHYSICAL THERAPIST

## 2020-09-28 PROCEDURE — 97035 APP MDLTY 1+ULTRASOUND EA 15: CPT | Mod: PN | Performed by: PHYSICAL THERAPIST

## 2020-09-28 PROCEDURE — 97014 ELECTRIC STIMULATION THERAPY: CPT | Mod: PN | Performed by: PHYSICAL THERAPIST

## 2020-09-28 NOTE — PROGRESS NOTES
Physical Therapy  Daily Treatment Note     Name: Danuta Santos  Clinic Number: [id number]  Diagnosis:   Encounter Diagnosis   Name Primary?    Chronic bilateral low back pain with right-sided sciatica Yes     Physician: Erica Khan MD  Treatment Orders: PT Eval and Treat  Past Medical History:   Diagnosis Date    Allergy     IBS (irritable bowel syndrome)     Pneumonia 02/2019     Precautions: as per diagnosis    Evaluation date: 2/15/2018  Visit # authorized: 66/72 on 9/28/2020  Authorization period: 12-31-20  Plan of care expiration: 10-9-20  MD Follow up appt: none scheduled    Time In:  12:20  Time Out: 1:37  Billable time:   55 min    Subjective     Pt reports: she has been walking which has really helped.  Pt states she is still having pain and tight .       Pain Scale: before treatment: 5/10  after treatment:feels better, 2/10  Objective     AR R pelvis  Severe tightness lumbar paraspinals R, min in L  After treatment min-mod tightness noted    TREATMENT  Therapeutic exercise: Danuta received therapeutic exercises to develop strength, endurance, ROM, flexibility and core stabilization for 15 minutes including:      HS stretch supine   Glut stretch  Piriformis stretch  B QL stretch  Contract relax R glut x 3    Pt was not successful today  Performed sidelying contract relax x 1 and was unsuccessful so performed manual therapy and then was successful with realignment of pelvis  Instructed pt that walking is helpful she still needs to perform other strengthening and continue work on flexibility    Verbally reviewed ex below and pt was not performing hip ext prone with bent knee, so reminded pt to add and to work on increasing reps as tolerated  Pelvic tilt  x 10 for 5 sec hold  Bridge x 10    Partial sit up x 10  SLR x 2/10  Hip abd sidelying x 10 B  Arm and Leg lift prone x 2/10   Hip ext prone with bent knee x 10  Contract relax R glut      Hold for now   Heel raises standing x 15    Step up x10  "    1 plank x 30 sec held the other 3 she normally does     (NP)Plank 4 reps 30 sec each     Manual therapy: Danuta  received the following manual therapy  techniques x 15  min. to include soft tissue and joint mobilization were applied to the: low back and gluteals to include:  Performed STM to LB paraspinals and QL R>L  P/A mob to lumbar region Grade 1-2 , sidelying L contract relax to R QL    Sidelying L contract relax mobilization to L5-S1 region to level pelvis at start and at end   Pt received tool-assisted massage with manual therapy techniques to R LB in prone to trigger an inflammatory healing response and stimulate the production of new collagen and proper, more functional, less painful healing.    Vacuum/cupping STM with manual therapy techniques was performed to back and gluteals to decrease muscle tightness, increase circulation and promote healing process.  The pt's skin was monitored for redness adjusting pressure as needed. The pt was instructed in possible side effects of bruising and/or soreness.      (NP)  Kinesiotape with 6" star for pain relief was performed over the R lumbar paraspinals.L3-4 region  Instructed pt in use, care and precautions with tape.    Ultrasound  for pain control and to decrease inflammation @ continuous duty cycle, 1 Mhz, applied to R lumbar paraspinals, intensity = 1.8 w/cm2 for 8 minutes.    Patient received pre-mod electrical stimulation to decrease muscle tightness and pain to Lumbar paraspinals/ sacral border of gluteals B for 15 minutes with MH supine with cycle time: continuous, beat frequency: , CC/CV: CV.      Written Home Exercises Provided: none today  Pt demo good understanding of the education provided. Danuta demonstrated good return demonstration of activities.     Pt. education:  · Posture reeducation, body mechanics, HEP,   · No spiritual or educational barriers to learning provided  · Pt has no cultural, educational or language barriers to " learning provided.    Assessment   Patient with increased muscle tightness Lumbar paraspinals and difficulty with self mob and sidelying contract relax initially. Pt with decreased muscle tightness and pain after treatment and though doing well with walking understands need to perform other exercises also and continued work on flexibility      Pt will continue to benefit from skilled outpatient physical therapy to address the remaining functional deficits, provide pt/family education, and to maximize pt's level of independence in the home and community environment. .     GOALS:   Short Term Goals:  3 weeks MET STG's  Increase range of motion 25%  Increase strength 1/2 muscle grade  Improve postural awareness of pelvis to independently identify dysfunction with min assist from PT  Be able to perform HEP with minimal cueing required     Long Term Goals: 6 weeks PARTIALLY MET LTG'S  Increase range of motion to 75% to 100% full   Improve muscle strength 1 muscle grade  Improve muscle strength with MMT to 4+/5 to 5/5  Improve and stabilize proper pelvic positioning  Restore ability to sit for ADL and work with min to 0 pain  Restore normal sleep habits without disturbances due to pain  Restore ability to perform ADL's and household activities independently with min to 0 pain    Anticipated barriers to physical therapy: none  Pt's spiritual, cultural and educational needs considered and pt agreeable to plan of care and goals        Plan   Continue with established plan of care towards PT goals.                                                  .

## 2020-10-06 ENCOUNTER — CLINICAL SUPPORT (OUTPATIENT)
Dept: REHABILITATION | Facility: HOSPITAL | Age: 65
End: 2020-10-06
Payer: COMMERCIAL

## 2020-10-06 DIAGNOSIS — M54.41 CHRONIC BILATERAL LOW BACK PAIN WITH RIGHT-SIDED SCIATICA: Primary | ICD-10-CM

## 2020-10-06 DIAGNOSIS — G89.29 CHRONIC BILATERAL LOW BACK PAIN WITH RIGHT-SIDED SCIATICA: Primary | ICD-10-CM

## 2020-10-06 PROCEDURE — 97140 MANUAL THERAPY 1/> REGIONS: CPT | Mod: PN | Performed by: PHYSICAL THERAPIST

## 2020-10-06 PROCEDURE — 97014 ELECTRIC STIMULATION THERAPY: CPT | Mod: PN | Performed by: PHYSICAL THERAPIST

## 2020-10-06 PROCEDURE — 97035 APP MDLTY 1+ULTRASOUND EA 15: CPT | Mod: PN | Performed by: PHYSICAL THERAPIST

## 2020-10-06 PROCEDURE — 97110 THERAPEUTIC EXERCISES: CPT | Mod: PN | Performed by: PHYSICAL THERAPIST

## 2020-10-06 NOTE — PROGRESS NOTES
"Physical Therapy Progress and Daily Treatment Note     Name: Danuta Santos  Clinic Number: [id number]  Diagnosis:   Encounter Diagnosis   Name Primary?    Chronic bilateral low back pain with right-sided sciatica Yes     Physician: Erica Khan MD  Treatment Orders: PT Eval and Treat  Past Medical History:   Diagnosis Date    Allergy     IBS (irritable bowel syndrome)     Pneumonia 02/2019     Precautions: as per diagnosis    Evaluation date: 2/15/2018  Visit # authorized: 67/72 on 10/6/2020  Authorization period: 12-31-20  Plan of care expiration:  11-17-20  MD Follow up appt: none scheduled    Time In:  1:45  Time Out: 2:45  Billable time:   55 min    Subjective     Pt reports: having some increased tightness with increased stress due to personal issues.        Pain Scale: before treatment: 5/10  after treatment:feels better, 2/10  Objective     AR R pelvis  Severe tightness lumbar paraspinals R, min in L  After treatment min-mod tightness noted    Lumbar active range of motion in standing is: 10-6-20  - flexion - ankle                     - extension -  25%                         - left side bending -  2" above knee         - right side bending -  2" above knee             Lumbar active range of motion in standing is: 5-25-20  - flexion - ankle                     - extension -  25%                         - left side bending -  2" above knee         - right side bending -  2" above knee    Muscle Strength 10-6-20  MMT R L   Hip flexion 4-/5 4-/5   Hip abduction 4+/5 4+/5   Hip extension 3/5 3+/5   Glut max 3-/5 3/5        Knee extension 5/5 5/5   Knee flexion 5/5 5/5            Muscle Strength 5-25-20  MMT R L   Hip flexion 4-/5 4-/5   Hip abduction 4+/5 4+/5   Hip extension 3/5 3+/5   Glut max 3-/5 3/5           Knee extension 5/5 5/5   Knee flexion 5/5 5/5          TREATMENT  Therapeutic exercise: Danuta received therapeutic exercises to develop strength, endurance, ROM, flexibility and core " "stabilization for 15 minutes including:      HS stretch supine   Glut stretch  Piriformis stretch  B QL stretch  Contract relax R glut x 3    Pt was not successful today    Reinforced to pt reasons why she needs to work on HEP to avoid inhibition of muscles due to back problem.  Pt expressed more understanding of importance of strengthening Pt committed to working on ex TIW    Verbally reviewed ex below and instructed pt to start back with 10 reps each  Pelvic tilt  x 10 for 5 sec hold  Bridge x 10    Partial sit up x 10  SLR x 10  Hip abd sidelying x 10 B  Arm and Leg lift prone x 10  Hip ext prone with bent knee x 10  Contract relax R glut      Hold for now   Heel raises standing x 15    Step up x10     1 plank x 30 sec held the other 3 she normally does     (NP)Plank 4 reps 30 sec each     Manual therapy: Danuta  received the following manual therapy  techniques x 15  min. to include soft tissue and joint mobilization were applied to the: low back and gluteals to include:  Performed STM to LB paraspinals and QL R>L  P/A mob to lumbar region Grade 1-2 , sidelying L contract relax to R QL    Sidelying L contract relax mobilization to L5-S1 region to level pelvis at start and at end   Pt received tool-assisted massage with manual therapy techniques to R LB in prone to trigger an inflammatory healing response and stimulate the production of new collagen and proper, more functional, less painful healing.    Vacuum/cupping STM with manual therapy techniques was performed to back and gluteals to decrease muscle tightness, increase circulation and promote healing process.  The pt's skin was monitored for redness adjusting pressure as needed. The pt was instructed in possible side effects of bruising and/or soreness.      (NP)  Kinesiotape with 6" star for pain relief was performed over the R lumbar paraspinals.L3-4 region  Instructed pt in use, care and precautions with tape.    Ultrasound  for pain control and to " decrease inflammation @ continuous duty cycle, 1 Mhz, applied to R lumbar paraspinals, intensity = 1.8 w/cm2 for 8 minutes.    Patient received pre-mod electrical stimulation to decrease muscle tightness and pain to Lumbar paraspinals/ sacral border of gluteals B for 15 minutes with MH supine with cycle time: continuous, beat frequency: , CC/CV: CV.      Written Home Exercises Provided: none today  Pt demo good understanding of the education provided. Danuta demonstrated good return demonstration of activities.     Pt. education:  · Posture reeducation, body mechanics, HEP,   · No spiritual or educational barriers to learning provided  · Pt has no cultural, educational or language barriers to learning provided.    Assessment   Patient has been seen 7 times since last progress report.  Pt has been involved in working on house to be able to sell house leading to increased activity which pt is not used to causing increased muscle tightness, pain and dysfunction.  Pt has restarted school which also leads to increased dysfunction which makes it difficulty for pt to self mobilize.  R lumbar paraspinals tend to get very tight leading to pelvis not responding to exercises to maintain pelvic stability.  Pt has also not had time or energy to perform PT strengthening program and understands need to get back working on these exercises to help with stability.  Pt's ROM and strength are similar to last reassessment which reinforced to pt that she needs to get back to full HEP in order to increase stability to decrease pain and be more successful with self mob.        Pt will continue to benefit from skilled outpatient physical therapy to address the remaining functional deficits, provide pt/family education, and to maximize pt's level of independence in the home and community environment. .     GOALS:   Short Term Goals:  3 weeks MET STG's  Increase range of motion 25%  Increase strength 1/2 muscle grade  Improve  postural awareness of pelvis to independently identify dysfunction with min assist from PT  Be able to perform HEP with minimal cueing required     Long Term Goals: 6 weeks PARTIALLY MET LTG'S  Increase range of motion to 75% to 100% full   Improve muscle strength 1 muscle grade  Improve muscle strength with MMT to 4+/5 to 5/5  Improve and stabilize proper pelvic positioning  Restore ability to sit for ADL and work with min to 0 pain  Restore normal sleep habits without disturbances due to pain  Restore ability to perform ADL's and household activities independently with min to 0 pain    Anticipated barriers to physical therapy: none  Pt's spiritual, cultural and educational needs considered and pt agreeable to plan of care and goals        Plan   If you concur, I recommend patient continue with physical therapy 1-2 times a week  for 6 weeks.  Please advise us of your  recommendations. Thank you for allowing us to assist in the care of your patient.      Jacy Escobar, MS, PT          I certify the need for these services furnished under this plan of treatment and while under my care.    ____________________________________ Physician/Referring Practitioner                                Date of Signature                                                       .

## 2020-10-06 NOTE — PLAN OF CARE
"Physical Therapy Progress and Daily Treatment Note     Name: Danuta Santos  Clinic Number: [id number]  Diagnosis:   Encounter Diagnosis   Name Primary?    Chronic bilateral low back pain with right-sided sciatica Yes     Physician: Erica Khan MD  Treatment Orders: PT Eval and Treat  Past Medical History:   Diagnosis Date    Allergy     IBS (irritable bowel syndrome)     Pneumonia 02/2019     Precautions: as per diagnosis    Evaluation date: 2/15/2018  Visit # authorized: 67/72 on 10/6/2020  Authorization period: 12-31-20  Plan of care expiration:  11-17-20  MD Follow up appt: none scheduled    Time In:  1:45  Time Out: 2:45  Billable time:   55 min    Subjective     Pt reports: having some increased tightness with increased stress due to personal issues.        Pain Scale: before treatment: 5/10  after treatment:feels better, 2/10  Objective     AR R pelvis  Severe tightness lumbar paraspinals R, min in L  After treatment min-mod tightness noted    Lumbar active range of motion in standing is: 10-6-20  - flexion - ankle                     - extension -  25%                         - left side bending -  2" above knee         - right side bending -  2" above knee             Lumbar active range of motion in standing is: 5-25-20  - flexion - ankle                     - extension -  25%                         - left side bending -  2" above knee         - right side bending -  2" above knee    Muscle Strength 10-6-20  MMT R L   Hip flexion 4-/5 4-/5   Hip abduction 4+/5 4+/5   Hip extension 3/5 3+/5   Glut max 3-/5 3/5        Knee extension 5/5 5/5   Knee flexion 5/5 5/5            Muscle Strength 5-25-20  MMT R L   Hip flexion 4-/5 4-/5   Hip abduction 4+/5 4+/5   Hip extension 3/5 3+/5   Glut max 3-/5 3/5           Knee extension 5/5 5/5   Knee flexion 5/5 5/5          TREATMENT  Therapeutic exercise: Danuta received therapeutic exercises to develop strength, endurance, ROM, flexibility and core " "stabilization for 15 minutes including:      HS stretch supine   Glut stretch  Piriformis stretch  B QL stretch  Contract relax R glut x 3    Pt was not successful today    Reinforced to pt reasons why she needs to work on HEP to avoid inhibition of muscles due to back problem.  Pt expressed more understanding of importance of strengthening Pt committed to working on ex TIW    Verbally reviewed ex below and instructed pt to start back with 10 reps each  Pelvic tilt  x 10 for 5 sec hold  Bridge x 10    Partial sit up x 10  SLR x 10  Hip abd sidelying x 10 B  Arm and Leg lift prone x 10  Hip ext prone with bent knee x 10  Contract relax R glut      Hold for now   Heel raises standing x 15    Step up x10     1 plank x 30 sec held the other 3 she normally does     (NP)Plank 4 reps 30 sec each     Manual therapy: Danuta  received the following manual therapy  techniques x 15  min. to include soft tissue and joint mobilization were applied to the: low back and gluteals to include:  Performed STM to LB paraspinals and QL R>L  P/A mob to lumbar region Grade 1-2 , sidelying L contract relax to R QL    Sidelying L contract relax mobilization to L5-S1 region to level pelvis at start and at end   Pt received tool-assisted massage with manual therapy techniques to R LB in prone to trigger an inflammatory healing response and stimulate the production of new collagen and proper, more functional, less painful healing.    Vacuum/cupping STM with manual therapy techniques was performed to back and gluteals to decrease muscle tightness, increase circulation and promote healing process.  The pt's skin was monitored for redness adjusting pressure as needed. The pt was instructed in possible side effects of bruising and/or soreness.      (NP)  Kinesiotape with 6" star for pain relief was performed over the R lumbar paraspinals.L3-4 region  Instructed pt in use, care and precautions with tape.    Ultrasound  for pain control and to " decrease inflammation @ continuous duty cycle, 1 Mhz, applied to R lumbar paraspinals, intensity = 1.8 w/cm2 for 8 minutes.    Patient received pre-mod electrical stimulation to decrease muscle tightness and pain to Lumbar paraspinals/ sacral border of gluteals B for 15 minutes with MH supine with cycle time: continuous, beat frequency: , CC/CV: CV.      Written Home Exercises Provided: none today  Pt demo good understanding of the education provided. Danuta demonstrated good return demonstration of activities.     Pt. education:  · Posture reeducation, body mechanics, HEP,   · No spiritual or educational barriers to learning provided  · Pt has no cultural, educational or language barriers to learning provided.    Assessment   Patient has been seen 7 times since last progress report.  Pt has been involved in working on house to be able to sell house leading to increased activity which pt is not used to causing increased muscle tightness, pain and dysfunction.  Pt has restarted school which also leads to increased dysfunction which makes it difficulty for pt to self mobilize.  R lumbar paraspinals tend to get very tight leading to pelvis not responding to exercises to maintain pelvic stability.  Pt has also not had time or energy to perform PT strengthening program and understands need to get back working on these exercises to help with stability.  Pt's ROM and strength are similar to last reassessment which reinforced to pt that she needs to get back to full HEP in order to increase stability to decrease pain and be more successful with self mob.        Pt will continue to benefit from skilled outpatient physical therapy to address the remaining functional deficits, provide pt/family education, and to maximize pt's level of independence in the home and community environment. .     GOALS:   Short Term Goals:  3 weeks MET STG's  Increase range of motion 25%  Increase strength 1/2 muscle grade  Improve  postural awareness of pelvis to independently identify dysfunction with min assist from PT  Be able to perform HEP with minimal cueing required     Long Term Goals: 6 weeks PARTIALLY MET LTG'S  Increase range of motion to 75% to 100% full   Improve muscle strength 1 muscle grade  Improve muscle strength with MMT to 4+/5 to 5/5  Improve and stabilize proper pelvic positioning  Restore ability to sit for ADL and work with min to 0 pain  Restore normal sleep habits without disturbances due to pain  Restore ability to perform ADL's and household activities independently with min to 0 pain    Anticipated barriers to physical therapy: none  Pt's spiritual, cultural and educational needs considered and pt agreeable to plan of care and goals        Plan   If you concur, I recommend patient continue with physical therapy 1-2 times a week  for 6 weeks.  Please advise us of your  recommendations. Thank you for allowing us to assist in the care of your patient.      Jacy Escobar, MS, PT          I certify the need for these services furnished under this plan of treatment and while under my care.    ____________________________________ Physician/Referring Practitioner                                Date of Signature                                                       .

## 2020-10-12 ENCOUNTER — CLINICAL SUPPORT (OUTPATIENT)
Dept: REHABILITATION | Facility: HOSPITAL | Age: 65
End: 2020-10-12
Payer: COMMERCIAL

## 2020-10-12 DIAGNOSIS — M54.41 CHRONIC BILATERAL LOW BACK PAIN WITH RIGHT-SIDED SCIATICA: Primary | ICD-10-CM

## 2020-10-12 DIAGNOSIS — G89.29 CHRONIC BILATERAL LOW BACK PAIN WITH RIGHT-SIDED SCIATICA: Primary | ICD-10-CM

## 2020-10-12 PROCEDURE — 97014 ELECTRIC STIMULATION THERAPY: CPT | Mod: PN | Performed by: PHYSICAL THERAPIST

## 2020-10-12 PROCEDURE — 97110 THERAPEUTIC EXERCISES: CPT | Mod: PN | Performed by: PHYSICAL THERAPIST

## 2020-10-12 PROCEDURE — 97140 MANUAL THERAPY 1/> REGIONS: CPT | Mod: PN | Performed by: PHYSICAL THERAPIST

## 2020-10-12 PROCEDURE — 97035 APP MDLTY 1+ULTRASOUND EA 15: CPT | Mod: PN | Performed by: PHYSICAL THERAPIST

## 2020-10-12 NOTE — PROGRESS NOTES
Physical Therapy Daily Treatment Note     Name: Danuta Santos  Clinic Number: [id number]  Diagnosis:   Encounter Diagnosis   Name Primary?    Chronic bilateral low back pain with right-sided sciatica Yes     Physician: Erica Khan MD  Treatment Orders: PT Eval and Treat  Past Medical History:   Diagnosis Date    Allergy     IBS (irritable bowel syndrome)     Pneumonia 02/2019     Precautions: as per diagnosis    Evaluation date: 2/15/2018  Visit # authorized: 68/72 on 10/12/2020  Authorization period: 12-31-20  Plan of care expiration:  11-17-20  MD Follow up appt: none scheduled    Time In:  4:58  Time Out: 6:00  Billable time:   55 min    Subjective     Pt reports: having some increased tightness with increased stress due to personal issues.        Pain Scale: before treatment: 5/10  after treatment:feels better, 2/10  Objective     AR R pelvis  Severe tightness lumbar paraspinals R, min in L  After treatment min-mod tightness noted    TREATMENT  Therapeutic exercise: Danuta received therapeutic exercises to develop strength, endurance, ROM, flexibility and core stabilization for 15 minutes including:    HS stretch supine   Glut stretch  Piriformis stretch  B QL stretch  Contract relax R glut x 3    Pt was not successful today    Pelvic tilt  x 10 for 5 sec hold  Bridge x 10    Partial sit up x 10  SLR x 10  Hip abd sidelying x 10 B  Arm and Leg lift prone x 10  Hip ext prone with bent knee x 10  Contract relax R glut    Hold for now   Heel raises standing x 15    Step up x10     1 plank x 30 sec held the other 3 she normally does     (NP)Plank 4 reps 30 sec each     Manual therapy: Danuta  received the following manual therapy  techniques x 15  min. to include soft tissue and joint mobilization were applied to the: low back and gluteals to include:  Performed STM to LB paraspinals and QL R>L  P/A mob to lumbar region Grade 1-2 , sidelying L contract relax to R QL    Sidelying L contract relax  "mobilization to L5-S1 region to level pelvis at start and at end   Pt received tool-assisted massage with manual therapy techniques to R LB in prone to trigger an inflammatory healing response and stimulate the production of new collagen and proper, more functional, less painful healing.    Vacuum/cupping STM with manual therapy techniques was performed to back and gluteals to decrease muscle tightness, increase circulation and promote healing process.  The pt's skin was monitored for redness adjusting pressure as needed. The pt was instructed in possible side effects of bruising and/or soreness.      (NP)  Kinesiotape with 6" star for pain relief was performed over the R lumbar paraspinals.L3-4 region  Instructed pt in use, care and precautions with tape.    Ultrasound  for pain control and to decrease inflammation @ continuous duty cycle, 1 Mhz, applied to R lumbar paraspinals, intensity = 1.8 w/cm2 for 8 minutes.    Patient received pre-mod electrical stimulation to decrease muscle tightness and pain to Lumbar paraspinals/ sacral border of gluteals B for 15 minutes with MH supine with cycle time: continuous, beat frequency: , CC/CV: CV.      Written Home Exercises Provided: none today  Pt demo good understanding of the education provided. Danuta demonstrated good return demonstration of activities.     Pt. education:  · Posture reeducation, body mechanics, HEP,   · No spiritual or educational barriers to learning provided  · Pt has no cultural, educational or language barriers to learning provided.    Assessment   Patient has been very busy with moving, and developing increased muscle tightness with extra activity. Pt with decreased symptoms and able to self mobilize after exercise and treatment Pt with good form and scott treatment well      Pt will continue to benefit from skilled outpatient physical therapy to address the remaining functional deficits, provide pt/family education, and to maximize pt's " level of independence in the home and community environment. .     GOALS:   Short Term Goals:  3 weeks MET STG's  Increase range of motion 25%  Increase strength 1/2 muscle grade  Improve postural awareness of pelvis to independently identify dysfunction with min assist from PT  Be able to perform HEP with minimal cueing required     Long Term Goals: 6 weeks PARTIALLY MET LTG'S  Increase range of motion to 75% to 100% full   Improve muscle strength 1 muscle grade  Improve muscle strength with MMT to 4+/5 to 5/5  Improve and stabilize proper pelvic positioning  Restore ability to sit for ADL and work with min to 0 pain  Restore normal sleep habits without disturbances due to pain  Restore ability to perform ADL's and household activities independently with min to 0 pain    Anticipated barriers to physical therapy: none  Pt's spiritual, cultural and educational needs considered and pt agreeable to plan of care and goals        Plan   Continue with established plan of care towards PT goals.                                                           .                                  Physical Therapy Progress and Daily Treatment Note     Name: Danuta Santos  Clinic Number: [id number]  Diagnosis:   Encounter Diagnosis   Name Primary?    Chronic bilateral low back pain with right-sided sciatica Yes     Physician: Erica Khan MD  Treatment Orders: PT Eval and Treat  Past Medical History:   Diagnosis Date    Allergy     IBS (irritable bowel syndrome)     Pneumonia 02/2019     Precautions: as per diagnosis    Evaluation date: 2/15/2018  Visit # authorized: 67/72 on 10/12/2020  Authorization period: 12-31-20  Plan of care expiration:  11-17-20  MD Follow up appt: none scheduled    Time In:  1:45  Time Out: 2:45  Billable time:   55 min    Subjective     Pt reports: having some increased tightness with increased stress due to personal issues.        Pain Scale: before treatment: 5/10  after treatment:feels better,  "2/10  Objective     AR R pelvis  Severe tightness lumbar paraspinals R, min in L  After treatment min-mod tightness noted    Lumbar active range of motion in standing is: 10-6-20  - flexion - ankle                     - extension -  25%                         - left side bending -  2" above knee         - right side bending -  2" above knee             Lumbar active range of motion in standing is: 5-25-20  - flexion - ankle                     - extension -  25%                         - left side bending -  2" above knee         - right side bending -  2" above knee    Muscle Strength 10-6-20  MMT R L   Hip flexion 4-/5 4-/5   Hip abduction 4+/5 4+/5   Hip extension 3/5 3+/5   Glut max 3-/5 3/5        Knee extension 5/5 5/5   Knee flexion 5/5 5/5            Muscle Strength 5-25-20  MMT R L   Hip flexion 4-/5 4-/5   Hip abduction 4+/5 4+/5   Hip extension 3/5 3+/5   Glut max 3-/5 3/5           Knee extension 5/5 5/5   Knee flexion 5/5 5/5          TREATMENT  Therapeutic exercise: Danuta received therapeutic exercises to develop strength, endurance, ROM, flexibility and core stabilization for 15 minutes including:      HS stretch supine   Glut stretch  Piriformis stretch  B QL stretch  Contract relax R glut x 3    Pt was not successful today    Reinforced to pt reasons why she needs to work on HEP to avoid inhibition of muscles due to back problem.  Pt expressed more understanding of importance of strengthening Pt committed to working on ex TIW    Verbally reviewed ex below and instructed pt to start back with 10 reps each  Pelvic tilt  x 10 for 5 sec hold  Bridge x 10    Partial sit up x 10  SLR x 10  Hip abd sidelying x 10 B  Arm and Leg lift prone x 10  Hip ext prone with bent knee x 10  Contract relax R glut      Hold for now   Heel raises standing x 15    Step up x10     1 plank x 30 sec held the other 3 she normally does     (NP)Plank 4 reps 30 sec each     Manual therapy: Danuta  received the following " "manual therapy  techniques x 15  min. to include soft tissue and joint mobilization were applied to the: low back and gluteals to include:  Performed STM to LB paraspinals and QL R>L  P/A mob to lumbar region Grade 1-2 , sidelying L contract relax to R QL    Sidelying L contract relax mobilization to L5-S1 region to level pelvis at start and at end   Pt received tool-assisted massage with manual therapy techniques to R LB in prone to trigger an inflammatory healing response and stimulate the production of new collagen and proper, more functional, less painful healing.    Vacuum/cupping STM with manual therapy techniques was performed to back and gluteals to decrease muscle tightness, increase circulation and promote healing process.  The pt's skin was monitored for redness adjusting pressure as needed. The pt was instructed in possible side effects of bruising and/or soreness.      (NP)  Kinesiotape with 6" star for pain relief was performed over the R lumbar paraspinals.L3-4 region  Instructed pt in use, care and precautions with tape.    Ultrasound  for pain control and to decrease inflammation @ continuous duty cycle, 1 Mhz, applied to R lumbar paraspinals, intensity = 1.8 w/cm2 for 8 minutes.    Patient received pre-mod electrical stimulation to decrease muscle tightness and pain to Lumbar paraspinals/ sacral border of gluteals B for 15 minutes with MH supine with cycle time: continuous, beat frequency: , CC/CV: CV.      Written Home Exercises Provided: none today  Pt demo good understanding of the education provided. Danuta demonstrated good return demonstration of activities.     Pt. education:  · Posture reeducation, body mechanics, HEP,   · No spiritual or educational barriers to learning provided  · Pt has no cultural, educational or language barriers to learning provided.    Assessment   Patient has been seen 7 times since last progress report.  Pt has been involved in working on house to be able " to sell house leading to increased activity which pt is not used to causing increased muscle tightness, pain and dysfunction.  Pt has restarted school which also leads to increased dysfunction which makes it difficulty for pt to self mobilize.  R lumbar paraspinals tend to get very tight leading to pelvis not responding to exercises to maintain pelvic stability.  Pt has also not had time or energy to perform PT strengthening program and understands need to get back working on these exercises to help with stability.  Pt's ROM and strength are similar to last reassessment which reinforced to pt that she needs to get back to full HEP in order to increase stability to decrease pain and be more successful with self mob.        Pt will continue to benefit from skilled outpatient physical therapy to address the remaining functional deficits, provide pt/family education, and to maximize pt's level of independence in the home and community environment. .     GOALS:   Short Term Goals:  3 weeks MET STG's  Increase range of motion 25%  Increase strength 1/2 muscle grade  Improve postural awareness of pelvis to independently identify dysfunction with min assist from PT  Be able to perform HEP with minimal cueing required     Long Term Goals: 6 weeks PARTIALLY MET LTG'S  Increase range of motion to 75% to 100% full   Improve muscle strength 1 muscle grade  Improve muscle strength with MMT to 4+/5 to 5/5  Improve and stabilize proper pelvic positioning  Restore ability to sit for ADL and work with min to 0 pain  Restore normal sleep habits without disturbances due to pain  Restore ability to perform ADL's and household activities independently with min to 0 pain    Anticipated barriers to physical therapy: none  Pt's spiritual, cultural and educational needs considered and pt agreeable to plan of care and goals        Plan   If you concur, I recommend patient continue with physical therapy 1-2 times a week  for 6 weeks.  Please  advise us of your  recommendations. Thank you for allowing us to assist in the care of your patient.      Jacy Escobar, MS, PT          I certify the need for these services furnished under this plan of treatment and while under my care.    ____________________________________ Physician/Referring Practitioner                                Date of Signature                                                       .

## 2020-10-23 ENCOUNTER — CLINICAL SUPPORT (OUTPATIENT)
Dept: REHABILITATION | Facility: HOSPITAL | Age: 65
End: 2020-10-23
Payer: COMMERCIAL

## 2020-10-23 DIAGNOSIS — G89.29 CHRONIC BILATERAL LOW BACK PAIN WITH RIGHT-SIDED SCIATICA: Primary | ICD-10-CM

## 2020-10-23 DIAGNOSIS — M54.41 CHRONIC BILATERAL LOW BACK PAIN WITH RIGHT-SIDED SCIATICA: Primary | ICD-10-CM

## 2020-10-23 PROCEDURE — 97140 MANUAL THERAPY 1/> REGIONS: CPT | Mod: PN | Performed by: PHYSICAL THERAPIST

## 2020-10-23 PROCEDURE — 97014 ELECTRIC STIMULATION THERAPY: CPT | Mod: PN | Performed by: PHYSICAL THERAPIST

## 2020-10-23 PROCEDURE — 97035 APP MDLTY 1+ULTRASOUND EA 15: CPT | Mod: PN | Performed by: PHYSICAL THERAPIST

## 2020-10-23 NOTE — PROGRESS NOTES
Physical Therapy Daily Treatment Note     Name: Danuta Santos  Clinic Number: [id number]  Diagnosis:   Encounter Diagnosis   Name Primary?    Chronic bilateral low back pain with right-sided sciatica Yes     Physician: Erica Khan MD  Treatment Orders: PT Eval and Treat  Past Medical History:   Diagnosis Date    Allergy     IBS (irritable bowel syndrome)     Pneumonia 02/2019     Precautions: as per diagnosis    Evaluation date: 2/15/2018  Visit # authorized: 68/72 on 10/23/2020  Authorization period: 12-31-20  Plan of care expiration:  11-17-20  MD Follow up appt: none scheduled    Time In:  8:36  Time Out: 9:30    Billable time:   50 min    Subjective     Pt reports: having some increased tightness with increased stress due to personal issues.        Pain Scale: before treatment: 5/10  after treatment:feels better, 2/10  Objective     AR R pelvis  Severe tightness lumbar paraspinals R, min in L Mod-severe in lower thoracic, noted poor posture with rounded shoulder posture  After treatment min-mod tightness noted    TREATMENT  Therapeutic exercise: Danuta received therapeutic exercises to develop strength, endurance, ROM, flexibility and core stabilization for 5 minutes including:    HS stretch supine   Glut stretch  Piriformis stretch  B QL stretch  Contract relax R glut x 3    Pt was not successful today to level pelvis I      Pt to perform I at home, did not review today  Pelvic tilt  x 10 for 5 sec hold  Bridge x 10    Partial sit up x 10  SLR x 10  Hip abd sidelying x 10 B  Arm and Leg lift prone x 10  Hip ext prone with bent knee x 10  Contract relax R glut    Hold for now   Heel raises standing x 15    Step up x10     1 plank x 30 sec held the other 3 she normally does     (NP)Plank 4 reps 30 sec each     Manual therapy: Danuta  received the following manual therapy  techniques x 20  min. to include soft tissue and joint mobilization were applied to the: low back and gluteals to include:   "Performed STM to lower thoracic and LB paraspinals and QL R>L  P/A mob to lumbar region Grade 1-2 , sidelying L contract relax to R QL    Sidelying L contract relax mobilization to L5-S1 region to level pelvis at start and at end   Pt received tool-assisted massage with manual therapy techniques to R LB in prone to trigger an inflammatory healing response and stimulate the production of new collagen and proper, more functional, less painful healing.    Vacuum/cupping STM with manual therapy techniques was performed to back and gluteals to decrease muscle tightness, increase circulation and promote healing process.  The pt's skin was monitored for redness adjusting pressure as needed. The pt was instructed in possible side effects of bruising and/or soreness.      (NP)  Kinesiotape with 6" star for pain relief was performed over the R lumbar paraspinals.L3-4 region  Instructed pt in use, care and precautions with tape.    Ultrasound  for pain control and to decrease inflammation @ continuous duty cycle, 1 Mhz, applied to R lumbar paraspinals, intensity = 1.8 w/cm2 for 8 minutes.    Patient received pre-mod electrical stimulation to decrease muscle tightness and pain to Lumbar paraspinals/ sacral border of gluteals B for 15 minutes with MH supine with cycle time: continuous, beat frequency: , CC/CV: CV.      Written Home Exercises Provided: none today  Pt demo good understanding of the education provided. Danuta demonstrated good return demonstration of activities.     Pt. education:  · Posture reeducation, body mechanics, HEP, proper posture in standing  · No spiritual or educational barriers to learning provided  · Pt has no cultural, educational or language barriers to learning provided.    Assessment   Patient has been very busy with moving, and developing increased muscle tightness with extra activity radiating up to thoracic region. Pt with decreased symptoms after treatment   Pt understands to work on " improved posture  Consider taping No time for other ex today but understands to continue with HEP>  Christina treatment well      Pt will continue to benefit from skilled outpatient physical therapy to address the remaining functional deficits, provide pt/family education, and to maximize pt's level of independence in the home and community environment. .     GOALS:   Short Term Goals:  3 weeks MET STG's  Increase range of motion 25%  Increase strength 1/2 muscle grade  Improve postural awareness of pelvis to independently identify dysfunction with min assist from PT  Be able to perform HEP with minimal cueing required     Long Term Goals: 6 weeks PARTIALLY MET LTG'S  Increase range of motion to 75% to 100% full   Improve muscle strength 1 muscle grade  Improve muscle strength with MMT to 4+/5 to 5/5  Improve and stabilize proper pelvic positioning  Restore ability to sit for ADL and work with min to 0 pain  Restore normal sleep habits without disturbances due to pain  Restore ability to perform ADL's and household activities independently with min to 0 pain    Anticipated barriers to physical therapy: none  Pt's spiritual, cultural and educational needs considered and pt agreeable to plan of care and goals        Plan   Continue with established plan of care towards PT goals.                                                           .                                                                                   .                              .

## 2020-11-03 ENCOUNTER — CLINICAL SUPPORT (OUTPATIENT)
Dept: REHABILITATION | Facility: HOSPITAL | Age: 65
End: 2020-11-03
Payer: COMMERCIAL

## 2020-11-03 DIAGNOSIS — M54.41 CHRONIC BILATERAL LOW BACK PAIN WITH RIGHT-SIDED SCIATICA: Primary | ICD-10-CM

## 2020-11-03 DIAGNOSIS — G89.29 CHRONIC BILATERAL LOW BACK PAIN WITH RIGHT-SIDED SCIATICA: Primary | ICD-10-CM

## 2020-11-03 PROCEDURE — 97140 MANUAL THERAPY 1/> REGIONS: CPT | Mod: PN | Performed by: PHYSICAL THERAPIST

## 2020-11-03 PROCEDURE — 97014 ELECTRIC STIMULATION THERAPY: CPT | Mod: PN | Performed by: PHYSICAL THERAPIST

## 2020-11-03 PROCEDURE — 97035 APP MDLTY 1+ULTRASOUND EA 15: CPT | Mod: PN | Performed by: PHYSICAL THERAPIST

## 2020-11-03 NOTE — PROGRESS NOTES
Physical Therapy Daily Treatment Note     Name: Danuta Santos  Clinic Number: [id number]  Diagnosis:   Encounter Diagnosis   Name Primary?    Chronic bilateral low back pain with right-sided sciatica Yes     Physician: Erica Khan MD  Treatment Orders: PT Eval and Treat  Past Medical History:   Diagnosis Date    Allergy     IBS (irritable bowel syndrome)     Pneumonia 02/2019     Precautions: as per diagnosis    Evaluation date: 2/15/2018  Visit # authorized: 69/72 on 11/3/2020  Authorization period: 12-31-20  Plan of care expiration:  11-17-20  MD Follow up appt: none scheduled    Time In: 1:43  Time Out: 2:45    Billable time:   50 min    Subjective     Pt reports: packing to be able to move Pt having some increased pain.  .        Pain Scale: before treatment: 5.5/10  after treatment:feels better, 2/10  Objective     AR R pelvis  Severe tightness lumbar paraspinals R, min in L  After treatment min-mod tightness noted    TREATMENT  Therapeutic exercise: Daunta received therapeutic exercises to develop strength, endurance, ROM, flexibility and core stabilization for 5 minutes including:    HS stretch supine   Glut stretch  Piriformis stretch  B QL stretch  Contract relax R glut x 3    Pt was not successful today to level pelvis I      Pt to perform I at home, did not review today  Pelvic tilt  x 10 for 5 sec hold  Bridge x 10    Partial sit up x 10  SLR x 10  Hip abd sidelying x 10 B  Arm and Leg lift prone x 10  Hip ext prone with bent knee x 10  Contract relax R glut    Hold for now   Heel raises standing x 15    Step up x10     1 plank x 30 sec held the other 3 she normally does     (NP)Plank 4 reps 30 sec each     Manual therapy: Danuta  received the following manual therapy  techniques x 20  min. to include soft tissue and joint mobilization were applied to the: low back and gluteals to include:  Performed STM to lower thoracic and LB paraspinals and QL R>L  P/A mob to lumbar region Grade 1-2  ", sidelying L contract relax to R QL    Sidelying L contract relax mobilization to L5-S1 region to level pelvis at start and at end   Pt received tool-assisted massage with manual therapy techniques to R LB in prone to trigger an inflammatory healing response and stimulate the production of new collagen and proper, more functional, less painful healing.    Vacuum/cupping STM with manual therapy techniques was performed to back and gluteals to decrease muscle tightness, increase circulation and promote healing process.  The pt's skin was monitored for redness adjusting pressure as needed. The pt was instructed in possible side effects of bruising and/or soreness.        Kinesiotape with 6" star for pain relief was performed over the R lumbar paraspinals.L3-4 region  Instructed pt in use, care and precautions with tape.    Ultrasound  for pain control and to decrease inflammation @ continuous duty cycle, 1 Mhz, applied to R lumbar paraspinals, intensity = 1.5 w/cm2 for 8 minutes. Decreased intensity today due to increased sensitivity at higher intensity     Patient received pre-mod electrical stimulation to decrease muscle tightness and pain to Lumbar paraspinals/ sacral border of gluteals B for 15 minutes with MH supine with cycle time: continuous, beat frequency: , CC/CV: CV.      Written Home Exercises Provided: none today  Pt demo good understanding of the education provided. Danuta demonstrated good return demonstration of activities.     Pt. education:  · Posture reeducation, body mechanics, HEP, proper posture in standing  · No spiritual or educational barriers to learning provided  · Pt has no cultural, educational or language barriers to learning provided.    Assessment   Patient continues to be busy with moving developing increased muscle tightness alek in lumbar region.  Thoracic region improved from previous session.  Resumed taping to provide further support to muscles while undergoing increased " activity with packing to move     Pt will continue to benefit from skilled outpatient physical therapy to address the remaining functional deficits, provide pt/family education, and to maximize pt's level of independence in the home and community environment. .     GOALS:   Short Term Goals:  3 weeks MET STG's  Increase range of motion 25%  Increase strength 1/2 muscle grade  Improve postural awareness of pelvis to independently identify dysfunction with min assist from PT  Be able to perform HEP with minimal cueing required     Long Term Goals: 6 weeks PARTIALLY MET LTG'S  Increase range of motion to 75% to 100% full   Improve muscle strength 1 muscle grade  Improve muscle strength with MMT to 4+/5 to 5/5  Improve and stabilize proper pelvic positioning  Restore ability to sit for ADL and work with min to 0 pain  Restore normal sleep habits without disturbances due to pain  Restore ability to perform ADL's and household activities independently with min to 0 pain    Anticipated barriers to physical therapy: none  Pt's spiritual, cultural and educational needs considered and pt agreeable to plan of care and goals        Plan   Continue with established plan of care towards PT goals.

## 2020-11-13 ENCOUNTER — CLINICAL SUPPORT (OUTPATIENT)
Dept: REHABILITATION | Facility: HOSPITAL | Age: 65
End: 2020-11-13
Payer: COMMERCIAL

## 2020-11-13 DIAGNOSIS — G89.29 CHRONIC BILATERAL LOW BACK PAIN WITH RIGHT-SIDED SCIATICA: Primary | ICD-10-CM

## 2020-11-13 DIAGNOSIS — M54.41 CHRONIC BILATERAL LOW BACK PAIN WITH RIGHT-SIDED SCIATICA: Primary | ICD-10-CM

## 2020-11-13 PROCEDURE — 97035 APP MDLTY 1+ULTRASOUND EA 15: CPT | Mod: PN | Performed by: PHYSICAL THERAPIST

## 2020-11-13 PROCEDURE — 97140 MANUAL THERAPY 1/> REGIONS: CPT | Mod: PN | Performed by: PHYSICAL THERAPIST

## 2020-11-13 PROCEDURE — 97014 ELECTRIC STIMULATION THERAPY: CPT | Mod: PN | Performed by: PHYSICAL THERAPIST

## 2020-11-13 NOTE — PROGRESS NOTES
Physical Therapy Daily Treatment Note     Name: Danuta Santos  Clinic Number: [id number]  Diagnosis:   Encounter Diagnosis   Name Primary?    Chronic bilateral low back pain with right-sided sciatica Yes     Physician: Erica Khan MD  Treatment Orders: PT Eval and Treat  Past Medical History:   Diagnosis Date    Allergy     IBS (irritable bowel syndrome)     Pneumonia 02/2019     Precautions: as per diagnosis    Evaluation date: 2/15/2018  Visit # authorized: 70/72 on 11/13/2020  Authorization period: 12-31-20  Plan of care expiration:  11-17-20  MD Follow up appt: none scheduled    Time In: 9:18  Time Out: 10:10   Billable time:   50 min    Subjective     Pt reports: saw massage therapist and worked all over  and out and also having neck pain  .        Pain Scale: before treatment: 5.5/10  after treatment:feels better, 2/10  Objective     AR R pelvis  Severe tightness lumbar paraspinals R, min in L  After treatment min-mod tightness noted    TREATMENT  Therapeutic exercise: Danuta received therapeutic exercises to develop strength, endurance, ROM, flexibility and core stabilization for 5 minutes including:    HS stretch supine   Glut stretch  Piriformis stretch  B QL stretch  Contract relax R glut x 3    Pt was not successful today to level pelvis I      Pt to perform I at home, did not review today  Pelvic tilt  x 10 for 5 sec hold  Bridge x 10    Partial sit up x 10  SLR x 10  Hip abd sidelying x 10 B  Arm and Leg lift prone x 10  Hip ext prone with bent knee x 10  Contract relax R glut    Hold for now   Heel raises standing x 15    Step up x10     1 plank x 30 sec held the other 3 she normally does     (NP)Plank 4 reps 30 sec each     Manual therapy: Danuta  received the following manual therapy  techniques x 15  min. to include soft tissue and joint mobilization were applied to the: low back and gluteals to include:  Performed STM to lower thoracic and LB paraspinals and QL R>L  P/A mob to  "lumbar region Grade 1-2 , sidelying L contract relax to R QL    Sidelying L contract relax mobilization to L5-S1 region to level pelvis at start and at end   (NP)Pt received tool-assisted massage with manual therapy techniques to R LB in prone to trigger an inflammatory healing response and stimulate the production of new collagen and proper, more functional, less painful healing.    (NP)Vacuum/cupping STM with manual therapy techniques was performed to back and gluteals to decrease muscle tightness, increase circulation and promote healing process.  The pt's skin was monitored for redness adjusting pressure as needed. The pt was instructed in possible side effects of bruising and/or soreness.      (NP)  Kinesiotape with 6" star for pain relief was performed over the R lumbar paraspinals.L3-4 region  Instructed pt in use, care and precautions with tape.    Ultrasound  for pain control and to decrease inflammation @ continuous duty cycle, 1 Mhz, applied to R lumbar paraspinals, intensity = 1.5 w/cm2 for 8 minutes. Decreased intensity today due to increased sensitivity at higher intensity     Patient received pre-mod electrical stimulation to decrease muscle tightness and pain to Lumbar paraspinals/ sacral border of gluteals B for 15 minutes with MH supine with cycle time: continuous, beat frequency: , CC/CV: CV.      Written Home Exercises Provided: none today  Pt demo good understanding of the education provided. Danuta demonstrated good return demonstration of activities.     Pt. education:  · Posture reeducation, body mechanics, HEP, proper posture in standing  · No spiritual or educational barriers to learning provided  · Pt has no cultural, educational or language barriers to learning provided.    Assessment   Pt has been doing more packing and feeling increased back pain and having difficulty with leveling pelvis In PT able to level pelvis with sidelying technique.  Pt with mod-severe tightness in " muscles Pt with some slight skin irritation from tape so held tools and tape today.  Pt with improved symptoms and level pelvis after treatment.       Pt will continue to benefit from skilled outpatient physical therapy to address the remaining functional deficits, provide pt/family education, and to maximize pt's level of independence in the home and community environment. .     GOALS:   Short Term Goals:  3 weeks MET STG's  Increase range of motion 25%  Increase strength 1/2 muscle grade  Improve postural awareness of pelvis to independently identify dysfunction with min assist from PT  Be able to perform HEP with minimal cueing required     Long Term Goals: 6 weeks PARTIALLY MET LTG'S  Increase range of motion to 75% to 100% full   Improve muscle strength 1 muscle grade  Improve muscle strength with MMT to 4+/5 to 5/5  Improve and stabilize proper pelvic positioning  Restore ability to sit for ADL and work with min to 0 pain  Restore normal sleep habits without disturbances due to pain  Restore ability to perform ADL's and household activities independently with min to 0 pain    Anticipated barriers to physical therapy: none  Pt's spiritual, cultural and educational needs considered and pt agreeable to plan of care and goals        Plan   Continue with established plan of care towards PT goals. Continue to work on self mob and stretching as tolerated with HEP

## 2020-11-19 NOTE — PROGRESS NOTES
Physical Therapy Daily Treatment Note     Name: Danuta Santos  Clinic Number: [id number]  Diagnosis:   Encounter Diagnosis   Name Primary?    Chronic bilateral low back pain with right-sided sciatica Yes     Physician: Erica Khan MD  Treatment Orders: PT Eval and Treat  Past Medical History:   Diagnosis Date    Allergy     IBS (irritable bowel syndrome)     Pneumonia 02/2019     Precautions: as per diagnosis    Evaluation date: 2/15/2018  Visit # authorized: 71/72 on 11/19/2020  Authorization period: 12-31-20  Plan of care expiration:  11-17-20  MD Follow up appt: none scheduled    Time In: 8:36  Time Out: 9:28   Billable time:   50 min    Subjective     Pt reports: she is going to be moving next week, so very busy with packing  Pt would like to be taped today to help provide some support  Pt had tried to level pelvis and not being fully successful still        Pain Scale: before treatment: 5.-6/10  after treatment:feels better, 2/10  Objective     AR R pelvis  Severe tightness lumbar paraspinals R, min in L  After treatment min-mod tightness noted    TREATMENT  Therapeutic exercise: Danuta received therapeutic exercises to develop strength, endurance, ROM, flexibility and core stabilization for 5 minutes including:    HS stretch supine   Glut stretch  Piriformis stretch  B QL stretch  Contract relax R glut x 3    Pt was not successful today to level pelvis I      Pt to perform I at home, did not review today  Pelvic tilt  x 10 for 5 sec hold  Bridge x 10    Partial sit up x 10  SLR x 10  Hip abd sidelying x 10 B  Arm and Leg lift prone x 10  Hip ext prone with bent knee x 10  Contract relax R glut    Hold for now   Heel raises standing x 15    Step up x10     1 plank x 30 sec held the other 3 she normally does     (NP)Plank 4 reps 30 sec each     Manual therapy: Danuta  received the following manual therapy  techniques x 2  min. to include soft tissue and joint mobilization were applied to the:  "low back and gluteals to include:  Performed STM to lower thoracic and LB paraspinals and QL R>L  P/A mob to lumbar region Grade 1-2 , sidelying L contract relax to R QL    Sidelying L contract relax mobilization to L5-S1 region to level pelvis at start and at end   Pt received tool-assisted massage with manual therapy techniques to R LB in prone to trigger an inflammatory healing response and stimulate the production of new collagen and proper, more functional, less painful healing.    Vacuum/cupping STM with manual therapy techniques was performed to back and gluteals to decrease muscle tightness, increase circulation and promote healing process.  The pt's skin was monitored for redness adjusting pressure as needed. The pt was instructed in possible side effects of bruising and/or soreness.        Kinesiotape with 6" star for pain relief was performed over the R lumbar paraspinals.L3-4 region  Instructed pt in use, care and precautions with tape.    Ultrasound  for pain control and to decrease inflammation @ continuous duty cycle, 1 Mhz, applied to R lumbar paraspinals, intensity = 1.5 w/cm2 for 8 minutes. Decreased intensity today due to increased sensitivity at higher intensity     Patient received pre-mod electrical stimulation to decrease muscle tightness and pain to Lumbar paraspinals/ sacral border of gluteals B for 15 minutes with MH supine with cycle time: continuous, beat frequency: , CC/CV: CV.      Written Home Exercises Provided: none today  Pt demo good understanding of the education provided. Danuta demonstrated good return demonstration of activities.     Pt. education:  · Posture reeducation, body mechanics, HEP, proper posture in standing  · No spiritual or educational barriers to learning provided  · Pt has no cultural, educational or language barriers to learning provided.    Assessment   Pt has been doing more packing and feeling increased back pain and having difficulty with leveling " pelvis In PT able to level pelvis with sidelying technique.  Pt with mod-severe tightness in muscles Pt with some slight skin irritation from tape so held tools and tape today.  Pt with improved symptoms and level pelvis after treatment.       Pt will continue to benefit from skilled outpatient physical therapy to address the remaining functional deficits, provide pt/family education, and to maximize pt's level of independence in the home and community environment. .     GOALS:   Short Term Goals:  3 weeks MET STG's  Increase range of motion 25%  Increase strength 1/2 muscle grade  Improve postural awareness of pelvis to independently identify dysfunction with min assist from PT  Be able to perform HEP with minimal cueing required     Long Term Goals: 6 weeks PARTIALLY MET LTG'S  Increase range of motion to 75% to 100% full   Improve muscle strength 1 muscle grade  Improve muscle strength with MMT to 4+/5 to 5/5  Improve and stabilize proper pelvic positioning  Restore ability to sit for ADL and work with min to 0 pain  Restore normal sleep habits without disturbances due to pain  Restore ability to perform ADL's and household activities independently with min to 0 pain    Anticipated barriers to physical therapy: none  Pt's spiritual, cultural and educational needs considered and pt agreeable to plan of care and goals        Plan   Continue with established plan of care towards PT goals. Continue to work on self mob and stretching as tolerated with HEP

## 2020-11-20 ENCOUNTER — CLINICAL SUPPORT (OUTPATIENT)
Dept: REHABILITATION | Facility: HOSPITAL | Age: 65
End: 2020-11-20
Payer: COMMERCIAL

## 2020-11-20 DIAGNOSIS — M54.41 CHRONIC BILATERAL LOW BACK PAIN WITH RIGHT-SIDED SCIATICA: Primary | ICD-10-CM

## 2020-11-20 DIAGNOSIS — G89.29 CHRONIC BILATERAL LOW BACK PAIN WITH RIGHT-SIDED SCIATICA: Primary | ICD-10-CM

## 2020-11-20 PROCEDURE — 97140 MANUAL THERAPY 1/> REGIONS: CPT | Mod: PN | Performed by: PHYSICAL THERAPIST

## 2020-11-20 PROCEDURE — 97035 APP MDLTY 1+ULTRASOUND EA 15: CPT | Mod: PN | Performed by: PHYSICAL THERAPIST

## 2020-11-20 PROCEDURE — 97014 ELECTRIC STIMULATION THERAPY: CPT | Mod: PN | Performed by: PHYSICAL THERAPIST

## 2020-11-25 NOTE — PROGRESS NOTES
Physical Therapy Daily Treatment Note     Name: Danuta Santos  Clinic Number: [id number]  Diagnosis:   Encounter Diagnosis   Name Primary?    Chronic bilateral low back pain with right-sided sciatica Yes     Physician: Erica Khan MD  Treatment Orders: PT Eval and Treat  Past Medical History:   Diagnosis Date    Allergy     IBS (irritable bowel syndrome)     Pneumonia 02/2019     Precautions: as per diagnosis    Evaluation date: 2/15/2018  Visit # authorized: 72/79 on 11/25/2020  Authorization period: 12-31-20  Plan of care expiration:  11-17-20  MD Follow up appt: none scheduled    Time In: 2:32  Time Out: 3:25  Billable time:  35  min    Subjective     Pt reports: she has been moving and having increased pain and tightness.         Pain Scale: before treatment: 6/10  after treatment:feels better, 3/10  Objective     AR R pelvis  Severe tightness lumbar paraspinals R, min in L  After treatment min-mod tightness noted    TREATMENT  Therapeutic exercise: Danuta received therapeutic exercises to develop strength, endurance, ROM, flexibility and core stabilization for 5 minutes including:    HS stretch supine   Glut stretch  Piriformis stretch  B QL stretch  Contract relax R glut x 3    Pt was not successful today to level pelvis I      Pt to perform I at home, did not review today  Pelvic tilt  x 10 for 5 sec hold  Bridge x 10    Partial sit up x 10  SLR x 10  Hip abd sidelying x 10 B  Arm and Leg lift prone x 10  Hip ext prone with bent knee x 10  Contract relax R glut    Hold for now   Heel raises standing x 15    Step up x10     1 plank x 30 sec held the other 3 she normally does     (NP)Plank 4 reps 30 sec each     Manual therapy: Danuta  received the following manual therapy  techniques x 20  min. to include soft tissue and joint mobilization were applied to the: low back and gluteals to include:  Performed STM to lower thoracic and LB paraspinals and QL R>L  P/A mob to lumbar region Grade 1-2 ,  "sidelying L contract relax to R QL    Sidelying L contract relax mobilization to L5-S1 region to level pelvis at start and at end   Pt received tool-assisted massage with manual therapy techniques to R LB in prone to trigger an inflammatory healing response and stimulate the production of new collagen and proper, more functional, less painful healing.    Vacuum/cupping STM with manual therapy techniques was performed to back and gluteals to decrease muscle tightness, increase circulation and promote healing process.  The pt's skin was monitored for redness adjusting pressure as needed. The pt was instructed in possible side effects of bruising and/or soreness.        Kinesiotape with 6" star for pain relief was performed over the R lumbar paraspinals.L3-4 region  Instructed pt in use, care and precautions with tape.    Ultrasound  for pain control and to decrease inflammation @ continuous duty cycle, 1 Mhz, applied to R lumbar paraspinals, intensity = 1.5 w/cm2 for 8 minutes. Decreased intensity today due to increased sensitivity at higher intensity     Unable to perform due to error with stim tried replacing pads still did not work so stopped treatment Patient received pre-mod electrical stimulation to decrease muscle tightness and pain to Lumbar paraspinals/ sacral border of gluteals B for 0 minutes with MH supine with cycle time: continuous, beat frequency: , CC/CV: CV.      Written Home Exercises Provided: none today  Pt demo good understanding of the education provided. Danuta demonstrated good return demonstration of activities.     Pt. education:  · Posture reeducation, body mechanics, HEP, proper posture in standing  · No spiritual or educational barriers to learning provided  · Pt has no cultural, educational or language barriers to learning provided.    Assessment   Pt has been moving and severe tightness noted Pt with decreased tightness and pain after treatment.  Unable to get ES to work today " so held ES and did MH for 10 min     Pt will continue to benefit from skilled outpatient physical therapy to address the remaining functional deficits, provide pt/family education, and to maximize pt's level of independence in the home and community environment. .     GOALS:   Short Term Goals:  3 weeks MET STG's  Increase range of motion 25%  Increase strength 1/2 muscle grade  Improve postural awareness of pelvis to independently identify dysfunction with min assist from PT  Be able to perform HEP with minimal cueing required     Long Term Goals: 6 weeks PARTIALLY MET LTG'S  Increase range of motion to 75% to 100% full   Improve muscle strength 1 muscle grade  Improve muscle strength with MMT to 4+/5 to 5/5  Improve and stabilize proper pelvic positioning  Restore ability to sit for ADL and work with min to 0 pain  Restore normal sleep habits without disturbances due to pain  Restore ability to perform ADL's and household activities independently with min to 0 pain    Anticipated barriers to physical therapy: none  Pt's spiritual, cultural and educational needs considered and pt agreeable to plan of care and goals        Plan   Continue with established plan of care towards PT goals. Continue to work on self mob and stretching as tolerated with HEP

## 2020-11-27 ENCOUNTER — CLINICAL SUPPORT (OUTPATIENT)
Dept: REHABILITATION | Facility: HOSPITAL | Age: 65
End: 2020-11-27
Payer: COMMERCIAL

## 2020-11-27 DIAGNOSIS — G89.29 CHRONIC BILATERAL LOW BACK PAIN WITH RIGHT-SIDED SCIATICA: Primary | ICD-10-CM

## 2020-11-27 DIAGNOSIS — M54.41 CHRONIC BILATERAL LOW BACK PAIN WITH RIGHT-SIDED SCIATICA: Primary | ICD-10-CM

## 2020-11-27 PROCEDURE — 97140 MANUAL THERAPY 1/> REGIONS: CPT | Mod: PN | Performed by: PHYSICAL THERAPIST

## 2020-11-27 PROCEDURE — 97035 APP MDLTY 1+ULTRASOUND EA 15: CPT | Mod: PN | Performed by: PHYSICAL THERAPIST

## 2020-12-11 ENCOUNTER — LAB VISIT (OUTPATIENT)
Dept: LAB | Facility: HOSPITAL | Age: 65
End: 2020-12-11
Attending: INTERNAL MEDICINE
Payer: COMMERCIAL

## 2020-12-11 DIAGNOSIS — R53.82 CHRONIC FATIGUE: ICD-10-CM

## 2020-12-11 DIAGNOSIS — K58.9 IRRITABLE BOWEL SYNDROME, UNSPECIFIED TYPE: ICD-10-CM

## 2020-12-11 DIAGNOSIS — D70.8 OTHER NEUTROPENIA: ICD-10-CM

## 2020-12-11 LAB
ALBUMIN SERPL BCP-MCNC: 4.2 G/DL (ref 3.5–5.2)
ALP SERPL-CCNC: 53 U/L (ref 55–135)
ALT SERPL W/O P-5'-P-CCNC: 28 U/L (ref 10–44)
ANION GAP SERPL CALC-SCNC: 8 MMOL/L (ref 8–16)
AST SERPL-CCNC: 30 U/L (ref 10–40)
BASOPHILS # BLD AUTO: 0.04 K/UL (ref 0–0.2)
BASOPHILS NFR BLD: 1.2 % (ref 0–1.9)
BILIRUB SERPL-MCNC: 0.8 MG/DL (ref 0.1–1)
BUN SERPL-MCNC: 16 MG/DL (ref 8–23)
CALCIUM SERPL-MCNC: 9.7 MG/DL (ref 8.7–10.5)
CHLORIDE SERPL-SCNC: 99 MMOL/L (ref 95–110)
CO2 SERPL-SCNC: 28 MMOL/L (ref 23–29)
CREAT SERPL-MCNC: 0.8 MG/DL (ref 0.5–1.4)
DIFFERENTIAL METHOD: ABNORMAL
EOSINOPHIL # BLD AUTO: 0.1 K/UL (ref 0–0.5)
EOSINOPHIL NFR BLD: 1.7 % (ref 0–8)
ERYTHROCYTE [DISTWIDTH] IN BLOOD BY AUTOMATED COUNT: 12.2 % (ref 11.5–14.5)
EST. GFR  (AFRICAN AMERICAN): >60 ML/MIN/1.73 M^2
EST. GFR  (NON AFRICAN AMERICAN): >60 ML/MIN/1.73 M^2
GLUCOSE SERPL-MCNC: 74 MG/DL (ref 70–110)
HCT VFR BLD AUTO: 38.8 % (ref 37–48.5)
HGB BLD-MCNC: 12.2 G/DL (ref 12–16)
IMM GRANULOCYTES # BLD AUTO: 0.01 K/UL (ref 0–0.04)
IMM GRANULOCYTES NFR BLD AUTO: 0.3 % (ref 0–0.5)
LDH SERPL L TO P-CCNC: 236 U/L (ref 110–260)
LYMPHOCYTES # BLD AUTO: 1.2 K/UL (ref 1–4.8)
LYMPHOCYTES NFR BLD: 34.8 % (ref 18–48)
MCH RBC QN AUTO: 29.2 PG (ref 27–31)
MCHC RBC AUTO-ENTMCNC: 31.4 G/DL (ref 32–36)
MCV RBC AUTO: 93 FL (ref 82–98)
MONOCYTES # BLD AUTO: 0.3 K/UL (ref 0.3–1)
MONOCYTES NFR BLD: 7.2 % (ref 4–15)
NEUTROPHILS # BLD AUTO: 1.9 K/UL (ref 1.8–7.7)
NEUTROPHILS NFR BLD: 54.8 % (ref 38–73)
NRBC BLD-RTO: 0 /100 WBC
PLATELET # BLD AUTO: 194 K/UL (ref 150–350)
PMV BLD AUTO: 11.6 FL (ref 9.2–12.9)
POTASSIUM SERPL-SCNC: 4.6 MMOL/L (ref 3.5–5.1)
PROT SERPL-MCNC: 7 G/DL (ref 6–8.4)
RBC # BLD AUTO: 4.18 M/UL (ref 4–5.4)
SODIUM SERPL-SCNC: 135 MMOL/L (ref 136–145)
WBC # BLD AUTO: 3.45 K/UL (ref 3.9–12.7)

## 2020-12-11 PROCEDURE — 83615 LACTATE (LD) (LDH) ENZYME: CPT

## 2020-12-11 PROCEDURE — 80053 COMPREHEN METABOLIC PANEL: CPT

## 2020-12-11 PROCEDURE — 36415 COLL VENOUS BLD VENIPUNCTURE: CPT

## 2020-12-11 PROCEDURE — 85025 COMPLETE CBC W/AUTO DIFF WBC: CPT

## 2020-12-15 ENCOUNTER — CLINICAL SUPPORT (OUTPATIENT)
Dept: REHABILITATION | Facility: HOSPITAL | Age: 65
End: 2020-12-15
Payer: COMMERCIAL

## 2020-12-15 DIAGNOSIS — M54.41 CHRONIC BILATERAL LOW BACK PAIN WITH RIGHT-SIDED SCIATICA: Primary | ICD-10-CM

## 2020-12-15 DIAGNOSIS — G89.29 CHRONIC BILATERAL LOW BACK PAIN WITH RIGHT-SIDED SCIATICA: Primary | ICD-10-CM

## 2020-12-15 PROCEDURE — 97035 APP MDLTY 1+ULTRASOUND EA 15: CPT | Mod: PN | Performed by: PHYSICAL THERAPIST

## 2020-12-15 PROCEDURE — 97140 MANUAL THERAPY 1/> REGIONS: CPT | Mod: PN | Performed by: PHYSICAL THERAPIST

## 2020-12-15 PROCEDURE — 97014 ELECTRIC STIMULATION THERAPY: CPT | Mod: PN | Performed by: PHYSICAL THERAPIST

## 2020-12-15 PROCEDURE — 97110 THERAPEUTIC EXERCISES: CPT | Mod: PN | Performed by: PHYSICAL THERAPIST

## 2020-12-15 NOTE — PROGRESS NOTES
"Physical Therapy Progress and Daily Treatment Note     Name: Danuta Santos  Clinic Number: [id number]  Diagnosis:   Encounter Diagnosis   Name Primary?    Chronic bilateral low back pain with right-sided sciatica Yes     Physician: Erica Khan MD  Treatment Orders: PT Eval and Treat  Past Medical History:   Diagnosis Date    Allergy     IBS (irritable bowel syndrome)     Pneumonia 02/2019     Precautions: as per diagnosis    Evaluation date: 2/15/2018  Visit # authorized: 72/79 on 12/15/2020  Authorization period: 12-31-20  Plan of care expiration:  1-26-21  MD Follow up appt: none scheduled    Time In: 8:35  Time Out: 9:30  Billable time:  55  min    Subjective     Pt reports: living in little house has helped not climbing stairs and not having to bend over to feed cats and has been able to exercise           Pain Scale: before treatment: 4.5/10  after treatment:feels better, 3/10  Objective     AR R pelvis  Mod-Severe tightness lumbar paraspinals R, min in L  After treatment min-mod tightness noted   Lumbar active range of motion in standing is: 12-15-20  - flexion - ankle                     - extension -  50%                         - left side bending -  2" above knee         - right side bending -  2" above knee               Lumbar active range of motion in standing is: 10-6-20  - flexion - ankle                     - extension -  25%                         - left side bending -  2" above knee         - right side bending -  2" above knee     Muscle Strength 12-15-20  MMT R L   Hip flexion 4/5 4/5   Hip abduction 4+/5 4+/5   Hip extension 3+/5 3+/5   Glut max 3/5 3/5           Knee extension 5/5 5/5   Knee flexion 5/5 5/5               Muscle Strength 10-6-20  MMT R L   Hip flexion 4-/5 4-/5   Hip abduction 4+/5 4+/5   Hip extension 3/5 3+/5   Glut max 3-/5 3/5           Knee extension 5/5 5/5   Knee flexion 5/5 5/5          TREATMENT  Therapeutic exercise: Danuta received therapeutic exercises " "to develop strength, endurance, ROM, flexibility and core stabilization for 10 minutes including:    HS stretch supine   Glut stretch  Piriformis stretch  B QL stretch  Contract relax R glut x 3    Pt was successful today to level pelvis I      Pt to perform I at home, did not review today  Pelvic tilt  x 10 for 5 sec hold  Bridge x 10    Partial sit up x 10  SLR x 10  Hip abd sidelying x 10 B  Arm and Leg lift prone x 10  Hip ext prone with bent knee x 10  Contract relax R glut    Hold for now   Heel raises standing x 15    Step up x10     1 plank x 30 sec held the other 3 she normally does     (NP)Plank 4 reps 30 sec each     Manual therapy: Danuta  received the following manual therapy  techniques x 20  min. to include soft tissue and joint mobilization were applied to the: low back and gluteals to include:  Performed STM to lower thoracic and LB paraspinals and QL R>L  P/A mob to lumbar region Grade 1-2 , sidelying L contract relax to R QL    Sidelying L contract relax mobilization to L5-S1 region to level pelvis at start and at end   Pt received tool-assisted massage with manual therapy techniques to R LB in prone to trigger an inflammatory healing response and stimulate the production of new collagen and proper, more functional, less painful healing.    Vacuum/cupping STM with manual therapy techniques was performed to back and gluteals to decrease muscle tightness, increase circulation and promote healing process.  The pt's skin was monitored for redness adjusting pressure as needed. The pt was instructed in possible side effects of bruising and/or soreness.       (NP) Kinesiotape with 6" star for pain relief was performed over the R lumbar paraspinals.L3-4 region  Instructed pt in use, care and precautions with tape.    Ultrasound  for pain control and to decrease inflammation @ continuous duty cycle, 1 Mhz, applied to R lumbar paraspinals, intensity = 1.5 w/cm2 for 8 minutes. Decreased intensity today " due to increased sensitivity at higher intensity      Patient received pre-mod electrical stimulation to decrease muscle tightness and pain to Lumbar paraspinals/ sacral border of gluteals B for 15minutes with MH supine with cycle time: continuous, beat frequency: , CC/CV: CV.      Written Home Exercises Provided: none today  Pt demo good understanding of the education provided. Danuta demonstrated good return demonstration of activities.     Pt. education:  · Posture reeducation, body mechanics, HEP, proper posture in standing  · No spiritual or educational barriers to learning provided  · Pt has no cultural, educational or language barriers to learning provided.    Assessment   Pt with slight improvement in ROM and strength.  While pt was moving and getting her house ready for sale she had increased symptoms and required more frequent treatment Pt getting settled in new home and less stress with living in new house physically and emotionally.  Pt recently was able to go 3 weeks before she developed increased tightness and was not as tight as was when pt was working on move.       Pt will continue to benefit from skilled outpatient physical therapy to address the remaining functional deficits, provide pt/family education, and to maximize pt's level of independence in the home and community environment. .     GOALS:   Short Term Goals:  3 weeks MET STG's  Increase range of motion 25%  Increase strength 1/2 muscle grade  Improve postural awareness of pelvis to independently identify dysfunction with min assist from PT  Be able to perform HEP with minimal cueing required     Long Term Goals: 6 weeks PARTIALLY MET LTG'S  Increase range of motion to 75% to 100% full   Improve muscle strength 1 muscle grade  Improve muscle strength with MMT to 4+/5 to 5/5  Improve and stabilize proper pelvic positioning  Restore ability to sit for ADL and work with min to 0 pain  Restore normal sleep habits without disturbances  due to pain  Restore ability to perform ADL's and household activities independently with min to 0 pain    Anticipated barriers to physical therapy: none  Pt's spiritual, cultural and educational needs considered and pt agreeable to plan of care and goals        Plan   If you concur, I recommend patient continue with physical therapy 1-2 times a week PRN  for 6 weeks.  Please advise us of your  recommendations. Thank you for allowing us to assist in the care of your patient.      Jacy Escobar, MS, PT          I certify the need for these services furnished under this plan of treatment and while under my care.    ____________________________________ Physician/Referring Practitioner                                Date of Signature

## 2020-12-15 NOTE — PLAN OF CARE
"Physical Therapy Progress and Daily Treatment Note     Name: Danuta Santos  Clinic Number: [id number]  Diagnosis:   Encounter Diagnosis   Name Primary?    Chronic bilateral low back pain with right-sided sciatica Yes     Physician: Erica Khan MD  Treatment Orders: PT Eval and Treat  Past Medical History:   Diagnosis Date    Allergy     IBS (irritable bowel syndrome)     Pneumonia 02/2019     Precautions: as per diagnosis    Evaluation date: 2/15/2018  Visit # authorized: 72/79 on 12/15/2020  Authorization period: 12-31-20  Plan of care expiration:  1-26-21  MD Follow up appt: none scheduled    Time In: 8:35  Time Out: 9:30  Billable time:  55  min    Subjective     Pt reports: living in little house has helped not climbing stairs and not having to bend over to feed cats and has been able to exercise           Pain Scale: before treatment: 4.5/10  after treatment:feels better, 3/10  Objective     AR R pelvis  Mod-Severe tightness lumbar paraspinals R, min in L  After treatment min-mod tightness noted   Lumbar active range of motion in standing is: 12-15-20  - flexion - ankle                     - extension -  50%                         - left side bending -  2" above knee         - right side bending -  2" above knee               Lumbar active range of motion in standing is: 10-6-20  - flexion - ankle                     - extension -  25%                         - left side bending -  2" above knee         - right side bending -  2" above knee     Muscle Strength 12-15-20  MMT R L   Hip flexion 4/5 4/5   Hip abduction 4+/5 4+/5   Hip extension 3+/5 3+/5   Glut max 3/5 3/5           Knee extension 5/5 5/5   Knee flexion 5/5 5/5               Muscle Strength 10-6-20  MMT R L   Hip flexion 4-/5 4-/5   Hip abduction 4+/5 4+/5   Hip extension 3/5 3+/5   Glut max 3-/5 3/5           Knee extension 5/5 5/5   Knee flexion 5/5 5/5          TREATMENT  Therapeutic exercise: Danuta received therapeutic exercises " "to develop strength, endurance, ROM, flexibility and core stabilization for 10 minutes including:    HS stretch supine   Glut stretch  Piriformis stretch  B QL stretch  Contract relax R glut x 3    Pt was successful today to level pelvis I      Pt to perform I at home, did not review today  Pelvic tilt  x 10 for 5 sec hold  Bridge x 10    Partial sit up x 10  SLR x 10  Hip abd sidelying x 10 B  Arm and Leg lift prone x 10  Hip ext prone with bent knee x 10  Contract relax R glut    Hold for now   Heel raises standing x 15    Step up x10     1 plank x 30 sec held the other 3 she normally does     (NP)Plank 4 reps 30 sec each     Manual therapy: Danuta  received the following manual therapy  techniques x 20  min. to include soft tissue and joint mobilization were applied to the: low back and gluteals to include:  Performed STM to lower thoracic and LB paraspinals and QL R>L  P/A mob to lumbar region Grade 1-2 , sidelying L contract relax to R QL    Sidelying L contract relax mobilization to L5-S1 region to level pelvis at start and at end   Pt received tool-assisted massage with manual therapy techniques to R LB in prone to trigger an inflammatory healing response and stimulate the production of new collagen and proper, more functional, less painful healing.    Vacuum/cupping STM with manual therapy techniques was performed to back and gluteals to decrease muscle tightness, increase circulation and promote healing process.  The pt's skin was monitored for redness adjusting pressure as needed. The pt was instructed in possible side effects of bruising and/or soreness.       (NP) Kinesiotape with 6" star for pain relief was performed over the R lumbar paraspinals.L3-4 region  Instructed pt in use, care and precautions with tape.    Ultrasound  for pain control and to decrease inflammation @ continuous duty cycle, 1 Mhz, applied to R lumbar paraspinals, intensity = 1.5 w/cm2 for 8 minutes. Decreased intensity today " due to increased sensitivity at higher intensity      Patient received pre-mod electrical stimulation to decrease muscle tightness and pain to Lumbar paraspinals/ sacral border of gluteals B for 15minutes with MH supine with cycle time: continuous, beat frequency: , CC/CV: CV.      Written Home Exercises Provided: none today  Pt demo good understanding of the education provided. Danuta demonstrated good return demonstration of activities.     Pt. education:  · Posture reeducation, body mechanics, HEP, proper posture in standing  · No spiritual or educational barriers to learning provided  · Pt has no cultural, educational or language barriers to learning provided.    Assessment   Pt with slight improvement in ROM and strength.  While pt was moving and getting her house ready for sale she had increased symptoms and required more frequent treatment Pt getting settled in new home and less stress with living in new house physically and emotionally.  Pt recently was able to go 3 weeks before she developed increased tightness and was not as tight as was when pt was working on move.       Pt will continue to benefit from skilled outpatient physical therapy to address the remaining functional deficits, provide pt/family education, and to maximize pt's level of independence in the home and community environment. .     GOALS:   Short Term Goals:  3 weeks MET STG's  Increase range of motion 25%  Increase strength 1/2 muscle grade  Improve postural awareness of pelvis to independently identify dysfunction with min assist from PT  Be able to perform HEP with minimal cueing required     Long Term Goals: 6 weeks PARTIALLY MET LTG'S  Increase range of motion to 75% to 100% full   Improve muscle strength 1 muscle grade  Improve muscle strength with MMT to 4+/5 to 5/5  Improve and stabilize proper pelvic positioning  Restore ability to sit for ADL and work with min to 0 pain  Restore normal sleep habits without disturbances  due to pain  Restore ability to perform ADL's and household activities independently with min to 0 pain    Anticipated barriers to physical therapy: none  Pt's spiritual, cultural and educational needs considered and pt agreeable to plan of care and goals        Plan   If you concur, I recommend patient continue with physical therapy 1-2 times a week PRN  for 6 weeks.  Please advise us of your  recommendations. Thank you for allowing us to assist in the care of your patient.      Jacy Escobar, MS, PT          I certify the need for these services furnished under this plan of treatment and while under my care.    ____________________________________ Physician/Referring Practitioner                                Date of Signature

## 2020-12-18 ENCOUNTER — TELEPHONE (OUTPATIENT)
Dept: HEMATOLOGY/ONCOLOGY | Facility: CLINIC | Age: 65
End: 2020-12-18

## 2020-12-18 NOTE — TELEPHONE ENCOUNTER
"----- Message from Cait Cardona sent at 12/18/2020  8:16 AM CST -----  Patient Assist    Name of caller: Danuta   Contact Preference:  [phone]  Does Patient feel the need to see the MD today? unclear  Physician: Pavan PLEITEZ MD  What is the nature of the call?    - missed appt this morning asked to r/s it Please call and assist.     Additional Notes:   "Thank you for all that you do for our patients'"          "

## 2020-12-21 ENCOUNTER — CLINICAL SUPPORT (OUTPATIENT)
Dept: REHABILITATION | Facility: HOSPITAL | Age: 65
End: 2020-12-21
Payer: COMMERCIAL

## 2020-12-21 DIAGNOSIS — G89.29 CHRONIC BILATERAL LOW BACK PAIN WITH RIGHT-SIDED SCIATICA: Primary | ICD-10-CM

## 2020-12-21 DIAGNOSIS — M54.41 CHRONIC BILATERAL LOW BACK PAIN WITH RIGHT-SIDED SCIATICA: Primary | ICD-10-CM

## 2020-12-21 PROCEDURE — 97035 APP MDLTY 1+ULTRASOUND EA 15: CPT | Mod: PN | Performed by: PHYSICAL THERAPIST

## 2020-12-21 PROCEDURE — 97140 MANUAL THERAPY 1/> REGIONS: CPT | Mod: PN | Performed by: PHYSICAL THERAPIST

## 2020-12-21 PROCEDURE — 97110 THERAPEUTIC EXERCISES: CPT | Mod: PN | Performed by: PHYSICAL THERAPIST

## 2020-12-21 PROCEDURE — 97014 ELECTRIC STIMULATION THERAPY: CPT | Mod: PN | Performed by: PHYSICAL THERAPIST

## 2020-12-21 NOTE — PROGRESS NOTES
Physical Therapy Daily Treatment Note     Name: Danuta Santos  Clinic Number: [id number]  Diagnosis:   Encounter Diagnosis   Name Primary?    Chronic bilateral low back pain with right-sided sciatica Yes     Physician: Erica Khan MD  Treatment Orders: PT Eval and Treat  Past Medical History:   Diagnosis Date    Allergy     IBS (irritable bowel syndrome)     Pneumonia 02/2019     Precautions: as per diagnosis    Evaluation date: 2/15/2018  Visit # authorized: 72/79 on 12/15/2020  Authorization period: 12-31-20  Plan of care expiration:  1-26-21  MD Follow up appt: none scheduled    Time In: 11:32  Time Out: 12:30  Billable time:  55  min    Subjective     Pt reports: she has been doing some unpacking resulting in some increase in LBP but not as severe when she was living in big house and was doing more packing and activities to get house ready for sale      Pain Scale: before treatment: 4.5/10  after treatment:feels better, 3/10  Objective     AR R pelvis  Mod-Severe tightness lumbar paraspinals R, min in L  After treatment min-mod tightness noted       TREATMENT  Therapeutic exercise: Danuta received therapeutic exercises to develop strength, endurance, ROM, flexibility and core stabilization for 10 minutes including:    HS stretch supine   Glut stretch  Piriformis stretch  B QL stretch  Contract relax R glut x 3    Pt was not quite able to level pelvis independently today, continued with slight dysfunction after self mob    Pt to perform I at home, did not review today  Pelvic tilt  x 10 for 5 sec hold  Bridge x 10    Partial sit up x 10  SLR x 10  Hip abd sidelying x 10 B  Arm and Leg lift prone x 10  Hip ext prone with bent knee x 10  Contract relax R glut    Hold for now   Heel raises standing x 15    Step up x10     1 plank x 30 sec held the other 3 she normally does     (NP)Plank 4 reps 30 sec each     Manual therapy: Danuta  received the following manual therapy  techniques x 20  min. to  "include soft tissue and joint mobilization were applied to the: low back and gluteals to include:  Performed STM to lower thoracic and LB paraspinals and QL R>L  P/A mob to lumbar region Grade 1-2 , sidelying L contract relax to R QL    Sidelying L contract relax mobilization to L5-S1 region to level pelvis at start and at end   Pt received tool-assisted massage with manual therapy techniques to R LB in prone to trigger an inflammatory healing response and stimulate the production of new collagen and proper, more functional, less painful healing.    Vacuum/cupping STM with manual therapy techniques was performed to back and gluteals to decrease muscle tightness, increase circulation and promote healing process.  The pt's skin was monitored for redness adjusting pressure as needed. The pt was instructed in possible side effects of bruising and/or soreness.      Kinesiotape with 6" star for pain relief was performed over the R lumbar paraspinals.L3-4 region  Instructed pt in use, care and precautions with tape.    Ultrasound  for pain control and to decrease inflammation @ continuous duty cycle, 1 Mhz, applied to R lumbar paraspinals, intensity = 1.5 w/cm2 for 8 minutes.      Patient received pre-mod electrical stimulation to decrease muscle tightness and pain to Lumbar paraspinals/ sacral border of gluteals B for 15minutes with MH supine with cycle time: continuous, beat frequency: , CC/CV: CV.      Written Home Exercises Provided: none today  Pt demo good understanding of the education provided. Danuta demonstrated good return demonstration of activities.     Pt. education:  · Posture reeducation, body mechanics, HEP, proper posture in standing  · No spiritual or educational barriers to learning provided  · Pt has no cultural, educational or language barriers to learning provided.    Assessment   Pt was not successful with self mob today which may be related to increased activity with unpacking boxes from " move.  Overall symptoms are decreased from prior to move, but still increased with continued increased activities with unpacking leading to increased tightness not allowing self mob to be successful.       Pt will continue to benefit from skilled outpatient physical therapy to address the remaining functional deficits, provide pt/family education, and to maximize pt's level of independence in the home and community environment. .     GOALS:   Short Term Goals:  3 weeks MET STG's  Increase range of motion 25%  Increase strength 1/2 muscle grade  Improve postural awareness of pelvis to independently identify dysfunction with min assist from PT  Be able to perform HEP with minimal cueing required     Long Term Goals: 6 weeks PARTIALLY MET LTG'S  Increase range of motion to 75% to 100% full   Improve muscle strength 1 muscle grade  Improve muscle strength with MMT to 4+/5 to 5/5  Improve and stabilize proper pelvic positioning  Restore ability to sit for ADL and work with min to 0 pain  Restore normal sleep habits without disturbances due to pain  Restore ability to perform ADL's and household activities independently with min to 0 pain    Anticipated barriers to physical therapy: none  Pt's spiritual, cultural and educational needs considered and pt agreeable to plan of care and goals        Plan   Continue with established plan of care towards PT goals.      See pt more frequently with increased activity to keep muscle tightness down to allow successful self mob

## 2020-12-22 ENCOUNTER — CLINICAL SUPPORT (OUTPATIENT)
Dept: REHABILITATION | Facility: HOSPITAL | Age: 65
End: 2020-12-22
Payer: COMMERCIAL

## 2020-12-22 DIAGNOSIS — M54.41 CHRONIC BILATERAL LOW BACK PAIN WITH RIGHT-SIDED SCIATICA: Primary | ICD-10-CM

## 2020-12-22 DIAGNOSIS — G89.29 CHRONIC BILATERAL LOW BACK PAIN WITH RIGHT-SIDED SCIATICA: Primary | ICD-10-CM

## 2020-12-22 PROCEDURE — 97140 MANUAL THERAPY 1/> REGIONS: CPT | Mod: PN | Performed by: PHYSICAL THERAPIST

## 2020-12-22 PROCEDURE — 97035 APP MDLTY 1+ULTRASOUND EA 15: CPT | Mod: PN | Performed by: PHYSICAL THERAPIST

## 2020-12-22 PROCEDURE — 97014 ELECTRIC STIMULATION THERAPY: CPT | Mod: PN | Performed by: PHYSICAL THERAPIST

## 2020-12-22 NOTE — PROGRESS NOTES
Physical Therapy Daily Treatment Note     Name: Danuta Santos  Clinic Number: [id number]  Diagnosis:   Encounter Diagnosis   Name Primary?    Chronic bilateral low back pain with right-sided sciatica Yes     Physician: Erica Khan MD  Treatment Orders: PT Eval and Treat  Past Medical History:   Diagnosis Date    Allergy     IBS (irritable bowel syndrome)     Pneumonia 02/2019     Precautions: as per diagnosis    Evaluation date: 2/15/2018  Visit # authorized: 72/79 on 12/15/2020  Authorization period: 12-31-20  Plan of care expiration:  1-26-21  MD Follow up appt: none scheduled    Time In: 1:50  Time Out: 2:50  Billable time:  55  min    Subjective     Pt reports: she went to storage unit and had to move boxes around to try and get something in particular and felt increased pain 5/10  Pt has been trying to self mobilize and has been unsuccessful  Feeling a little better but still cannot self mobilize      Pain Scale: before treatment: 4.5/10  after treatment:feels better, 3/10  Objective     AR R pelvis  Mod-Severe tightness lumbar paraspinals R, min in L  After treatment min-mod tightness noted       TREATMENT  Therapeutic exercise: Danuta received therapeutic exercises to develop strength, endurance, ROM, flexibility and core stabilization for 5 minutes including:    HS stretch supine   Glut stretch  Piriformis stretch  B QL stretch  Contract relax R glut x 3    Pt was unable to self mobilize    Pt to perform I at home, did not review today  Pelvic tilt  x 10 for 5 sec hold  Bridge x 10    Partial sit up x 10  SLR x 10  Hip abd sidelying x 10 B  Arm and Leg lift prone x 10  Hip ext prone with bent knee x 10  Contract relax R glut    Hold for now   Heel raises standing x 15    Step up x10     1 plank x 30 sec held the other 3 she normally does     (NP)Plank 4 reps 30 sec each     Manual therapy: Danuta  received the following manual therapy  techniques x 25  min. to include soft tissue and joint  "mobilization were applied to the: low back and gluteals to include:  Performed STM to lower thoracic and LB paraspinals and QL R>L  P/A mob to lumbar region Grade 1-2 , sidelying L contract relax to R QL    Sidelying L contract relax mobilization to L5-S1 region to level pelvis at start and at end   (NP)Pt received tool-assisted massage with manual therapy techniques to R LB in prone to trigger an inflammatory healing response and stimulate the production of new collagen and proper, more functional, less painful healing.    (NP)Vacuum/cupping STM with manual therapy techniques was performed to back and gluteals to decrease muscle tightness, increase circulation and promote healing process.  The pt's skin was monitored for redness adjusting pressure as needed. The pt was instructed in possible side effects of bruising and/or soreness.      Kinesiotape with 6" star for pain relief was performed over the R lumbar paraspinals.L3-4 region  Instructed pt in use, care and precautions with tape.    Ultrasound  for pain control and to decrease inflammation @ continuous duty cycle, 1 Mhz, applied to R lumbar paraspinals, intensity = 1.5 w/cm2 for 8 minutes.      Patient received pre-mod electrical stimulation to decrease muscle tightness and pain to Lumbar paraspinals/ sacral border of gluteals B for 15minutes with MH supine with cycle time: continuous, beat frequency: , CC/CV: CV.      Written Home Exercises Provided: none today  Pt demo good understanding of the education provided. Danuta demonstrated good return demonstration of activities.     Pt. education:  · Posture reeducation, body mechanics, HEP, proper posture in standing  · No spiritual or educational barriers to learning provided  · Pt has no cultural, educational or language barriers to learning provided.    Assessment   Pt was not successful with self mob today again after doing more work in storage unit leading to increased pain.  Pt with bruising " from cupping so held tools for manual therapy and performed STM and responded well with improved ability to mobilize after treatment Pt scott treatment well and level pelvis at end of session.  Retaped pt and she understands to remove if skin irritation occurs and to modify activity working in storage unit.         Pt will continue to benefit from skilled outpatient physical therapy to address the remaining functional deficits, provide pt/family education, and to maximize pt's level of independence in the home and community environment. .     GOALS:   Short Term Goals:  3 weeks MET STG's  Increase range of motion 25%  Increase strength 1/2 muscle grade  Improve postural awareness of pelvis to independently identify dysfunction with min assist from PT  Be able to perform HEP with minimal cueing required     Long Term Goals: 6 weeks PARTIALLY MET LTG'S  Increase range of motion to 75% to 100% full   Improve muscle strength 1 muscle grade  Improve muscle strength with MMT to 4+/5 to 5/5  Improve and stabilize proper pelvic positioning  Restore ability to sit for ADL and work with min to 0 pain  Restore normal sleep habits without disturbances due to pain  Restore ability to perform ADL's and household activities independently with min to 0 pain    Anticipated barriers to physical therapy: none  Pt's spiritual, cultural and educational needs considered and pt agreeable to plan of care and goals        Plan   Continue with established plan of care towards PT goals.      See pt more frequently with increased activity to keep muscle tightness down to allow successful self mob

## 2020-12-24 ENCOUNTER — OFFICE VISIT (OUTPATIENT)
Dept: HEMATOLOGY/ONCOLOGY | Facility: CLINIC | Age: 65
End: 2020-12-24
Payer: COMMERCIAL

## 2020-12-24 DIAGNOSIS — R53.82 CHRONIC FATIGUE: ICD-10-CM

## 2020-12-24 DIAGNOSIS — D70.8 OTHER NEUTROPENIA: Primary | ICD-10-CM

## 2020-12-24 DIAGNOSIS — K58.9 IRRITABLE BOWEL SYNDROME, UNSPECIFIED TYPE: ICD-10-CM

## 2020-12-24 PROCEDURE — 99213 PR OFFICE/OUTPT VISIT, EST, LEVL III, 20-29 MIN: ICD-10-PCS | Mod: 95,,, | Performed by: INTERNAL MEDICINE

## 2020-12-24 PROCEDURE — 1159F PR MEDICATION LIST DOCUMENTED IN MEDICAL RECORD: ICD-10-PCS | Mod: ,,, | Performed by: INTERNAL MEDICINE

## 2020-12-24 PROCEDURE — 1159F MED LIST DOCD IN RCRD: CPT | Mod: ,,, | Performed by: INTERNAL MEDICINE

## 2020-12-24 PROCEDURE — 99213 OFFICE O/P EST LOW 20 MIN: CPT | Mod: 95,,, | Performed by: INTERNAL MEDICINE

## 2020-12-24 NOTE — PROGRESS NOTES
The patient location is: home   The chief complaint leading to consultation is: Chronic neutropenia, anemia, follow up    Visit type: audiovisual    Face to Face time with patient: 20 minutes  20 minutes of total time spent on the encounter, which includes face to face time and non-face to face time preparing to see the patient (eg, review of tests), Obtaining and/or reviewing separately obtained history, Documenting clinical information in the electronic or other health record, Independently interpreting results (not separately reported) and communicating results to the patient/family/caregiver, or Care coordination (not separately reported).         Each patient to whom he or she provides medical services by telemedicine is:  (1) informed of the relationship between the physician and patient and the respective role of any other health care provider with respect to management of the patient; and (2) notified that he or she may decline to receive medical services by telemedicine and may withdraw from such care at any time.      CC: Anemia, leukopenia, follow up        HPI: , 65, is here for virtual; hematology followup. She was seen here for anemia and leukopenia. She has IBS.   She has chronic anemia, leukopenia dating back to 2006, mild absolute neutropenia dating back to 2009. WBC count was 3.78, ANC 2.1, in March 2006. Platelets were normal. Hemoglobin was 11.8g/dl, with normocytic picture.   In May 2011, WBC count was 3.72, ANC 1.7, hemoglobin 11.7g/dl.   WBC count was 2.47 and ANC was 1.2 in Sept 2019. Hemoglobin was 12.4g/dl.    No h/o fever, weight loss, change in appetite, or infections. No recent change in medications. She does not take herbal medications / supplements.     Interval history: She is here for a virtual follow up visit. She is afebrile. Denies mouth sores , diarrhea , weight loss, change in appetite or recent fever.           Review of Systems   Constitutional: Negative for  chills, fever, malaise/fatigue and weight loss.   HENT: Negative for ear pain, hearing loss, nosebleeds and tinnitus.    Eyes: Negative for blurred vision, double vision, photophobia, pain and discharge.   Respiratory: Negative for hemoptysis and sputum production.    Cardiovascular: Negative for chest pain and palpitations.   Gastrointestinal: Negative for abdominal pain, blood in stool, nausea and vomiting.   Genitourinary: Negative for flank pain, frequency, hematuria and urgency.   Musculoskeletal: Negative for back pain, myalgias and neck pain.   Neurological: Negative for dizziness, tremors, sensory change and headaches.   Endo/Heme/Allergies: Does not bruise/bleed easily.   Psychiatric/Behavioral: Negative for depression, hallucinations and substance abuse. The patient is not nervous/anxious.      Current Outpatient Medications   Medication Sig    ALPRAZolam (XANAX) 0.25 MG tablet Take 1 tablet (0.25 mg total) by mouth daily as needed for Anxiety. (Patient not taking: Reported on 7/16/2020)    ascorbic acid, vitamin C, (VITAMIN C) 500 MG tablet Take 500 mg by mouth once daily.    azelastine (ASTELIN) 137 mcg (0.1 %) nasal spray 2 sprays by Nasal route every evening.     cefdinir (OMNICEF) 300 MG capsule Take 300 mg by mouth.    cetirizine (ZYRTEC) 10 MG tablet Take 10 mg by mouth once daily.    cyanocobalamin (VITAMIN B-12) 500 MCG tablet Take 1,000 mcg by mouth once daily.      LACTOBACILLUS COMBO NO.6 (PROBIOTIC COMPLEX ORAL) Take 1 capsule by mouth once.     pediatric multivitamin chewable tablet Take 2 tablets by mouth once daily.    tiZANidine (ZANAFLEX) 2 MG tablet Take 1 tablet (2 mg total) by mouth every 8 (eight) hours as needed. (Patient not taking: Reported on 7/16/2020)    vitamin D 185 MG Tab Take 2,000 mg by mouth once daily.      No current facility-administered medications for this visit.        10/7/19 SPEP: Normal total protein, normal pattern.   10/7/19 sIFE: No monoclonal peaks  identified.     Component      Latest Ref Rng & Units 12/11/2020 7/10/2020   WBC      3.90 - 12.70 K/uL 3.45 (L) 3.99   RBC      4.00 - 5.40 M/uL 4.18 4.36   Hemoglobin      12.0 - 16.0 g/dL 12.2 12.9   Hematocrit      37.0 - 48.5 % 38.8 41.3   MCV      82 - 98 fL 93 95   MCH      27.0 - 31.0 pg 29.2 29.6   MCHC      32.0 - 36.0 g/dL 31.4 (L) 31.2 (L)   RDW      11.5 - 14.5 % 12.2 12.2   Platelets      150 - 350 K/uL 194 201   MPV      9.2 - 12.9 fL 11.6 11.7   Immature Granulocytes      0.0 - 0.5 % 0.3 0.3   Gran # (ANC)      1.8 - 7.7 K/uL 1.9 2.3   Immature Grans (Abs)      0.00 - 0.04 K/uL 0.01 0.01   Lymph #      1.0 - 4.8 K/uL 1.2 1.3   Mono #      0.3 - 1.0 K/uL 0.3 0.3   Eos #      0.0 - 0.5 K/uL 0.1 0.0   Baso #      0.00 - 0.20 K/uL 0.04 0.04   nRBC      0 /100 WBC 0 0   Gran %      38.0 - 73.0 % 54.8 56.8   Lymph %      18.0 - 48.0 % 34.8 33.1   Mono %      4.0 - 15.0 % 7.2 7.8   Eosinophil %      0.0 - 8.0 % 1.7 1.0   Basophil %      0.0 - 1.9 % 1.2 1.0   Differential Method       Automated Automated   Sodium      136 - 145 mmol/L 135 (L)    Potassium      3.5 - 5.1 mmol/L 4.6    Chloride      95 - 110 mmol/L 99    CO2      23 - 29 mmol/L 28    Glucose      70 - 110 mg/dL 74    BUN      8 - 23 mg/dL 16    Creatinine      0.5 - 1.4 mg/dL 0.8    Calcium      8.7 - 10.5 mg/dL 9.7    PROTEIN TOTAL      6.0 - 8.4 g/dL 7.0    Albumin      3.5 - 5.2 g/dL 4.2    BILIRUBIN TOTAL      0.1 - 1.0 mg/dL 0.8    Alkaline Phosphatase      55 - 135 U/L 53 (L)    AST      10 - 40 U/L 30    ALT      10 - 44 U/L 28    Anion Gap      8 - 16 mmol/L 8    eGFR if African American      >60 mL/min/1.73 m:2 >60.0    eGFR if non African American      >60 mL/min/1.73 m:2 >60.0    LD      110 - 260 U/L 236       Assessment:     1. Chronic leukopenia  2. Mild absolute neutropenia           Plan:     1,2: Noted since at least since 2006. Most recent WBC  Count is 3.45 , ANC  1.9, on 12/11/120. Platelets, Hgb/HCT all normal . Previously  her B12 was high. Folate was normal. HBV, HCV serologies negative. EBV serology suggests past infection. KIMO negative. No monoclonal proteins on SPEP/SHYANN. Differental diagnoses: constitutional neutropenia / auto immune neutropenia / cyclic neutropenia. She is asymptomatic. No B symptoms, mouth sores, infections, diarrhea, rash.   No indication for further evaluation at this time.  Follow up as needed.

## 2020-12-31 ENCOUNTER — CLINICAL SUPPORT (OUTPATIENT)
Dept: REHABILITATION | Facility: HOSPITAL | Age: 65
End: 2020-12-31
Payer: COMMERCIAL

## 2020-12-31 DIAGNOSIS — M54.41 CHRONIC BILATERAL LOW BACK PAIN WITH RIGHT-SIDED SCIATICA: Primary | ICD-10-CM

## 2020-12-31 DIAGNOSIS — G89.29 CHRONIC BILATERAL LOW BACK PAIN WITH RIGHT-SIDED SCIATICA: Primary | ICD-10-CM

## 2020-12-31 PROCEDURE — 97035 APP MDLTY 1+ULTRASOUND EA 15: CPT | Mod: PN | Performed by: PHYSICAL THERAPIST

## 2020-12-31 PROCEDURE — 97140 MANUAL THERAPY 1/> REGIONS: CPT | Mod: PN | Performed by: PHYSICAL THERAPIST

## 2020-12-31 PROCEDURE — 97014 ELECTRIC STIMULATION THERAPY: CPT | Mod: PN | Performed by: PHYSICAL THERAPIST

## 2020-12-31 NOTE — PROGRESS NOTES
Physical Therapy Daily Treatment Note     Name: Danuta Santos  Clinic Number: [id number]  Diagnosis:   Encounter Diagnosis   Name Primary?    Chronic bilateral low back pain with right-sided sciatica Yes     Physician: Erica Khan MD  Treatment Orders: PT Eval and Treat  Past Medical History:   Diagnosis Date    Allergy     IBS (irritable bowel syndrome)     Pneumonia 02/2019     Precautions: as per diagnosis    Evaluation date: 2/15/2018  Visit # authorized: 73/79 on 12/15/2020  Authorization period: 12-31-20  Plan of care expiration:  1-26-21  MD Follow up appt: none scheduled    Time In: 10:17  Time Out: 11:25  Billable time:  55  min    Subjective     Pt reports: she has been unpacking a lot of boxes and having increased pain and unable to self mob.  Pt states she was doing pretty good until she took tape off.       Pain Scale: before treatment: 5/10  after treatment:feels better, 3/10  Objective     AR R pelvis  Mod-Severe tightness lumbar paraspinals R, min in L  After treatment min-mod tightness noted       TREATMENT  Therapeutic exercise: Danuta received therapeutic exercises to develop strength, endurance, ROM, flexibility and core stabilization for 5 minutes including:    HS stretch supine   Glut stretch  Piriformis stretch  B QL stretch  Contract relax R glut x 3    Pt was unable to self mobilize    Pt to perform I at home, did not review today  Pelvic tilt  x 10 for 5 sec hold  Bridge x 10    Partial sit up x 10  SLR x 10  Hip abd sidelying x 10 B  Arm and Leg lift prone x 10  Hip ext prone with bent knee x 10  Contract relax R glut    Hold for now   Heel raises standing x 15    Step up x10     1 plank x 30 sec held the other 3 she normally does     (NP)Plank 4 reps 30 sec each     Manual therapy: Danuta  received the following manual therapy  techniques x 25  min. to include soft tissue and joint mobilization were applied to the: low back and gluteals to include:  Performed STM to lower  "thoracic and LB paraspinals and QL R>L  P/A mob to lumbar region Grade 1-2 , sidelying L contract relax to R QL    Sidelying L contract relax mobilization to L5-S1 region to level pelvis at start and at end   Pt received tool-assisted massage with manual therapy techniques to R LB in prone to trigger an inflammatory healing response and stimulate the production of new collagen and proper, more functional, less painful healing.    Vacuum/cupping STM with manual therapy techniques was performed to back and gluteals to decrease muscle tightness, increase circulation and promote healing process.  The pt's skin was monitored for redness adjusting pressure as needed. The pt was instructed in possible side effects of bruising and/or soreness.      Kinesiotape with 6" star for pain relief was performed over the R lumbar paraspinals.L3-4 region  Instructed pt in use, care and precautions with tape.    Ultrasound  for pain control and to decrease inflammation @ continuous duty cycle, 1 Mhz, applied to R lumbar paraspinals, intensity = 1.5 w/cm2 for 8 minutes.      Patient received pre-mod electrical stimulation to decrease muscle tightness and pain to Lumbar paraspinals/ sacral border of gluteals B for 15minutes with MH supine with cycle time: continuous, beat frequency: , CC/CV: CV.      Written Home Exercises Provided: none today  Pt demo good understanding of the education provided. Danuta demonstrated good return demonstration of activities.     Pt. education:  · Posture reeducation, body mechanics, HEP, proper posture in standing  · No spiritual or educational barriers to learning provided  · Pt has no cultural, educational or language barriers to learning provided.    Assessment   Pt had been doing fairly well with tape in place she felt, but continues work on unpacking boxes which leads to increased muscle tightness resulting in inability to self mobilize further leading to muscle tightness.  Pt able to get " all modalities with tools and cupping which led to further decrease in muscle tightness.  Pt may need continued assistance as she gets settled in her new home.      Pt will continue to benefit from skilled outpatient physical therapy to address the remaining functional deficits, provide pt/family education, and to maximize pt's level of independence in the home and community environment. .     GOALS:   Short Term Goals:  3 weeks MET STG's  Increase range of motion 25%  Increase strength 1/2 muscle grade  Improve postural awareness of pelvis to independently identify dysfunction with min assist from PT  Be able to perform HEP with minimal cueing required     Long Term Goals: 6 weeks PARTIALLY MET LTG'S  Increase range of motion to 75% to 100% full   Improve muscle strength 1 muscle grade  Improve muscle strength with MMT to 4+/5 to 5/5  Improve and stabilize proper pelvic positioning  Restore ability to sit for ADL and work with min to 0 pain  Restore normal sleep habits without disturbances due to pain  Restore ability to perform ADL's and household activities independently with min to 0 pain    Anticipated barriers to physical therapy: none  Pt's spiritual, cultural and educational needs considered and pt agreeable to plan of care and goals        Plan   Continue with established plan of care towards PT goals.      See pt more frequently with increased activity to keep muscle tightness down to allow successful self mob

## 2021-01-15 ENCOUNTER — CLINICAL SUPPORT (OUTPATIENT)
Dept: REHABILITATION | Facility: HOSPITAL | Age: 66
End: 2021-01-15
Payer: COMMERCIAL

## 2021-01-15 DIAGNOSIS — M54.41 CHRONIC BILATERAL LOW BACK PAIN WITH RIGHT-SIDED SCIATICA: Primary | ICD-10-CM

## 2021-01-15 DIAGNOSIS — G89.29 CHRONIC BILATERAL LOW BACK PAIN WITH RIGHT-SIDED SCIATICA: Primary | ICD-10-CM

## 2021-01-15 PROCEDURE — 97035 APP MDLTY 1+ULTRASOUND EA 15: CPT | Mod: PN | Performed by: PHYSICAL THERAPIST

## 2021-01-15 PROCEDURE — 97140 MANUAL THERAPY 1/> REGIONS: CPT | Mod: PN | Performed by: PHYSICAL THERAPIST

## 2021-01-15 PROCEDURE — 97014 ELECTRIC STIMULATION THERAPY: CPT | Mod: PN | Performed by: PHYSICAL THERAPIST

## 2021-01-28 ENCOUNTER — CLINICAL SUPPORT (OUTPATIENT)
Dept: REHABILITATION | Facility: HOSPITAL | Age: 66
End: 2021-01-28
Payer: COMMERCIAL

## 2021-01-28 DIAGNOSIS — G89.29 CHRONIC BILATERAL LOW BACK PAIN WITH RIGHT-SIDED SCIATICA: Primary | ICD-10-CM

## 2021-01-28 DIAGNOSIS — M54.41 CHRONIC BILATERAL LOW BACK PAIN WITH RIGHT-SIDED SCIATICA: Primary | ICD-10-CM

## 2021-01-28 PROCEDURE — 97110 THERAPEUTIC EXERCISES: CPT | Mod: PN | Performed by: PHYSICAL THERAPIST

## 2021-01-28 PROCEDURE — 97014 ELECTRIC STIMULATION THERAPY: CPT | Mod: PN | Performed by: PHYSICAL THERAPIST

## 2021-01-28 PROCEDURE — 97035 APP MDLTY 1+ULTRASOUND EA 15: CPT | Mod: PN | Performed by: PHYSICAL THERAPIST

## 2021-01-28 PROCEDURE — 97140 MANUAL THERAPY 1/> REGIONS: CPT | Mod: PN | Performed by: PHYSICAL THERAPIST

## 2021-02-01 ENCOUNTER — OFFICE VISIT (OUTPATIENT)
Dept: INTERNAL MEDICINE | Facility: CLINIC | Age: 66
End: 2021-02-01
Payer: COMMERCIAL

## 2021-02-01 VITALS
WEIGHT: 135 LBS | SYSTOLIC BLOOD PRESSURE: 110 MMHG | OXYGEN SATURATION: 97 % | HEART RATE: 66 BPM | BODY MASS INDEX: 21.69 KG/M2 | DIASTOLIC BLOOD PRESSURE: 80 MMHG | HEIGHT: 66 IN

## 2021-02-01 DIAGNOSIS — R07.89 CHEST WALL PAIN: Primary | ICD-10-CM

## 2021-02-01 PROCEDURE — 99213 OFFICE O/P EST LOW 20 MIN: CPT | Mod: S$GLB,,, | Performed by: INTERNAL MEDICINE

## 2021-02-01 PROCEDURE — 3288F FALL RISK ASSESSMENT DOCD: CPT | Mod: CPTII,S$GLB,, | Performed by: INTERNAL MEDICINE

## 2021-02-01 PROCEDURE — 1126F AMNT PAIN NOTED NONE PRSNT: CPT | Mod: S$GLB,,, | Performed by: INTERNAL MEDICINE

## 2021-02-01 PROCEDURE — 99999 PR PBB SHADOW E&M-EST. PATIENT-LVL III: CPT | Mod: PBBFAC,,, | Performed by: INTERNAL MEDICINE

## 2021-02-01 PROCEDURE — 99999 PR PBB SHADOW E&M-EST. PATIENT-LVL III: ICD-10-PCS | Mod: PBBFAC,,, | Performed by: INTERNAL MEDICINE

## 2021-02-01 PROCEDURE — 3008F PR BODY MASS INDEX (BMI) DOCUMENTED: ICD-10-PCS | Mod: CPTII,S$GLB,, | Performed by: INTERNAL MEDICINE

## 2021-02-01 PROCEDURE — 1126F PR PAIN SEVERITY QUANTIFIED, NO PAIN PRESENT: ICD-10-PCS | Mod: S$GLB,,, | Performed by: INTERNAL MEDICINE

## 2021-02-01 PROCEDURE — 99213 PR OFFICE/OUTPT VISIT, EST, LEVL III, 20-29 MIN: ICD-10-PCS | Mod: S$GLB,,, | Performed by: INTERNAL MEDICINE

## 2021-02-01 PROCEDURE — 1159F PR MEDICATION LIST DOCUMENTED IN MEDICAL RECORD: ICD-10-PCS | Mod: S$GLB,,, | Performed by: INTERNAL MEDICINE

## 2021-02-01 PROCEDURE — 3288F PR FALLS RISK ASSESSMENT DOCUMENTED: ICD-10-PCS | Mod: CPTII,S$GLB,, | Performed by: INTERNAL MEDICINE

## 2021-02-01 PROCEDURE — 1101F PT FALLS ASSESS-DOCD LE1/YR: CPT | Mod: CPTII,S$GLB,, | Performed by: INTERNAL MEDICINE

## 2021-02-01 PROCEDURE — 1101F PR PT FALLS ASSESS DOC 0-1 FALLS W/OUT INJ PAST YR: ICD-10-PCS | Mod: CPTII,S$GLB,, | Performed by: INTERNAL MEDICINE

## 2021-02-01 PROCEDURE — 1159F MED LIST DOCD IN RCRD: CPT | Mod: S$GLB,,, | Performed by: INTERNAL MEDICINE

## 2021-02-01 PROCEDURE — 3008F BODY MASS INDEX DOCD: CPT | Mod: CPTII,S$GLB,, | Performed by: INTERNAL MEDICINE

## 2021-02-01 RX ORDER — HYDROCORTISONE 1 %
CREAM (GRAM) TOPICAL
COMMUNITY
Start: 2021-01-11 | End: 2022-04-12

## 2021-02-05 ENCOUNTER — CLINICAL SUPPORT (OUTPATIENT)
Dept: REHABILITATION | Facility: HOSPITAL | Age: 66
End: 2021-02-05
Payer: COMMERCIAL

## 2021-02-05 DIAGNOSIS — G89.29 CHRONIC BILATERAL LOW BACK PAIN WITH RIGHT-SIDED SCIATICA: Primary | ICD-10-CM

## 2021-02-05 DIAGNOSIS — M54.41 CHRONIC BILATERAL LOW BACK PAIN WITH RIGHT-SIDED SCIATICA: Primary | ICD-10-CM

## 2021-02-05 PROCEDURE — 97035 APP MDLTY 1+ULTRASOUND EA 15: CPT | Mod: PN | Performed by: PHYSICAL THERAPIST

## 2021-02-05 PROCEDURE — 97140 MANUAL THERAPY 1/> REGIONS: CPT | Mod: PN | Performed by: PHYSICAL THERAPIST

## 2021-02-05 PROCEDURE — 97014 ELECTRIC STIMULATION THERAPY: CPT | Mod: PN | Performed by: PHYSICAL THERAPIST

## 2021-02-09 ENCOUNTER — TELEPHONE (OUTPATIENT)
Dept: INTERNAL MEDICINE | Facility: CLINIC | Age: 66
End: 2021-02-09

## 2021-02-09 ENCOUNTER — PATIENT MESSAGE (OUTPATIENT)
Dept: INTERNAL MEDICINE | Facility: CLINIC | Age: 66
End: 2021-02-09

## 2021-02-09 ENCOUNTER — CLINICAL SUPPORT (OUTPATIENT)
Dept: REHABILITATION | Facility: HOSPITAL | Age: 66
End: 2021-02-09
Payer: COMMERCIAL

## 2021-02-09 DIAGNOSIS — G89.29 CHRONIC BILATERAL LOW BACK PAIN WITH RIGHT-SIDED SCIATICA: Primary | ICD-10-CM

## 2021-02-09 DIAGNOSIS — M54.41 CHRONIC BILATERAL LOW BACK PAIN WITH RIGHT-SIDED SCIATICA: Primary | ICD-10-CM

## 2021-02-09 PROCEDURE — 97035 APP MDLTY 1+ULTRASOUND EA 15: CPT | Mod: PN | Performed by: PHYSICAL THERAPIST

## 2021-02-09 PROCEDURE — 97014 ELECTRIC STIMULATION THERAPY: CPT | Mod: PN | Performed by: PHYSICAL THERAPIST

## 2021-02-09 PROCEDURE — 97140 MANUAL THERAPY 1/> REGIONS: CPT | Mod: PN | Performed by: PHYSICAL THERAPIST

## 2021-02-19 ENCOUNTER — IMMUNIZATION (OUTPATIENT)
Dept: INTERNAL MEDICINE | Facility: CLINIC | Age: 66
End: 2021-02-19
Payer: COMMERCIAL

## 2021-02-19 DIAGNOSIS — Z23 NEED FOR VACCINATION: Primary | ICD-10-CM

## 2021-02-19 PROCEDURE — 91300 COVID-19, MRNA, LNP-S, PF, 30 MCG/0.3 ML DOSE VACCINE: CPT | Mod: PBBFAC | Performed by: INTERNAL MEDICINE

## 2021-02-25 ENCOUNTER — CLINICAL SUPPORT (OUTPATIENT)
Dept: REHABILITATION | Facility: HOSPITAL | Age: 66
End: 2021-02-25
Payer: COMMERCIAL

## 2021-02-25 DIAGNOSIS — M54.41 CHRONIC BILATERAL LOW BACK PAIN WITH RIGHT-SIDED SCIATICA: Primary | ICD-10-CM

## 2021-02-25 DIAGNOSIS — G89.29 CHRONIC BILATERAL LOW BACK PAIN WITH RIGHT-SIDED SCIATICA: Primary | ICD-10-CM

## 2021-02-25 PROCEDURE — 97035 APP MDLTY 1+ULTRASOUND EA 15: CPT | Mod: PN | Performed by: PHYSICAL THERAPIST

## 2021-02-25 PROCEDURE — 97140 MANUAL THERAPY 1/> REGIONS: CPT | Mod: PN | Performed by: PHYSICAL THERAPIST

## 2021-02-25 PROCEDURE — 97014 ELECTRIC STIMULATION THERAPY: CPT | Mod: PN | Performed by: PHYSICAL THERAPIST

## 2021-03-12 ENCOUNTER — IMMUNIZATION (OUTPATIENT)
Dept: INTERNAL MEDICINE | Facility: CLINIC | Age: 66
End: 2021-03-12
Payer: COMMERCIAL

## 2021-03-12 DIAGNOSIS — Z23 NEED FOR VACCINATION: Primary | ICD-10-CM

## 2021-03-12 PROCEDURE — 91300 COVID-19, MRNA, LNP-S, PF, 30 MCG/0.3 ML DOSE VACCINE: CPT | Mod: PBBFAC | Performed by: INTERNAL MEDICINE

## 2021-03-12 PROCEDURE — 0002A COVID-19, MRNA, LNP-S, PF, 30 MCG/0.3 ML DOSE VACCINE: CPT | Mod: PBBFAC | Performed by: INTERNAL MEDICINE

## 2021-04-19 ENCOUNTER — CLINICAL SUPPORT (OUTPATIENT)
Dept: REHABILITATION | Facility: HOSPITAL | Age: 66
End: 2021-04-19
Payer: COMMERCIAL

## 2021-04-19 DIAGNOSIS — M54.41 CHRONIC BILATERAL LOW BACK PAIN WITH RIGHT-SIDED SCIATICA: Primary | ICD-10-CM

## 2021-04-19 DIAGNOSIS — G89.29 CHRONIC BILATERAL LOW BACK PAIN WITH RIGHT-SIDED SCIATICA: Primary | ICD-10-CM

## 2021-04-19 PROCEDURE — 97140 MANUAL THERAPY 1/> REGIONS: CPT | Mod: PN | Performed by: PHYSICAL THERAPIST

## 2021-04-19 PROCEDURE — 97035 APP MDLTY 1+ULTRASOUND EA 15: CPT | Mod: PN | Performed by: PHYSICAL THERAPIST

## 2021-04-19 PROCEDURE — 97014 ELECTRIC STIMULATION THERAPY: CPT | Mod: PN | Performed by: PHYSICAL THERAPIST

## 2021-04-19 PROCEDURE — 97110 THERAPEUTIC EXERCISES: CPT | Mod: PN | Performed by: PHYSICAL THERAPIST

## 2021-05-06 ENCOUNTER — PATIENT MESSAGE (OUTPATIENT)
Dept: INTERNAL MEDICINE | Facility: CLINIC | Age: 66
End: 2021-05-06

## 2021-05-06 DIAGNOSIS — Z01.818 PREOP TESTING: Primary | ICD-10-CM

## 2021-05-07 ENCOUNTER — CLINICAL SUPPORT (OUTPATIENT)
Dept: REHABILITATION | Facility: HOSPITAL | Age: 66
End: 2021-05-07
Payer: COMMERCIAL

## 2021-05-07 DIAGNOSIS — M54.41 CHRONIC BILATERAL LOW BACK PAIN WITH RIGHT-SIDED SCIATICA: Primary | ICD-10-CM

## 2021-05-07 DIAGNOSIS — G89.29 CHRONIC BILATERAL LOW BACK PAIN WITH RIGHT-SIDED SCIATICA: Primary | ICD-10-CM

## 2021-05-07 PROCEDURE — 97140 MANUAL THERAPY 1/> REGIONS: CPT | Mod: PN | Performed by: PHYSICAL THERAPIST

## 2021-05-07 PROCEDURE — 97110 THERAPEUTIC EXERCISES: CPT | Mod: PN | Performed by: PHYSICAL THERAPIST

## 2021-05-07 PROCEDURE — 97035 APP MDLTY 1+ULTRASOUND EA 15: CPT | Mod: PN | Performed by: PHYSICAL THERAPIST

## 2021-05-07 PROCEDURE — 97014 ELECTRIC STIMULATION THERAPY: CPT | Mod: PN | Performed by: PHYSICAL THERAPIST

## 2021-05-20 ENCOUNTER — CLINICAL SUPPORT (OUTPATIENT)
Dept: REHABILITATION | Facility: HOSPITAL | Age: 66
End: 2021-05-20
Payer: COMMERCIAL

## 2021-05-20 DIAGNOSIS — M54.41 CHRONIC BILATERAL LOW BACK PAIN WITH RIGHT-SIDED SCIATICA: Primary | ICD-10-CM

## 2021-05-20 DIAGNOSIS — G89.29 CHRONIC BILATERAL LOW BACK PAIN WITH RIGHT-SIDED SCIATICA: Primary | ICD-10-CM

## 2021-05-20 PROCEDURE — 97014 ELECTRIC STIMULATION THERAPY: CPT | Mod: PN | Performed by: PHYSICAL THERAPIST

## 2021-05-20 PROCEDURE — 97140 MANUAL THERAPY 1/> REGIONS: CPT | Mod: PN | Performed by: PHYSICAL THERAPIST

## 2021-05-20 PROCEDURE — 97035 APP MDLTY 1+ULTRASOUND EA 15: CPT | Mod: PN | Performed by: PHYSICAL THERAPIST

## 2021-05-21 ENCOUNTER — LAB VISIT (OUTPATIENT)
Dept: LAB | Facility: HOSPITAL | Age: 66
End: 2021-05-21
Attending: INTERNAL MEDICINE
Payer: COMMERCIAL

## 2021-05-21 ENCOUNTER — TELEPHONE (OUTPATIENT)
Dept: INTERNAL MEDICINE | Facility: CLINIC | Age: 66
End: 2021-05-21

## 2021-05-21 ENCOUNTER — HOSPITAL ENCOUNTER (OUTPATIENT)
Dept: CARDIOLOGY | Facility: CLINIC | Age: 66
Discharge: HOME | End: 2021-05-21
Payer: COMMERCIAL

## 2021-05-21 DIAGNOSIS — Z01.818 PREOP TESTING: ICD-10-CM

## 2021-05-21 LAB
ALBUMIN SERPL BCP-MCNC: 4 G/DL (ref 3.5–5.2)
ALP SERPL-CCNC: 53 U/L (ref 55–135)
ALT SERPL W/O P-5'-P-CCNC: 22 U/L (ref 10–44)
ANION GAP SERPL CALC-SCNC: 5 MMOL/L (ref 8–16)
AST SERPL-CCNC: 26 U/L (ref 10–40)
BASOPHILS # BLD AUTO: 0.03 K/UL (ref 0–0.2)
BASOPHILS NFR BLD: 0.8 % (ref 0–1.9)
BILIRUB SERPL-MCNC: 0.8 MG/DL (ref 0.1–1)
BUN SERPL-MCNC: 17 MG/DL (ref 8–23)
CALCIUM SERPL-MCNC: 10.3 MG/DL (ref 8.7–10.5)
CHLORIDE SERPL-SCNC: 100 MMOL/L (ref 95–110)
CO2 SERPL-SCNC: 29 MMOL/L (ref 23–29)
CREAT SERPL-MCNC: 0.9 MG/DL (ref 0.5–1.4)
DIFFERENTIAL METHOD: ABNORMAL
EOSINOPHIL # BLD AUTO: 0 K/UL (ref 0–0.5)
EOSINOPHIL NFR BLD: 1.1 % (ref 0–8)
ERYTHROCYTE [DISTWIDTH] IN BLOOD BY AUTOMATED COUNT: 12.5 % (ref 11.5–14.5)
EST. GFR  (AFRICAN AMERICAN): >60 ML/MIN/1.73 M^2
EST. GFR  (NON AFRICAN AMERICAN): >60 ML/MIN/1.73 M^2
GLUCOSE SERPL-MCNC: 89 MG/DL (ref 70–110)
HCT VFR BLD AUTO: 37.2 % (ref 37–48.5)
HGB BLD-MCNC: 12.1 G/DL (ref 12–16)
IMM GRANULOCYTES # BLD AUTO: 0.01 K/UL (ref 0–0.04)
IMM GRANULOCYTES NFR BLD AUTO: 0.3 % (ref 0–0.5)
LYMPHOCYTES # BLD AUTO: 1.3 K/UL (ref 1–4.8)
LYMPHOCYTES NFR BLD: 34.6 % (ref 18–48)
MCH RBC QN AUTO: 29.7 PG (ref 27–31)
MCHC RBC AUTO-ENTMCNC: 32.5 G/DL (ref 32–36)
MCV RBC AUTO: 91 FL (ref 82–98)
MONOCYTES # BLD AUTO: 0.3 K/UL (ref 0.3–1)
MONOCYTES NFR BLD: 9.1 % (ref 4–15)
NEUTROPHILS # BLD AUTO: 2 K/UL (ref 1.8–7.7)
NEUTROPHILS NFR BLD: 54.1 % (ref 38–73)
NRBC BLD-RTO: 0 /100 WBC
PLATELET # BLD AUTO: 191 K/UL (ref 150–450)
PMV BLD AUTO: 11.4 FL (ref 9.2–12.9)
POTASSIUM SERPL-SCNC: 4.4 MMOL/L (ref 3.5–5.1)
PROT SERPL-MCNC: 6.7 G/DL (ref 6–8.4)
RBC # BLD AUTO: 4.08 M/UL (ref 4–5.4)
SODIUM SERPL-SCNC: 134 MMOL/L (ref 136–145)
WBC # BLD AUTO: 3.73 K/UL (ref 3.9–12.7)

## 2021-05-21 PROCEDURE — 93005 EKG 12-LEAD: ICD-10-PCS | Mod: S$GLB,,, | Performed by: INTERNAL MEDICINE

## 2021-05-21 PROCEDURE — 93010 EKG 12-LEAD: ICD-10-PCS | Mod: S$GLB,,, | Performed by: INTERNAL MEDICINE

## 2021-05-21 PROCEDURE — 80053 COMPREHEN METABOLIC PANEL: CPT | Performed by: INTERNAL MEDICINE

## 2021-05-21 PROCEDURE — 93010 ELECTROCARDIOGRAM REPORT: CPT | Mod: S$GLB,,, | Performed by: INTERNAL MEDICINE

## 2021-05-21 PROCEDURE — 93005 ELECTROCARDIOGRAM TRACING: CPT | Mod: S$GLB,,, | Performed by: INTERNAL MEDICINE

## 2021-05-21 PROCEDURE — 36415 COLL VENOUS BLD VENIPUNCTURE: CPT | Performed by: INTERNAL MEDICINE

## 2021-05-21 PROCEDURE — 85025 COMPLETE CBC W/AUTO DIFF WBC: CPT | Performed by: INTERNAL MEDICINE

## 2021-05-24 ENCOUNTER — HOSPITAL ENCOUNTER (OUTPATIENT)
Dept: CARDIOLOGY | Facility: HOSPITAL | Age: 66
Discharge: HOME | End: 2021-05-24
Attending: INTERNAL MEDICINE
Payer: COMMERCIAL

## 2021-05-24 ENCOUNTER — OFFICE VISIT (OUTPATIENT)
Dept: INTERNAL MEDICINE | Facility: CLINIC | Age: 66
End: 2021-05-24
Payer: COMMERCIAL

## 2021-05-24 VITALS
OXYGEN SATURATION: 99 % | HEIGHT: 66 IN | WEIGHT: 133 LBS | HEART RATE: 62 BPM | BODY MASS INDEX: 21.38 KG/M2 | SYSTOLIC BLOOD PRESSURE: 130 MMHG | DIASTOLIC BLOOD PRESSURE: 70 MMHG

## 2021-05-24 DIAGNOSIS — R94.31 ABNORMAL EKG: ICD-10-CM

## 2021-05-24 DIAGNOSIS — Z01.818 PREOP EXAMINATION: Primary | ICD-10-CM

## 2021-05-24 DIAGNOSIS — M20.41 HAMMER TOE OF RIGHT FOOT: ICD-10-CM

## 2021-05-24 DIAGNOSIS — M21.619 BUNION: ICD-10-CM

## 2021-05-24 PROCEDURE — 1159F PR MEDICATION LIST DOCUMENTED IN MEDICAL RECORD: ICD-10-PCS | Mod: S$GLB,,, | Performed by: INTERNAL MEDICINE

## 2021-05-24 PROCEDURE — 93306 TTE W/DOPPLER COMPLETE: CPT

## 2021-05-24 PROCEDURE — 93306 ECHO (CUPID ONLY): ICD-10-PCS | Mod: 26,,, | Performed by: INTERNAL MEDICINE

## 2021-05-24 PROCEDURE — 3288F FALL RISK ASSESSMENT DOCD: CPT | Mod: CPTII,S$GLB,, | Performed by: INTERNAL MEDICINE

## 2021-05-24 PROCEDURE — 93306 TTE W/DOPPLER COMPLETE: CPT | Mod: 26,,, | Performed by: INTERNAL MEDICINE

## 2021-05-24 PROCEDURE — 99214 OFFICE O/P EST MOD 30 MIN: CPT | Mod: S$GLB,,, | Performed by: INTERNAL MEDICINE

## 2021-05-24 PROCEDURE — 3008F BODY MASS INDEX DOCD: CPT | Mod: CPTII,S$GLB,, | Performed by: INTERNAL MEDICINE

## 2021-05-24 PROCEDURE — 99999 PR PBB SHADOW E&M-EST. PATIENT-LVL III: CPT | Mod: PBBFAC,,, | Performed by: INTERNAL MEDICINE

## 2021-05-24 PROCEDURE — 99999 PR PBB SHADOW E&M-EST. PATIENT-LVL III: ICD-10-PCS | Mod: PBBFAC,,, | Performed by: INTERNAL MEDICINE

## 2021-05-24 PROCEDURE — 1101F PR PT FALLS ASSESS DOC 0-1 FALLS W/OUT INJ PAST YR: ICD-10-PCS | Mod: CPTII,S$GLB,, | Performed by: INTERNAL MEDICINE

## 2021-05-24 PROCEDURE — 3288F PR FALLS RISK ASSESSMENT DOCUMENTED: ICD-10-PCS | Mod: CPTII,S$GLB,, | Performed by: INTERNAL MEDICINE

## 2021-05-24 PROCEDURE — 1126F PR PAIN SEVERITY QUANTIFIED, NO PAIN PRESENT: ICD-10-PCS | Mod: S$GLB,,, | Performed by: INTERNAL MEDICINE

## 2021-05-24 PROCEDURE — 1101F PT FALLS ASSESS-DOCD LE1/YR: CPT | Mod: CPTII,S$GLB,, | Performed by: INTERNAL MEDICINE

## 2021-05-24 PROCEDURE — 99214 PR OFFICE/OUTPT VISIT, EST, LEVL IV, 30-39 MIN: ICD-10-PCS | Mod: S$GLB,,, | Performed by: INTERNAL MEDICINE

## 2021-05-24 PROCEDURE — 1159F MED LIST DOCD IN RCRD: CPT | Mod: S$GLB,,, | Performed by: INTERNAL MEDICINE

## 2021-05-24 PROCEDURE — 1126F AMNT PAIN NOTED NONE PRSNT: CPT | Mod: S$GLB,,, | Performed by: INTERNAL MEDICINE

## 2021-05-24 PROCEDURE — 3008F PR BODY MASS INDEX (BMI) DOCUMENTED: ICD-10-PCS | Mod: CPTII,S$GLB,, | Performed by: INTERNAL MEDICINE

## 2021-05-25 ENCOUNTER — TELEPHONE (OUTPATIENT)
Dept: INTERNAL MEDICINE | Facility: CLINIC | Age: 66
End: 2021-05-25

## 2021-05-25 ENCOUNTER — LAB VISIT (OUTPATIENT)
Dept: INTERNAL MEDICINE | Facility: CLINIC | Age: 66
End: 2021-05-25
Payer: COMMERCIAL

## 2021-05-25 ENCOUNTER — CLINICAL SUPPORT (OUTPATIENT)
Dept: REHABILITATION | Facility: HOSPITAL | Age: 66
End: 2021-05-25
Payer: COMMERCIAL

## 2021-05-25 DIAGNOSIS — M54.41 CHRONIC BILATERAL LOW BACK PAIN WITH RIGHT-SIDED SCIATICA: Primary | ICD-10-CM

## 2021-05-25 DIAGNOSIS — Z01.818 PREOP EXAMINATION: ICD-10-CM

## 2021-05-25 DIAGNOSIS — G89.29 CHRONIC BILATERAL LOW BACK PAIN WITH RIGHT-SIDED SCIATICA: Primary | ICD-10-CM

## 2021-05-25 LAB
ASCENDING AORTA: 2.94 CM
AV INDEX (PROSTH): 0.73
AV MEAN GRADIENT: 3 MMHG
AV PEAK GRADIENT: 5 MMHG
AV VALVE AREA: 2.28 CM2
AV VELOCITY RATIO: 0.78
BSA FOR ECHO PROCEDURE: 1.68 M2
CV ECHO LV RWT: 0.35 CM
DOP CALC AO PEAK VEL: 1.17 M/S
DOP CALC AO VTI: 29.84 CM
DOP CALC LVOT AREA: 3.1 CM2
DOP CALC LVOT DIAMETER: 1.99 CM
DOP CALC LVOT PEAK VEL: 0.91 M/S
DOP CALC LVOT STROKE VOLUME: 67.99 CM3
DOP CALCLVOT PEAK VEL VTI: 21.87 CM
ECHO LV POSTERIOR WALL: 0.83 CM (ref 0.6–1.1)
EJECTION FRACTION: 60 %
FRACTIONAL SHORTENING: 33 % (ref 28–44)
INTERVENTRICULAR SEPTUM: 0.79 CM (ref 0.6–1.1)
LA MAJOR: 5.05 CM
LA MINOR: 4.88 CM
LA WIDTH: 4.2 CM
LEFT ATRIUM SIZE: 3.18 CM
LEFT ATRIUM VOLUME INDEX: 33.5 ML/M2
LEFT ATRIUM VOLUME: 56.35 CM3
LEFT INTERNAL DIMENSION IN SYSTOLE: 3.14 CM (ref 2.1–4)
LEFT VENTRICLE DIASTOLIC VOLUME INDEX: 60.99 ML/M2
LEFT VENTRICLE DIASTOLIC VOLUME: 102.46 ML
LEFT VENTRICLE MASS INDEX: 74 G/M2
LEFT VENTRICLE SYSTOLIC VOLUME INDEX: 23.3 ML/M2
LEFT VENTRICLE SYSTOLIC VOLUME: 39.14 ML
LEFT VENTRICULAR INTERNAL DIMENSION IN DIASTOLE: 4.7 CM (ref 3.5–6)
LEFT VENTRICULAR MASS: 124.25 G
MV A" WAVE DURATION": 11.7 MSEC
PISA TR MAX VEL: 2.52 M/S
PULM VEIN S/D RATIO: 1.09
PV PEAK D VEL: 0.45 M/S
PV PEAK S VEL: 0.49 M/S
RA MAJOR: 5.18 CM
RA WIDTH: 3.52 CM
RIGHT VENTRICULAR END-DIASTOLIC DIMENSION: 3.99 CM
RV TISSUE DOPPLER FREE WALL SYSTOLIC VELOCITY 1 (APICAL 4 CHAMBER VIEW): 14.12 CM/S
SINUS: 2.68 CM
STJ: 2.64 CM
TDI LATERAL: 0.08 M/S
TDI SEPTAL: 0.07 M/S
TDI: 0.08 M/S
TR MAX PG: 25 MMHG
TRICUSPID ANNULAR PLANE SYSTOLIC EXCURSION: 2.6 CM

## 2021-05-25 PROCEDURE — 97110 THERAPEUTIC EXERCISES: CPT | Mod: PN | Performed by: PHYSICAL THERAPIST

## 2021-05-25 PROCEDURE — 97014 ELECTRIC STIMULATION THERAPY: CPT | Mod: PN | Performed by: PHYSICAL THERAPIST

## 2021-05-25 PROCEDURE — U0003 INFECTIOUS AGENT DETECTION BY NUCLEIC ACID (DNA OR RNA); SEVERE ACUTE RESPIRATORY SYNDROME CORONAVIRUS 2 (SARS-COV-2) (CORONAVIRUS DISEASE [COVID-19]), AMPLIFIED PROBE TECHNIQUE, MAKING USE OF HIGH THROUGHPUT TECHNOLOGIES AS DESCRIBED BY CMS-2020-01-R: HCPCS | Performed by: INTERNAL MEDICINE

## 2021-05-25 PROCEDURE — U0005 INFEC AGEN DETEC AMPLI PROBE: HCPCS | Performed by: INTERNAL MEDICINE

## 2021-05-25 PROCEDURE — 97035 APP MDLTY 1+ULTRASOUND EA 15: CPT | Mod: PN | Performed by: PHYSICAL THERAPIST

## 2021-05-25 PROCEDURE — 97140 MANUAL THERAPY 1/> REGIONS: CPT | Mod: PN | Performed by: PHYSICAL THERAPIST

## 2021-05-26 LAB — SARS-COV-2 RNA RESP QL NAA+PROBE: NOT DETECTED

## 2021-05-27 ENCOUNTER — CLINICAL SUPPORT (OUTPATIENT)
Dept: REHABILITATION | Facility: HOSPITAL | Age: 66
End: 2021-05-27
Payer: COMMERCIAL

## 2021-05-27 ENCOUNTER — TELEPHONE (OUTPATIENT)
Dept: INTERNAL MEDICINE | Facility: CLINIC | Age: 66
End: 2021-05-27

## 2021-05-27 DIAGNOSIS — M54.41 CHRONIC BILATERAL LOW BACK PAIN WITH RIGHT-SIDED SCIATICA: Primary | ICD-10-CM

## 2021-05-27 DIAGNOSIS — G89.29 CHRONIC BILATERAL LOW BACK PAIN WITH RIGHT-SIDED SCIATICA: Primary | ICD-10-CM

## 2021-05-27 PROCEDURE — 97014 ELECTRIC STIMULATION THERAPY: CPT | Mod: PN | Performed by: PHYSICAL THERAPIST

## 2021-05-27 PROCEDURE — 97035 APP MDLTY 1+ULTRASOUND EA 15: CPT | Mod: PN | Performed by: PHYSICAL THERAPIST

## 2021-05-27 PROCEDURE — 97140 MANUAL THERAPY 1/> REGIONS: CPT | Mod: PN | Performed by: PHYSICAL THERAPIST

## 2021-05-27 PROCEDURE — 97110 THERAPEUTIC EXERCISES: CPT | Mod: PN | Performed by: PHYSICAL THERAPIST

## 2021-06-08 ENCOUNTER — CLINICAL SUPPORT (OUTPATIENT)
Dept: REHABILITATION | Facility: HOSPITAL | Age: 66
End: 2021-06-08
Payer: COMMERCIAL

## 2021-06-08 DIAGNOSIS — G89.29 CHRONIC BILATERAL LOW BACK PAIN WITH RIGHT-SIDED SCIATICA: Primary | ICD-10-CM

## 2021-06-08 DIAGNOSIS — M54.41 CHRONIC BILATERAL LOW BACK PAIN WITH RIGHT-SIDED SCIATICA: Primary | ICD-10-CM

## 2021-06-08 PROCEDURE — 97140 MANUAL THERAPY 1/> REGIONS: CPT | Mod: PN | Performed by: PHYSICAL THERAPIST

## 2021-06-08 PROCEDURE — 97035 APP MDLTY 1+ULTRASOUND EA 15: CPT | Mod: PN | Performed by: PHYSICAL THERAPIST

## 2021-06-08 PROCEDURE — 97014 ELECTRIC STIMULATION THERAPY: CPT | Mod: PN | Performed by: PHYSICAL THERAPIST

## 2021-07-28 DIAGNOSIS — Z12.31 OTHER SCREENING MAMMOGRAM: ICD-10-CM

## 2021-07-30 ENCOUNTER — TELEPHONE (OUTPATIENT)
Dept: INTERNAL MEDICINE | Facility: CLINIC | Age: 66
End: 2021-07-30

## 2021-07-30 DIAGNOSIS — Z03.818 ENCOUNTER FOR OBSERVATION FOR SUSPECTED EXPOSURE TO OTHER BIOLOGICAL AGENTS RULED OUT: Primary | ICD-10-CM

## 2021-07-31 ENCOUNTER — LAB VISIT (OUTPATIENT)
Dept: INTERNAL MEDICINE | Facility: CLINIC | Age: 66
End: 2021-07-31
Payer: COMMERCIAL

## 2021-07-31 DIAGNOSIS — Z03.818 ENCOUNTER FOR OBSERVATION FOR SUSPECTED EXPOSURE TO OTHER BIOLOGICAL AGENTS RULED OUT: ICD-10-CM

## 2021-07-31 PROCEDURE — U0005 INFEC AGEN DETEC AMPLI PROBE: HCPCS | Performed by: INTERNAL MEDICINE

## 2021-07-31 PROCEDURE — U0003 INFECTIOUS AGENT DETECTION BY NUCLEIC ACID (DNA OR RNA); SEVERE ACUTE RESPIRATORY SYNDROME CORONAVIRUS 2 (SARS-COV-2) (CORONAVIRUS DISEASE [COVID-19]), AMPLIFIED PROBE TECHNIQUE, MAKING USE OF HIGH THROUGHPUT TECHNOLOGIES AS DESCRIBED BY CMS-2020-01-R: HCPCS | Performed by: INTERNAL MEDICINE

## 2021-08-02 LAB
SARS-COV-2 RNA RESP QL NAA+PROBE: NOT DETECTED
SARS-COV-2- CYCLE NUMBER: -1

## 2021-08-04 ENCOUNTER — CLINICAL SUPPORT (OUTPATIENT)
Dept: REHABILITATION | Facility: HOSPITAL | Age: 66
End: 2021-08-04
Payer: COMMERCIAL

## 2021-08-04 DIAGNOSIS — G89.29 CHRONIC BILATERAL LOW BACK PAIN WITH RIGHT-SIDED SCIATICA: Primary | ICD-10-CM

## 2021-08-04 DIAGNOSIS — M54.41 CHRONIC BILATERAL LOW BACK PAIN WITH RIGHT-SIDED SCIATICA: Primary | ICD-10-CM

## 2021-08-04 PROCEDURE — 97140 MANUAL THERAPY 1/> REGIONS: CPT | Mod: PN | Performed by: PHYSICAL THERAPIST

## 2021-08-04 PROCEDURE — 97035 APP MDLTY 1+ULTRASOUND EA 15: CPT | Mod: PN | Performed by: PHYSICAL THERAPIST

## 2021-08-04 PROCEDURE — 97014 ELECTRIC STIMULATION THERAPY: CPT | Mod: PN | Performed by: PHYSICAL THERAPIST

## 2021-08-04 PROCEDURE — 97110 THERAPEUTIC EXERCISES: CPT | Mod: PN | Performed by: PHYSICAL THERAPIST

## 2021-08-12 ENCOUNTER — PATIENT MESSAGE (OUTPATIENT)
Dept: INTERNAL MEDICINE | Facility: CLINIC | Age: 66
End: 2021-08-12

## 2021-08-17 ENCOUNTER — CLINICAL SUPPORT (OUTPATIENT)
Dept: REHABILITATION | Facility: HOSPITAL | Age: 66
End: 2021-08-17
Payer: COMMERCIAL

## 2021-08-17 ENCOUNTER — TELEPHONE (OUTPATIENT)
Dept: INTERNAL MEDICINE | Facility: CLINIC | Age: 66
End: 2021-08-17

## 2021-08-17 DIAGNOSIS — M54.41 CHRONIC BILATERAL LOW BACK PAIN WITH RIGHT-SIDED SCIATICA: Primary | ICD-10-CM

## 2021-08-17 DIAGNOSIS — Z01.84 IMMUNITY STATUS TESTING: Primary | ICD-10-CM

## 2021-08-17 DIAGNOSIS — G89.29 CHRONIC BILATERAL LOW BACK PAIN WITH RIGHT-SIDED SCIATICA: Primary | ICD-10-CM

## 2021-08-17 PROCEDURE — 97110 THERAPEUTIC EXERCISES: CPT | Mod: PN | Performed by: PHYSICAL THERAPIST

## 2021-08-17 PROCEDURE — 97035 APP MDLTY 1+ULTRASOUND EA 15: CPT | Mod: PN | Performed by: PHYSICAL THERAPIST

## 2021-08-17 PROCEDURE — 97014 ELECTRIC STIMULATION THERAPY: CPT | Mod: PN | Performed by: PHYSICAL THERAPIST

## 2021-08-17 PROCEDURE — 97140 MANUAL THERAPY 1/> REGIONS: CPT | Mod: PN | Performed by: PHYSICAL THERAPIST

## 2021-08-18 ENCOUNTER — HOSPITAL ENCOUNTER (OUTPATIENT)
Dept: RADIOLOGY | Facility: OTHER | Age: 66
Discharge: HOME | End: 2021-08-18
Attending: INTERNAL MEDICINE
Payer: COMMERCIAL

## 2021-08-18 DIAGNOSIS — Z12.31 OTHER SCREENING MAMMOGRAM: ICD-10-CM

## 2021-08-18 PROCEDURE — 77067 MAMMO DIGITAL SCREENING BILAT WITH TOMO: ICD-10-PCS | Mod: 26,,, | Performed by: RADIOLOGY

## 2021-08-18 PROCEDURE — 77063 MAMMO DIGITAL SCREENING BILAT WITH TOMO: ICD-10-PCS | Mod: 26,,, | Performed by: RADIOLOGY

## 2021-08-18 PROCEDURE — 77063 BREAST TOMOSYNTHESIS BI: CPT | Mod: 26,,, | Performed by: RADIOLOGY

## 2021-08-18 PROCEDURE — 77067 SCR MAMMO BI INCL CAD: CPT | Mod: TC

## 2021-08-18 PROCEDURE — 77067 SCR MAMMO BI INCL CAD: CPT | Mod: 26,,, | Performed by: RADIOLOGY

## 2021-08-21 ENCOUNTER — LAB VISIT (OUTPATIENT)
Dept: LAB | Facility: HOSPITAL | Age: 66
End: 2021-08-21
Attending: INTERNAL MEDICINE
Payer: COMMERCIAL

## 2021-08-21 DIAGNOSIS — Z01.84 IMMUNITY STATUS TESTING: ICD-10-CM

## 2021-08-21 LAB
SARS-COV-2 IGG SERPL IA-ACNC: 1332.9 AU/ML
SARS-COV-2 IGG SERPL QL IA: POSITIVE

## 2021-08-21 PROCEDURE — 86769 SARS-COV-2 COVID-19 ANTIBODY: CPT | Performed by: INTERNAL MEDICINE

## 2021-08-21 PROCEDURE — 36415 COLL VENOUS BLD VENIPUNCTURE: CPT | Performed by: INTERNAL MEDICINE

## 2021-08-26 ENCOUNTER — CLINICAL SUPPORT (OUTPATIENT)
Dept: OTHER | Facility: CLINIC | Age: 66
End: 2021-08-26
Payer: COMMERCIAL

## 2021-08-26 ENCOUNTER — CLINICAL SUPPORT (OUTPATIENT)
Dept: REHABILITATION | Facility: HOSPITAL | Age: 66
End: 2021-08-26
Payer: COMMERCIAL

## 2021-08-26 DIAGNOSIS — Z00.8 ENCOUNTER FOR OTHER GENERAL EXAMINATION: ICD-10-CM

## 2021-08-26 DIAGNOSIS — G89.29 CHRONIC BILATERAL LOW BACK PAIN WITH RIGHT-SIDED SCIATICA: Primary | ICD-10-CM

## 2021-08-26 DIAGNOSIS — M54.41 CHRONIC BILATERAL LOW BACK PAIN WITH RIGHT-SIDED SCIATICA: Primary | ICD-10-CM

## 2021-08-26 PROCEDURE — 97035 APP MDLTY 1+ULTRASOUND EA 15: CPT | Mod: PN | Performed by: PHYSICAL THERAPIST

## 2021-08-26 PROCEDURE — 80061 LIPID PANEL: CPT | Mod: QW,S$GLB,, | Performed by: INTERNAL MEDICINE

## 2021-08-26 PROCEDURE — 97014 ELECTRIC STIMULATION THERAPY: CPT | Mod: PN | Performed by: PHYSICAL THERAPIST

## 2021-08-26 PROCEDURE — 80061 PR  LIPID PANEL: ICD-10-PCS | Mod: QW,S$GLB,, | Performed by: INTERNAL MEDICINE

## 2021-08-26 PROCEDURE — 82947 PR  ASSAY QUANTITATIVE,BLOOD GLUCOSE: ICD-10-PCS | Mod: QW,S$GLB,, | Performed by: INTERNAL MEDICINE

## 2021-08-26 PROCEDURE — 82947 ASSAY GLUCOSE BLOOD QUANT: CPT | Mod: QW,S$GLB,, | Performed by: INTERNAL MEDICINE

## 2021-08-26 PROCEDURE — 99401 PR PREVENT COUNSEL,INDIV,15 MIN: ICD-10-PCS | Mod: 25,S$GLB,, | Performed by: INTERNAL MEDICINE

## 2021-08-26 PROCEDURE — 97140 MANUAL THERAPY 1/> REGIONS: CPT | Mod: PN | Performed by: PHYSICAL THERAPIST

## 2021-08-26 PROCEDURE — 99401 PREV MED CNSL INDIV APPRX 15: CPT | Mod: 25,S$GLB,, | Performed by: INTERNAL MEDICINE

## 2021-08-27 VITALS — HEIGHT: 66 IN | BODY MASS INDEX: 21.47 KG/M2

## 2021-08-27 LAB
HDLC SERPL-MCNC: 69 MG/DL
POC CHOLESTEROL, TOTAL: 172 MG/DL
POC GLUCOSE, FASTING: 87 MG/DL (ref 60–110)
TRIGL SERPL-MCNC: 44 MG/DL

## 2021-09-10 ENCOUNTER — CLINICAL SUPPORT (OUTPATIENT)
Dept: REHABILITATION | Facility: HOSPITAL | Age: 66
End: 2021-09-10
Payer: COMMERCIAL

## 2021-09-10 DIAGNOSIS — G89.29 CHRONIC BILATERAL LOW BACK PAIN WITH RIGHT-SIDED SCIATICA: Primary | ICD-10-CM

## 2021-09-10 DIAGNOSIS — M54.41 CHRONIC BILATERAL LOW BACK PAIN WITH RIGHT-SIDED SCIATICA: Primary | ICD-10-CM

## 2021-09-10 PROCEDURE — 97014 ELECTRIC STIMULATION THERAPY: CPT | Mod: PN | Performed by: PHYSICAL THERAPIST

## 2021-09-10 PROCEDURE — 97140 MANUAL THERAPY 1/> REGIONS: CPT | Mod: PN | Performed by: PHYSICAL THERAPIST

## 2021-09-10 PROCEDURE — 97035 APP MDLTY 1+ULTRASOUND EA 15: CPT | Mod: PN | Performed by: PHYSICAL THERAPIST

## 2021-09-22 ENCOUNTER — CLINICAL SUPPORT (OUTPATIENT)
Dept: REHABILITATION | Facility: HOSPITAL | Age: 66
End: 2021-09-22
Payer: COMMERCIAL

## 2021-09-22 DIAGNOSIS — M54.41 CHRONIC BILATERAL LOW BACK PAIN WITH RIGHT-SIDED SCIATICA: Primary | ICD-10-CM

## 2021-09-22 DIAGNOSIS — G89.29 CHRONIC BILATERAL LOW BACK PAIN WITH RIGHT-SIDED SCIATICA: Primary | ICD-10-CM

## 2021-09-22 PROCEDURE — 97014 ELECTRIC STIMULATION THERAPY: CPT | Mod: PN | Performed by: PHYSICAL THERAPIST

## 2021-09-22 PROCEDURE — 97035 APP MDLTY 1+ULTRASOUND EA 15: CPT | Mod: PN | Performed by: PHYSICAL THERAPIST

## 2021-09-22 PROCEDURE — 97140 MANUAL THERAPY 1/> REGIONS: CPT | Mod: PN | Performed by: PHYSICAL THERAPIST

## 2021-09-26 ENCOUNTER — PATIENT MESSAGE (OUTPATIENT)
Dept: INTERNAL MEDICINE | Facility: CLINIC | Age: 66
End: 2021-09-26

## 2021-09-28 ENCOUNTER — IMMUNIZATION (OUTPATIENT)
Dept: INTERNAL MEDICINE | Facility: CLINIC | Age: 66
End: 2021-09-28
Payer: COMMERCIAL

## 2021-09-28 DIAGNOSIS — Z23 NEED FOR VACCINATION: Primary | ICD-10-CM

## 2021-09-28 PROCEDURE — 0003A COVID-19, MRNA, LNP-S, PF, 30 MCG/0.3 ML DOSE VACCINE: CPT | Mod: PBBFAC | Performed by: INTERNAL MEDICINE

## 2021-09-28 PROCEDURE — 91300 COVID-19, MRNA, LNP-S, PF, 30 MCG/0.3 ML DOSE VACCINE: CPT | Mod: PBBFAC | Performed by: INTERNAL MEDICINE

## 2021-09-30 ENCOUNTER — PATIENT MESSAGE (OUTPATIENT)
Dept: INTERNAL MEDICINE | Facility: CLINIC | Age: 66
End: 2021-09-30

## 2021-10-06 ENCOUNTER — CLINICAL SUPPORT (OUTPATIENT)
Dept: REHABILITATION | Facility: HOSPITAL | Age: 66
End: 2021-10-06
Payer: COMMERCIAL

## 2021-10-06 DIAGNOSIS — M54.41 CHRONIC BILATERAL LOW BACK PAIN WITH RIGHT-SIDED SCIATICA: Primary | ICD-10-CM

## 2021-10-06 DIAGNOSIS — G89.29 CHRONIC BILATERAL LOW BACK PAIN WITH RIGHT-SIDED SCIATICA: Primary | ICD-10-CM

## 2021-10-06 PROCEDURE — 97035 APP MDLTY 1+ULTRASOUND EA 15: CPT | Mod: PN | Performed by: PHYSICAL THERAPIST

## 2021-10-06 PROCEDURE — 97140 MANUAL THERAPY 1/> REGIONS: CPT | Mod: PN | Performed by: PHYSICAL THERAPIST

## 2021-10-06 PROCEDURE — 97014 ELECTRIC STIMULATION THERAPY: CPT | Mod: PN | Performed by: PHYSICAL THERAPIST

## 2021-10-07 ENCOUNTER — CLINICAL SUPPORT (OUTPATIENT)
Dept: URGENT CARE | Facility: CLINIC | Age: 66
End: 2021-10-07
Payer: COMMERCIAL

## 2021-10-07 DIAGNOSIS — Z11.9 SCREENING EXAMINATION FOR UNSPECIFIED INFECTIOUS DISEASE: Primary | ICD-10-CM

## 2021-10-07 LAB
CTP QC/QA: YES
SARS-COV-2 RDRP RESP QL NAA+PROBE: NEGATIVE

## 2021-10-07 PROCEDURE — 99211 PR OFFICE/OUTPT VISIT, EST, LEVL I: ICD-10-PCS | Mod: S$GLB,CS,, | Performed by: FAMILY MEDICINE

## 2021-10-07 PROCEDURE — U0002: ICD-10-PCS | Mod: QW,S$GLB,, | Performed by: FAMILY MEDICINE

## 2021-10-07 PROCEDURE — 99211 OFF/OP EST MAY X REQ PHY/QHP: CPT | Mod: S$GLB,CS,, | Performed by: FAMILY MEDICINE

## 2021-10-07 PROCEDURE — U0002 COVID-19 LAB TEST NON-CDC: HCPCS | Mod: QW,S$GLB,, | Performed by: FAMILY MEDICINE

## 2021-10-15 DIAGNOSIS — Z98.890 HISTORY OF BUNIONECTOMY OF LEFT GREAT TOE: Primary | ICD-10-CM

## 2021-10-18 ENCOUNTER — CLINICAL SUPPORT (OUTPATIENT)
Dept: REHABILITATION | Facility: HOSPITAL | Age: 66
End: 2021-10-18
Payer: COMMERCIAL

## 2021-10-18 DIAGNOSIS — G89.29 CHRONIC BILATERAL LOW BACK PAIN WITH RIGHT-SIDED SCIATICA: Primary | ICD-10-CM

## 2021-10-18 DIAGNOSIS — Z98.890 HISTORY OF BUNIONECTOMY OF LEFT GREAT TOE: ICD-10-CM

## 2021-10-18 DIAGNOSIS — M54.41 CHRONIC BILATERAL LOW BACK PAIN WITH RIGHT-SIDED SCIATICA: Primary | ICD-10-CM

## 2021-10-18 PROCEDURE — 97014 ELECTRIC STIMULATION THERAPY: CPT | Mod: PN | Performed by: PHYSICAL THERAPIST

## 2021-10-18 PROCEDURE — 97140 MANUAL THERAPY 1/> REGIONS: CPT | Mod: PN | Performed by: PHYSICAL THERAPIST

## 2021-10-18 PROCEDURE — 97035 APP MDLTY 1+ULTRASOUND EA 15: CPT | Mod: PN | Performed by: PHYSICAL THERAPIST

## 2021-10-31 ENCOUNTER — CLINICAL SUPPORT (OUTPATIENT)
Dept: URGENT CARE | Facility: CLINIC | Age: 66
End: 2021-10-31
Payer: COMMERCIAL

## 2021-10-31 DIAGNOSIS — Z11.9 SCREENING EXAMINATION FOR UNSPECIFIED INFECTIOUS DISEASE: Primary | ICD-10-CM

## 2021-10-31 LAB
CTP QC/QA: YES
SARS-COV-2 RDRP RESP QL NAA+PROBE: NEGATIVE

## 2021-10-31 PROCEDURE — U0002 COVID-19 LAB TEST NON-CDC: HCPCS | Mod: QW,S$GLB,, | Performed by: PHYSICIAN ASSISTANT

## 2021-10-31 PROCEDURE — 99211 PR OFFICE/OUTPT VISIT, EST, LEVL I: ICD-10-PCS | Mod: S$GLB,CS,, | Performed by: PHYSICIAN ASSISTANT

## 2021-10-31 PROCEDURE — 99211 OFF/OP EST MAY X REQ PHY/QHP: CPT | Mod: S$GLB,CS,, | Performed by: PHYSICIAN ASSISTANT

## 2021-10-31 PROCEDURE — U0002: ICD-10-PCS | Mod: QW,S$GLB,, | Performed by: PHYSICIAN ASSISTANT

## 2021-11-03 ENCOUNTER — CLINICAL SUPPORT (OUTPATIENT)
Dept: REHABILITATION | Facility: HOSPITAL | Age: 66
End: 2021-11-03
Payer: COMMERCIAL

## 2021-11-03 DIAGNOSIS — M54.41 CHRONIC BILATERAL LOW BACK PAIN WITH RIGHT-SIDED SCIATICA: Primary | ICD-10-CM

## 2021-11-03 DIAGNOSIS — G89.29 CHRONIC BILATERAL LOW BACK PAIN WITH RIGHT-SIDED SCIATICA: Primary | ICD-10-CM

## 2021-11-03 PROCEDURE — 97140 MANUAL THERAPY 1/> REGIONS: CPT | Mod: PN | Performed by: PHYSICAL THERAPIST

## 2021-11-03 PROCEDURE — 97014 ELECTRIC STIMULATION THERAPY: CPT | Mod: PN | Performed by: PHYSICAL THERAPIST

## 2021-11-10 ENCOUNTER — CLINICAL SUPPORT (OUTPATIENT)
Dept: REHABILITATION | Facility: HOSPITAL | Age: 66
End: 2021-11-10
Payer: COMMERCIAL

## 2021-11-10 DIAGNOSIS — M54.41 CHRONIC BILATERAL LOW BACK PAIN WITH RIGHT-SIDED SCIATICA: Primary | ICD-10-CM

## 2021-11-10 DIAGNOSIS — G89.29 CHRONIC BILATERAL LOW BACK PAIN WITH RIGHT-SIDED SCIATICA: Primary | ICD-10-CM

## 2021-11-10 PROCEDURE — 97140 MANUAL THERAPY 1/> REGIONS: CPT | Mod: PN | Performed by: PHYSICAL THERAPIST

## 2021-11-10 PROCEDURE — 97014 ELECTRIC STIMULATION THERAPY: CPT | Mod: PN | Performed by: PHYSICAL THERAPIST

## 2021-11-10 PROCEDURE — 97035 APP MDLTY 1+ULTRASOUND EA 15: CPT | Mod: PN | Performed by: PHYSICAL THERAPIST

## 2021-11-18 ENCOUNTER — CLINICAL SUPPORT (OUTPATIENT)
Dept: REHABILITATION | Facility: HOSPITAL | Age: 66
End: 2021-11-18
Payer: COMMERCIAL

## 2021-11-18 DIAGNOSIS — G89.29 CHRONIC BILATERAL LOW BACK PAIN WITH RIGHT-SIDED SCIATICA: Primary | ICD-10-CM

## 2021-11-18 DIAGNOSIS — M54.41 CHRONIC BILATERAL LOW BACK PAIN WITH RIGHT-SIDED SCIATICA: Primary | ICD-10-CM

## 2021-11-18 PROCEDURE — 97014 ELECTRIC STIMULATION THERAPY: CPT | Mod: PN | Performed by: PHYSICAL THERAPIST

## 2021-11-18 PROCEDURE — 97140 MANUAL THERAPY 1/> REGIONS: CPT | Mod: PN | Performed by: PHYSICAL THERAPIST

## 2021-11-18 PROCEDURE — 97035 APP MDLTY 1+ULTRASOUND EA 15: CPT | Mod: PN | Performed by: PHYSICAL THERAPIST

## 2021-11-22 ENCOUNTER — CLINICAL SUPPORT (OUTPATIENT)
Dept: URGENT CARE | Facility: CLINIC | Age: 66
End: 2021-11-22
Payer: COMMERCIAL

## 2021-11-22 DIAGNOSIS — Z11.9 SCREENING EXAMINATION FOR UNSPECIFIED INFECTIOUS DISEASE: Primary | ICD-10-CM

## 2021-11-22 LAB
CTP QC/QA: YES
SARS-COV-2 RDRP RESP QL NAA+PROBE: NEGATIVE

## 2021-11-22 PROCEDURE — 99211 OFF/OP EST MAY X REQ PHY/QHP: CPT | Mod: S$GLB,,, | Performed by: FAMILY MEDICINE

## 2021-11-22 PROCEDURE — U0002: ICD-10-PCS | Mod: QW,S$GLB,, | Performed by: FAMILY MEDICINE

## 2021-11-22 PROCEDURE — U0002 COVID-19 LAB TEST NON-CDC: HCPCS | Mod: QW,S$GLB,, | Performed by: FAMILY MEDICINE

## 2021-11-22 PROCEDURE — 99211 PR OFFICE/OUTPT VISIT, EST, LEVL I: ICD-10-PCS | Mod: S$GLB,,, | Performed by: FAMILY MEDICINE

## 2021-12-02 ENCOUNTER — CLINICAL SUPPORT (OUTPATIENT)
Dept: REHABILITATION | Facility: HOSPITAL | Age: 66
End: 2021-12-02
Payer: COMMERCIAL

## 2021-12-02 DIAGNOSIS — G89.29 CHRONIC BILATERAL LOW BACK PAIN WITH RIGHT-SIDED SCIATICA: Primary | ICD-10-CM

## 2021-12-02 DIAGNOSIS — M54.41 CHRONIC BILATERAL LOW BACK PAIN WITH RIGHT-SIDED SCIATICA: Primary | ICD-10-CM

## 2021-12-02 PROCEDURE — 97140 MANUAL THERAPY 1/> REGIONS: CPT | Mod: PN | Performed by: PHYSICAL THERAPIST

## 2021-12-02 PROCEDURE — 97014 ELECTRIC STIMULATION THERAPY: CPT | Mod: PN | Performed by: PHYSICAL THERAPIST

## 2021-12-02 PROCEDURE — 97035 APP MDLTY 1+ULTRASOUND EA 15: CPT | Mod: PN | Performed by: PHYSICAL THERAPIST

## 2021-12-08 ENCOUNTER — OFFICE VISIT (OUTPATIENT)
Dept: INTERNAL MEDICINE | Facility: CLINIC | Age: 66
End: 2021-12-08
Payer: COMMERCIAL

## 2021-12-08 VITALS
BODY MASS INDEX: 21.47 KG/M2 | HEIGHT: 66 IN | DIASTOLIC BLOOD PRESSURE: 60 MMHG | SYSTOLIC BLOOD PRESSURE: 124 MMHG | OXYGEN SATURATION: 98 % | HEART RATE: 61 BPM

## 2021-12-08 DIAGNOSIS — R51.9 HEADACHE DISORDER: ICD-10-CM

## 2021-12-08 DIAGNOSIS — Z86.19 FREQUENT INFECTIONS: Primary | ICD-10-CM

## 2021-12-08 DIAGNOSIS — M81.0 POSTMENOPAUSAL BONE LOSS: ICD-10-CM

## 2021-12-08 PROCEDURE — 99999 PR PBB SHADOW E&M-EST. PATIENT-LVL V: ICD-10-PCS | Mod: PBBFAC,,, | Performed by: INTERNAL MEDICINE

## 2021-12-08 PROCEDURE — 99999 PR PBB SHADOW E&M-EST. PATIENT-LVL V: CPT | Mod: PBBFAC,,, | Performed by: INTERNAL MEDICINE

## 2021-12-08 PROCEDURE — 99214 OFFICE O/P EST MOD 30 MIN: CPT | Mod: S$GLB,,, | Performed by: INTERNAL MEDICINE

## 2021-12-08 PROCEDURE — 99214 PR OFFICE/OUTPT VISIT, EST, LEVL IV, 30-39 MIN: ICD-10-PCS | Mod: S$GLB,,, | Performed by: INTERNAL MEDICINE

## 2021-12-08 RX ORDER — VITAMIN E 268 MG
400 CAPSULE ORAL DAILY
COMMUNITY
End: 2022-03-16

## 2021-12-09 ENCOUNTER — CLINICAL SUPPORT (OUTPATIENT)
Dept: REHABILITATION | Facility: HOSPITAL | Age: 66
End: 2021-12-09
Payer: COMMERCIAL

## 2021-12-09 DIAGNOSIS — G89.29 CHRONIC BILATERAL LOW BACK PAIN WITH RIGHT-SIDED SCIATICA: Primary | ICD-10-CM

## 2021-12-09 DIAGNOSIS — M54.41 CHRONIC BILATERAL LOW BACK PAIN WITH RIGHT-SIDED SCIATICA: Primary | ICD-10-CM

## 2021-12-09 PROCEDURE — 97110 THERAPEUTIC EXERCISES: CPT | Mod: PN | Performed by: PHYSICAL THERAPIST

## 2021-12-09 PROCEDURE — 97035 APP MDLTY 1+ULTRASOUND EA 15: CPT | Mod: PN | Performed by: PHYSICAL THERAPIST

## 2021-12-09 PROCEDURE — 97140 MANUAL THERAPY 1/> REGIONS: CPT | Mod: PN | Performed by: PHYSICAL THERAPIST

## 2021-12-09 PROCEDURE — 97014 ELECTRIC STIMULATION THERAPY: CPT | Mod: PN | Performed by: PHYSICAL THERAPIST

## 2021-12-21 ENCOUNTER — CLINICAL SUPPORT (OUTPATIENT)
Dept: REHABILITATION | Facility: HOSPITAL | Age: 66
End: 2021-12-21
Payer: COMMERCIAL

## 2021-12-21 DIAGNOSIS — G89.29 CHRONIC BILATERAL LOW BACK PAIN WITH RIGHT-SIDED SCIATICA: Primary | ICD-10-CM

## 2021-12-21 DIAGNOSIS — M54.41 CHRONIC BILATERAL LOW BACK PAIN WITH RIGHT-SIDED SCIATICA: Primary | ICD-10-CM

## 2021-12-21 PROCEDURE — 97140 MANUAL THERAPY 1/> REGIONS: CPT | Mod: PN | Performed by: PHYSICAL THERAPIST

## 2021-12-21 PROCEDURE — 97110 THERAPEUTIC EXERCISES: CPT | Mod: PN | Performed by: PHYSICAL THERAPIST

## 2021-12-21 PROCEDURE — 97035 APP MDLTY 1+ULTRASOUND EA 15: CPT | Mod: PN | Performed by: PHYSICAL THERAPIST

## 2021-12-21 PROCEDURE — 97014 ELECTRIC STIMULATION THERAPY: CPT | Mod: PN | Performed by: PHYSICAL THERAPIST

## 2021-12-27 ENCOUNTER — CLINICAL SUPPORT (OUTPATIENT)
Dept: REHABILITATION | Facility: HOSPITAL | Age: 66
End: 2021-12-27
Payer: COMMERCIAL

## 2021-12-27 DIAGNOSIS — G89.29 CHRONIC BILATERAL LOW BACK PAIN WITH RIGHT-SIDED SCIATICA: Primary | ICD-10-CM

## 2021-12-27 DIAGNOSIS — M54.41 CHRONIC BILATERAL LOW BACK PAIN WITH RIGHT-SIDED SCIATICA: Primary | ICD-10-CM

## 2021-12-27 PROCEDURE — 97014 ELECTRIC STIMULATION THERAPY: CPT | Mod: PN | Performed by: PHYSICAL THERAPIST

## 2021-12-27 PROCEDURE — 97140 MANUAL THERAPY 1/> REGIONS: CPT | Mod: PN | Performed by: PHYSICAL THERAPIST

## 2021-12-27 PROCEDURE — 97035 APP MDLTY 1+ULTRASOUND EA 15: CPT | Mod: PN | Performed by: PHYSICAL THERAPIST

## 2022-01-04 ENCOUNTER — CLINICAL SUPPORT (OUTPATIENT)
Dept: REHABILITATION | Facility: HOSPITAL | Age: 67
End: 2022-01-04
Payer: COMMERCIAL

## 2022-01-04 ENCOUNTER — DOCUMENTATION ONLY (OUTPATIENT)
Dept: REHABILITATION | Facility: HOSPITAL | Age: 67
End: 2022-01-04
Payer: COMMERCIAL

## 2022-01-04 DIAGNOSIS — M54.41 CHRONIC BILATERAL LOW BACK PAIN WITH RIGHT-SIDED SCIATICA: Primary | ICD-10-CM

## 2022-01-04 DIAGNOSIS — G89.29 CHRONIC BILATERAL LOW BACK PAIN WITH RIGHT-SIDED SCIATICA: Primary | ICD-10-CM

## 2022-01-04 PROCEDURE — 97014 ELECTRIC STIMULATION THERAPY: CPT | Mod: PN | Performed by: PHYSICAL THERAPIST

## 2022-01-04 PROCEDURE — 97140 MANUAL THERAPY 1/> REGIONS: CPT | Mod: PN | Performed by: PHYSICAL THERAPIST

## 2022-01-04 PROCEDURE — 97035 APP MDLTY 1+ULTRASOUND EA 15: CPT | Mod: PN | Performed by: PHYSICAL THERAPIST

## 2022-01-04 NOTE — PROGRESS NOTES
Physical Therapy Progress and  Daily Treatment Note      Name: Danuta Santos  Clinic Number: [id number]  Diagnosis:        Encounter Diagnosis   Name Primary?    Chronic bilateral low back pain with right-sided sciatica Yes      Physician: Erica Khan MD  Treatment Orders: PT Eval and Treat       Past Medical History:   Diagnosis Date    Allergy      IBS (irritable bowel syndrome)      Pneumonia 02/2019      Precautions: as per diagnosis    Evaluation date: 2/15/2018  Visit # authorized: 98/106   Authorization period:12-31-21  Plan of care expiration: 2-15-22  MD Follow up appt: 2-7-22     Time In: 10:10 pt late  Time Out: 11:10  Billable time: 40  Min + ES     Subjective      Pt reports: feeling tight and hard to tell if level or not.  Pt states not feeling too bad compared to last visit      Pain Scale: before treatment: 3.5/10 after treatment: 1/10 and level pelvis  Objective   Pt to be starting new referral and will create new episode for the new year and to coordinate with new referral      AR R pelvis  Pt unable to self mobilize today  Severe tightness lumbar paraspinals R and L      Lumbar active range of motion in standing is:  - flexion - toes                     - extension -  75%                         - left side bending -  To knee         - right side bending -  To knee           Lumbar active range of motion in standing is: 11-10-21  - flexion - to knee                     - extension -  25%                         - left side bending -  Knee          - right side bending -  knee           Lumbar active range of motion in standing is: 8-4-21  - flexion - toes                     - extension -  75%                         - left side bending -  knee         - right side bending -  knee             Muscle Strength  MMT R L   Hip flexion 4+/5 4+/5   Hip abduction 4+/5 5-/5   Hip extension 4+/5 4+/5   Glut max 4/5 4/5   Knee extension 5/5 5/5   Knee flexion 5/5 5/5          MMT deferred today  due to acute symptoms on 11-10-21        Therapeutic exercise: Danuta received therapeutic exercises to develop strength, endurance, ROM, flexibility and core stabilization for 15 minutes including:     HS stretch supine   Glut stretch  Piriformis stretch  B QL stretch  Contract relax R glut x 3       Pt was able to perform ex noted above and led to level pelvis.  Pt states improved but still out, feels pelvis will go in dysfunction quickly again         Verbally reviewed and pt is performing HEP   Pelvic tilt  x 10 for 5 sec hold  Bridge x 10, at home 20     Partial sit up x 10, at home 30  SLR x 10, 20 at home   Hip abd sidelying x 10 B, 20 at home   Arm and Leg lift prone x 10, 40 at home   Hip ext prone with bent knee x 10  Corrected for further knee flexion to improve glut recruitment  GSS standing   Contract relax R glut     Walking 45 min      Hold for now   Heel raises standing x 15     Step up x10      1 plank x 30 sec held the other 3 she normally does                (NP)Plank 4 reps 30 sec each      Manual therapy: Danuta  received the following manual therapy  techniques x 15  min. to include soft tissue and joint mobilization were applied to the: low back and gluteals to include:      Performed STM to lower thoracic and LB paraspinals and QL R>L  P/A mob to lumbar region Grade 1-2 , sidelying L contract relax to R QL    Sidelying L contract relax mobilization to L5-S1 region to level pelvis at start and at end of treatment and level pelvis achieved.    Pt received tool-assisted massage with manual therapy techniques to R LB in prone to trigger an inflammatory healing response and stimulate the production of new collagen and proper, more functional, less painful healing.     Vacuum/cupping STM with manual therapy techniques was performed to back and gluteals to decrease muscle tightness, increase circulation and promote healing process.  The pt's skin was monitored for redness adjusting pressure as  "needed. The pt was instructed in possible side effects of bruising and/or soreness.        HOLD per request of pt Kinesiotape with 6" star for pain relief was performed over the R lumbar paraspinals.L3-4 region  Instructed pt in use, care and precautions with tape.     Ultrasound  for pain control and to decrease inflammation @ continuous duty cycle, 1 Mhz, applied to R lumbar paraspinals, intensity = 1.5 w/cm2 for 8 minutes. 2 min prep       Patient received pre-mod electrical stimulation to decrease muscle tightness and pain to Lumbar paraspinals/ sacral border of gluteals B for 15 minutes with MH supine with cycle time: continuous, beat frequency: , CC/CV: CV.       Written Home Exercises Provided: none today  Pt demo good understanding of the education provided. Danuta demonstrated good return demonstration of activities.      Pt. education:  · Posture reeducation, body mechanics, HEP, proper posture in standing  · No spiritual or educational barriers to learning provided  · Pt has no cultural, educational or language barriers to learning provided.     Assessment   Pt has been seen for 4 visits since last progress note.  Pt had some increased stress leading to increased muscle tightness.  Pt with improved symptoms as compared to last visit and able to self mobilize more effectively. Pt continues to develop R lumbar paraspinal tightness that is relieved with manual therapy and modalities that she has been unable to achieve on her own.  Pt feels the tighter she gets the more ineffective self mob becomes.  Pt displays improved strength and ROM as compared to last progress note.     Pt will continue to benefit from skilled outpatient physical therapy to address the remaining functional deficits, provide pt/family education, and to maximize pt's level of independence in the home and community environment. .      GOALS:   Short Term Goals:  3 weeks MET STG's  Increase range of motion 25%  Increase strength " 1/2 muscle grade  Improve postural awareness of pelvis to independently identify dysfunction with min assist from PT  Be able to perform HEP with minimal cueing required     Long Term Goals: 6 weeks PARTIALLY MET LTG'S  Increase range of motion to 75% to 100% full   Improve muscle strength 1 muscle grade  Improve muscle strength with MMT to 4+/5 to 5/5  Improve and stabilize proper pelvic positioning  Restore ability to sit for ADL and work with min to 0 pain  Restore normal sleep habits without disturbances due to pain  Restore ability to perform ADL's and household activities independently with min to 0 pain     Anticipated barriers to physical therapy: none  Pt's spiritual, cultural and educational needs considered and pt agreeable to plan of care and goals         Plan   Continue with established plan of care towards PT goals.          Jacy Escobar, MS, PT

## 2022-01-04 NOTE — PROGRESS NOTES
Physical Therapy Progress and  Daily Treatment Note     Name: Danuta Santos  Clinic Number: [id number]  Diagnosis:   Encounter Diagnosis   Name Primary?    Chronic bilateral low back pain with right-sided sciatica Yes     Physician: Erica Khan MD  Treatment Orders: PT Eval and Treat  Past Medical History:   Diagnosis Date    Allergy     IBS (irritable bowel syndrome)     Pneumonia 02/2019     Precautions: as per diagnosis    Evaluation date: 2/15/2018  Visit # authorized: 1/20  Authorization period:4-1-22  Plan of care expiration: 2-15-22  MD Follow up appt: 2-7-22    Time In: 4:05  Time Out: 5:10   Billable time: 40  Min + ES    Subjective     Pt reports: have not been able to get herself level for 2 days.  Pt states her grades are due and she has been sitting a lot for school.  Pt states she also has not been sleeping well.  Pt states having some increased back pain Pt states having increased radicular pain down R LE also now which she is unable to get relief from     Pain Scale: before treatment: 5/10 after treatment: 1/10 and level pelvis  Objective      AR R pelvis  Pt unable to self mobilize today  Severe tightness lumbar paraspinals R and L     Lumbar active range of motion in standing is: 1-4-22  - flexion - toes                     - extension -  75%                         - left side bending -  To knee         - right side bending -  To knee           Lumbar active range of motion in standing is: 11-10-21  - flexion - to knee                     - extension -  25%                         - left side bending -  Knee          - right side bending -  knee           Lumbar active range of motion in standing is: 8-4-21  - flexion - toes                     - extension -  75%                         - left side bending -  knee         - right side bending -  knee             Muscle Strength 12-20-21  MMT R L   Hip flexion 4+/5 4+/5   Hip abduction 4+/5 5-/5   Hip extension 4+/5 4+/5   Glut max  4/5 4/5   Knee extension 5/5 5/5   Knee flexion 5/5 5/5          MMT deferred today due to acute symptoms on 11-10-21      Therapeutic exercise: Danuta received therapeutic exercises to develop strength, endurance, ROM, flexibility and core stabilization for 5 minutes including:    HS stretch supine   Glut stretch  Piriformis stretch  B QL stretch  Contract relax R glut x 3          Verbally reviewed and pt is performing HEP   Pelvic tilt  x 10 for 5 sec hold  Bridge x 10, at home 20    Partial sit up x 10, at home 30  SLR x 10, 20 at home   Hip abd sidelying x 10 B, 20 at home   Arm and Leg lift prone x 10, 40 at home   Hip ext prone with bent knee x 10  Corrected for further knee flexion to improve glut recruitment  GSS standing   Contract relax R glut    Walking 45 min     Hold for now   Heel raises standing x 15    Step up x10     1 plank x 30 sec held the other 3 she normally does     (NP)Plank 4 reps 30 sec each     Manual therapy: Danuta  received the following manual therapy  techniques x 25  min. to include soft tissue and joint mobilization were applied to the: low back and gluteals to include:     Performed STM to lower thoracic and LB paraspinals and QL R>L  P/A mob to lumbar region Grade 1-2 , sidelying L contract relax to R QL    Sidelying L contract relax mobilization to L5-S1 region to level pelvis at start and at end of treatment and level pelvis achieved.    Pt received tool-assisted massage with manual therapy techniques to R LB in prone to trigger an inflammatory healing response and stimulate the production of new collagen and proper, more functional, less painful healing.    Vacuum/cupping STM with manual therapy techniques was performed to back and gluteals to decrease muscle tightness, increase circulation and promote healing process.  The pt's skin was monitored for redness adjusting pressure as needed. The pt was instructed in possible side effects of bruising and/or soreness.        "HOLD per request of pt Kinesiotape with 6" star for pain relief was performed over the R lumbar paraspinals.L3-4 region  Instructed pt in use, care and precautions with tape.    Ultrasound  for pain control and to decrease inflammation @ continuous duty cycle, 1 Mhz, applied to R lumbar paraspinals, intensity = 1.5 w/cm2 for 8 minutes. 2 min prep      Patient received pre-mod electrical stimulation to decrease muscle tightness and pain to Lumbar paraspinals/ sacral border of gluteals B for 15 minutes with MH supine with cycle time: continuous, beat frequency: , CC/CV: CV.      Written Home Exercises Provided: none today  Pt demo good understanding of the education provided. Danuta demonstrated good return demonstration of activities.     Pt. education:  · Posture reeducation, body mechanics, HEP, proper posture in standing  · No spiritual or educational barriers to learning provided  · Pt has no cultural, educational or language barriers to learning provided.    Assessment   Pt has been busy with school work which involves increased sitting activities resulting in dysfunction and able to self correct.  Pt with level pelvis after treatment and improved symptoms.        Pt will continue to benefit from skilled outpatient physical therapy to address the remaining functional deficits, provide pt/family education, and to maximize pt's level of independence in the home and community environment. .     GOALS:   Short Term Goals:  3 weeks MET STG's  Increase range of motion 25%  Increase strength 1/2 muscle grade  Improve postural awareness of pelvis to independently identify dysfunction with min assist from PT  Be able to perform HEP with minimal cueing required     Long Term Goals: 6 weeks PARTIALLY MET LTG'S  Increase range of motion to 75% to 100% full   Improve muscle strength 1 muscle grade  Improve muscle strength with MMT to 4+/5 to 5/5  Improve and stabilize proper pelvic positioning  Restore ability to " sit for ADL and work with min to 0 pain  Restore normal sleep habits without disturbances due to pain  Restore ability to perform ADL's and household activities independently with min to 0 pain    Anticipated barriers to physical therapy: none  Pt's spiritual, cultural and educational needs considered and pt agreeable to plan of care and goals        Plan   Continue with established plan of care towards PT goals.    Pt to focus on self treatment as much as possible.  Pt to utilize PT when unable to self mobilize     Jacy Escobar, MS, PT          I certify the need for these services furnished under this plan of treatment and while under my care.    ____________________________________ Physician/Referring Practitioner                                Date of Signature

## 2022-01-18 ENCOUNTER — CLINICAL SUPPORT (OUTPATIENT)
Dept: REHABILITATION | Facility: HOSPITAL | Age: 67
End: 2022-01-18
Payer: COMMERCIAL

## 2022-01-18 DIAGNOSIS — M54.41 CHRONIC BILATERAL LOW BACK PAIN WITH RIGHT-SIDED SCIATICA: Primary | ICD-10-CM

## 2022-01-18 DIAGNOSIS — G89.29 CHRONIC BILATERAL LOW BACK PAIN WITH RIGHT-SIDED SCIATICA: Primary | ICD-10-CM

## 2022-01-18 PROCEDURE — 97110 THERAPEUTIC EXERCISES: CPT | Mod: PN | Performed by: PHYSICAL THERAPIST

## 2022-01-18 PROCEDURE — 97140 MANUAL THERAPY 1/> REGIONS: CPT | Mod: PN | Performed by: PHYSICAL THERAPIST

## 2022-01-18 PROCEDURE — 97035 APP MDLTY 1+ULTRASOUND EA 15: CPT | Mod: PN | Performed by: PHYSICAL THERAPIST

## 2022-01-18 PROCEDURE — 97014 ELECTRIC STIMULATION THERAPY: CPT | Mod: PN | Performed by: PHYSICAL THERAPIST

## 2022-01-18 NOTE — PROGRESS NOTES
Physical Therapy  Daily Treatment Note     Name: Danuta Santos  Clinic Number: [id number]  Diagnosis:   Encounter Diagnosis   Name Primary?    Chronic bilateral low back pain with right-sided sciatica Yes     Physician: Erica Khan MD  Treatment Orders: PT Eval and Treat  Past Medical History:   Diagnosis Date    Allergy     IBS (irritable bowel syndrome)     Pneumonia 02/2019     Precautions: as per diagnosis    Evaluation date: 2/15/2018  Visit # authorized: 2/20  Authorization period:4-1-22  Plan of care expiration: 2-15-22  MD Follow up appt: 2-7-22    Time In: 4:12 pt late  Time Out: 5:22  Billable time: 50  Min + ES    Subjective     Pt reports: have not been able to get herself level for 2 days.  Pt states her grades are due and she has been sitting a lot for school.  Pt states she also has not been sleeping well.  Pt states having some increased back pain Pt states having increased radicular pain down R LE also now which she is unable to get relief from     Pain Scale: before treatment: 4/10 after treatment: 1/10 and level pelvis  Objective     Level pelvis to start      Therapeutic exercise: Danuta received therapeutic exercises to develop strength, endurance, ROM, flexibility and core stabilization for 10 minutes including:    Discussed pt therex program.  Pt is doing ex TIW, and verbally reviewed HEP.  Discussed consideration of doing at least QL stretch daily and will consider adding theracane more often to area of tightness. Pt continues with routine for unlevel pelvis as needed.      HS stretch supine   Glut stretch  Piriformis stretch  B QL stretch  Contract relax R glut x 3          Verbally reviewed and pt is performing HEP   Pelvic tilt  x 10 for 5 sec hold  Bridge x 10, at home 20    Partial sit up x 10, at home 30  SLR x 10, 20 at home   Hip abd sidelying x 10 B, 20 at home   Arm and Leg lift prone x 10, 40 at home   Hip ext prone with bent knee x 10  Corrected for further knee  "flexion to improve glut recruitment  GSS standing   Contract relax R glut    Pt has resumed front planks and will start to add side planks    Walking 45 min     Manual therapy: Danuta  received the following manual therapy  techniques x 30  min. to include soft tissue and joint mobilization were applied to the: low back and gluteals to include:     Performed STM to lower thoracic and LB paraspinals and QL R>L  P/A mob to lumbar region Grade 1-2 , sidelying L contract relax to R QL    Sidelying L contract relax mobilization to L5-S1 region to level pelvis at start and at end of treatment and level pelvis achieved.    Pt received tool-assisted massage with manual therapy techniques to R LB in prone to trigger an inflammatory healing response and stimulate the production of new collagen and proper, more functional, less painful healing.    Vacuum/cupping STM with manual therapy techniques was performed to back and gluteals to decrease muscle tightness, increase circulation and promote healing process.  The pt's skin was monitored for redness adjusting pressure as needed. The pt was instructed in possible side effects of bruising and/or soreness.       HOLD per request of pt Kinesiotape with 6" star for pain relief was performed over the R lumbar paraspinals.L3-4 region  Instructed pt in use, care and precautions with tape.    Ultrasound  for pain control and to decrease inflammation @ continuous duty cycle, 1 Mhz, applied to R lumbar paraspinals, intensity = 1.5 w/cm2 for 8 minutes. 2 min prep      Patient received pre-mod electrical stimulation to decrease muscle tightness and pain to Lumbar paraspinals/ sacral border of gluteals B for 15 minutes with MH supine with cycle time: continuous, beat frequency: , CC/CV: CV.      Written Home Exercises Provided: none today  Pt demo good understanding of the education provided. Danuta demonstrated good return demonstration of activities.     Pt. " education:  · Posture reeducation, body mechanics, HEP, proper posture in standing  · No spiritual or educational barriers to learning provided  · Pt has no cultural, educational or language barriers to learning provided.    Assessment   Pt came in to session with level pelvis, but pain 4/10, so has been able to maintain a level pelvis but still with some tightness.  Reviewed HEP verbally after treatment and discussed need to consider extra stretching and pt has theracane to use for self STM.  Pt is now single, so no one at home to do cups for her.  Pt may benefit from extra stretching and self STM to better maintain tissue mobility on her own.         Pt will continue to benefit from skilled outpatient physical therapy to address the remaining functional deficits, provide pt/family education, and to maximize pt's level of independence in the home and community environment. .     GOALS:   Short Term Goals:  3 weeks MET STG's  Increase range of motion 25%  Increase strength 1/2 muscle grade  Improve postural awareness of pelvis to independently identify dysfunction with min assist from PT  Be able to perform HEP with minimal cueing required     Long Term Goals: 6 weeks PARTIALLY MET LTG'S  Increase range of motion to 75% to 100% full   Improve muscle strength 1 muscle grade  Improve muscle strength with MMT to 4+/5 to 5/5  Improve and stabilize proper pelvic positioning  Restore ability to sit for ADL and work with min to 0 pain  Restore normal sleep habits without disturbances due to pain  Restore ability to perform ADL's and household activities independently with min to 0 pain    Anticipated barriers to physical therapy: none  Pt's spiritual, cultural and educational needs considered and pt agreeable to plan of care and goals        Plan   Continue with established plan of care towards PT goals.    Pt to focus on self treatment as much as possible.  Pt to utilize PT when unable to self mobilize  Assess response to  additional stretching.    Jacy Escobar, MS, PT

## 2022-02-07 ENCOUNTER — OFFICE VISIT (OUTPATIENT)
Dept: INTERNAL MEDICINE | Facility: CLINIC | Age: 67
End: 2022-02-07
Payer: COMMERCIAL

## 2022-02-07 ENCOUNTER — OFFICE VISIT (OUTPATIENT)
Dept: ALLERGY | Facility: CLINIC | Age: 67
End: 2022-02-07
Payer: COMMERCIAL

## 2022-02-07 VITALS — WEIGHT: 134.94 LBS | HEIGHT: 66 IN | BODY MASS INDEX: 21.69 KG/M2

## 2022-02-07 DIAGNOSIS — J32.9 RECURRENT SINUSITIS: ICD-10-CM

## 2022-02-07 DIAGNOSIS — Z00.00 PREVENTATIVE HEALTH CARE: Primary | ICD-10-CM

## 2022-02-07 DIAGNOSIS — Z78.0 ASYMPTOMATIC MENOPAUSAL STATE: ICD-10-CM

## 2022-02-07 DIAGNOSIS — Z01.419 WELL WOMAN EXAM: ICD-10-CM

## 2022-02-07 PROCEDURE — 99397 PER PM REEVAL EST PAT 65+ YR: CPT | Mod: S$GLB,,, | Performed by: INTERNAL MEDICINE

## 2022-02-07 PROCEDURE — 1125F AMNT PAIN NOTED PAIN PRSNT: CPT | Mod: CPTII,S$GLB,, | Performed by: ALLERGY & IMMUNOLOGY

## 2022-02-07 PROCEDURE — 3079F PR MOST RECENT DIASTOLIC BLOOD PRESSURE 80-89 MM HG: ICD-10-PCS | Mod: CPTII,S$GLB,, | Performed by: INTERNAL MEDICINE

## 2022-02-07 PROCEDURE — 3008F PR BODY MASS INDEX (BMI) DOCUMENTED: ICD-10-PCS | Mod: CPTII,S$GLB,, | Performed by: ALLERGY & IMMUNOLOGY

## 2022-02-07 PROCEDURE — 3288F PR FALLS RISK ASSESSMENT DOCUMENTED: ICD-10-PCS | Mod: CPTII,S$GLB,, | Performed by: INTERNAL MEDICINE

## 2022-02-07 PROCEDURE — 3008F BODY MASS INDEX DOCD: CPT | Mod: CPTII,S$GLB,, | Performed by: ALLERGY & IMMUNOLOGY

## 2022-02-07 PROCEDURE — 1159F PR MEDICATION LIST DOCUMENTED IN MEDICAL RECORD: ICD-10-PCS | Mod: CPTII,S$GLB,, | Performed by: ALLERGY & IMMUNOLOGY

## 2022-02-07 PROCEDURE — 3079F DIAST BP 80-89 MM HG: CPT | Mod: CPTII,S$GLB,, | Performed by: INTERNAL MEDICINE

## 2022-02-07 PROCEDURE — 1126F PR PAIN SEVERITY QUANTIFIED, NO PAIN PRESENT: ICD-10-PCS | Mod: CPTII,S$GLB,, | Performed by: INTERNAL MEDICINE

## 2022-02-07 PROCEDURE — 99999 PR PBB SHADOW E&M-EST. PATIENT-LVL III: ICD-10-PCS | Mod: PBBFAC,,, | Performed by: ALLERGY & IMMUNOLOGY

## 2022-02-07 PROCEDURE — 1101F PT FALLS ASSESS-DOCD LE1/YR: CPT | Mod: CPTII,S$GLB,, | Performed by: INTERNAL MEDICINE

## 2022-02-07 PROCEDURE — 99999 PR PBB SHADOW E&M-EST. PATIENT-LVL V: ICD-10-PCS | Mod: PBBFAC,,, | Performed by: INTERNAL MEDICINE

## 2022-02-07 PROCEDURE — 1125F PR PAIN SEVERITY QUANTIFIED, PAIN PRESENT: ICD-10-PCS | Mod: CPTII,S$GLB,, | Performed by: ALLERGY & IMMUNOLOGY

## 2022-02-07 PROCEDURE — 99397 PR PREVENTIVE VISIT,EST,65 & OVER: ICD-10-PCS | Mod: S$GLB,,, | Performed by: INTERNAL MEDICINE

## 2022-02-07 PROCEDURE — 3008F PR BODY MASS INDEX (BMI) DOCUMENTED: ICD-10-PCS | Mod: CPTII,S$GLB,, | Performed by: INTERNAL MEDICINE

## 2022-02-07 PROCEDURE — 1159F MED LIST DOCD IN RCRD: CPT | Mod: CPTII,S$GLB,, | Performed by: ALLERGY & IMMUNOLOGY

## 2022-02-07 PROCEDURE — 1126F AMNT PAIN NOTED NONE PRSNT: CPT | Mod: CPTII,S$GLB,, | Performed by: INTERNAL MEDICINE

## 2022-02-07 PROCEDURE — 3008F BODY MASS INDEX DOCD: CPT | Mod: CPTII,S$GLB,, | Performed by: INTERNAL MEDICINE

## 2022-02-07 PROCEDURE — 3288F FALL RISK ASSESSMENT DOCD: CPT | Mod: CPTII,S$GLB,, | Performed by: INTERNAL MEDICINE

## 2022-02-07 PROCEDURE — 99204 OFFICE O/P NEW MOD 45 MIN: CPT | Mod: S$GLB,,, | Performed by: ALLERGY & IMMUNOLOGY

## 2022-02-07 PROCEDURE — 3075F PR MOST RECENT SYSTOLIC BLOOD PRESS GE 130-139MM HG: ICD-10-PCS | Mod: CPTII,S$GLB,, | Performed by: INTERNAL MEDICINE

## 2022-02-07 PROCEDURE — 99999 PR PBB SHADOW E&M-EST. PATIENT-LVL III: CPT | Mod: PBBFAC,,, | Performed by: ALLERGY & IMMUNOLOGY

## 2022-02-07 PROCEDURE — 1101F PR PT FALLS ASSESS DOC 0-1 FALLS W/OUT INJ PAST YR: ICD-10-PCS | Mod: CPTII,S$GLB,, | Performed by: INTERNAL MEDICINE

## 2022-02-07 PROCEDURE — 99999 PR PBB SHADOW E&M-EST. PATIENT-LVL V: CPT | Mod: PBBFAC,,, | Performed by: INTERNAL MEDICINE

## 2022-02-07 PROCEDURE — 99204 PR OFFICE/OUTPT VISIT, NEW, LEVL IV, 45-59 MIN: ICD-10-PCS | Mod: S$GLB,,, | Performed by: ALLERGY & IMMUNOLOGY

## 2022-02-07 PROCEDURE — 3075F SYST BP GE 130 - 139MM HG: CPT | Mod: CPTII,S$GLB,, | Performed by: INTERNAL MEDICINE

## 2022-02-07 RX ORDER — POLYETHYLENE GLYCOL 3350 17 G/17G
POWDER, FOR SOLUTION ORAL
COMMUNITY

## 2022-02-07 NOTE — PROGRESS NOTES
Physical Therapy  Daily Treatment Note     Name: Danuta Santos  Clinic Number: [id number]  Diagnosis:   Encounter Diagnosis   Name Primary?    Chronic bilateral low back pain with right-sided sciatica Yes     Physician: Erica Khan MD  Treatment Orders: PT Eval and Treat  Past Medical History:   Diagnosis Date    Allergy     IBS (irritable bowel syndrome)     Pneumonia 02/2019     Precautions: as per diagnosis    Evaluation date: 2/15/2018  Visit # authorized: 3/20  Authorization period:4-1-22  Plan of care expiration: 2-15-22  MD Follow up appt: 2-7-22    Time In: 3:05  Time Out: 4:00  Billable time: 20  Min + ES    Subjective     Pt reports: back has been ok and this past weekend was unable to self mobilize.  Pt states she had migraine and had to keep working but did rest in PM and after discussion revealed she tends to sit slouched on sofa.     Pain Scale: before treatment: 5/10 after treatment: 1/10 and level pelvis  Objective     Level pelvis to start      Therapeutic exercise: Danuta received therapeutic exercises to develop strength, endurance, ROM, flexibility and core stabilization for 2 minutes including:    Instructed pt in need to maintain proper posture with sitting and pt reported she may just lie down to watch TV on sofa    HS stretch supine   Glut stretch  Piriformis stretch  B QL stretch  Contract relax R glut x 3      Verbally reviewed and pt is performing HEP   Pelvic tilt  x 10 for 5 sec hold  Bridge x 10, at home 20    Partial sit up x 10, at home 30  SLR x 10, 20 at home   Hip abd sidelying x 10 B, 20 at home   Arm and Leg lift prone x 10, 40 at home   Hip ext prone with bent knee x 10  Corrected for further knee flexion to improve glut recruitment  GSS standing   Contract relax R glut    Pt has resumed front planks and will start to add side planks    Walking 45 min     Manual therapy: Danuta  received the following manual therapy  techniques x 8  min. to include soft  "tissue and joint mobilization were applied to the: low back and gluteals to include:     Performed STM to lower thoracic and LB paraspinals and QL R>L  P/A mob to lumbar region Grade 1-2 , sidelying L contract relax to R QL    Sidelying L contract relax mobilization to L5-S1 region to level pelvis at start and at end of treatment and level pelvis achieved.    (NP)Pt received tool-assisted massage with manual therapy techniques to R LB in prone to trigger an inflammatory healing response and stimulate the production of new collagen and proper, more functional, less painful healing.    (NP)Vacuum/cupping STM with manual therapy techniques was performed to back and gluteals to decrease muscle tightness, increase circulation and promote healing process.  The pt's skin was monitored for redness adjusting pressure as needed. The pt was instructed in possible side effects of bruising and/or soreness.       HOLD per request of pt Kinesiotape with 6" star for pain relief was performed over the R lumbar paraspinals.L3-4 region  Instructed pt in use, care and precautions with tape.    Ultrasound  for pain control and to decrease inflammation @ continuous duty cycle, 1 Mhz, applied to R lumbar paraspinals, intensity = 1.5 w/cm2 for 8 minutes. 2 min prep      Patient received pre-mod electrical stimulation to decrease muscle tightness and pain to Lumbar paraspinals/ sacral border of gluteals B for 15 minutes with MH supine with cycle time: continuous, beat frequency: , CC/CV: CV.      Written Home Exercises Provided: none today  Pt demo good understanding of the education provided. Danuta demonstrated good return demonstration of activities.     Pt. education:  · Posture reeducation, body mechanics, HEP, proper posture in standing  · No spiritual or educational barriers to learning provided  · Pt has no cultural, educational or language barriers to learning provided.    Assessment   Pt unable to self mobilize.  Pt with " some tightness L and R lumbar paraspinals today with R being tighter than normal.  Pt appears to understand need to sit with proper support to avoid strain on back or pelvic dysfunction  Pt scott treatment well with decreased pain after Rx     Pt will continue to benefit from skilled outpatient physical therapy to address the remaining functional deficits, provide pt/family education, and to maximize pt's level of independence in the home and community environment. .     GOALS:   Short Term Goals:  3 weeks MET STG's  Increase range of motion 25%  Increase strength 1/2 muscle grade  Improve postural awareness of pelvis to independently identify dysfunction with min assist from PT  Be able to perform HEP with minimal cueing required     Long Term Goals: 6 weeks PARTIALLY MET LTG'S  Increase range of motion to 75% to 100% full   Improve muscle strength 1 muscle grade  Improve muscle strength with MMT to 4+/5 to 5/5  Improve and stabilize proper pelvic positioning  Restore ability to sit for ADL and work with min to 0 pain  Restore normal sleep habits without disturbances due to pain  Restore ability to perform ADL's and household activities independently with min to 0 pain    Anticipated barriers to physical therapy: none  Pt's spiritual, cultural and educational needs considered and pt agreeable to plan of care and goals        Plan   Continue with established plan of care towards PT goals.    Pt to focus on self treatment as much as possible.  Pt to utilize PT when unable to self mobilize  Assess response to additional stretching.    Jacy Escobar, MS, PT

## 2022-02-07 NOTE — PROGRESS NOTES
Subjective:       Patient ID: Danuta Santos is a 66 y.o. female.    Chief Complaint:  Sinus Problem  referred by Dr. Khan for concern of frequent infections.    HPI    Pt presents w hx recurrent sinusitis. Reports hx of about 2 episodes suspected bacterial sinusitis per year for several year. Infections seem sudden in onset, usu presesnt w fever, fatigue. Notes improvement w abx.  Had turbinate reduction and balloon sinuplasty in  w Dr. Tran. This seemed helpful.   She recalls pneumonia in  treated as outpt.  No OM as adult. No recurrent skin infections.  In  had deviated septum repair  Hx rhinitis medicamentosa. 25 years. Stopped nasal decongestants 2018 w help of xhance thearpy.  Does have hx recurrent HA that she can distinguish as different than her sinus infections.    Had prevnar and pneumovax in     Environmental History: Pets in the home: none.  [place]: area rugs and hardwood floors  Tobacco Smoke in Home: no    Past Medical History:   Diagnosis Date    Allergy     IBS (irritable bowel syndrome)     Pneumonia 2019    Recurrent upper respiratory infection (URI)        Family History   Problem Relation Age of Onset    Cancer Father     Stroke Maternal Grandfather     Breast cancer Neg Hx     Colon cancer Neg Hx     Diabetes Neg Hx     Eclampsia Neg Hx     Hypertension Neg Hx     Miscarriages / Stillbirths Neg Hx     Ovarian cancer Neg Hx      labor Neg Hx     Allergic rhinitis Neg Hx     Allergies Neg Hx     Angioedema Neg Hx     Asthma Neg Hx     Eczema Neg Hx     Immunodeficiency Neg Hx     Rhinitis Neg Hx     Urticaria Neg Hx     Atopy Neg Hx          Review of Systems   Constitutional: Negative for activity change, fatigue and fever.   HENT: Negative for congestion, postnasal drip, rhinorrhea, sinus pressure and sneezing.    Eyes: Negative for discharge, redness and itching.   Respiratory: Negative for cough, shortness of breath and wheezing.     Cardiovascular: Negative for chest pain.   Gastrointestinal: Negative for diarrhea, nausea and vomiting.   Genitourinary: Negative for dysuria.   Musculoskeletal: Negative for arthralgias and joint swelling.   Skin: Negative for rash.   Neurological: Positive for headaches.   Hematological: Does not bruise/bleed easily.   Psychiatric/Behavioral: Negative for behavioral problems and sleep disturbance.        Objective:   Physical Exam  Vitals and nursing note reviewed.   Constitutional:       General: She is not in acute distress.     Appearance: She is well-developed and well-nourished.   HENT:      Head: Normocephalic.      Right Ear: Tympanic membrane and external ear normal.      Left Ear: Tympanic membrane and external ear normal.      Nose: No septal deviation, sinus tenderness, mucosal edema or rhinorrhea.      Right Sinus: No maxillary sinus tenderness or frontal sinus tenderness.      Left Sinus: No maxillary sinus tenderness or frontal sinus tenderness.      Mouth/Throat:      Mouth: Oropharynx is clear and moist and mucous membranes are normal.      Pharynx: Uvula midline. No uvula swelling.   Eyes:      General:         Right eye: No discharge.         Left eye: No discharge.      Conjunctiva/sclera: Conjunctivae normal.   Cardiovascular:      Rate and Rhythm: Normal rate and regular rhythm.   Pulmonary:      Effort: Pulmonary effort is normal. No respiratory distress.      Breath sounds: Normal breath sounds. No wheezing.   Abdominal:      General: Bowel sounds are normal.      Palpations: Abdomen is soft.      Tenderness: There is no abdominal tenderness.   Musculoskeletal:         General: No tenderness or edema. Normal range of motion.      Cervical back: Normal range of motion and neck supple.   Lymphadenopathy:      Cervical: No cervical adenopathy.   Skin:     General: Skin is warm.      Findings: No erythema or rash.   Neurological:      Mental Status: She is alert and oriented to person, place,  and time.   Psychiatric:         Mood and Affect: Mood and affect normal.         Behavior: Behavior normal.         Thought Content: Thought content normal.         Judgment: Judgment normal.           Assessment:       1. Recurrent sinusitis         Plan:       Danuta was seen today for sinus problem.    Diagnoses and all orders for this visit:    Recurrent sinusitis  -     Immunoglobulins (IgG, IgA, IgM) Quantitative; Future  -     S.pneumoniae IgG Serotypes; Future  Fu pending results

## 2022-02-08 ENCOUNTER — CLINICAL SUPPORT (OUTPATIENT)
Dept: REHABILITATION | Facility: HOSPITAL | Age: 67
End: 2022-02-08
Payer: COMMERCIAL

## 2022-02-08 DIAGNOSIS — G89.29 CHRONIC BILATERAL LOW BACK PAIN WITH RIGHT-SIDED SCIATICA: Primary | ICD-10-CM

## 2022-02-08 DIAGNOSIS — M54.41 CHRONIC BILATERAL LOW BACK PAIN WITH RIGHT-SIDED SCIATICA: Primary | ICD-10-CM

## 2022-02-08 PROCEDURE — 97140 MANUAL THERAPY 1/> REGIONS: CPT | Mod: PN | Performed by: PHYSICAL THERAPIST

## 2022-02-08 PROCEDURE — 97014 ELECTRIC STIMULATION THERAPY: CPT | Mod: PN | Performed by: PHYSICAL THERAPIST

## 2022-02-08 PROCEDURE — 97035 APP MDLTY 1+ULTRASOUND EA 15: CPT | Mod: PN | Performed by: PHYSICAL THERAPIST

## 2022-02-10 VITALS
SYSTOLIC BLOOD PRESSURE: 132 MMHG | OXYGEN SATURATION: 99 % | HEART RATE: 62 BPM | BODY MASS INDEX: 21.69 KG/M2 | DIASTOLIC BLOOD PRESSURE: 80 MMHG | HEIGHT: 66 IN | TEMPERATURE: 99 F | WEIGHT: 135 LBS

## 2022-02-10 NOTE — PROGRESS NOTES
Subjective:       Patient ID: Danuta Santos is a 66 y.o. female.    Chief Complaint: Annual Exam    HPI  She is here for annual exam.  Currently without complaint    Past medical history:  Reactive airway disease ,neutropenia, irritable bowel syndrome, status post .  She had a colonoscopy 2020    Medications:  See med list    Allergies:  See allergy list      Review of Systems   Constitutional: Negative for chills, fatigue, fever and unexpected weight change.   Respiratory: Negative for chest tightness and shortness of breath.    Cardiovascular: Negative for chest pain and palpitations.   Gastrointestinal: Negative for abdominal pain and blood in stool.   Neurological: Negative for dizziness, syncope, numbness and headaches.       Objective:      Physical Exam  HENT:      Right Ear: External ear normal.      Left Ear: External ear normal.      Nose: Nose normal.      Mouth/Throat:      Mouth: Mucous membranes are moist.      Pharynx: Oropharynx is clear.   Eyes:      Pupils: Pupils are equal, round, and reactive to light.   Cardiovascular:      Rate and Rhythm: Normal rate and regular rhythm.      Heart sounds: No murmur heard.      Pulmonary:      Breath sounds: Normal breath sounds.   Chest:   Breasts:      Right: No axillary adenopathy.      Left: No axillary adenopathy.       Abdominal:      General: There is no distension.      Palpations: There is no hepatomegaly or splenomegaly.      Tenderness: There is no abdominal tenderness.   Musculoskeletal:      Cervical back: Normal range of motion.   Lymphadenopathy:      Cervical: No cervical adenopathy.      Upper Body:      Right upper body: No axillary adenopathy.      Left upper body: No axillary adenopathy.   Neurological:      Cranial Nerves: No cranial nerve deficit.      Sensory: No sensory deficit.      Motor: Motor function is intact.      Deep Tendon Reflexes: Reflexes are normal and symmetric.         Assessment/Plan       Assessment and  plan:  Annual exam.  Check CMP, lipid panel, CBC, TSH.  Schedule bone density

## 2022-02-12 ENCOUNTER — LAB VISIT (OUTPATIENT)
Dept: LAB | Facility: HOSPITAL | Age: 67
End: 2022-02-12
Attending: INTERNAL MEDICINE
Payer: COMMERCIAL

## 2022-02-12 DIAGNOSIS — J32.9 RECURRENT SINUSITIS: ICD-10-CM

## 2022-02-12 DIAGNOSIS — Z00.00 PREVENTATIVE HEALTH CARE: ICD-10-CM

## 2022-02-12 LAB
ALBUMIN SERPL BCP-MCNC: 4.3 G/DL (ref 3.5–5.2)
ALP SERPL-CCNC: 62 U/L (ref 55–135)
ALT SERPL W/O P-5'-P-CCNC: 16 U/L (ref 10–44)
ANION GAP SERPL CALC-SCNC: 9 MMOL/L (ref 8–16)
AST SERPL-CCNC: 22 U/L (ref 10–40)
BASOPHILS # BLD AUTO: 0.04 K/UL (ref 0–0.2)
BASOPHILS NFR BLD: 1 % (ref 0–1.9)
BILIRUB SERPL-MCNC: 0.8 MG/DL (ref 0.1–1)
BUN SERPL-MCNC: 18 MG/DL (ref 8–23)
CALCIUM SERPL-MCNC: 10.5 MG/DL (ref 8.7–10.5)
CHLORIDE SERPL-SCNC: 100 MMOL/L (ref 95–110)
CHOLEST SERPL-MCNC: 194 MG/DL (ref 120–199)
CHOLEST/HDLC SERPL: 2.1 {RATIO} (ref 2–5)
CO2 SERPL-SCNC: 28 MMOL/L (ref 23–29)
CREAT SERPL-MCNC: 0.7 MG/DL (ref 0.5–1.4)
DIFFERENTIAL METHOD: ABNORMAL
EOSINOPHIL # BLD AUTO: 0 K/UL (ref 0–0.5)
EOSINOPHIL NFR BLD: 1 % (ref 0–8)
ERYTHROCYTE [DISTWIDTH] IN BLOOD BY AUTOMATED COUNT: 12.7 % (ref 11.5–14.5)
EST. GFR  (AFRICAN AMERICAN): >60 ML/MIN/1.73 M^2
EST. GFR  (NON AFRICAN AMERICAN): >60 ML/MIN/1.73 M^2
GLUCOSE SERPL-MCNC: 88 MG/DL (ref 70–110)
HCT VFR BLD AUTO: 41.9 % (ref 37–48.5)
HDLC SERPL-MCNC: 94 MG/DL (ref 40–75)
HDLC SERPL: 48.5 % (ref 20–50)
HGB BLD-MCNC: 13.1 G/DL (ref 12–16)
IGA SERPL-MCNC: 114 MG/DL (ref 40–350)
IGG SERPL-MCNC: 1006 MG/DL (ref 650–1600)
IGM SERPL-MCNC: 83 MG/DL (ref 50–300)
IMM GRANULOCYTES # BLD AUTO: 0 K/UL (ref 0–0.04)
IMM GRANULOCYTES NFR BLD AUTO: 0 % (ref 0–0.5)
LDLC SERPL CALC-MCNC: 92.8 MG/DL (ref 63–159)
LYMPHOCYTES # BLD AUTO: 1.2 K/UL (ref 1–4.8)
LYMPHOCYTES NFR BLD: 31.5 % (ref 18–48)
MCH RBC QN AUTO: 29.4 PG (ref 27–31)
MCHC RBC AUTO-ENTMCNC: 31.3 G/DL (ref 32–36)
MCV RBC AUTO: 94 FL (ref 82–98)
MONOCYTES # BLD AUTO: 0.3 K/UL (ref 0.3–1)
MONOCYTES NFR BLD: 8.7 % (ref 4–15)
NEUTROPHILS # BLD AUTO: 2.3 K/UL (ref 1.8–7.7)
NEUTROPHILS NFR BLD: 57.8 % (ref 38–73)
NONHDLC SERPL-MCNC: 100 MG/DL
NRBC BLD-RTO: 0 /100 WBC
PLATELET # BLD AUTO: 185 K/UL (ref 150–450)
PMV BLD AUTO: 12 FL (ref 9.2–12.9)
POTASSIUM SERPL-SCNC: 4.4 MMOL/L (ref 3.5–5.1)
PROT SERPL-MCNC: 7.4 G/DL (ref 6–8.4)
RBC # BLD AUTO: 4.46 M/UL (ref 4–5.4)
SODIUM SERPL-SCNC: 137 MMOL/L (ref 136–145)
TRIGL SERPL-MCNC: 36 MG/DL (ref 30–150)
TSH SERPL DL<=0.005 MIU/L-ACNC: 1.59 UIU/ML (ref 0.4–4)
WBC # BLD AUTO: 3.9 K/UL (ref 3.9–12.7)

## 2022-02-12 PROCEDURE — 84443 ASSAY THYROID STIM HORMONE: CPT | Performed by: INTERNAL MEDICINE

## 2022-02-12 PROCEDURE — 80053 COMPREHEN METABOLIC PANEL: CPT | Performed by: INTERNAL MEDICINE

## 2022-02-12 PROCEDURE — 80061 LIPID PANEL: CPT | Performed by: INTERNAL MEDICINE

## 2022-02-12 PROCEDURE — 86317 IMMUNOASSAY INFECTIOUS AGENT: CPT | Mod: 59 | Performed by: ALLERGY & IMMUNOLOGY

## 2022-02-12 PROCEDURE — 82784 ASSAY IGA/IGD/IGG/IGM EACH: CPT | Performed by: ALLERGY & IMMUNOLOGY

## 2022-02-12 PROCEDURE — 85025 COMPLETE CBC W/AUTO DIFF WBC: CPT | Performed by: INTERNAL MEDICINE

## 2022-02-14 NOTE — PROGRESS NOTES
Physical Therapy Progress and Daily Treatment Note     Name: Danuta Santos  Clinic Number: [id number]  Diagnosis:   Encounter Diagnosis   Name Primary?    Chronic bilateral low back pain with right-sided sciatica Yes     Physician: Natacha Velazco MD  Treatment Orders: PT Eval and Treat  Past Medical History:   Diagnosis Date    Allergy     IBS (irritable bowel syndrome)     Pneumonia 02/2019     Precautions: as per diagnosis    Evaluation date: 2/15/2018  Visit # authorized: 4/20  Authorization period:4-1-22  Plan of care expiration: 2-15-22  MD Follow up appt: none scheduled    Time In: 2:05  Time Out: 3:35  Billable time: 70  Min + ES    Subjective     Pt reports: she had gone to storage unit and her back went out.  Pt states unable to get her back level.  Pt states prior to storage unit back felt tight, but ok.  Pt states she has not been doing modalities at home. Pt states she stretches almost every day.   Pt states she is working on proper posture in standing and sitting    Pain Scale: before treatment: 5/10 after treatment: 1/10 and level pelvis  Objective     AR R pelvis  Pt able to self mobilize today, but continue with tightness and some pain  Mod-severe tightness lumbar paraspinals R and L was severe at last session     Lumbar active range of motion in standing is: 2-15-22  - flexion - toes                     - extension -  75%                         - left side bending -  To knee         - right side bending -  To knee             Lumbar active range of motion in standing is: 12-21-21  - flexion - toes                     - extension -  75%                         - left side bending -  To knee         - right side bending -  To knee      Muscle Strength 2-15-22  MMT R L   Hip flexion 4+/5 4+/5   Hip abduction 5-/5 5-/5   Hip extension 5-/5 5-/5   Glut max 4+/5 4+/5   Knee extension 5/5 5/5   Knee flexion 5/5 5/5           Muscle Strength 12-21-21  MMT R L   Hip flexion 4+/5 4+/5   Hip  "abduction 4+/5 5-/5   Hip extension 4+/5 4+/5   Glut max 4/5 4/5   Knee extension 5/5 5/5   Knee flexion 5/5 5/5           Therapeutic exercise: Danuta received therapeutic exercises to develop strength, endurance, ROM, flexibility and core stabilization for 35 minutes including:        HS stretch supine   Glut stretch  Piriformis stretch  B QL stretch  Contract relax R glut x 3       Pelvic tilt with march  x 10  Bridge x 10, at home 20    Partial sit up x 10, at home 50  SLR x 10, 20 at home   Iron cross x 2   Trunk rotation supine x 10  Hip abd sidelying x 10 B, 20 at home   Arm and Leg lift prone x 10, 40 at home   Hip ext prone with bent knee x 10  20 at home  Press up x 2   Planks x 30 sec x2    GSS standing   Contract relax R glut        Manual therapy: Danuta  received the following manual therapy  techniques x 25 min. to include soft tissue and joint mobilization were applied to the: low back and gluteals to include:     Performed STM to LB paraspinals and QL R>L  P/A mob to lumbar region Grade 1-2 , sidelying L contract relax to R QL    Sidelying L contract relax mobilization to L5-S1 region to level pelvis  at end of treatment and level pelvis achieved.    Pt received tool-assisted massage with manual therapy techniques to R LB in prone to trigger an inflammatory healing response and stimulate the production of new collagen and proper, more functional, less painful healing.    Vacuum/cupping STM with manual therapy techniques was performed to back and gluteals to decrease muscle tightness, increase circulation and promote healing process.  The pt's skin was monitored for redness adjusting pressure as needed. The pt was instructed in possible side effects of bruising and/or soreness.       HOLD per request of pt Kinesiotape with 6" star for pain relief was performed over the R lumbar paraspinals.L3-4 region  Instructed pt in use, care and precautions with tape.    Ultrasound  for pain control and to " decrease inflammation @ continuous duty cycle, 1 Mhz, applied to R lumbar paraspinals, intensity = 1.5 w/cm2 for 8 minutes. 2 min prep      Patient received pre-mod electrical stimulation to decrease muscle tightness and pain to Lumbar paraspinals/ sacral border of gluteals B for 15 minutes with MH supine with cycle time: continuous, beat frequency: , CC/CV: CV.      Written Home Exercises Provided: yes  Pt demo good understanding of the education provided. Danuta demonstrated good return demonstration of activities.     Pt. education:  · Posture reeducation, body mechanics, HEP, proper posture in standing  · No spiritual or educational barriers to learning provided  · Pt has no cultural, educational or language barriers to learning provided.    Assessment   Patient demonstrates improvement in strength since last measurement.  Pt continues with pelvic dysfunction which at times she is unable to self mobilize.  Pt scott treatment well and will benefit from additional core strengthening and stretching ex.  Pt had not been using home TENS and cupping equipment and understands need to utilize these tools at home also to help keep muscle tightness at min level.  Pt with improved symptoms at end of session and will benefit from continued PT as needed to assist pt in managing her chronic back problem with emphasis on HEP and self treatment.      Pt will continue to benefit from skilled outpatient physical therapy to address the remaining functional deficits, provide pt/family education, and to maximize pt's level of independence in the home and community environment. .     GOALS:   Short Term Goals:  3 weeks MET STG's  Increase range of motion 25%  Increase strength 1/2 muscle grade  Improve postural awareness of pelvis to independently identify dysfunction with min assist from PT  Be able to perform HEP with minimal cueing required     Long Term Goals: 6 weeks PARTIALLY MET LTG'S  Increase range of motion to 75% to  100% full   Improve muscle strength 1 muscle grade  Improve muscle strength with MMT to 4+/5 to 5/5  Improve and stabilize proper pelvic positioning  Restore ability to sit for ADL and work with min to 0 pain  Restore normal sleep habits without disturbances due to pain  Restore ability to perform ADL's and household activities independently with min to 0 pain    Anticipated barriers to physical therapy: none  Pt's spiritual, cultural and educational needs considered and pt agreeable to plan of care and goals        Plan   If you concur, I recommend patient continue with physical therapy 1-2 times a week as needed  for 6 weeks.  Please advise us of your  recommendations. Thank you for allowing us to assist in the care of your patient.      Jacy Escobar, MS, PT          I certify the need for these services furnished under this plan of treatment and while under my care.    ____________________________________ Physician/Referring Practitioner                                Date of Signature           Jacy Escobar, MS, PT

## 2022-02-15 ENCOUNTER — CLINICAL SUPPORT (OUTPATIENT)
Dept: REHABILITATION | Facility: HOSPITAL | Age: 67
End: 2022-02-15
Payer: COMMERCIAL

## 2022-02-15 DIAGNOSIS — G89.29 CHRONIC BILATERAL LOW BACK PAIN WITH RIGHT-SIDED SCIATICA: Primary | ICD-10-CM

## 2022-02-15 DIAGNOSIS — M54.41 CHRONIC BILATERAL LOW BACK PAIN WITH RIGHT-SIDED SCIATICA: Primary | ICD-10-CM

## 2022-02-15 PROCEDURE — 97140 MANUAL THERAPY 1/> REGIONS: CPT | Mod: PN | Performed by: PHYSICAL THERAPIST

## 2022-02-15 PROCEDURE — 97014 ELECTRIC STIMULATION THERAPY: CPT | Mod: PN | Performed by: PHYSICAL THERAPIST

## 2022-02-15 PROCEDURE — 97110 THERAPEUTIC EXERCISES: CPT | Mod: PN | Performed by: PHYSICAL THERAPIST

## 2022-02-15 PROCEDURE — 97035 APP MDLTY 1+ULTRASOUND EA 15: CPT | Mod: PN | Performed by: PHYSICAL THERAPIST

## 2022-02-15 NOTE — PLAN OF CARE
Physical Therapy Progress and Daily Treatment Note     Name: Danuta Santos  Clinic Number: [id number]  Diagnosis:   Encounter Diagnosis   Name Primary?    Chronic bilateral low back pain with right-sided sciatica Yes     Physician: Natacha Velazco MD  Treatment Orders: PT Eval and Treat  Past Medical History:   Diagnosis Date    Allergy     IBS (irritable bowel syndrome)     Pneumonia 02/2019     Precautions: as per diagnosis    Evaluation date: 2/15/2018  Visit # authorized: 4/20  Authorization period:4-1-22  Plan of care expiration: 2-15-22  MD Follow up appt: none scheduled    Time In: 2:05  Time Out: 3:35  Billable time: 70  Min + ES    Subjective     Pt reports: she had gone to storage unit and her back went out.  Pt states unable to get her back level.  Pt states prior to storage unit back felt tight, but ok.  Pt states she has not been doing modalities at home. Pt states she stretches almost every day.   Pt states she is working on proper posture in standing and sitting    Pain Scale: before treatment: 5/10 after treatment: 1/10 and level pelvis  Objective     AR R pelvis  Pt able to self mobilize today, but continue with tightness and some pain  Mod-severe tightness lumbar paraspinals R and L was severe at last session     Lumbar active range of motion in standing is: 2-15-22  - flexion - toes                     - extension -  75%                         - left side bending -  To knee         - right side bending -  To knee             Lumbar active range of motion in standing is: 12-21-21  - flexion - toes                     - extension -  75%                         - left side bending -  To knee         - right side bending -  To knee      Muscle Strength 2-15-22  MMT R L   Hip flexion 4+/5 4+/5   Hip abduction 5-/5 5-/5   Hip extension 5-/5 5-/5   Glut max 4+/5 4+/5   Knee extension 5/5 5/5   Knee flexion 5/5 5/5           Muscle Strength 12-21-21  MMT R L   Hip flexion 4+/5 4+/5   Hip  "abduction 4+/5 5-/5   Hip extension 4+/5 4+/5   Glut max 4/5 4/5   Knee extension 5/5 5/5   Knee flexion 5/5 5/5           Therapeutic exercise: Danuta received therapeutic exercises to develop strength, endurance, ROM, flexibility and core stabilization for 35 minutes including:        HS stretch supine   Glut stretch  Piriformis stretch  B QL stretch  Contract relax R glut x 3       Pelvic tilt with march  x 10  Bridge x 10, at home 20    Partial sit up x 10, at home 50  SLR x 10, 20 at home   Iron cross x 2   Trunk rotation supine x 10  Hip abd sidelying x 10 B, 20 at home   Arm and Leg lift prone x 10, 40 at home   Hip ext prone with bent knee x 10  20 at home  Press up x 2   Planks x 30 sec x2    GSS standing   Contract relax R glut        Manual therapy: Danuta  received the following manual therapy  techniques x 25 min. to include soft tissue and joint mobilization were applied to the: low back and gluteals to include:     Performed STM to LB paraspinals and QL R>L  P/A mob to lumbar region Grade 1-2 , sidelying L contract relax to R QL    Sidelying L contract relax mobilization to L5-S1 region to level pelvis  at end of treatment and level pelvis achieved.    Pt received tool-assisted massage with manual therapy techniques to R LB in prone to trigger an inflammatory healing response and stimulate the production of new collagen and proper, more functional, less painful healing.    Vacuum/cupping STM with manual therapy techniques was performed to back and gluteals to decrease muscle tightness, increase circulation and promote healing process.  The pt's skin was monitored for redness adjusting pressure as needed. The pt was instructed in possible side effects of bruising and/or soreness.       HOLD per request of pt Kinesiotape with 6" star for pain relief was performed over the R lumbar paraspinals.L3-4 region  Instructed pt in use, care and precautions with tape.    Ultrasound  for pain control and to " decrease inflammation @ continuous duty cycle, 1 Mhz, applied to R lumbar paraspinals, intensity = 1.5 w/cm2 for 8 minutes. 2 min prep      Patient received pre-mod electrical stimulation to decrease muscle tightness and pain to Lumbar paraspinals/ sacral border of gluteals B for 15 minutes with MH supine with cycle time: continuous, beat frequency: , CC/CV: CV.      Written Home Exercises Provided: yes  Pt demo good understanding of the education provided. Danuta demonstrated good return demonstration of activities.     Pt. education:  · Posture reeducation, body mechanics, HEP, proper posture in standing  · No spiritual or educational barriers to learning provided  · Pt has no cultural, educational or language barriers to learning provided.    Assessment   Patient demonstrates improvement in strength since last measurement.  Pt continues with pelvic dysfunction which at times she is unable to self mobilize.  Pt scott treatment well and will benefit from additional core strengthening and stretching ex.  Pt had not been using home TENS and cupping equipment and understands need to utilize these tools at home also to help keep muscle tightness at min level.  Pt with improved symptoms at end of session and will benefit from continued PT as needed to assist pt in managing her chronic back problem.        Pt will continue to benefit from skilled outpatient physical therapy to address the remaining functional deficits, provide pt/family education, and to maximize pt's level of independence in the home and community environment. .     GOALS:   Short Term Goals:  3 weeks MET STG's  Increase range of motion 25%  Increase strength 1/2 muscle grade  Improve postural awareness of pelvis to independently identify dysfunction with min assist from PT  Be able to perform HEP with minimal cueing required     Long Term Goals: 6 weeks PARTIALLY MET LTG'S  Increase range of motion to 75% to 100% full   Improve muscle strength 1  muscle grade  Improve muscle strength with MMT to 4+/5 to 5/5  Improve and stabilize proper pelvic positioning  Restore ability to sit for ADL and work with min to 0 pain  Restore normal sleep habits without disturbances due to pain  Restore ability to perform ADL's and household activities independently with min to 0 pain    Anticipated barriers to physical therapy: none  Pt's spiritual, cultural and educational needs considered and pt agreeable to plan of care and goals        Plan   If you concur, I recommend patient continue with physical therapy 1-2 times a week as needed  for 6 weeks.  Please advise us of your  recommendations. Thank you for allowing us to assist in the care of your patient.      Jacy Escobar, MS, PT          I certify the need for these services furnished under this plan of treatment and while under my care.    ____________________________________ Physician/Referring Practitioner                                Date of Signature           Jacy Escobar, MS, PT

## 2022-02-18 LAB
DEPRECATED S PNEUM12 IGG SER-MCNC: 0.6 UG/ML
DEPRECATED S PNEUM23 IGG SER-MCNC: 4.9 UG/ML
DEPRECATED S PNEUM4 IGG SER-MCNC: 2.2 UG/ML
DEPRECATED S PNEUM8 IGG SER-MCNC: 2.7 UG/ML
DEPRECATED S PNEUM9 IGG SER-MCNC: 0.8 UG/ML
S PN DA SERO 19F IGG SER-MCNC: 1.5 UG/ML
S PNEUM DA 1 IGG SER-MCNC: 5.6 UG/ML
S PNEUM DA 14 IGG SER-MCNC: 8.1
S PNEUM DA 18C IGG SER-MCNC: 6.5
S PNEUM DA 3 IGG SER-MCNC: 3.3 UG/ML
S PNEUM DA 5 IGG SER-MCNC: 0.6 UG/ML
S PNEUM DA 6B IGG SER-MCNC: 1.2 UG/ML
S PNEUM DA 7F IGG SER-MCNC: 0.5 UG/ML
S PNEUM DA 9V IGG SER-MCNC: 1 UG/ML

## 2022-02-20 ENCOUNTER — PATIENT MESSAGE (OUTPATIENT)
Dept: INTERNAL MEDICINE | Facility: CLINIC | Age: 67
End: 2022-02-20
Payer: COMMERCIAL

## 2022-02-21 NOTE — PROGRESS NOTES
Physical Therapy Daily Treatment Note     Name: Danuta Santos  Clinic Number: [id number]  Diagnosis:   Encounter Diagnosis   Name Primary?    Chronic bilateral low back pain with right-sided sciatica Yes     Physician: Natacha Velazco MD  Treatment Orders: PT Eval and Treat  Past Medical History:   Diagnosis Date    Allergy     IBS (irritable bowel syndrome)     Pneumonia 02/2019     Precautions: as per diagnosis    Evaluation date: 2/15/2018  Visit # authorized: 5/20  Authorization period:4-1-22  Plan of care expiration: 2-15-22  MD Follow up appt: none scheduled    Time In: 4:11  Time Out: 5:07  Billable time: 53  Min + ES    Subjective     Pt reports: had out of town company so busy with them and feeling tight and tired.  Pt states trying not to slouch.  Pt is leaving Saturday to go to [place].      Pain Scale: before treatment: 5/10 after treatment: 1/10 and level pelvis  Objective     AR R pelvis   Mod-severe tightness lumbar paraspinals R and L was severe at last session           TREATMENT    Therapeutic exercise: Danuta received therapeutic exercises to develop strength, endurance, ROM, flexibility and core stabilization for 5 minutes including:    Verbally reviewed  HS stretch supine   Glut stretch  Piriformis stretch  B QL stretch  Contract relax R glut x 3       Pelvic tilt with march  x 10  Bridge x 10, at home 20    Partial sit up x 10, at home 50  SLR x 10, 20 at home   Iron cross x 2   Trunk rotation supine x 10  Hip abd sidelying x 10 B, 20 at home   Arm and Leg lift prone x 10, 40 at home   Hip ext prone with bent knee x 10  20 at home  Press up x 2   Planks x 30 sec x2    GSS standing   Contract relax R glut        Manual therapy: Danuta  received the following manual therapy  techniques x 23 min. to include soft tissue and joint mobilization were applied to the: low back and gluteals to include:     Performed STM to LB paraspinals and QL R>L  P/A mob to lumbar region Grade 1-2 ,  sidelying L contract relax to R QL    Sidelying L contract relax mobilization to L5-S1 region to level pelvis  at end of treatment and level pelvis achieved.    Pt received tool-assisted massage with manual therapy techniques to R LB in prone to trigger an inflammatory healing response and stimulate the production of new collagen and proper, more functional, less painful healing.    Vacuum/cupping STM with manual therapy techniques was performed to back and gluteals to decrease muscle tightness, increase circulation and promote healing process.  The pt's skin was monitored for redness adjusting pressure as needed. The pt was instructed in possible side effects of bruising and/or soreness.    .    Ultrasound  for pain control and to decrease inflammation @ continuous duty cycle, 1 Mhz, applied to R lumbar paraspinals, intensity = 1.5 w/cm2 for 8 minutes. 2 min prep      Patient received pre-mod electrical stimulation to decrease muscle tightness and pain to Lumbar paraspinals/ sacral border of gluteals B for 15 minutes with MH supine with cycle time: continuous, beat frequency: , CC/CV: CV.      Written Home Exercises Provided: yes  Pt demo good understanding of the education provided. Danuta demonstrated good return demonstration of activities.     Pt. education:  · Posture reeducation, body mechanics, HEP, proper posture in standing  · No spiritual or educational barriers to learning provided  · Pt has no cultural, educational or language barriers to learning provided.    Assessment   Patient with increased symptoms after a busy week and concerned about upcoming trip to [place]>  Pt understands to work on good support in sitting for airplane and will continue to work on HEP as tolerated.  Pt scott treatment well and decreased symptoms at end of treatment with decreased muscle tightness and level pelvis.       Pt will continue to benefit from skilled outpatient physical therapy to address the remaining functional  deficits, provide pt/family education, and to maximize pt's level of independence in the home and community environment. .     GOALS:   Short Term Goals:  3 weeks MET STG's  Increase range of motion 25%  Increase strength 1/2 muscle grade  Improve postural awareness of pelvis to independently identify dysfunction with min assist from PT  Be able to perform HEP with minimal cueing required     Long Term Goals: 6 weeks PARTIALLY MET LTG'S  Increase range of motion to 75% to 100% full   Improve muscle strength 1 muscle grade  Improve muscle strength with MMT to 4+/5 to 5/5  Improve and stabilize proper pelvic positioning  Restore ability to sit for ADL and work with min to 0 pain  Restore normal sleep habits without disturbances due to pain  Restore ability to perform ADL's and household activities independently with min to 0 pain    Anticipated barriers to physical therapy: none  Pt's spiritual, cultural and educational needs considered and pt agreeable to plan of care and goals        Plan   Continue with established plan of care towards PT goals.        Jacy Escobar, MS, PT

## 2022-02-22 ENCOUNTER — CLINICAL SUPPORT (OUTPATIENT)
Dept: REHABILITATION | Facility: HOSPITAL | Age: 67
End: 2022-02-22
Payer: COMMERCIAL

## 2022-02-22 DIAGNOSIS — G89.29 CHRONIC BILATERAL LOW BACK PAIN WITH RIGHT-SIDED SCIATICA: Primary | ICD-10-CM

## 2022-02-22 DIAGNOSIS — M54.41 CHRONIC BILATERAL LOW BACK PAIN WITH RIGHT-SIDED SCIATICA: Primary | ICD-10-CM

## 2022-02-22 PROCEDURE — 97140 MANUAL THERAPY 1/> REGIONS: CPT | Mod: PN | Performed by: PHYSICAL THERAPIST

## 2022-02-22 PROCEDURE — 97014 ELECTRIC STIMULATION THERAPY: CPT | Mod: PN | Performed by: PHYSICAL THERAPIST

## 2022-02-22 PROCEDURE — 97035 APP MDLTY 1+ULTRASOUND EA 15: CPT | Mod: PN | Performed by: PHYSICAL THERAPIST

## 2022-03-08 ENCOUNTER — TELEPHONE (OUTPATIENT)
Dept: INTERNAL MEDICINE | Facility: CLINIC | Age: 67
End: 2022-03-08
Payer: COMMERCIAL

## 2022-03-09 NOTE — PROGRESS NOTES
Physical Therapy Daily Treatment Note     Name: Danuta Santos  Clinic Number: [id number]  Diagnosis:   Encounter Diagnosis   Name Primary?    Chronic bilateral low back pain with right-sided sciatica Yes     Physician: Natacha Velazco MD  Treatment Orders: PT Eval and Treat  Past Medical History:   Diagnosis Date    Allergy     IBS (irritable bowel syndrome)     Pneumonia 02/2019     Precautions: as per diagnosis    Evaluation date: 1-4-22  Visit # authorized: 6/20  Authorization period:4-1-22  Plan of care expiration: 3-29-22  MD Follow up appt: none scheduled    Time In: 3:04  Time Out:4:10  Billable time: 40  Min + ES    Subjective     Pt reports: she is having hardware taken out of foot next Friday.  Pt states she went to see Dr. Haas had x-ray of hips and knees due to pain in these areas.  MD reported to pt no arthritis noted  CC is pain in L hip when sitting alek at work. Pt states she feels very tight in muscles and has just started to stretch BID instead of just in PM .      Pain Scale: before treatment: 5/10 after treatment: 1/10 and level pelvis       AR R pelvis   Mod-severe tightness lumbar paraspinals R, mod after treatment       TREATMENT    Therapeutic exercise: Danuta received therapeutic exercises to develop strength, endurance, ROM, flexibility and core stabilization for 5 minutes including:  Provided pt information on wedge she could use for sitting to decrease hip flexion in sitting and will note response    Verbally reviewed  HS stretch supine   Glut stretch  Piriformis stretch  B QL stretch  Contract relax R glut x 3      Pelvic tilt with march  x 10  Bridge x 10, at home 20    Partial sit up x 10, at home 50  SLR x 10, 20 at home   Iron cross x 2  Trunk rotation supine x 10  Hip abd sidelying x 10 B, 20 at home   Arm and Leg lift prone x 10, 40 at home   Hip ext prone with bent knee x 10  20 at home  Press up x 2   Planks x 30 sec x2    GSS standing   Contract relax R  glut    Manual therapy: Danuta  received the following manual therapy  techniques x 25 min. to include soft tissue and joint mobilization were applied to the: low back and gluteals to include:     Performed STM to LB paraspinals and QL R>L  P/A mob to lumbar region Grade 1-2 , sidelying L contract relax to R QL    Sidelying L contract relax mobilization to L5-S1 region to level pelvis  at end of treatment and level pelvis achieved.    Pt received tool-assisted massage with manual therapy techniques to R LB in prone to trigger an inflammatory healing response and stimulate the production of new collagen and proper, more functional, less painful healing.    Vacuum/cupping STM with manual therapy techniques was performed to back and gluteals to decrease muscle tightness, increase circulation and promote healing process.  The pt's skin was monitored for redness adjusting pressure as needed. The pt was instructed in possible side effects of bruising and/or soreness.    .    Ultrasound  for pain control and to decrease inflammation @ continuous duty cycle, 1 Mhz, applied to R lumbar paraspinals, intensity = 1.5 w/cm2 for 8 minutes. 2 min prep      Patient received pre-mod electrical stimulation to decrease muscle tightness and pain to Lumbar paraspinals/ sacral border of gluteals B for 15 minutes with MH supine with cycle time: continuous, beat frequency: , CC/CV: CV.      Written Home Exercises Provided: yes  Pt demo good understanding of the education provided. Danuta demonstrated good return demonstration of activities.     Pt. education:  · Posture reeducation, body mechanics, HEP, proper posture in standing  · No spiritual or educational barriers to learning provided  · Pt has no cultural, educational or language barriers to learning provided.    Assessment   Pt with increased hip symptoms that may be related to pelvic dysfunction or LBP.  Pt may benefit from additional stretching ex for hip.  Pt scott  treatment well and decreased symptoms and muscle tightness noted after treatment and achieved level pelvis.       Pt will continue to benefit from skilled outpatient physical therapy to address the remaining functional deficits, provide pt/family education, and to maximize pt's level of independence in the home and community environment. .     GOALS:   Short Term Goals:  3 weeks MET STG's  Increase range of motion 25%  Increase strength 1/2 muscle grade  Improve postural awareness of pelvis to independently identify dysfunction with min assist from PT  Be able to perform HEP with minimal cueing required     Long Term Goals: 6 weeks PARTIALLY MET LTG'S  Increase range of motion to 75% to 100% full   Improve muscle strength 1 muscle grade  Improve muscle strength with MMT to 4+/5 to 5/5  Improve and stabilize proper pelvic positioning  Restore ability to sit for ADL and work with min to 0 pain  Restore normal sleep habits without disturbances due to pain  Restore ability to perform ADL's and household activities independently with min to 0 pain    Anticipated barriers to physical therapy: none  Pt's spiritual, cultural and educational needs considered and pt agreeable to plan of care and goals        Plan   Continue with established plan of care towards PT goals.  Assess response of additional stretching  Pt to undergo recovery from foot surgery with removal of hardware      Jacy Escobar, MS, PT

## 2022-03-10 ENCOUNTER — CLINICAL SUPPORT (OUTPATIENT)
Dept: REHABILITATION | Facility: HOSPITAL | Age: 67
End: 2022-03-10
Payer: COMMERCIAL

## 2022-03-10 DIAGNOSIS — M54.41 CHRONIC BILATERAL LOW BACK PAIN WITH RIGHT-SIDED SCIATICA: Primary | ICD-10-CM

## 2022-03-10 DIAGNOSIS — G89.29 CHRONIC BILATERAL LOW BACK PAIN WITH RIGHT-SIDED SCIATICA: Primary | ICD-10-CM

## 2022-03-10 PROCEDURE — 97014 ELECTRIC STIMULATION THERAPY: CPT | Mod: PN | Performed by: PHYSICAL THERAPIST

## 2022-03-10 PROCEDURE — 97035 APP MDLTY 1+ULTRASOUND EA 15: CPT | Mod: PN | Performed by: PHYSICAL THERAPIST

## 2022-03-10 PROCEDURE — 97140 MANUAL THERAPY 1/> REGIONS: CPT | Mod: PN | Performed by: PHYSICAL THERAPIST

## 2022-03-11 ENCOUNTER — OFFICE VISIT (OUTPATIENT)
Dept: INTERNAL MEDICINE | Facility: CLINIC | Age: 67
End: 2022-03-11
Payer: COMMERCIAL

## 2022-03-11 ENCOUNTER — PATIENT MESSAGE (OUTPATIENT)
Dept: ALLERGY | Facility: CLINIC | Age: 67
End: 2022-03-11
Payer: COMMERCIAL

## 2022-03-11 ENCOUNTER — HOSPITAL ENCOUNTER (OUTPATIENT)
Dept: CARDIOLOGY | Facility: CLINIC | Age: 67
Discharge: HOME | End: 2022-03-11
Payer: COMMERCIAL

## 2022-03-11 ENCOUNTER — HOSPITAL ENCOUNTER (OUTPATIENT)
Dept: RADIOLOGY | Facility: HOSPITAL | Age: 67
Discharge: HOME | End: 2022-03-11
Attending: INTERNAL MEDICINE
Payer: COMMERCIAL

## 2022-03-11 DIAGNOSIS — Z01.818 PREOP EXAMINATION: Primary | ICD-10-CM

## 2022-03-11 DIAGNOSIS — Z01.818 PREOP EXAMINATION: ICD-10-CM

## 2022-03-11 PROCEDURE — 99999 PR PBB SHADOW E&M-EST. PATIENT-LVL IV: CPT | Mod: PBBFAC,,, | Performed by: INTERNAL MEDICINE

## 2022-03-11 PROCEDURE — 71046 X-RAY EXAM CHEST 2 VIEWS: CPT | Mod: 26,,, | Performed by: RADIOLOGY

## 2022-03-11 PROCEDURE — 1126F AMNT PAIN NOTED NONE PRSNT: CPT | Mod: CPTII,S$GLB,, | Performed by: INTERNAL MEDICINE

## 2022-03-11 PROCEDURE — 99213 PR OFFICE/OUTPT VISIT, EST, LEVL III, 20-29 MIN: ICD-10-PCS | Mod: S$GLB,,, | Performed by: INTERNAL MEDICINE

## 2022-03-11 PROCEDURE — 1101F PR PT FALLS ASSESS DOC 0-1 FALLS W/OUT INJ PAST YR: ICD-10-PCS | Mod: CPTII,S$GLB,, | Performed by: INTERNAL MEDICINE

## 2022-03-11 PROCEDURE — 99999 PR PBB SHADOW E&M-EST. PATIENT-LVL IV: ICD-10-PCS | Mod: PBBFAC,,, | Performed by: INTERNAL MEDICINE

## 2022-03-11 PROCEDURE — 3008F BODY MASS INDEX DOCD: CPT | Mod: CPTII,S$GLB,, | Performed by: INTERNAL MEDICINE

## 2022-03-11 PROCEDURE — 3008F PR BODY MASS INDEX (BMI) DOCUMENTED: ICD-10-PCS | Mod: CPTII,S$GLB,, | Performed by: INTERNAL MEDICINE

## 2022-03-11 PROCEDURE — 3079F PR MOST RECENT DIASTOLIC BLOOD PRESSURE 80-89 MM HG: ICD-10-PCS | Mod: CPTII,S$GLB,, | Performed by: INTERNAL MEDICINE

## 2022-03-11 PROCEDURE — 3288F PR FALLS RISK ASSESSMENT DOCUMENTED: ICD-10-PCS | Mod: CPTII,S$GLB,, | Performed by: INTERNAL MEDICINE

## 2022-03-11 PROCEDURE — 1126F PR PAIN SEVERITY QUANTIFIED, NO PAIN PRESENT: ICD-10-PCS | Mod: CPTII,S$GLB,, | Performed by: INTERNAL MEDICINE

## 2022-03-11 PROCEDURE — 93005 EKG 12-LEAD: ICD-10-PCS | Mod: S$GLB,,, | Performed by: INTERNAL MEDICINE

## 2022-03-11 PROCEDURE — 93010 ELECTROCARDIOGRAM REPORT: CPT | Mod: S$GLB,,, | Performed by: INTERNAL MEDICINE

## 2022-03-11 PROCEDURE — 3075F SYST BP GE 130 - 139MM HG: CPT | Mod: CPTII,S$GLB,, | Performed by: INTERNAL MEDICINE

## 2022-03-11 PROCEDURE — 3288F FALL RISK ASSESSMENT DOCD: CPT | Mod: CPTII,S$GLB,, | Performed by: INTERNAL MEDICINE

## 2022-03-11 PROCEDURE — 3079F DIAST BP 80-89 MM HG: CPT | Mod: CPTII,S$GLB,, | Performed by: INTERNAL MEDICINE

## 2022-03-11 PROCEDURE — 71046 X-RAY EXAM CHEST 2 VIEWS: CPT | Mod: TC

## 2022-03-11 PROCEDURE — 93005 ELECTROCARDIOGRAM TRACING: CPT | Mod: S$GLB,,, | Performed by: INTERNAL MEDICINE

## 2022-03-11 PROCEDURE — 93010 EKG 12-LEAD: ICD-10-PCS | Mod: S$GLB,,, | Performed by: INTERNAL MEDICINE

## 2022-03-11 PROCEDURE — 1159F PR MEDICATION LIST DOCUMENTED IN MEDICAL RECORD: ICD-10-PCS | Mod: CPTII,S$GLB,, | Performed by: INTERNAL MEDICINE

## 2022-03-11 PROCEDURE — 71046 XR CHEST PA AND LATERAL: ICD-10-PCS | Mod: 26,,, | Performed by: RADIOLOGY

## 2022-03-11 PROCEDURE — 99213 OFFICE O/P EST LOW 20 MIN: CPT | Mod: S$GLB,,, | Performed by: INTERNAL MEDICINE

## 2022-03-11 PROCEDURE — 3075F PR MOST RECENT SYSTOLIC BLOOD PRESS GE 130-139MM HG: ICD-10-PCS | Mod: CPTII,S$GLB,, | Performed by: INTERNAL MEDICINE

## 2022-03-11 PROCEDURE — 1101F PT FALLS ASSESS-DOCD LE1/YR: CPT | Mod: CPTII,S$GLB,, | Performed by: INTERNAL MEDICINE

## 2022-03-11 PROCEDURE — 1159F MED LIST DOCD IN RCRD: CPT | Mod: CPTII,S$GLB,, | Performed by: INTERNAL MEDICINE

## 2022-03-11 RX ORDER — ACETAMINOPHEN 500 MG
500 TABLET ORAL EVERY 6 HOURS PRN
COMMUNITY

## 2022-03-11 RX ORDER — PIMECROLIMUS 10 MG/G
CREAM TOPICAL 2 TIMES DAILY PRN
COMMUNITY
Start: 2022-02-01

## 2022-03-13 ENCOUNTER — PATIENT MESSAGE (OUTPATIENT)
Dept: INTERNAL MEDICINE | Facility: CLINIC | Age: 67
End: 2022-03-13
Payer: COMMERCIAL

## 2022-03-13 DIAGNOSIS — Z01.818 PREOP EXAMINATION: Primary | ICD-10-CM

## 2022-03-14 VITALS
DIASTOLIC BLOOD PRESSURE: 86 MMHG | SYSTOLIC BLOOD PRESSURE: 136 MMHG | HEART RATE: 62 BPM | BODY MASS INDEX: 21.69 KG/M2 | TEMPERATURE: 99 F | HEIGHT: 66 IN | WEIGHT: 135 LBS | OXYGEN SATURATION: 99 %

## 2022-03-15 ENCOUNTER — HOSPITAL ENCOUNTER (OUTPATIENT)
Dept: RADIOLOGY | Facility: CLINIC | Age: 67
Discharge: HOME | End: 2022-03-15
Attending: INTERNAL MEDICINE
Payer: COMMERCIAL

## 2022-03-15 DIAGNOSIS — Z78.0 ASYMPTOMATIC MENOPAUSAL STATE: ICD-10-CM

## 2022-03-15 PROCEDURE — 77080 DXA BONE DENSITY AXIAL: CPT | Mod: TC

## 2022-03-15 PROCEDURE — 77080 DXA BONE DENSITY AXIAL: CPT | Mod: 26,,, | Performed by: INTERNAL MEDICINE

## 2022-03-15 PROCEDURE — 77080 DEXA BONE DENSITY SPINE HIP: ICD-10-PCS | Mod: 26,,, | Performed by: INTERNAL MEDICINE

## 2022-03-15 NOTE — PROGRESS NOTES
Subjective:       Patient ID: Danuta Santos is a 66 y.o. female.    Chief Complaint: Pre-op Exam    HPI  She is scheduled for surgery March 18th and is in need of a preop clearance.  Denies chest pain, no shortness of breath, no palpitations  Review of Systems   Constitutional: Negative for chills, fatigue, fever and unexpected weight change.   Respiratory: Negative for chest tightness and shortness of breath.    Cardiovascular: Negative for chest pain and palpitations.   Gastrointestinal: Negative for abdominal pain and blood in stool.   Neurological: Negative for dizziness, syncope, numbness and headaches.       Objective:      Physical Exam  HENT:      Right Ear: External ear normal.      Left Ear: External ear normal.      Nose: Nose normal.      Mouth/Throat:      Mouth: Mucous membranes are moist.      Pharynx: Oropharynx is clear.   Eyes:      Pupils: Pupils are equal, round, and reactive to light.   Cardiovascular:      Rate and Rhythm: Normal rate and regular rhythm.      Heart sounds: No murmur heard.  Pulmonary:      Breath sounds: Normal breath sounds.   Chest:   Breasts:      Right: No axillary adenopathy.      Left: No axillary adenopathy.       Abdominal:      General: There is no distension.      Palpations: There is no hepatomegaly or splenomegaly.      Tenderness: There is no abdominal tenderness.   Musculoskeletal:      Cervical back: Normal range of motion.   Lymphadenopathy:      Cervical: No cervical adenopathy.      Upper Body:      Right upper body: No axillary adenopathy.      Left upper body: No axillary adenopathy.   Neurological:      Cranial Nerves: No cranial nerve deficit.      Sensory: No sensory deficit.      Motor: Motor function is intact.      Deep Tendon Reflexes: Reflexes are normal and symmetric.         Assessment/Plan       Assessment and plan:  Preop clearance:  Check EKG and chest x-ray

## 2022-03-15 NOTE — TELEPHONE ENCOUNTER
Please schedule cardiology appt for preop clearance. Preferably prior to 3-18-22, because that is when the surgery is scheduled

## 2022-03-16 ENCOUNTER — TELEPHONE (OUTPATIENT)
Dept: OBSTETRICS AND GYNECOLOGY | Facility: CLINIC | Age: 67
End: 2022-03-16
Payer: COMMERCIAL

## 2022-03-16 ENCOUNTER — CLINICAL SUPPORT (OUTPATIENT)
Dept: URGENT CARE | Facility: CLINIC | Age: 67
End: 2022-03-16
Payer: COMMERCIAL

## 2022-03-16 ENCOUNTER — TELEPHONE (OUTPATIENT)
Dept: INTERNAL MEDICINE | Facility: CLINIC | Age: 67
End: 2022-03-16
Payer: COMMERCIAL

## 2022-03-16 ENCOUNTER — TELEPHONE (OUTPATIENT)
Dept: CARDIOLOGY | Facility: CLINIC | Age: 67
End: 2022-03-16

## 2022-03-16 ENCOUNTER — OFFICE VISIT (OUTPATIENT)
Dept: CARDIOLOGY | Facility: CLINIC | Age: 67
End: 2022-03-16
Payer: COMMERCIAL

## 2022-03-16 ENCOUNTER — LAB VISIT (OUTPATIENT)
Dept: LAB | Facility: HOSPITAL | Age: 67
End: 2022-03-16
Payer: COMMERCIAL

## 2022-03-16 ENCOUNTER — PATIENT MESSAGE (OUTPATIENT)
Dept: INTERNAL MEDICINE | Facility: CLINIC | Age: 67
End: 2022-03-16
Payer: COMMERCIAL

## 2022-03-16 VITALS
BODY MASS INDEX: 21.79 KG/M2 | HEIGHT: 66 IN | SYSTOLIC BLOOD PRESSURE: 142 MMHG | DIASTOLIC BLOOD PRESSURE: 88 MMHG | HEART RATE: 66 BPM | OXYGEN SATURATION: 97 %

## 2022-03-16 DIAGNOSIS — Z01.818 PREOP EXAMINATION: ICD-10-CM

## 2022-03-16 DIAGNOSIS — M79.672 PAIN IN BOTH FEET: Primary | ICD-10-CM

## 2022-03-16 DIAGNOSIS — M79.671 PAIN IN BOTH FEET: Primary | ICD-10-CM

## 2022-03-16 DIAGNOSIS — Z01.818 PRE-OP TESTING: ICD-10-CM

## 2022-03-16 DIAGNOSIS — Z01.810 PREOPERATIVE CARDIOVASCULAR EXAMINATION: ICD-10-CM

## 2022-03-16 LAB
ANION GAP SERPL CALC-SCNC: 5 MMOL/L (ref 8–16)
BASOPHILS # BLD AUTO: 0.04 K/UL (ref 0–0.2)
BASOPHILS NFR BLD: 0.9 % (ref 0–1.9)
BILIRUB UR QL STRIP: NEGATIVE
BUN SERPL-MCNC: 17 MG/DL (ref 8–23)
CALCIUM SERPL-MCNC: 10.4 MG/DL (ref 8.7–10.5)
CHLORIDE SERPL-SCNC: 99 MMOL/L (ref 95–110)
CLARITY UR REFRACT.AUTO: CLEAR
CO2 SERPL-SCNC: 30 MMOL/L (ref 23–29)
COLOR UR AUTO: NORMAL
CREAT SERPL-MCNC: 0.8 MG/DL (ref 0.5–1.4)
DIFFERENTIAL METHOD: ABNORMAL
EOSINOPHIL # BLD AUTO: 0.1 K/UL (ref 0–0.5)
EOSINOPHIL NFR BLD: 1.1 % (ref 0–8)
ERYTHROCYTE [DISTWIDTH] IN BLOOD BY AUTOMATED COUNT: 12.7 % (ref 11.5–14.5)
EST. GFR  (AFRICAN AMERICAN): >60 ML/MIN/1.73 M^2
EST. GFR  (NON AFRICAN AMERICAN): >60 ML/MIN/1.73 M^2
GLUCOSE SERPL-MCNC: 99 MG/DL (ref 70–110)
GLUCOSE UR QL STRIP: NEGATIVE
HCT VFR BLD AUTO: 42 % (ref 37–48.5)
HGB BLD-MCNC: 13.3 G/DL (ref 12–16)
HGB UR QL STRIP: NEGATIVE
IMM GRANULOCYTES # BLD AUTO: 0 K/UL (ref 0–0.04)
IMM GRANULOCYTES NFR BLD AUTO: 0 % (ref 0–0.5)
KETONES UR QL STRIP: NEGATIVE
LEUKOCYTE ESTERASE UR QL STRIP: NEGATIVE
LYMPHOCYTES # BLD AUTO: 1.1 K/UL (ref 1–4.8)
LYMPHOCYTES NFR BLD: 23.9 % (ref 18–48)
MCH RBC QN AUTO: 30 PG (ref 27–31)
MCHC RBC AUTO-ENTMCNC: 31.7 G/DL (ref 32–36)
MCV RBC AUTO: 95 FL (ref 82–98)
MONOCYTES # BLD AUTO: 0.3 K/UL (ref 0.3–1)
MONOCYTES NFR BLD: 6.9 % (ref 4–15)
NEUTROPHILS # BLD AUTO: 3.1 K/UL (ref 1.8–7.7)
NEUTROPHILS NFR BLD: 67.2 % (ref 38–73)
NITRITE UR QL STRIP: NEGATIVE
NRBC BLD-RTO: 0 /100 WBC
PH UR STRIP: 7 [PH] (ref 5–8)
PLATELET # BLD AUTO: 223 K/UL (ref 150–450)
PMV BLD AUTO: 11.6 FL (ref 9.2–12.9)
POTASSIUM SERPL-SCNC: 4.4 MMOL/L (ref 3.5–5.1)
PROT UR QL STRIP: NEGATIVE
RBC # BLD AUTO: 4.44 M/UL (ref 4–5.4)
SODIUM SERPL-SCNC: 134 MMOL/L (ref 136–145)
SP GR UR STRIP: 1.01 (ref 1–1.03)
URN SPEC COLLECT METH UR: NORMAL
WBC # BLD AUTO: 4.64 K/UL (ref 3.9–12.7)

## 2022-03-16 PROCEDURE — 3008F PR BODY MASS INDEX (BMI) DOCUMENTED: ICD-10-PCS | Mod: CPTII,S$GLB,, | Performed by: INTERNAL MEDICINE

## 2022-03-16 PROCEDURE — 3288F PR FALLS RISK ASSESSMENT DOCUMENTED: ICD-10-PCS | Mod: CPTII,S$GLB,, | Performed by: INTERNAL MEDICINE

## 2022-03-16 PROCEDURE — U0003 INFECTIOUS AGENT DETECTION BY NUCLEIC ACID (DNA OR RNA); SEVERE ACUTE RESPIRATORY SYNDROME CORONAVIRUS 2 (SARS-COV-2) (CORONAVIRUS DISEASE [COVID-19]), AMPLIFIED PROBE TECHNIQUE, MAKING USE OF HIGH THROUGHPUT TECHNOLOGIES AS DESCRIBED BY CMS-2020-01-R: HCPCS | Performed by: INTERNAL MEDICINE

## 2022-03-16 PROCEDURE — 99205 OFFICE O/P NEW HI 60 MIN: CPT | Mod: S$GLB,,, | Performed by: INTERNAL MEDICINE

## 2022-03-16 PROCEDURE — 80048 BASIC METABOLIC PNL TOTAL CA: CPT

## 2022-03-16 PROCEDURE — 3077F SYST BP >= 140 MM HG: CPT | Mod: CPTII,S$GLB,, | Performed by: INTERNAL MEDICINE

## 2022-03-16 PROCEDURE — 85025 COMPLETE CBC W/AUTO DIFF WBC: CPT

## 2022-03-16 PROCEDURE — 3079F DIAST BP 80-89 MM HG: CPT | Mod: CPTII,S$GLB,, | Performed by: INTERNAL MEDICINE

## 2022-03-16 PROCEDURE — 1126F PR PAIN SEVERITY QUANTIFIED, NO PAIN PRESENT: ICD-10-PCS | Mod: CPTII,S$GLB,, | Performed by: INTERNAL MEDICINE

## 2022-03-16 PROCEDURE — 1126F AMNT PAIN NOTED NONE PRSNT: CPT | Mod: CPTII,S$GLB,, | Performed by: INTERNAL MEDICINE

## 2022-03-16 PROCEDURE — 81003 URINALYSIS AUTO W/O SCOPE: CPT

## 2022-03-16 PROCEDURE — 3079F PR MOST RECENT DIASTOLIC BLOOD PRESSURE 80-89 MM HG: ICD-10-PCS | Mod: CPTII,S$GLB,, | Performed by: INTERNAL MEDICINE

## 2022-03-16 PROCEDURE — 3008F BODY MASS INDEX DOCD: CPT | Mod: CPTII,S$GLB,, | Performed by: INTERNAL MEDICINE

## 2022-03-16 PROCEDURE — 99205 PR OFFICE/OUTPT VISIT, NEW, LEVL V, 60-74 MIN: ICD-10-PCS | Mod: S$GLB,,, | Performed by: INTERNAL MEDICINE

## 2022-03-16 PROCEDURE — 1101F PR PT FALLS ASSESS DOC 0-1 FALLS W/OUT INJ PAST YR: ICD-10-PCS | Mod: CPTII,S$GLB,, | Performed by: INTERNAL MEDICINE

## 2022-03-16 PROCEDURE — 3288F FALL RISK ASSESSMENT DOCD: CPT | Mod: CPTII,S$GLB,, | Performed by: INTERNAL MEDICINE

## 2022-03-16 PROCEDURE — 99999 PR PBB SHADOW E&M-EST. PATIENT-LVL IV: ICD-10-PCS | Mod: PBBFAC,,, | Performed by: INTERNAL MEDICINE

## 2022-03-16 PROCEDURE — 36415 COLL VENOUS BLD VENIPUNCTURE: CPT

## 2022-03-16 PROCEDURE — 99999 PR PBB SHADOW E&M-EST. PATIENT-LVL IV: CPT | Mod: PBBFAC,,, | Performed by: INTERNAL MEDICINE

## 2022-03-16 PROCEDURE — 1101F PT FALLS ASSESS-DOCD LE1/YR: CPT | Mod: CPTII,S$GLB,, | Performed by: INTERNAL MEDICINE

## 2022-03-16 PROCEDURE — 3077F PR MOST RECENT SYSTOLIC BLOOD PRESSURE >= 140 MM HG: ICD-10-PCS | Mod: CPTII,S$GLB,, | Performed by: INTERNAL MEDICINE

## 2022-03-16 PROCEDURE — U0005 INFEC AGEN DETEC AMPLI PROBE: HCPCS | Performed by: INTERNAL MEDICINE

## 2022-03-16 NOTE — TELEPHONE ENCOUNTER
----- Message from Jenna Coronado sent at 3/16/2022  9:42 AM CDT -----  Pt calling for a recommendation for an internal medicine provider that she can switch to.      Best contact [phone]

## 2022-03-16 NOTE — PATIENT INSTRUCTIONS
Assessment/Plan:  Danuta Santos is a 66 y.o. female with irritable bowel syndrome, who presents for initial appointment.     1. Preoperative cardiovascular risk stratification prior to planned podiatric surgery for removal of hardware on the right foot- Mrs. Santos has no chest pain, no heart failure symptoms, and no active arrhythmia(s).  She can perform greater than 4 METS with no difficulty.  EKG on 3/11/2022 shows sinus bradycardia; septal infarct (age undetermined); nonspecific ST/T wave changes.  Of note, the QS pattern in leads V1 and V2 on the current EKG, has been present since 1/27/2004 (EKG reviewed in legacy documents).  This finding was concerning for possible septal scar when noted in 2004, and the patient underwent stress testing for this reason.  The stress test in 2004 was negative for ischemia. She is at low risk for a perioperative major adverse cardiac event during this moderate risk procedure.  Revised Cardiac Risk Index of 3.9%.  No further testing necessary.    Follow up in 6 months

## 2022-03-16 NOTE — PROGRESS NOTES
[facility]      Chief Complaint   Patient presents with    Pre-op Exam       Patient ID: Danuta Santos is a 66 y.o. female with irritable bowel syndrome, who presents for initial appointment.  Pertinent history/events are as follows:     -Pt kindly referred by Dr. Velazco for preoperative cardiac risk stratification prior to planned podiatric surgery for removal of hardware on the right foot on 3/18/2022.      HPI:  Mrs. Santos has no chest pain, no heart failure symptoms, and no active arrhythmia(s).  She can perform greater than 4 METS with no difficulty.  EKG on 3/11/2022 shows sinus bradycardia; septal infarct (age undetermined); nonspecific ST/T wave changes.  Of note, the QS pattern in leads V1 and V2 on the current EKG, has been present since 2004 (EKG reviewed in legacy documents).  This finding was concerning for possible septal scar when noted in , and the patient underwent stress testing for this reason.  The stress test in  was negative for ischemia.    Past Medical History:   Diagnosis Date    Allergy     IBS (irritable bowel syndrome)     Pneumonia 2019    Recurrent upper respiratory infection (URI)      Past Surgical History:   Procedure Laterality Date     SECTION      COLONOSCOPY N/A 2020    Procedure: COLONOSCOPY;  Surgeon: Liam Lipscomb MD;  [facility]: [facility] ([address] [place] [address]);  Service: Endoscopy;  Laterality: N/A;  Single balloon needed, can start with regular scope  COVID screening  20 - [place]    NOSE SURGERY      SINUS SURGERY  2019     Social History     Socioeconomic History    Marital status:    Tobacco Use    Smoking status: Never Smoker    Smokeless tobacco: Never Used   Substance and Sexual Activity    Alcohol use: No    Drug use: No    Sexual activity: Yes     Partners: Male     Birth control/protection: None, Post-menopausal     Family History   Problem Relation Age of Onset    Cancer  Father     Stroke Maternal Grandfather     Breast cancer Neg Hx     Colon cancer Neg Hx     Diabetes Neg Hx     Eclampsia Neg Hx     Hypertension Neg Hx     Miscarriages / Stillbirths Neg Hx     Ovarian cancer Neg Hx      labor Neg Hx     Allergic rhinitis Neg Hx     Allergies Neg Hx     Angioedema Neg Hx     Asthma Neg Hx     Eczema Neg Hx     Immunodeficiency Neg Hx     Rhinitis Neg Hx     Urticaria Neg Hx     Atopy Neg Hx        Review of patient's allergies indicates:   Allergen Reactions    Wheat containing prod      Sinus      Advil [ibuprofen]     Apple cider vinegar Other (See Comments)    Aspirin Nausea And Vomiting    Lactobacillus acidoph-lactase      Other reaction(s): Other (See Comments)    Milk containing products Other (See Comments)     Post nasal drip    Nsaids (non-steroidal anti-inflammatory drug)      Other reaction(s): Other (See Comments)  Pt states she gets pain in her stomach when she takes nsaids due to IBS    Alcohol Other (See Comments) and Palpitations     Post nasal drip       Medication List with Changes/Refills   Current Medications    ACETAMINOPHEN (TYLENOL) 500 MG TABLET    Take 500 mg by mouth every 6 (six) hours as needed.    ASCORBIC ACID, VITAMIN C, (VITAMIN C) 500 MG TABLET    Take 500 mg by mouth once daily.    CALCIUM CARBONATE (CALCIUM 500 ORAL)    Take by mouth.    CYANOCOBALAMIN 500 MCG TABLET    Take 1,000 mcg by mouth once daily.    GLUCOSAM/CHOND/COLLAGEN/HYALUR (AMWYZGVT-DYEZ-YGSUWD-HYALUR AC ORAL)    Take 1 tablet by mouth once daily at 6am.    HYDROCORTISONE 1 % CREAM    Apply topically as needed.    LACTOBACILLUS COMBO NO.6 (PROBIOTIC COMPLEX ORAL)    Take 1 capsule by mouth once.     PEDIATRIC MULTIVITAMIN CHEWABLE TABLET    Take 2 tablets by mouth once daily.    PIMECROLIMUS (ELIDEL) 1 % CREAM    Apply topically 2 (two) times daily as needed.    POLYETHYLENE GLYCOL (GLYCOLAX) 17 GRAM PWPK    Take by mouth.    VITAMIN D 185 MG TAB    " Take 2,000 mg by mouth once daily.    ZINC ORAL    Take by mouth.   Discontinued Medications    VITAMIN E 400 UNIT CAPSULE    Take 400 Units by mouth once daily.       Review of Systems  Constitution: Denies chills, fever, and sweats.  HENT: Denies headaches or blurry vision.  Cardiovascular: Denies chest pain or irregular heart beat.  Respiratory: Denies cough or shortness of breath.  Gastrointestinal: Denies abdominal pain, nausea, or vomiting.  Musculoskeletal: Denies muscle cramps.  Neurological: Denies dizziness or focal weakness.  Psychiatric/Behavioral: Normal mental status.  Hematologic/Lymphatic: Denies bleeding problem or easy bruising/bleeding.  Skin: Denies rash or suspicious lesions    Physical Examination  BP (!) 142/88   Pulse 66   Ht 5' 6" (1.676 m)   SpO2 97%   BMI 21.79 kg/m²     Constitutional: No acute distress, conversant  HEENT: Sclera anicteric, Pupils equal, round and reactive to light, extraocular motions intact, Oropharynx clear  Neck: No JVD, no carotid bruits  Cardiovascular: regular rate and rhythm, no murmur, rubs or gallops, normal S1/S2  Pulmonary: Clear to auscultation bilaterally  Abdominal: Abdomen soft, nontender, nondistended, positive bowel sounds  Extremities: No lower extremity edema,   Pulses:  Carotid pulses are 2+ on the right side, and 2+ on the left side.  Radial pulses are 2+ on the right side, and 2+ on the left side.   Femoral pulses are 2+ on the right side, and 2+ on the left side.  Popliteal pulses are 2+ on the right side, and 2+ on the left side.   Dorsalis pedis pulses are 2+ on the right side, and 2+ on the left side.   Posterior tibial pulses are 2+ on the right side, and 2+ on the left side.    Skin: No ecchymosis, erythema, or ulcers  Psych: Alert and oriented x 3, appropriate affect  Neuro: CNII-XII intact, no focal deficits    Labs:  Most Recent Data  CBC:   Lab Results   Component Value Date    WBC 4.64 03/16/2022    HGB 13.3 03/16/2022    HCT 42.0 " 03/16/2022     03/16/2022    MCV 95 03/16/2022    RDW 12.7 03/16/2022     BMP:   Lab Results   Component Value Date     02/12/2022    K 4.4 02/12/2022     02/12/2022    CO2 28 02/12/2022    BUN 18 02/12/2022    CREATININE 0.7 02/12/2022    GLU 88 02/12/2022    CALCIUM 10.5 02/12/2022     LFTS;   Lab Results   Component Value Date    PROT 7.4 02/12/2022    ALBUMIN 4.3 02/12/2022    BILITOT 0.8 02/12/2022    AST 22 02/12/2022    ALKPHOS 62 02/12/2022    ALT 16 02/12/2022     COAGS: No results found for: INR, PROTIME, PTT  FLP:   Lab Results   Component Value Date    CHOL 194 02/12/2022    HDL 94 (H) 02/12/2022    LDLCALC 92.8 02/12/2022    TRIG 36 02/12/2022    CHOLHDL 48.5 02/12/2022     CARDIAC: No results found for: TROPONINI, CKMB, BNP    Imaging:    EKG 3/11/2022:  Sinus bradycardia  Septal infarct (age undetermined)  Nonspecific ST abnormality      Assessment/Plan:  Danuta Santos is a 66 y.o. female with irritable bowel syndrome, who presents for initial appointment.     1. Preoperative cardiovascular risk stratification prior to planned podiatric surgery for removal of hardware on the right foot- Mrs. Santos has no chest pain, no heart failure symptoms, and no active arrhythmia(s).  She can perform greater than 4 METS with no difficulty.  EKG on 3/11/2022 shows sinus bradycardia; septal infarct (age undetermined); nonspecific ST/T wave changes.  Of note, the QS pattern in leads V1 and V2 on the current EKG, has been present since 1/27/2004 (EKG reviewed in legacy documents).  This finding was concerning for possible septal scar when noted in 2004, and the patient underwent stress testing for this reason.  The stress test in 2004 was negative for ischemia. She is at low risk for a perioperative major adverse cardiac event during this moderate risk procedure.  Revised Cardiac Risk Index of 3.9%.  No further testing necessary.    Follow up in 6 months    Total duration of face to face visit  time 45 minutes.  Total time spent counseling greater than fifty percent of total visit time.  Counseling included discussion regarding imaging findings, diagnosis, possibilities, treatment options, risks and benefits.  The patient had many questions regarding the options and long-term effects.    Ace Romero MD, PhD  Interventional Cardiology

## 2022-03-16 NOTE — TELEPHONE ENCOUNTER
----- Message from Herlinda Rogers sent at 3/16/2022  8:34 AM CDT -----  Contact: [phone]  Pt calling to get orders to have labs done to clear her for surgery her surgery is scheduled for 3/18.Please advise

## 2022-03-16 NOTE — PROGRESS NOTES
Subjective:       Patient ID: Danuta Santos is a 66 y.o. female.    Chief Complaint: Labs Only    Pt here for pre-procedure covid testing    ROS     Objective:      Physical Exam    Assessment:       1. Pre-op testing        Plan:                   No follow-ups on file.

## 2022-03-17 ENCOUNTER — PATIENT MESSAGE (OUTPATIENT)
Dept: CARDIOLOGY | Facility: CLINIC | Age: 67
End: 2022-03-17
Payer: COMMERCIAL

## 2022-03-17 ENCOUNTER — TELEPHONE (OUTPATIENT)
Dept: CARDIOLOGY | Facility: CLINIC | Age: 67
End: 2022-03-17
Payer: COMMERCIAL

## 2022-03-17 LAB
SARS-COV-2 RNA RESP QL NAA+PROBE: NOT DETECTED
SARS-COV-2- CYCLE NUMBER: NORMAL

## 2022-03-17 NOTE — TELEPHONE ENCOUNTER
----- Message from Jeana Otero sent at 3/17/2022 11:49 AM CDT -----  Regarding: Questions and Concerns  Contact: [phone]  Patient Danuta is calling to speak with Nydia. Please call patient in ref to her cardiac clerance. Patient stated the Podiatry office haven't received her H&P or clearance. Surgery is scheduled for tomorrow. Please refax to the office. Patients c/b number is [phone]    Thank You

## 2022-03-17 NOTE — TELEPHONE ENCOUNTER
Kirstin [facility]ARD[facility] Staff  Caller: Unspecified (Today,  2:36 PM)  Ramy with Dr. Kevin Plascencia has yet to receive the pt's clr for surgery tomorrow and will not be able to proceed without this being sent ASAP.  [phone].     Thank you

## 2022-03-21 ENCOUNTER — PATIENT MESSAGE (OUTPATIENT)
Dept: INTERNAL MEDICINE | Facility: CLINIC | Age: 67
End: 2022-03-21
Payer: COMMERCIAL

## 2022-03-22 ENCOUNTER — TELEPHONE (OUTPATIENT)
Dept: ALLERGY | Facility: CLINIC | Age: 67
End: 2022-03-22
Payer: COMMERCIAL

## 2022-03-22 NOTE — TELEPHONE ENCOUNTER
----- Message from Sony Kerr MD sent at 2/21/2022  8:43 AM CST -----  Please scheduled fu for pt. Evaluation of her immune system shows she might benefit from another Pneumovax vaccine to help minimize her risk of recurrent sinus and lung infections. Id' like to give her a Pneumovax and then document her response to it 4-6 weeks later.

## 2022-03-29 ENCOUNTER — TELEPHONE (OUTPATIENT)
Dept: INTERNAL MEDICINE | Facility: CLINIC | Age: 67
End: 2022-03-29
Payer: COMMERCIAL

## 2022-03-29 ENCOUNTER — NURSE TRIAGE (OUTPATIENT)
Dept: ADMINISTRATIVE | Facility: CLINIC | Age: 67
End: 2022-03-29
Payer: COMMERCIAL

## 2022-03-29 ENCOUNTER — PATIENT MESSAGE (OUTPATIENT)
Dept: INTERNAL MEDICINE | Facility: CLINIC | Age: 67
End: 2022-03-29
Payer: COMMERCIAL

## 2022-03-29 DIAGNOSIS — G57.01 SCIATIC NERVE DISEASE, RIGHT: Primary | ICD-10-CM

## 2022-03-29 RX ORDER — CELECOXIB 100 MG/1
100 CAPSULE ORAL 2 TIMES DAILY PRN
Qty: 45 CAPSULE | Refills: 0 | Status: SHIPPED | OUTPATIENT
Start: 2022-03-29 | End: 2022-04-12

## 2022-03-29 NOTE — TELEPHONE ENCOUNTER
Danuta calling c/o lower back pain to right leg x 3 days, slightly relieved by rulfing massage but not completely relieved. Pt states she called physical therapist & advised to call PCP to possibly have muscle relaxer prescribed for relief. Advised pt per triage protocol to see physician within next 3 days. Pt politely declined offer to schedule appt, states she will wait to speak to physician in the morning. Informed pt high priority message will be sent to PCP office. Verbalized understanding.     Reason for Disposition   [1] MODERATE back pain (e.g., interferes with normal activities) AND [2] present > 3 days    Additional Information   Negative: Passed out (i.e., lost consciousness, collapsed and was not responding)   Negative: Shock suspected (e.g., cold/pale/clammy skin, too weak to stand, low BP, rapid pulse)   Negative: Sounds like a life-threatening emergency to the triager   Negative: [1] SEVERE back pain (e.g., excruciating) AND [2] sudden onset AND [3] age > 60 years   Negative: [1] Unable to urinate (or only a few drops) > 4 hours AND [2] bladder feels very full (e.g., palpable bladder or strong urge to urinate)   Negative: [1] Loss of bladder or bowel control (urine or bowel incontinence; wetting self, leaking stool) AND [2] new-onset   Negative: Numbness in groin or rectal area (i.e., loss of sensation)   Negative: [1] SEVERE abdominal pain AND [2] present > 1 hour   Negative: [1] Abdominal pain AND [2] age > 60 years   Negative: Weakness of a leg or foot (e.g., unable to bear weight, dragging foot)   Negative: Unable to walk   Negative: Patient sounds very sick or weak to the triager   Negative: [1] SEVERE back pain (e.g., excruciating, unable to do any normal activities) AND [2] not improved 2 hours after pain medicine   Negative: [1] Pain radiates into the thigh or further down the leg AND [2] both legs   Negative: [1] Fever > 100.0 F (37.8 C) AND [2] flank pain (i.e., in side,  "below ribs and above hip)   Negative: [1] Pain or burning with passing urine (urination) AND [2] flank pain (i.e., in side, below ribs and above hip)   Negative: Numbness in a leg or foot (i.e., loss of sensation)   Negative: [1] Numbness in an arm or hand (i.e., loss of sensation) AND [2] upper back pain   Negative: High-risk adult (e.g., history of cancer, HIV, or IV drug use)   Negative: Soft tissue infection (e.g., abscess, cellulitis) or other serious infection (e.g., bacteremia) in last 2 weeks   Negative: [1] Fever AND [2] no symptoms of UTI  (Exception: has generalized muscle pains, not localized back pain)   Negative: Rash in same area as pain (may be described as "small blisters")   Negative: Blood in urine (red, pink, or tea-colored)    Protocols used: BACK PAIN-A-AH      "

## 2022-03-29 NOTE — TELEPHONE ENCOUNTER
In response to message below pt was called and a message for her to call back for the RN on call to discuss her situation further.         Hi!  I strained my back and would like to know if I could get a very low dose muscle relaxant.  My back has tensed up and hurts. As you know, I have had back problems since I'm very young.  Please let me know.  Thanks, Danuta

## 2022-04-11 ENCOUNTER — OFFICE VISIT (OUTPATIENT)
Dept: INTERNAL MEDICINE | Facility: CLINIC | Age: 67
End: 2022-04-11
Payer: COMMERCIAL

## 2022-04-11 ENCOUNTER — CLINICAL SUPPORT (OUTPATIENT)
Dept: INTERNAL MEDICINE | Facility: CLINIC | Age: 67
End: 2022-04-11

## 2022-04-11 VITALS
WEIGHT: 134.5 LBS | HEIGHT: 66 IN | TEMPERATURE: 98 F | SYSTOLIC BLOOD PRESSURE: 126 MMHG | DIASTOLIC BLOOD PRESSURE: 68 MMHG | HEART RATE: 56 BPM | BODY MASS INDEX: 21.62 KG/M2

## 2022-04-11 DIAGNOSIS — Z00.00 ANNUAL PHYSICAL EXAM: Primary | ICD-10-CM

## 2022-04-11 DIAGNOSIS — Z13.6 ENCOUNTER FOR SCREENING FOR CARDIOVASCULAR DISORDERS: ICD-10-CM

## 2022-04-11 DIAGNOSIS — Z86.19 FREQUENT INFECTIONS: Chronic | ICD-10-CM

## 2022-04-11 DIAGNOSIS — M54.41 CHRONIC BILATERAL LOW BACK PAIN WITH RIGHT-SIDED SCIATICA: Chronic | ICD-10-CM

## 2022-04-11 DIAGNOSIS — Z00.00 ROUTINE GENERAL MEDICAL EXAMINATION AT A HEALTH CARE FACILITY: Primary | ICD-10-CM

## 2022-04-11 DIAGNOSIS — G89.29 CHRONIC BILATERAL LOW BACK PAIN WITH RIGHT-SIDED SCIATICA: Chronic | ICD-10-CM

## 2022-04-11 DIAGNOSIS — R94.31 ABNORMAL EKG: ICD-10-CM

## 2022-04-11 PROCEDURE — 1159F MED LIST DOCD IN RCRD: CPT | Mod: CPTII,S$GLB,, | Performed by: INTERNAL MEDICINE

## 2022-04-11 PROCEDURE — 99999 PR PBB SHADOW E&M-EST. PATIENT-LVL I: CPT | Mod: CHM,PBBFAC,,

## 2022-04-11 PROCEDURE — 1101F PR PT FALLS ASSESS DOC 0-1 FALLS W/OUT INJ PAST YR: ICD-10-PCS | Mod: CPTII,S$GLB,, | Performed by: INTERNAL MEDICINE

## 2022-04-11 PROCEDURE — 3078F PR MOST RECENT DIASTOLIC BLOOD PRESSURE < 80 MM HG: ICD-10-PCS | Mod: CPTII,S$GLB,, | Performed by: INTERNAL MEDICINE

## 2022-04-11 PROCEDURE — 1126F AMNT PAIN NOTED NONE PRSNT: CPT | Mod: CPTII,S$GLB,, | Performed by: INTERNAL MEDICINE

## 2022-04-11 PROCEDURE — 1101F PT FALLS ASSESS-DOCD LE1/YR: CPT | Mod: CPTII,S$GLB,, | Performed by: INTERNAL MEDICINE

## 2022-04-11 PROCEDURE — 99999 PR PBB SHADOW E&M-EST. PATIENT-LVL IV: ICD-10-PCS | Mod: PBBFAC,,, | Performed by: INTERNAL MEDICINE

## 2022-04-11 PROCEDURE — 3288F FALL RISK ASSESSMENT DOCD: CPT | Mod: CPTII,S$GLB,, | Performed by: INTERNAL MEDICINE

## 2022-04-11 PROCEDURE — 3008F BODY MASS INDEX DOCD: CPT | Mod: CPTII,S$GLB,, | Performed by: INTERNAL MEDICINE

## 2022-04-11 PROCEDURE — 99999 PR PBB SHADOW E&M-EST. PATIENT-LVL IV: CPT | Mod: PBBFAC,,, | Performed by: INTERNAL MEDICINE

## 2022-04-11 PROCEDURE — 99397 PER PM REEVAL EST PAT 65+ YR: CPT | Mod: S$GLB,,, | Performed by: INTERNAL MEDICINE

## 2022-04-11 PROCEDURE — 3008F PR BODY MASS INDEX (BMI) DOCUMENTED: ICD-10-PCS | Mod: CPTII,S$GLB,, | Performed by: INTERNAL MEDICINE

## 2022-04-11 PROCEDURE — 3074F SYST BP LT 130 MM HG: CPT | Mod: CPTII,S$GLB,, | Performed by: INTERNAL MEDICINE

## 2022-04-11 PROCEDURE — 99999 PR PBB SHADOW E&M-EST. PATIENT-LVL I: ICD-10-PCS | Mod: CHM,PBBFAC,,

## 2022-04-11 PROCEDURE — 3288F PR FALLS RISK ASSESSMENT DOCUMENTED: ICD-10-PCS | Mod: CPTII,S$GLB,, | Performed by: INTERNAL MEDICINE

## 2022-04-11 PROCEDURE — 3074F PR MOST RECENT SYSTOLIC BLOOD PRESSURE < 130 MM HG: ICD-10-PCS | Mod: CPTII,S$GLB,, | Performed by: INTERNAL MEDICINE

## 2022-04-11 PROCEDURE — 1160F RVW MEDS BY RX/DR IN RCRD: CPT | Mod: CPTII,S$GLB,, | Performed by: INTERNAL MEDICINE

## 2022-04-11 PROCEDURE — 3078F DIAST BP <80 MM HG: CPT | Mod: CPTII,S$GLB,, | Performed by: INTERNAL MEDICINE

## 2022-04-11 PROCEDURE — 99397 PR PREVENTIVE VISIT,EST,65 & OVER: ICD-10-PCS | Mod: S$GLB,,, | Performed by: INTERNAL MEDICINE

## 2022-04-11 PROCEDURE — 1159F PR MEDICATION LIST DOCUMENTED IN MEDICAL RECORD: ICD-10-PCS | Mod: CPTII,S$GLB,, | Performed by: INTERNAL MEDICINE

## 2022-04-11 PROCEDURE — 1160F PR REVIEW ALL MEDS BY PRESCRIBER/CLIN PHARMACIST DOCUMENTED: ICD-10-PCS | Mod: CPTII,S$GLB,, | Performed by: INTERNAL MEDICINE

## 2022-04-11 PROCEDURE — 1126F PR PAIN SEVERITY QUANTIFIED, NO PAIN PRESENT: ICD-10-PCS | Mod: CPTII,S$GLB,, | Performed by: INTERNAL MEDICINE

## 2022-04-11 RX ORDER — ALPRAZOLAM 0.25 MG/1
0.25 TABLET ORAL 2 TIMES DAILY PRN
Qty: 30 TABLET | Refills: 1 | Status: SHIPPED | OUTPATIENT
Start: 2022-04-11 | End: 2022-04-11 | Stop reason: SDUPTHER

## 2022-04-11 RX ORDER — ALPRAZOLAM 0.25 MG/1
0.25 TABLET ORAL 2 TIMES DAILY PRN
Qty: 30 TABLET | Refills: 1 | Status: SHIPPED | OUTPATIENT
Start: 2022-04-11 | End: 2023-04-13

## 2022-04-11 RX ORDER — CHOLECALCIFEROL (VITAMIN D3) 25 MCG
2000 TABLET ORAL DAILY
COMMUNITY

## 2022-04-11 NOTE — PROGRESS NOTES
Subjective:       Patient ID: Danuta Santos is a 66 y.o. female.    Chief Complaint: Establish [facility] and [facility]    66-year-old nonsmoking female here to establish with [facility]. She is a  at [employer]. She already had an annual physical exam.  Her main past medical history is chronic intermittent lumbar pain for which she undergoes [facility] physical therapy whether at home or with a physical therapist, Laurie Escobar on the [facility] that she has been seeing for quite some time.She has even undergone [place]   She also has had repair of a right flexure second digit tendon tear on her hand in 2/2021 which is when the EKG was done that showed non specific changes in the septal area.   She also had two surgeries on her left foot for a bunion. 2/2021 and more recently for removal of the plate and screw in 3/2022 by Dr Plascencia.     Review of Systems   Constitutional: Negative for chills, decreased appetite, fever, malaise/fatigue, night sweats, weight gain and weight loss.   HENT: Negative for congestion, ear pain, hearing loss, hoarse voice, sore throat and tinnitus.    Eyes: Negative for blurred vision, redness and visual disturbance.   Cardiovascular: Negative for chest pain, leg swelling and palpitations.   Respiratory: Negative for cough, hemoptysis, shortness of breath and sputum production.    Hematologic/Lymphatic: Negative for adenopathy. Does not bruise/bleed easily.   Skin: Negative for dry skin, itching, rash and suspicious lesions.   Musculoskeletal: Positive for back pain. Negative for joint pain, myalgias and neck pain.        Radiates down the right leg usually    Gastrointestinal: Negative for abdominal pain, constipation, diarrhea, heartburn, nausea and vomiting.   Genitourinary: Negative for dysuria, flank pain, frequency, hematuria, hesitancy and urgency.   Neurological: Negative for dizziness, headaches, numbness, paresthesias and weakness.    Psychiatric/Behavioral: Negative for depression and memory loss. The patient does not have insomnia and is not nervous/anxious.        Objective:      Physical Exam  HENT:      Head: Normocephalic and atraumatic.   Eyes:      General: No scleral icterus.  Neck:      Vascular: No carotid bruit.   Cardiovascular:      Rate and Rhythm: Normal rate and regular rhythm.      Heart sounds: No murmur heard.  Pulmonary:      Effort: Pulmonary effort is normal.      Breath sounds: Normal breath sounds. No rales.   Abdominal:      General: Bowel sounds are normal. There is no distension.      Palpations: Abdomen is soft.      Tenderness: There is no abdominal tenderness.   Musculoskeletal:         General: No tenderness. Normal range of motion.      Cervical back: Normal range of motion and neck supple.   Skin:     General: Skin is warm and dry.   Neurological:      Mental Status: She is alert and oriented to person, place, and time.         Assessment:       1. Annual physical exam    2. Chronic bilateral low back pain with right-sided sciatica- fused on right L5/S!    3. Abnormal EKG    4. Encounter for screening for cardiovascular disorders    5. Frequent infections- low IGg response to pneumococcal vaccine        Plan:       Annual physical exam     Health Maintenance   Topic Date Due    Mammogram  08/18/2022    DEXA Scan  03/15/2024    TETANUS VACCINE  08/15/2026    Lipid Panel  02/12/2027    Hepatitis C Screening  Completed     Health Maintenance Reviewed    LAST COLONOSCOPY 7/2020 REPEAT 7 YEARS  LAST BMD 3/2022  LOWEST T SCORE -1.5 FEMORAL NECK ; NOT ON TX SO REPEAT IN 2 YEARS    Chronic bilateral low back pain with right-sided sciatica- fused on right L5/S1  Comments:  undergoing physical therapy for years.  Laurie Startee on the [facility] as well    Abnormal EKG  Comments:  ?septal infarct seen on both preop EKGs in 3/2022 and 5/2021  echo normal   Orders:  -     CT Cardiac Scoring; Future; Expected  date: 04/12/2022    Encounter for screening for cardiovascular disorders  Comments:  as per above with non specific changes on EKG  pt LDL is consistently less than 100  Orders:  -     CT Cardiac Scoring; Future; Expected date: 04/12/2022    Frequent infections- low IGg response to pneumococcal vaccine  Comments:  recommend as per allergy/immunology another pneumococal vaccine 23  also when pneumococcal 20 comes out would recommend it as well    Situational anxiety - rare  -     ALPRAZolam (XANAX) 0.25 MG tablet; Take 1 tablet (0.25 mg total) by mouth 2 (two) times daily as needed for Anxiety.  Dispense: 30 tablet; Refill: 1

## 2022-04-11 NOTE — PROGRESS NOTES
Subjective:       Patient ID: Danuta Santos is a 66 y.o. female.    Chief Complaint: No chief complaint on file.    HPI   Pt. Has no significant cardiovascular or pulmonary history.    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  Patient had a bunionectomy in 2021 and had her hardware removed on March 18th, 2022.  Patient is currently limited from walking long distances.  Patient had right carpel tunnel and second metacarpal Imagent repair surgery in 2021, from which she denies any current limitations.      Current exercise routine:  Patient currently walks for 30-60 minutes, 5 days a week.  Patient practices core stabilization exercises and stretches, 4 days a week.  Patient attends physical therapy for her back, once a week.    Goals:  Patient set a goal weight of 130 lbs.    Fun Facts:  Patient was very friendly and engaged.  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  Patient noted that she has some body dysmorphia and thinks of herself as overweight from her history in [place].  Patient seems invested in her health and was receptive to all recommendations made.      Review of Systems      Objective:     The fitness evaluation results are as follows:  D.O.S. 4/11/2022   Height (in): 66   Weight (lbs): 134.5   BMI: 21.030655   Body Fat (%): 30.50   Waist (cm): 71   Hip (cm): 102   WHR: 0.70   RBP (mmHg): 124/70   RHR (bpm): 58    Strength R (lbs)t: 65    Strength Lt (lbs): 61.657494   Push-up Assessment: 23   Curl-up Assessment: 30   Flexibility Testing (cm): 18   REE (kcals): 1286       Physical Exam    Assessment:     Age/gender stratified assessment:  Resting BP: Within Normal Limits   Body Fat %: Very Good   WHR Risk Factor: Low Risk    Strength R: Above Average     Strength L: Above Average   Upper Body Endurance: Excellent   Abdominal Endurance: Above Average   Lower body Flexibiltiy: Needs Improvement       Problem List Items Addressed This Visit    None     Visit Diagnoses     Routine general medical examination at a health care facility    -  Primary          Plan:       Recommended fitness guidelines:    -150 minutes of moderate intensity aerobic exercise per week or 75 minutes of vigorous intensity aerobic exercise per week.  Start to add some higher intensity aerobic exercise into your current routine by swimming or ellitpicaling.      -2 to 4 days per week of resistance training for each muscle group.  Use some of the weight machines at the gym to practice upper and lower body resistance training exercises, 2 days a week.      -Daily stretching with a hold of at least 30 seconds per muscle group.  Practice the seated hamstring stretch, demonstrated during the evaluation, daily.

## 2022-04-12 ENCOUNTER — CLINICAL SUPPORT (OUTPATIENT)
Dept: INTERNAL MEDICINE | Facility: CLINIC | Age: 67
End: 2022-04-12
Payer: COMMERCIAL

## 2022-04-12 ENCOUNTER — OFFICE VISIT (OUTPATIENT)
Dept: OBSTETRICS AND GYNECOLOGY | Facility: CLINIC | Age: 67
End: 2022-04-12
Attending: OBSTETRICS & GYNECOLOGY
Payer: COMMERCIAL

## 2022-04-12 ENCOUNTER — IMMUNIZATION (OUTPATIENT)
Dept: INTERNAL MEDICINE | Facility: CLINIC | Age: 67
End: 2022-04-12
Payer: COMMERCIAL

## 2022-04-12 VITALS
BODY MASS INDEX: 21.69 KG/M2 | SYSTOLIC BLOOD PRESSURE: 128 MMHG | WEIGHT: 135 LBS | DIASTOLIC BLOOD PRESSURE: 78 MMHG | HEIGHT: 66 IN

## 2022-04-12 DIAGNOSIS — Z23 NEED FOR VACCINATION: Primary | ICD-10-CM

## 2022-04-12 DIAGNOSIS — Z00.00 ROUTINE GENERAL MEDICAL EXAMINATION AT A HEALTH CARE FACILITY: Primary | ICD-10-CM

## 2022-04-12 DIAGNOSIS — Z01.419 WELL WOMAN EXAM: ICD-10-CM

## 2022-04-12 DIAGNOSIS — Z01.419 WELL FEMALE EXAM WITH ROUTINE GYNECOLOGICAL EXAM: Primary | ICD-10-CM

## 2022-04-12 DIAGNOSIS — Z12.31 ENCOUNTER FOR SCREENING MAMMOGRAM FOR MALIGNANT NEOPLASM OF BREAST: ICD-10-CM

## 2022-04-12 PROBLEM — Z12.11 COLON CANCER SCREENING: Status: RESOLVED | Noted: 2020-07-20 | Resolved: 2022-04-12

## 2022-04-12 PROBLEM — Z01.810 PREOPERATIVE CARDIOVASCULAR EXAMINATION: Status: RESOLVED | Noted: 2022-03-16 | Resolved: 2022-04-12

## 2022-04-12 PROCEDURE — 3008F PR BODY MASS INDEX (BMI) DOCUMENTED: ICD-10-PCS | Mod: CPTII,S$GLB,, | Performed by: OBSTETRICS & GYNECOLOGY

## 2022-04-12 PROCEDURE — 1160F PR REVIEW ALL MEDS BY PRESCRIBER/CLIN PHARMACIST DOCUMENTED: ICD-10-PCS | Mod: CPTII,S$GLB,, | Performed by: OBSTETRICS & GYNECOLOGY

## 2022-04-12 PROCEDURE — 3074F PR MOST RECENT SYSTOLIC BLOOD PRESSURE < 130 MM HG: ICD-10-PCS | Mod: CPTII,S$GLB,, | Performed by: OBSTETRICS & GYNECOLOGY

## 2022-04-12 PROCEDURE — 3008F BODY MASS INDEX DOCD: CPT | Mod: CPTII,S$GLB,, | Performed by: OBSTETRICS & GYNECOLOGY

## 2022-04-12 PROCEDURE — 1126F PR PAIN SEVERITY QUANTIFIED, NO PAIN PRESENT: ICD-10-PCS | Mod: CPTII,S$GLB,, | Performed by: OBSTETRICS & GYNECOLOGY

## 2022-04-12 PROCEDURE — 1159F PR MEDICATION LIST DOCUMENTED IN MEDICAL RECORD: ICD-10-PCS | Mod: CPTII,S$GLB,, | Performed by: OBSTETRICS & GYNECOLOGY

## 2022-04-12 PROCEDURE — 3288F PR FALLS RISK ASSESSMENT DOCUMENTED: ICD-10-PCS | Mod: CPTII,S$GLB,, | Performed by: OBSTETRICS & GYNECOLOGY

## 2022-04-12 PROCEDURE — 99999 PR PBB SHADOW E&M-EST. PATIENT-LVL V: CPT | Mod: PBBFAC,,, | Performed by: OBSTETRICS & GYNECOLOGY

## 2022-04-12 PROCEDURE — 3074F SYST BP LT 130 MM HG: CPT | Mod: CPTII,S$GLB,, | Performed by: OBSTETRICS & GYNECOLOGY

## 2022-04-12 PROCEDURE — 1101F PT FALLS ASSESS-DOCD LE1/YR: CPT | Mod: CPTII,S$GLB,, | Performed by: OBSTETRICS & GYNECOLOGY

## 2022-04-12 PROCEDURE — 99999 PR PBB SHADOW E&M-EST. PATIENT-LVL V: ICD-10-PCS | Mod: PBBFAC,,, | Performed by: OBSTETRICS & GYNECOLOGY

## 2022-04-12 PROCEDURE — 1126F AMNT PAIN NOTED NONE PRSNT: CPT | Mod: CPTII,S$GLB,, | Performed by: OBSTETRICS & GYNECOLOGY

## 2022-04-12 PROCEDURE — 3078F PR MOST RECENT DIASTOLIC BLOOD PRESSURE < 80 MM HG: ICD-10-PCS | Mod: CPTII,S$GLB,, | Performed by: OBSTETRICS & GYNECOLOGY

## 2022-04-12 PROCEDURE — 3288F FALL RISK ASSESSMENT DOCD: CPT | Mod: CPTII,S$GLB,, | Performed by: OBSTETRICS & GYNECOLOGY

## 2022-04-12 PROCEDURE — 99397 PER PM REEVAL EST PAT 65+ YR: CPT | Mod: S$GLB,,, | Performed by: OBSTETRICS & GYNECOLOGY

## 2022-04-12 PROCEDURE — 1159F MED LIST DOCD IN RCRD: CPT | Mod: CPTII,S$GLB,, | Performed by: OBSTETRICS & GYNECOLOGY

## 2022-04-12 PROCEDURE — 88175 CYTOPATH C/V AUTO FLUID REDO: CPT | Performed by: OBSTETRICS & GYNECOLOGY

## 2022-04-12 PROCEDURE — 99397 PR PREVENTIVE VISIT,EST,65 & OVER: ICD-10-PCS | Mod: S$GLB,,, | Performed by: OBSTETRICS & GYNECOLOGY

## 2022-04-12 PROCEDURE — 91305 COVID-19, MRNA, LNP-S, PF, 30 MCG/0.3 ML DOSE VACCINE (PFIZER): CPT | Mod: PBBFAC | Performed by: INTERNAL MEDICINE

## 2022-04-12 PROCEDURE — 1101F PR PT FALLS ASSESS DOC 0-1 FALLS W/OUT INJ PAST YR: ICD-10-PCS | Mod: CPTII,S$GLB,, | Performed by: OBSTETRICS & GYNECOLOGY

## 2022-04-12 PROCEDURE — 99999 PR PBB SHADOW E&M-EST. PATIENT-LVL I: ICD-10-PCS | Mod: CHM,PBBFAC,,

## 2022-04-12 PROCEDURE — 99999 PR PBB SHADOW E&M-EST. PATIENT-LVL I: CPT | Mod: CHM,PBBFAC,,

## 2022-04-12 PROCEDURE — 3078F DIAST BP <80 MM HG: CPT | Mod: CPTII,S$GLB,, | Performed by: OBSTETRICS & GYNECOLOGY

## 2022-04-12 PROCEDURE — 1160F RVW MEDS BY RX/DR IN RCRD: CPT | Mod: CPTII,S$GLB,, | Performed by: OBSTETRICS & GYNECOLOGY

## 2022-04-12 NOTE — PROGRESS NOTES
"Nutrition Assessment  Session Time:  60 minutes  (Initial Annual  Physical)    Client name:  Danuta Santos  :  1955  Age:  66 y.o.  Gender:  female    Client states:  She has a limited and simple diet due to IBS-C which includes chicken, salmon, red meat 1-2x/wk, tuna, vegan or goat cheese, oat milk oatmeal and oat bran, Kind bars, homemade honey almond bread, gluten free crackers and salad. She enjoys and eats veggies when dining out however does not like to prepare them at home. Dr. Khan gave her a choice of taking iron pills daily or eating red meat and she chose red meat. She uses little salt + dill to season her food and usually skips carb's at dinner meal as her "tummy feels better". Gluten free or not with carb's tends to cause sinus congestion so it must be worth the symptons and chocolate cake would be. During the pandemic she went through a divorce and has not done any cardio exercise since that time". Mentions she gains wt easily and weighs herself daily. Her goals by next visit are to return to her previous aerobic exercise.      Anthropometrics  Height: 5'6"     Weight:  134.5#  BMI:  21.75  % Body Fat:  30.5    Clinical Signs/Symptoms  N/V/D:  none  Appetite:  good       Past Medical History:   Diagnosis Date    Allergy     wheat , diary, alcohol ,some vinegars    Arthritis     IBS (irritable bowel syndrome)     Recurrent upper respiratory infection (URI)        Past Surgical History:   Procedure Laterality Date     SECTION       and     COLONOSCOPY N/A 2020    Procedure: COLONOSCOPY;  Surgeon: Liam Lipscomb MD;  Location: [facility] ([address] [place] [address]);  Service: Endoscopy;  Laterality: N/A;  Single balloon needed, can start with regular scope  COVID screening  20 - [facility]    foot bunio Left     intial 2021 and then remoced 3/18/2022    NOSE SURGERY      right hand  2021    2nd digit tore flexer tendon ; dr sarah ROSEN " SURGERY  03/20/2019    shay--- after pneumonia then a yr later sinoplasty       Medications    has a current medication list which includes the following prescription(s): acetaminophen, alprazolam, ascorbic acid (vitamin c), calcium carbonate, cyanocobalamin, glucosam/chond/collagen/hyalur, l.acid/b.animalis,bifidum/fos, pediatric multivitamin, pimecrolimus, polyethylene glycol, vitamin d, and zinc.    Vitamins, Minerals, and/or Supplements:  Probiotic, Glucosamine Chondroitin, Miralex, Fleet enema daily, Vitamin D3 2,000 IU, Zinc, B12 1000 mcg, Calcium 500 mg, Vitamin C 500 mg, pediatric chewable MVI     Food/Medication Interactions:  Reviewed     Food Allergies or Intolerances:  Intolerances due to IBS - spicy and fried foods. ETOH and chocolate promotes sinus congestion.     Social History    Marital status:    Employment:  Teacher - [employer]    Social History     Tobacco Use    Smoking status: Never Smoker    Smokeless tobacco: Never Used   Substance Use Topics    Alcohol use: No        Lab Reports   Sodium   Date Value Ref Range Status   03/16/2022 134 (L) 136 - 145 mmol/L Final     Potassium   Date Value Ref Range Status   03/16/2022 4.4 3.5 - 5.1 mmol/L Final     Chloride   Date Value Ref Range Status   03/16/2022 99 95 - 110 mmol/L Final     CO2   Date Value Ref Range Status   03/16/2022 30 (H) 23 - 29 mmol/L Final     Glucose   Date Value Ref Range Status   03/16/2022 99 70 - 110 mg/dL Final     BUN   Date Value Ref Range Status   03/16/2022 17 8 - 23 mg/dL Final     Creatinine   Date Value Ref Range Status   03/16/2022 0.8 0.5 - 1.4 mg/dL Final     Calcium   Date Value Ref Range Status   03/16/2022 10.4 8.7 - 10.5 mg/dL Final     Total Protein   Date Value Ref Range Status   02/12/2022 7.4 6.0 - 8.4 g/dL Final     Albumin   Date Value Ref Range Status   02/12/2022 4.3 3.5 - 5.2 g/dL Final     Total Bilirubin   Date Value Ref Range Status   02/12/2022 0.8 0.1 - 1.0 mg/dL Final      Comment:     For infants and newborns, interpretation of results should be based  on gestational age, weight and in agreement with clinical  observations.    Premature Infant recommended reference ranges:  Up to 24 hours.............<8.0 mg/dL  Up to 48 hours............<12.0 mg/dL  3-5 days..................<15.0 mg/dL  6-29 days.................<15.0 mg/dL       Alkaline Phosphatase   Date Value Ref Range Status   02/12/2022 62 55 - 135 U/L Final     AST   Date Value Ref Range Status   02/12/2022 22 10 - 40 U/L Final     ALT   Date Value Ref Range Status   02/12/2022 16 10 - 44 U/L Final     Anion Gap   Date Value Ref Range Status   03/16/2022 5 (L) 8 - 16 mmol/L Final     eGFR if    Date Value Ref Range Status   03/16/2022 >60.0 >60 mL/min/1.73 m^2 Final     eGFR if non    Date Value Ref Range Status   03/16/2022 >60.0 >60 mL/min/1.73 m^2 Final     Comment:     Calculation used to obtain the estimated glomerular filtration  rate (eGFR) is the CKD-EPI equation.         Lab Results   Component Value Date    WBC 4.64 03/16/2022    HGB 13.3 03/16/2022    HCT 42.0 03/16/2022    MCV 95 03/16/2022     03/16/2022        Lab Results   Component Value Date    CHOL 194 02/12/2022     Lab Results   Component Value Date    HDL 94 (H) 02/12/2022     Lab Results   Component Value Date    LDLCALC 92.8 02/12/2022     Lab Results   Component Value Date    TRIG 36 02/12/2022     Lab Results   Component Value Date    CHOLHDL 48.5 02/12/2022     Lab Results   Component Value Date    HGBA1C 5.6 08/16/2016     BP Readings from Last 1 Encounters:   04/12/22 128/78       Food History  Breakfast:  Corn tortilla with vegan cheese, grilled tuna with guacamole, 2c. Coffee, oat milk with honey  Mid-morning Snack:  Fruit salad or oat bar  Lunch:  Salad with chicken or salmon with oil and vinegar, water  Mid-afternoon Snack:  Apple or oat bar  Dinner:  Protein with salad, water  H.S. Snack:  none  *Fluid  intake:  Coffee, oat milk, water    Exercise History:  Walking 4-5 times a week + stretching and core exercises    Cultural/Spiritual/Personal Preferences:  None identified    Support System:  friends    State of Change:  Preparation    Barriers to Change:  None identified    Diagnosis    Other: No nutrition related diagnosis at this time.     Intervention    RMR (Method:  InBody):  1286 kcal  Activity Factor:  1.3    KAIT:  1671 kcal    Goals:  1.  Restart swimming at [employer] 40 laps for 20 minutes  2.  Daily fluid goal 65 oz.   3.  Consider chewable adult MVI daily    Nutrition Education  The following education was provided to the patient:  Complimented patient on proactive role in health maintenance.  Complimented patient on dietary compliance/modifications and resulting health improvements.  Discussed Heart Healthy Eating.  Suggested dietary modifications based on current dietary behaviors and individual food preferences.  Discussed physical activity guidelines and its associated benefits.  Discussed nutrition-related lab values and dietary and/or lifestyle factors affecting them.  Discussed recommended servings of non-starchy vegetables/day and food sources of such.  Discussed benefits of adequate hydration and recommended fluid intake.  Discussed [facility] client resources (may include but not limited to [facility] Eat Fit Shopping List, Fast Food Guide, Lite Restaurant Guide, and Meal Planning Guide).  Discussed importance of small behavior/habit changes in improving long-term adherence and sustainability.  Discussed goal setting.  Provided ongoing support, encouragement, and guidance toward improved health efforts.  Explained the coaching service and that the  will be in contact in one month.    Patient verbalized understanding of nutrition education and recommendations received.    Handouts Provided  Meal Planning Guide  Restaurant Guide  Eat Fit Shopping List  Eat Fit Luz  Fueling Well On-The-Go  Vitamin/Mineral  Guide    Monitoring/Evaluation    Monitor the following:  Weight  BMI  % Body Fat  Caloric intake  Labs:  Lipids/HAIC    Follow Up Plan:  Communication with referring healthcare provider is unnecessary at this time as patient presented as part of annual wellness exam.  However, will follow up with patient in 1-2 years.

## 2022-04-12 NOTE — PROGRESS NOTES
SUBJECTIVE:   66 y.o. female   for routine gyn exam. No LMP recorded. Patient is postmenopausal..  She has no unusual complaints.  No PMB.  No HRT.         Past Medical History:   Diagnosis Date    Allergy     wheat , diary, alcohol ,some vinegars    Arthritis     IBS (irritable bowel syndrome)     Recurrent upper respiratory infection (URI)      Past Surgical History:   Procedure Laterality Date     SECTION       and     COLONOSCOPY N/A 2020    Procedure: COLONOSCOPY;  Surgeon: Liam Lipscomb MD;  Location: [facility] ([address] [place] [address]);  Service: Endoscopy;  Laterality: N/A;  Single balloon needed, can start with regular scope  COVID screening  20 - [facility] - [facility]    foot bunio Left     intial 2021 and then remoced 3/18/2022    NOSE SURGERY      right hand  2021    2nd digit tore flexer tendon ; dr smith    SINUS SURGERY  2019    shay--- after pneumonia then a yr later sinoplasty     Social History     Socioeconomic History    Marital status:    Tobacco Use    Smoking status: Never Smoker    Smokeless tobacco: Never Used   Substance and Sexual Activity    Alcohol use: No    Drug use: No    Sexual activity: Yes     Partners: Male     Birth control/protection: None, Post-menopausal     Family History   Problem Relation Age of Onset    COPD Mother     Cancer Father 54        pancreatic - ergonamist    Hypertension Brother     Cancer Paternal Aunt         breast    Cancer Maternal Grandmother         leukemia    Alcohol abuse Maternal Grandfather     Stroke Maternal Grandfather     Stroke Paternal Grandfather     Cancer Cousin         leukemia    Breast cancer Neg Hx     Colon cancer Neg Hx     Diabetes Neg Hx     Eclampsia Neg Hx     Miscarriages / Stillbirths Neg Hx     Ovarian cancer Neg Hx      labor Neg Hx     Allergic rhinitis Neg Hx     Allergies Neg Hx     Angioedema Neg Hx     Asthma Neg Hx     Eczema Neg  Hx     Immunodeficiency Neg Hx     Rhinitis Neg Hx     Urticaria Neg Hx     Atopy Neg Hx      OB History    Para Term  AB Living   2 2 2     2   SAB IAB Ectopic Multiple Live Births                  # Outcome Date GA Lbr Gio/2nd Weight Sex Delivery Anes PTL Lv   2 Term            1 Term                  Current Outpatient Medications   Medication Sig Dispense Refill    acetaminophen (TYLENOL) 500 MG tablet Take 500 mg by mouth every 6 (six) hours as needed.      ALPRAZolam (XANAX) 0.25 MG tablet Take 1 tablet (0.25 mg total) by mouth 2 (two) times daily as needed for Anxiety. 30 tablet 1    ascorbic acid, vitamin C, (VITAMIN C) 500 MG tablet Take 500 mg by mouth once daily.      calcium carbonate (CALCIUM 500 ORAL) Take by mouth.      cyanocobalamin 500 MCG tablet Take 1,000 mcg by mouth once daily.      glucosam/chond/collagen/hyalur (PUTGFMXD-VWGY-USZLEL-HYALUR AC ORAL) Take 1 tablet by mouth once daily at 6am.      LACTOBACILLUS COMBO NO.6 (PROBIOTIC COMPLEX ORAL) Take 1 capsule by mouth once.       pediatric multivitamin chewable tablet Take 2 tablets by mouth once daily.      pimecrolimus (ELIDEL) 1 % cream Apply topically 2 (two) times daily as needed.      polyethylene glycol (GLYCOLAX) 17 gram PwPk Take by mouth.      vitamin D (VITAMIN D3) 1000 units Tab Take 2,000 Units by mouth once daily.      ZINC ORAL Take by mouth.       No current facility-administered medications for this visit.     Allergies: Wheat containing prod, Advil [ibuprofen], Apple cider vinegar, Aspirin, Lactobacillus acidoph-lactase, Milk containing products, Nsaids (non-steroidal anti-inflammatory drug), and Alcohol     ROS:  Constitutional: no weight loss, weight gain, fever, fatigue  Eyes:  No vision changes, glasses/contacts  ENT/Mouth: No ulcers, sinus problems, ears ringing, headache  Cardiovascular: No inability to lie flat, chest pain, exercise intolerance, swelling, heart palpitations  Respiratory:  "No wheezing, coughing blood, shortness of breath, or cough  Gastrointestinal: No diarrhea, bloody stool, nausea/vomiting, constipation, gas, hemorrhoids  Genitourinary: No blood in urine, painful urination, urgency of urination, frequency of urination, incomplete emptying, incontinence, abnormal bleeding, painful periods, heavy periods, vaginal discharge, vaginal odor, painful intercourse, sexual problems, bleeding after intercourse.  Musculoskeletal: No muscle weakness  Skin/Breast: No painful breasts, nipple discharge, masses, rash, ulcers  Neurological: No passing out, seizures, numbness, headache  Endocrine: No diabetes, hypothyroid, hyperthyroid, hot flashes, hair loss, abnormal hair growth, acne  Psychiatric: No depression, crying  Hematologic: No bruises, bleeding, swollen lymph nodes, anemia.      OBJECTIVE:   The patient appears well, alert, oriented x 3, in no distress.  /78   Ht 5' 6" (1.676 m)   Wt 61.2 kg (135 lb)   BMI 21.79 kg/m²   NECK: no thyromegaly, trachea midline  SKIN: no acne, striae, hirsutism  CHEST: CTAB  CV: RRR  BREAST EXAM: breasts appear normal, no suspicious masses, no skin or nipple changes or axillary nodes  ABDOMEN: no hernias, masses, or hepatosplenomegaly  GENITALIA: normal external genitalia, no erythema, no discharge  URETHRA: normal urethra, normal urethral meatus  VAGINA: Normal  CERVIX: no lesions or cervical motion tenderness  UTERUS: normal size, contour, position, consistency, mobility, non-tender  ADNEXA: no mass, fullness, tenderness      ASSESSMENT:   1. Well female exam with routine gynecological exam  CANCELED: Liquid-Based Pap Smear, Screening   2. Well woman exam  Ambulatory referral/consult to Obstetrics / Gynecology   3. Encounter for screening mammogram for malignant neoplasm of breast  Mammo Digital Screening Bilat w/ Esvin       PLAN:   Orders Placed This Encounter    Mammo Digital Screening Bilat w/ Esvin    Pap Smear, Thin Prep with Reflex to HPV "     Discussed healthy lifestyle including regular exercise, healthy eating, etc.  Return to clinic in 1 year

## 2022-04-13 ENCOUNTER — OFFICE VISIT (OUTPATIENT)
Dept: ALLERGY | Facility: CLINIC | Age: 67
End: 2022-04-13
Payer: COMMERCIAL

## 2022-04-13 VITALS
HEART RATE: 57 BPM | SYSTOLIC BLOOD PRESSURE: 155 MMHG | HEIGHT: 66 IN | WEIGHT: 134.94 LBS | BODY MASS INDEX: 21.69 KG/M2 | DIASTOLIC BLOOD PRESSURE: 69 MMHG

## 2022-04-13 DIAGNOSIS — J32.9 RECURRENT SINUSITIS: ICD-10-CM

## 2022-04-13 DIAGNOSIS — R76.0 ABNORMAL ANTIBODY TITER: Primary | ICD-10-CM

## 2022-04-13 PROCEDURE — 90471 PNEUMOCOCCAL POLYSACCHARIDE VACCINE 23-VALENT =>2YO SQ IM: ICD-10-PCS | Mod: S$GLB,,, | Performed by: ALLERGY & IMMUNOLOGY

## 2022-04-13 PROCEDURE — 90732 PNEUMOCOCCAL POLYSACCHARIDE VACCINE 23-VALENT =>2YO SQ IM: ICD-10-PCS | Mod: S$GLB,,, | Performed by: ALLERGY & IMMUNOLOGY

## 2022-04-13 PROCEDURE — 3077F PR MOST RECENT SYSTOLIC BLOOD PRESSURE >= 140 MM HG: ICD-10-PCS | Mod: CPTII,S$GLB,, | Performed by: ALLERGY & IMMUNOLOGY

## 2022-04-13 PROCEDURE — 3078F PR MOST RECENT DIASTOLIC BLOOD PRESSURE < 80 MM HG: ICD-10-PCS | Mod: CPTII,S$GLB,, | Performed by: ALLERGY & IMMUNOLOGY

## 2022-04-13 PROCEDURE — 99999 PR PBB SHADOW E&M-EST. PATIENT-LVL III: CPT | Mod: PBBFAC,,, | Performed by: ALLERGY & IMMUNOLOGY

## 2022-04-13 PROCEDURE — 99999 PR PBB SHADOW E&M-EST. PATIENT-LVL III: ICD-10-PCS | Mod: PBBFAC,,, | Performed by: ALLERGY & IMMUNOLOGY

## 2022-04-13 PROCEDURE — 99214 OFFICE O/P EST MOD 30 MIN: CPT | Mod: 25,S$GLB,, | Performed by: ALLERGY & IMMUNOLOGY

## 2022-04-13 PROCEDURE — 1159F MED LIST DOCD IN RCRD: CPT | Mod: CPTII,S$GLB,, | Performed by: ALLERGY & IMMUNOLOGY

## 2022-04-13 PROCEDURE — 3008F PR BODY MASS INDEX (BMI) DOCUMENTED: ICD-10-PCS | Mod: CPTII,S$GLB,, | Performed by: ALLERGY & IMMUNOLOGY

## 2022-04-13 PROCEDURE — 90732 PPSV23 VACC 2 YRS+ SUBQ/IM: CPT | Mod: S$GLB,,, | Performed by: ALLERGY & IMMUNOLOGY

## 2022-04-13 PROCEDURE — 3078F DIAST BP <80 MM HG: CPT | Mod: CPTII,S$GLB,, | Performed by: ALLERGY & IMMUNOLOGY

## 2022-04-13 PROCEDURE — 3077F SYST BP >= 140 MM HG: CPT | Mod: CPTII,S$GLB,, | Performed by: ALLERGY & IMMUNOLOGY

## 2022-04-13 PROCEDURE — 99214 PR OFFICE/OUTPT VISIT, EST, LEVL IV, 30-39 MIN: ICD-10-PCS | Mod: 25,S$GLB,, | Performed by: ALLERGY & IMMUNOLOGY

## 2022-04-13 PROCEDURE — 90471 IMMUNIZATION ADMIN: CPT | Mod: S$GLB,,, | Performed by: ALLERGY & IMMUNOLOGY

## 2022-04-13 PROCEDURE — 1126F AMNT PAIN NOTED NONE PRSNT: CPT | Mod: CPTII,S$GLB,, | Performed by: ALLERGY & IMMUNOLOGY

## 2022-04-13 PROCEDURE — 3008F BODY MASS INDEX DOCD: CPT | Mod: CPTII,S$GLB,, | Performed by: ALLERGY & IMMUNOLOGY

## 2022-04-13 PROCEDURE — 1126F PR PAIN SEVERITY QUANTIFIED, NO PAIN PRESENT: ICD-10-PCS | Mod: CPTII,S$GLB,, | Performed by: ALLERGY & IMMUNOLOGY

## 2022-04-13 PROCEDURE — 1159F PR MEDICATION LIST DOCUMENTED IN MEDICAL RECORD: ICD-10-PCS | Mod: CPTII,S$GLB,, | Performed by: ALLERGY & IMMUNOLOGY

## 2022-04-13 NOTE — PROGRESS NOTES
Subjective:       Patient ID: Danuta Santos is a 66 y.o. female.    LV 2/7/22    Chief Complaint:  Follow-up  follow up recurrent sinusitis    Follow-up  Associated symptoms include headaches. Pertinent negatives include no arthralgias, chest pain, congestion, coughing, fatigue, fever, joint swelling, nausea, rash or vomiting.     Pt presents for fu. Doing well since LV. Denies any interval sinus infections.  Eval from LV notable for pneumococcal titers < 70% protective w nl IgG, IgA, IgM    Hx from initial visit 2/7/22:  Pt presents w hx recurrent sinusitis. Reports hx of about 2 episodes suspected bacterial sinusitis per year for several year. Infections seem sudden in onset, usu presesnt w fever, fatigue. Notes improvement w abx.  Had turbinate reduction and balloon sinuplasty in 2020 w Dr. Tran. This seemed helpful.   She recalls pneumonia in 2018 treated as outpt.  No OM as adult. No recurrent skin infections.  In '95 had deviated septum repair  Hx rhinitis medicamentosa. 25 years. Stopped nasal decongestants March 2018 w help of xhance thearpy.  Does have hx recurrent HA that she can distinguish as different than her sinus infections.  Had prevnar and pneumovax in 2019    Environmental History: Pets in the home: none.  [place]: area rugs and hardwood floors  Tobacco Smoke in Home: no    Past Medical History:   Diagnosis Date    Allergy     wheat , diary, alcohol ,some vinegars    Arthritis     IBS (irritable bowel syndrome)     Recurrent upper respiratory infection (URI)        Family History   Problem Relation Age of Onset    COPD Mother     Cancer Father 54        pancreatic - ergonamist    Hypertension Brother     Cancer Paternal Aunt         breast    Cancer Maternal Grandmother         leukemia    Alcohol abuse Maternal Grandfather     Stroke Maternal Grandfather     Stroke Paternal Grandfather     Cancer Cousin         leukemia    Breast cancer Neg Hx     Colon cancer Neg Hx      Diabetes Neg Hx     Eclampsia Neg Hx     Miscarriages / Stillbirths Neg Hx     Ovarian cancer Neg Hx      labor Neg Hx     Allergic rhinitis Neg Hx     Allergies Neg Hx     Angioedema Neg Hx     Asthma Neg Hx     Eczema Neg Hx     Immunodeficiency Neg Hx     Rhinitis Neg Hx     Urticaria Neg Hx     Atopy Neg Hx          Review of Systems   Constitutional: Negative for activity change, fatigue and fever.   HENT: Negative for congestion, postnasal drip, rhinorrhea, sinus pressure and sneezing.    Eyes: Negative for discharge, redness and itching.   Respiratory: Negative for cough, shortness of breath and wheezing.    Cardiovascular: Negative for chest pain.   Gastrointestinal: Negative for diarrhea, nausea and vomiting.   Genitourinary: Negative for dysuria.   Musculoskeletal: Negative for arthralgias and joint swelling.   Skin: Negative for rash.   Neurological: Positive for headaches.   Hematological: Does not bruise/bleed easily.   Psychiatric/Behavioral: Negative for behavioral problems and sleep disturbance.        Objective:   Physical Exam  Vitals and nursing note reviewed.   Constitutional:       General: She is not in acute distress.     Appearance: She is well-developed.   HENT:      Head: Normocephalic.      Right Ear: External ear normal.      Left Ear: External ear normal.      Nose: No septal deviation, mucosal edema or rhinorrhea.      Right Sinus: No maxillary sinus tenderness or frontal sinus tenderness.      Left Sinus: No maxillary sinus tenderness or frontal sinus tenderness.      Mouth/Throat:      Pharynx: Uvula midline. No uvula swelling.   Eyes:      General:         Right eye: No discharge.         Left eye: No discharge.      Conjunctiva/sclera: Conjunctivae normal.   Cardiovascular:      Rate and Rhythm: Normal rate and regular rhythm.   Pulmonary:      Effort: Pulmonary effort is normal. No respiratory distress.      Breath sounds: Normal breath sounds. No wheezing.    Musculoskeletal:         General: No tenderness.      Cervical back: Normal range of motion and neck supple.   Lymphadenopathy:      Cervical: No cervical adenopathy.   Skin:     General: Skin is warm.      Findings: No erythema or rash.   Neurological:      Mental Status: She is alert and oriented to person, place, and time.   Psychiatric:         Behavior: Behavior normal.         Thought Content: Thought content normal.         Judgment: Judgment normal.        Latest Reference Range & Units 02/12/22 08:45   IgG 650 - 1600 mg/dL 1006 [1]   IgM 50 - 300 mg/dL 83 [2]   IgA 40 - 350 mg/dL 114 [3]        02/12/22 08:45   S.pneumoniae Type 1 5.6   S.pneumoniae Type 12F 0.6   S.pneumoniae Type 18C 6.5   S.pneumoniae Type 19F 1.5   S.pneumoniae Type 23F 4.9   S.pneumoniae Type 3 3.3   S.pneumoniae Type 5 0.6   S.pneumoniae Type 6B 1.2   S.pneumoniae Type 7F 0.5   S.pneumoniae Type 8 2.7   S.pneumoniae Type 9N 0.8   S.pneumoniae Type 9V Abs 1.0 [1]   Strep pneumo Type 14 8.1   Strep pneumo Type 4 2.2       Assessment:       1. Abnormal antibody titer    2. Recurrent sinusitis         Plan:       Challenge with 23-valent pneumococcal polysaccharide vaccine, Pneumovax. Repeat pneumococcal titers in 4-6 weeks. Follow up in clinic if poor response--consider PCV20. Counseled that if good response to Pneumovax is demonstrated, expect decreased frequency and severity of mucosal infections, and can then follow up prn. Follow up alek if recurrence of frequent mucosal infections.

## 2022-05-23 ENCOUNTER — LAB VISIT (OUTPATIENT)
Dept: LAB | Facility: HOSPITAL | Age: 67
End: 2022-05-23
Attending: ALLERGY & IMMUNOLOGY
Payer: COMMERCIAL

## 2022-05-23 DIAGNOSIS — R76.0 ABNORMAL ANTIBODY TITER: ICD-10-CM

## 2022-05-23 PROCEDURE — 86317 IMMUNOASSAY INFECTIOUS AGENT: CPT | Performed by: ALLERGY & IMMUNOLOGY

## 2022-05-23 PROCEDURE — 36415 COLL VENOUS BLD VENIPUNCTURE: CPT | Performed by: ALLERGY & IMMUNOLOGY

## 2022-05-24 ENCOUNTER — PATIENT MESSAGE (OUTPATIENT)
Dept: INTERNAL MEDICINE | Facility: CLINIC | Age: 67
End: 2022-05-24
Payer: COMMERCIAL

## 2022-05-27 LAB
DEPRECATED S PNEUM12 IGG SER-MCNC: 1 UG/ML
DEPRECATED S PNEUM23 IGG SER-MCNC: 6.1 UG/ML
DEPRECATED S PNEUM4 IGG SER-MCNC: 3.1 UG/ML
DEPRECATED S PNEUM8 IGG SER-MCNC: 3.7 UG/ML
DEPRECATED S PNEUM9 IGG SER-MCNC: 2.1 UG/ML
S PN DA SERO 19F IGG SER-MCNC: 2.8 UG/ML
S PNEUM DA 1 IGG SER-MCNC: 8.2 UG/ML
S PNEUM DA 14 IGG SER-MCNC: 10.8
S PNEUM DA 18C IGG SER-MCNC: 7.8
S PNEUM DA 3 IGG SER-MCNC: 12.8 UG/ML
S PNEUM DA 5 IGG SER-MCNC: 0.9 UG/ML
S PNEUM DA 6B IGG SER-MCNC: 2 UG/ML
S PNEUM DA 7F IGG SER-MCNC: 4.2 UG/ML
S PNEUM DA 9V IGG SER-MCNC: 1.7 UG/ML

## 2022-05-30 NOTE — PROGRESS NOTES
Physical Therapy Progress and  Daily Treatment Note     Name: Danuta Santos  Clinic Number: [id number]  Diagnosis:   Encounter Diagnosis   Name Primary?    Chronic bilateral low back pain with right-sided sciatica Yes     Physician: Natacha Velazco MD  Treatment Orders: PT Eval and Treat  Past Medical History:   Diagnosis Date    Allergy     IBS (irritable bowel syndrome)     Pneumonia 02/2019     Precautions: as per diagnosis    Evaluation date: 1-4-22  Visit # authorized: 7/20  Authorization period:4-1-22  Plan of care expiration: 7-12-22  MD Follow up appt: none scheduled    Time In: 2:00   Time Out:3:10  Billable time: 50  Min + ES    Subjective     Pt reports: overall she has been well.  Pt states she has been able to manage symptoms fairly well.  Pt had episode recently of lifting garbage can and was unable to self mobilize this past week.  Feels so very tight    Pain Scale: before treatment: 5-6/10 after treatment: 1/10 and level pelvis       AR R pelvis   Mod-severe tightness lumbar paraspinals R, mod after treatment  Severe decreased spring lumbar vertebrae    Lumbar active range of motion in standing is: 5-31-22  - flexion - toes                     - extension -  50%                         - left side bending -  To knee         - right side bending -  To knee             Lumbar active range of motion in standing is: 12-21-21  - flexion - toes                     - extension -  75%                         - left side bending -  To knee         - right side bending -  To knee       Muscle Strength 5-29-22  MMT R L   Hip flexion 5-/5 5-/5   Hip abduction 5-/5 5-/5   Hip extension 5-/5 5-/5   Glut max 4/5 4+/5   Knee extension 5/5 5/5   Knee flexion 5/5 5/5           Muscle Strength 12-21-21  MMT R L   Hip flexion 4+/5 4+/5   Hip abduction 4+/5 5-/5   Hip extension 4+/5 4+/5   Glut max 4/5 4/5   Knee extension 5/5 5/5   Knee flexion 5/5 5/5            TREATMENT    Therapeutic exercise: Danuta  received therapeutic exercises to develop strength, endurance, ROM, flexibility and core stabilization for 15 minutes including:  Provided pt information on wedge she could use for sitting to decrease hip flexion in sitting and will note response    Review of HEP led to awareness that pt was doing a lot of walking and not all strengthening due to fatigue. Instructed pt in need to work on lumbar and glut strengthening with arm and leg lift prone and hip ext prone with bent knee to isolate glut along with press up to maintain lumbar mobility   HS stretch supine   Glut stretch  Piriformis stretch  B QL stretch  Contract relax R glut x 3      Pelvic tilt with march  x 10  Bridge x 10, at home 20    Partial sit up x 10, at home 50  SLR x 10, 20 at home   Iron cross x 2  Trunk rotation supine x 10  Hip abd sidelying x 10 B, 20 at home   Arm and Leg lift prone x 10, 40 at home   Hip ext prone with bent knee x 10  20 at home  Press up x 2       GSS standing   Contract relax R glut    Manual therapy: Danuta  received the following manual therapy  techniques x 25 min. to include soft tissue and joint mobilization were applied to the: low back and gluteals to include:     Performed STM to LB paraspinals and QL R>L  P/A mob to lumbar region Grade 1-2 , sidelying L contract relax to R QL    Sidelying L contract relax mobilization to L5-S1 region to level pelvis  at end of treatment and level pelvis achieved.    Pt received tool-assisted massage with manual therapy techniques to R LB in prone to trigger an inflammatory healing response and stimulate the production of new collagen and proper, more functional, less painful healing.    Vacuum/cupping STM with manual therapy techniques was performed to back and gluteals to decrease muscle tightness, increase circulation and promote healing process.  The pt's skin was monitored for redness adjusting pressure as needed. The pt was instructed in possible side effects of bruising  and/or soreness.    .    Ultrasound  for pain control and to decrease inflammation @ continuous duty cycle, 1 Mhz, applied to R lumbar paraspinals, intensity = 1.5 w/cm2 for 8 minutes. 2 min prep      Patient received pre-mod electrical stimulation to decrease muscle tightness and pain to Lumbar paraspinals/ sacral border of gluteals B for 15 minutes with MH supine with cycle time: continuous, beat frequency: , CC/CV: CV.      Written Home Exercises Provided: yes  Pt demo good understanding of the education provided. Danuta demonstrated good return demonstration of activities.     Pt. education:  · Posture reeducation, body mechanics, HEP, proper posture in standing  · No spiritual or educational barriers to learning provided  · Pt has no cultural, educational or language barriers to learning provided.    Assessment   Pt has been seen for 2 visits since last session.  Pt did not attend PT while this PT was on medical leave 3-14-22 to 4-25-22.   Pt has been self managing proper pelvic positioning but did note decreased lumbar mobility with decreased BB and decreased ability to perform press up along with decreased R glut max strength.  Pt appears to understand need to resume full HEP and will monitor symptoms along with continued stretching and self mob for proper pelvic positioning.       Pt will continue to benefit from skilled outpatient physical therapy to address the remaining functional deficits, provide pt/family education, and to maximize pt's level of independence in the home and community environment. .     GOALS:   Short Term Goals:  3 weeks MET STG's  Increase range of motion 25%  Increase strength 1/2 muscle grade  Improve postural awareness of pelvis to independently identify dysfunction with min assist from PT  Be able to perform HEP with minimal cueing required     Long Term Goals: 6 weeks PARTIALLY MET LTG'S  Increase range of motion to 75% to 100% full   Improve muscle strength 1 muscle  grade  Improve muscle strength with MMT to 4+/5 to 5/5  Improve and stabilize proper pelvic positioning  Restore ability to sit for ADL and work with min to 0 pain  Restore normal sleep habits without disturbances due to pain  Restore ability to perform ADL's and household activities independently with min to 0 pain    Anticipated barriers to physical therapy: none  Pt's spiritual, cultural and educational needs considered and pt agreeable to plan of care and goals        Plan   If you concur, I recommend patient continue with physical therapy 1-2 times a week  for 6 weeks as needed,  Please advise us of your  recommendations. Thank you for allowing us to assist in the care of your patient.      Jacy Escobar, MS, PT          I certify the need for these services furnished under this plan of treatment and while under my care.    ____________________________________ Physician/Referring Practitioner                                Date of Signature             Jacy Escobar, MS, PT

## 2022-05-31 ENCOUNTER — CLINICAL SUPPORT (OUTPATIENT)
Dept: REHABILITATION | Facility: HOSPITAL | Age: 67
End: 2022-05-31
Payer: COMMERCIAL

## 2022-05-31 DIAGNOSIS — G89.29 CHRONIC BILATERAL LOW BACK PAIN WITH RIGHT-SIDED SCIATICA: Primary | ICD-10-CM

## 2022-05-31 DIAGNOSIS — M54.41 CHRONIC BILATERAL LOW BACK PAIN WITH RIGHT-SIDED SCIATICA: Primary | ICD-10-CM

## 2022-05-31 PROCEDURE — 97035 APP MDLTY 1+ULTRASOUND EA 15: CPT | Mod: PN | Performed by: PHYSICAL THERAPIST

## 2022-05-31 PROCEDURE — 97140 MANUAL THERAPY 1/> REGIONS: CPT | Mod: PN | Performed by: PHYSICAL THERAPIST

## 2022-05-31 PROCEDURE — 97110 THERAPEUTIC EXERCISES: CPT | Mod: PN | Performed by: PHYSICAL THERAPIST

## 2022-05-31 PROCEDURE — 97014 ELECTRIC STIMULATION THERAPY: CPT | Mod: PN | Performed by: PHYSICAL THERAPIST

## 2022-05-31 NOTE — PLAN OF CARE
Physical Therapy Progress and  Daily Treatment Note     Name: Danuta Santos  Clinic Number: [id number]  Diagnosis:   Encounter Diagnosis   Name Primary?    Chronic bilateral low back pain with right-sided sciatica Yes     Physician: Natacha Velazco MD  Treatment Orders: PT Eval and Treat  Past Medical History:   Diagnosis Date    Allergy     IBS (irritable bowel syndrome)     Pneumonia 02/2019     Precautions: as per diagnosis    Evaluation date: 1-4-22  Visit # authorized: 7/20  Authorization period:4-1-22  Plan of care expiration: 7-12-22  MD Follow up appt: none scheduled    Time In: 2:00   Time Out:3:10  Billable time: 50  Min + ES    Subjective     Pt reports: overall she has been well.  Pt states she has been able to manage symptoms fairly well.  Pt had episode recently of lifting garbage can and was unable to self mobilize this past week.  Feels so very tight    Pain Scale: before treatment: 5-6/10 after treatment: 1/10 and level pelvis       AR R pelvis   Mod-severe tightness lumbar paraspinals R, mod after treatment  Severe decreased spring lumbar vertebrae    Lumbar active range of motion in standing is: 5-31-22  - flexion - toes                     - extension -  50%                         - left side bending -  To knee         - right side bending -  To knee             Lumbar active range of motion in standing is: 12-21-21  - flexion - toes                     - extension -  75%                         - left side bending -  To knee         - right side bending -  To knee       Muscle Strength 5-29-22  MMT R L   Hip flexion 5-/5 5-/5   Hip abduction 5-/5 5-/5   Hip extension 5-/5 5-/5   Glut max 4/5 4+/5   Knee extension 5/5 5/5   Knee flexion 5/5 5/5           Muscle Strength 12-21-21  MMT R L   Hip flexion 4+/5 4+/5   Hip abduction 4+/5 5-/5   Hip extension 4+/5 4+/5   Glut max 4/5 4/5   Knee extension 5/5 5/5   Knee flexion 5/5 5/5            TREATMENT    Therapeutic exercise: Danuta  received therapeutic exercises to develop strength, endurance, ROM, flexibility and core stabilization for 15 minutes including:  Provided pt information on wedge she could use for sitting to decrease hip flexion in sitting and will note response    Review of HEP led to awareness that pt was doing a lot of walking and not all strengthening due to fatigue. Instructed pt in need to work on lumbar and glut strengthening with arm and leg lift prone and hip ext prone with bent knee to isolate glut along with press up to maintain lumbar mobility   HS stretch supine   Glut stretch  Piriformis stretch  B QL stretch  Contract relax R glut x 3      Pelvic tilt with march  x 10  Bridge x 10, at home 20    Partial sit up x 10, at home 50  SLR x 10, 20 at home   Iron cross x 2  Trunk rotation supine x 10  Hip abd sidelying x 10 B, 20 at home   Arm and Leg lift prone x 10, 40 at home   Hip ext prone with bent knee x 10  20 at home  Press up x 2       GSS standing   Contract relax R glut    Manual therapy: Danuta  received the following manual therapy  techniques x 25 min. to include soft tissue and joint mobilization were applied to the: low back and gluteals to include:     Performed STM to LB paraspinals and QL R>L  P/A mob to lumbar region Grade 1-2 , sidelying L contract relax to R QL    Sidelying L contract relax mobilization to L5-S1 region to level pelvis  at end of treatment and level pelvis achieved.    Pt received tool-assisted massage with manual therapy techniques to R LB in prone to trigger an inflammatory healing response and stimulate the production of new collagen and proper, more functional, less painful healing.    Vacuum/cupping STM with manual therapy techniques was performed to back and gluteals to decrease muscle tightness, increase circulation and promote healing process.  The pt's skin was monitored for redness adjusting pressure as needed. The pt was instructed in possible side effects of bruising  and/or soreness.    .    Ultrasound  for pain control and to decrease inflammation @ continuous duty cycle, 1 Mhz, applied to R lumbar paraspinals, intensity = 1.5 w/cm2 for 8 minutes. 2 min prep      Patient received pre-mod electrical stimulation to decrease muscle tightness and pain to Lumbar paraspinals/ sacral border of gluteals B for 15 minutes with MH supine with cycle time: continuous, beat frequency: , CC/CV: CV.      Written Home Exercises Provided: yes  Pt demo good understanding of the education provided. Danuta demonstrated good return demonstration of activities.     Pt. education:  · Posture reeducation, body mechanics, HEP, proper posture in standing  · No spiritual or educational barriers to learning provided  · Pt has no cultural, educational or language barriers to learning provided.    Assessment   Pt has been seen for 2 visits since last session.  Pt did not attend PT while this PT was on medical leave 3-14-22 to 4-25-22.   Pt has been self managing proper pelvic positioning but did note decreased lumbar mobility with decreased BB and decreased ability to perform press up along with decreased R glut max strength.  Pt appears to understand need to resume full HEP and will monitor symptoms along with continued stretching and self mob for proper pelvic positioning.       Pt will continue to benefit from skilled outpatient physical therapy to address the remaining functional deficits, provide pt/family education, and to maximize pt's level of independence in the home and community environment. .     GOALS:   Short Term Goals:  3 weeks MET STG's  Increase range of motion 25%  Increase strength 1/2 muscle grade  Improve postural awareness of pelvis to independently identify dysfunction with min assist from PT  Be able to perform HEP with minimal cueing required     Long Term Goals: 6 weeks PARTIALLY MET LTG'S  Increase range of motion to 75% to 100% full   Improve muscle strength 1 muscle  grade  Improve muscle strength with MMT to 4+/5 to 5/5  Improve and stabilize proper pelvic positioning  Restore ability to sit for ADL and work with min to 0 pain  Restore normal sleep habits without disturbances due to pain  Restore ability to perform ADL's and household activities independently with min to 0 pain    Anticipated barriers to physical therapy: none  Pt's spiritual, cultural and educational needs considered and pt agreeable to plan of care and goals        Plan   If you concur, I recommend patient continue with physical therapy 1-2 times a week  for 6 weeks as needed,  Please advise us of your  recommendations. Thank you for allowing us to assist in the care of your patient.      Jacy Escobar, MS, PT          I certify the need for these services furnished under this plan of treatment and while under my care.    ____________________________________ Physician/Referring Practitioner                                Date of Signature             Jacy Escobar, MS, PT

## 2022-06-02 ENCOUNTER — PATIENT MESSAGE (OUTPATIENT)
Dept: ALLERGY | Facility: CLINIC | Age: 67
End: 2022-06-02
Payer: COMMERCIAL

## 2022-06-03 ENCOUNTER — HOSPITAL ENCOUNTER (OUTPATIENT)
Dept: RADIOLOGY | Facility: HOSPITAL | Age: 67
Discharge: HOME | End: 2022-06-03
Attending: INTERNAL MEDICINE
Payer: COMMERCIAL

## 2022-06-03 ENCOUNTER — PATIENT MESSAGE (OUTPATIENT)
Dept: INTERNAL MEDICINE | Facility: CLINIC | Age: 67
End: 2022-06-03
Payer: COMMERCIAL

## 2022-06-03 DIAGNOSIS — R94.31 ABNORMAL EKG: ICD-10-CM

## 2022-06-03 DIAGNOSIS — Z13.6 ENCOUNTER FOR SCREENING FOR CARDIOVASCULAR DISORDERS: ICD-10-CM

## 2022-06-03 PROCEDURE — 75571 CT HRT W/O DYE W/CA TEST: CPT | Mod: TC

## 2022-06-03 PROCEDURE — 75571 CT CALCIUM SCORING CARDIAC: ICD-10-PCS | Mod: 26,,, | Performed by: RADIOLOGY

## 2022-06-03 PROCEDURE — 75571 CT HRT W/O DYE W/CA TEST: CPT | Mod: 26,,, | Performed by: RADIOLOGY

## 2022-06-07 NOTE — PROGRESS NOTES
Physical Therapy  Daily Treatment Note     Name: Danuta Santos  Clinic Number: [id number]  Diagnosis:   Encounter Diagnosis   Name Primary?    Chronic bilateral low back pain with right-sided sciatica Yes     Physician: Sandra Velazco., MD  Treatment Orders: PT Eval and Treat  Past Medical History:   Diagnosis Date    Allergy     IBS (irritable bowel syndrome)     Pneumonia 02/2019     Precautions: as per diagnosis    Evaluation date: 1-4-22  Visit # authorized: 8/20  Authorization period:4-1-22  Plan of care expiration: 7-12-22  MD Follow up appt: none scheduled    Time In: 10:11   Time Out:11:20  Billable time: 50  Min + ES    Subjective     Pt reports: busy packing to go out of town for wedding.  Pt states she did some cupping and helped a little     Pain Scale: before treatment: 3-5/10 with packing close to 5/10 after treatment: 1/10 and level pelvis       AR R pelvis   Mod-severe tightness lumbar paraspinals R, mod after treatment  Severe decreased spring lumbar vertebrae             TREATMENT    Therapeutic exercise: Danuta received therapeutic exercises to develop strength, endurance, ROM, flexibility and core stabilization for 15 minutes including:  Review verbally of all ex and pt was not performing pelvic tilt with march which was challenging for pt, but able to perform she also was not performing trunk rotation supine and did not recall having this ex sheet, so provided again.    Added B Knee to  Chest and felt stretch in LB but did not lead to dysfunction, so will add for further stretching to lumbar paraspinals   HS stretch supine   Glut stretch  Piriformis stretch  B QL stretch  Contract relax R glut x 3      Pelvic tilt with march  x 10  Bridge x 10, at home 20    Partial sit up x 10, at home 50  SLR x 10, 20 at home   Iron cross x 2  Trunk rotation supine x 10  Hip abd sidelying x 10 B, 20 at home   Arm and Leg lift prone x 10, 40 at home   Hip ext prone with bent knee x 10  20 at  home  Press up x 10       GSS standing   Contract relax R glut    Manual therapy: Danuta  received the following manual therapy  techniques x 25 min. to include soft tissue and joint mobilization were applied to the: low back and gluteals to include:     Performed STM to LB paraspinals and QL R>L  P/A mob to lumbar region Grade 1-2 , sidelying L contract relax to R QL    Sidelying L contract relax mobilization to L5-S1 region to level pelvis  at end of treatment and level pelvis achieved.    Pt received tool-assisted massage with manual therapy techniques to R LB in prone to trigger an inflammatory healing response and stimulate the production of new collagen and proper, more functional, less painful healing.    Vacuum/cupping STM with manual therapy techniques was performed to back and gluteals to decrease muscle tightness, increase circulation and promote healing process.  The pt's skin was monitored for redness adjusting pressure as needed. The pt was instructed in possible side effects of bruising and/or soreness.    .    Ultrasound  for pain control and to decrease inflammation @ continuous duty cycle, 1 Mhz, applied to R lumbar paraspinals, intensity = 1.5 w/cm2 for 8 minutes. 2 min prep      Patient received pre-mod electrical stimulation to decrease muscle tightness and pain to Lumbar paraspinals/ sacral border of gluteals B for 15 minutes with MH supine with cycle time: continuous, beat frequency: , CC/CV: CV.      Written Home Exercises Provided: yes  Pt demo good understanding of the education provided. Danuta demonstrated good return demonstration of activities.     Pt. education:  · Posture reeducation, body mechanics, HEP, proper posture in standing  · No spiritual or educational barriers to learning provided  · Pt has no cultural, educational or language barriers to learning provided.    Assessment   Pt with varying levels of pain, but is with less discomfort overall.  Pt getting ready for  trip out of town which leads to increased pain with increased FB activities trying to pack.  Pt may benefit from additional stretching ex and understands further progression with core stabilization to improve stability     Pt will continue to benefit from skilled outpatient physical therapy to address the remaining functional deficits, provide pt/family education, and to maximize pt's level of independence in the home and community environment. .     GOALS:   Short Term Goals:  3 weeks MET STG's  Increase range of motion 25%  Increase strength 1/2 muscle grade  Improve postural awareness of pelvis to independently identify dysfunction with min assist from PT  Be able to perform HEP with minimal cueing required     Long Term Goals: 6 weeks PARTIALLY MET LTG'S  Increase range of motion to 75% to 100% full   Improve muscle strength 1 muscle grade  Improve muscle strength with MMT to 4+/5 to 5/5  Improve and stabilize proper pelvic positioning  Restore ability to sit for ADL and work with min to 0 pain  Restore normal sleep habits without disturbances due to pain  Restore ability to perform ADL's and household activities independently with min to 0 pain    Anticipated barriers to physical therapy: none  Pt's spiritual, cultural and educational needs considered and pt agreeable to plan of care and goals        Plan   Continue with established plan of care towards PT goals.        Progress stabilization and strength Reassess response to additional stretching    Jacy Escobar, MS, PT

## 2022-06-08 ENCOUNTER — CLINICAL SUPPORT (OUTPATIENT)
Dept: REHABILITATION | Facility: HOSPITAL | Age: 67
End: 2022-06-08
Payer: COMMERCIAL

## 2022-06-08 DIAGNOSIS — M54.41 CHRONIC BILATERAL LOW BACK PAIN WITH RIGHT-SIDED SCIATICA: Primary | ICD-10-CM

## 2022-06-08 DIAGNOSIS — G89.29 CHRONIC BILATERAL LOW BACK PAIN WITH RIGHT-SIDED SCIATICA: Primary | ICD-10-CM

## 2022-06-08 PROCEDURE — 97014 ELECTRIC STIMULATION THERAPY: CPT | Mod: PN | Performed by: PHYSICAL THERAPIST

## 2022-06-08 PROCEDURE — 97140 MANUAL THERAPY 1/> REGIONS: CPT | Mod: PN | Performed by: PHYSICAL THERAPIST

## 2022-06-08 PROCEDURE — 97035 APP MDLTY 1+ULTRASOUND EA 15: CPT | Mod: PN | Performed by: PHYSICAL THERAPIST

## 2022-06-08 PROCEDURE — 97110 THERAPEUTIC EXERCISES: CPT | Mod: PN | Performed by: PHYSICAL THERAPIST

## 2022-06-19 NOTE — PROGRESS NOTES
Results are normal or stable and require no intervention.
pt presents s/p mechanical fall while walking with walker. as per EMS, it was witnessed by family. -LOC. 81ASA daily. neuro alert called at triage

## 2022-06-21 NOTE — PROGRESS NOTES
Physical Therapy  Daily Treatment Note     Name: Danuta Santos  Clinic Number: [id number]  Diagnosis:   Encounter Diagnosis   Name Primary?    Chronic bilateral low back pain with right-sided sciatica Yes     Physician: Natacha Velazco MD  Treatment Orders: PT Eval and Treat  Past Medical History:   Diagnosis Date    Allergy     IBS (irritable bowel syndrome)     Pneumonia 02/2019     Precautions: as per diagnosis    Evaluation date: 1-4-22  Visit # authorized: 9/20  Authorization period:4-1-22  Plan of care expiration: 7-12-22  MD Follow up appt: none scheduled    Time In: 2;00   Time Out:3:00  Billable time: 41  Min + ES    Subjective     Pt reports: back from her trip Pt states her back did well Pt states she did a lot of walking which helps     Pain Scale: before treatment: 2.5/10 with packing close to 5/10 after treatment: 1/10 and level pelvis       Level pelvis   Mod-severe tightness lumbar paraspinals R, mod after treatment  Severe decreased spring lumbar vertebrae             TREATMENT    Therapeutic exercise: Danuta received therapeutic exercises to develop strength, endurance, ROM, flexibility and core stabilization for 6 minutes including:  Review verbally of all ex   Continue B Knee to  Chest     HS stretch supine   Glut stretch  Piriformis stretch  B QL stretch  Contract relax R glut x 3      Pelvic tilt with march  x 10  Bridge x 10, at home 20    Partial sit up x 10, at home 50  SLR x 10, 20 at home   Iron cross x 2  Trunk rotation supine x 10  Hip abd sidelying x 10 B, 20 at home   Arm and Leg lift prone x 10, 40 at home   Hip ext prone with bent knee x 10  20 at home  Press up x 10       GSS standing   Contract relax R glut    Manual therapy: Danuta  received the following manual therapy  techniques x 25 min. to include soft tissue and joint mobilization were applied to the: low back and gluteals to include:     Performed STM to LB paraspinals and QL R>L  P/A mob to lumbar region  Grade 1-2 , sidelying L contract relax to R QL    Sidelying L contract relax mobilization to L5-S1 region to level pelvis  at end of treatment and level pelvis achieved.    Pt received tool-assisted massage with manual therapy techniques to R LB in prone to trigger an inflammatory healing response and stimulate the production of new collagen and proper, more functional, less painful healing.    Vacuum/cupping STM with manual therapy techniques was performed to back and gluteals to decrease muscle tightness, increase circulation and promote healing process.  The pt's skin was monitored for redness adjusting pressure as needed. The pt was instructed in possible side effects of bruising and/or soreness.    .    Ultrasound  for pain control and to decrease inflammation @ continuous duty cycle, 1 Mhz, applied to R lumbar paraspinals, intensity = 1.5 w/cm2 for 8 minutes. 2 min prep      Patient received pre-mod electrical stimulation to decrease muscle tightness and pain to Lumbar paraspinals/ sacral border of gluteals B for 15 minutes with MH supine with cycle time: continuous, beat frequency: , CC/CV: CV.      Written Home Exercises Provided: yes  Pt demo good understanding of the education provided. Danuta demonstrated good return demonstration of activities.     Pt. education:  · Posture reeducation, body mechanics, HEP, proper posture in standing  · No spiritual or educational barriers to learning provided  · Pt has no cultural, educational or language barriers to learning provided.    Assessment   Pt with level pelvis upon entrance and overall less pain. Pt with increased walking which has led to less episodes of dysfunction, though continue with increased paraspinal tightness.  Pt did well while out of town.  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      Pt will continue to benefit from skilled outpatient physical therapy to address the  remaining functional deficits, provide pt/family education, and to maximize pt's level of independence in the home and community environment. .     GOALS:   Short Term Goals:  3 weeks MET STG's  Increase range of motion 25%  Increase strength 1/2 muscle grade  Improve postural awareness of pelvis to independently identify dysfunction with min assist from PT  Be able to perform HEP with minimal cueing required     Long Term Goals: 6 weeks PARTIALLY MET LTG'S  Increase range of motion to 75% to 100% full   Improve muscle strength 1 muscle grade  Improve muscle strength with MMT to 4+/5 to 5/5  Improve and stabilize proper pelvic positioning  Restore ability to sit for ADL and work with min to 0 pain  Restore normal sleep habits without disturbances due to pain  Restore ability to perform ADL's and household activities independently with min to 0 pain    Anticipated barriers to physical therapy: none  Pt's spiritual, cultural and educational needs considered and pt agreeable to plan of care and goals        Plan   Continue with established plan of care towards PT goals.        Progress stabilization and strength Reassess response to additional stretching and continued walking    Jacy Escobar, MS, PT

## 2022-06-23 ENCOUNTER — CLINICAL SUPPORT (OUTPATIENT)
Dept: REHABILITATION | Facility: HOSPITAL | Age: 67
End: 2022-06-23
Payer: COMMERCIAL

## 2022-06-23 DIAGNOSIS — G89.29 CHRONIC BILATERAL LOW BACK PAIN WITH RIGHT-SIDED SCIATICA: Primary | ICD-10-CM

## 2022-06-23 DIAGNOSIS — M54.41 CHRONIC BILATERAL LOW BACK PAIN WITH RIGHT-SIDED SCIATICA: Primary | ICD-10-CM

## 2022-06-23 PROCEDURE — 97035 APP MDLTY 1+ULTRASOUND EA 15: CPT | Mod: PN | Performed by: PHYSICAL THERAPIST

## 2022-06-23 PROCEDURE — 97014 ELECTRIC STIMULATION THERAPY: CPT | Mod: PN | Performed by: PHYSICAL THERAPIST

## 2022-06-23 PROCEDURE — 97140 MANUAL THERAPY 1/> REGIONS: CPT | Mod: PN | Performed by: PHYSICAL THERAPIST

## 2022-07-11 NOTE — PROGRESS NOTES
Physical Therapy Progress and Daily Treatment Note     Name: Danuta Santos  Clinic Number: [id number]  Diagnosis:   Encounter Diagnosis   Name Primary?    Chronic bilateral low back pain with right-sided sciatica Yes     Physician: Yumiko Davis MD  Treatment Orders: PT Eval and Treat  Past Medical History:   Diagnosis Date    Allergy     IBS (irritable bowel syndrome)     Pneumonia 02/2019     Precautions: as per diagnosis    Evaluation date: 1-4-22  Visit # authorized: 10/20  Authorization period:12-31-22  Plan of care expiration: 8-23-22  MD Follow up appt: none scheduled    Time In: 2:10  Time Out:3:10  Billable time: 40  Min + ES    Subjective     Pt reports: she is walking 2 hours a day which she does partially for her mental state to help with stress.  Pt states she is noticing that she is tightening in back .      Pain Scale: before treatment: 5/10 with packing close to 5/10 after treatment: 1/10 and level pelvis    Objective     Level pelvis   Mod-severe tightness lumbar paraspinals R, mod after treatment  Severe decreased spring lumbar vertebrae     Lumbar active range of motion in standing is: 7-12-22  - flexion - toes                     - extension -  75%                         - left side bending -  To knee         - right side bending -  To knee             Lumbar active range of motion in standing is: 12-21-21  - flexion - toes                     - extension -  75%                         - left side bending -  To knee         - right side bending -  To knee       Muscle Strength 7-12-22  MMT R L   Hip flexion 5/5 5/5   Hip abduction 5/5 5/5   Hip extension 5/5 5/5   Glut max 4+/5 4+/5   Knee extension 5/5 5/5   Knee flexion 5/5 5/5           Muscle Strength 12-21-21  MMT R L   Hip flexion 4+/5 4+/5   Hip abduction 4+/5 5-/5   Hip extension 4+/5 4+/5   Glut max 4/5 4/5   Knee extension 5/5 5/5   Knee flexion 5/5 5/5                 TREATMENT    Therapeutic exercise: Danuta  received therapeutic exercises to develop strength, endurance, ROM, flexibility and core stabilization for10 minutes including:  Review verbally of all ex   Reassessment  Continue B Knee to  Chest     HS stretch supine   Glut stretch  Piriformis stretch  B QL stretch  Contract relax R glut x 3      Pelvic tilt with march  x 10  Bridge x 10, at home 20    Partial sit up x 10, at home 50  SLR x 10, 20 at home   Iron cross x 2  Trunk rotation supine x 10  Hip abd sidelying x 10 B, 20 at home   Arm and Leg lift prone x 10, 40 at home   Hip ext prone with bent knee x 10  20 at home  Press up x 10       GSS standing   Contract relax R glut    Manual therapy: Danuta  received the following manual therapy  techniques x 20 min. to include soft tissue and joint mobilization were applied to the: low back and gluteals to include:     Performed STM to LB paraspinals and QL R>L  (NP)P/A mob to lumbar region Grade 1-2 , (NP)sidelying L contract relax to R QL    Sidelying L contract relax mobilization to L5-S1 region to level pelvis  at end of treatment and level pelvis achieved.    Pt received tool-assisted massage with manual therapy techniques to R LB in prone to trigger an inflammatory healing response and stimulate the production of new collagen and proper, more functional, less painful healing.    Vacuum/cupping STM with manual therapy techniques was performed to back and gluteals to decrease muscle tightness, increase circulation and promote healing process.  The pt's skin was monitored for redness adjusting pressure as needed. The pt was instructed in possible side effects of bruising and/or soreness.    .    Ultrasound  for pain control and to decrease inflammation @ continuous duty cycle, 1 Mhz, applied to R lumbar paraspinals, intensity = 1.5 w/cm2 for 8 minutes. 2 min prep      Patient received pre-mod electrical stimulation to decrease muscle tightness and pain to Lumbar paraspinals/ sacral border of gluteals B for  15 minutes with MH supine with cycle time: continuous, beat frequency: , CC/CV: CV.      Written Home Exercises Provided: yes  Pt demo good understanding of the education provided. Danuta demonstrated good return demonstration of activities.     Pt. education:  · Posture reeducation, body mechanics, HEP, proper posture in standing  · No spiritual or educational barriers to learning provided  · Pt has no cultural, educational or language barriers to learning provided.    Assessment   Pt has been seen for 3 visits since last progress note. Patient demonstrates improvement in range of motion, strength, stabilization and function.    Pt scott treatment well though still develops tightness in lumbar region which she is unable to self manage.  After discussion pt to try to increase usage of home TENS unit for pain relief.       Pt will continue to benefit from skilled outpatient physical therapy to address the remaining functional deficits, provide pt/family education, and to maximize pt's level of independence in the home and community environment. .     GOALS:   Short Term Goals:  3 weeks MET STG's  Increase range of motion 25%  Increase strength 1/2 muscle grade  Improve postural awareness of pelvis to independently identify dysfunction with min assist from PT  Be able to perform HEP with minimal cueing required     Long Term Goals: 6 weeks PARTIALLY MET LTG'S  Increase range of motion to 75% to 100% full   Improve muscle strength 1 muscle grade  Improve muscle strength with MMT to 4+/5 to 5/5  Improve and stabilize proper pelvic positioning  Restore ability to sit for ADL and work with min to 0 pain  Restore normal sleep habits without disturbances due to pain  Restore ability to perform ADL's and household activities independently with min to 0 pain    Anticipated barriers to physical therapy: none  Pt's spiritual, cultural and educational needs considered and pt agreeable to plan of care and goals        Plan    If you concur, I recommend patient continue with physical therapy 1-2 times a week as needed  for 6 weeks.  Please advise us of your  recommendations. Thank you for allowing us to assist in the care of your patient.      Jacy Escobar, MS, PT          I certify the need for these services furnished under this plan of treatment and while under my care.    ____________________________________ Physician/Referring Practitioner                                Date of Signature

## 2022-07-12 ENCOUNTER — CLINICAL SUPPORT (OUTPATIENT)
Dept: REHABILITATION | Facility: HOSPITAL | Age: 67
End: 2022-07-12
Payer: COMMERCIAL

## 2022-07-12 DIAGNOSIS — M54.41 CHRONIC BILATERAL LOW BACK PAIN WITH RIGHT-SIDED SCIATICA: Primary | ICD-10-CM

## 2022-07-12 DIAGNOSIS — G89.29 CHRONIC BILATERAL LOW BACK PAIN WITH RIGHT-SIDED SCIATICA: Primary | ICD-10-CM

## 2022-07-12 PROCEDURE — 97110 THERAPEUTIC EXERCISES: CPT | Mod: PN | Performed by: PHYSICAL THERAPIST

## 2022-07-12 PROCEDURE — 97035 APP MDLTY 1+ULTRASOUND EA 15: CPT | Mod: PN | Performed by: PHYSICAL THERAPIST

## 2022-07-12 PROCEDURE — 97014 ELECTRIC STIMULATION THERAPY: CPT | Mod: PN | Performed by: PHYSICAL THERAPIST

## 2022-07-12 PROCEDURE — 97140 MANUAL THERAPY 1/> REGIONS: CPT | Mod: PN | Performed by: PHYSICAL THERAPIST

## 2022-07-12 NOTE — PLAN OF CARE
Physical Therapy Progress and Daily Treatment Note     Name: Danuta Santos  Clinic Number: [id number]  Diagnosis:   Encounter Diagnosis   Name Primary?    Chronic bilateral low back pain with right-sided sciatica Yes     Physician: Ryan VAZQUEZ MD  Treatment Orders: PT Eval and Treat  Past Medical History:   Diagnosis Date    Allergy     IBS (irritable bowel syndrome)     Pneumonia 02/2019     Precautions: as per diagnosis    Evaluation date: 1-4-22  Visit # authorized: 10/20  Authorization period:12-31-22  Plan of care expiration: 8-23-22  MD Follow up appt: none scheduled    Time In: 2:10  Time Out:3:10  Billable time: 40  Min + ES    Subjective     Pt reports: she is walking 2 hours a day which she does partially for her mental state to help with stress.  Pt states she is noticing that she is tightening in back .      Pain Scale: before treatment: 5/10 with packing close to 5/10 after treatment: 1/10 and level pelvis    Objective     Level pelvis   Mod-severe tightness lumbar paraspinals R, mod after treatment  Severe decreased spring lumbar vertebrae     Lumbar active range of motion in standing is: 7-12-22  - flexion - toes                     - extension -  75%                         - left side bending -  To knee         - right side bending -  To knee             Lumbar active range of motion in standing is: 12-21-21  - flexion - toes                     - extension -  75%                         - left side bending -  To knee         - right side bending -  To knee       Muscle Strength 7-12-22  MMT R L   Hip flexion 5/5 5/5   Hip abduction 5/5 5/5   Hip extension 5/5 5/5   Glut max 4+/5 4+/5   Knee extension 5/5 5/5   Knee flexion 5/5 5/5           Muscle Strength 12-21-21  MMT R L   Hip flexion 4+/5 4+/5   Hip abduction 4+/5 5-/5   Hip extension 4+/5 4+/5   Glut max 4/5 4/5   Knee extension 5/5 5/5   Knee flexion 5/5 5/5                 TREATMENT    Therapeutic exercise: Danuta teodora  therapeutic exercises to develop strength, endurance, ROM, flexibility and core stabilization for10 minutes including:  Review verbally of all ex   Reassessment  Continue B Knee to  Chest     HS stretch supine   Glut stretch  Piriformis stretch  B QL stretch  Contract relax R glut x 3      Pelvic tilt with march  x 10  Bridge x 10, at home 20    Partial sit up x 10, at home 50  SLR x 10, 20 at home   Iron cross x 2  Trunk rotation supine x 10  Hip abd sidelying x 10 B, 20 at home   Arm and Leg lift prone x 10, 40 at home   Hip ext prone with bent knee x 10  20 at home  Press up x 10       GSS standing   Contract relax R glut    Manual therapy: Danuta  received the following manual therapy  techniques x 20 min. to include soft tissue and joint mobilization were applied to the: low back and gluteals to include:     Performed STM to LB paraspinals and QL R>L  (NP)P/A mob to lumbar region Grade 1-2 , (NP)sidelying L contract relax to R QL    Sidelying L contract relax mobilization to L5-S1 region to level pelvis  at end of treatment and level pelvis achieved.    Pt received tool-assisted massage with manual therapy techniques to R LB in prone to trigger an inflammatory healing response and stimulate the production of new collagen and proper, more functional, less painful healing.    Vacuum/cupping STM with manual therapy techniques was performed to back and gluteals to decrease muscle tightness, increase circulation and promote healing process.  The pt's skin was monitored for redness adjusting pressure as needed. The pt was instructed in possible side effects of bruising and/or soreness.    .    Ultrasound  for pain control and to decrease inflammation @ continuous duty cycle, 1 Mhz, applied to R lumbar paraspinals, intensity = 1.5 w/cm2 for 8 minutes. 2 min prep      Patient received pre-mod electrical stimulation to decrease muscle tightness and pain to Lumbar paraspinals/ sacral border of gluteals B for 15  minutes with MH supine with cycle time: continuous, beat frequency: , CC/CV: CV.      Written Home Exercises Provided: yes  Pt demo good understanding of the education provided. Danuta demonstrated good return demonstration of activities.     Pt. education:  · Posture reeducation, body mechanics, HEP, proper posture in standing  · No spiritual or educational barriers to learning provided  · Pt has no cultural, educational or language barriers to learning provided.    Assessment   Pt has been seen for 3 visits since last progress note. Patient demonstrates improvement in range of motion, strength, stabilization and function.    Pt scott treatment well though still develops tightness in lumbar region which she is unable to self manage.  After discussion pt to try to increase usage of home TENS unit for pain relief.       Pt will continue to benefit from skilled outpatient physical therapy to address the remaining functional deficits, provide pt/family education, and to maximize pt's level of independence in the home and community environment. .     GOALS:   Short Term Goals:  3 weeks MET STG's  Increase range of motion 25%  Increase strength 1/2 muscle grade  Improve postural awareness of pelvis to independently identify dysfunction with min assist from PT  Be able to perform HEP with minimal cueing required     Long Term Goals: 6 weeks PARTIALLY MET LTG'S  Increase range of motion to 75% to 100% full   Improve muscle strength 1 muscle grade  Improve muscle strength with MMT to 4+/5 to 5/5  Improve and stabilize proper pelvic positioning  Restore ability to sit for ADL and work with min to 0 pain  Restore normal sleep habits without disturbances due to pain  Restore ability to perform ADL's and household activities independently with min to 0 pain    Anticipated barriers to physical therapy: none  Pt's spiritual, cultural and educational needs considered and pt agreeable to plan of care and goals        Plan    If you concur, I recommend patient continue with physical therapy 1-2 times a week as needed  for 6 weeks.  Please advise us of your  recommendations. Thank you for allowing us to assist in the care of your patient.      Jacy Escobar, MS, PT          I certify the need for these services furnished under this plan of treatment and while under my care.    ____________________________________ Physician/Referring Practitioner                                Date of Signature                        Physical Therapy Progress and Daily Treatment Note     Name: Danuta Santos  Clinic Number: [id number]  Diagnosis:   Encounter Diagnosis   Name Primary?    Chronic bilateral low back pain with right-sided sciatica Yes     Physician: Natacha Velazco MD  Treatment Orders: PT Eval and Treat  Past Medical History:   Diagnosis Date    Allergy     IBS (irritable bowel syndrome)     Pneumonia 02/2019     Precautions: as per diagnosis    Evaluation date: 1-4-22  Visit # authorized: 10/20  Authorization period:12-31-22  Plan of care expiration: 8-23-22  MD Follow up appt: none scheduled    Time In: 2:10  Time Out:3:10  Billable time: 40  Min + ES    Subjective     Pt reports: she is walking 2 hours a day which she does partially for her mental state to help with stress.  Pt states she is noticing that she is tightening in back .      Pain Scale: before treatment: 5/10 with packing close to 5/10 after treatment: 1/10 and level pelvis    Objective     Level pelvis   Mod-severe tightness lumbar paraspinals R, mod after treatment  Severe decreased spring lumbar vertebrae     Lumbar active range of motion in standing is: 7-12-22  - flexion - toes                     - extension -  75%                         - left side bending -  To knee         - right side bending -  To knee             Lumbar active range of motion in standing is: 12-21-21  - flexion - toes                     - extension -   75%                         - left side bending -  To knee         - right side bending -  To knee       Muscle Strength 7-12-22  MMT R L   Hip flexion 5/5 5/5   Hip abduction 5/5 5/5   Hip extension 5/5 5/5   Glut max 4+/5 4+/5   Knee extension 5/5 5/5   Knee flexion 5/5 5/5           Muscle Strength 12-21-21  MMT R L   Hip flexion 4+/5 4+/5   Hip abduction 4+/5 5-/5   Hip extension 4+/5 4+/5   Glut max 4/5 4/5   Knee extension 5/5 5/5   Knee flexion 5/5 5/5                 TREATMENT    Therapeutic exercise: Danuta received therapeutic exercises to develop strength, endurance, ROM, flexibility and core stabilization for10 minutes including:  Review verbally of all ex   Reassessment  Continue B Knee to  Chest     HS stretch supine   Glut stretch  Piriformis stretch  B QL stretch  Contract relax R glut x 3      Pelvic tilt with march  x 10  Bridge x 10, at home 20    Partial sit up x 10, at home 50  SLR x 10, 20 at home   Iron cross x 2  Trunk rotation supine x 10  Hip abd sidelying x 10 B, 20 at home   Arm and Leg lift prone x 10, 40 at home   Hip ext prone with bent knee x 10  20 at home  Press up x 10       GSS standing   Contract relax R glut    Manual therapy: Danuta  received the following manual therapy  techniques x 20 min. to include soft tissue and joint mobilization were applied to the: low back and gluteals to include:     Performed STM to LB paraspinals and QL R>L  (NP)P/A mob to lumbar region Grade 1-2 , (NP)sidelying L contract relax to R QL    Sidelying L contract relax mobilization to L5-S1 region to level pelvis  at end of treatment and level pelvis achieved.    Pt received tool-assisted massage with manual therapy techniques to R LB in prone to trigger an inflammatory healing response and stimulate the production of new collagen and proper, more functional, less painful healing.    Vacuum/cupping STM with manual therapy techniques was performed to back and gluteals to decrease muscle  tightness, increase circulation and promote healing process.  The pt's skin was monitored for redness adjusting pressure as needed. The pt was instructed in possible side effects of bruising and/or soreness.    .    Ultrasound  for pain control and to decrease inflammation @ continuous duty cycle, 1 Mhz, applied to R lumbar paraspinals, intensity = 1.5 w/cm2 for 8 minutes. 2 min prep      Patient received pre-mod electrical stimulation to decrease muscle tightness and pain to Lumbar paraspinals/ sacral border of gluteals B for 15 minutes with MH supine with cycle time: continuous, beat frequency: , CC/CV: CV.      Written Home Exercises Provided: yes  Pt demo good understanding of the education provided. Danuta demonstrated good return demonstration of activities.     Pt. education:  · Posture reeducation, body mechanics, HEP, proper posture in standing  · No spiritual or educational barriers to learning provided  · Pt has no cultural, educational or language barriers to learning provided.    Assessment   Pt has been seen for 3 visits since last progress note. Patient demonstrates improvement in range of motion, strength, stabilization and function.    Pt scott treatment well though still develops tightness in lumbar region which she is unable to self manage.  After discussion pt to try to increase usage of home TENS unit for pain relief.       Pt will continue to benefit from skilled outpatient physical therapy to address the remaining functional deficits, provide pt/family education, and to maximize pt's level of independence in the home and community environment. .     GOALS:   Short Term Goals:  3 weeks MET STG's  Increase range of motion 25%  Increase strength 1/2 muscle grade  Improve postural awareness of pelvis to independently identify dysfunction with min assist from PT  Be able to perform HEP with minimal cueing required     Long Term Goals: 6 weeks PARTIALLY MET LTG'S  Increase range of motion to  75% to 100% full   Improve muscle strength 1 muscle grade  Improve muscle strength with MMT to 4+/5 to 5/5  Improve and stabilize proper pelvic positioning  Restore ability to sit for ADL and work with min to 0 pain  Restore normal sleep habits without disturbances due to pain  Restore ability to perform ADL's and household activities independently with min to 0 pain    Anticipated barriers to physical therapy: none  Pt's spiritual, cultural and educational needs considered and pt agreeable to plan of care and goals        Plan   If you concur, I recommend patient continue with physical therapy 1-2 times a week as needed  for 6 weeks.  Please advise us of your  recommendations. Thank you for allowing us to assist in the care of your patient.      Jacy Escobar, MS, PT          I certify the need for these services furnished under this plan of treatment and while under my care.    ____________________________________ Physician/Referring Practitioner                                Date of Signature

## 2022-08-02 NOTE — PROGRESS NOTES
Physical Therapy Daily Treatment Note     Name: Danuta Santos  Clinic Number: [id number]  Diagnosis:   Encounter Diagnosis   Name Primary?    Chronic bilateral low back pain with right-sided sciatica Yes     Physician: Yumiko Davis MD  Treatment Orders: PT Eval and Treat  Past Medical History:   Diagnosis Date    Allergy     IBS (irritable bowel syndrome)     Pneumonia 02/2019     Precautions: as per diagnosis    Evaluation date: 1-4-22  Visit # authorized: 11/20  Authorization period:12-31-22  Plan of care expiration: 8-23-22  MD Follow up appt: none scheduled    Time In: 11:06  Time Out:12:05  Billable time: 40  Min + ES    Subjective     Pt reports: she was out of town in [place] and visited her brother and sister in law.  Pt states her back did ok, but did get tight.  Pt states she took a bit of muscle relaxer.  Pt states her bed was comfortable there.    Pain Scale: before treatment: 4/10  after treatment: 1/10 and level pelvis    Objective     Slight AR R pelvis noted    TREATMENT   Therapeutic exercises were performed to improve ROM, strength, flexibility and stabilization for 5 minutes.        HS stretch supine   Glut stretch  Piriformis stretch  B QL stretch  Contract relax R glut x 3    Pt unsuccessful with self mob today      Verbally review below  Continue B Knee to  Chest     Pelvic tilt with march  x 10  Bridge x 10, at home 20    Partial sit up x 10, at home 50  SLR x 10, 20 at home   Iron cross x 2  Trunk rotation supine x 10  Hip abd sidelying x 10 B, 20 at home   Arm and Leg lift prone x 10, 40 at home   Hip ext prone with bent knee x 10  20 at home  Press up x 10       GSS standing   Contract relax R glut    Manual therapy: Danuta  received the following manual therapy  techniques x 25 min. to include soft tissue and joint mobilization were applied to the: low back and gluteals to include:     Performed STM to LB paraspinals and QL R>L  P/A mob to lumbar region Grade 1-2 ,    Sidelying L contract relax mobilization to L5-S1 region to level pelvis  at end of treatment and level pelvis achieved.    Pt received tool-assisted massage with manual therapy techniques to R LB in prone to trigger an inflammatory healing response and stimulate the production of new collagen and proper, more functional, less painful healing.    Vacuum/cupping STM with manual therapy techniques was performed to back and gluteals to decrease muscle tightness, increase circulation and promote healing process.  The pt's skin was monitored for redness adjusting pressure as needed. The pt was instructed in possible side effects of bruising and/or soreness.    .    Ultrasound  for pain control and to decrease inflammation @ continuous duty cycle, 1 Mhz, applied to R lumbar paraspinals, intensity = 1.5 w/cm2 for 8 minutes. 2 min prep      Patient received pre-mod electrical stimulation to decrease muscle tightness and pain to Lumbar paraspinals/ sacral border of gluteals B for 15 minutes with MH supine with cycle time: continuous, beat frequency: , CC/CV: CV.      Written Home Exercises Provided: yes  Pt demo good understanding of the education provided. Danuta demonstrated good return demonstration of activities.     Pt. education:  · Posture reeducation, body mechanics, HEP, proper posture in standing  · No spiritual or educational barriers to learning provided  · Pt has no cultural, educational or language barriers to learning provided.    Assessment   Pt has been out of town and began with increased tightness toward end of trip.  Pt was unable to self mobilize.  Pt responded well to treatment with decreased tightness and pain and able to mobilize with sidelying mob to level pelvis and decrease pain.       Pt will continue to benefit from skilled outpatient physical therapy to address the remaining functional deficits, provide pt/family education, and to maximize pt's level of independence in the home and  community environment. .     GOALS:   Short Term Goals:  3 weeks MET STG's  Increase range of motion 25%  Increase strength 1/2 muscle grade  Improve postural awareness of pelvis to independently identify dysfunction with min assist from PT  Be able to perform HEP with minimal cueing required     Long Term Goals: 6 weeks PARTIALLY MET LTG'S  Increase range of motion to 75% to 100% full   Improve muscle strength 1 muscle grade  Improve muscle strength with MMT to 4+/5 to 5/5  Improve and stabilize proper pelvic positioning  Restore ability to sit for ADL and work with min to 0 pain  Restore normal sleep habits without disturbances due to pain  Restore ability to perform ADL's and household activities independently with min to 0 pain    Anticipated barriers to physical therapy: none  Pt's spiritual, cultural and educational needs considered and pt agreeable to plan of care and goals        Plan   Continue with established plan of care towards PT goals.   See pt as needed through POC to assist in I management of chronic back symptoms       Jacy Escobar, MS, PT

## 2022-08-03 ENCOUNTER — CLINICAL SUPPORT (OUTPATIENT)
Dept: REHABILITATION | Facility: HOSPITAL | Age: 67
End: 2022-08-03
Payer: COMMERCIAL

## 2022-08-03 DIAGNOSIS — M54.41 CHRONIC BILATERAL LOW BACK PAIN WITH RIGHT-SIDED SCIATICA: Primary | ICD-10-CM

## 2022-08-03 DIAGNOSIS — G89.29 CHRONIC BILATERAL LOW BACK PAIN WITH RIGHT-SIDED SCIATICA: Primary | ICD-10-CM

## 2022-08-03 PROCEDURE — 97140 MANUAL THERAPY 1/> REGIONS: CPT | Mod: PN | Performed by: PHYSICAL THERAPIST

## 2022-08-03 PROCEDURE — 97014 ELECTRIC STIMULATION THERAPY: CPT | Mod: PN | Performed by: PHYSICAL THERAPIST

## 2022-08-03 PROCEDURE — 97035 APP MDLTY 1+ULTRASOUND EA 15: CPT | Mod: PN | Performed by: PHYSICAL THERAPIST

## 2022-08-05 ENCOUNTER — PATIENT MESSAGE (OUTPATIENT)
Dept: INTERNAL MEDICINE | Facility: CLINIC | Age: 67
End: 2022-08-05
Payer: COMMERCIAL

## 2022-08-10 NOTE — PROGRESS NOTES
Physical Therapy Daily Treatment Note     Name: Danuta Santos  Clinic Number: [id number]  Diagnosis:   Encounter Diagnosis   Name Primary?    Chronic bilateral low back pain with right-sided sciatica Yes     Physician: Yumiko Davis MD  Treatment Orders: PT Eval and Treat  Past Medical History:   Diagnosis Date    Allergy     IBS (irritable bowel syndrome)     Pneumonia 02/2019     Precautions: as per diagnosis    Evaluation date: 1-4-22  Visit # authorized: 12/20  Authorization period:12-31-22  Plan of care expiration: 8-23-22  MD Follow up appt: none scheduled    Time In: 1:08  Time Out:2:25  Billable time:  55 Min + ES    Subjective     Pt reports: has been doing pretty well with ability to stabilize, walking is helping.  Pt states feeling tight and feeling pulling down leg With going back to school, needs to join a gym     Pain Scale: before treatment: 4/10  after treatment: 1/10 and level pelvis    Objective     Level pelvis to start    TREATMENT   Therapeutic exercises were performed to improve ROM, strength, flexibility and stabilization for 20 minutes.    Performed treadmill 10 min at 2.5-3.0 mph and no dysfunction after walk   Discussed surfaces at gym which could include [place] with track. Pt may prefer treadmill after discussion.  Instructed pt to go to gym when she would be going to determine availability of treadmills and amount of crowd at gym.      Verbal review of ex   HS stretch supine   Glut stretch  Piriformis stretch  B QL stretch  Contract relax R glut x 3        Verbally review below  Continue B Knee to  Chest     Pelvic tilt with march  x 10  Bridge x 10, at home 20    Partial sit up x 10, at home 50  SLR x 10, 20 at home   Iron cross x 2  Trunk rotation supine x 10  Hip abd sidelying x 10 B, 20 at home   Arm and Leg lift prone x 10, 40 at home   Hip ext prone with bent knee x 10  20 at home  Press up x 10       GSS standing   Contract relax R glut    Manual therapy: Danuta   received the following manual therapy  techniques x 25 min. to include soft tissue and joint mobilization were applied to the: low back and gluteals to include:     Performed STM to LB paraspinals and QL R>L  P/A mob to lumbar region Grade 1-2 ,   Sidelying L contract relax mobilization to L5-S1 region to level pelvis  at end of treatment and level pelvis achieved.    Pt received tool-assisted massage with manual therapy techniques to R LB in prone to trigger an inflammatory healing response and stimulate the production of new collagen and proper, more functional, less painful healing.    Vacuum/cupping STM with manual therapy techniques was performed to back and gluteals to decrease muscle tightness, increase circulation and promote healing process.  The pt's skin was monitored for redness adjusting pressure as needed. The pt was instructed in possible side effects of bruising and/or soreness.    .    Ultrasound  for pain control and to decrease inflammation @ continuous duty cycle, 1 Mhz, applied to R lumbar paraspinals, intensity = 1.5 w/cm2 for 8 minutes. 2 min prep      Patient received pre-mod electrical stimulation to decrease muscle tightness and pain to Lumbar paraspinals/ sacral border of gluteals B for 15 minutes with MH supine with cycle time: continuous, beat frequency: , CC/CV: CV.      Written Home Exercises Provided: continue with prior HEP  Pt demo good understanding of the education provided. Danuta demonstrated good return demonstration of activities.     Pt. education:  · Posture reeducation, body mechanics, HEP, proper posture in standing  · No spiritual or educational barriers to learning provided  · Pt has no cultural, educational or language barriers to learning provided.    Assessment   Pt has been able to do a lot of walking which appears to be helpful to pt.  Pt is about to return to school and due to timing of when she will walk for exercise is looking into joining a gym.  Pt  with level pelvis, but continues with severe tightness of R lumbar paraspinals, which improves with STM and modalities Pt with decreased tightness and pain after treatment      Pt will continue to benefit from skilled outpatient physical therapy to address the remaining functional deficits, provide pt/family education, and to maximize pt's level of independence in the home and community environment. .     GOALS:   Short Term Goals:  3 weeks MET STG's  Increase range of motion 25%  Increase strength 1/2 muscle grade  Improve postural awareness of pelvis to independently identify dysfunction with min assist from PT  Be able to perform HEP with minimal cueing required     Long Term Goals: 6 weeks PARTIALLY MET LTG'S  Increase range of motion to 75% to 100% full   Improve muscle strength 1 muscle grade  Improve muscle strength with MMT to 4+/5 to 5/5  Improve and stabilize proper pelvic positioning  Restore ability to sit for ADL and work with min to 0 pain  Restore normal sleep habits without disturbances due to pain  Restore ability to perform ADL's and household activities independently with min to 0 pain    Anticipated barriers to physical therapy: none  Pt's spiritual, cultural and educational needs considered and pt agreeable to plan of care and goals        Plan   Continue with established plan of care towards PT goals.   See pt as needed through POC to assist in I management of chronic back symptoms       Jacy Escobar, MS, PT

## 2022-08-15 ENCOUNTER — CLINICAL SUPPORT (OUTPATIENT)
Dept: REHABILITATION | Facility: HOSPITAL | Age: 67
End: 2022-08-15
Payer: COMMERCIAL

## 2022-08-15 DIAGNOSIS — G89.29 CHRONIC BILATERAL LOW BACK PAIN WITH RIGHT-SIDED SCIATICA: Primary | ICD-10-CM

## 2022-08-15 DIAGNOSIS — M54.41 CHRONIC BILATERAL LOW BACK PAIN WITH RIGHT-SIDED SCIATICA: Primary | ICD-10-CM

## 2022-08-15 PROCEDURE — 97140 MANUAL THERAPY 1/> REGIONS: CPT | Mod: PN | Performed by: PHYSICAL THERAPIST

## 2022-08-15 PROCEDURE — 97014 ELECTRIC STIMULATION THERAPY: CPT | Mod: PN | Performed by: PHYSICAL THERAPIST

## 2022-08-15 PROCEDURE — 97110 THERAPEUTIC EXERCISES: CPT | Mod: PN | Performed by: PHYSICAL THERAPIST

## 2022-08-15 PROCEDURE — 97035 APP MDLTY 1+ULTRASOUND EA 15: CPT | Mod: PN | Performed by: PHYSICAL THERAPIST

## 2022-08-16 ENCOUNTER — CLINICAL SUPPORT (OUTPATIENT)
Dept: INTERNAL MEDICINE | Facility: CLINIC | Age: 67
End: 2022-08-16
Payer: COMMERCIAL

## 2022-08-16 DIAGNOSIS — Z23 IMMUNIZATION DUE: Primary | ICD-10-CM

## 2022-08-16 PROCEDURE — 90471 IMMUNIZATION ADMIN: CPT | Mod: S$GLB,,, | Performed by: INTERNAL MEDICINE

## 2022-08-16 PROCEDURE — 90715 TDAP VACCINE GREATER THAN OR EQUAL TO 7YO IM: ICD-10-PCS | Mod: S$GLB,,, | Performed by: INTERNAL MEDICINE

## 2022-08-16 PROCEDURE — 90471 TDAP VACCINE GREATER THAN OR EQUAL TO 7YO IM: ICD-10-PCS | Mod: S$GLB,,, | Performed by: INTERNAL MEDICINE

## 2022-08-16 PROCEDURE — 90715 TDAP VACCINE 7 YRS/> IM: CPT | Mod: S$GLB,,, | Performed by: INTERNAL MEDICINE

## 2022-08-22 ENCOUNTER — PATIENT MESSAGE (OUTPATIENT)
Dept: INTERNAL MEDICINE | Facility: CLINIC | Age: 67
End: 2022-08-22
Payer: COMMERCIAL

## 2022-08-25 ENCOUNTER — PATIENT MESSAGE (OUTPATIENT)
Dept: INTERNAL MEDICINE | Facility: CLINIC | Age: 67
End: 2022-08-25
Payer: COMMERCIAL

## 2022-08-25 ENCOUNTER — CLINICAL SUPPORT (OUTPATIENT)
Dept: OTHER | Facility: CLINIC | Age: 67
End: 2022-08-25
Payer: COMMERCIAL

## 2022-08-25 DIAGNOSIS — Z00.8 ENCOUNTER FOR OTHER GENERAL EXAMINATION: ICD-10-CM

## 2022-08-25 PROCEDURE — 99401 PR PREVENT COUNSEL,INDIV,15 MIN: ICD-10-PCS | Mod: S$GLB,,, | Performed by: INTERNAL MEDICINE

## 2022-08-25 PROCEDURE — 82947 PR  ASSAY QUANTITATIVE,BLOOD GLUCOSE: ICD-10-PCS | Mod: QW,S$GLB,, | Performed by: INTERNAL MEDICINE

## 2022-08-25 PROCEDURE — 99401 PREV MED CNSL INDIV APPRX 15: CPT | Mod: S$GLB,,, | Performed by: INTERNAL MEDICINE

## 2022-08-25 PROCEDURE — 82947 ASSAY GLUCOSE BLOOD QUANT: CPT | Mod: QW,S$GLB,, | Performed by: INTERNAL MEDICINE

## 2022-08-25 PROCEDURE — 80061 LIPID PANEL: CPT | Mod: QW,S$GLB,, | Performed by: INTERNAL MEDICINE

## 2022-08-25 PROCEDURE — 80061 PR  LIPID PANEL: ICD-10-PCS | Mod: QW,S$GLB,, | Performed by: INTERNAL MEDICINE

## 2022-08-26 VITALS
DIASTOLIC BLOOD PRESSURE: 73 MMHG | HEIGHT: 66 IN | WEIGHT: 134 LBS | SYSTOLIC BLOOD PRESSURE: 136 MMHG | BODY MASS INDEX: 21.53 KG/M2

## 2022-08-26 LAB
HDLC SERPL-MCNC: 81 MG/DL
POC CHOLESTEROL, TOTAL: 174 MG/DL
POC GLUCOSE, FASTING: 90 MG/DL (ref 60–110)
TRIGL SERPL-MCNC: 44 MG/DL

## 2022-08-30 NOTE — PROGRESS NOTES
Physical Therapy Daily Treatment Note     Name: Danuta Santos  Clinic Number: [id number]  Diagnosis:   Encounter Diagnosis   Name Primary?    Chronic bilateral low back pain with right-sided sciatica Yes     Physician: Yumiko Davis MD  Treatment Orders: PT Eval and Treat  Past Medical History:   Diagnosis Date    Allergy     IBS (irritable bowel syndrome)     Pneumonia 02/2019     Precautions: as per diagnosis    Evaluation date: 1-4-22  Visit # authorized: 13/20  Authorization period:12-31-22  Plan of care expiration: 8-23-22  MD Follow up appt: none scheduled    Time In: 12:15  Time Out: 12:53  Billable time:  20 Min + ES    Subjective     Pt reports: back went out and unable to get level on her own.  Pt states she was trying to clean windshield in car and was positioned in an awkward position and led to increased pain   Pain Scale: before treatment: 5/10  after treatment: 1/10 and level pelvis    Objective     Level pelvis to start    TREATMENT   Therapeutic exercises were performed to improve ROM, strength, flexibility and stabilization for 2 minutes.          Verbal review of ex   HS stretch supine   Glut stretch  Piriformis stretch  B QL stretch  Contract relax R glut x 3        Verbally review below  Continue B Knee to  Chest     Pelvic tilt with march  x 10  Bridge x 10, at home 20    Partial sit up x 10, at home 50  SLR x 10, 20 at home   Iron cross x 2  Trunk rotation supine x 10  Hip abd sidelying x 10 B, 20 at home   Arm and Leg lift prone x 10, 40 at home   Hip ext prone with bent knee x 10  20 at home  Press up x 10       GSS standing   Contract relax R glut    Manual therapy: Danuta  received the following manual therapy  techniques x 18 min. to include soft tissue and joint mobilization were applied to the: low back and gluteals to include:     Performed STM to LB paraspinals and QL R>L  P/A mob to lumbar region Grade 1-2 ,   Sidelying L contract relax mobilization to L5-S1 region to  level pelvis  at end of treatment and level pelvis achieved.    (NP)Pt received tool-assisted massage with manual therapy techniques to R LB in prone to trigger an inflammatory healing response and stimulate the production of new collagen and proper, more functional, less painful healing.    (NP)Vacuum/cupping STM with manual therapy techniques was performed to back and gluteals to decrease muscle tightness, increase circulation and promote healing process.  The pt's skin was monitored for redness adjusting pressure as needed. The pt was instructed in possible side effects of bruising and/or soreness.    .    (NP)Ultrasound  for pain control and to decrease inflammation @ continuous duty cycle, 1 Mhz, applied to R lumbar paraspinals, intensity = 1.5 w/cm2 for 8 minutes. 2 min prep      Patient received pre-mod electrical stimulation to decrease muscle tightness and pain to Lumbar paraspinals/ sacral border of gluteals B for 15 minutes with MH supine with cycle time: continuous, beat frequency: , CC/CV: CV.      Written Home Exercises Provided: continue with prior HEP  Pt demo good understanding of the education provided. Danuta demonstrated good return demonstration of activities.     Pt. education:  Posture reeducation, body mechanics, HEP, proper posture in standing  No spiritual or educational barriers to learning provided  Pt has no cultural, educational or language barriers to learning provided.    Assessment   Pt had overreached resulting in dysfunction she was unable to self mobilize.  Pt scott treatment well and decreased pain and level pelvis after treatment.       Pt will continue to benefit from skilled outpatient physical therapy to address the remaining functional deficits, provide pt/family education, and to maximize pt's level of independence in the home and community environment. .     GOALS:   Short Term Goals:  3 weeks MET STG's  Increase range of motion 25%  Increase strength 1/2 muscle  grade  Improve postural awareness of pelvis to independently identify dysfunction with min assist from PT  Be able to perform HEP with minimal cueing required     Long Term Goals: 6 weeks PARTIALLY MET LTG'S  Increase range of motion to 75% to 100% full   Improve muscle strength 1 muscle grade  Improve muscle strength with MMT to 4+/5 to 5/5  Improve and stabilize proper pelvic positioning  Restore ability to sit for ADL and work with min to 0 pain  Restore normal sleep habits without disturbances due to pain  Restore ability to perform ADL's and household activities independently with min to 0 pain    Anticipated barriers to physical therapy: none  Pt's spiritual, cultural and educational needs considered and pt agreeable to plan of care and goals        Plan   Continue with established plan of care towards PT goals.   See pt as needed through POC to assist in I management of chronic back symptoms       Jacy Escobar MS, PT

## 2022-08-31 ENCOUNTER — CLINICAL SUPPORT (OUTPATIENT)
Dept: REHABILITATION | Facility: HOSPITAL | Age: 67
End: 2022-08-31
Payer: COMMERCIAL

## 2022-08-31 DIAGNOSIS — M54.41 CHRONIC BILATERAL LOW BACK PAIN WITH RIGHT-SIDED SCIATICA: Primary | ICD-10-CM

## 2022-08-31 DIAGNOSIS — G89.29 CHRONIC BILATERAL LOW BACK PAIN WITH RIGHT-SIDED SCIATICA: Primary | ICD-10-CM

## 2022-08-31 PROCEDURE — 97140 MANUAL THERAPY 1/> REGIONS: CPT | Mod: PN | Performed by: PHYSICAL THERAPIST

## 2022-08-31 PROCEDURE — 97014 ELECTRIC STIMULATION THERAPY: CPT | Mod: PN | Performed by: PHYSICAL THERAPIST

## 2022-09-09 ENCOUNTER — PATIENT MESSAGE (OUTPATIENT)
Dept: INTERNAL MEDICINE | Facility: CLINIC | Age: 67
End: 2022-09-09
Payer: COMMERCIAL

## 2022-09-15 ENCOUNTER — CLINICAL SUPPORT (OUTPATIENT)
Dept: REHABILITATION | Facility: HOSPITAL | Age: 67
End: 2022-09-15
Payer: COMMERCIAL

## 2022-09-15 DIAGNOSIS — G89.29 CHRONIC BILATERAL LOW BACK PAIN WITH RIGHT-SIDED SCIATICA: Primary | ICD-10-CM

## 2022-09-15 DIAGNOSIS — M54.41 CHRONIC BILATERAL LOW BACK PAIN WITH RIGHT-SIDED SCIATICA: Primary | ICD-10-CM

## 2022-09-15 PROCEDURE — 97140 MANUAL THERAPY 1/> REGIONS: CPT | Mod: PN | Performed by: PHYSICAL THERAPIST

## 2022-09-15 PROCEDURE — 97014 ELECTRIC STIMULATION THERAPY: CPT | Mod: PN | Performed by: PHYSICAL THERAPIST

## 2022-09-15 PROCEDURE — 97035 APP MDLTY 1+ULTRASOUND EA 15: CPT | Mod: PN | Performed by: PHYSICAL THERAPIST

## 2022-09-15 PROCEDURE — 97110 THERAPEUTIC EXERCISES: CPT | Mod: PN | Performed by: PHYSICAL THERAPIST

## 2022-09-15 NOTE — PROGRESS NOTES
Physical Therapy Progress and Daily Treatment Note     Name: Danuta Santos  Clinic Number: [id number]  Diagnosis:   Encounter Diagnosis   Name Primary?    Chronic bilateral low back pain with right-sided sciatica Yes     Physician: Yumiko Davis MD  Treatment Orders: PT Eval and Treat  Past Medical History:   Diagnosis Date    Allergy     IBS (irritable bowel syndrome)     Pneumonia 02/2019     Precautions: as per diagnosis    Evaluation date: 1-4-22  Visit # authorized: 14/20  Authorization period:12-31-22  Plan of care expiration: 10-27-22  MD Follow up appt: none scheduled    Time In: 4:06  Time Out: 5:25  Billable time:  58 Min + ES    Subjective     Pt reports: has been able to handle on her own fairly well.  Pt states 4 days ago she was unable to self mobilize.  Pt states she has developed increased pain since that time   Pain Scale: before treatment: 5/10  after treatment: 1/10 and level pelvis    Objective   AR R pelvis      Mod-severe tightness lumbar paraspinals R, min-mod after treatment  Severe decreased spring lumbar vertebrae     Lumbar active range of motion in standing is: 9-15-22  - flexion - toes                     - extension -  75%                         - left side bending -  To knee         - right side bending -  To knee             Lumbar active range of motion in standing is: 12-21-21  - flexion - toes                     - extension -  75%                         - left side bending -  To knee         - right side bending -  To knee       Muscle Strength 9-15-22  MMT R L   Hip flexion 5/5 5/5   Hip abduction 5/5 5/5   Hip extension 5/5 5/5   Glut max 4+/5 4+/5   Knee extension 5/5 5/5   Knee flexion 5/5 5/5           Muscle Strength 12-21-21  MMT R L   Hip flexion 4+/5 4+/5   Hip abduction 4+/5 5-/5   Hip extension 4+/5 4+/5   Glut max 4/5 4/5   Knee extension 5/5 5/5   Knee flexion 5/5 5/5                TREATMENT   Therapeutic exercises were performed to improve ROM,  "strength, flexibility and stabilization for 28 minutes.      Reassessment      HS stretch supine   Glut stretch  Piriformis stretch  B QL stretch  Contract relax R glut x 3    Pt with improved pelvic positioning, but did not fully realign  Discussed heel lift options.  Pt's current heel lifts have flattened and is looking to replace.  One option 5/8" is too much.  Pt currently has flattened hers to 3/8"  Pt to look for 1/4" and will search through store or order online and was shown a product that was firm gel plastic instead of foam to avoid compression and loss of height.        Verbally review below  Continue B Knee to  Chest     Pelvic tilt with march  x 10  Bridge x 10, at home 20    Partial sit up x 10, at home 50  SLR x 10, 20 at home   Iron cross x 2  Trunk rotation supine x 10  Hip abd sidelying x 10 B, 20 at home   Arm and Leg lift prone x 10, 40 at home   Hip ext prone with bent knee x 10  20 at home  Press up x 10       GSS standing   Contract relax R glut    Manual therapy: Danuta  received the following manual therapy  techniques x 20 min. to include soft tissue and joint mobilization were applied to the: low back and gluteals to include:     Performed STM to LB paraspinals and QL R>L  P/A mob to lumbar region Grade 1-2 ,   Sidelying L contract relax mobilization to L5-S1 region to level pelvis  at end of treatment and level pelvis achieved.    (NP)Pt received tool-assisted massage with manual therapy techniques to R LB in prone to trigger an inflammatory healing response and stimulate the production of new collagen and proper, more functional, less painful healing.    (NP)Vacuum/cupping STM with manual therapy techniques was performed to back and gluteals to decrease muscle tightness, increase circulation and promote healing process.  The pt's skin was monitored for redness adjusting pressure as needed. The pt was instructed in possible side effects of bruising and/or soreness.    .    Ultrasound  " for pain control and to decrease inflammation @ continuous duty cycle, 1 Mhz, applied to R lumbar paraspinals, intensity = 1.5 w/cm2 for 8 minutes. 2 min prep      Patient received pre-mod electrical stimulation to decrease muscle tightness and pain to Lumbar paraspinals/ sacral border of gluteals B for 15 minutes with MH supine with cycle time: continuous, beat frequency: , CC/CV: CV.      Written Home Exercises Provided: continue with prior HEP  Pt demo good understanding of the education provided. Danuta demonstrated good return demonstration of activities.     Pt. education:  Posture reeducation, body mechanics, HEP, proper posture in standing  No spiritual or educational barriers to learning provided  Pt has no cultural, educational or language barriers to learning provided.    Assessment   Pt has been seen for 4 visits since last progress report.  Pt continues with development of severe muscle tightness in R lumbar paraspinals and difficulty with self mob after muscle gets too tight .  Pt exhibits similar mobility and strength but did note compression of heel lift and pt may benefit from replacing heel lifts.  Pt appears to understands heel lift options after discussion.  Pt with improved symptoms after treatment      Pt will continue to benefit from skilled outpatient physical therapy to address the remaining functional deficits, provide pt/family education, and to maximize pt's level of independence in the home and community environment. .     GOALS:   Short Term Goals:  3 weeks MET STG's  Increase range of motion 25%  Increase strength 1/2 muscle grade  Improve postural awareness of pelvis to independently identify dysfunction with min assist from PT  Be able to perform HEP with minimal cueing required     Long Term Goals: 6 weeks PARTIALLY MET LTG'S  Increase range of motion to 75% to 100% full   Improve muscle strength 1 muscle grade  Improve muscle strength with MMT to 4+/5 to 5/5  Improve and  stabilize proper pelvic positioning  Restore ability to sit for ADL and work with min to 0 pain  Restore normal sleep habits without disturbances due to pain  Restore ability to perform ADL's and household activities independently with min to 0 pain    Anticipated barriers to physical therapy: none  Pt's spiritual, cultural and educational needs considered and pt agreeable to plan of care and goals        Plan   If you concur, I recommend patient continue with physical therapy 1-2 times a week as needed  for 6 weeks.  Please advise us of your  recommendations. Thank you for allowing us to assist in the care of your patient.      Jacy Escobar, MS, PT          I certify the need for these services furnished under this plan of treatment and while under my care.    ____________________________________ Physician/Referring Practitioner                                Date of Signature           Jacy Escobar, MS, PT

## 2022-09-15 NOTE — PLAN OF CARE
Physical Therapy Progress and Daily Treatment Note     Name: Danuta Santos  Clinic Number: [id number]  Diagnosis:   Encounter Diagnosis   Name Primary?    Chronic bilateral low back pain with right-sided sciatica Yes     Physician: Yumiko Davis MD  Treatment Orders: PT Eval and Treat  Past Medical History:   Diagnosis Date    Allergy     IBS (irritable bowel syndrome)     Pneumonia 02/2019     Precautions: as per diagnosis    Evaluation date: 1-4-22  Visit # authorized: 14/20  Authorization period:12-31-22  Plan of care expiration: 10-27-22  MD Follow up appt: none scheduled    Time In: 4:06  Time Out: 5:25  Billable time:  58 Min + ES    Subjective     Pt reports: has been able to handle on her own fairly well.  Pt states 4 days ago she was unable to self mobilize.  Pt states she has developed increased pain since that time   Pain Scale: before treatment: 5/10  after treatment: 1/10 and level pelvis    Objective   AR R pelvis      Mod-severe tightness lumbar paraspinals R, min-mod after treatment  Severe decreased spring lumbar vertebrae     Lumbar active range of motion in standing is: 9-15-22  - flexion - toes                     - extension -  75%                         - left side bending -  To knee         - right side bending -  To knee             Lumbar active range of motion in standing is: 12-21-21  - flexion - toes                     - extension -  75%                         - left side bending -  To knee         - right side bending -  To knee       Muscle Strength 9-15-22  MMT R L   Hip flexion 5/5 5/5   Hip abduction 5/5 5/5   Hip extension 5/5 5/5   Glut max 4+/5 4+/5   Knee extension 5/5 5/5   Knee flexion 5/5 5/5           Muscle Strength 12-21-21  MMT R L   Hip flexion 4+/5 4+/5   Hip abduction 4+/5 5-/5   Hip extension 4+/5 4+/5   Glut max 4/5 4/5   Knee extension 5/5 5/5   Knee flexion 5/5 5/5                TREATMENT   Therapeutic exercises were performed to improve ROM,  "strength, flexibility and stabilization for 28 minutes.      Reassessment      HS stretch supine   Glut stretch  Piriformis stretch  B QL stretch  Contract relax R glut x 3    Pt with improved pelvic positioning, but did not fully realign  Discussed heel lift options.  Pt's current heel lifts have flattened and is looking to replace.  One option 5/8" is too much.  Pt currently has flattened hers to 3/8"  Pt to look for 1/4" and will search through store or order online and was shown a product that was firm gel plastic instead of foam to avoid compression and loss of height.        Verbally review below  Continue B Knee to  Chest     Pelvic tilt with march  x 10  Bridge x 10, at home 20    Partial sit up x 10, at home 50  SLR x 10, 20 at home   Iron cross x 2  Trunk rotation supine x 10  Hip abd sidelying x 10 B, 20 at home   Arm and Leg lift prone x 10, 40 at home   Hip ext prone with bent knee x 10  20 at home  Press up x 10       GSS standing   Contract relax R glut    Manual therapy: Danuta  received the following manual therapy  techniques x 20 min. to include soft tissue and joint mobilization were applied to the: low back and gluteals to include:     Performed STM to LB paraspinals and QL R>L  P/A mob to lumbar region Grade 1-2 ,   Sidelying L contract relax mobilization to L5-S1 region to level pelvis  at end of treatment and level pelvis achieved.    (NP)Pt received tool-assisted massage with manual therapy techniques to R LB in prone to trigger an inflammatory healing response and stimulate the production of new collagen and proper, more functional, less painful healing.    (NP)Vacuum/cupping STM with manual therapy techniques was performed to back and gluteals to decrease muscle tightness, increase circulation and promote healing process.  The pt's skin was monitored for redness adjusting pressure as needed. The pt was instructed in possible side effects of bruising and/or soreness.    .    Ultrasound  " for pain control and to decrease inflammation @ continuous duty cycle, 1 Mhz, applied to R lumbar paraspinals, intensity = 1.5 w/cm2 for 8 minutes. 2 min prep      Patient received pre-mod electrical stimulation to decrease muscle tightness and pain to Lumbar paraspinals/ sacral border of gluteals B for 15 minutes with MH supine with cycle time: continuous, beat frequency: , CC/CV: CV.      Written Home Exercises Provided: continue with prior HEP  Pt demo good understanding of the education provided. Danuta demonstrated good return demonstration of activities.     Pt. education:  Posture reeducation, body mechanics, HEP, proper posture in standing  No spiritual or educational barriers to learning provided  Pt has no cultural, educational or language barriers to learning provided.    Assessment   Pt has been seen for 4 visits since last progress report.  Pt continues with development of severe muscle tightness in R lumbar paraspinals and difficulty with self mob after muscle gets too tight .  Pt exhibits similar mobility and strength but did note compression of heel lift and pt may benefit from replacing heel lifts.  Pt appears to understands heel lift options after discussion.  Pt with improved symptoms after treatment      Pt will continue to benefit from skilled outpatient physical therapy to address the remaining functional deficits, provide pt/family education, and to maximize pt's level of independence in the home and community environment. .     GOALS:   Short Term Goals:  3 weeks MET STG's  Increase range of motion 25%  Increase strength 1/2 muscle grade  Improve postural awareness of pelvis to independently identify dysfunction with min assist from PT  Be able to perform HEP with minimal cueing required     Long Term Goals: 6 weeks PARTIALLY MET LTG'S  Increase range of motion to 75% to 100% full   Improve muscle strength 1 muscle grade  Improve muscle strength with MMT to 4+/5 to 5/5  Improve and  stabilize proper pelvic positioning  Restore ability to sit for ADL and work with min to 0 pain  Restore normal sleep habits without disturbances due to pain  Restore ability to perform ADL's and household activities independently with min to 0 pain    Anticipated barriers to physical therapy: none  Pt's spiritual, cultural and educational needs considered and pt agreeable to plan of care and goals        Plan   If you concur, I recommend patient continue with physical therapy 1-2 times a week as needed  for 6 weeks.  Please advise us of your  recommendations. Thank you for allowing us to assist in the care of your patient.      Jacy Escobar, MS, PT          I certify the need for these services furnished under this plan of treatment and while under my care.    ____________________________________ Physician/Referring Practitioner                                Date of Signature

## 2022-09-16 ENCOUNTER — PATIENT MESSAGE (OUTPATIENT)
Dept: INTERNAL MEDICINE | Facility: CLINIC | Age: 67
End: 2022-09-16
Payer: COMMERCIAL

## 2022-09-16 RX ORDER — TIZANIDINE 2 MG/1
4 TABLET ORAL EVERY 8 HOURS PRN
Qty: 30 TABLET | Refills: 1 | Status: SHIPPED | OUTPATIENT
Start: 2022-09-16 | End: 2022-09-26

## 2022-09-20 ENCOUNTER — PATIENT MESSAGE (OUTPATIENT)
Dept: INTERNAL MEDICINE | Facility: CLINIC | Age: 67
End: 2022-09-20
Payer: COMMERCIAL

## 2022-09-24 ENCOUNTER — IMMUNIZATION (OUTPATIENT)
Dept: INTERNAL MEDICINE | Facility: CLINIC | Age: 67
End: 2022-09-24
Payer: COMMERCIAL

## 2022-09-24 DIAGNOSIS — Z23 NEED FOR VACCINATION: Primary | ICD-10-CM

## 2022-09-24 PROCEDURE — 0124A PR IMMUNIZ ADMIN, SARS-COV- 2 COVID-19 VACCINE, 30MCG/0.3ML, BIVALENT, BOOSTER DOSE: CPT | Mod: S$GLB,,, | Performed by: INTERNAL MEDICINE

## 2022-09-24 PROCEDURE — 91312 COVID-19, MRNA, LNP-S, BIVALENT BOOSTER, PF, 30 MCG/0.3 ML DOSE: ICD-10-PCS | Mod: S$GLB,,, | Performed by: INTERNAL MEDICINE

## 2022-09-24 PROCEDURE — 0124A PR IMMUNIZ ADMIN, SARS-COV- 2 COVID-19 VACCINE, 30MCG/0.3ML, BIVALENT, BOOSTER DOSE: ICD-10-PCS | Mod: S$GLB,,, | Performed by: INTERNAL MEDICINE

## 2022-09-24 PROCEDURE — 0124A COVID-19, MRNA, LNP-S, BIVALENT BOOSTER, PF, 30 MCG/0.3 ML DOSE: CPT | Mod: CV19,PBBFAC | Performed by: INTERNAL MEDICINE

## 2022-09-24 PROCEDURE — 91312 COVID-19, MRNA, LNP-S, BIVALENT BOOSTER, PF, 30 MCG/0.3 ML DOSE: CPT | Mod: S$GLB,,, | Performed by: INTERNAL MEDICINE

## 2022-09-26 ENCOUNTER — CLINICAL SUPPORT (OUTPATIENT)
Dept: REHABILITATION | Facility: HOSPITAL | Age: 67
End: 2022-09-26
Payer: COMMERCIAL

## 2022-09-26 DIAGNOSIS — G89.29 CHRONIC BILATERAL LOW BACK PAIN WITH RIGHT-SIDED SCIATICA: Primary | ICD-10-CM

## 2022-09-26 DIAGNOSIS — M54.41 CHRONIC BILATERAL LOW BACK PAIN WITH RIGHT-SIDED SCIATICA: Primary | ICD-10-CM

## 2022-09-26 PROCEDURE — 97140 MANUAL THERAPY 1/> REGIONS: CPT | Mod: PN | Performed by: PHYSICAL THERAPIST

## 2022-09-26 PROCEDURE — 97110 THERAPEUTIC EXERCISES: CPT | Mod: PN | Performed by: PHYSICAL THERAPIST

## 2022-09-26 PROCEDURE — 97035 APP MDLTY 1+ULTRASOUND EA 15: CPT | Mod: PN | Performed by: PHYSICAL THERAPIST

## 2022-09-26 PROCEDURE — 97014 ELECTRIC STIMULATION THERAPY: CPT | Mod: PN | Performed by: PHYSICAL THERAPIST

## 2022-09-26 NOTE — PROGRESS NOTES
Physical Therapy  Daily Treatment Note     Name: Danuta Santos  Clinic Number: [id number]  Diagnosis:   Encounter Diagnosis   Name Primary?    Chronic bilateral low back pain with right-sided sciatica Yes     Physician: Yumiko Davis MD  Treatment Orders: PT Eval and Treat  Past Medical History:   Diagnosis Date    Allergy     IBS (irritable bowel syndrome)     Pneumonia 02/2019     Precautions: as per diagnosis    Evaluation date: 1-4-22  Visit # authorized: 15/20  Authorization period:12-31-22  Plan of care expiration: 10-27-22  MD Follow up appt: none scheduled    Time In: 2:48  Time Out: 4:00  Billable time:  45 Min + ES    Subjective     Pt reports: she has been walking a lot.  Pt states 11/2 hours a day.  Pt states doing stretches and sometimes strengthening.  Pt states she some back pain, but after walking it helps decrease tightness and pain.   Pain Scale: before treatment: 4/10  after treatment: 1/10 and level pelvis    Objective   AR R pelvis  Mod-severe tightness lumbar paraspinals R, min-mod after treatment  Severe decreased spring lumbar vertebrae    TREATMENT   Therapeutic exercises were performed to improve ROM, strength, flexibility and stabilization for 15 minutes.       Pt has not been stretching before walk only after  Instructed pt to stretch prior to walking    HS stretch supine   Glut stretch  Piriformis stretch  B QL stretch  Contract relax R glut x 3    Pt with improved pelvic positioning, but did not fully realign  Discussed heel lift options.  Pt ordered heel lifts, but not in yet    Verbally review below  Continue B Knee to  Chest     Pelvic tilt with march  x 10  Bridge x 10, at home 20    Partial sit up x 10, at home 50  SLR x 10, 20 at home   Iron cross x 2  Trunk rotation supine x 10  Hip abd sidelying x 10 B, 20 at home   Arm and Leg lift prone x 10, 40 at home   Hip ext prone with bent knee x 10  20 at home  Press up x 10       GSS standing   Contract relax R  glut    Manual therapy: Danuta  received the following manual therapy  techniques x 20 min. to include soft tissue and joint mobilization were applied to the: low back and gluteals to include:     Performed STM to LB paraspinals and QL R>L  P/A mob to lumbar region Grade 1-2 , MET to QL R SL L  Sidelying L contract relax mobilization to L5-S1 region to level pelvis  at end of treatment and level pelvis achieved.    Pt received tool-assisted massage with manual therapy techniques to R LB in prone to trigger an inflammatory healing response and stimulate the production of new collagen and proper, more functional, less painful healing.    Vacuum/cupping STM with manual therapy techniques was performed to back and gluteals to decrease muscle tightness, increase circulation and promote healing process.  The pt's skin was monitored for redness adjusting pressure as needed. The pt was instructed in possible side effects of bruising and/or soreness.    .    Ultrasound  for pain control and to decrease inflammation @ continuous duty cycle, 1 Mhz, applied to R lumbar paraspinals, intensity = 1.5 w/cm2 for 8 minutes. 2 min prep      Patient received pre-mod electrical stimulation to decrease muscle tightness and pain to Lumbar paraspinals/ sacral border of gluteals B for 15 minutes with MH supine with cycle time: continuous, beat frequency: , CC/CV: CV.      Written Home Exercises Provided: continue with prior HEP  Pt demo good understanding of the education provided. Danuta demonstrated good return demonstration of activities.     Pt. education:  Posture reeducation, body mechanics, HEP, proper posture in standing  No spiritual or educational barriers to learning provided  Pt has no cultural, educational or language barriers to learning provided.    Assessment   Pt has increased walking and though knew she was in dysfunction would wait and try to realign after walk.  Pt appears to understand need to achieve level  pelvis prior to walk to avoid increased muscle tightness which leads to inability to self correct.  Pt understands she may need to perform MET after walk also to align pelvis.  Pt scott treatment well and decreased symptoms after Rx.       Pt will continue to benefit from skilled outpatient physical therapy to address the remaining functional deficits, provide pt/family education, and to maximize pt's level of independence in the home and community environment. .     GOALS:   Short Term Goals:  3 weeks MET STG's  Increase range of motion 25%  Increase strength 1/2 muscle grade  Improve postural awareness of pelvis to independently identify dysfunction with min assist from PT  Be able to perform HEP with minimal cueing required     Long Term Goals: 6 weeks PARTIALLY MET LTG'S  Increase range of motion to 75% to 100% full   Improve muscle strength 1 muscle grade  Improve muscle strength with MMT to 4+/5 to 5/5  Improve and stabilize proper pelvic positioning  Restore ability to sit for ADL and work with min to 0 pain  Restore normal sleep habits without disturbances due to pain  Restore ability to perform ADL's and household activities independently with min to 0 pain    Anticipated barriers to physical therapy: none  Pt's spiritual, cultural and educational needs considered and pt agreeable to plan of care and goals        Plan   Continue with established plan of care towards PT goals.               Jacy Escobar, MS, PT

## 2022-10-03 ENCOUNTER — TELEPHONE (OUTPATIENT)
Dept: INTERNAL MEDICINE | Facility: CLINIC | Age: 67
End: 2022-10-03
Payer: COMMERCIAL

## 2022-10-03 ENCOUNTER — LAB VISIT (OUTPATIENT)
Dept: LAB | Facility: HOSPITAL | Age: 67
End: 2022-10-03
Attending: INTERNAL MEDICINE
Payer: COMMERCIAL

## 2022-10-03 DIAGNOSIS — Z86.2 HISTORY OF ANEMIA: ICD-10-CM

## 2022-10-03 DIAGNOSIS — J32.0 CHRONIC MAXILLARY SINUSITIS: ICD-10-CM

## 2022-10-03 DIAGNOSIS — J33.8 OTHER POLYP OF SINUS: ICD-10-CM

## 2022-10-03 DIAGNOSIS — R53.83 FATIGUE, UNSPECIFIED TYPE: ICD-10-CM

## 2022-10-03 DIAGNOSIS — Z86.2 HISTORY OF ANEMIA: Primary | ICD-10-CM

## 2022-10-03 LAB
ALBUMIN SERPL BCP-MCNC: 3.9 G/DL (ref 3.5–5.2)
ALP SERPL-CCNC: 54 U/L (ref 55–135)
ALT SERPL W/O P-5'-P-CCNC: 16 U/L (ref 10–44)
ANION GAP SERPL CALC-SCNC: 10 MMOL/L (ref 8–16)
AST SERPL-CCNC: 19 U/L (ref 10–40)
BASOPHILS # BLD AUTO: 0.05 K/UL (ref 0–0.2)
BASOPHILS NFR BLD: 1.2 % (ref 0–1.9)
BILIRUB SERPL-MCNC: 0.4 MG/DL (ref 0.1–1)
BUN SERPL-MCNC: 23 MG/DL (ref 8–23)
CALCIUM SERPL-MCNC: 10 MG/DL (ref 8.7–10.5)
CHLORIDE SERPL-SCNC: 99 MMOL/L (ref 95–110)
CO2 SERPL-SCNC: 23 MMOL/L (ref 23–29)
CREAT SERPL-MCNC: 0.8 MG/DL (ref 0.5–1.4)
DIFFERENTIAL METHOD: ABNORMAL
EOSINOPHIL # BLD AUTO: 0.1 K/UL (ref 0–0.5)
EOSINOPHIL NFR BLD: 1.5 % (ref 0–8)
ERYTHROCYTE [DISTWIDTH] IN BLOOD BY AUTOMATED COUNT: 12.8 % (ref 11.5–14.5)
EST. GFR  (NO RACE VARIABLE): >60 ML/MIN/1.73 M^2
FERRITIN SERPL-MCNC: 70 NG/ML (ref 20–300)
GLUCOSE SERPL-MCNC: 107 MG/DL (ref 70–110)
HCT VFR BLD AUTO: 37.1 % (ref 37–48.5)
HGB BLD-MCNC: 11.7 G/DL (ref 12–16)
IMM GRANULOCYTES # BLD AUTO: 0.01 K/UL (ref 0–0.04)
IMM GRANULOCYTES NFR BLD AUTO: 0.2 % (ref 0–0.5)
LYMPHOCYTES # BLD AUTO: 0.9 K/UL (ref 1–4.8)
LYMPHOCYTES NFR BLD: 22.9 % (ref 18–48)
MCH RBC QN AUTO: 29.1 PG (ref 27–31)
MCHC RBC AUTO-ENTMCNC: 31.5 G/DL (ref 32–36)
MCV RBC AUTO: 92 FL (ref 82–98)
MONOCYTES # BLD AUTO: 0.4 K/UL (ref 0.3–1)
MONOCYTES NFR BLD: 9 % (ref 4–15)
NEUTROPHILS # BLD AUTO: 2.6 K/UL (ref 1.8–7.7)
NEUTROPHILS NFR BLD: 65.2 % (ref 38–73)
NRBC BLD-RTO: 0 /100 WBC
PLATELET # BLD AUTO: 212 K/UL (ref 150–450)
PMV BLD AUTO: 11.2 FL (ref 9.2–12.9)
POTASSIUM SERPL-SCNC: 4.1 MMOL/L (ref 3.5–5.1)
PROT SERPL-MCNC: 6.5 G/DL (ref 6–8.4)
RBC # BLD AUTO: 4.02 M/UL (ref 4–5.4)
SODIUM SERPL-SCNC: 132 MMOL/L (ref 136–145)
TSH SERPL DL<=0.005 MIU/L-ACNC: 1.3 UIU/ML (ref 0.4–4)
WBC # BLD AUTO: 4.02 K/UL (ref 3.9–12.7)

## 2022-10-03 PROCEDURE — 36415 COLL VENOUS BLD VENIPUNCTURE: CPT | Performed by: INTERNAL MEDICINE

## 2022-10-03 PROCEDURE — 82728 ASSAY OF FERRITIN: CPT | Performed by: INTERNAL MEDICINE

## 2022-10-03 PROCEDURE — 85025 COMPLETE CBC W/AUTO DIFF WBC: CPT | Performed by: INTERNAL MEDICINE

## 2022-10-03 PROCEDURE — 84443 ASSAY THYROID STIM HORMONE: CPT | Performed by: INTERNAL MEDICINE

## 2022-10-03 PROCEDURE — 80053 COMPREHEN METABOLIC PANEL: CPT | Performed by: INTERNAL MEDICINE

## 2022-10-04 DIAGNOSIS — R09.81 SINUS CONGESTION: ICD-10-CM

## 2022-10-04 DIAGNOSIS — R53.83 FATIGUE, UNSPECIFIED TYPE: Primary | ICD-10-CM

## 2022-10-04 NOTE — TELEPHONE ENCOUNTER
Ch coming for 230 pm for covid swab.     Book nursing visit     She will also schedule a ct sinus and sign a consent for dr day

## 2022-10-08 ENCOUNTER — HOSPITAL ENCOUNTER (OUTPATIENT)
Dept: RADIOLOGY | Facility: HOSPITAL | Age: 67
Discharge: HOME | End: 2022-10-08
Attending: INTERNAL MEDICINE
Payer: COMMERCIAL

## 2022-10-08 ENCOUNTER — PATIENT MESSAGE (OUTPATIENT)
Dept: INTERNAL MEDICINE | Facility: CLINIC | Age: 67
End: 2022-10-08
Payer: COMMERCIAL

## 2022-10-08 DIAGNOSIS — J33.8 OTHER POLYP OF SINUS: ICD-10-CM

## 2022-10-08 DIAGNOSIS — J32.0 CHRONIC MAXILLARY SINUSITIS: ICD-10-CM

## 2022-10-08 PROCEDURE — 70486 CT MAXILLOFACIAL W/O DYE: CPT | Mod: 26,,, | Performed by: RADIOLOGY

## 2022-10-08 PROCEDURE — 70486 CT MAXILLOFACIAL W/O DYE: CPT | Mod: TC

## 2022-10-08 PROCEDURE — 70486 CT SINUSES WITHOUT CONTRAST: ICD-10-PCS | Mod: 26,,, | Performed by: RADIOLOGY

## 2022-10-10 ENCOUNTER — PATIENT MESSAGE (OUTPATIENT)
Dept: INTERNAL MEDICINE | Facility: CLINIC | Age: 67
End: 2022-10-10
Payer: COMMERCIAL

## 2022-10-27 ENCOUNTER — CLINICAL SUPPORT (OUTPATIENT)
Dept: REHABILITATION | Facility: HOSPITAL | Age: 67
End: 2022-10-27
Payer: COMMERCIAL

## 2022-10-27 DIAGNOSIS — M54.41 CHRONIC BILATERAL LOW BACK PAIN WITH RIGHT-SIDED SCIATICA: Primary | ICD-10-CM

## 2022-10-27 DIAGNOSIS — G89.29 CHRONIC BILATERAL LOW BACK PAIN WITH RIGHT-SIDED SCIATICA: Primary | ICD-10-CM

## 2022-10-27 PROCEDURE — 97110 THERAPEUTIC EXERCISES: CPT | Mod: PN | Performed by: PHYSICAL THERAPIST

## 2022-10-27 PROCEDURE — 97014 ELECTRIC STIMULATION THERAPY: CPT | Mod: PN | Performed by: PHYSICAL THERAPIST

## 2022-10-27 PROCEDURE — 97140 MANUAL THERAPY 1/> REGIONS: CPT | Mod: PN | Performed by: PHYSICAL THERAPIST

## 2022-10-27 PROCEDURE — 97035 APP MDLTY 1+ULTRASOUND EA 15: CPT | Mod: PN | Performed by: PHYSICAL THERAPIST

## 2022-10-27 NOTE — PLAN OF CARE
Physical Therapy Progress and  Daily Treatment Note     Name: Danuta Santos  Clinic Number: [id number]  Diagnosis:   Encounter Diagnosis   Name Primary?    Chronic bilateral low back pain with right-sided sciatica Yes     Physician: Yumiko Davis MD  Treatment Orders: PT Eval and Treat  Past Medical History:   Diagnosis Date    Allergy     IBS (irritable bowel syndrome)     Pneumonia 02/2019     Precautions: as per diagnosis    Evaluation date: 1-4-22  Visit # authorized: 16/20  Authorization period:12-31-22  Plan of care expiration: 10-27-22  MD Follow up appt: none scheduled    Time In: 3:20    Time Out: 4:30  Billable time:  45 Min + ES    Subjective     Pt reports: she has been walking a lot and able to do her exercises   Pt states 11/2 hours a day.  Pt states doing stretches and sometimes strengthening.  Pt states she some back pain, but after walking it helps decrease tightness and pain.   Pain Scale: before treatment: 4/10  after treatment: 1/10 and level pelvis    Objective   AR R pelvis  Mod-tightness lumbar paraspinals R, min after treatment  Severe decreased spring lumbar vertebrae       Lumbar active range of motion in standing is: 10-27-22  - flexion - toes                     - extension -  100%                         - left side bending -  To knee         - right side bending -  To knee             Lumbar active range of motion in standing is: 12-21-21  - flexion - toes                     - extension -  75%                         - left side bending -  To knee         - right side bending -  To knee       Muscle Strength 10-27-22  MMT R L   Hip flexion 5/5 5/5   Hip abduction 5/5 5/5   Hip extension 5/5 5/5   Glut max 4+/5 4+/5   Knee extension 5/5 5/5   Knee flexion 5/5 5/5           Muscle Strength 12-21-21  MMT R L   Hip flexion 4+/5 4+/5   Hip abduction 4+/5 5-/5   Hip extension 4+/5 4+/5   Glut max 4/5 4/5   Knee extension 5/5 5/5   Knee flexion 5/5 5/5          TREATMENT    Therapeutic exercises were performed to improve ROM, strength, flexibility and stabilization for 15 minutes.      Pt received heel lifts and wearing new heel lifts     Pt has not been stretching before walk only after  Instructed pt to stretch prior to walking    HS stretch supine   Glut stretch  Piriformis stretch  B QL stretch  Contract relax R glut x 3    Pt with improved pelvic positioning, but did not fully realign    Verbally review below  Continue B Knee to  Chest     Pelvic tilt with march  x 10  Bridge x 10, at home 20    Partial sit up x 10, at home 50  SLR x 10, 20 at home   Iron cross x 2  Trunk rotation supine x 10  Hip abd sidelying x 10 B, 20 at home   Arm and Leg lift prone x 10, 40 at home   Hip ext prone with bent knee x 10  20 at home  Press up x 10       GSS standing   Contract relax R glut    Manual therapy: Danuta  received the following manual therapy  techniques x 20 min. to include soft tissue and joint mobilization were applied to the: low back and gluteals to include:     Performed STM to LB paraspinals and QL R>L  P/A mob to lumbar region Grade 1-2 , MET to QL R SL L  Sidelying L contract relax mobilization to L5-S1 region to level pelvis  at end of treatment and level pelvis achieved.    Pt received tool-assisted massage with manual therapy techniques to R LB in prone to trigger an inflammatory healing response and stimulate the production of new collagen and proper, more functional, less painful healing.    Vacuum/cupping STM with manual therapy techniques was performed to back and gluteals to decrease muscle tightness, increase circulation and promote healing process.  The pt's skin was monitored for redness adjusting pressure as needed. The pt was instructed in possible side effects of bruising and/or soreness.    .    Ultrasound  for pain control and to decrease inflammation @ continuous duty cycle, 1 Mhz, applied to R lumbar paraspinals, intensity = 1.5 w/cm2 for 8 minutes. 2  min prep      Patient received pre-mod electrical stimulation to decrease muscle tightness and pain to Lumbar paraspinals/ sacral border of gluteals B for 15 minutes with MH supine with cycle time: continuous, beat frequency: , CC/CV: CV.      Written Home Exercises Provided: continue with prior HEP  Pt demo good understanding of the education provided. Danuta demonstrated good return demonstration of activities.     Pt. education:  Posture reeducation, body mechanics, HEP, proper posture in standing  No spiritual or educational barriers to learning provided  Pt has no cultural, educational or language barriers to learning provided.    Assessment   Pt has been seen for 2 visits since last progress report ( pt seen for 4 visits during previous session) and less pain upon arrival than usual 5/10 Pt with improved ability to BB and decreased muscle tightness noted Pt with decreased pain and muscle tightness noted at end of session        Pt will continue to benefit from skilled outpatient physical therapy to address the remaining functional deficits, provide pt/family education, and to maximize pt's level of independence in the home and community environment. .     GOALS:   Short Term Goals:  3 weeks MET STG's  Increase range of motion 25%  Increase strength 1/2 muscle grade  Improve postural awareness of pelvis to independently identify dysfunction with min assist from PT  Be able to perform HEP with minimal cueing required     Long Term Goals: 6 weeks PARTIALLY MET LTG'S  Increase range of motion to 75% to 100% full   Improve muscle strength 1 muscle grade  Improve muscle strength with MMT to 4+/5 to 5/5  Improve and stabilize proper pelvic positioning  Restore ability to sit for ADL and work with min to 0 pain  Restore normal sleep habits without disturbances due to pain  Restore ability to perform ADL's and household activities independently with min to 0 pain    Anticipated barriers to physical therapy:  none  Pt's spiritual, cultural and educational needs considered and pt agreeable to plan of care and goals        Plan     If you concur, I recommend patient continue with physical therapy 1-2 times a week as needed  for 6 weeks.  Please advise us of your  recommendations. Thank you for allowing us to assist in the care of your patient.     Jacy Escobar, MS, PT      I certify the need for these services furnished under this plan of treatment and while under my care.    ____________________________________ Physician/Referring Practitioner                                  Date of Signature

## 2022-10-27 NOTE — PROGRESS NOTES
Physical Therapy Progress and  Daily Treatment Note     Name: Danuta Santos  Clinic Number: [id number]  Diagnosis:   Encounter Diagnosis   Name Primary?    Chronic bilateral low back pain with right-sided sciatica Yes     Physician: Yumiko Davis MD  Treatment Orders: PT Eval and Treat  Past Medical History:   Diagnosis Date    Allergy     IBS (irritable bowel syndrome)     Pneumonia 02/2019     Precautions: as per diagnosis    Evaluation date: 1-4-22  Visit # authorized: 16/20  Authorization period:12-31-22  Plan of care expiration: 10-27-22  MD Follow up appt: none scheduled    Time In: 3:20    Time Out: 4:30  Billable time:  45 Min + ES    Subjective     Pt reports: she has been walking a lot and able to do her exercises   Pt states 11/2 hours a day.  Pt states doing stretches and sometimes strengthening.  Pt states she some back pain, but after walking it helps decrease tightness and pain.   Pain Scale: before treatment: 4/10  after treatment: 1/10 and level pelvis    Objective   AR R pelvis  Mod-tightness lumbar paraspinals R, min after treatment  Severe decreased spring lumbar vertebrae       Lumbar active range of motion in standing is: 10-27-22  - flexion - toes                     - extension -  100%                         - left side bending -  To knee         - right side bending -  To knee             Lumbar active range of motion in standing is: 12-21-21  - flexion - toes                     - extension -  75%                         - left side bending -  To knee         - right side bending -  To knee       Muscle Strength 10-27-22  MMT R L   Hip flexion 5/5 5/5   Hip abduction 5/5 5/5   Hip extension 5/5 5/5   Glut max 4+/5 4+/5   Knee extension 5/5 5/5   Knee flexion 5/5 5/5           Muscle Strength 12-21-21  MMT R L   Hip flexion 4+/5 4+/5   Hip abduction 4+/5 5-/5   Hip extension 4+/5 4+/5   Glut max 4/5 4/5   Knee extension 5/5 5/5   Knee flexion 5/5 5/5          TREATMENT    Therapeutic exercises were performed to improve ROM, strength, flexibility and stabilization for 15 minutes.      Pt received heel lifts and wearing new heel lifts     Pt has not been stretching before walk only after  Instructed pt to stretch prior to walking    HS stretch supine   Glut stretch  Piriformis stretch  B QL stretch  Contract relax R glut x 3    Pt with improved pelvic positioning, but did not fully realign    Verbally review below  Continue B Knee to  Chest     Pelvic tilt with march  x 10  Bridge x 10, at home 20    Partial sit up x 10, at home 50  SLR x 10, 20 at home   Iron cross x 2  Trunk rotation supine x 10  Hip abd sidelying x 10 B, 20 at home   Arm and Leg lift prone x 10, 40 at home   Hip ext prone with bent knee x 10  20 at home  Press up x 10       GSS standing   Contract relax R glut    Manual therapy: Danuta  received the following manual therapy  techniques x 20 min. to include soft tissue and joint mobilization were applied to the: low back and gluteals to include:     Performed STM to LB paraspinals and QL R>L  P/A mob to lumbar region Grade 1-2 , MET to QL R SL L  Sidelying L contract relax mobilization to L5-S1 region to level pelvis  at end of treatment and level pelvis achieved.    Pt received tool-assisted massage with manual therapy techniques to R LB in prone to trigger an inflammatory healing response and stimulate the production of new collagen and proper, more functional, less painful healing.    Vacuum/cupping STM with manual therapy techniques was performed to back and gluteals to decrease muscle tightness, increase circulation and promote healing process.  The pt's skin was monitored for redness adjusting pressure as needed. The pt was instructed in possible side effects of bruising and/or soreness.    .    Ultrasound  for pain control and to decrease inflammation @ continuous duty cycle, 1 Mhz, applied to R lumbar paraspinals, intensity = 1.5 w/cm2 for 8 minutes. 2  min prep      Patient received pre-mod electrical stimulation to decrease muscle tightness and pain to Lumbar paraspinals/ sacral border of gluteals B for 15 minutes with MH supine with cycle time: continuous, beat frequency: , CC/CV: CV.      Written Home Exercises Provided: continue with prior HEP  Pt demo good understanding of the education provided. Danuta demonstrated good return demonstration of activities.     Pt. education:  Posture reeducation, body mechanics, HEP, proper posture in standing  No spiritual or educational barriers to learning provided  Pt has no cultural, educational or language barriers to learning provided.    Assessment   Pt has been seen for 2 visits since last progress report ( pt seen for 4 visits during previous session) and less pain upon arrival than usual 5/10 Pt with improved ability to BB and decreased muscle tightness noted Pt with decreased pain and muscle tightness noted at end of session        Pt will continue to benefit from skilled outpatient physical therapy to address the remaining functional deficits, provide pt/family education, and to maximize pt's level of independence in the home and community environment. .     GOALS:   Short Term Goals:  3 weeks MET STG's  Increase range of motion 25%  Increase strength 1/2 muscle grade  Improve postural awareness of pelvis to independently identify dysfunction with min assist from PT  Be able to perform HEP with minimal cueing required     Long Term Goals: 6 weeks PARTIALLY MET LTG'S  Increase range of motion to 75% to 100% full   Improve muscle strength 1 muscle grade  Improve muscle strength with MMT to 4+/5 to 5/5  Improve and stabilize proper pelvic positioning  Restore ability to sit for ADL and work with min to 0 pain  Restore normal sleep habits without disturbances due to pain  Restore ability to perform ADL's and household activities independently with min to 0 pain    Anticipated barriers to physical therapy:  none  Pt's spiritual, cultural and educational needs considered and pt agreeable to plan of care and goals        Plan     If you concur, I recommend patient continue with physical therapy 1-2 times a week as needed  for 6 weeks.  Please advise us of your  recommendations. Thank you for allowing us to assist in the care of your patient.     Jacy Escobar, MS, PT      I certify the need for these services furnished under this plan of treatment and while under my care.    ____________________________________ Physician/Referring Practitioner                                  Date of Signature

## 2022-11-08 ENCOUNTER — CLINICAL SUPPORT (OUTPATIENT)
Dept: REHABILITATION | Facility: HOSPITAL | Age: 67
End: 2022-11-08
Payer: COMMERCIAL

## 2022-11-08 DIAGNOSIS — G89.29 CHRONIC BILATERAL LOW BACK PAIN WITH RIGHT-SIDED SCIATICA: Primary | ICD-10-CM

## 2022-11-08 DIAGNOSIS — M54.41 CHRONIC BILATERAL LOW BACK PAIN WITH RIGHT-SIDED SCIATICA: Primary | ICD-10-CM

## 2022-11-08 PROCEDURE — 97014 ELECTRIC STIMULATION THERAPY: CPT | Mod: PN | Performed by: PHYSICAL THERAPIST

## 2022-11-08 PROCEDURE — 97140 MANUAL THERAPY 1/> REGIONS: CPT | Mod: PN | Performed by: PHYSICAL THERAPIST

## 2022-11-08 NOTE — PROGRESS NOTES
Physical Therapy Daily Treatment Note     Name: Danuta Santos  Clinic Number: [id number]  Diagnosis:   Encounter Diagnosis   Name Primary?    Chronic bilateral low back pain with right-sided sciatica Yes     Physician: Yumiko Davis MD  Treatment Orders: PT Eval and Treat  Past Medical History:   Diagnosis Date    Allergy     IBS (irritable bowel syndrome)     Pneumonia 02/2019     Precautions: as per diagnosis    Evaluation date: 1-4-22  Visit # authorized: 17/20  Authorization period:12-31-22  Plan of care expiration: 10-27-22  MD Follow up appt: none scheduled    Time In: 2:59 pt late she got wrong time   Time Out: 3:45  Billable time:  25 Min + ES    Subjective     Pt reports: her back is a little off due to stress at school.  Pt states she has not been able to get back level.  Pt states she had MD appt and vote and then to school, so did not try this AM.  Pain Scale: before treatment:5/10  after treatment: 1/10 and level pelvis    Objective   AR R pelvis  Severe tightness lumbar paraspinals R, min after treatment  Severe decreased spring lumbar vertebrae       TREATMENT   (NP)Therapeutic exercises were performed to improve ROM, strength, flexibility and stabilization for 0 minutes.      Pt received heel lifts and wearing new heel lifts     Pt has not been stretching before walk only after  Instructed pt to stretch prior to walking    HS stretch supine   Glut stretch  Piriformis stretch  B QL stretch  Contract relax R glut x 3    Pt with improved pelvic positioning, but did not fully realign    Verbally review below  Continue B Knee to  Chest     Pelvic tilt with march  x 10  Bridge x 10, at home 20    Partial sit up x 10, at home 50  SLR x 10, 20 at home   Iron cross x 2  Trunk rotation supine x 10  Hip abd sidelying x 10 B, 20 at home   Arm and Leg lift prone x 10, 40 at home   Hip ext prone with bent knee x 10  20 at home  Press up x 10       GSS standing   Contract relax R glut    Manual therapy:  Danuta  received the following manual therapy  techniques x 25 min. to include soft tissue and joint mobilization were applied to the: low back and gluteals to include:     Performed STM to LB paraspinals and QL R>L  P/A mob to lumbar region Grade 1-2 , MET to QL R SL L  Sidelying L contract relax mobilization to L5-S1 region to level pelvis  at end of treatment and level pelvis achieved.    Pt received tool-assisted massage with manual therapy techniques to R LB in prone to trigger an inflammatory healing response and stimulate the production of new collagen and proper, more functional, less painful healing.    Vacuum/cupping STM with manual therapy techniques was performed to back and gluteals to decrease muscle tightness, increase circulation and promote healing process.  The pt's skin was monitored for redness adjusting pressure as needed. The pt was instructed in possible side effects of bruising and/or soreness.    .    (NP)Ultrasound  for pain control and to decrease inflammation @ continuous duty cycle, 1 Mhz, applied to R lumbar paraspinals, intensity = 1.5 w/cm2 for 8 minutes. 2 min prep      Patient received pre-mod electrical stimulation to decrease muscle tightness and pain to Lumbar paraspinals/ sacral border of gluteals B for 15 minutes with MH supine with cycle time: continuous, beat frequency: , CC/CV: CV.      Written Home Exercises Provided: continue with prior HEP  Pt demo good understanding of the education provided. Danuta demonstrated good return demonstration of activities.     Pt. education:  Posture reeducation, body mechanics, HEP, proper posture in standing  No spiritual or educational barriers to learning provided  Pt has no cultural, educational or language barriers to learning provided.    Assessment   Pt has been under stress with work since last session leading to increased pain and muscle tightness.  Pt with decreased muscle tightness and pain after treatment with  improved pelvic positioning  Pt understands to continue with HEP.          Pt will continue to benefit from skilled outpatient physical therapy to address the remaining functional deficits, provide pt/family education, and to maximize pt's level of independence in the home and community environment. .     GOALS:   Short Term Goals:  3 weeks MET STG's  Increase range of motion 25%  Increase strength 1/2 muscle grade  Improve postural awareness of pelvis to independently identify dysfunction with min assist from PT  Be able to perform HEP with minimal cueing required     Long Term Goals: 6 weeks PARTIALLY MET LTG'S  Increase range of motion to 75% to 100% full   Improve muscle strength 1 muscle grade  Improve muscle strength with MMT to 4+/5 to 5/5  Improve and stabilize proper pelvic positioning  Restore ability to sit for ADL and work with min to 0 pain  Restore normal sleep habits without disturbances due to pain  Restore ability to perform ADL's and household activities independently with min to 0 pain    Anticipated barriers to physical therapy: none  Pt's spiritual, cultural and educational needs considered and pt agreeable to plan of care and goals        Plan   Continue with established plan of care towards PT goals.          Jacy Escobar, MS, PT

## 2022-11-18 NOTE — PROGRESS NOTES
Physical Therapy Daily Treatment Note     Name: Danuta Santos  Clinic Number: [id number]  Diagnosis:   Encounter Diagnosis   Name Primary?    Chronic bilateral low back pain with right-sided sciatica Yes     Physician: Yumiko Davis MD  Treatment Orders: PT Eval and Treat  Past Medical History:   Diagnosis Date    Allergy     IBS (irritable bowel syndrome)     Pneumonia 02/2019     Precautions: as per diagnosis    Evaluation date: 1-4-22  Visit # authorized: 18/20  Authorization period:12-31-22  Plan of care expiration: 12-8-22  MD Follow up appt: none scheduled    Time In: 3:06  Time Out: 4:15  Billable time:  50 Min + ES    Subjective     Pt reports: not walking as much due to time change unable to walk in dark.  Pt states not feeling too bad.  Pt states slight pulling. Pt states overall she has been able to manage pain independently.    Pain Scale: before treatment:3.5-4/10  after treatment: 1/10 and level pelvis    Objective   AR R pelvis  Severe tightness lumbar paraspinals R, min after treatment  Severe decreased spring lumbar vertebrae       TREATMENT   (NP)Therapeutic exercises were performed to improve ROM, strength, flexibility and stabilization for 15 minutes.      Pt received heel lifts and wearing new heel lifts     Pt has not been stretching before walk only after  Instructed pt to stretch prior to walking    HS stretch supine   Glut stretch  Piriformis stretch  B QL stretch  Contract relax R glut x 3    Pt with improved pelvic positioning, but did not fully realign    Verbally review below  Continue B Knee to  Chest     Pelvic tilt with march  x 10  Bridge x 10, at home 20    Partial sit up x 10, at home 50  SLR x 10, 20 at home   Iron cross x 2  Trunk rotation supine x 10  Hip abd sidelying x 10 B, 20 at home   Arm and Leg lift prone x 10, 40 at home   Hip ext prone with bent knee x 10  20 at home  Press up x 10       GSS standing   Contract relax R glut    Manual therapy: Danuta   received the following manual therapy  techniques x 25 min. to include soft tissue and joint mobilization were applied to the: low back and gluteals to include:     Performed STM to LB paraspinals and QL R>L  P/A mob to lumbar region Grade 1-2 , MET to QL R SL L  Sidelying L contract relax mobilization to L5-S1 region to level pelvis  at end of treatment and level pelvis achieved.    Pt received tool-assisted massage with manual therapy techniques to R LB in prone to trigger an inflammatory healing response and stimulate the production of new collagen and proper, more functional, less painful healing.    Vacuum/cupping STM with manual therapy techniques was performed to back and gluteals to decrease muscle tightness, increase circulation and promote healing process.  The pt's skin was monitored for redness adjusting pressure as needed. The pt was instructed in possible side effects of bruising and/or soreness.    .    Ultrasound  for pain control and to decrease inflammation @ continuous duty cycle, 1 Mhz, applied to R lumbar paraspinals, intensity = 1.5 w/cm2 for 8 minutes. 2 min prep      Patient received pre-mod electrical stimulation to decrease muscle tightness and pain to Lumbar paraspinals/ sacral border of gluteals B for 15 minutes with MH supine with cycle time: continuous, beat frequency: , CC/CV: CV.      Written Home Exercises Provided: continue with prior HEP  Pt demo good understanding of the education provided. Danuta demonstrated good return demonstration of activities.     Pt. education:  Posture reeducation, body mechanics, HEP, proper posture in standing  No spiritual or educational barriers to learning provided  Pt has no cultural, educational or language barriers to learning provided.    Assessment    Pt with severe tightness, but pain intensity not quite as severe as generally noted at start of session.  Pt was unable to self mobilize, but after treatment decreased muscle tightness  noted and level pelvis resulting in improved symptoms.       Pt will continue to benefit from skilled outpatient physical therapy to address the remaining functional deficits, provide pt/family education, and to maximize pt's level of independence in the home and community environment. .     GOALS:   Short Term Goals:  3 weeks MET STG's  Increase range of motion 25%  Increase strength 1/2 muscle grade  Improve postural awareness of pelvis to independently identify dysfunction with min assist from PT  Be able to perform HEP with minimal cueing required     Long Term Goals: 6 weeks PARTIALLY MET LTG'S  Increase range of motion to 75% to 100% full   Improve muscle strength 1 muscle grade  Improve muscle strength with MMT to 4+/5 to 5/5  Improve and stabilize proper pelvic positioning  Restore ability to sit for ADL and work with min to 0 pain  Restore normal sleep habits without disturbances due to pain  Restore ability to perform ADL's and household activities independently with min to 0 pain    Anticipated barriers to physical therapy: none  Pt's spiritual, cultural and educational needs considered and pt agreeable to plan of care and goals        Plan   Continue with established plan of care towards PT goals.          Jacy Escobar, MS, PT

## 2022-11-21 ENCOUNTER — CLINICAL SUPPORT (OUTPATIENT)
Dept: REHABILITATION | Facility: HOSPITAL | Age: 67
End: 2022-11-21
Payer: COMMERCIAL

## 2022-11-21 DIAGNOSIS — G89.29 CHRONIC BILATERAL LOW BACK PAIN WITH RIGHT-SIDED SCIATICA: Primary | ICD-10-CM

## 2022-11-21 DIAGNOSIS — M54.41 CHRONIC BILATERAL LOW BACK PAIN WITH RIGHT-SIDED SCIATICA: Primary | ICD-10-CM

## 2022-11-21 PROCEDURE — 97035 APP MDLTY 1+ULTRASOUND EA 15: CPT | Mod: PN | Performed by: PHYSICAL THERAPIST

## 2022-11-21 PROCEDURE — 97014 ELECTRIC STIMULATION THERAPY: CPT | Mod: PN | Performed by: PHYSICAL THERAPIST

## 2022-11-21 PROCEDURE — 97140 MANUAL THERAPY 1/> REGIONS: CPT | Mod: PN | Performed by: PHYSICAL THERAPIST

## 2022-11-21 PROCEDURE — 97110 THERAPEUTIC EXERCISES: CPT | Mod: PN | Performed by: PHYSICAL THERAPIST

## 2022-11-23 NOTE — PROGRESS NOTES
Chief Complaint   Patient presents with   • Office Visit     New problem Left ring finger locking        Preferred Pharmacy:    [id number] - [facility] [id number] [facility] [address]  Phone: [phone] Fax: [phone]    [facility] [address] [address]  Phone: [phone] Fax: [phone]     Physical Therapy Daily Treatment Note     Name: Danuta Santos  Clinic Number: [id number]  Diagnosis:   Encounter Diagnosis   Name Primary?    Chronic bilateral low back pain with right-sided sciatica Yes     Physician: Erica Khan MD  Treatment Orders: PT Eval and Treat  Past Medical History:   Diagnosis Date    Allergy     IBS (irritable bowel syndrome)     Pneumonia 02/2019       Precautions: as per diagnosis    Evaluation date: 2/15/2018  Visit # authorized: 51/60 on 11/18/2019  Authorization period: 12-31-18  Plan of care expiration: 11-26-19  MD Follow up appt: none scheduled    Time In:  3:10  Time Out: 4:25  Billable time:  55  min    Subjective     Pt reports: she did some painting yesterday and feeling tight.  Pt states pain coming and going up to 5/10  Pt has been able to be consistent with exercises on the weekend  Pain Scale: before treatment:  5/10after treatment:feels better, did not provide #  Objective     TREATMENT    Therapeutic exercise: Danuta received therapeutic exercises to develop strength, endurance, ROM, flexibility and core stabilization for 15 minutes including:    HS stretch supine   Glut stretch  Piriformis stretch  B QL stretch    Contract relax R glut x 3  Pt was able to self mobilize today,    Bridge x 15  Pelvic tilt  X 20 for 5 sec hold  Partial sit up x 20  Hip abd sidelying x 20 B  SLR x 20  Arm and Leg lift prone x 2/10  Hip ext prone with bent knee x 2/10  Contract relax R glut    Sidelying L contract relax mob to L5-S1 region to level pelvis at end       HOLD FOR NOW  Heel raises standing x 15    Step up x10     1 plank x 30 sec held the other 3 she normally does     (NP)Plank 4 reps 30 sec each          Manual therapy: Danuta  received the following manual therapy  techniques x 25  min. to include soft tissue and joint mobilization were applied to the: low back and gluteals to include:STM to LB paraspinals and QL R>L  P/A mob to lumbar region Grade 1-2   "    (NP) due to increased redness of skin Kinesiotape with 6" star for pain relief was performed over the R lumbar paraspinals.L3-4 region  Instructed pt in use, care and precautions with tape.     Pt received tool-assisted massage with manual therapy techniques to R LB in sidelying with pillow at waist putting pt in SB L to trigger an inflammatory healing response and stimulate the production of new collagen and proper, more functional, less painful healing.        Vacuum/cupping STM with manual therapy techniques was performed to back and gluteals to decrease muscle tightness, increase circulation and promote healing process.  The pt's skin was monitored for redness adjusting pressure as needed. The pt was instructed in possible side effects of bruising and/or soreness.      (NP)Ultrasound  for pain control and to decrease inflammation @ continuous duty cycle, 1 Mhz, applied to R lumbar paraspinals, intensity = 1.8 w/cm2 for 8 minutes.    Patient received pre-mod electrical stimulation to decrease muscle tightness and pain to Lumbar paraspinals/ sacral border of gluteals B for 15 minutes with MH supine with cycle time: continuous, beat frequency: , CC/CV: CV.        Written Home Exercises Provided: none today  Pt demo good understanding of the education provided. Danuta demonstrated good return demonstration of activities.     Pt. education:  · Posture reeducation, body mechanics, HEP,   · No spiritual or educational barriers to learning provided  · Pt has no cultural, educational or language barriers to learning provided.    Assessment   Pt has been busy with activities around the house.  Pt feeling increased tightness in muscles, but able to self mobilize today.  Pt with decreased symptoms after treatment and level pelvis     Pt will continue to benefit from skilled outpatient physical therapy to address the remaining functional deficits, provide pt/family education, and to maximize pt's level of " independence in the home and community environment. .     GOALS:   Short Term Goals:  3 weeks MET STG's  Increase range of motion 25%  Increase strength 1/2 muscle grade  Improve postural awareness of pelvis to independently identify dysfunction with min assist from PT  Be able to perform HEP with minimal cueing required     Long Term Goals: 6 weeks PARTIALLY MET LTG'S  Increase range of motion to 75% to 100% full   Improve muscle strength 1 muscle grade  Improve muscle strength with MMT to 4+/5 to 5/5  Improve and stabilize proper pelvic positioning  Restore ability to sit for ADL and work with min to 0 pain  Restore normal sleep habits without disturbances due to pain  Restore ability to perform ADL's and household activities independently with min to 0 pain    Anticipated barriers to physical therapy: none  Pt's spiritual, cultural and educational needs considered and pt agreeable to plan of care and goals        Plan   Continue with established plan of care towards PT goals.

## 2022-11-28 ENCOUNTER — LAB VISIT (OUTPATIENT)
Dept: LAB | Facility: HOSPITAL | Age: 67
End: 2022-11-28
Attending: INTERNAL MEDICINE
Payer: COMMERCIAL

## 2022-11-28 ENCOUNTER — HOSPITAL ENCOUNTER (OUTPATIENT)
Dept: CARDIOLOGY | Facility: CLINIC | Age: 67
Discharge: HOME | End: 2022-11-28
Payer: COMMERCIAL

## 2022-11-28 ENCOUNTER — OFFICE VISIT (OUTPATIENT)
Dept: INTERNAL MEDICINE | Facility: CLINIC | Age: 67
End: 2022-11-28
Payer: COMMERCIAL

## 2022-11-28 VITALS
HEART RATE: 51 BPM | BODY MASS INDEX: 21.63 KG/M2 | SYSTOLIC BLOOD PRESSURE: 131 MMHG | WEIGHT: 134 LBS | DIASTOLIC BLOOD PRESSURE: 86 MMHG

## 2022-11-28 DIAGNOSIS — R03.0 ELEVATED BLOOD PRESSURE READING: ICD-10-CM

## 2022-11-28 DIAGNOSIS — Z01.818 PREOP EXAMINATION: ICD-10-CM

## 2022-11-28 DIAGNOSIS — G89.29 PAIN, FOOT, CHRONIC, RIGHT: Primary | ICD-10-CM

## 2022-11-28 DIAGNOSIS — M79.671 PAIN, FOOT, CHRONIC, RIGHT: Primary | ICD-10-CM

## 2022-11-28 LAB
ANION GAP SERPL CALC-SCNC: 8 MMOL/L (ref 8–16)
BUN SERPL-MCNC: 13 MG/DL (ref 8–23)
CALCIUM SERPL-MCNC: 9.8 MG/DL (ref 8.7–10.5)
CHLORIDE SERPL-SCNC: 100 MMOL/L (ref 95–110)
CO2 SERPL-SCNC: 26 MMOL/L (ref 23–29)
CREAT SERPL-MCNC: 0.7 MG/DL (ref 0.5–1.4)
EST. GFR  (NO RACE VARIABLE): >60 ML/MIN/1.73 M^2
GLUCOSE SERPL-MCNC: 91 MG/DL (ref 70–110)
POTASSIUM SERPL-SCNC: 4.2 MMOL/L (ref 3.5–5.1)
SODIUM SERPL-SCNC: 134 MMOL/L (ref 136–145)

## 2022-11-28 PROCEDURE — 99999 PR PBB SHADOW E&M-EST. PATIENT-LVL III: CPT | Mod: PBBFAC,,, | Performed by: INTERNAL MEDICINE

## 2022-11-28 PROCEDURE — 93010 EKG 12-LEAD: ICD-10-PCS | Mod: S$GLB,,, | Performed by: INTERNAL MEDICINE

## 2022-11-28 PROCEDURE — 36415 COLL VENOUS BLD VENIPUNCTURE: CPT | Performed by: INTERNAL MEDICINE

## 2022-11-28 PROCEDURE — 1125F AMNT PAIN NOTED PAIN PRSNT: CPT | Mod: CPTII,S$GLB,, | Performed by: INTERNAL MEDICINE

## 2022-11-28 PROCEDURE — 99999 PR PBB SHADOW E&M-EST. PATIENT-LVL III: ICD-10-PCS | Mod: PBBFAC,,, | Performed by: INTERNAL MEDICINE

## 2022-11-28 PROCEDURE — 1159F PR MEDICATION LIST DOCUMENTED IN MEDICAL RECORD: ICD-10-PCS | Mod: CPTII,S$GLB,, | Performed by: INTERNAL MEDICINE

## 2022-11-28 PROCEDURE — 93005 EKG 12-LEAD: ICD-10-PCS | Mod: S$GLB,,, | Performed by: INTERNAL MEDICINE

## 2022-11-28 PROCEDURE — 93005 ELECTROCARDIOGRAM TRACING: CPT | Mod: S$GLB,,, | Performed by: INTERNAL MEDICINE

## 2022-11-28 PROCEDURE — 1159F MED LIST DOCD IN RCRD: CPT | Mod: CPTII,S$GLB,, | Performed by: INTERNAL MEDICINE

## 2022-11-28 PROCEDURE — 3288F PR FALLS RISK ASSESSMENT DOCUMENTED: ICD-10-PCS | Mod: CPTII,S$GLB,, | Performed by: INTERNAL MEDICINE

## 2022-11-28 PROCEDURE — 1101F PT FALLS ASSESS-DOCD LE1/YR: CPT | Mod: CPTII,S$GLB,, | Performed by: INTERNAL MEDICINE

## 2022-11-28 PROCEDURE — 3079F DIAST BP 80-89 MM HG: CPT | Mod: CPTII,S$GLB,, | Performed by: INTERNAL MEDICINE

## 2022-11-28 PROCEDURE — 1125F PR PAIN SEVERITY QUANTIFIED, PAIN PRESENT: ICD-10-PCS | Mod: CPTII,S$GLB,, | Performed by: INTERNAL MEDICINE

## 2022-11-28 PROCEDURE — 3288F FALL RISK ASSESSMENT DOCD: CPT | Mod: CPTII,S$GLB,, | Performed by: INTERNAL MEDICINE

## 2022-11-28 PROCEDURE — 3075F PR MOST RECENT SYSTOLIC BLOOD PRESS GE 130-139MM HG: ICD-10-PCS | Mod: CPTII,S$GLB,, | Performed by: INTERNAL MEDICINE

## 2022-11-28 PROCEDURE — 99213 OFFICE O/P EST LOW 20 MIN: CPT | Mod: S$GLB,,, | Performed by: INTERNAL MEDICINE

## 2022-11-28 PROCEDURE — 3008F PR BODY MASS INDEX (BMI) DOCUMENTED: ICD-10-PCS | Mod: CPTII,S$GLB,, | Performed by: INTERNAL MEDICINE

## 2022-11-28 PROCEDURE — 93010 ELECTROCARDIOGRAM REPORT: CPT | Mod: S$GLB,,, | Performed by: INTERNAL MEDICINE

## 2022-11-28 PROCEDURE — 3075F SYST BP GE 130 - 139MM HG: CPT | Mod: CPTII,S$GLB,, | Performed by: INTERNAL MEDICINE

## 2022-11-28 PROCEDURE — 99213 PR OFFICE/OUTPT VISIT, EST, LEVL III, 20-29 MIN: ICD-10-PCS | Mod: S$GLB,,, | Performed by: INTERNAL MEDICINE

## 2022-11-28 PROCEDURE — 3008F BODY MASS INDEX DOCD: CPT | Mod: CPTII,S$GLB,, | Performed by: INTERNAL MEDICINE

## 2022-11-28 PROCEDURE — 3079F PR MOST RECENT DIASTOLIC BLOOD PRESSURE 80-89 MM HG: ICD-10-PCS | Mod: CPTII,S$GLB,, | Performed by: INTERNAL MEDICINE

## 2022-11-28 PROCEDURE — 80048 BASIC METABOLIC PNL TOTAL CA: CPT | Performed by: INTERNAL MEDICINE

## 2022-11-28 PROCEDURE — 1101F PR PT FALLS ASSESS DOC 0-1 FALLS W/OUT INJ PAST YR: ICD-10-PCS | Mod: CPTII,S$GLB,, | Performed by: INTERNAL MEDICINE

## 2022-11-30 NOTE — PROGRESS NOTES
Physical Therapy Daily Treatment Note     Name: Danuta Santos  Clinic Number: [id number]  Diagnosis:   Encounter Diagnosis   Name Primary?    Chronic bilateral low back pain with right-sided sciatica Yes     Physician: Yumiko Davis MD  Treatment Orders: PT Eval and Treat  Past Medical History:   Diagnosis Date    Allergy     IBS (irritable bowel syndrome)     Pneumonia 02/2019     Precautions: as per diagnosis    Evaluation date: 1-4-22  Visit # authorized: 19/20  Authorization period:12-31-22  Plan of care expiration: 12-8-22  MD Follow up appt: none scheduled    Time In: 12:38  Time Out: 1:45  Billable time:  45 Min + ES    Subjective     Pt reports: she had traveled over [facility] and unable to self mobilize.    Pain Scale: before treatment:6/10  after treatment: 1/10 and level pelvis    Objective   AR R pelvis  Severe tightness lumbar paraspinals R, min after treatment  Severe decreased spring lumbar vertebrae       TREATMENT   Therapeutic exercises were performed to improve ROM, strength, flexibility and stabilization for 5 minutes.      Pt has not been doing much walking due to time change and unable to walk in her neighborhood in the dark.  Pt was out of town and had not been performing strengthening.  Instructed pt in need to resume strengthening     Verbal review of ex below  HS stretch supine   Glut stretch  Piriformis stretch  B QL stretch  Contract relax R glut x 3         B Knee to  Chest     Pelvic tilt with march  x 10  Bridge x 10, at home 20    Partial sit up x 10, at home 50  SLR x 10, 20 at home   Iron cross x 2  Trunk rotation supine x 10  Hip abd sidelying x 10 B, 20 at home   Arm and Leg lift prone x 10, 40 at home   Hip ext prone with bent knee x 10  20 at home  Press up x 10       GSS standing   Contract relax R glut    Manual therapy: Danuta  received the following manual therapy  techniques x 30 min. to include soft tissue and joint mobilization were applied to the: low  back and gluteals to include:     Performed STM to LB paraspinals and QL R>L  P/A mob to lumbar region Grade 1-2 , MET to QL R SL L  Sidelying L contract relax mobilization to L5-S1 region to level pelvis  at end of treatment and level pelvis achieved.    Pt received tool-assisted massage with manual therapy techniques to R LB in prone to trigger an inflammatory healing response and stimulate the production of new collagen and proper, more functional, less painful healing.    Vacuum/cupping STM with manual therapy techniques was performed to back and gluteals to decrease muscle tightness, increase circulation and promote healing process.  The pt's skin was monitored for redness adjusting pressure as needed. The pt was instructed in possible side effects of bruising and/or soreness.    .    Ultrasound  for pain control and to decrease inflammation @ continuous duty cycle, 1 Mhz, applied to R lumbar paraspinals, intensity = 1.5 w/cm2 for 8 minutes. 2 min prep      Patient received pre-mod electrical stimulation to decrease muscle tightness and pain to Lumbar paraspinals/ sacral border of gluteals B for 15 minutes with MH supine with cycle time: continuous, beat frequency: , CC/CV: CV.      Written Home Exercises Provided: continue with prior HEP  Pt demo good understanding of the education provided. Danuta demonstrated good return demonstration of activities.     Pt. education:  Posture reeducation, body mechanics, HEP, proper posture in standing  No spiritual or educational barriers to learning provided  Pt has no cultural, educational or language barriers to learning provided.    Assessment    Pt with severe tightness, but pain intensity not quite as severe as generally noted at start of session.  Pt was unable to self mobilize, but after treatment decreased muscle tightness noted and level pelvis resulting in improved symptoms.       Pt will continue to benefit from skilled outpatient physical therapy to  address the remaining functional deficits, provide pt/family education, and to maximize pt's level of independence in the home and community environment. .     GOALS:   Short Term Goals:  3 weeks MET STG's  Increase range of motion 25%  Increase strength 1/2 muscle grade  Improve postural awareness of pelvis to independently identify dysfunction with min assist from PT  Be able to perform HEP with minimal cueing required     Long Term Goals: 6 weeks PARTIALLY MET LTG'S  Increase range of motion to 75% to 100% full   Improve muscle strength 1 muscle grade  Improve muscle strength with MMT to 4+/5 to 5/5  Improve and stabilize proper pelvic positioning  Restore ability to sit for ADL and work with min to 0 pain  Restore normal sleep habits without disturbances due to pain  Restore ability to perform ADL's and household activities independently with min to 0 pain    Anticipated barriers to physical therapy: none  Pt's spiritual, cultural and educational needs considered and pt agreeable to plan of care and goals        Plan   Continue with established plan of care towards PT goals.          Jacy Escobar, MS, PT

## 2022-11-30 NOTE — PROGRESS NOTES
Subjective:       Patient ID: Danuta Santos is a 67 y.o. female.    Chief Complaint: Pre-op Exam    Very pleasant nonsmoking 67-year-old female here for preoperative evaluation for right foot 2nd toe and possibly 3rd toe surgery.  Her original bunion surgery was in May of 2020 and subsequently about 8 months later she had some hardware removed in these surgeries were all done by podiatrist, Dr. Plascencia.  Surgery is on the 9th of next month.  For the initial surgery back in 2021 she has some nonspecific changes on preoperative EKG which prompted a cardiology consult and echocardiogram.  Patient was cleared for general anesthesia surgery and had no problems for the aforementioned prior two surgeries.    Review of Systems   Constitutional:  Negative for activity change, appetite change, chills, diaphoresis, fatigue, fever and unexpected weight change.   HENT:  Negative for nasal congestion, ear pain, mouth sores, postnasal drip, sinus pressure/congestion, sore throat and trouble swallowing.    Eyes:  Negative for pain, redness and visual disturbance.   Respiratory:  Negative for apnea, cough, chest tightness, shortness of breath and wheezing.    Cardiovascular:  Negative for chest pain, palpitations and leg swelling.   Gastrointestinal:  Negative for abdominal distention, abdominal pain, blood in stool, constipation, diarrhea, nausea and vomiting.   Endocrine: Negative for cold intolerance, polydipsia, polyphagia and polyuria.   Genitourinary:  Negative for difficulty urinating, dysuria, flank pain, frequency, hematuria, menstrual problem, pelvic pain and urgency.   Musculoskeletal:  Negative for arthralgias, back pain, joint swelling and neck pain.   Integumentary:  Negative for color change, rash and wound.   Neurological:  Negative for dizziness, tremors, seizures, syncope, weakness, light-headedness, numbness and headaches.   Hematological:  Negative for adenopathy. Does not bruise/bleed easily.    Psychiatric/Behavioral:  Negative for confusion, decreased concentration, dysphoric mood, hallucinations, self-injury, sleep disturbance and suicidal ideas. The patient is not nervous/anxious.        Objective:      Physical Exam  Vitals and nursing note reviewed.   Constitutional:       Appearance: She is well-developed.   HENT:      Head: Normocephalic and atraumatic.   Eyes:      General: No scleral icterus.        Right eye: No discharge.         Left eye: No discharge.   Neck:      Vascular: No JVD.   Cardiovascular:      Rate and Rhythm: Normal rate and regular rhythm.      Heart sounds: No murmur heard.    No friction rub. No gallop.   Pulmonary:      Effort: Pulmonary effort is normal.      Breath sounds: Normal breath sounds. No wheezing or rales.   Abdominal:      General: Bowel sounds are normal. There is no distension.      Palpations: Abdomen is soft.      Tenderness: There is no abdominal tenderness.   Musculoskeletal:         General: No tenderness.      Cervical back: Neck supple.   Lymphadenopathy:      Cervical: No cervical adenopathy.   Skin:     Findings: No erythema or rash.   Neurological:      Mental Status: She is alert and oriented to person, place, and time.      Cranial Nerves: No cranial nerve deficit.       Lab Results   Component Value Date    WBC 4.02 10/03/2022    HGB 11.7 (L) 10/03/2022    HCT 37.1 10/03/2022     10/03/2022    CHOL 194 02/12/2022    TRIG 36 02/12/2022    HDL 94 (H) 02/12/2022    ALT 16 10/03/2022    AST 19 10/03/2022     (L) 11/28/2022    K 4.2 11/28/2022     11/28/2022    CREATININE 0.7 11/28/2022    BUN 13 11/28/2022    CO2 26 11/28/2022    TSH 1.299 10/03/2022    HGBA1C 5.6 08/16/2016     EKG today :   Vent. Rate : 049 BPM     Atrial Rate : 049 BPM      P-R Int : 198 ms          QRS Dur : 092 ms       QT Int : 432 ms       P-R-T Axes : 072 -15 041 degrees      QTc Int : 390 ms   Sinus bradycardia   Otherwise normal ECG     Assessment/plan        Problem List Items Addressed This Visit    None  Visit Diagnoses       Pain, foot, chronic, right    -  Primary    Preop examination        Elevated blood pressure reading        Relevant Orders    SCHEDULED EKG 12-LEAD (to Muse) (Completed)    CBC Auto Differential    Basic Metabolic Panel (Completed)    Urinalysis, Reflex to Urine Culture Urine, Clean Catch (Completed)            PATIENT IS PERIOPERATIVELY CLEARED FOR GENERAL ANESTHESIA AND SURGERY.  SHE IS A 67-YEAR-OLD NONSMOKING FEMALE WITH NO CARDIOVASCULAR RISK EXCEPT FOR OF COURSE BEING POSTMENOPAUSAL AND 67.

## 2022-12-01 ENCOUNTER — CLINICAL SUPPORT (OUTPATIENT)
Dept: REHABILITATION | Facility: HOSPITAL | Age: 67
End: 2022-12-01
Payer: COMMERCIAL

## 2022-12-01 ENCOUNTER — CLINICAL SUPPORT (OUTPATIENT)
Dept: INTERNAL MEDICINE | Facility: CLINIC | Age: 67
End: 2022-12-01
Payer: COMMERCIAL

## 2022-12-01 DIAGNOSIS — R03.0 ELEVATED BLOOD PRESSURE READING: ICD-10-CM

## 2022-12-01 DIAGNOSIS — Z86.2 HISTORY OF ANEMIA: Primary | ICD-10-CM

## 2022-12-01 DIAGNOSIS — G89.29 CHRONIC BILATERAL LOW BACK PAIN WITH RIGHT-SIDED SCIATICA: Primary | ICD-10-CM

## 2022-12-01 DIAGNOSIS — M54.41 CHRONIC BILATERAL LOW BACK PAIN WITH RIGHT-SIDED SCIATICA: Primary | ICD-10-CM

## 2022-12-01 DIAGNOSIS — Z86.2 HISTORY OF ANEMIA: ICD-10-CM

## 2022-12-01 LAB
BASOPHILS # BLD AUTO: 0.02 K/UL (ref 0–0.2)
BASOPHILS NFR BLD: 0.4 % (ref 0–1.9)
DIFFERENTIAL METHOD: NORMAL
EOSINOPHIL # BLD AUTO: 0 K/UL (ref 0–0.5)
EOSINOPHIL NFR BLD: 0.8 % (ref 0–8)
ERYTHROCYTE [DISTWIDTH] IN BLOOD BY AUTOMATED COUNT: 12.5 % (ref 11.5–14.5)
FERRITIN SERPL-MCNC: 75 NG/ML (ref 20–300)
HCT VFR BLD AUTO: 38.2 % (ref 37–48.5)
HGB BLD-MCNC: 12.3 G/DL (ref 12–16)
IMM GRANULOCYTES # BLD AUTO: 0.01 K/UL (ref 0–0.04)
IMM GRANULOCYTES NFR BLD AUTO: 0.2 % (ref 0–0.5)
IRON SERPL-MCNC: 57 UG/DL (ref 30–160)
LYMPHOCYTES # BLD AUTO: 1.1 K/UL (ref 1–4.8)
LYMPHOCYTES NFR BLD: 21.9 % (ref 18–48)
MCH RBC QN AUTO: 29.4 PG (ref 27–31)
MCHC RBC AUTO-ENTMCNC: 32.2 G/DL (ref 32–36)
MCV RBC AUTO: 91 FL (ref 82–98)
MONOCYTES # BLD AUTO: 0.3 K/UL (ref 0.3–1)
MONOCYTES NFR BLD: 5.1 % (ref 4–15)
NEUTROPHILS # BLD AUTO: 3.5 K/UL (ref 1.8–7.7)
NEUTROPHILS NFR BLD: 71.6 % (ref 38–73)
NRBC BLD-RTO: 0 /100 WBC
PLATELET # BLD AUTO: 228 K/UL (ref 150–450)
PMV BLD AUTO: 11.6 FL (ref 9.2–12.9)
RBC # BLD AUTO: 4.18 M/UL (ref 4–5.4)
SATURATED IRON: 13 % (ref 20–50)
TOTAL IRON BINDING CAPACITY: 444 UG/DL (ref 250–450)
TRANSFERRIN SERPL-MCNC: 300 MG/DL (ref 200–375)
WBC # BLD AUTO: 4.94 K/UL (ref 3.9–12.7)

## 2022-12-01 PROCEDURE — 82728 ASSAY OF FERRITIN: CPT | Performed by: INTERNAL MEDICINE

## 2022-12-01 PROCEDURE — 85025 COMPLETE CBC W/AUTO DIFF WBC: CPT | Performed by: INTERNAL MEDICINE

## 2022-12-01 PROCEDURE — 36415 COLL VENOUS BLD VENIPUNCTURE: CPT | Performed by: INTERNAL MEDICINE

## 2022-12-01 PROCEDURE — 97014 ELECTRIC STIMULATION THERAPY: CPT | Mod: PN | Performed by: PHYSICAL THERAPIST

## 2022-12-01 PROCEDURE — 84466 ASSAY OF TRANSFERRIN: CPT | Performed by: INTERNAL MEDICINE

## 2022-12-01 PROCEDURE — 97140 MANUAL THERAPY 1/> REGIONS: CPT | Mod: PN | Performed by: PHYSICAL THERAPIST

## 2022-12-01 PROCEDURE — 97035 APP MDLTY 1+ULTRASOUND EA 15: CPT | Mod: PN | Performed by: PHYSICAL THERAPIST

## 2022-12-05 ENCOUNTER — CLINICAL SUPPORT (OUTPATIENT)
Dept: REHABILITATION | Facility: HOSPITAL | Age: 67
End: 2022-12-05
Payer: COMMERCIAL

## 2022-12-05 DIAGNOSIS — G89.29 CHRONIC BILATERAL LOW BACK PAIN WITH RIGHT-SIDED SCIATICA: Primary | ICD-10-CM

## 2022-12-05 DIAGNOSIS — M54.41 CHRONIC BILATERAL LOW BACK PAIN WITH RIGHT-SIDED SCIATICA: Primary | ICD-10-CM

## 2022-12-05 PROCEDURE — 97014 ELECTRIC STIMULATION THERAPY: CPT | Mod: PN | Performed by: PHYSICAL THERAPIST

## 2022-12-05 PROCEDURE — 97035 APP MDLTY 1+ULTRASOUND EA 15: CPT | Mod: PN | Performed by: PHYSICAL THERAPIST

## 2022-12-05 PROCEDURE — 97140 MANUAL THERAPY 1/> REGIONS: CPT | Mod: PN | Performed by: PHYSICAL THERAPIST

## 2022-12-07 NOTE — PROGRESS NOTES
Physical Therapy Daily Treatment Note     Name: Danuta Santos  Clinic Number: [id number]  Diagnosis:   Encounter Diagnosis   Name Primary?    Chronic bilateral low back pain with right-sided sciatica Yes     Physician: Yumiko Davis MD  Treatment Orders: PT Eval and Treat  Past Medical History:   Diagnosis Date    Allergy     IBS (irritable bowel syndrome)     Pneumonia 02/2019     Precautions: as per diagnosis    Evaluation date: 1-4-22  Visit # authorized: 21/26  Authorization period:12-31-22  Plan of care expiration: 12-8-22  MD Follow up appt: none scheduled    Time In: 7:46  Time Out: 8:22  Billable time:  10 Min + ES    Subjective     Pt reports: she rolled over 4 cases of water and pushed water and felt increased pain  Pt states she had increased leg pain   Pain Scale: before treatment:5/10  after treatment: 1/10 and level pelvis    Objective   Level pelvis to start  Mod-severe tightness lumbar paraspinals R, min after treatment        TREATMENT   (NP)Therapeutic exercises were performed to improve ROM, strength, flexibility and stabilization for 0 minutes.      Pt has not been doing much walking due to time change and unable to walk in her neighborhood in the dark.  Pt was out of town and had not been performing strengthening.  Instructed pt in need to resume strengthening     Verbal review of ex below  HS stretch supine   Glut stretch  Piriformis stretch  B QL stretch  Contract relax R glut x 3         B Knee to  Chest     Pelvic tilt with march  x 10  Bridge x 10, at home 20    Partial sit up x 10, at home 50  SLR x 10, 20 at home   Iron cross x 2  Trunk rotation supine x 10  Hip abd sidelying x 10 B, 20 at home   Arm and Leg lift prone x 10, 40 at home   Hip ext prone with bent knee x 10  20 at home  Press up x 10       GSS standing   Contract relax R glut    Manual therapy: Danuta  received the following manual therapy  techniques x 10min. to include soft tissue and joint mobilization were  "applied to the: low back and gluteals to include:     Performed STM to LB paraspinals and QL R>L  (NP)P/A mob to lumbar region Grade 1-2 , MET to QL R SL L  Sidelying L contract relax mobilization to L5-S1 region to level pelvis  at end of treatment and level pelvis achieved.    (NP)Pt received tool-assisted massage with manual therapy techniques to R LB in prone to trigger an inflammatory healing response and stimulate the production of new collagen and proper, more functional, less painful healing.    (NP)Vacuum/cupping STM with manual therapy techniques was performed to back and gluteals to decrease muscle tightness, increase circulation and promote healing process.  The pt's skin was monitored for redness adjusting pressure as needed. The pt was instructed in possible side effects of bruising and/or soreness.      (NP)Kinesiotape with 6" star for pain relief was performed over the R lumbar paraspinals Instructed pt in use, care and precautions with tape.       (NP)Ultrasound  for pain control and to decrease inflammation @ continuous duty cycle, 1 Mhz, applied to R lumbar paraspinals, intensity = 1.5 w/cm2 for 8 minutes. 2 min prep      Patient received pre-mod electrical stimulation to decrease muscle tightness and pain to Lumbar paraspinals/ sacral border of gluteals B for 15 minutes with MH supine with cycle time: continuous, beat frequency: , CC/CV: CV.      Written Home Exercises Provided: continue with prior HEP  Pt demo good understanding of the education provided. Danuta demonstrated good return demonstration of activities.     Pt. education:  Posture reeducation, body mechanics, HEP, proper posture in standing  No spiritual or educational barriers to learning provided  Pt has no cultural, educational or language barriers to learning provided.    Assessment    Pt with mod-severe tightness, and was unable to level pelvis.  Pt had pushed water bottle case with leg which possibly led to further " increased muscle tightness and pain.   Pt to undergo surgery end of week, so stress and anxiety of surgery may be part of increased tightness.     Pt will continue to benefit from skilled outpatient physical therapy to address the remaining functional deficits, provide pt/family education, and to maximize pt's level of independence in the home and community environment. .     GOALS:   Short Term Goals:  3 weeks MET STG's  Increase range of motion 25%  Increase strength 1/2 muscle grade  Improve postural awareness of pelvis to independently identify dysfunction with min assist from PT  Be able to perform HEP with minimal cueing required     Long Term Goals: 6 weeks PARTIALLY MET LTG'S  Increase range of motion to 75% to 100% full   Improve muscle strength 1 muscle grade  Improve muscle strength with MMT to 4+/5 to 5/5  Improve and stabilize proper pelvic positioning  Restore ability to sit for ADL and work with min to 0 pain  Restore normal sleep habits without disturbances due to pain  Restore ability to perform ADL's and household activities independently with min to 0 pain    Anticipated barriers to physical therapy: none  Pt's spiritual, cultural and educational needs considered and pt agreeable to plan of care and goals        Plan   Continue with established plan of care towards PT goals.          Jacy Escobar, MS, PT

## 2022-12-08 ENCOUNTER — CLINICAL SUPPORT (OUTPATIENT)
Dept: REHABILITATION | Facility: HOSPITAL | Age: 67
End: 2022-12-08
Payer: COMMERCIAL

## 2022-12-08 DIAGNOSIS — M54.41 CHRONIC BILATERAL LOW BACK PAIN WITH RIGHT-SIDED SCIATICA: Primary | ICD-10-CM

## 2022-12-08 DIAGNOSIS — G89.29 CHRONIC BILATERAL LOW BACK PAIN WITH RIGHT-SIDED SCIATICA: Primary | ICD-10-CM

## 2022-12-08 PROCEDURE — 97014 ELECTRIC STIMULATION THERAPY: CPT | Mod: PN | Performed by: PHYSICAL THERAPIST

## 2022-12-08 PROCEDURE — 97140 MANUAL THERAPY 1/> REGIONS: CPT | Mod: PN | Performed by: PHYSICAL THERAPIST

## 2022-12-19 ENCOUNTER — CLINICAL SUPPORT (OUTPATIENT)
Dept: REHABILITATION | Facility: HOSPITAL | Age: 67
End: 2022-12-19
Payer: COMMERCIAL

## 2022-12-19 DIAGNOSIS — G89.29 CHRONIC BILATERAL LOW BACK PAIN WITH RIGHT-SIDED SCIATICA: Primary | ICD-10-CM

## 2022-12-19 DIAGNOSIS — M54.41 CHRONIC BILATERAL LOW BACK PAIN WITH RIGHT-SIDED SCIATICA: Primary | ICD-10-CM

## 2022-12-19 PROCEDURE — 97014 ELECTRIC STIMULATION THERAPY: CPT | Mod: PN | Performed by: PHYSICAL THERAPIST

## 2022-12-19 PROCEDURE — 97012 MECHANICAL TRACTION THERAPY: CPT | Mod: PN | Performed by: PHYSICAL THERAPIST

## 2022-12-19 PROCEDURE — 97140 MANUAL THERAPY 1/> REGIONS: CPT | Mod: PN | Performed by: PHYSICAL THERAPIST

## 2022-12-19 PROCEDURE — 97035 APP MDLTY 1+ULTRASOUND EA 15: CPT | Mod: PN | Performed by: PHYSICAL THERAPIST

## 2022-12-19 NOTE — PROGRESS NOTES
Physical Therapy Progress and Daily Treatment Note     Name: Danuta Santos  Clinic Number: [id number]  Diagnosis:   Encounter Diagnosis   Name Primary?    Chronic bilateral low back pain with right-sided sciatica Yes     Physician: Yumiko Davis MD  Treatment Orders: PT Eval and Treat  Past Medical History:   Diagnosis Date    Allergy     IBS (irritable bowel syndrome)     Pneumonia 02/2019     Precautions: as per diagnosis    Evaluation date: 1-4-22  Visit # authorized: 22/26  Authorization period:12-31-22  Plan of care expiration:1-30-23  MD Follow up appt: none scheduled    Time In: 1:40  Time Out: 3:00  Billable time:  10 Min + ES and traction    Subjective     Pt reports: walking with cast shoe and imbalance with regular shoe on opposite foot after foot surgery on R led to increased back pain and some pain in R buttock  Prior to trying flexion principles pt was experiencing pain down LLE  Pain Scale: before treatment:6.5/10  after treatment: 3/10 and level pelvis    Objective   Level pelvis to start    Lumbar active range of motion in standing is:  - flexion - 10%                     - extension -  10%                         - left side bending -  10%         - right side bending -  10%      '  Mod-severe tightness lumbar paraspinals L  Severe on R  Performed flexion and extension testing and more + response to flexion    Performed gentle distraction to lumbar spine through legs and pt reported improved symptoms     TREATMENT   Therapeutic exercises were performed to improve ROM, strength, flexibility and stabilization for 5 minutes.       Knee to chest x 10   Contract relax R glut x 3  Instructed pt to perform q 2 hours or at onset of increased symptoms    HOLD ex below  HS stretch supine   Glut stretch  Piriformis stretch  B QL stretch  Contract relax R glut x 3         B Knee to  Chest     Pelvic tilt with march  x 10  Bridge x 10, at home 20    Partial sit up x 10, at home 50  SLR x 10, 20 at  "home   Iron cross x 2  Trunk rotation supine x 10  Hip abd sidelying x 10 B, 20 at home   Arm and Leg lift prone x 10, 40 at home   Hip ext prone with bent knee x 10  20 at home  Press up x 10       GSS standing   Contract relax R glut    Manual therapy: Danuta  received the following manual therapy  techniques x25 min. to include soft tissue and joint mobilization were applied to the: low back and gluteals to include:     Performed STM to LB paraspinals and QL R>L  (NP)P/A mob to lumbar region Grade 1-2 , MET to QL R SL L  (NP)Sidelying L contract relax mobilization to L5-S1 region to level pelvis  at end of treatment and level pelvis achieved.    Pt received tool-assisted massage with manual therapy techniques to B LB in prone to trigger an inflammatory healing response and stimulate the production of new collagen and proper, more functional, less painful healing.    Vacuum/cupping STM with manual therapy techniques was performed to back and gluteals to decrease muscle tightness, increase circulation and promote healing process.  The pt's skin was monitored for redness adjusting pressure as needed. The pt was instructed in possible side effects of bruising and/or soreness.      (NP)Kinesiotape with 6" star for pain relief was performed over the R lumbar paraspinals Instructed pt in use, care and precautions with tape.       Ultrasound  for pain control and to decrease inflammation @ continuous duty cycle, 1 Mhz, applied to R lumbar paraspinals, intensity = 1.5 w/cm2 for 8 minutes. 2 min prep     Lumbar mechanical traction was performed to decompress spine and decrease muscle tightness and pain.  Traction was performed with a 30 second hold with a load of 50# and 10 second rest with a load of 25# for 10 minutes.        Patient received pre-mod electrical stimulation to decrease muscle tightness and pain to Lumbar paraspinals/ sacral border of gluteals B for 15 minutes with MH supine with cycle time: continuous, " beat frequency: , CC/CV: CV.      Written Home Exercises Provided: continue with prior HEP  Pt demo good understanding of the education provided. Danuta demonstrated good return demonstration of activities.     Pt. education:  Posture reeducation, body mechanics, HEP, proper posture in standing  No spiritual or educational barriers to learning provided  Pt has no cultural, educational or language barriers to learning provided.    Assessment    Pt had aggravated back due to abnormal gait with cast shoe.  Pt with level pelvis, increased muscle tightness and appears to display discogenic signs and symptoms.  Pt may benefit from flexion principles.  Had pt perform contract relax after knees to chest to maintain level pelvis.  With this flare up increased frequency of PT may be necessary.  Pt with decreased pain after treatment and appears to understand use of knees to chest and contract relax to also help with symptoms     Pt will continue to benefit from skilled outpatient physical therapy to address the remaining functional deficits, provide pt/family education, and to maximize pt's level of independence in the home and community environment. .     GOALS:   Short Term Goals:  3 weeks MET STG's  Increase range of motion 25%  Increase strength 1/2 muscle grade  Improve postural awareness of pelvis to independently identify dysfunction with min assist from PT  Be able to perform HEP with minimal cueing required     Long Term Goals: 6 weeks PARTIALLY MET LTG'S  Increase range of motion to 75% to 100% full   Improve muscle strength 1 muscle grade  Improve muscle strength with MMT to 4+/5 to 5/5  Improve and stabilize proper pelvic positioning  Restore ability to sit for ADL and work with min to 0 pain  Restore normal sleep habits without disturbances due to pain  Restore ability to perform ADL's and household activities independently with min to 0 pain    Anticipated barriers to physical therapy: none  Pt's  spiritual, cultural and educational needs considered and pt agreeable to plan of care and goals        Plan   If you concur, I recommend patient continue with physical therapy 1-3 times a week  for 6 weeks.  Please advise us of your  recommendations. Thank you for allowing us to assist in the care of your patient.      Jacy Escobar, MS, PT          I certify the need for these services furnished under this plan of treatment and while under my care.    ____________________________________ Physician/Referring Practitioner                                Date of Signature             Jacy Escobar, MS, PT

## 2022-12-19 NOTE — PLAN OF CARE
Physical Therapy Progress and Daily Treatment Note     Name: Danuta Santos  Clinic Number: [id number]  Diagnosis:   Encounter Diagnosis   Name Primary?    Chronic bilateral low back pain with right-sided sciatica Yes     Physician: Yumiko Davis MD  Treatment Orders: PT Eval and Treat  Past Medical History:   Diagnosis Date    Allergy     IBS (irritable bowel syndrome)     Pneumonia 02/2019     Precautions: as per diagnosis    Evaluation date: 1-4-22  Visit # authorized: 22/26  Authorization period:12-31-22  Plan of care expiration:1-30-23  MD Follow up appt: none scheduled    Time In: 1:40  Time Out: 3:00  Billable time:  10 Min + ES and traction    Subjective     Pt reports: walking with cast shoe and imbalance with regular shoe on opposite foot after foot surgery on R led to increased back pain and some pain in R buttock  Prior to trying flexion principles pt was experiencing pain down LLE  Pain Scale: before treatment:6.5/10  after treatment: 3/10 and level pelvis    Objective   Level pelvis to start    Lumbar active range of motion in standing is:  - flexion - 10%                     - extension -  10%                         - left side bending -  10%         - right side bending -  10%      '  Mod-severe tightness lumbar paraspinals L  Severe on R  Performed flexion and extension testing and more + response to flexion    Performed gentle distraction to lumbar spine through legs and pt reported improved symptoms     TREATMENT   Therapeutic exercises were performed to improve ROM, strength, flexibility and stabilization for 5 minutes.       Knee to chest x 10   Contract relax R glut x 3  Instructed pt to perform q 2 hours or at onset of increased symptoms    HOLD ex below  HS stretch supine   Glut stretch  Piriformis stretch  B QL stretch  Contract relax R glut x 3         B Knee to  Chest     Pelvic tilt with march  x 10  Bridge x 10, at home 20    Partial sit up x 10, at home 50  SLR x 10, 20 at  "home   Iron cross x 2  Trunk rotation supine x 10  Hip abd sidelying x 10 B, 20 at home   Arm and Leg lift prone x 10, 40 at home   Hip ext prone with bent knee x 10  20 at home  Press up x 10       GSS standing   Contract relax R glut    Manual therapy: Danuta  received the following manual therapy  techniques x25 min. to include soft tissue and joint mobilization were applied to the: low back and gluteals to include:     Performed STM to LB paraspinals and QL R>L  (NP)P/A mob to lumbar region Grade 1-2 , MET to QL R SL L  (NP)Sidelying L contract relax mobilization to L5-S1 region to level pelvis  at end of treatment and level pelvis achieved.    Pt received tool-assisted massage with manual therapy techniques to B LB in prone to trigger an inflammatory healing response and stimulate the production of new collagen and proper, more functional, less painful healing.    Vacuum/cupping STM with manual therapy techniques was performed to back and gluteals to decrease muscle tightness, increase circulation and promote healing process.  The pt's skin was monitored for redness adjusting pressure as needed. The pt was instructed in possible side effects of bruising and/or soreness.      (NP)Kinesiotape with 6" star for pain relief was performed over the R lumbar paraspinals Instructed pt in use, care and precautions with tape.       Ultrasound  for pain control and to decrease inflammation @ continuous duty cycle, 1 Mhz, applied to R lumbar paraspinals, intensity = 1.5 w/cm2 for 8 minutes. 2 min prep     Lumbar mechanical traction was performed to decompress spine and decrease muscle tightness and pain.  Traction was performed with a 30 second hold with a load of 50# and 10 second rest with a load of 25# for 10 minutes.        Patient received pre-mod electrical stimulation to decrease muscle tightness and pain to Lumbar paraspinals/ sacral border of gluteals B for 15 minutes with MH supine with cycle time: continuous, " beat frequency: , CC/CV: CV.      Written Home Exercises Provided: continue with prior HEP  Pt demo good understanding of the education provided. Danuta demonstrated good return demonstration of activities.     Pt. education:  Posture reeducation, body mechanics, HEP, proper posture in standing  No spiritual or educational barriers to learning provided  Pt has no cultural, educational or language barriers to learning provided.    Assessment    Pt had aggravated back due to abnormal gait with cast shoe.  Pt with level pelvis, increased muscle tightness and appears to display discogenic signs and symptoms.  Pt may benefit from flexion principles.  Had pt perform contract relax after knees to chest to maintain level pelvis.  With this flare up increased frequency of PT may be necessary.  Pt with decreased pain after treatment and appears to understand use of knees to chest and contract relax to also help with symptoms     Pt will continue to benefit from skilled outpatient physical therapy to address the remaining functional deficits, provide pt/family education, and to maximize pt's level of independence in the home and community environment. .     GOALS:   Short Term Goals:  3 weeks MET STG's  Increase range of motion 25%  Increase strength 1/2 muscle grade  Improve postural awareness of pelvis to independently identify dysfunction with min assist from PT  Be able to perform HEP with minimal cueing required     Long Term Goals: 6 weeks PARTIALLY MET LTG'S  Increase range of motion to 75% to 100% full   Improve muscle strength 1 muscle grade  Improve muscle strength with MMT to 4+/5 to 5/5  Improve and stabilize proper pelvic positioning  Restore ability to sit for ADL and work with min to 0 pain  Restore normal sleep habits without disturbances due to pain  Restore ability to perform ADL's and household activities independently with min to 0 pain    Anticipated barriers to physical therapy: none  Pt's  spiritual, cultural and educational needs considered and pt agreeable to plan of care and goals        Plan   If you concur, I recommend patient continue with physical therapy 1-3 times a week  for 6 weeks.  Please advise us of your  recommendations. Thank you for allowing us to assist in the care of your patient.      Jacy Escobar, MS, PT          I certify the need for these services furnished under this plan of treatment and while under my care.    ____________________________________ Physician/Referring Practitioner                                Date of Signature             Jacy Escobar, MS, PT

## 2022-12-22 ENCOUNTER — OFFICE VISIT (OUTPATIENT)
Dept: INTERNAL MEDICINE | Facility: CLINIC | Age: 67
End: 2022-12-22
Payer: COMMERCIAL

## 2022-12-22 VITALS
SYSTOLIC BLOOD PRESSURE: 134 MMHG | BODY MASS INDEX: 21.63 KG/M2 | HEART RATE: 61 BPM | DIASTOLIC BLOOD PRESSURE: 83 MMHG | WEIGHT: 134 LBS

## 2022-12-22 DIAGNOSIS — G89.29 CHRONIC BILATERAL LOW BACK PAIN WITH RIGHT-SIDED SCIATICA: Primary | ICD-10-CM

## 2022-12-22 DIAGNOSIS — M54.9 DORSALGIA, UNSPECIFIED: ICD-10-CM

## 2022-12-22 DIAGNOSIS — M54.41 CHRONIC BILATERAL LOW BACK PAIN WITH RIGHT-SIDED SCIATICA: Primary | ICD-10-CM

## 2022-12-22 PROCEDURE — 3288F FALL RISK ASSESSMENT DOCD: CPT | Mod: CPTII,S$GLB,, | Performed by: INTERNAL MEDICINE

## 2022-12-22 PROCEDURE — 3008F BODY MASS INDEX DOCD: CPT | Mod: CPTII,S$GLB,, | Performed by: INTERNAL MEDICINE

## 2022-12-22 PROCEDURE — 99999 PR PBB SHADOW E&M-EST. PATIENT-LVL II: ICD-10-PCS | Mod: PBBFAC,,, | Performed by: INTERNAL MEDICINE

## 2022-12-22 PROCEDURE — 99213 OFFICE O/P EST LOW 20 MIN: CPT | Mod: 25,S$GLB,, | Performed by: INTERNAL MEDICINE

## 2022-12-22 PROCEDURE — 3079F DIAST BP 80-89 MM HG: CPT | Mod: CPTII,S$GLB,, | Performed by: INTERNAL MEDICINE

## 2022-12-22 PROCEDURE — 1125F PR PAIN SEVERITY QUANTIFIED, PAIN PRESENT: ICD-10-PCS | Mod: CPTII,S$GLB,, | Performed by: INTERNAL MEDICINE

## 2022-12-22 PROCEDURE — 3008F PR BODY MASS INDEX (BMI) DOCUMENTED: ICD-10-PCS | Mod: CPTII,S$GLB,, | Performed by: INTERNAL MEDICINE

## 2022-12-22 PROCEDURE — 96372 THER/PROPH/DIAG INJ SC/IM: CPT | Mod: S$GLB,,, | Performed by: INTERNAL MEDICINE

## 2022-12-22 PROCEDURE — 1101F PR PT FALLS ASSESS DOC 0-1 FALLS W/OUT INJ PAST YR: ICD-10-PCS | Mod: CPTII,S$GLB,, | Performed by: INTERNAL MEDICINE

## 2022-12-22 PROCEDURE — 3075F SYST BP GE 130 - 139MM HG: CPT | Mod: CPTII,S$GLB,, | Performed by: INTERNAL MEDICINE

## 2022-12-22 PROCEDURE — 3288F PR FALLS RISK ASSESSMENT DOCUMENTED: ICD-10-PCS | Mod: CPTII,S$GLB,, | Performed by: INTERNAL MEDICINE

## 2022-12-22 PROCEDURE — 3079F PR MOST RECENT DIASTOLIC BLOOD PRESSURE 80-89 MM HG: ICD-10-PCS | Mod: CPTII,S$GLB,, | Performed by: INTERNAL MEDICINE

## 2022-12-22 PROCEDURE — 96372 PR INJECTION,THERAP/PROPH/DIAG2ST, IM OR SUBCUT: ICD-10-PCS | Mod: S$GLB,,, | Performed by: INTERNAL MEDICINE

## 2022-12-22 PROCEDURE — 99999 PR PBB SHADOW E&M-EST. PATIENT-LVL II: CPT | Mod: PBBFAC,,, | Performed by: INTERNAL MEDICINE

## 2022-12-22 PROCEDURE — 99213 PR OFFICE/OUTPT VISIT, EST, LEVL III, 20-29 MIN: ICD-10-PCS | Mod: 25,S$GLB,, | Performed by: INTERNAL MEDICINE

## 2022-12-22 PROCEDURE — 1125F AMNT PAIN NOTED PAIN PRSNT: CPT | Mod: CPTII,S$GLB,, | Performed by: INTERNAL MEDICINE

## 2022-12-22 PROCEDURE — 1101F PT FALLS ASSESS-DOCD LE1/YR: CPT | Mod: CPTII,S$GLB,, | Performed by: INTERNAL MEDICINE

## 2022-12-22 PROCEDURE — 3075F PR MOST RECENT SYSTOLIC BLOOD PRESS GE 130-139MM HG: ICD-10-PCS | Mod: CPTII,S$GLB,, | Performed by: INTERNAL MEDICINE

## 2022-12-22 RX ORDER — TIZANIDINE 2 MG/1
TABLET ORAL
Qty: 60 TABLET | Refills: 0 | Status: SHIPPED | OUTPATIENT
Start: 2022-12-22 | End: 2023-04-13

## 2022-12-22 RX ORDER — METHOCARBAMOL 750 MG/1
750 TABLET, FILM COATED ORAL 2 TIMES DAILY PRN
Qty: 40 TABLET | Refills: 0 | Status: SHIPPED | OUTPATIENT
Start: 2022-12-22 | End: 2023-01-01

## 2022-12-22 RX ORDER — FAMOTIDINE 20 MG/1
20 TABLET, FILM COATED ORAL 2 TIMES DAILY PRN
Qty: 60 TABLET | Refills: 0 | Status: SHIPPED | OUTPATIENT
Start: 2022-12-22 | End: 2023-04-13

## 2022-12-22 RX ORDER — KETOROLAC TROMETHAMINE 30 MG/ML
60 INJECTION, SOLUTION INTRAMUSCULAR; INTRAVENOUS
Status: COMPLETED | OUTPATIENT
Start: 2022-12-22 | End: 2022-12-22

## 2022-12-22 RX ORDER — CELECOXIB 100 MG/1
100 CAPSULE ORAL 2 TIMES DAILY
Qty: 60 CAPSULE | Refills: 1 | Status: SHIPPED | OUTPATIENT
Start: 2022-12-22 | End: 2023-04-13

## 2022-12-22 RX ADMIN — KETOROLAC TROMETHAMINE 60 MG: 30 INJECTION, SOLUTION INTRAMUSCULAR; INTRAVENOUS at 01:12

## 2022-12-23 NOTE — PROGRESS NOTES
Subjective:       Patient ID: Danuta Santos is a 67 y.o. female.    Chief Complaint: Back Pain    Five pleasant nonsmoking 59-year-old female here for an acute care visit for acute on chronic low back pain.  She had right foot surgery at the beginning of November and has been in a boot and this is expected to be the source for the acute radiculopathy pain down into the right foot at times.  She is having a difficult time even sitting down.  She was doing physical therapy for her foot but had to miss this last week.  She has been taking 2 Zanaflex at night 2 mg each as well as Celebrex 100 mg twice a day and Tylenol around the clock at 675 mg 4 times a day.  And this is providing her with mild relief.  She did come with an MRI from a few years ago that shows spinal stenosis at L4/L5.  She does not tolerate oral NSAIDs outside of Celebrex and has had a low threshold for pneumonia a few years ago after being on a few rounds of corticosteroids.  She also can have airway reactive disease developed quickly from an upper respiratory tract infection.    Review of Systems   Constitutional:  Negative for activity change, appetite change, chills, diaphoresis, fatigue, fever and unexpected weight change.   HENT:  Negative for nasal congestion, ear pain, mouth sores, postnasal drip, sinus pressure/congestion, sore throat and trouble swallowing.    Eyes:  Negative for pain, redness and visual disturbance.   Respiratory:  Negative for apnea, cough, chest tightness, shortness of breath and wheezing.    Cardiovascular:  Negative for chest pain, palpitations and leg swelling.   Gastrointestinal:  Negative for abdominal distention, abdominal pain, blood in stool, constipation, diarrhea, nausea and vomiting.   Endocrine: Negative for cold intolerance, polydipsia, polyphagia and polyuria.   Genitourinary:  Negative for difficulty urinating, dysuria, flank pain, frequency, hematuria, menstrual problem, pelvic pain and urgency.    Musculoskeletal:  Positive for arthralgias, back pain, gait problem and leg pain. Negative for joint swelling and neck pain.   Integumentary:  Negative for color change, rash and wound.   Neurological:  Negative for dizziness, tremors, seizures, syncope, weakness, light-headedness, numbness and headaches.   Hematological:  Negative for adenopathy. Does not bruise/bleed easily.   Psychiatric/Behavioral:  Negative for confusion, decreased concentration, dysphoric mood, hallucinations, self-injury, sleep disturbance and suicidal ideas. The patient is not nervous/anxious.        Objective:      Physical Exam  Cardiovascular:      Rate and Rhythm: Normal rate and regular rhythm.      Heart sounds: No murmur heard.  Pulmonary:      Effort: Pulmonary effort is normal.      Breath sounds: Normal breath sounds. No rales.   Abdominal:      General: Bowel sounds are normal. There is no distension.      Palpations: Abdomen is soft.      Tenderness: There is no abdominal tenderness.   Musculoskeletal:      Lumbar back: Spasms and tenderness present. No bony tenderness. Decreased range of motion. Positive right straight leg raise test and positive left straight leg raise test.   Neurological:      Mental Status: She is alert and oriented to person, place, and time.       Assessment/plan       Problem List Items Addressed This Visit       Chronic bilateral low back pain with right-sided sciatica - Primary    Relevant Medications    celecoxib (CELEBREX) 100 MG capsule    methocarbamoL (ROBAXIN) 750 MG Tab    tiZANidine (ZANAFLEX) 2 MG tablet    Other Relevant Orders    MRI Lumbar Spine Without Contrast     Other Visit Diagnoses       Dorsalgia, unspecified        Relevant Medications    ketorolac injection 60 mg (Completed)    Other Relevant Orders    MRI Lumbar Spine Without Contrast

## 2022-12-27 NOTE — PROGRESS NOTES
Physical Therapy Daily Treatment Note     Name: Danuta Santos  Clinic Number: [id number]  Diagnosis:   Encounter Diagnosis   Name Primary?    Chronic bilateral low back pain with right-sided sciatica Yes     Physician: Yumiko Davis MD  Treatment Orders: PT Eval and Treat  Past Medical History:   Diagnosis Date    Allergy     IBS (irritable bowel syndrome)     Pneumonia 02/2019     Precautions: as per diagnosis    Evaluation date: 1-4-22  Visit # authorized: 23/26  Authorization period:12-31-22  Plan of care expiration:1-30-23  MD Follow up appt: none scheduled    Time In: 10:50  Time Out: 12:20  Billable time:  45 Min + ES and traction    Subjective     Pt reports: she is better but still with pain on both sides of back .  Pt states she has had to continue in cast shoe and is uncomfortable in cast shoe Pt is able to sleep in bed or lie on sofa and still will lie on mat on floor for rest.  Pt is unable to stand or sit for prolonged period yet     Pain Scale: before treatment:5/10  after treatment: 3/10 and level pelvis    Objective     AR R pelvis   Mod-severe tightness L lumbar paraspinals, severe on R      TREATMENT   Therapeutic exercises were performed to improve ROM, strength, flexibility and stabilization for 10 minutes.      Knee to chest x 10 instructed pt to be more gentle with stretching for pt pulling into pain.   Contract relax R glut x 3   Prone press up x 10  Instructed pt to perform q 2 hours or at onset of increased symptoms    HOLD ex below  HS stretch supine   Glut stretch  Piriformis stretch  B QL stretch  Contract relax R glut x 3         B Knee to  Chest     Pelvic tilt with march  x 10  Bridge x 10, at home 20    Partial sit up x 10, at home 50  SLR x 10, 20 at home   Iron cross x 2  Trunk rotation supine x 10  Hip abd sidelying x 10 B, 20 at home   Arm and Leg lift prone x 10, 40 at home   Hip ext prone with bent knee x 10  20 at home  Press up x 10       GSS standing   Contract  "relax R glut    Manual therapy: Danuta  received the following manual therapy  techniques x 25 min. to include soft tissue and joint mobilization were applied to the: low back and gluteals to include:     Performed STM to LB paraspinals and QL R>L  (NP)P/A mob to lumbar region Grade 1-2 , MET to QL R SL L  (NP)Sidelying L contract relax mobilization to L5-S1 region to level pelvis  at end of treatment and level pelvis achieved.    Pt received tool-assisted massage with manual therapy techniques to B LB in prone to trigger an inflammatory healing response and stimulate the production of new collagen and proper, more functional, less painful healing.    Vacuum/cupping STM with manual therapy techniques was performed to back and gluteals to decrease muscle tightness, increase circulation and promote healing process.  The pt's skin was monitored for redness adjusting pressure as needed. The pt was instructed in possible side effects of bruising and/or soreness.      (NP)Kinesiotape with 6" star for pain relief was performed over the R lumbar paraspinals Instructed pt in use, care and precautions with tape.       Ultrasound  for pain control and to decrease inflammation @ continuous duty cycle, 1 Mhz, applied to R lumbar paraspinals, intensity = 1.5 w/cm2 for 8 minutes. 2 min prep     Lumbar mechanical traction was performed to decompress spine and decrease muscle tightness and pain.  Traction was performed with a 30 second hold with a load of 50# and 10 second rest with a load of 25# for 10 minutes.        Patient received pre-mod electrical stimulation to decrease muscle tightness and pain to Lumbar paraspinals/ sacral border of gluteals B for 15 minutes with MH supine with cycle time: continuous, beat frequency: , CC/CV: CV.      Written Home Exercises Provided: continue with prior HEP  Pt demo good understanding of the education provided. Danuta demonstrated good return demonstration of activities. "     Pt. education:  Posture reeducation, body mechanics, HEP, proper posture in standing  No spiritual or educational barriers to learning provided  Pt has no cultural, educational or language barriers to learning provided.    Assessment    Pt continues with gait abnormality as pt continues to recover from toe surgery.  Pt with increased pelvic instability and increased muscle tightness.  Pt improved from previous session, for now able to sit to drive, but walking and standing is still limited due to foot symptoms.  Pt scott treatment well and improved symptoms overall and level pelvis at end of session.  Pt felt compression from traction belt was also pain relieving and may consider L-S belt.       Pt will continue to benefit from skilled outpatient physical therapy to address the remaining functional deficits, provide pt/family education, and to maximize pt's level of independence in the home and community environment. .     GOALS:   Short Term Goals:  3 weeks MET STG's  Increase range of motion 25%  Increase strength 1/2 muscle grade  Improve postural awareness of pelvis to independently identify dysfunction with min assist from PT  Be able to perform HEP with minimal cueing required     Long Term Goals: 6 weeks PARTIALLY MET LTG'S  Increase range of motion to 75% to 100% full   Improve muscle strength 1 muscle grade  Improve muscle strength with MMT to 4+/5 to 5/5  Improve and stabilize proper pelvic positioning  Restore ability to sit for ADL and work with min to 0 pain  Restore normal sleep habits without disturbances due to pain  Restore ability to perform ADL's and household activities independently with min to 0 pain    Anticipated barriers to physical therapy: none  Pt's spiritual, cultural and educational needs considered and pt agreeable to plan of care and goals        Plan   If you concur, I recommend patient continue with physical therapy 1-3 times a week  for 6 weeks.  Please advise us of your   recommendations. Thank you for allowing us to assist in the care of your patient.      Jacy Escobar MS, PT          I certify the need for these services furnished under this plan of treatment and while under my care.    ____________________________________ Physician/Referring Practitioner                                Date of Signature             Jacy Escobar, MS, PT

## 2022-12-28 ENCOUNTER — PATIENT MESSAGE (OUTPATIENT)
Dept: INTERNAL MEDICINE | Facility: CLINIC | Age: 67
End: 2022-12-28
Payer: COMMERCIAL

## 2022-12-28 ENCOUNTER — CLINICAL SUPPORT (OUTPATIENT)
Dept: REHABILITATION | Facility: HOSPITAL | Age: 67
End: 2022-12-28
Payer: COMMERCIAL

## 2022-12-28 DIAGNOSIS — M54.41 CHRONIC BILATERAL LOW BACK PAIN WITH RIGHT-SIDED SCIATICA: Primary | ICD-10-CM

## 2022-12-28 DIAGNOSIS — G89.29 CHRONIC BILATERAL LOW BACK PAIN WITH RIGHT-SIDED SCIATICA: Primary | ICD-10-CM

## 2022-12-28 PROCEDURE — 97110 THERAPEUTIC EXERCISES: CPT | Mod: PN | Performed by: PHYSICAL THERAPIST

## 2022-12-28 PROCEDURE — 97012 MECHANICAL TRACTION THERAPY: CPT | Mod: PN | Performed by: PHYSICAL THERAPIST

## 2022-12-28 PROCEDURE — 97140 MANUAL THERAPY 1/> REGIONS: CPT | Mod: PN | Performed by: PHYSICAL THERAPIST

## 2022-12-28 PROCEDURE — 97014 ELECTRIC STIMULATION THERAPY: CPT | Mod: PN | Performed by: PHYSICAL THERAPIST

## 2022-12-28 PROCEDURE — 97035 APP MDLTY 1+ULTRASOUND EA 15: CPT | Mod: PN | Performed by: PHYSICAL THERAPIST

## 2022-12-28 RX ORDER — KETOROLAC TROMETHAMINE 30 MG/ML
60 INJECTION, SOLUTION INTRAMUSCULAR; INTRAVENOUS
Status: DISCONTINUED | OUTPATIENT
Start: 2022-12-28 | End: 2023-04-21

## 2022-12-30 ENCOUNTER — HOSPITAL ENCOUNTER (OUTPATIENT)
Dept: RADIOLOGY | Facility: OTHER | Age: 67
Discharge: HOME | End: 2022-12-30
Attending: INTERNAL MEDICINE
Payer: COMMERCIAL

## 2022-12-30 ENCOUNTER — CLINICAL SUPPORT (OUTPATIENT)
Dept: INTERNAL MEDICINE | Facility: CLINIC | Age: 67
End: 2022-12-30
Payer: COMMERCIAL

## 2022-12-30 DIAGNOSIS — G89.29 ACUTE EXACERBATION OF CHRONIC LOW BACK PAIN: Primary | ICD-10-CM

## 2022-12-30 DIAGNOSIS — M54.9 DORSALGIA, UNSPECIFIED: ICD-10-CM

## 2022-12-30 DIAGNOSIS — M54.41 CHRONIC BILATERAL LOW BACK PAIN WITH RIGHT-SIDED SCIATICA: ICD-10-CM

## 2022-12-30 DIAGNOSIS — M54.50 ACUTE EXACERBATION OF CHRONIC LOW BACK PAIN: Primary | ICD-10-CM

## 2022-12-30 DIAGNOSIS — G89.29 CHRONIC BILATERAL LOW BACK PAIN WITH RIGHT-SIDED SCIATICA: ICD-10-CM

## 2022-12-30 PROCEDURE — 96372 THER/PROPH/DIAG INJ SC/IM: CPT | Mod: S$GLB,,, | Performed by: INTERNAL MEDICINE

## 2022-12-30 PROCEDURE — 72148 MRI LUMBAR SPINE WITHOUT CONTRAST: ICD-10-PCS | Mod: 26,,, | Performed by: RADIOLOGY

## 2022-12-30 PROCEDURE — 72148 MRI LUMBAR SPINE W/O DYE: CPT | Mod: TC

## 2022-12-30 PROCEDURE — 96372 PR INJECTION,THERAP/PROPH/DIAG2ST, IM OR SUBCUT: ICD-10-PCS | Mod: S$GLB,,, | Performed by: INTERNAL MEDICINE

## 2022-12-30 PROCEDURE — 99212 PR OFFICE/OUTPT VISIT, EST, LEVL II, 10-19 MIN: ICD-10-PCS | Mod: 25,S$GLB,, | Performed by: INTERNAL MEDICINE

## 2022-12-30 PROCEDURE — 72148 MRI LUMBAR SPINE W/O DYE: CPT | Mod: 26,,, | Performed by: RADIOLOGY

## 2022-12-30 PROCEDURE — 99212 OFFICE O/P EST SF 10 MIN: CPT | Mod: 25,S$GLB,, | Performed by: INTERNAL MEDICINE

## 2022-12-30 RX ORDER — KETOROLAC TROMETHAMINE 30 MG/ML
60 INJECTION, SOLUTION INTRAMUSCULAR; INTRAVENOUS
Status: COMPLETED | OUTPATIENT
Start: 2022-12-30 | End: 2022-12-30

## 2022-12-30 RX ADMIN — KETOROLAC TROMETHAMINE 60 MG: 30 INJECTION, SOLUTION INTRAMUSCULAR; INTRAVENOUS at 04:12

## 2023-01-08 ENCOUNTER — OFFICE VISIT (OUTPATIENT)
Dept: URGENT CARE | Facility: CLINIC | Age: 68
End: 2023-01-08
Payer: COMMERCIAL

## 2023-01-08 VITALS
OXYGEN SATURATION: 96 % | TEMPERATURE: 97 F | SYSTOLIC BLOOD PRESSURE: 133 MMHG | WEIGHT: 134 LBS | HEIGHT: 66 IN | DIASTOLIC BLOOD PRESSURE: 80 MMHG | HEART RATE: 66 BPM | BODY MASS INDEX: 21.53 KG/M2 | RESPIRATION RATE: 18 BRPM

## 2023-01-08 DIAGNOSIS — J31.0 RHINITIS, UNSPECIFIED TYPE: ICD-10-CM

## 2023-01-08 DIAGNOSIS — R09.82 POST-NASAL DRIP: ICD-10-CM

## 2023-01-08 DIAGNOSIS — J01.11 ACUTE RECURRENT FRONTAL SINUSITIS: Primary | ICD-10-CM

## 2023-01-08 DIAGNOSIS — J34.3 HYPERTROPHY OF NASAL TURBINATES: ICD-10-CM

## 2023-01-08 PROCEDURE — 1160F PR REVIEW ALL MEDS BY PRESCRIBER/CLIN PHARMACIST DOCUMENTED: ICD-10-PCS | Mod: CPTII,S$GLB,, | Performed by: NURSE PRACTITIONER

## 2023-01-08 PROCEDURE — 1159F PR MEDICATION LIST DOCUMENTED IN MEDICAL RECORD: ICD-10-PCS | Mod: CPTII,S$GLB,, | Performed by: NURSE PRACTITIONER

## 2023-01-08 PROCEDURE — 3008F BODY MASS INDEX DOCD: CPT | Mod: CPTII,S$GLB,, | Performed by: NURSE PRACTITIONER

## 2023-01-08 PROCEDURE — 99213 PR OFFICE/OUTPT VISIT, EST, LEVL III, 20-29 MIN: ICD-10-PCS | Mod: S$GLB,,, | Performed by: NURSE PRACTITIONER

## 2023-01-08 PROCEDURE — 1159F MED LIST DOCD IN RCRD: CPT | Mod: CPTII,S$GLB,, | Performed by: NURSE PRACTITIONER

## 2023-01-08 PROCEDURE — 3079F DIAST BP 80-89 MM HG: CPT | Mod: CPTII,S$GLB,, | Performed by: NURSE PRACTITIONER

## 2023-01-08 PROCEDURE — 3008F PR BODY MASS INDEX (BMI) DOCUMENTED: ICD-10-PCS | Mod: CPTII,S$GLB,, | Performed by: NURSE PRACTITIONER

## 2023-01-08 PROCEDURE — 99213 OFFICE O/P EST LOW 20 MIN: CPT | Mod: S$GLB,,, | Performed by: NURSE PRACTITIONER

## 2023-01-08 PROCEDURE — 3079F PR MOST RECENT DIASTOLIC BLOOD PRESSURE 80-89 MM HG: ICD-10-PCS | Mod: CPTII,S$GLB,, | Performed by: NURSE PRACTITIONER

## 2023-01-08 PROCEDURE — 3075F SYST BP GE 130 - 139MM HG: CPT | Mod: CPTII,S$GLB,, | Performed by: NURSE PRACTITIONER

## 2023-01-08 PROCEDURE — 1160F RVW MEDS BY RX/DR IN RCRD: CPT | Mod: CPTII,S$GLB,, | Performed by: NURSE PRACTITIONER

## 2023-01-08 PROCEDURE — 3075F PR MOST RECENT SYSTOLIC BLOOD PRESS GE 130-139MM HG: ICD-10-PCS | Mod: CPTII,S$GLB,, | Performed by: NURSE PRACTITIONER

## 2023-01-08 RX ORDER — AMOXICILLIN AND CLAVULANATE POTASSIUM 875; 125 MG/1; MG/1
1 TABLET, FILM COATED ORAL EVERY 12 HOURS
Qty: 14 TABLET | Refills: 0 | Status: SHIPPED | OUTPATIENT
Start: 2023-01-08 | End: 2023-01-15

## 2023-01-08 NOTE — PATIENT INSTRUCTIONS
Please drink plenty of fluids.  Please get plenty of rest.  Please return here or go to the Emergency Department for any concerns or worsening of condition.  If you were given wait & see antibiotics, please wait 3-5 days before taking them, and only take them if your symptoms have worsened or not improved.  If you do begin taking the antibiotics, please take them to completion.  If you were prescribed antibiotics, please take them to completion.  If you were prescribed a narcotic medication, do not drive or operate heavy equipment or machinery while taking these medications.  If you do not have Hypertension or any history of palpitations, it is ok to take over the counter Sudafed or Mucinex D or Allegra-D or Claritin-D or Zyrtec-D.  If you do take one of the above, it is ok to combine that with plain over the counter Mucinex or Allegra or Claritin or Zyrtec.  If for example you are taking Zyrtec -D, you can combine that with Mucinex, but not Mucinex-D.  If you are taking Mucinex-D, you can combine that with plain Allegra or Claritin or Zyrtec.   If you do have Hypertension or palpitations, it is safe to take Coricidin HBP for relief of sinus symptoms.  We recommend you take over the counter Flonase (Fluticasone) or another nasally inhaled steroid unless you are already taking one.  Nasal irrigation with a saline spray or Netti Pot like device per their directions is also recommended.  If not allergic, please take over the counter Tylenol (Acetaminophen) and/or Motrin (Ibuprofen) as directed for control of pain and/or fever.  Please follow up with your primary care doctor or specialist as needed.    If you  smoke, please stop smoking.

## 2023-01-08 NOTE — PROGRESS NOTES
"Subjective:       Patient ID: Danuta Santos is a 67 y.o. female.    Vitals:  height is 5' 6" (1.676 m) and weight is 60.8 kg (134 lb). Her temperature is 97.1 °F (36.2 °C). Her blood pressure is 133/80 and her pulse is 66. Her respiration is 18 and oxygen saturation is 96%.     Chief Complaint: Sinus Problem    Patient presents with c.o sinus pressure and congestion. Patient also with eye pain and headache. No fever. At home covid test neg.       67 year old female presents to clinic with complaints of facial sinus pressure, frontal headache, yellow nasal secretions, post nasal drip. Home covid test with negative results. Treating with sudafed. History positive for Pfizer covid vaccine x 5 doses 2/19/21, 3/12/21, 9/28/21, 4/12/22, bivalent booster 9/24/22. She tested positive for covid 10/4/22    Sinus Problem  This is a new problem. Associated symptoms include congestion, coughing, headaches, sinus pressure and a sore throat. Pertinent negatives include no chills or diaphoresis. Treatments tried: sudafed. The treatment provided no relief.     Constitution: Negative for chills, sweating, fatigue and fever.   HENT:  Positive for congestion, postnasal drip, sinus pressure and sore throat.    Respiratory:  Positive for cough.    Gastrointestinal:  Negative for abdominal pain, nausea and vomiting.   Musculoskeletal:  Negative for muscle ache.   Neurological:  Positive for headaches.     Objective:      Physical Exam   Constitutional: She is oriented to person, place, and time. She appears well-developed. She is cooperative.  Non-toxic appearance. She does not appear ill. No distress.   HENT:   Head: Normocephalic and atraumatic.   Ears:   Right Ear: Hearing, external ear and ear canal normal. Tympanic membrane is bulging.   Left Ear: Hearing, external ear and ear canal normal. Tympanic membrane is bulging.   Nose: Mucosal edema present. No rhinorrhea or nasal deformity. No epistaxis. Right sinus exhibits " frontal sinus tenderness. Right sinus exhibits no maxillary sinus tenderness. Left sinus exhibits frontal sinus tenderness. Left sinus exhibits no maxillary sinus tenderness.   Mouth/Throat: Uvula is midline, oropharynx is clear and moist and mucous membranes are normal. No trismus in the jaw. Normal dentition. No uvula swelling. No oropharyngeal exudate, posterior oropharyngeal edema or posterior oropharyngeal erythema.   Eyes: Conjunctivae and lids are normal. No scleral icterus.   Neck: Trachea normal and phonation normal. Neck supple. No edema present. No erythema present. No neck rigidity present.   Cardiovascular: Normal rate, regular rhythm and normal heart sounds.   Pulmonary/Chest: Effort normal and breath sounds normal. No respiratory distress. She has no decreased breath sounds. She has no rhonchi.   Abdominal: Normal appearance.   Musculoskeletal: Normal range of motion.         General: No deformity. Normal range of motion.   Neurological: She is alert and oriented to person, place, and time. She exhibits normal muscle tone. Coordination normal.   Skin: Skin is warm, dry, intact, not diaphoretic and not pale.   Psychiatric: Her speech is normal and behavior is normal. Judgment and thought content normal.   Nursing note and vitals reviewed.      Assessment:       1. Acute recurrent frontal sinusitis    2. Post-nasal drip    3. Hypertrophy of nasal turbinates    4. Rhinitis, unspecified type          Plan:         Acute recurrent frontal sinusitis  -     amoxicillin-clavulanate 875-125mg (AUGMENTIN) 875-125 mg per tablet; Take 1 tablet by mouth every 12 (twelve) hours. for 7 days  Dispense: 14 tablet; Refill: 0    Post-nasal drip    Hypertrophy of nasal turbinates    Rhinitis, unspecified type      Patient Instructions   Please drink plenty of fluids.  Please get plenty of rest.  Please return here or go to the Emergency Department for any concerns or worsening of condition.  If you were given wait & see  antibiotics, please wait 3-5 days before taking them, and only take them if your symptoms have worsened or not improved.  If you do begin taking the antibiotics, please take them to completion.  If you were prescribed antibiotics, please take them to completion.  If you were prescribed a narcotic medication, do not drive or operate heavy equipment or machinery while taking these medications.  If you do not have Hypertension or any history of palpitations, it is ok to take over the counter Sudafed or Mucinex D or Allegra-D or Claritin-D or Zyrtec-D.  If you do take one of the above, it is ok to combine that with plain over the counter Mucinex or Allegra or Claritin or Zyrtec.  If for example you are taking Zyrtec -D, you can combine that with Mucinex, but not Mucinex-D.  If you are taking Mucinex-D, you can combine that with plain Allegra or Claritin or Zyrtec.   If you do have Hypertension or palpitations, it is safe to take Coricidin HBP for relief of sinus symptoms.  We recommend you take over the counter Flonase (Fluticasone) or another nasally inhaled steroid unless you are already taking one.  Nasal irrigation with a saline spray or Netti Pot like device per their directions is also recommended.  If not allergic, please take over the counter Tylenol (Acetaminophen) and/or Motrin (Ibuprofen) as directed for control of pain and/or fever.  Please follow up with your primary care doctor or specialist as needed.    If you  smoke, please stop smoking.

## 2023-01-11 ENCOUNTER — OFFICE VISIT (OUTPATIENT)
Dept: URGENT CARE | Facility: CLINIC | Age: 68
End: 2023-01-11
Payer: COMMERCIAL

## 2023-01-11 VITALS
DIASTOLIC BLOOD PRESSURE: 86 MMHG | OXYGEN SATURATION: 97 % | SYSTOLIC BLOOD PRESSURE: 143 MMHG | HEIGHT: 66 IN | TEMPERATURE: 98 F | WEIGHT: 134 LBS | BODY MASS INDEX: 21.53 KG/M2 | HEART RATE: 60 BPM

## 2023-01-11 DIAGNOSIS — H65.93 MIDDLE EAR EFFUSION, BILATERAL: ICD-10-CM

## 2023-01-11 DIAGNOSIS — J06.9 VIRAL URI WITH COUGH: Primary | ICD-10-CM

## 2023-01-11 DIAGNOSIS — R05.8 COUGH WITH CONGESTION OF PARANASAL SINUS: ICD-10-CM

## 2023-01-11 DIAGNOSIS — R09.82 PND (POST-NASAL DRIP): ICD-10-CM

## 2023-01-11 DIAGNOSIS — R09.81 COUGH WITH CONGESTION OF PARANASAL SINUS: ICD-10-CM

## 2023-01-11 DIAGNOSIS — Z11.52 ENCOUNTER FOR SCREENING FOR COVID-19: ICD-10-CM

## 2023-01-11 DIAGNOSIS — Z11.59 ENCOUNTER FOR SCREENING FOR VIRAL DISEASE: ICD-10-CM

## 2023-01-11 LAB
CTP QC/QA: YES
CTP QC/QA: YES
POC MOLECULAR INFLUENZA A AGN: NEGATIVE
POC MOLECULAR INFLUENZA B AGN: NEGATIVE
SARS-COV-2 AG RESP QL IA.RAPID: NEGATIVE

## 2023-01-11 PROCEDURE — 87502 INFLUENZA DNA AMP PROBE: CPT | Mod: QW,S$GLB,, | Performed by: PHYSICIAN ASSISTANT

## 2023-01-11 PROCEDURE — 1159F PR MEDICATION LIST DOCUMENTED IN MEDICAL RECORD: ICD-10-PCS | Mod: CPTII,S$GLB,, | Performed by: PHYSICIAN ASSISTANT

## 2023-01-11 PROCEDURE — 3008F BODY MASS INDEX DOCD: CPT | Mod: CPTII,S$GLB,, | Performed by: PHYSICIAN ASSISTANT

## 2023-01-11 PROCEDURE — 87502 POCT INFLUENZA A/B MOLECULAR: ICD-10-PCS | Mod: QW,S$GLB,, | Performed by: PHYSICIAN ASSISTANT

## 2023-01-11 PROCEDURE — 3077F PR MOST RECENT SYSTOLIC BLOOD PRESSURE >= 140 MM HG: ICD-10-PCS | Mod: CPTII,S$GLB,, | Performed by: PHYSICIAN ASSISTANT

## 2023-01-11 PROCEDURE — 1159F MED LIST DOCD IN RCRD: CPT | Mod: CPTII,S$GLB,, | Performed by: PHYSICIAN ASSISTANT

## 2023-01-11 PROCEDURE — 3079F DIAST BP 80-89 MM HG: CPT | Mod: CPTII,S$GLB,, | Performed by: PHYSICIAN ASSISTANT

## 2023-01-11 PROCEDURE — 87811 SARS-COV-2 COVID19 W/OPTIC: CPT | Mod: QW,S$GLB,, | Performed by: PHYSICIAN ASSISTANT

## 2023-01-11 PROCEDURE — 3079F PR MOST RECENT DIASTOLIC BLOOD PRESSURE 80-89 MM HG: ICD-10-PCS | Mod: CPTII,S$GLB,, | Performed by: PHYSICIAN ASSISTANT

## 2023-01-11 PROCEDURE — 99214 PR OFFICE/OUTPT VISIT, EST, LEVL IV, 30-39 MIN: ICD-10-PCS | Mod: S$GLB,,, | Performed by: PHYSICIAN ASSISTANT

## 2023-01-11 PROCEDURE — 3008F PR BODY MASS INDEX (BMI) DOCUMENTED: ICD-10-PCS | Mod: CPTII,S$GLB,, | Performed by: PHYSICIAN ASSISTANT

## 2023-01-11 PROCEDURE — 99214 OFFICE O/P EST MOD 30 MIN: CPT | Mod: S$GLB,,, | Performed by: PHYSICIAN ASSISTANT

## 2023-01-11 PROCEDURE — 3077F SYST BP >= 140 MM HG: CPT | Mod: CPTII,S$GLB,, | Performed by: PHYSICIAN ASSISTANT

## 2023-01-11 PROCEDURE — 87811 SARS CORONAVIRUS 2 ANTIGEN POCT, MANUAL READ: ICD-10-PCS | Mod: QW,S$GLB,, | Performed by: PHYSICIAN ASSISTANT

## 2023-01-11 RX ORDER — AZELASTINE 1 MG/ML
1 SPRAY, METERED NASAL 2 TIMES DAILY PRN
Qty: 30 ML | Refills: 0 | Status: SHIPPED | OUTPATIENT
Start: 2023-01-11 | End: 2023-03-04 | Stop reason: SDUPTHER

## 2023-01-11 RX ORDER — PROMETHAZINE HYDROCHLORIDE AND DEXTROMETHORPHAN HYDROBROMIDE 6.25; 15 MG/5ML; MG/5ML
5 SYRUP ORAL NIGHTLY PRN
Qty: 118 ML | Refills: 0 | Status: SHIPPED | OUTPATIENT
Start: 2023-01-11 | End: 2023-01-21

## 2023-01-11 NOTE — PATIENT INSTRUCTIONS
PLEASE READ YOUR DISCHARGE INSTRUCTIONS ENTIRELY AS IT CONTAINS IMPORTANT INFORMATION.    Patient had covid testing done today.    Discussed corona virus precautions and reviewed [facility] FAC; printed a copy for patient.  I discussed to continue to monitor their symptoms. Discussed that if their symptoms persist or worsen to seek re-evaluation. Clinic vs. ER precautions were given.  Patient verbalized understanding and agreed with the entire plan of care.    If Negative and no direct exposure: symptom free without fever reducing meds in 24 hours - can go back to work in 24 hours with surgical mask for 10-14 days.    - Reviewed radiographs and all diagnostic testing with patient/family.    - Rest.  Drink plenty of fluids.    - Tylenol OR anti-inflammatory (NSAIDs, ibuprofen, aleve, motrin) as directed as needed for fever/pain.  For Tylenol, do not exceed 3000 mg/ day. If no contraindication or allergies.    -Take cough syrup as needed for cough during at night.   - do not operate machinery when taking cough syrup or pain meds as they may cause drowsiness.      -Below are suggestions for symptomatic relief:              -Salt water gargles to soothe throat pain.              -Chloroseptic spray also helps to numb throat pain. Drink hot tea with honey or lemon to soothe your throat.              -Nasal saline spray reduces inflammation and dryness.              -Warm face compresses to help with facial sinus pain/pressure.              -Vicks vapor rub at night.              -Astelin NASAL SPRAY twice day for nasal/sinus congestion   **may also supplement with 2nd OTC nasal spray to help with inflammation and congestion.   Wean to off when you nose becomes to dry or bleed. Also use nasal saline twice a day to help with dryness.               -Flonase OTC or Nasacort OTC  once or twice a day for nasal/sinus congestion. DON'T USE IF YOU HAVE GLAUCOMA. CHECK WITH YOUR PHARMACIST/PHYSICIAN.              -Simple foods like  chicken noodle soup.              -Mucinex DM (ANY COUGH EXPECTORANT-- guaifenesin) for cough or chest congestion with mucus and (ANY COUGH SUPPRESSANT- dextromethorphan) helps with coughing every 12 hours. Mucinex-DM if you have chest congestion or sputum (caution if history of high blood pressure or palpitations).              -Zyrtec/Claritin/xyzal during the day time  & Benadryl at night (only if severe runny nose) may help with allergies and runny nose. Add decongestant if you have nasal/sinus congestion/sinus pressure/ear fullness sensation. (see below)              -may take OTC meclizine as needed for dizziness or nausea.     Caution with use of Decongestant meds:  -If you DO NOT have Hypertension or any history of palpitations, it is ok to take over the counter Sudafed or Mucinex D or Allegra-D or Claritin-D or Zyrtec-D.  -If you do take one of the above, it is ok to combine that with plain over the counter Mucinex or Allegra or Claritin or Zyrtec. If, for example, you are taking Zyrtec -D, you can combine that with Mucinex, but not Mucinex-D.  If you are taking Mucinex-D, you can combine that with plain Allegra or Claritin or Zyrtec.     -Do not combine pseudophed or phenylephrine with any other brand allergy-D for DECONGESTANT.   -Or vice versa, you can you take plain allergy medications (allegra/claritin/zyrtec with NO Decongestant) and ADD OTC pseudophed or phenelyphrine 3 times a day (or every 4-6 hours needed). Avoid taking decongestant late at night or with caffeine as it can keep you up or cause jittery feeling.     -If you DO have Hypertension , anxiety, or palpitations, it is safe to take Coricidin HBP for relief of cough, congestion, or sinus symptoms every 4-6 hours.      For your GI symptoms:  -Use gatorade/pedialyte or rehydration packets to help stay hydrated. Vitamin water and plain water do not contain rehydrating electrolytes.  -Increase clear liquids (water, gatorade, pedialyte, broths,  jello, etc) Hold off on solids for 12-18 hours. Then advance to BRAT diet (banana, rice, applesauce, tea, toast/crackers), then advance further as tolerated. Avoid spicy or fatty foods.   -Please go to the ER if you experience worsening abdominal pain, blood in your vomit or stool, high fever, dizziness, fainting, swelling of your abdomen, inability to pass gas or stool, or inability to urinate.     -You must understand that you've received an Urgent Care treatment only and that you may be released before all your medical problems are known or treated. You, the patient, will arrange for follow up care as instructed. Please arrange follow up with your primary medical clinic within 2-5 days if your signs and symptoms have not resolved or worsen.     - Follow up with your PCP or specialty clinic as directed.  You can call [phone] or [phone] to schedule an appointment with the appropriate provider.  Schedule CENTER is open Mon-Friday 8-5pm (excluded holidays).    - If your condition worsens or fails to improve we recommend that you receive another evaluation at the emergency room immediately or contact your primary medical clinic to discuss your concerns.

## 2023-01-11 NOTE — PROGRESS NOTES
"Subjective:       Patient ID: Danuta Santos is a 67 y.o. female.    Vitals:  height is 5' 6" (1.676 m) and weight is 60.8 kg (134 lb). Her oral temperature is 98 °F (36.7 °C). Her blood pressure is 143/86 (abnormal) and her pulse is 60. Her oxygen saturation is 97%.     Chief Complaint: Cough    67-year-old female who has already vaccinated for COVID-19 has a history of seasonal allergies, arthritis, ADHD, IBS, chronic back pain with sciatica, and is a  who presents to urgent care clinic for re-evaluation.  She was seen at this clinic on 01/08/2023 by different provider and prescribed Augmentin for sinusitis.  Patient reports symptoms started Saturday 01/07/2023 and has not improves till Monday/day 3 of symptoms she began Augmentin course.  Feels no improvement with Augmentin course.  Complaining of nasal/sinus congestion, postnasal drip, mild sore throat, mildly productive cough that is worse at night, headache, and fatigue.  She took 2 doses of Claritin D with no significant relief made her feel funny so she stopped taking.  Takes Tylenol as needed.  Uses Flonase once a day in the morning for chronic seasonal allergies.  No other associated symptoms.  Most bothersome symptom is postnasal drip and fatigue.  No sick exposure.      Medical assistant note:  Pt is coming in for a cough, post nasal drip and headaches   Pt says that her syms started 4 days ago   Pt is requesting that she gets tested for  RSV, covid and the flu   Pt pain level for her headache is a 5   Pt self treated with tylenol   When pt cough she is coughing up yellow mucus       Cough  This is a new problem. Episode onset: 4 days ago. The problem has been unchanged. The problem occurs constantly. Associated symptoms include headaches, nasal congestion, postnasal drip, rhinorrhea and a sore throat. Pertinent negatives include no chest pain, chills, ear congestion, ear pain, fever, heartburn, hemoptysis, myalgias, rash, " shortness of breath or wheezing. Nothing aggravates the symptoms.     Constitution: Positive for fatigue. Negative for activity change, appetite change, chills, sweating, fever and generalized weakness.   HENT:  Positive for congestion, postnasal drip, sinus pressure and sore throat. Negative for ear pain, hearing loss, facial swelling, sinus pain, trouble swallowing and voice change.    Neck: Negative for neck pain, neck stiffness and painful lymph nodes.   Cardiovascular:  Negative for chest pain, leg swelling, palpitations, sob on exertion and passing out.   Eyes:  Negative for eye discharge, eye pain, photophobia, vision loss, double vision and blurred vision.   Respiratory:  Positive for cough and sputum production. Negative for chest tightness, bloody sputum, COPD, shortness of breath, stridor, wheezing and asthma.    Gastrointestinal:  Negative for abdominal pain, nausea, vomiting, constipation, diarrhea, bright red blood in stool, rectal bleeding, heartburn and bowel incontinence.   Genitourinary:  Negative for dysuria, frequency, urgency, urine decreased, flank pain, bladder incontinence and hematuria.   Musculoskeletal:  Negative for trauma, joint pain, joint swelling, abnormal ROM of joint, muscle cramps and muscle ache.   Skin:  Negative for color change, pale, rash and wound.   Allergic/Immunologic: Negative for seasonal allergies, asthma and immunocompromised state.   Neurological:  Positive for headaches. Negative for dizziness, history of vertigo, light-headedness, passing out, facial drooping, speech difficulty, coordination disturbances, loss of balance, disorientation, altered mental status, loss of consciousness, numbness, tingling and seizures.   Hematologic/Lymphatic: Negative for swollen lymph nodes, easy bruising/bleeding and trouble clotting. Does not bruise/bleed easily.   Psychiatric/Behavioral:  Negative for altered mental status and disorientation.        Past Medical History:    Diagnosis Date    ADHD (attention deficit hyperactivity disorder)     Allergy     wheat , diary, alcohol ,some vinegars    Arthritis     Bronchitis- bad twice 2018 and 2019 again ; admitted for bacterial pneumonia     IBS (irritable bowel syndrome)     Recurrent upper respiratory infection (URI)        Objective:      Physical Exam   Constitutional: She is oriented to person, place, and time. She appears well-developed. She is cooperative.  Non-toxic appearance. She does not appear ill. No distress.      Comments:Well Appearing     HENT:   Head: Normocephalic and atraumatic.   Ears:   Right Ear: Hearing, external ear and ear canal normal. No no drainage, swelling or tenderness.   Left Ear: Hearing, external ear and ear canal normal. No no drainage, swelling or tenderness.      Comments: Mild bilateral middle ear effusion with no bulging or erythema.  Right TM worse than left.  Nose: Nose normal. No rhinorrhea, purulent discharge or congestion. Right sinus exhibits no maxillary sinus tenderness and no frontal sinus tenderness. Left sinus exhibits no maxillary sinus tenderness and no frontal sinus tenderness.   Mouth/Throat: Uvula is midline, oropharynx is clear and moist and mucous membranes are normal. Mucous membranes are moist. No oral lesions. No trismus in the jaw. No uvula swelling. No oropharyngeal exudate, posterior oropharyngeal edema or posterior oropharyngeal erythema. No tonsillar exudate. Oropharynx is clear.   Eyes: Conjunctivae, EOM and lids are normal. Pupils are equal, round, and reactive to light. No visual field deficit is present. Right eye exhibits no discharge. Left eye exhibits no discharge. Right conjunctiva is not injected. Right conjunctiva has no hemorrhage. Left conjunctiva is not injected. Left conjunctiva has no hemorrhage. Extraocular movement intact vision grossly intact gaze aligned appropriately   Neck: Neck supple. No neck rigidity present.   Cardiovascular: Normal rate, regular  rhythm, normal heart sounds and normal pulses.   No murmur heard.  Pulmonary/Chest: Effort normal and breath sounds normal. No accessory muscle usage or stridor. No respiratory distress. She has no wheezes. She exhibits no tenderness.   Abdominal: Normal appearance. She exhibits no distension and no mass. Soft. There is no abdominal tenderness. There is no rebound and no guarding.   Musculoskeletal: Normal range of motion.         General: Normal range of motion.      Right lower leg: No edema.      Left lower leg: No edema.      Comments: Moves all extremities with normal tone, strength, and ROM.  Gait normal.   Lymphadenopathy:     She has no cervical adenopathy.   Neurological: no focal deficit. She is alert, oriented to person, place, and time and at baseline. She has normal motor skills and normal sensation. She displays no weakness, facial symmetry, normal reflexes and no dysarthria. No cranial nerve deficit or sensory deficit. She exhibits normal muscle tone. She has a normal Finger-Nose-Finger Test. Coordination: Heel to shin test normal. She shows no pronator drift. She displays no seizure activity. Gait and coordination normal. Coordination normal. GCS eye subscore is 4. GCS verbal subscore is 5. GCS motor subscore is 6.   Skin: Skin is warm, dry, not diaphoretic and no rash. Capillary refill takes less than 2 seconds.   Psychiatric: Her speech is normal and behavior is normal. Thought content normal.   Nursing note and vitals reviewed.        Results for orders placed or performed in visit on 01/11/23   POCT Influenza A/B MOLECULAR   Result Value Ref Range    POC Molecular Influenza A Ag Negative Negative, Not Reported    POC Molecular Influenza B Ag Negative Negative, Not Reported     Acceptable Yes    SARS Coronavirus 2 Antigen, POCT Manual Read   Result Value Ref Range    SARS Coronavirus 2 Antigen Negative Negative     Acceptable Yes        Assessment:       1. Viral URI  with cough    2. Middle ear effusion, bilateral    3. Cough with congestion of paranasal sinus    4. PND (post-nasal drip)    5. Encounter for screening for COVID-19    6. Encounter for screening for viral disease        Note dictated with voice recognition software, please excuse any grammatical errors.    Nontoxic appearing. Vitals are stable. Rapid covid negative.   All diagnostic testing personally reviewed and interpreted.   Patient has symptoms at this time which is consistent with above diagnosis.        Patient was recommended OTC treatments for their symptoms.   Patient was also prescribed medications for their symptoms.  Discuss Tessalon as needed for cough suppressive but patient declined and will take regular OTC Mucinex.    Emphasized the importance of OTC symptomatic treatment for improvements in symptoms and prevent further worsening of conditioning.  Recommend oral antihistamine q.a.m., antihistamine nasal spray 1-2 times per day, Mucinex  Q 12 hour, and Sudafed q.4-6 hours in addition to prescribed promethazine DM and Astelin nasal spray.    We had in-depth conversation regarding antibiotics has no efficacy in acute viral infection.  Patient will discontinue Augmentin course since it is not helpful in her symptoms.     Patient was counseled, explained with the test results meaning, expected course, and answered all of questions. They can also receive results via my chart.  Printed and verbal treatment guidelines/recommendations were given.   Recommend follow-up PCP in the next 2-3 days if new or worsening symptoms.    Patient understands that they received an Urgent Care treatment only and that they may be released before all your medical problems are known or treated. Strict ED versus clinic precautions given.  Patient verbalized understanding and agreed with plan of care.    Note dictated with voice recognition software, please excuse any grammatical errors.    Plan:         Viral URI with  cough  -     promethazine-dextromethorphan (PROMETHAZINE-DM) 6.25-15 mg/5 mL Syrp; Take 5 mLs by mouth nightly as needed (cough at night). Do not take maximum of 30mLs in 24hrs  Dispense: 118 mL; Refill: 0  -     azelastine (ASTELIN) 137 mcg (0.1 %) nasal spray; 1 spray (137 mcg total) by Nasal route 2 (two) times daily as needed for Rhinitis.  Dispense: 30 mL; Refill: 0    Middle ear effusion, bilateral    Cough with congestion of paranasal sinus  -     POCT Influenza A/B MOLECULAR  -     SARS Coronavirus 2 Antigen, POCT Manual Read  -     promethazine-dextromethorphan (PROMETHAZINE-DM) 6.25-15 mg/5 mL Syrp; Take 5 mLs by mouth nightly as needed (cough at night). Do not take maximum of 30mLs in 24hrs  Dispense: 118 mL; Refill: 0  -     azelastine (ASTELIN) 137 mcg (0.1 %) nasal spray; 1 spray (137 mcg total) by Nasal route 2 (two) times daily as needed for Rhinitis.  Dispense: 30 mL; Refill: 0    PND (post-nasal drip)  -     promethazine-dextromethorphan (PROMETHAZINE-DM) 6.25-15 mg/5 mL Syrp; Take 5 mLs by mouth nightly as needed (cough at night). Do not take maximum of 30mLs in 24hrs  Dispense: 118 mL; Refill: 0  -     azelastine (ASTELIN) 137 mcg (0.1 %) nasal spray; 1 spray (137 mcg total) by Nasal route 2 (two) times daily as needed for Rhinitis.  Dispense: 30 mL; Refill: 0    Encounter for screening for COVID-19    Encounter for screening for viral disease              Additional MDM:     Heart Failure Score:   COPD = No    Patient Instructions     PLEASE READ YOUR DISCHARGE INSTRUCTIONS ENTIRELY AS IT CONTAINS IMPORTANT INFORMATION.    Patient had covid testing done today.    Discussed corona virus precautions and reviewed CDC FAC; printed a copy for patient.  I discussed to continue to monitor their symptoms. Discussed that if their symptoms persist or worsen to seek re-evaluation. Clinic vs. ER precautions were given.  Patient verbalized understanding and agreed with the entire plan of care.    If Negative  and no direct exposure: symptom free without fever reducing meds in 24 hours - can go back to work in 24 hours with surgical mask for 10-14 days.    - Reviewed radiographs and all diagnostic testing with patient/family.    - Rest.  Drink plenty of fluids.    - Tylenol OR anti-inflammatory (NSAIDs, ibuprofen, aleve, motrin) as directed as needed for fever/pain.  For Tylenol, do not exceed 3000 mg/ day. If no contraindication or allergies.    -Take cough syrup as needed for cough during at night.   - do not operate machinery when taking cough syrup or pain meds as they may cause drowsiness.      -Below are suggestions for symptomatic relief:              -Salt water gargles to soothe throat pain.              -Chloroseptic spray also helps to numb throat pain. Drink hot tea with honey or lemon to soothe your throat.              -Nasal saline spray reduces inflammation and dryness.              -Warm face compresses to help with facial sinus pain/pressure.              -Vicks vapor rub at night.              -Astelin NASAL SPRAY twice day for nasal/sinus congestion   **may also supplement with 2nd OTC nasal spray to help with inflammation and congestion.   Wean to off when you nose becomes to dry or bleed. Also use nasal saline twice a day to help with dryness.               -Flonase OTC or Nasacort OTC  once or twice a day for nasal/sinus congestion. DON'T USE IF YOU HAVE GLAUCOMA. CHECK WITH YOUR PHARMACIST/PHYSICIAN.              -Simple foods like chicken noodle soup.              -Mucinex DM (ANY COUGH EXPECTORANT-- guaifenesin) for cough or chest congestion with mucus and (ANY COUGH SUPPRESSANT- dextromethorphan) helps with coughing every 12 hours. Mucinex-DM if you have chest congestion or sputum (caution if history of high blood pressure or palpitations).              -Zyrtec/Claritin/xyzal during the day time  & Benadryl at night (only if severe runny nose) may help with allergies and runny nose. Add  decongestant if you have nasal/sinus congestion/sinus pressure/ear fullness sensation. (see below)              -may take OTC meclizine as needed for dizziness or nausea.     Caution with use of Decongestant meds:  -If you DO NOT have Hypertension or any history of palpitations, it is ok to take over the counter Sudafed or Mucinex D or Allegra-D or Claritin-D or Zyrtec-D.  -If you do take one of the above, it is ok to combine that with plain over the counter Mucinex or Allegra or Claritin or Zyrtec. If, for example, you are taking Zyrtec -D, you can combine that with Mucinex, but not Mucinex-D.  If you are taking Mucinex-D, you can combine that with plain Allegra or Claritin or Zyrtec.     -Do not combine pseudophed or phenylephrine with any other brand allergy-D for DECONGESTANT.   -Or vice versa, you can you take plain allergy medications (allegra/claritin/zyrtec with NO Decongestant) and ADD OTC pseudophed or phenelyphrine 3 times a day (or every 4-6 hours needed). Avoid taking decongestant late at night or with caffeine as it can keep you up or cause jittery feeling.     -If you DO have Hypertension , anxiety, or palpitations, it is safe to take Coricidin HBP for relief of cough, congestion, or sinus symptoms every 4-6 hours.      For your GI symptoms:  -Use gatorade/pedialyte or rehydration packets to help stay hydrated. Vitamin water and plain water do not contain rehydrating electrolytes.  -Increase clear liquids (water, gatorade, pedialyte, broths, jello, etc) Hold off on solids for 12-18 hours. Then advance to BRAT diet (banana, rice, applesauce, tea, toast/crackers), then advance further as tolerated. Avoid spicy or fatty foods.   -Please go to the ER if you experience worsening abdominal pain, blood in your vomit or stool, high fever, dizziness, fainting, swelling of your abdomen, inability to pass gas or stool, or inability to urinate.     -You must understand that you've received an Urgent Care  treatment only and that you may be released before all your medical problems are known or treated. You, the patient, will arrange for follow up care as instructed. Please arrange follow up with your primary medical clinic within 2-5 days if your signs and symptoms have not resolved or worsen.     - Follow up with your PCP or specialty clinic as directed.  You can call [phone] or [phone] to schedule an appointment with the appropriate provider.  Schedule [facility] is open Mon-Friday 8-5pm (excluded holidays).    - If your condition worsens or fails to improve we recommend that you receive another evaluation at the emergency room immediately or contact your primary medical clinic to discuss your concerns.

## 2023-01-13 ENCOUNTER — OFFICE VISIT (OUTPATIENT)
Dept: INTERNAL MEDICINE | Facility: CLINIC | Age: 68
End: 2023-01-13
Payer: COMMERCIAL

## 2023-01-13 VITALS
BODY MASS INDEX: 21.62 KG/M2 | SYSTOLIC BLOOD PRESSURE: 152 MMHG | HEART RATE: 60 BPM | WEIGHT: 134.5 LBS | HEIGHT: 66 IN | TEMPERATURE: 98 F | DIASTOLIC BLOOD PRESSURE: 84 MMHG

## 2023-01-13 DIAGNOSIS — M54.9 DORSALGIA, UNSPECIFIED: ICD-10-CM

## 2023-01-13 DIAGNOSIS — B96.89 ACUTE BACTERIAL SINUSITIS: Primary | ICD-10-CM

## 2023-01-13 DIAGNOSIS — J01.90 ACUTE BACTERIAL SINUSITIS: Primary | ICD-10-CM

## 2023-01-13 DIAGNOSIS — F51.09 SITUATIONAL INSOMNIA: ICD-10-CM

## 2023-01-13 PROCEDURE — 1160F PR REVIEW ALL MEDS BY PRESCRIBER/CLIN PHARMACIST DOCUMENTED: ICD-10-PCS | Mod: CPTII,S$GLB,, | Performed by: INTERNAL MEDICINE

## 2023-01-13 PROCEDURE — 1159F MED LIST DOCD IN RCRD: CPT | Mod: CPTII,S$GLB,, | Performed by: INTERNAL MEDICINE

## 2023-01-13 PROCEDURE — 3077F SYST BP >= 140 MM HG: CPT | Mod: CPTII,S$GLB,, | Performed by: INTERNAL MEDICINE

## 2023-01-13 PROCEDURE — 3008F PR BODY MASS INDEX (BMI) DOCUMENTED: ICD-10-PCS | Mod: CPTII,S$GLB,, | Performed by: INTERNAL MEDICINE

## 2023-01-13 PROCEDURE — 1159F PR MEDICATION LIST DOCUMENTED IN MEDICAL RECORD: ICD-10-PCS | Mod: CPTII,S$GLB,, | Performed by: INTERNAL MEDICINE

## 2023-01-13 PROCEDURE — 1160F RVW MEDS BY RX/DR IN RCRD: CPT | Mod: CPTII,S$GLB,, | Performed by: INTERNAL MEDICINE

## 2023-01-13 PROCEDURE — 99214 OFFICE O/P EST MOD 30 MIN: CPT | Mod: S$GLB,,, | Performed by: INTERNAL MEDICINE

## 2023-01-13 PROCEDURE — 1125F AMNT PAIN NOTED PAIN PRSNT: CPT | Mod: CPTII,S$GLB,, | Performed by: INTERNAL MEDICINE

## 2023-01-13 PROCEDURE — 1125F PR PAIN SEVERITY QUANTIFIED, PAIN PRESENT: ICD-10-PCS | Mod: CPTII,S$GLB,, | Performed by: INTERNAL MEDICINE

## 2023-01-13 PROCEDURE — 3079F PR MOST RECENT DIASTOLIC BLOOD PRESSURE 80-89 MM HG: ICD-10-PCS | Mod: CPTII,S$GLB,, | Performed by: INTERNAL MEDICINE

## 2023-01-13 PROCEDURE — 99999 PR PBB SHADOW E&M-EST. PATIENT-LVL IV: CPT | Mod: PBBFAC,,, | Performed by: INTERNAL MEDICINE

## 2023-01-13 PROCEDURE — 3077F PR MOST RECENT SYSTOLIC BLOOD PRESSURE >= 140 MM HG: ICD-10-PCS | Mod: CPTII,S$GLB,, | Performed by: INTERNAL MEDICINE

## 2023-01-13 PROCEDURE — 3079F DIAST BP 80-89 MM HG: CPT | Mod: CPTII,S$GLB,, | Performed by: INTERNAL MEDICINE

## 2023-01-13 PROCEDURE — 3008F BODY MASS INDEX DOCD: CPT | Mod: CPTII,S$GLB,, | Performed by: INTERNAL MEDICINE

## 2023-01-13 PROCEDURE — 99999 PR PBB SHADOW E&M-EST. PATIENT-LVL IV: ICD-10-PCS | Mod: PBBFAC,,, | Performed by: INTERNAL MEDICINE

## 2023-01-13 PROCEDURE — 99214 PR OFFICE/OUTPT VISIT, EST, LEVL IV, 30-39 MIN: ICD-10-PCS | Mod: S$GLB,,, | Performed by: INTERNAL MEDICINE

## 2023-01-13 RX ORDER — LORAZEPAM 0.5 MG/1
0.5 TABLET ORAL NIGHTLY PRN
Qty: 30 TABLET | Refills: 0 | Status: SHIPPED | OUTPATIENT
Start: 2023-01-13 | End: 2023-04-13

## 2023-01-15 NOTE — PROGRESS NOTES
Subjective:       Patient ID: Danuta Santos is a 67 y.o. female.    Chief Complaint: Back Pain and Headache    Very pleasant 67-year-old nonsmoking female here for follow-up from 2 urgent care visits for respiratory tract infection.  The 1st 1 was on Sunday on January 8th and she was diagnosed with an acute bacterial sinusitis with prodrome symptoms possibly going on for a couple of weeks prior with some intermittent chills and some nasal congestion.  When she went to urgent Care she had a lot of congestion and drainage that kept her up all night.  She was given a prescription for Augmentin at that time and had been taking Sudafed and Mucinex DM.  Three days later she went back to urgent Care as she developed a headache and additional symptoms and it was recommended that she might have a viral infection and to hold on the antibiotics.  It was recommended that she add Zyrtec at night for few days and continue Astelin and Flonase nose spray together once daily.  Patient is not sure she should continue antibiotics.  Of note is that the patient has gotten bronchitis from an upper respiratory infection rather quickly intermittently over the years.  From her nose is mostly clear but she has coughed up some greenish mucus over the past week as well.    She is having no issues with the Augmentin.  She does not take antibiotics more than once or twice a year.  As concerning her back it is improved and she continues with physical therapy.  She is having some issues with sleeping at night which she feels is very situational.  Low-dose alprazolam made her very sleepy the next day.    Review of Systems   Constitutional:  Negative for activity change, appetite change, chills, diaphoresis, fatigue, fever and unexpected weight change.   HENT:  Positive for nasal congestion and sinus pressure/congestion. Negative for ear pain, mouth sores, postnasal drip, sore throat and trouble swallowing.    Eyes:  Negative for pain,  redness and visual disturbance.   Respiratory:  Negative for apnea, cough, chest tightness, shortness of breath and wheezing.    Cardiovascular:  Negative for chest pain, palpitations and leg swelling.   Gastrointestinal:  Negative for abdominal distention, abdominal pain, blood in stool, constipation, diarrhea, nausea and vomiting.   Endocrine: Negative for cold intolerance, polydipsia, polyphagia and polyuria.   Genitourinary:  Negative for difficulty urinating, dysuria, flank pain, frequency, hematuria, menstrual problem, pelvic pain and urgency.   Musculoskeletal:  Positive for back pain. Negative for arthralgias, joint swelling and neck pain.   Integumentary:  Negative for color change, rash and wound.   Neurological:  Negative for dizziness, tremors, seizures, syncope, weakness, light-headedness, numbness and headaches.   Hematological:  Negative for adenopathy. Does not bruise/bleed easily.   Psychiatric/Behavioral:  Positive for sleep disturbance. Negative for confusion, decreased concentration, dysphoric mood, hallucinations, self-injury and suicidal ideas. The patient is not nervous/anxious.        Objective:      Physical Exam  Constitutional:       General: She is not in acute distress.     Appearance: She is well-developed. She is not diaphoretic.   HENT:      Head: Normocephalic and atraumatic.      Right Ear: Tympanic membrane, ear canal and external ear normal. No drainage. Tympanic membrane is not scarred or retracted.      Left Ear: Tympanic membrane, ear canal and external ear normal. No drainage. Tympanic membrane is not scarred or retracted.      Nose: Mucosal edema present.      Right Sinus: No maxillary sinus tenderness or frontal sinus tenderness.      Left Sinus: No maxillary sinus tenderness or frontal sinus tenderness.      Mouth/Throat:      Pharynx: No oropharyngeal exudate, posterior oropharyngeal erythema or uvula swelling.      Tonsils: No tonsillar exudate.   Eyes:      General:          Right eye: No discharge.         Left eye: No discharge.      Conjunctiva/sclera: Conjunctivae normal.   Cardiovascular:      Rate and Rhythm: Normal rate and regular rhythm.   Pulmonary:      Effort: Pulmonary effort is normal. No respiratory distress.      Breath sounds: Normal breath sounds. No stridor. No wheezing or rales.   Chest:      Chest wall: No tenderness.   Musculoskeletal:      Cervical back: Normal range of motion and neck supple.   Lymphadenopathy:      Head:      Right side of head: No submandibular, preauricular or posterior auricular adenopathy.      Left side of head: No submandibular, preauricular or posterior auricular adenopathy.      Cervical: No cervical adenopathy.   Skin:     General: Skin is warm and dry.   Psychiatric:         Behavior: Behavior normal.       Assessment/plan       Problem List Items Addressed This Visit    None  Visit Diagnoses       Acute bacterial sinusitis    -  Primary    Recommend increasing Mucinex DM 2 twice a day, using Sudafed as needed, 1 more night of Zyrtec and continuing Astelin and Flonase along with the completion of the Augmentin     Situational insomnia        Relevant Medications    LORazepam (ATIVAN) 0.5 MG tablet    Dorsalgia, unspecified        Improved, continue with PT and stretching as you are doing and you can use NSAIDs as needed with celebrex

## 2023-01-19 ENCOUNTER — CLINICAL SUPPORT (OUTPATIENT)
Dept: REHABILITATION | Facility: HOSPITAL | Age: 68
End: 2023-01-19
Payer: COMMERCIAL

## 2023-01-19 ENCOUNTER — DOCUMENTATION ONLY (OUTPATIENT)
Dept: REHABILITATION | Facility: HOSPITAL | Age: 68
End: 2023-01-19

## 2023-01-19 DIAGNOSIS — G89.29 CHRONIC BILATERAL LOW BACK PAIN WITH RIGHT-SIDED SCIATICA: Primary | ICD-10-CM

## 2023-01-19 DIAGNOSIS — M54.41 CHRONIC BILATERAL LOW BACK PAIN WITH RIGHT-SIDED SCIATICA: Primary | ICD-10-CM

## 2023-01-19 PROCEDURE — 97035 APP MDLTY 1+ULTRASOUND EA 15: CPT | Mod: PN | Performed by: PHYSICAL THERAPIST

## 2023-01-19 PROCEDURE — 97110 THERAPEUTIC EXERCISES: CPT | Mod: PN | Performed by: PHYSICAL THERAPIST

## 2023-01-19 PROCEDURE — 97014 ELECTRIC STIMULATION THERAPY: CPT | Mod: PN | Performed by: PHYSICAL THERAPIST

## 2023-01-19 PROCEDURE — 97140 MANUAL THERAPY 1/> REGIONS: CPT | Mod: PN | Performed by: PHYSICAL THERAPIST

## 2023-01-19 NOTE — PROGRESS NOTES
PHYSICAL THERAPY SUMMARY    Name: Danuta Santos  Referring Provider: Physician: Yuimko Davis, MDPT Order: PT evaluate and treat   Clinical #: [id number]  Discharge Summary Date: 1/19/2023  Diagnosis:   1. Chronic bilateral low back pain with right-sided sciatica            Patient was seen for 23 OP PT visits from 1-4-22 to 12-28-22 for 2022. Treatment included: evaluation, HEP, pt education, joint and soft tissue mobilizations, ther ex, and modalities with US, MH and ES. Due to chronic nature of pt back pain she will continue to require PT as needed when she is unable to self manage symptoms in order to improve and maintain highest level of function.

## 2023-01-19 NOTE — PROGRESS NOTES
Physical Therapy Daily Progress and Treatment Note     Name: Danuta Santos  Clinic Number: [id number]  Diagnosis:   Encounter Diagnosis   Name Primary?    Chronic bilateral low back pain with right-sided sciatica Yes     Physician: Yumiko Davis MD  Treatment Orders: PT Eval and Treat  Past Medical History:   Diagnosis Date    Allergy     IBS (irritable bowel syndrome)     Pneumonia 02/2019     Precautions: as per diagnosis    Evaluation date: 1-19-23  Visit # authorized: 1/20  Authorization period:12-31-23  Plan of care expiration:3-2-23  MD Follow up appt: none scheduled    Time In: 2:35  Time Out: 3:45  Billable time:  45 Min + ES    Subjective     Pt reports: her back has been hurting the last 2 days.  Pt states prior to that was able to recover from s/p surgery. Pt states standing more to work on computer and was able to sit a little recently.      Pain Scale: before treatment:5/10  after treatment: 3/10 and level pelvis    Objective     AR R pelvis   Mod-severe tightness L lumbar paraspinals, severe on R     AR R pelvis  Mod-tightness lumbar paraspinals R, min after treatment  Severe decreased spring lumbar vertebrae     Lumbar active range of motion in standing is: 1-19-23  - flexion - toes                     - extension -  50%                         - left side bending -  to knee         - right side bending -  to knee pain              Lumbar active range of motion in standing is: 10-27-22  - flexion - toes                     - extension -  100%                         - left side bending -  To knee         - right side bending -  To knee     Muscle Strength 1-19-23  MMT R L   Hip flexion 4+/5 4+/5   Hip abduction 4+/5 5/5   Hip extension 4+/5 5/5   Glut max 4/5 4+/5   Knee extension 5/5 5/5   Knee flexion 5/5 5/5                 Muscle Strength 10-27-22  MMT R L   Hip flexion 5/5 5/5   Hip abduction 5/5 5/5   Hip extension 5/5 5/5   Glut max 4+/5 4+/5   Knee extension 5/5 5/5   Knee flexion  "5/5 5/5          TREATMENT   Therapeutic exercises were performed to improve ROM, strength, flexibility and stabilization for 10 minutes.      HS stretch supine   Glut stretch  Piriformis stretch  B QL stretch  Contract relax R glut x 3  Did level pelvis, pt reports does not stay  B Knee to chest     Add starting today   Pelvic tilt   x 10  Bridge x 10,   SLR x 10,   Trunk rotation supine x 10  Hip abd sidelying x 10 B,   GSS standing     Hold others from prior HEP for now  Partial sit up x 10, at home 50  Iron cross x 2  Arm and Leg lift prone x 10, 40 at home   Hip ext prone with bent knee x 10  20 at home  Press up x 10   Contract relax R glut    Manual therapy: Danuta  received the following manual therapy  techniques x 25 min. to include soft tissue and joint mobilization were applied to the: low back and gluteals to include:     Performed STM to LB paraspinals and QL R>L  P/A mob to lumbar region Grade 1-2 , (NP)MET to QL R SL L  Sidelying L contract relax mobilization to L5-S1 region to level pelvis  at end of treatment and level pelvis achieved at end of session    Pt received tool-assisted massage with manual therapy techniques to B LB in prone to trigger an inflammatory healing response and stimulate the production of new collagen and proper, more functional, less painful healing.    Vacuum/cupping STM with manual therapy techniques was performed to back and gluteals to decrease muscle tightness, increase circulation and promote healing process.  The pt's skin was monitored for redness adjusting pressure as needed. The pt was instructed in possible side effects of bruising and/or soreness.      (NP)Kinesiotape with 6" star for pain relief was performed over the R lumbar paraspinals Instructed pt in use, care and precautions with tape.       Ultrasound  for pain control and to decrease inflammation @ continuous duty cycle, 1 Mhz, applied to R lumbar paraspinals, intensity = 1.5 w/cm2 for 8 minutes. 2 min " prep        Patient received pre-mod electrical stimulation to decrease muscle tightness and pain to Lumbar paraspinals/ sacral border of gluteals B for 15 minutes with MH supine with cycle time: continuous, beat frequency: , CC/CV: CV.      Written Home Exercises Provided: continue with prior HEP  Pt demo good understanding of the education provided. Danuta demonstrated good return demonstration of activities.     Pt. education:  Posture reeducation, body mechanics, HEP, proper posture in standing  No spiritual or educational barriers to learning provided  Pt has no cultural, educational or language barriers to learning provided.    Assessment    Pt had developed gait abnormality after undergoing toe surgery and requiring pt to wear cast shoe resulting in severe LBP, which she is continuing to recover from.  Pt is now in shoes and has been more independent in self management, but continues with increased muscle tightness and pain.         Pt will continue to benefit from skilled outpatient physical therapy to address the remaining functional deficits, provide pt/family education, and to maximize pt's level of independence in the home and community environment. .     GOALS:   Short Term Goals:  3 weeks MET STG's  Increase range of motion 25%  Increase strength 1/2 muscle grade  Improve postural awareness of pelvis to independently identify dysfunction with min assist from PT  Be able to perform HEP with minimal cueing required     Long Term Goals: 6 weeks PARTIALLY MET LTG'S  Increase range of motion to 75% to 100% full   Improve muscle strength 1 muscle grade  Improve muscle strength with MMT to 4+/5 to 5/5  Improve and stabilize proper pelvic positioning  Restore ability to sit for ADL and work with min to 0 pain  Restore normal sleep habits without disturbances due to pain  Restore ability to perform ADL's and household activities independently with min to 0 pain    Anticipated barriers to physical  therapy: none  Pt's spiritual, cultural and educational needs considered and pt agreeable to plan of care and goals        Plan   If you concur, I recommend patient continue with physical therapy 1-3 times a week  as needed for 6 weeks.  Please advise us of your  recommendations. Thank you for allowing us to assist in the care of your patient.      Jacy Escobar, MS, PT          I certify the need for these services furnished under this plan of treatment and while under my care.    ____________________________________ Physician/Referring Practitioner                                Date of Signature

## 2023-01-20 NOTE — PLAN OF CARE
Physical Therapy Daily Progress and Treatment Note     Name: Danuta Santos  Clinic Number: [id number]  Diagnosis:   Encounter Diagnosis   Name Primary?    Chronic bilateral low back pain with right-sided sciatica Yes     Physician: Yumiko Davis MD  Treatment Orders: PT Eval and Treat  Past Medical History:   Diagnosis Date    Allergy     IBS (irritable bowel syndrome)     Pneumonia 02/2019     Precautions: as per diagnosis    Evaluation date: 1-19-23  Visit # authorized: 1/20  Authorization period:12-31-23  Plan of care expiration:3-2-23  MD Follow up appt: none scheduled    Time In: 2:35  Time Out: 3:45  Billable time:  45 Min + ES    Subjective     Pt reports: her back has been hurting the last 2 days.  Pt states prior to that was able to recover from s/p surgery. Pt states standing more to work on computer and was able to sit a little recently.      Pain Scale: before treatment:5/10  after treatment: 3/10 and level pelvis    Objective     AR R pelvis   Mod-severe tightness L lumbar paraspinals, severe on R     AR R pelvis  Mod-tightness lumbar paraspinals R, min after treatment  Severe decreased spring lumbar vertebrae     Lumbar active range of motion in standing is: 1-19-23  - flexion - toes                     - extension -  50%                         - left side bending -  to knee         - right side bending -  to knee pain              Lumbar active range of motion in standing is: 10-27-22  - flexion - toes                     - extension -  100%                         - left side bending -  To knee         - right side bending -  To knee     Muscle Strength 1-19-23  MMT R L   Hip flexion 4+/5 4+/5   Hip abduction 4+/5 5/5   Hip extension 4+/5 5/5   Glut max 4/5 4+/5   Knee extension 5/5 5/5   Knee flexion 5/5 5/5                 Muscle Strength 10-27-22  MMT R L   Hip flexion 5/5 5/5   Hip abduction 5/5 5/5   Hip extension 5/5 5/5   Glut max 4+/5 4+/5   Knee extension 5/5 5/5   Knee flexion  "5/5 5/5          TREATMENT   Therapeutic exercises were performed to improve ROM, strength, flexibility and stabilization for 10 minutes.      HS stretch supine   Glut stretch  Piriformis stretch  B QL stretch  Contract relax R glut x 3  Did level pelvis, pt reports does not stay  B Knee to chest     Add starting today   Pelvic tilt   x 10  Bridge x 10,   SLR x 10,   Trunk rotation supine x 10  Hip abd sidelying x 10 B,   GSS standing     Hold others from prior HEP for now  Partial sit up x 10, at home 50  Iron cross x 2  Arm and Leg lift prone x 10, 40 at home   Hip ext prone with bent knee x 10  20 at home  Press up x 10   Contract relax R glut    Manual therapy: Danuta  received the following manual therapy  techniques x 25 min. to include soft tissue and joint mobilization were applied to the: low back and gluteals to include:     Performed STM to LB paraspinals and QL R>L  P/A mob to lumbar region Grade 1-2 , (NP)MET to QL R SL L  Sidelying L contract relax mobilization to L5-S1 region to level pelvis  at end of treatment and level pelvis achieved at end of session    Pt received tool-assisted massage with manual therapy techniques to B LB in prone to trigger an inflammatory healing response and stimulate the production of new collagen and proper, more functional, less painful healing.    Vacuum/cupping STM with manual therapy techniques was performed to back and gluteals to decrease muscle tightness, increase circulation and promote healing process.  The pt's skin was monitored for redness adjusting pressure as needed. The pt was instructed in possible side effects of bruising and/or soreness.      (NP)Kinesiotape with 6" star for pain relief was performed over the R lumbar paraspinals Instructed pt in use, care and precautions with tape.       Ultrasound  for pain control and to decrease inflammation @ continuous duty cycle, 1 Mhz, applied to R lumbar paraspinals, intensity = 1.5 w/cm2 for 8 minutes. 2 min " prep        Patient received pre-mod electrical stimulation to decrease muscle tightness and pain to Lumbar paraspinals/ sacral border of gluteals B for 15 minutes with MH supine with cycle time: continuous, beat frequency: , CC/CV: CV.      Written Home Exercises Provided: continue with prior HEP  Pt demo good understanding of the education provided. Danuta demonstrated good return demonstration of activities.     Pt. education:  Posture reeducation, body mechanics, HEP, proper posture in standing  No spiritual or educational barriers to learning provided  Pt has no cultural, educational or language barriers to learning provided.    Assessment    Pt had developed gait abnormality after undergoing toe surgery and requiring pt to wear cast shoe resulting in severe LBP, which she is continuing to recover from.  Pt is now in shoes and has been more independent in self management, but continues with increased muscle tightness and pain.         Pt will continue to benefit from skilled outpatient physical therapy to address the remaining functional deficits, provide pt/family education, and to maximize pt's level of independence in the home and community environment. .     GOALS:   Short Term Goals:  3 weeks MET STG's  Increase range of motion 25%  Increase strength 1/2 muscle grade  Improve postural awareness of pelvis to independently identify dysfunction with min assist from PT  Be able to perform HEP with minimal cueing required     Long Term Goals: 6 weeks PARTIALLY MET LTG'S  Increase range of motion to 75% to 100% full   Improve muscle strength 1 muscle grade  Improve muscle strength with MMT to 4+/5 to 5/5  Improve and stabilize proper pelvic positioning  Restore ability to sit for ADL and work with min to 0 pain  Restore normal sleep habits without disturbances due to pain  Restore ability to perform ADL's and household activities independently with min to 0 pain    Anticipated barriers to physical  therapy: none  Pt's spiritual, cultural and educational needs considered and pt agreeable to plan of care and goals        Plan   If you concur, I recommend patient continue with physical therapy 1-3 times a week  as needed for 6 weeks.  Please advise us of your  recommendations. Thank you for allowing us to assist in the care of your patient.      Jacy Escobar, MS, PT          I certify the need for these services furnished under this plan of treatment and while under my care.    ____________________________________ Physician/Referring Practitioner                                Date of Signature

## 2023-02-16 ENCOUNTER — CLINICAL SUPPORT (OUTPATIENT)
Dept: REHABILITATION | Facility: HOSPITAL | Age: 68
End: 2023-02-16
Payer: COMMERCIAL

## 2023-02-16 DIAGNOSIS — M54.41 CHRONIC BILATERAL LOW BACK PAIN WITH RIGHT-SIDED SCIATICA: Primary | ICD-10-CM

## 2023-02-16 DIAGNOSIS — G89.29 CHRONIC BILATERAL LOW BACK PAIN WITH RIGHT-SIDED SCIATICA: Primary | ICD-10-CM

## 2023-02-16 PROCEDURE — 97035 APP MDLTY 1+ULTRASOUND EA 15: CPT | Mod: PN | Performed by: PHYSICAL THERAPIST

## 2023-02-16 PROCEDURE — 97140 MANUAL THERAPY 1/> REGIONS: CPT | Mod: PN | Performed by: PHYSICAL THERAPIST

## 2023-02-16 PROCEDURE — 97014 ELECTRIC STIMULATION THERAPY: CPT | Mod: PN | Performed by: PHYSICAL THERAPIST

## 2023-02-16 NOTE — PROGRESS NOTES
Physical Therapy Daily Treatment Note     Name: Danuta Santos  Clinic Number: [id number]  Diagnosis:   Encounter Diagnosis   Name Primary?    Chronic bilateral low back pain with right-sided sciatica Yes     Physician: Yumiko Davis MD  Treatment Orders: PT Eval and Treat  Past Medical History:   Diagnosis Date    Allergy     IBS (irritable bowel syndrome)     Pneumonia 02/2019     Precautions: as per diagnosis    Evaluation date: 1-19-23  Visit # authorized: 2/20  Authorization period:12-31-23  Plan of care expiration:3-2-23  MD Follow up appt: none scheduled    Time In: 8:40  Time Out: 9:35  Billable time:  40 Min + ES    Subjective     Pt reports: she has been doing pretty well overall and recently went in dysfunction and has been unable to self mobilize.  Pt states she joined a gym and is able to walk the track.  The gym has a pool, but has not started to swim addie    Pain Scale: before treatment:5/10  after treatment: 3/10 and level pelvis    Objective     AR R pelvis   Mod tightness L lumbar paraspinals, mod-severe on R       TREATMENT   Therapeutic exercises were performed to improve ROM, strength, flexibility and stabilization for 5 minutes.    Review of HEP and stretching she has been doing to try and self mobilize  HS stretch supine   Glut stretch  Piriformis stretch  B QL stretch  Contract relax R glut x 3  Did level pelvis, pt reports does not stay  B Knee to chest       Pelvic tilt   x 10  Bridge x 10,   SLR x 10,   Trunk rotation supine x 10  Hip abd sidelying x 10 B,   GSS standing     Hold others from prior HEP for now  Partial sit up x 10, at home 50  Iron cross x 2  Arm and Leg lift prone x 10, 40 at home   Hip ext prone with bent knee x 10  20 at home  Press up x 10   Contract relax R glut    Manual therapy: Danuta  received the following manual therapy  techniques x 25 min. to include soft tissue and joint mobilization were applied to the: low back and gluteals to include:  "    Performed STM to LB paraspinals and QL R>L  P/A mob to lumbar region Grade 1-2 , (NP)MET to QL R SL L  Sidelying L contract relax mobilization to L5-S1 region to level pelvis  at end of treatment and level pelvis achieved at end of session    Pt received tool-assisted massage with manual therapy techniques to B LB in prone to trigger an inflammatory healing response and stimulate the production of new collagen and proper, more functional, less painful healing.    Vacuum/cupping STM with manual therapy techniques was performed to back and gluteals to decrease muscle tightness, increase circulation and promote healing process.  The pt's skin was monitored for redness adjusting pressure as needed. The pt was instructed in possible side effects of bruising and/or soreness.      (NP)Kinesiotape with 6" star for pain relief was performed over the R lumbar paraspinals Instructed pt in use, care and precautions with tape.       Ultrasound  for pain control and to decrease inflammation @ continuous duty cycle, 1 Mhz, applied to R lumbar paraspinals, intensity = 1.5 w/cm2 for 8 minutes. 2 min prep     Decreased ES to 10 min due to personal time constraints   Patient received pre-mod electrical stimulation to decrease muscle tightness and pain to Lumbar paraspinals/ sacral border of gluteals B for 10 minutes with MH supine with cycle time: continuous, beat frequency: , CC/CV: CV.      Written Home Exercises Provided: continue with prior HEP  Pt demo good understanding of the education provided. Danuta demonstrated good return demonstration of activities.     Pt. education:  Posture reeducation, body mechanics, HEP, proper posture in standing  No spiritual or educational barriers to learning provided  Pt has no cultural, educational or language barriers to learning provided.    Assessment    Pt has generally been able to self manage symptoms.  Pt recently unable to self mobilize due to increased muscle tightness.  " Pt with decreased symptoms after treatment and will be walking more at gym and may also benefit from swimming and will continue to focus on self management and treat as needed        Pt will continue to benefit from skilled outpatient physical therapy to address the remaining functional deficits, provide pt/family education, and to maximize pt's level of independence in the home and community environment. .     GOALS:   Short Term Goals:  3 weeks MET STG's  Increase range of motion 25%  Increase strength 1/2 muscle grade  Improve postural awareness of pelvis to independently identify dysfunction with min assist from PT  Be able to perform HEP with minimal cueing required     Long Term Goals: 6 weeks PARTIALLY MET LTG'S  Increase range of motion to 75% to 100% full   Improve muscle strength 1 muscle grade  Improve muscle strength with MMT to 4+/5 to 5/5  Improve and stabilize proper pelvic positioning  Restore ability to sit for ADL and work with min to 0 pain  Restore normal sleep habits without disturbances due to pain  Restore ability to perform ADL's and household activities independently with min to 0 pain    Anticipated barriers to physical therapy: none  Pt's spiritual, cultural and educational needs considered and pt agreeable to plan of care and goals        Plan   Continue with established plan of care towards PT goals.        Jacy Escobar, MS, PT

## 2023-02-23 ENCOUNTER — CLINICAL SUPPORT (OUTPATIENT)
Dept: REHABILITATION | Facility: HOSPITAL | Age: 68
End: 2023-02-23
Payer: COMMERCIAL

## 2023-02-23 DIAGNOSIS — M54.41 CHRONIC BILATERAL LOW BACK PAIN WITH RIGHT-SIDED SCIATICA: Primary | ICD-10-CM

## 2023-02-23 DIAGNOSIS — G89.29 CHRONIC BILATERAL LOW BACK PAIN WITH RIGHT-SIDED SCIATICA: Primary | ICD-10-CM

## 2023-02-23 PROCEDURE — 97140 MANUAL THERAPY 1/> REGIONS: CPT | Mod: PN | Performed by: PHYSICAL THERAPIST

## 2023-02-23 PROCEDURE — 97035 APP MDLTY 1+ULTRASOUND EA 15: CPT | Mod: PN | Performed by: PHYSICAL THERAPIST

## 2023-02-23 PROCEDURE — 97110 THERAPEUTIC EXERCISES: CPT | Mod: PN | Performed by: PHYSICAL THERAPIST

## 2023-02-23 PROCEDURE — 97014 ELECTRIC STIMULATION THERAPY: CPT | Mod: PN | Performed by: PHYSICAL THERAPIST

## 2023-02-23 NOTE — PROGRESS NOTES
Physical Therapy Daily Treatment Note     Name: Danuta Santos  Clinic Number: [id number]  Diagnosis:   Encounter Diagnosis   Name Primary?    Chronic bilateral low back pain with right-sided sciatica Yes     Physician: Yumiko Davis MD  Treatment Orders: PT Eval and Treat  Past Medical History:   Diagnosis Date    Allergy     IBS (irritable bowel syndrome)     Pneumonia 02/2019     Precautions: as per diagnosis    Evaluation date: 1-19-23  Visit # authorized: 3/20  Authorization period:12-31-23  Plan of care expiration:3-2-23  MD Follow up appt: none scheduled    Time In: 1:07  Time Out: 2:20  Billable time:   50 Min + ES    Subjective     Pt reports: she has been doing well.  Pt states she had a busy with Sarmad Carmen with family.    Pain Scale: before treatment:5/10  after treatment: 1/10 and level pelvis    Objective     AR R pelvis   Min-mod tightness L lumbar paraspinals, mod-severe on R , min-mod R after Rx      TREATMENT   Therapeutic exercises were performed to improve ROM, strength, flexibility and stabilization for 15 minutes.    Review of HEP and stretching she has been doing to try and self mobilize  HS stretch supine   Glut stretch  Piriformis stretch  B QL stretch  Contract relax R glut x 3  Did level pelvis, pt reports does not stay  B Knee to chest       Pelvic tilt   x 10  Bridge x 10,   SLR x 10,   Trunk rotation supine x 10  Partial sit up x 10  Hip abd sidelying x 10 B,  Arm and Leg lift prone x 10,   Hip ext prone with bent knee x 10  Press up x 5   GSS standing     Iron cross x 2 as needed   Plank x 30 sec x 1 Led to dysfunction so pt performed contract relax and realigned I   Contract relax R glut    Pt to start walking at gym or park and start planks again  Pt unable to try to swim until May due to pool upkeep.      Manual therapy: Danuta  received the following manual therapy  techniques x 25 min. to include soft tissue and joint mobilization were applied to the: low back and  "gluteals to include:     Performed STM to LB paraspinals and QL R>L  P/A mob to lumbar region Grade 1-2 , (NP)MET to QL R SL L  Sidelying L contract relax mobilization to L5-S1 region to level pelvis  at end of treatment and level pelvis achieved at end of session    Pt received tool-assisted massage with manual therapy techniques to B LB in prone to trigger an inflammatory healing response and stimulate the production of new collagen and proper, more functional, less painful healing.    Vacuum/cupping STM with manual therapy techniques was performed to back and gluteals to decrease muscle tightness, increase circulation and promote healing process.  The pt's skin was monitored for redness adjusting pressure as needed. The pt was instructed in possible side effects of bruising and/or soreness.      (NP)Kinesiotape with 6" star for pain relief was performed over the R lumbar paraspinals Instructed pt in use, care and precautions with tape.       Ultrasound  for pain control and to decrease inflammation @ continuous duty cycle, 1 Mhz, applied to R lumbar paraspinals, intensity = 1.5 w/cm2 for 8 minutes. 2 min prep        Patient received pre-mod electrical stimulation to decrease muscle tightness and pain to Lumbar paraspinals/ sacral border of gluteals B for 15 minutes with MH supine with cycle time: continuous, beat frequency: , CC/CV: CV.      Written Home Exercises Provided: continue with prior HEP  Pt demo good understanding of the education provided. Danuta demonstrated good return demonstration of activities.     Pt. education:  Posture reeducation, body mechanics, HEP, proper posture in standing  No spiritual or educational barriers to learning provided  Pt has no cultural, educational or language barriers to learning provided.    Assessment   Pt recently unable to self mobilize.  Pt now able to start back with therex now that toe has recovered.  Pt able to handle plank position for toe in shoes " though led to dysfunction after ex.  Pt understands to perform stretching and contract relax to maintain stabilization as starting back to therex.  Pt with improved symptoms after Rx and able to self mobilize after ex.       Pt will continue to benefit from skilled outpatient physical therapy to address the remaining functional deficits, provide pt/family education, and to maximize pt's level of independence in the home and community environment. .     GOALS:   Short Term Goals:  3 weeks MET STG's  Increase range of motion 25%  Increase strength 1/2 muscle grade  Improve postural awareness of pelvis to independently identify dysfunction with min assist from PT  Be able to perform HEP with minimal cueing required     Long Term Goals: 6 weeks PARTIALLY MET LTG'S  Increase range of motion to 75% to 100% full   Improve muscle strength 1 muscle grade  Improve muscle strength with MMT to 4+/5 to 5/5  Improve and stabilize proper pelvic positioning  Restore ability to sit for ADL and work with min to 0 pain  Restore normal sleep habits without disturbances due to pain  Restore ability to perform ADL's and household activities independently with min to 0 pain    Anticipated barriers to physical therapy: none  Pt's spiritual, cultural and educational needs considered and pt agreeable to plan of care and goals        Plan   Continue with established plan of care towards PT goals.

## 2023-03-03 ENCOUNTER — HOSPITAL ENCOUNTER (OUTPATIENT)
Dept: RADIOLOGY | Facility: HOSPITAL | Age: 68
Discharge: HOME | End: 2023-03-03
Attending: OBSTETRICS & GYNECOLOGY
Payer: COMMERCIAL

## 2023-03-03 VITALS — HEIGHT: 66 IN | BODY MASS INDEX: 21.53 KG/M2 | WEIGHT: 134 LBS

## 2023-03-03 DIAGNOSIS — Z12.31 ENCOUNTER FOR SCREENING MAMMOGRAM FOR MALIGNANT NEOPLASM OF BREAST: ICD-10-CM

## 2023-03-03 PROCEDURE — 77067 SCR MAMMO BI INCL CAD: CPT | Mod: TC

## 2023-03-03 PROCEDURE — 77067 SCR MAMMO BI INCL CAD: CPT | Mod: 26,,, | Performed by: RADIOLOGY

## 2023-03-03 PROCEDURE — 77063 MAMMO DIGITAL SCREENING BILAT WITH TOMO: ICD-10-PCS | Mod: 26,,, | Performed by: RADIOLOGY

## 2023-03-03 PROCEDURE — 77063 BREAST TOMOSYNTHESIS BI: CPT | Mod: 26,,, | Performed by: RADIOLOGY

## 2023-03-03 PROCEDURE — 77067 MAMMO DIGITAL SCREENING BILAT WITH TOMO: ICD-10-PCS | Mod: 26,,, | Performed by: RADIOLOGY

## 2023-03-06 DIAGNOSIS — R09.81 COUGH WITH CONGESTION OF PARANASAL SINUS: ICD-10-CM

## 2023-03-06 DIAGNOSIS — R09.82 PND (POST-NASAL DRIP): ICD-10-CM

## 2023-03-06 DIAGNOSIS — R05.8 COUGH WITH CONGESTION OF PARANASAL SINUS: ICD-10-CM

## 2023-03-06 DIAGNOSIS — J06.9 VIRAL URI WITH COUGH: ICD-10-CM

## 2023-03-06 RX ORDER — AZELASTINE 1 MG/ML
1 SPRAY, METERED NASAL 2 TIMES DAILY PRN
Qty: 30 ML | Refills: 0 | Status: SHIPPED | OUTPATIENT
Start: 2023-03-06 | End: 2023-04-13

## 2023-03-06 NOTE — TELEPHONE ENCOUNTER
No new care gaps identified.  [facility] Embedded Care Gaps. Reference number: [id number]. 3/06/2023   1:26:13 PM CST

## 2023-03-15 NOTE — PROGRESS NOTES
Physical Therapy Progress and  Daily Treatment Note     Name: Danuta Santos  Clinic Number: [id number]  Diagnosis:   Encounter Diagnosis   Name Primary?    Chronic bilateral low back pain with right-sided sciatica Yes     Physician: Yumiko Davis MD  Treatment Orders: PT Eval and Treat  Past Medical History:   Diagnosis Date    Allergy     IBS (irritable bowel syndrome)     Pneumonia 02/2019     Precautions: as per diagnosis    Evaluation date: 1-19-23  Visit # authorized: 4/20  Authorization period:12-31-23  Plan of care expiration:4-27-23  MD Follow up appt: none scheduled    Time In: 2:35  Time Out: 3:50  Billable time:   50 Min + ES    Subjective     Pt reports: she has been doing well.  Pt states she has been doing a lot of walking.  Pt states she has been doing her stretches, not as much strengthening this past week.    Pain Scale: before treatment:4/10  after treatment: 1/10 and level pelvis    Objective   2 visits since last reassessment   AR R pelvis   Min-mod tightness L lumbar paraspinals, mod on R , min  R after Rx    Lumbar active range of motion in standing is: 3-16-23  - flexion - toes                     - extension -  75%                         - left side bending -  to knee         - right side bending -  to knee pain           Lumbar active range of motion in standing is: 10-27-22  - flexion - toes                     - extension -  100%                         - left side bending -  To knee         - right side bending -  To knee        Muscle Strength 3-16-23  MMT R L   Hip flexion 4/5 4/5   Hip abduction 5/5 5/5   Hip extension 4+/5 5/5   Glut max 4-/5 4/5   Knee extension 5/5 5/5   Knee flexion 5/5 5/5                  Muscle Strength 10-27-22  MMT R L   Hip flexion 5/5 5/5   Hip abduction 5/5 5/5   Hip extension 5/5 5/5   Glut max 4+/5 4+/5   Knee extension 5/5 5/5   Knee flexion 5/5 5/5         TREATMENT   Therapeutic exercises were performed to improve ROM, strength, flexibility  "and stabilization for 15 minutes.      HS stretch supine   Glut stretch  Piriformis stretch  B QL stretch  Contract relax R glut x 3  did not realign  B Knee to chest       Pelvic tilt   x 10  Bridge x 10,   SLR x 10,   Trunk rotation supine x 10  Partial sit up x 10  Hip abd sidelying x 10 B,  Arm and Leg lift prone x 10,   Hip ext prone with bent knee x 10   Sit to stand x 10  Press up x 5   GSS standing     Iron cross x 2 as needed  Plank x 30 sec x 1 Led to dysfunction so pt performed contract relax and realigned I   Contract relax R glut    Pt to start walking at gym or park and start planks again  Pt unable to try to swim until May due to pool upkeep.      Manual therapy: Danuta  received the following manual therapy  techniques x 25 min. to include soft tissue and joint mobilization were applied to the: low back and gluteals to include:     Performed STM to LB paraspinals and QL R>L  P/A mob to lumbar region Grade 1-2 , (NP)MET to QL R SL L  Sidelying L contract relax mobilization to L5-S1 region to level pelvis  at end of treatment and level pelvis achieved at end of session    Pt received tool-assisted massage with manual therapy techniques to B LB in prone to trigger an inflammatory healing response and stimulate the production of new collagen and proper, more functional, less painful healing.    Vacuum/cupping STM with manual therapy techniques was performed to back and gluteals to decrease muscle tightness, increase circulation and promote healing process.  The pt's skin was monitored for redness adjusting pressure as needed. The pt was instructed in possible side effects of bruising and/or soreness.      (NP)Kinesiotape with 6" star for pain relief was performed over the R lumbar paraspinals Instructed pt in use, care and precautions with tape.       Ultrasound  for pain control and to decrease inflammation @ continuous duty cycle, 1 Mhz, applied to R lumbar paraspinals, intensity = 1.5 w/cm2 for 8 " minutes. 2 min prep      Patient received pre-mod electrical stimulation to decrease muscle tightness and pain to Lumbar paraspinals/ sacral border of gluteals B for 15 minutes with MH supine with cycle time: continuous, beat frequency: , CC/CV: CV.      Written Home Exercises Provided: yes  Pt demo good understanding of the education provided. Danuta demonstrated good return demonstration of activities.     Pt. education:  Posture reeducation, body mechanics, HEP, proper posture in standing  No spiritual or educational barriers to learning provided  Pt has no cultural, educational or language barriers to learning provided.    Assessment   Pt has been seen for 2 visits since last progress report.  Pt was unable to self mobilize at treatment today.  Pt has not been as diligent with HEP and noted decreased strength in some areas of LE despite doing a lot of walking.  Pt appears to understand need to remain compliant with HEP and can use sit to stand for general strengthening if unable to perform LE strengthening program.  Pt scott treatment well and level pelvis with decreased symptoms at end of session.  Pt will benefit from being seen as needed to assist in pelvic stabilization to decrease pain and improve functional abilities       Pt will continue to benefit from skilled outpatient physical therapy to address the remaining functional deficits, provide pt/family education, and to maximize pt's level of independence in the home and community environment. .     GOALS:   Short Term Goals:  3 weeks MET STG's  Increase range of motion 25%  Increase strength 1/2 muscle grade  Improve postural awareness of pelvis to independently identify dysfunction with min assist from PT  Be able to perform HEP with minimal cueing required     Long Term Goals: 6 weeks PARTIALLY MET LTG'S  Increase range of motion to 75% to 100% full   Improve muscle strength 1 muscle grade  Improve muscle strength with MMT to 4+/5 to  5/5  Improve and stabilize proper pelvic positioning  Restore ability to sit for ADL and work with min to 0 pain  Restore normal sleep habits without disturbances due to pain  Restore ability to perform ADL's and household activities independently with min to 0 pain    Anticipated barriers to physical therapy: none  Pt's spiritual, cultural and educational needs considered and pt agreeable to plan of care and goals        Plan   If you concur, I recommend patient continue with physical therapy 1-2 times a week as needed for 6 weeks.  Please advise us of your  recommendations. Thank you for allowing us to assist in the care of your patient.      Jacy Escobar, MS, PT          I certify the need for these services furnished under this plan of treatment and while under my care.    ____________________________________ Physician/Referring Practitioner                                Date of Signature

## 2023-03-16 ENCOUNTER — CLINICAL SUPPORT (OUTPATIENT)
Dept: REHABILITATION | Facility: HOSPITAL | Age: 68
End: 2023-03-16
Payer: COMMERCIAL

## 2023-03-16 DIAGNOSIS — M54.41 CHRONIC BILATERAL LOW BACK PAIN WITH RIGHT-SIDED SCIATICA: Primary | ICD-10-CM

## 2023-03-16 DIAGNOSIS — G89.29 CHRONIC BILATERAL LOW BACK PAIN WITH RIGHT-SIDED SCIATICA: Primary | ICD-10-CM

## 2023-03-16 PROCEDURE — 97140 MANUAL THERAPY 1/> REGIONS: CPT | Mod: PN | Performed by: PHYSICAL THERAPIST

## 2023-03-16 PROCEDURE — 97110 THERAPEUTIC EXERCISES: CPT | Mod: PN | Performed by: PHYSICAL THERAPIST

## 2023-03-16 PROCEDURE — 97035 APP MDLTY 1+ULTRASOUND EA 15: CPT | Mod: PN | Performed by: PHYSICAL THERAPIST

## 2023-03-16 PROCEDURE — 97014 ELECTRIC STIMULATION THERAPY: CPT | Mod: PN | Performed by: PHYSICAL THERAPIST

## 2023-03-16 NOTE — PLAN OF CARE
Physical Therapy Progress and  Daily Treatment Note     Name: Danuta Santos  Clinic Number: [id number]  Diagnosis:   Encounter Diagnosis   Name Primary?    Chronic bilateral low back pain with right-sided sciatica Yes     Physician: Yumiko Davis MD  Treatment Orders: PT Eval and Treat  Past Medical History:   Diagnosis Date    Allergy     IBS (irritable bowel syndrome)     Pneumonia 02/2019     Precautions: as per diagnosis    Evaluation date: 1-19-23  Visit # authorized: 4/20  Authorization period:12-31-23  Plan of care expiration:4-27-23  MD Follow up appt: none scheduled    Time In: 2:35  Time Out: 3:50  Billable time:   50 Min + ES    Subjective     Pt reports: she has been doing well.  Pt states she has been doing a lot of walking.  Pt states she has been doing her stretches, not as much strengthening this past week.    Pain Scale: before treatment:4/10  after treatment: 1/10 and level pelvis    Objective   2 visits since last reassessment   AR R pelvis   Min-mod tightness L lumbar paraspinals, mod on R , min  R after Rx    Lumbar active range of motion in standing is: 3-16-23  - flexion - toes                     - extension -  75%                         - left side bending -  to knee         - right side bending -  to knee pain           Lumbar active range of motion in standing is: 10-27-22  - flexion - toes                     - extension -  100%                         - left side bending -  To knee         - right side bending -  To knee        Muscle Strength 3-16-23  MMT R L   Hip flexion 4/5 4/5   Hip abduction 5/5 5/5   Hip extension 4+/5 5/5   Glut max 4-/5 4/5   Knee extension 5/5 5/5   Knee flexion 5/5 5/5                  Muscle Strength 10-27-22  MMT R L   Hip flexion 5/5 5/5   Hip abduction 5/5 5/5   Hip extension 5/5 5/5   Glut max 4+/5 4+/5   Knee extension 5/5 5/5   Knee flexion 5/5 5/5         TREATMENT   Therapeutic exercises were performed to improve ROM, strength, flexibility  "and stabilization for 15 minutes.      HS stretch supine   Glut stretch  Piriformis stretch  B QL stretch  Contract relax R glut x 3  did not realign  B Knee to chest       Pelvic tilt   x 10  Bridge x 10,   SLR x 10,   Trunk rotation supine x 10  Partial sit up x 10  Hip abd sidelying x 10 B,  Arm and Leg lift prone x 10,   Hip ext prone with bent knee x 10   Sit to stand x 10  Press up x 5   GSS standing     Iron cross x 2 as needed  Plank x 30 sec x 1 Led to dysfunction so pt performed contract relax and realigned I   Contract relax R glut    Pt to start walking at gym or park and start planks again  Pt unable to try to swim until May due to pool upkeep.      Manual therapy: Danuta  received the following manual therapy  techniques x 25 min. to include soft tissue and joint mobilization were applied to the: low back and gluteals to include:     Performed STM to LB paraspinals and QL R>L  P/A mob to lumbar region Grade 1-2 , (NP)MET to QL R SL L  Sidelying L contract relax mobilization to L5-S1 region to level pelvis  at end of treatment and level pelvis achieved at end of session    Pt received tool-assisted massage with manual therapy techniques to B LB in prone to trigger an inflammatory healing response and stimulate the production of new collagen and proper, more functional, less painful healing.    Vacuum/cupping STM with manual therapy techniques was performed to back and gluteals to decrease muscle tightness, increase circulation and promote healing process.  The pt's skin was monitored for redness adjusting pressure as needed. The pt was instructed in possible side effects of bruising and/or soreness.      (NP)Kinesiotape with 6" star for pain relief was performed over the R lumbar paraspinals Instructed pt in use, care and precautions with tape.       Ultrasound  for pain control and to decrease inflammation @ continuous duty cycle, 1 Mhz, applied to R lumbar paraspinals, intensity = 1.5 w/cm2 for 8 " minutes. 2 min prep      Patient received pre-mod electrical stimulation to decrease muscle tightness and pain to Lumbar paraspinals/ sacral border of gluteals B for 15 minutes with MH supine with cycle time: continuous, beat frequency: , CC/CV: CV.      Written Home Exercises Provided: yes  Pt demo good understanding of the education provided. Danuta demonstrated good return demonstration of activities.     Pt. education:  Posture reeducation, body mechanics, HEP, proper posture in standing  No spiritual or educational barriers to learning provided  Pt has no cultural, educational or language barriers to learning provided.    Assessment   Pt has been seen for 2 visits since last progress report.  Pt was unable to self mobilize at treatment today.  Pt has not been as diligent with HEP and noted decreased strength in some areas of LE despite doing a lot of walking.  Pt appears to understand need to remain compliant with HEP and can use sit to stand for general strengthening if unable to perform LE strengthening program.  Pt scott treatment well and level pelvis with decreased symptoms at end of session.  Pt will benefit from being seen as needed to assist in pelvic stabilization to decrease pain and improve functional abilities       Pt will continue to benefit from skilled outpatient physical therapy to address the remaining functional deficits, provide pt/family education, and to maximize pt's level of independence in the home and community environment. .     GOALS:   Short Term Goals:  3 weeks MET STG's  Increase range of motion 25%  Increase strength 1/2 muscle grade  Improve postural awareness of pelvis to independently identify dysfunction with min assist from PT  Be able to perform HEP with minimal cueing required     Long Term Goals: 6 weeks PARTIALLY MET LTG'S  Increase range of motion to 75% to 100% full   Improve muscle strength 1 muscle grade  Improve muscle strength with MMT to 4+/5 to  5/5  Improve and stabilize proper pelvic positioning  Restore ability to sit for ADL and work with min to 0 pain  Restore normal sleep habits without disturbances due to pain  Restore ability to perform ADL's and household activities independently with min to 0 pain    Anticipated barriers to physical therapy: none  Pt's spiritual, cultural and educational needs considered and pt agreeable to plan of care and goals        Plan   If you concur, I recommend patient continue with physical therapy 1-2 times a week as needed for 6 weeks.  Please advise us of your  recommendations. Thank you for allowing us to assist in the care of your patient.      Jacy Escobar, MS, PT          I certify the need for these services furnished under this plan of treatment and while under my care.    ____________________________________ Physician/Referring Practitioner                                Date of Signature

## 2023-03-30 ENCOUNTER — CLINICAL SUPPORT (OUTPATIENT)
Dept: REHABILITATION | Facility: HOSPITAL | Age: 68
End: 2023-03-30
Payer: COMMERCIAL

## 2023-03-30 DIAGNOSIS — G89.29 CHRONIC BILATERAL LOW BACK PAIN WITH RIGHT-SIDED SCIATICA: Primary | ICD-10-CM

## 2023-03-30 DIAGNOSIS — M54.41 CHRONIC BILATERAL LOW BACK PAIN WITH RIGHT-SIDED SCIATICA: Primary | ICD-10-CM

## 2023-03-30 PROCEDURE — 97014 ELECTRIC STIMULATION THERAPY: CPT | Mod: PN | Performed by: PHYSICAL THERAPIST

## 2023-03-30 PROCEDURE — 97140 MANUAL THERAPY 1/> REGIONS: CPT | Mod: PN | Performed by: PHYSICAL THERAPIST

## 2023-03-30 PROCEDURE — 97035 APP MDLTY 1+ULTRASOUND EA 15: CPT | Mod: PN | Performed by: PHYSICAL THERAPIST

## 2023-03-30 NOTE — PROGRESS NOTES
Physical Therapy Daily Treatment Note     Name: Danuta Santos  Clinic Number: [id number]  Diagnosis:   Encounter Diagnosis   Name Primary?    Chronic bilateral low back pain with right-sided sciatica Yes     Physician: Yumiko Davis MD  Treatment Orders: PT Eval and Treat  Past Medical History:   Diagnosis Date    Allergy     IBS (irritable bowel syndrome)     Pneumonia 02/2019     Precautions: as per diagnosis    Evaluation date: 1-19-23  Visit # authorized: 5/20  Authorization period:12-31-23  Plan of care expiration:4-27-23  MD Follow up appt: none scheduled    Time In: 2:40  Time Out: 3:40  Billable time:   42 Min + ES    Subjective     Pt reports: had to push water into house for neighbor could not help Pt states she knew not to lift but still with pushing the cases into house led to dysfunction .    Pain Scale: before treatment:6/10  after treatment: 1/10 and level pelvis    Objective     TREATMENT   Therapeutic exercises were performed to improve ROM, strength, flexibility and stabilization for 5 minutes.      HS stretch supine   Glut stretch  Piriformis stretch  B QL stretch  Contract relax R glut x 3  did not realign  B Knee to chest       Pelvic tilt   x 10  Bridge x 10,   SLR x 10,   Trunk rotation supine x 10  Partial sit up x 10  Hip abd sidelying x 10 B,  Arm and Leg lift prone x 10,   Hip ext prone with bent knee x 10   Sit to stand x 10  Press up x 5   GSS standing     Iron cross x 2 as needed  Plank x 30 sec x 1 Led to dysfunction so pt performed contract relax and realigned I   Contract relax R glut    Pt to start walking at gym or park and start planks again  Pt unable to try to swim until May due to pool upkeep.      Manual therapy: Danuta  received the following manual therapy  techniques x 27 min. to include soft tissue and joint mobilization were applied to the: low back and gluteals to include:     Performed STM to LB paraspinals and QL R>L  P/A mob to lumbar region Grade 1-2 ,  "(NP)MET to QL R SL L  Sidelying L contract relax mobilization to L5-S1 region to level pelvis  at end of treatment and level pelvis achieved at end of session    Pt received tool-assisted massage with manual therapy techniques to B LB in prone to trigger an inflammatory healing response and stimulate the production of new collagen and proper, more functional, less painful healing.    Vacuum/cupping STM with manual therapy techniques was performed to back and gluteals to decrease muscle tightness, increase circulation and promote healing process.  The pt's skin was monitored for redness adjusting pressure as needed. The pt was instructed in possible side effects of bruising and/or soreness.      (NP)Kinesiotape with 6" star for pain relief was performed over the R lumbar paraspinals Instructed pt in use, care and precautions with tape.       Ultrasound  for pain control and to decrease inflammation @ continuous duty cycle, 1 Mhz, applied to R lumbar paraspinals, intensity = 1.5 w/cm2 for 8 minutes. 2 min prep      Patient received pre-mod electrical stimulation to decrease muscle tightness and pain to Lumbar paraspinals/ sacral border of gluteals B for 15 minutes with MH supine with cycle time: continuous, beat frequency: , CC/CV: CV.      Written Home Exercises Provided: yes  Pt demo good understanding of the education provided. Danuta demonstrated good return demonstration of activities.     Pt. education:  Posture reeducation, body mechanics, HEP, proper posture in standing  No spiritual or educational barriers to learning provided  Pt has no cultural, educational or language barriers to learning provided.    Assessment   Pt had been doing well but strained back pushing water cases into home so understands unable to perform this type of activity even if avoiding lifting.  Pt with level pelvis and improved symptoms after treatment      Pt will continue to benefit from skilled outpatient physical therapy to " address the remaining functional deficits, provide pt/family education, and to maximize pt's level of independence in the home and community environment. .     GOALS:   Short Term Goals:  3 weeks MET STG's  Increase range of motion 25%  Increase strength 1/2 muscle grade  Improve postural awareness of pelvis to independently identify dysfunction with min assist from PT  Be able to perform HEP with minimal cueing required     Long Term Goals: 6 weeks PARTIALLY MET LTG'S  Increase range of motion to 75% to 100% full   Improve muscle strength 1 muscle grade  Improve muscle strength with MMT to 4+/5 to 5/5  Improve and stabilize proper pelvic positioning  Restore ability to sit for ADL and work with min to 0 pain  Restore normal sleep habits without disturbances due to pain  Restore ability to perform ADL's and household activities independently with min to 0 pain    Anticipated barriers to physical therapy: none  Pt's spiritual, cultural and educational needs considered and pt agreeable to plan of care and goals        Plan   If you concur, I recommend patient continue with physical therapy 1-2 times a week as needed for 6 weeks.  Please advise us of your  recommendations. Thank you for allowing us to assist in the care of your patient.      Jacy Escobar, MS, PT          I certify the need for these services furnished under this plan of treatment and while under my care.    ____________________________________ Physician/Referring Practitioner                                Date of Signature                                                                                          Physical Therapy Progress and  Daily Treatment Note     Name: Danuta Santos  [facility] Number: [id number]  Diagnosis:   Encounter Diagnosis   Name Primary?    Chronic bilateral low back pain with right-sided sciatica Yes     Physician: Yumiko Davis MD  Treatment Orders: PT Eval and Treat  Past Medical History:   Diagnosis Date     Allergy     IBS (irritable bowel syndrome)     Pneumonia 02/2019     Precautions: as per diagnosis    Evaluation date: 1-19-23  Visit # authorized: 4/20  Authorization period:12-31-23  Plan of care expiration:4-27-23  MD Follow up appt: none scheduled    Time In: 2:35  Time Out: 3:50  Billable time:   50 Min + ES    Subjective     Pt reports: she has been doing well.  Pt states she has been doing a lot of walking.  Pt states she has been doing her stretches, not as much strengthening this past week.    Pain Scale: before treatment:4/10  after treatment: 1/10 and level pelvis    Objective   2 visits since last reassessment   AR R pelvis   Min-mod tightness L lumbar paraspinals, mod on R , min  R after Rx    Lumbar active range of motion in standing is: 3-16-23  - flexion - toes                     - extension -  75%                         - left side bending -  to knee         - right side bending -  to knee pain           Lumbar active range of motion in standing is: 10-27-22  - flexion - toes                     - extension -  100%                         - left side bending -  To knee         - right side bending -  To knee        Muscle Strength 3-16-23  MMT R L   Hip flexion 4/5 4/5   Hip abduction 5/5 5/5   Hip extension 4+/5 5/5   Glut max 4-/5 4/5   Knee extension 5/5 5/5   Knee flexion 5/5 5/5                  Muscle Strength 10-27-22  MMT R L   Hip flexion 5/5 5/5   Hip abduction 5/5 5/5   Hip extension 5/5 5/5   Glut max 4+/5 4+/5   Knee extension 5/5 5/5   Knee flexion 5/5 5/5         TREATMENT   Therapeutic exercises were performed to improve ROM, strength, flexibility and stabilization for 15 minutes.      HS stretch supine   Glut stretch  Piriformis stretch  B QL stretch  Contract relax R glut x 3  did not realign  B Knee to chest       Pelvic tilt   x 10  Bridge x 10,   SLR x 10,   Trunk rotation supine x 10  Partial sit up x 10  Hip abd sidelying x 10 B,  Arm and Leg lift prone x 10,   Hip ext prone  "with bent knee x 10   Sit to stand x 10  Press up x 5   GSS standing     Iron cross x 2 as needed  Plank x 30 sec x 1 Led to dysfunction so pt performed contract relax and realigned I   Contract relax R glut    Pt to start walking at gym or park and start planks again  Pt unable to try to swim until May due to pool upkeep.      Manual therapy: Danuta  received the following manual therapy  techniques x 25 min. to include soft tissue and joint mobilization were applied to the: low back and gluteals to include:     Performed STM to LB paraspinals and QL R>L  P/A mob to lumbar region Grade 1-2 , (NP)MET to QL R SL L  Sidelying L contract relax mobilization to L5-S1 region to level pelvis  at end of treatment and level pelvis achieved at end of session    Pt received tool-assisted massage with manual therapy techniques to B LB in prone to trigger an inflammatory healing response and stimulate the production of new collagen and proper, more functional, less painful healing.    Vacuum/cupping STM with manual therapy techniques was performed to back and gluteals to decrease muscle tightness, increase circulation and promote healing process.  The pt's skin was monitored for redness adjusting pressure as needed. The pt was instructed in possible side effects of bruising and/or soreness.      (NP)Kinesiotape with 6" star for pain relief was performed over the R lumbar paraspinals Instructed pt in use, care and precautions with tape.       Ultrasound  for pain control and to decrease inflammation @ continuous duty cycle, 1 Mhz, applied to R lumbar paraspinals, intensity = 1.5 w/cm2 for 8 minutes. 2 min prep      Patient received pre-mod electrical stimulation to decrease muscle tightness and pain to Lumbar paraspinals/ sacral border of gluteals B for 15 minutes with MH supine with cycle time: continuous, beat frequency: , CC/CV: CV.      Written Home Exercises Provided: yes  Pt demo good understanding of the education " no weight gain/no sweating/no anorexia provided. Danuta demonstrated good return demonstration of activities.     Pt. education:  Posture reeducation, body mechanics, HEP, proper posture in standing  No spiritual or educational barriers to learning provided  Pt has no cultural, educational or language barriers to learning provided.    Assessment   Pt has been seen for 2 visits since last progress report.  Pt was unable to self mobilize at treatment today.  Pt has not been as diligent with HEP and noted decreased strength in some areas of LE despite doing a lot of walking.  Pt appears to understand need to remain compliant with HEP and can use sit to stand for general strengthening if unable to perform LE strengthening program.  Pt scott treatment well and level pelvis with decreased symptoms at end of session.  Pt will benefit from being seen as needed to assist in pelvic stabilization to decrease pain and improve functional abilities       Pt will continue to benefit from skilled outpatient physical therapy to address the remaining functional deficits, provide pt/family education, and to maximize pt's level of independence in the home and community environment. .     GOALS:   Short Term Goals:  3 weeks MET STG's  Increase range of motion 25%  Increase strength 1/2 muscle grade  Improve postural awareness of pelvis to independently identify dysfunction with min assist from PT  Be able to perform HEP with minimal cueing required     Long Term Goals: 6 weeks PARTIALLY MET LTG'S  Increase range of motion to 75% to 100% full   Improve muscle strength 1 muscle grade  Improve muscle strength with MMT to 4+/5 to 5/5  Improve and stabilize proper pelvic positioning  Restore ability to sit for ADL and work with min to 0 pain  Restore normal sleep habits without disturbances due to pain  Restore ability to perform ADL's and household activities independently with min to 0 pain    Anticipated barriers to physical therapy: none  Pt's spiritual, cultural and  educational needs considered and pt agreeable to plan of care and goals        Plan   If you concur, I recommend patient continue with physical therapy 1-2 times a week as needed for 6 weeks.  Please advise us of your  recommendations. Thank you for allowing us to assist in the care of your patient.      Jacy Escobar, MS, PT          I certify the need for these services furnished under this plan of treatment and while under my care.    ____________________________________ Physician/Referring Practitioner                                Date of Signature

## 2023-04-04 ENCOUNTER — CLINICAL SUPPORT (OUTPATIENT)
Dept: REHABILITATION | Facility: HOSPITAL | Age: 68
End: 2023-04-04
Payer: COMMERCIAL

## 2023-04-04 DIAGNOSIS — M54.41 CHRONIC BILATERAL LOW BACK PAIN WITH RIGHT-SIDED SCIATICA: Primary | ICD-10-CM

## 2023-04-04 DIAGNOSIS — G89.29 CHRONIC BILATERAL LOW BACK PAIN WITH RIGHT-SIDED SCIATICA: Primary | ICD-10-CM

## 2023-04-04 PROCEDURE — 97014 ELECTRIC STIMULATION THERAPY: CPT | Mod: PN | Performed by: PHYSICAL THERAPIST

## 2023-04-04 PROCEDURE — 97035 APP MDLTY 1+ULTRASOUND EA 15: CPT | Mod: PN | Performed by: PHYSICAL THERAPIST

## 2023-04-04 PROCEDURE — 97140 MANUAL THERAPY 1/> REGIONS: CPT | Mod: PN | Performed by: PHYSICAL THERAPIST

## 2023-04-04 NOTE — PROGRESS NOTES
Physical Therapy Daily Treatment Note     Name: Danuta Santos  Clinic Number: [id number]  Diagnosis:   Encounter Diagnosis   Name Primary?    Chronic bilateral low back pain with right-sided sciatica Yes     Physician: Yumiko Davis MD  Treatment Orders: PT Eval and Treat  Past Medical History:   Diagnosis Date    Allergy     IBS (irritable bowel syndrome)     Pneumonia 02/2019     Precautions: as per diagnosis    Evaluation date: 1-19-23  Visit # authorized: 6/20  Authorization period:12-31-23  Plan of care expiration:4-27-23  MD Follow up appt: none scheduled    Time In: 4:11  Time Out: 5:10  Billable time:   40 Min + ES    Subjective     Pt reports: pulled herself out again and unable to self mobilize   Pain Scale: before treatment:5/10  after treatment: 1/10 and level pelvis    Objective     TREATMENT   Therapeutic exercises were performed to improve ROM, strength, flexibility and stabilization for 5 minutes.      Verbal review of HEP  HS stretch supine   Glut stretch  Piriformis stretch  B QL stretch  Contract relax R glut x 3  did not realign  B Knee to chest       Pelvic tilt   x 10  Bridge x 10,   SLR x 10,   Trunk rotation supine x 10  Partial sit up x 10  Hip abd sidelying x 10 B,  Arm and Leg lift prone x 10,   Hip ext prone with bent knee x 10   Sit to stand x 10  Press up x 5   GSS standing     Iron cross x 2 as needed  Plank x 30 sec x 1 Led to dysfunction so pt performed contract relax and realigned I   Contract relax R glut    Pt to start walking at gym or park and start planks again  Pt unable to try to swim until May due to pool upkeep.      Manual therapy: Danuta  received the following manual therapy  techniques x 25 min. to include soft tissue and joint mobilization were applied to the: low back and gluteals to include:     Performed STM to LB paraspinals and QL R>L  P/A mob to lumbar region Grade 1-2 , (NP)MET to QL R SL L  Sidelying L contract relax mobilization to L5-S1 region  "to level pelvis  at end of treatment and level pelvis achieved at end of session    Pt received tool-assisted massage with manual therapy techniques to B LB in prone to trigger an inflammatory healing response and stimulate the production of new collagen and proper, more functional, less painful healing.    Vacuum/cupping STM with manual therapy techniques was performed to back and gluteals to decrease muscle tightness, increase circulation and promote healing process.  The pt's skin was monitored for redness adjusting pressure as needed. The pt was instructed in possible side effects of bruising and/or soreness.      (NP)Kinesiotape with 6" star for pain relief was performed over the R lumbar paraspinals Instructed pt in use, care and precautions with tape.       Ultrasound  for pain control and to decrease inflammation @ continuous duty cycle, 1 Mhz, applied to R lumbar paraspinals, intensity = 1.5 w/cm2 for 8 minutes. 2 min prep      Patient received pre-mod electrical stimulation to decrease muscle tightness and pain to Lumbar paraspinals/ sacral border of gluteals B for 15 minutes with MH supine with cycle time: continuous, beat frequency: , CC/CV: CV.      Written Home Exercises Provided: yes  Pt demo good understanding of the education provided. Danuta demonstrated good return demonstration of activities.     Pt. education:  Posture reeducation, body mechanics, HEP, proper posture in standing  No spiritual or educational barriers to learning provided  Pt has no cultural, educational or language barriers to learning provided.    Assessment   Pt developed dysfunction again and unable to self mobilize.  Pt with mod to severe tightness to start and may not be recovered from increased activity pushing cases of water.  Pt scott treatment well and decreased pain and level pelvis at end of session  pt understands to continue work on I HEP     Pt will continue to benefit from skilled outpatient physical therapy " to address the remaining functional deficits, provide pt/family education, and to maximize pt's level of independence in the home and community environment. .     GOALS:   Short Term Goals:  3 weeks MET STG's  Increase range of motion 25%  Increase strength 1/2 muscle grade  Improve postural awareness of pelvis to independently identify dysfunction with min assist from PT  Be able to perform HEP with minimal cueing required     Long Term Goals: 6 weeks PARTIALLY MET LTG'S  Increase range of motion to 75% to 100% full   Improve muscle strength 1 muscle grade  Improve muscle strength with MMT to 4+/5 to 5/5  Improve and stabilize proper pelvic positioning  Restore ability to sit for ADL and work with min to 0 pain  Restore normal sleep habits without disturbances due to pain  Restore ability to perform ADL's and household activities independently with min to 0 pain    Anticipated barriers to physical therapy: none  Pt's spiritual, cultural and educational needs considered and pt agreeable to plan of care and goals        Plan   Continue with established plan of care towards PT goals.      Jacy Escobar, MS, PT

## 2023-04-10 ENCOUNTER — CLINICAL SUPPORT (OUTPATIENT)
Dept: REHABILITATION | Facility: HOSPITAL | Age: 68
End: 2023-04-10
Payer: COMMERCIAL

## 2023-04-10 DIAGNOSIS — G89.29 CHRONIC BILATERAL LOW BACK PAIN WITH RIGHT-SIDED SCIATICA: Primary | ICD-10-CM

## 2023-04-10 DIAGNOSIS — M54.41 CHRONIC BILATERAL LOW BACK PAIN WITH RIGHT-SIDED SCIATICA: Primary | ICD-10-CM

## 2023-04-10 PROCEDURE — 97140 MANUAL THERAPY 1/> REGIONS: CPT | Mod: PN | Performed by: PHYSICAL THERAPIST

## 2023-04-10 PROCEDURE — 97035 APP MDLTY 1+ULTRASOUND EA 15: CPT | Mod: PN | Performed by: PHYSICAL THERAPIST

## 2023-04-10 PROCEDURE — 97014 ELECTRIC STIMULATION THERAPY: CPT | Mod: PN | Performed by: PHYSICAL THERAPIST

## 2023-04-10 NOTE — PROGRESS NOTES
Physical Therapy Daily Treatment Note     Name: Danuta Santos  Clinic Number: [id number]  Diagnosis:   Encounter Diagnosis   Name Primary?    Chronic bilateral low back pain with right-sided sciatica Yes     Physician: Yumiko Davis MD  Treatment Orders: PT Eval and Treat  Past Medical History:   Diagnosis Date    Allergy     IBS (irritable bowel syndrome)     Pneumonia 02/2019     Precautions: as per diagnosis    Evaluation date: 1-19-23  Visit # authorized: 7/20  Authorization period:12-31-23  Plan of care expiration:4-27-23  MD Follow up appt: none scheduled    Time In: 2:35  Time Out: 3:35  Billable time:   40 Min + ES    Subjective     Pt reports: she is having a little tightness and had done an ex class and not sure if she was in dysfunction   Pain Scale: before treatment:4/10  after treatment: 1/10 and level pelvis    Objective   Level pelvis to start    TREATMENT   Therapeutic exercises were performed to improve ROM, strength, flexibility and stabilization for 5 minutes.      Verbal review of HEP  HS stretch supine   Glut stretch  Piriformis stretch  B QL stretch  Contract relax R glut x 3  did not realign  B Knee to chest       Pelvic tilt   x 10  Bridge x 10,   SLR x 10,   Trunk rotation supine x 10  Partial sit up x 10  Hip abd sidelying x 10 B,  Arm and Leg lift prone x 10,   Hip ext prone with bent knee x 10   Sit to stand x 10  Press up x 5   GSS standing     Iron cross x 2 as needed  Plank x 30 sec x 1 Led to dysfunction so pt performed contract relax and realigned I   Contract relax R glut    Pt to start walking at gym or park and start planks again  Pt unable to try to swim until May due to pool upkeep.      Manual therapy: Danuta  received the following manual therapy  techniques x 25 min. to include soft tissue and joint mobilization were applied to the: low back and gluteals to include:     Performed STM to LB paraspinals and QL R>L  P/A mob to lumbar region Grade 1-2 , (NP)MET to  "QL R SL L  Sidelying L contract relax mobilization to L5-S1 region to level pelvis  at end of treatment and level pelvis achieved at end of session    Pt received tool-assisted massage with manual therapy techniques to B LB in prone to trigger an inflammatory healing response and stimulate the production of new collagen and proper, more functional, less painful healing.    Vacuum/cupping STM with manual therapy techniques was performed to back and gluteals to decrease muscle tightness, increase circulation and promote healing process.  The pt's skin was monitored for redness adjusting pressure as needed. The pt was instructed in possible side effects of bruising and/or soreness.      (NP)Kinesiotape with 6" star for pain relief was performed over the R lumbar paraspinals Instructed pt in use, care and precautions with tape.       Ultrasound  for pain control and to decrease inflammation @ continuous duty cycle, 1 Mhz, applied to R lumbar paraspinals, intensity = 1.5 w/cm2 for 8 minutes. 2 min prep      Patient received pre-mod electrical stimulation to decrease muscle tightness and pain to Lumbar paraspinals/ sacral border of gluteals B for 15 minutes with MH supine with cycle time: continuous, beat frequency: , CC/CV: CV.      Written Home Exercises Provided: yes  Pt demo good understanding of the education provided. Danuta demonstrated good return demonstration of activities.     Pt. education:  Posture reeducation, body mechanics, HEP, proper posture in standing  No spiritual or educational barriers to learning provided  Pt has no cultural, educational or language barriers to learning provided.    Assessment   Pt with less tightness overall and able to self mobilize, but pt was concerned due to severity of symptoms last week that she wished to keep symptoms more under control by attending PT this week while off school due to holiday week.       Pt will continue to benefit from skilled outpatient physical " therapy to address the remaining functional deficits, provide pt/family education, and to maximize pt's level of independence in the home and community environment. .     GOALS:   Short Term Goals:  3 weeks MET STG's  Increase range of motion 25%  Increase strength 1/2 muscle grade  Improve postural awareness of pelvis to independently identify dysfunction with min assist from PT  Be able to perform HEP with minimal cueing required     Long Term Goals: 6 weeks PARTIALLY MET LTG'S  Increase range of motion to 75% to 100% full   Improve muscle strength 1 muscle grade  Improve muscle strength with MMT to 4+/5 to 5/5  Improve and stabilize proper pelvic positioning  Restore ability to sit for ADL and work with min to 0 pain  Restore normal sleep habits without disturbances due to pain  Restore ability to perform ADL's and household activities independently with min to 0 pain    Anticipated barriers to physical therapy: none  Pt's spiritual, cultural and educational needs considered and pt agreeable to plan of care and goals        Plan   Continue with established plan of care towards PT goals.      Jacy Escobar, MS, PT

## 2023-04-13 ENCOUNTER — CLINICAL SUPPORT (OUTPATIENT)
Dept: INTERNAL MEDICINE | Facility: CLINIC | Age: 68
End: 2023-04-13
Payer: COMMERCIAL

## 2023-04-13 ENCOUNTER — OFFICE VISIT (OUTPATIENT)
Dept: INTERNAL MEDICINE | Facility: CLINIC | Age: 68
End: 2023-04-13
Payer: COMMERCIAL

## 2023-04-13 VITALS
SYSTOLIC BLOOD PRESSURE: 136 MMHG | DIASTOLIC BLOOD PRESSURE: 78 MMHG | OXYGEN SATURATION: 98 % | BODY MASS INDEX: 21.69 KG/M2 | TEMPERATURE: 98 F | WEIGHT: 135 LBS | HEART RATE: 53 BPM | HEIGHT: 66 IN

## 2023-04-13 DIAGNOSIS — Z00.00 ANNUAL PHYSICAL EXAM: Primary | ICD-10-CM

## 2023-04-13 DIAGNOSIS — R10.2 PELVIC PAIN IN FEMALE: ICD-10-CM

## 2023-04-13 LAB
ALBUMIN SERPL BCP-MCNC: 4.2 G/DL (ref 3.5–5.2)
ALP SERPL-CCNC: 63 U/L (ref 55–135)
ALT SERPL W/O P-5'-P-CCNC: 18 U/L (ref 10–44)
ANION GAP SERPL CALC-SCNC: 7 MMOL/L (ref 8–16)
AST SERPL-CCNC: 23 U/L (ref 10–40)
BILIRUB SERPL-MCNC: 0.6 MG/DL (ref 0.1–1)
BUN SERPL-MCNC: 16 MG/DL (ref 8–23)
CALCIUM SERPL-MCNC: 10.2 MG/DL (ref 8.7–10.5)
CHLORIDE SERPL-SCNC: 104 MMOL/L (ref 95–110)
CHOLEST SERPL-MCNC: 187 MG/DL (ref 120–199)
CHOLEST/HDLC SERPL: 2.3 {RATIO} (ref 2–5)
CO2 SERPL-SCNC: 27 MMOL/L (ref 23–29)
CREAT SERPL-MCNC: 0.8 MG/DL (ref 0.5–1.4)
ERYTHROCYTE [DISTWIDTH] IN BLOOD BY AUTOMATED COUNT: 12.7 % (ref 11.5–14.5)
EST. GFR  (NO RACE VARIABLE): >60 ML/MIN/1.73 M^2
ESTIMATED AVG GLUCOSE: 111 MG/DL (ref 68–131)
GLUCOSE SERPL-MCNC: 87 MG/DL (ref 70–110)
HBA1C MFR BLD: 5.5 % (ref 4–5.6)
HCT VFR BLD AUTO: 39.6 % (ref 37–48.5)
HDLC SERPL-MCNC: 82 MG/DL (ref 40–75)
HDLC SERPL: 43.9 % (ref 20–50)
HGB BLD-MCNC: 12.5 G/DL (ref 12–16)
LDLC SERPL CALC-MCNC: 96.6 MG/DL (ref 63–159)
MCH RBC QN AUTO: 28.9 PG (ref 27–31)
MCHC RBC AUTO-ENTMCNC: 31.6 G/DL (ref 32–36)
MCV RBC AUTO: 92 FL (ref 82–98)
MICROSCOPIC COMMENT: NORMAL
NONHDLC SERPL-MCNC: 105 MG/DL
PLATELET # BLD AUTO: 201 K/UL (ref 150–450)
PMV BLD AUTO: 12 FL (ref 9.2–12.9)
POTASSIUM SERPL-SCNC: 5.5 MMOL/L (ref 3.5–5.1)
PROT SERPL-MCNC: 7.1 G/DL (ref 6–8.4)
RBC # BLD AUTO: 4.32 M/UL (ref 4–5.4)
RBC #/AREA URNS AUTO: 0 /HPF (ref 0–4)
SODIUM SERPL-SCNC: 138 MMOL/L (ref 136–145)
TRIGL SERPL-MCNC: 42 MG/DL (ref 30–150)
TSH SERPL DL<=0.005 MIU/L-ACNC: 2.57 UIU/ML (ref 0.4–4)
WBC # BLD AUTO: 3.69 K/UL (ref 3.9–12.7)
WBC #/AREA URNS AUTO: 0 /HPF (ref 0–5)

## 2023-04-13 PROCEDURE — 3075F SYST BP GE 130 - 139MM HG: CPT | Mod: CPTII,S$GLB,, | Performed by: INTERNAL MEDICINE

## 2023-04-13 PROCEDURE — 3044F PR MOST RECENT HEMOGLOBIN A1C LEVEL <7.0%: ICD-10-PCS | Mod: CPTII,S$GLB,, | Performed by: INTERNAL MEDICINE

## 2023-04-13 PROCEDURE — 1160F RVW MEDS BY RX/DR IN RCRD: CPT | Mod: CPTII,S$GLB,, | Performed by: INTERNAL MEDICINE

## 2023-04-13 PROCEDURE — 99999 PR PBB SHADOW E&M-EST. PATIENT-LVL III: CPT | Mod: PBBFAC,,, | Performed by: INTERNAL MEDICINE

## 2023-04-13 PROCEDURE — 3078F DIAST BP <80 MM HG: CPT | Mod: CPTII,S$GLB,, | Performed by: INTERNAL MEDICINE

## 2023-04-13 PROCEDURE — 85027 COMPLETE CBC AUTOMATED: CPT | Performed by: INTERNAL MEDICINE

## 2023-04-13 PROCEDURE — 99999 PR PBB SHADOW E&M-EST. PATIENT-LVL III: ICD-10-PCS | Mod: PBBFAC,,, | Performed by: INTERNAL MEDICINE

## 2023-04-13 PROCEDURE — 3075F PR MOST RECENT SYSTOLIC BLOOD PRESS GE 130-139MM HG: ICD-10-PCS | Mod: CPTII,S$GLB,, | Performed by: INTERNAL MEDICINE

## 2023-04-13 PROCEDURE — 3044F HG A1C LEVEL LT 7.0%: CPT | Mod: CPTII,S$GLB,, | Performed by: INTERNAL MEDICINE

## 2023-04-13 PROCEDURE — 1101F PR PT FALLS ASSESS DOC 0-1 FALLS W/OUT INJ PAST YR: ICD-10-PCS | Mod: CPTII,S$GLB,, | Performed by: INTERNAL MEDICINE

## 2023-04-13 PROCEDURE — 1159F PR MEDICATION LIST DOCUMENTED IN MEDICAL RECORD: ICD-10-PCS | Mod: CPTII,S$GLB,, | Performed by: INTERNAL MEDICINE

## 2023-04-13 PROCEDURE — 99397 PR PREVENTIVE VISIT,EST,65 & OVER: ICD-10-PCS | Mod: S$GLB,,, | Performed by: INTERNAL MEDICINE

## 2023-04-13 PROCEDURE — 1160F PR REVIEW ALL MEDS BY PRESCRIBER/CLIN PHARMACIST DOCUMENTED: ICD-10-PCS | Mod: CPTII,S$GLB,, | Performed by: INTERNAL MEDICINE

## 2023-04-13 PROCEDURE — 3288F FALL RISK ASSESSMENT DOCD: CPT | Mod: CPTII,S$GLB,, | Performed by: INTERNAL MEDICINE

## 2023-04-13 PROCEDURE — 81001 URINALYSIS AUTO W/SCOPE: CPT | Performed by: INTERNAL MEDICINE

## 2023-04-13 PROCEDURE — 3008F BODY MASS INDEX DOCD: CPT | Mod: CPTII,S$GLB,, | Performed by: INTERNAL MEDICINE

## 2023-04-13 PROCEDURE — 80061 LIPID PANEL: CPT | Performed by: INTERNAL MEDICINE

## 2023-04-13 PROCEDURE — 99397 PER PM REEVAL EST PAT 65+ YR: CPT | Mod: S$GLB,,, | Performed by: INTERNAL MEDICINE

## 2023-04-13 PROCEDURE — 1126F AMNT PAIN NOTED NONE PRSNT: CPT | Mod: CPTII,S$GLB,, | Performed by: INTERNAL MEDICINE

## 2023-04-13 PROCEDURE — 3008F PR BODY MASS INDEX (BMI) DOCUMENTED: ICD-10-PCS | Mod: CPTII,S$GLB,, | Performed by: INTERNAL MEDICINE

## 2023-04-13 PROCEDURE — 3288F PR FALLS RISK ASSESSMENT DOCUMENTED: ICD-10-PCS | Mod: CPTII,S$GLB,, | Performed by: INTERNAL MEDICINE

## 2023-04-13 PROCEDURE — 1159F MED LIST DOCD IN RCRD: CPT | Mod: CPTII,S$GLB,, | Performed by: INTERNAL MEDICINE

## 2023-04-13 PROCEDURE — 84443 ASSAY THYROID STIM HORMONE: CPT | Performed by: INTERNAL MEDICINE

## 2023-04-13 PROCEDURE — 1126F PR PAIN SEVERITY QUANTIFIED, NO PAIN PRESENT: ICD-10-PCS | Mod: CPTII,S$GLB,, | Performed by: INTERNAL MEDICINE

## 2023-04-13 PROCEDURE — 80053 COMPREHEN METABOLIC PANEL: CPT | Performed by: INTERNAL MEDICINE

## 2023-04-13 PROCEDURE — 83036 HEMOGLOBIN GLYCOSYLATED A1C: CPT | Performed by: INTERNAL MEDICINE

## 2023-04-13 PROCEDURE — 3078F PR MOST RECENT DIASTOLIC BLOOD PRESSURE < 80 MM HG: ICD-10-PCS | Mod: CPTII,S$GLB,, | Performed by: INTERNAL MEDICINE

## 2023-04-13 PROCEDURE — 1101F PT FALLS ASSESS-DOCD LE1/YR: CPT | Mod: CPTII,S$GLB,, | Performed by: INTERNAL MEDICINE

## 2023-04-13 PROCEDURE — 36415 COLL VENOUS BLD VENIPUNCTURE: CPT | Performed by: INTERNAL MEDICINE

## 2023-04-13 RX ORDER — TIZANIDINE 2 MG/1
4 TABLET ORAL NIGHTLY PRN
COMMUNITY

## 2023-04-14 NOTE — PROGRESS NOTES
Subjective:       Patient ID: Danuta Santos is a 67 y.o. female who presents today for:    Chief Complaint:   Chief Complaint   Patient presents with    Annual Exam       HPI:  Very pleasant nonsmoking 67-year-old female who is here for an annual  physical.  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  She does have some GI issues and intolerance to certain foods including gluten.  She is extremely healthy diet.  As concerning health maintenance she had a colonoscopy in July of 2020.  Next colonoscopy will be in 2025 summer.  She also had a CT coronary Agatston score June of 2022 .  Mammograms are done in more and she is up-to-date.  He had a Pap smear last year in April.  Last bone density was in March of 2022 and she had mild osteopenia with a T-score of-1.5 the neck. Next due 3/2023    Review of Systems   Constitutional:  Negative for chills, fever and weight loss.   HENT:  Negative for sore throat.    Eyes:  Negative for blurred vision and double vision.   Respiratory:  Negative for cough and shortness of breath.    Cardiovascular:  Negative for chest pain and palpitations.   Gastrointestinal:  Negative for constipation, diarrhea, nausea and vomiting.   Genitourinary:  Negative for dysuria and hematuria.   Musculoskeletal:  Negative for joint pain and myalgias.   Skin:  Negative for itching and rash.   Neurological:  Negative for sensory change, focal weakness and headaches.   Endo/Heme/Allergies:  Does not bruise/bleed easily.   Psychiatric/Behavioral:  Negative for depression and suicidal ideas.       Medications:  Outpatient Encounter Medications as of 4/13/2023   Medication Sig Dispense Refill    acetaminophen (TYLENOL) 500 MG tablet Take 500 mg by mouth every 6 (six) hours as needed.      ascorbic acid, vitamin C, (VITAMIN C) 500 MG tablet  Take 500 mg by mouth once daily.      azelastine (ASTELIN) 137 mcg (0.1 %) nasal spray 1 spray (137 mcg total) by Nasal route 2 (two) times daily as needed for Rhinitis. 30 mL 0    calcium carbonate (CALCIUM 500 ORAL) Take by mouth.      cyanocobalamin 500 MCG tablet Take 1,000 mcg by mouth once daily.      glucosam/chond/collagen/hyalur (XMABAIOR-JYBV-FWHGPL-HYALUR AC ORAL) Take 1 tablet by mouth once daily at 6am.      LACTOBACILLUS COMBO NO.6 (PROBIOTIC COMPLEX ORAL) Take 1 capsule by mouth once.       pimecrolimus (ELIDEL) 1 % cream Apply topically 2 (two) times daily as needed.      polyethylene glycol (GLYCOLAX) 17 gram PwPk Take by mouth.      tiZANidine (ZANAFLEX) 2 MG tablet Take 4 mg by mouth nightly as needed.      vitamin D (VITAMIN D3) 1000 units Tab Take 2,000 Units by mouth once daily.      [DISCONTINUED] pediatric multivitamin chewable tablet Take 2 tablets by mouth once daily.      [DISCONTINUED] ALPRAZolam (XANAX) 0.25 MG tablet Take 1 tablet (0.25 mg total) by mouth 2 (two) times daily as needed for Anxiety. 30 tablet 1    [DISCONTINUED] azelastine (ASTELIN) 137 mcg (0.1 %) nasal spray 1 spray (137 mcg total) by Nasal route 2 (two) times daily as needed for Rhinitis. 30 mL 0    [DISCONTINUED] celecoxib (CELEBREX) 100 MG capsule Take 1 capsule (100 mg total) by mouth 2 (two) times daily. (Patient not taking: Reported on 4/13/2023) 60 capsule 1    [DISCONTINUED] famotidine (PEPCID) 20 MG tablet Take 1 tablet (20 mg total) by mouth 2 (two) times daily as needed for Heartburn. 60 tablet 0    [DISCONTINUED] LORazepam (ATIVAN) 0.5 MG tablet Take 1 tablet (0.5 mg total) by mouth nightly as needed for Anxiety. 30 tablet 0    [DISCONTINUED] tiZANidine (ZANAFLEX) 2 MG tablet 1-2 nightly prn (Patient not taking: Reported on 4/13/2023) 60 tablet 0    [DISCONTINUED] ZINC ORAL Take by mouth.       Facility-Administered Encounter Medications as of 4/13/2023   Medication Dose Route Frequency Provider Last Rate  Last Admin    ketorolac injection 60 mg  60 mg Intramuscular 1 time in Clinic/HOD Yumiko Davis MD           Allergies:  Review of patient's allergies indicates:   Allergen Reactions    Wheat containing prod      Sinus      Advil [ibuprofen]     Apple cider vinegar Other (See Comments)    Aspirin Nausea And Vomiting    Lactobacillus acidoph-lactase      Other reaction(s): Other (See Comments)    Milk containing products Other (See Comments)     Post nasal drip    Nsaids (non-steroidal anti-inflammatory drug)      Other reaction(s): Other (See Comments)  Pt states she gets pain in her stomach when she takes nsaids due to IBS    Alcohol Other (See Comments) and Palpitations     Post nasal drip       Health Maintenance:  Immunization History   Administered Date(s) Administered    COVID-19, MRNA, LN-S, PF (Pfizer) (Gray Cap) 04/12/2022    COVID-19, MRNA, LN-S, PF (Pfizer) (Purple Cap) 02/19/2021, 03/12/2021, 09/28/2021    COVID-19, mRNA, LNP-S, bivalent booster, PF (PFIZER OMICRON) 09/24/2022    Hepatitis A, Adult 03/15/2017, 09/19/2017    Hepatitis B 07/12/2012, 09/12/2012    Hepatitis B, Adult 05/21/2015    Hepatitis B, Pediatric/Adolescent 07/12/2012, 09/12/2012    IPV 09/26/2012    Influenza (FLUAD) - Quadrivalent - Adjuvanted - PF *Preferred* (65+) 10/01/2021    Influenza - High Dose - PF (65 years and older) 10/03/2019    Influenza - Quadrivalent 09/18/2018    Influenza - Quadrivalent - PF *Preferred* (6 months and older) 09/22/2018, 09/22/2020    Influenza A (H1N1) 2009 Monovalent - IM 11/21/2009    Pneumococcal Conjugate - 13 Valent 09/30/2019    Pneumococcal Polysaccharide - 23 Valent 03/12/2019, 04/13/2022    Tdap 08/15/2016, 08/16/2022    Typhoid 09/26/2012    Typhoid - ViCPs 09/26/2012, 03/15/2017    Zoster 07/02/2015    Zoster Recombinant 09/18/2018, 09/18/2018, 12/15/2018      Health Maintenance   Topic Date Due    Mammogram  03/03/2024    DEXA Scan  03/15/2024    Lipid Panel  04/13/2028    TETANUS  "VACCINE  08/16/2032    Hepatitis C Screening  Completed          Objective:      Vital Signs  Temp: 98.2 °F (36.8 °C)  Pulse: (!) 53  SpO2: 98 %  BP: 136/78  Pain Score: 0-No pain  Height and Weight  Height: 5' 6" (167.6 cm)  Weight: 61.2 kg (135 lb)  BSA (Calculated - sq m): 1.69 sq meters  BMI (Calculated): 21.8  Weight in (lb) to have BMI = 25: 154.6]    Physical Exam  Vitals and nursing note reviewed.   Constitutional:       Appearance: She is well-developed.   HENT:      Head: Normocephalic and atraumatic.   Eyes:      General: No scleral icterus.        Right eye: No discharge.         Left eye: No discharge.   Neck:      Vascular: No JVD.   Cardiovascular:      Rate and Rhythm: Normal rate and regular rhythm.      Heart sounds: No murmur heard.    No friction rub. No gallop.   Pulmonary:      Effort: Pulmonary effort is normal.      Breath sounds: Normal breath sounds. No wheezing or rales.   Abdominal:      General: Bowel sounds are normal. There is no distension.      Palpations: Abdomen is soft.      Tenderness: There is no abdominal tenderness.   Musculoskeletal:         General: No tenderness.      Cervical back: Neck supple.   Lymphadenopathy:      Cervical: No cervical adenopathy.   Skin:     Findings: No erythema or rash.   Neurological:      Mental Status: She is alert and oriented to person, place, and time.      Cranial Nerves: No cranial nerve deficit.      Lab Results   Component Value Date    WBC 3.69 (L) 04/13/2023    HGB 12.5 04/13/2023    HCT 39.6 04/13/2023     04/13/2023    CHOL 187 04/13/2023    TRIG 42 04/13/2023    HDL 82 (H) 04/13/2023    ALT 18 04/13/2023    AST 23 04/13/2023     04/13/2023    K 5.5 (H) 04/13/2023     04/13/2023    CREATININE 0.8 04/13/2023    BUN 16 04/13/2023    CO2 27 04/13/2023    TSH 2.570 04/13/2023    HGBA1C 5.5 04/13/2023     Assessment/plan:     Danuta Santos is a 67 y.o.female with:    Annual physical exam     Health Maintenance "   Topic Date Due    Mammogram  03/03/2024    DEXA Scan  03/15/2024    Lipid Panel  04/13/2028    TETANUS VACCINE  08/16/2032    Hepatitis C Screening  Completed     Health Maintenance Reviewed    You are in great health!    Future Appointments   Date Time Provider Department Center   4/18/2023  2:30 PM Jacy Escobar, PT [facility]   4/27/2023  3:15 PM Jacy Escobar, PT [facility]   5/5/2023  1:15 PM NOM [facility]US1 MASTER [facility]       Yumiko Carias MD  [facility]

## 2023-04-17 ENCOUNTER — TELEPHONE (OUTPATIENT)
Dept: OBSTETRICS AND GYNECOLOGY | Facility: CLINIC | Age: 68
End: 2023-04-17
Payer: COMMERCIAL

## 2023-04-17 ENCOUNTER — PATIENT MESSAGE (OUTPATIENT)
Dept: OBSTETRICS AND GYNECOLOGY | Facility: CLINIC | Age: 68
End: 2023-04-17
Payer: COMMERCIAL

## 2023-04-17 NOTE — TELEPHONE ENCOUNTER
Annual exam reschedule per patient request for Friday 04/21/2023 for 10:15 am. Portal message sent   Yes

## 2023-04-17 NOTE — TELEPHONE ENCOUNTER
----- Message from Sandra Elmore sent at 4/17/2023  2:20 PM CDT -----  Regarding: please call  Hi, can someone please reach out to this patient?  I was able to schedule her for tomorrow at 11:30 but she has an important meeting at work.  She has a cyst on her vagina and wants to see if you can fit her in for an early appointment this Wednesday or this Friday.  Your help is truly appreciated.    Thank you,  Sandra

## 2023-04-18 ENCOUNTER — CLINICAL SUPPORT (OUTPATIENT)
Dept: REHABILITATION | Facility: HOSPITAL | Age: 68
End: 2023-04-18
Payer: COMMERCIAL

## 2023-04-18 DIAGNOSIS — G89.29 CHRONIC BILATERAL LOW BACK PAIN WITH RIGHT-SIDED SCIATICA: Primary | ICD-10-CM

## 2023-04-18 DIAGNOSIS — M54.41 CHRONIC BILATERAL LOW BACK PAIN WITH RIGHT-SIDED SCIATICA: Primary | ICD-10-CM

## 2023-04-18 PROCEDURE — 97110 THERAPEUTIC EXERCISES: CPT | Mod: PN | Performed by: PHYSICAL THERAPIST

## 2023-04-18 PROCEDURE — 97035 APP MDLTY 1+ULTRASOUND EA 15: CPT | Mod: PN | Performed by: PHYSICAL THERAPIST

## 2023-04-18 PROCEDURE — 97014 ELECTRIC STIMULATION THERAPY: CPT | Mod: PN | Performed by: PHYSICAL THERAPIST

## 2023-04-18 PROCEDURE — 97140 MANUAL THERAPY 1/> REGIONS: CPT | Mod: PN | Performed by: PHYSICAL THERAPIST

## 2023-04-18 NOTE — PROGRESS NOTES
Physical Therapy Daily Treatment Note     Name: Danuta Santos  Clinic Number: [id number]  Diagnosis:   Encounter Diagnosis   Name Primary?    Chronic bilateral low back pain with right-sided sciatica Yes     Physician: Yumiko Davis MD  Treatment Orders: PT Eval and Treat  Past Medical History:   Diagnosis Date    Allergy     IBS (irritable bowel syndrome)     Pneumonia 02/2019     Precautions: as per diagnosis    Evaluation date: 1-19-23  Visit # authorized: 8/20  Authorization period:12-31-23  Plan of care expiration:4-27-23  MD Follow up appt: none scheduled    Time In: 2:38  Time Out: 3:35  Billable time:   38 Min + ES    Subjective     Pt reports: feeling tight today and has continued with HEP, but feels she is able to level pelvis, but continue with pain and tightness with pain radiating down R LE    Pain Scale: before treatment:3-4/10  after treatment: 1/10 and level pelvis    Objective   AR R pelvis to start    TREATMENT   Therapeutic exercises were performed to improve ROM, strength, flexibility and stabilization for 8 minutes.      HS stretch supine   Glut stretch  Piriformis stretch  B QL stretch     B Knee to chest   Contract relax R glut x 3  and realigned and no leg symptoms at end of ex      Verbal review of below.    Pelvic tilt   x 10  Bridge x 10,   SLR x 10,   Trunk rotation supine x 10  Partial sit up x 10  Hip abd sidelying x 10 B,  Arm and Leg lift prone x 10, instructed pt to put pillow under stomach   Hip ext prone with bent knee x 10 instructed pt to put pillow under stomach  Sit to stand x 10  Instructed pt to hold press up for now and will note results  GSS standing x 10    [employer] cross x 2 as needed  Plank x 30 sec x 1 Led to dysfunction so pt performed contract relax and realigned I   Contract relax R glut    Pt to start walking at gym or park and start planks again  Pt unable to try to swim until May due to pool upkeep.      Manual therapy: Danuta  received the following  "manual therapy  techniques x 20 min. to include soft tissue and joint mobilization were applied to the: low back and gluteals to include:     Performed STM to LB paraspinals and QL R>L  (NP)P/A mob to lumbar region Grade 1-2 , (NP)MET to QL R SL L  Sidelying L contract relax mobilization to L5-S1 region to level pelvis  at end of treatment and level pelvis achieved at end of session    Pt received tool-assisted massage with manual therapy techniques to B LB in prone to trigger an inflammatory healing response and stimulate the production of new collagen and proper, more functional, less painful healing.    Vacuum/cupping STM with manual therapy techniques was performed to back and gluteals to decrease muscle tightness, increase circulation and promote healing process.  The pt's skin was monitored for redness adjusting pressure as needed. The pt was instructed in possible side effects of bruising and/or soreness.      (NP)Kinesiotape with 6" star for pain relief was performed over the R lumbar paraspinals Instructed pt in use, care and precautions with tape.       Ultrasound  for pain control and to decrease inflammation @ continuous duty cycle, 1 Mhz, applied to R lumbar paraspinals, intensity = 1.5 w/cm2 for 8 minutes. 2 min prep      Patient received pre-mod electrical stimulation to decrease muscle tightness and pain to Lumbar paraspinals/ sacral border of gluteals B for 15 minutes with MH supine with cycle time: continuous, beat frequency: , CC/CV: CV.      Written Home Exercises Provided: yes  Pt demo good understanding of the education provided. Danuta demonstrated good return demonstration of activities.     Pt. education:  Posture reeducation, body mechanics, HEP, proper posture in standing  No spiritual or educational barriers to learning provided  Pt has no cultural, educational or language barriers to learning provided.    Assessment   Pt with less tightness overall and able to self mobilize,but " continues with radicular symptoms into calf. Pt states she continues with press up but does experience some discomfort now with this ex and will only do a few at t time.  Instructed pt to hold press up for now and only do knee to chest followed by contract relax glut.  Pt appears to understand use of flexion principles to open spine and decrease nerve irritation and will use a pillow under stomach with back strengthening  will consider holding back strengthening if radicular symptoms continue  .       Pt will continue to benefit from skilled outpatient physical therapy to address the remaining functional deficits, provide pt/family education, and to maximize pt's level of independence in the home and community environment. .     GOALS:   Short Term Goals:  3 weeks MET STG's  Increase range of motion 25%  Increase strength 1/2 muscle grade  Improve postural awareness of pelvis to independently identify dysfunction with min assist from PT  Be able to perform HEP with minimal cueing required     Long Term Goals: 6 weeks PARTIALLY MET LTG'S  Increase range of motion to 75% to 100% full   Improve muscle strength 1 muscle grade  Improve muscle strength with MMT to 4+/5 to 5/5  Improve and stabilize proper pelvic positioning  Restore ability to sit for ADL and work with min to 0 pain  Restore normal sleep habits without disturbances due to pain  Restore ability to perform ADL's and household activities independently with min to 0 pain    Anticipated barriers to physical therapy: none  Pt's spiritual, cultural and educational needs considered and pt agreeable to plan of care and goals        Plan   Continue with established plan of care towards PT goals.   Assess response to holding extension and use of flexion to decrease radicular symptoms    Jacy Escobar, MS, PT

## 2023-04-20 ENCOUNTER — PATIENT MESSAGE (OUTPATIENT)
Dept: INTERNAL MEDICINE | Facility: CLINIC | Age: 68
End: 2023-04-20
Payer: COMMERCIAL

## 2023-04-21 ENCOUNTER — PATIENT MESSAGE (OUTPATIENT)
Dept: OBSTETRICS AND GYNECOLOGY | Facility: CLINIC | Age: 68
End: 2023-04-21

## 2023-04-21 ENCOUNTER — OFFICE VISIT (OUTPATIENT)
Dept: OBSTETRICS AND GYNECOLOGY | Facility: CLINIC | Age: 68
End: 2023-04-21
Payer: COMMERCIAL

## 2023-04-21 VITALS — DIASTOLIC BLOOD PRESSURE: 70 MMHG | SYSTOLIC BLOOD PRESSURE: 140 MMHG | BODY MASS INDEX: 21.79 KG/M2 | HEIGHT: 66 IN

## 2023-04-21 DIAGNOSIS — Z01.419 WELL FEMALE EXAM WITH ROUTINE GYNECOLOGICAL EXAM: Primary | ICD-10-CM

## 2023-04-21 DIAGNOSIS — N90.89 VULVAL LESION: ICD-10-CM

## 2023-04-21 PROCEDURE — 3077F SYST BP >= 140 MM HG: CPT | Mod: CPTII,S$GLB,, | Performed by: OBSTETRICS & GYNECOLOGY

## 2023-04-21 PROCEDURE — 99397 PR PREVENTIVE VISIT,EST,65 & OVER: ICD-10-PCS | Mod: S$GLB,,, | Performed by: OBSTETRICS & GYNECOLOGY

## 2023-04-21 PROCEDURE — 3288F PR FALLS RISK ASSESSMENT DOCUMENTED: ICD-10-PCS | Mod: CPTII,S$GLB,, | Performed by: OBSTETRICS & GYNECOLOGY

## 2023-04-21 PROCEDURE — 1160F RVW MEDS BY RX/DR IN RCRD: CPT | Mod: CPTII,S$GLB,, | Performed by: OBSTETRICS & GYNECOLOGY

## 2023-04-21 PROCEDURE — 3044F PR MOST RECENT HEMOGLOBIN A1C LEVEL <7.0%: ICD-10-PCS | Mod: CPTII,S$GLB,, | Performed by: OBSTETRICS & GYNECOLOGY

## 2023-04-21 PROCEDURE — 3077F PR MOST RECENT SYSTOLIC BLOOD PRESSURE >= 140 MM HG: ICD-10-PCS | Mod: CPTII,S$GLB,, | Performed by: OBSTETRICS & GYNECOLOGY

## 2023-04-21 PROCEDURE — 3008F BODY MASS INDEX DOCD: CPT | Mod: CPTII,S$GLB,, | Performed by: OBSTETRICS & GYNECOLOGY

## 2023-04-21 PROCEDURE — 3288F FALL RISK ASSESSMENT DOCD: CPT | Mod: CPTII,S$GLB,, | Performed by: OBSTETRICS & GYNECOLOGY

## 2023-04-21 PROCEDURE — 1101F PR PT FALLS ASSESS DOC 0-1 FALLS W/OUT INJ PAST YR: ICD-10-PCS | Mod: CPTII,S$GLB,, | Performed by: OBSTETRICS & GYNECOLOGY

## 2023-04-21 PROCEDURE — 99999 PR PBB SHADOW E&M-EST. PATIENT-LVL IV: ICD-10-PCS | Mod: PBBFAC,,, | Performed by: OBSTETRICS & GYNECOLOGY

## 2023-04-21 PROCEDURE — 99397 PER PM REEVAL EST PAT 65+ YR: CPT | Mod: S$GLB,,, | Performed by: OBSTETRICS & GYNECOLOGY

## 2023-04-21 PROCEDURE — 1126F PR PAIN SEVERITY QUANTIFIED, NO PAIN PRESENT: ICD-10-PCS | Mod: CPTII,S$GLB,, | Performed by: OBSTETRICS & GYNECOLOGY

## 2023-04-21 PROCEDURE — 3044F HG A1C LEVEL LT 7.0%: CPT | Mod: CPTII,S$GLB,, | Performed by: OBSTETRICS & GYNECOLOGY

## 2023-04-21 PROCEDURE — 3078F DIAST BP <80 MM HG: CPT | Mod: CPTII,S$GLB,, | Performed by: OBSTETRICS & GYNECOLOGY

## 2023-04-21 PROCEDURE — 1159F MED LIST DOCD IN RCRD: CPT | Mod: CPTII,S$GLB,, | Performed by: OBSTETRICS & GYNECOLOGY

## 2023-04-21 PROCEDURE — 1126F AMNT PAIN NOTED NONE PRSNT: CPT | Mod: CPTII,S$GLB,, | Performed by: OBSTETRICS & GYNECOLOGY

## 2023-04-21 PROCEDURE — 99999 PR PBB SHADOW E&M-EST. PATIENT-LVL IV: CPT | Mod: PBBFAC,,, | Performed by: OBSTETRICS & GYNECOLOGY

## 2023-04-21 PROCEDURE — 88142 CYTOPATH C/V THIN LAYER: CPT | Performed by: PATHOLOGY

## 2023-04-21 PROCEDURE — 1160F PR REVIEW ALL MEDS BY PRESCRIBER/CLIN PHARMACIST DOCUMENTED: ICD-10-PCS | Mod: CPTII,S$GLB,, | Performed by: OBSTETRICS & GYNECOLOGY

## 2023-04-21 PROCEDURE — 88141 PR  CYTOPATH CERV/VAG INTERPRET: ICD-10-PCS | Mod: ,,, | Performed by: PATHOLOGY

## 2023-04-21 PROCEDURE — 3008F PR BODY MASS INDEX (BMI) DOCUMENTED: ICD-10-PCS | Mod: CPTII,S$GLB,, | Performed by: OBSTETRICS & GYNECOLOGY

## 2023-04-21 PROCEDURE — 1101F PT FALLS ASSESS-DOCD LE1/YR: CPT | Mod: CPTII,S$GLB,, | Performed by: OBSTETRICS & GYNECOLOGY

## 2023-04-21 PROCEDURE — 3078F PR MOST RECENT DIASTOLIC BLOOD PRESSURE < 80 MM HG: ICD-10-PCS | Mod: CPTII,S$GLB,, | Performed by: OBSTETRICS & GYNECOLOGY

## 2023-04-21 PROCEDURE — 1159F PR MEDICATION LIST DOCUMENTED IN MEDICAL RECORD: ICD-10-PCS | Mod: CPTII,S$GLB,, | Performed by: OBSTETRICS & GYNECOLOGY

## 2023-04-21 PROCEDURE — 88141 CYTOPATH C/V INTERPRET: CPT | Mod: ,,, | Performed by: PATHOLOGY

## 2023-04-21 NOTE — PROGRESS NOTES
SUBJECTIVE:   67 y.o. female   for routine gyn exam. No LMP recorded. Patient is postmenopausal..  She has no unusual complaints. No PMB. No HRT. Desires PAP smear. Has vaginal dryness. Considering restarting Intrarosa or vagifem or ceram. Has left vulvar cyst. Desires removal. Has had these in the past and they got infected.         Past Medical History:   Diagnosis Date    ADHD (attention deficit hyperactivity disorder)     Allergy     wheat , diary, alcohol ,some vinegars    Arthritis     Bronchitis- bad twice 2018 and 2019 again ; admitted for bacterial pneumonia     IBS (irritable bowel syndrome)     Recurrent upper respiratory infection (URI)      Past Surgical History:   Procedure Laterality Date     SECTION       and     COLONOSCOPY N/A 2020    Procedure: COLONOSCOPY;  Surgeon: Liam Lipscomb MD;  Location: [facility] ([address] [place] [address]);  Service: Endoscopy;  Laterality: N/A;  Single balloon needed, can start with regular scope  COVID screening  20 - [facility]    foot bunio Left     intial 2021 and then remoced 3/18/2022    NOSE SURGERY      right hand  2021    2nd digit tore flexer tendon ; dr smith    SINUS SURGERY  2019    shay--- after pneumonia then a yr later sinoplasty     Social History     Socioeconomic History    Marital status:    Tobacco Use    Smoking status: Never    Smokeless tobacco: Never   Substance and Sexual Activity    Alcohol use: No    Drug use: No    Sexual activity: Not Currently     Partners: Male     Birth control/protection: None, Post-menopausal     Family History   Problem Relation Age of Onset    COPD Mother     Cancer Father 54        pancreatic - ergonamist    Hypertension Brother     Cancer Paternal Aunt         breast    Cancer Maternal Grandmother         leukemia    Alcohol abuse Maternal Grandfather     Stroke Maternal Grandfather     Stroke Paternal Grandfather     Cancer Cousin         leukemia    Breast  cancer Neg Hx     Colon cancer Neg Hx     Diabetes Neg Hx     Eclampsia Neg Hx     Miscarriages / Stillbirths Neg Hx     Ovarian cancer Neg Hx      labor Neg Hx     Allergic rhinitis Neg Hx     Allergies Neg Hx     Angioedema Neg Hx     Asthma Neg Hx     Eczema Neg Hx     Immunodeficiency Neg Hx     Rhinitis Neg Hx     Urticaria Neg Hx     Atopy Neg Hx      OB History    Para Term  AB Living   2 2 2     2   SAB IAB Ectopic Multiple Live Births           2      # Outcome Date GA Lbr Gio/2nd Weight Sex Delivery Anes PTL Lv   2 Term            1 Term                  Current Outpatient Medications   Medication Sig Dispense Refill    acetaminophen (TYLENOL) 500 MG tablet Take 500 mg by mouth every 6 (six) hours as needed.      ascorbic acid, vitamin C, (VITAMIN C) 500 MG tablet Take 500 mg by mouth once daily.      azelastine (ASTELIN) 137 mcg (0.1 %) nasal spray 1 spray (137 mcg total) by Nasal route 2 (two) times daily as needed for Rhinitis. 30 mL 0    calcium carbonate (CALCIUM 500 ORAL) Take by mouth.      cyanocobalamin 500 MCG tablet Take 1,000 mcg by mouth once daily.      glucosam/chond/collagen/hyalur (CCGWSNAK-TSOF-JDXMMG-HYALUR AC ORAL) Take 1 tablet by mouth once daily at 6am.      LACTOBACILLUS COMBO NO.6 (PROBIOTIC COMPLEX ORAL) Take 1 capsule by mouth once.       pimecrolimus (ELIDEL) 1 % cream Apply topically 2 (two) times daily as needed.      polyethylene glycol (GLYCOLAX) 17 gram PwPk Take by mouth.      tiZANidine (ZANAFLEX) 2 MG tablet Take 4 mg by mouth nightly as needed.      vitamin D (VITAMIN D3) 1000 units Tab Take 2,000 Units by mouth once daily.       No current facility-administered medications for this visit.     Allergies: Wheat containing prod, Advil [ibuprofen], Apple cider vinegar, Aspirin, Lactobacillus acidoph-lactase, Milk containing products, Nsaids (non-steroidal anti-inflammatory drug), and Alcohol     ROS:  Constitutional: no weight loss, weight gain, fever,  "fatigue  Eyes:  No vision changes, glasses/contacts  ENT/Mouth: No ulcers, sinus problems, ears ringing, headache  Cardiovascular: No inability to lie flat, chest pain, exercise intolerance, swelling, heart palpitations  Respiratory: No wheezing, coughing blood, shortness of breath, or cough  Gastrointestinal: No diarrhea, bloody stool, nausea/vomiting, constipation, gas, hemorrhoids  Genitourinary: No blood in urine, painful urination, urgency of urination, frequency of urination, incomplete emptying, incontinence, abnormal bleeding, painful periods, heavy periods, vaginal discharge, vaginal odor, painful intercourse, sexual problems, bleeding after intercourse.  Musculoskeletal: No muscle weakness  Skin/Breast: No painful breasts, nipple discharge, masses, rash, ulcers  Neurological: No passing out, seizures, numbness, headache  Endocrine: No diabetes, hypothyroid, hyperthyroid, hot flashes, hair loss, abnormal hair growth, acne  Psychiatric: No depression, crying  Hematologic: No bruises, bleeding, swollen lymph nodes, anemia.      OBJECTIVE:   The patient appears well, alert, oriented x 3, in no distress.  BP (!) 140/70 (BP Location: Left arm, Patient Position: Sitting, BP Method: Medium (Manual))   Ht 5' 6" (1.676 m)   BMI 21.79 kg/m²   NECK: no thyromegaly, trachea midline  SKIN: no acne, striae, hirsutism  CHEST: CTAB  CV: RRR  BREAST EXAM: breasts appear normal, no suspicious masses, no skin or nipple changes or axillary nodes  ABDOMEN: no hernias, masses, or hepatosplenomegaly  GENITALIA: normal external genitalia, no erythema, no discharge  Left perirectal/ perineum with 4 mm area of folliculitis vs cyst  URETHRA: normal urethra, normal urethral meatus  VAGINA: Normal  CERVIX: no lesions or cervical motion tenderness  UTERUS: normal size, contour, position, consistency, mobility, non-tender  ADNEXA: no mass, fullness, tenderness    Physical Exam  Genitourinary:              ASSESSMENT:   1. Well female " exam with routine gynecological exam  Liquid-Based Pap Smear, Screening      2. Vulval lesion            PLAN:   Orders Placed This Encounter    Liquid-Based Pap Smear, Screening     Reassured benign vulvar lesion. Warm compresses. Desires excision.  Discussed healthy lifestyle including regular exercise, healthy eating, etc.  Return to clinic in 1 year

## 2023-04-26 LAB
FINAL PATHOLOGIC DIAGNOSIS: ABNORMAL
Lab: ABNORMAL

## 2023-04-28 ENCOUNTER — PATIENT MESSAGE (OUTPATIENT)
Dept: OBSTETRICS AND GYNECOLOGY | Facility: CLINIC | Age: 68
End: 2023-04-28
Payer: COMMERCIAL

## 2023-05-01 ENCOUNTER — IMMUNIZATION (OUTPATIENT)
Dept: INTERNAL MEDICINE | Facility: CLINIC | Age: 68
End: 2023-05-01
Payer: COMMERCIAL

## 2023-05-01 ENCOUNTER — PATIENT MESSAGE (OUTPATIENT)
Dept: INTERNAL MEDICINE | Facility: CLINIC | Age: 68
End: 2023-05-01
Payer: COMMERCIAL

## 2023-05-01 DIAGNOSIS — R09.81 COUGH WITH CONGESTION OF PARANASAL SINUS: ICD-10-CM

## 2023-05-01 DIAGNOSIS — J06.9 VIRAL URI WITH COUGH: ICD-10-CM

## 2023-05-01 DIAGNOSIS — R05.8 COUGH WITH CONGESTION OF PARANASAL SINUS: ICD-10-CM

## 2023-05-01 DIAGNOSIS — R09.82 PND (POST-NASAL DRIP): ICD-10-CM

## 2023-05-01 DIAGNOSIS — Z23 NEED FOR VACCINATION: Primary | ICD-10-CM

## 2023-05-01 PROCEDURE — 0124A COVID-19, MRNA, LNP-S, BIVALENT BOOSTER, PF, 30 MCG/0.3 ML DOSE: CPT | Mod: CV19,PBBFAC | Performed by: INTERNAL MEDICINE

## 2023-05-01 PROCEDURE — 91312 COVID-19, MRNA, LNP-S, BIVALENT BOOSTER, PF, 30 MCG/0.3 ML DOSE: CPT | Mod: S$GLB,,, | Performed by: INTERNAL MEDICINE

## 2023-05-01 PROCEDURE — 91312 COVID-19, MRNA, LNP-S, BIVALENT BOOSTER, PF, 30 MCG/0.3 ML DOSE: ICD-10-PCS | Mod: S$GLB,,, | Performed by: INTERNAL MEDICINE

## 2023-05-01 RX ORDER — AZELASTINE 1 MG/ML
1 SPRAY, METERED NASAL 2 TIMES DAILY PRN
Qty: 30 ML | Refills: 2 | Status: SHIPPED | OUTPATIENT
Start: 2023-05-01 | End: 2023-06-13 | Stop reason: SDUPTHER

## 2023-05-04 ENCOUNTER — PATIENT MESSAGE (OUTPATIENT)
Dept: INTERNAL MEDICINE | Facility: CLINIC | Age: 68
End: 2023-05-04
Payer: COMMERCIAL

## 2023-05-04 ENCOUNTER — CLINICAL SUPPORT (OUTPATIENT)
Dept: REHABILITATION | Facility: HOSPITAL | Age: 68
End: 2023-05-04
Payer: COMMERCIAL

## 2023-05-04 DIAGNOSIS — M54.41 CHRONIC BILATERAL LOW BACK PAIN WITH RIGHT-SIDED SCIATICA: Primary | ICD-10-CM

## 2023-05-04 DIAGNOSIS — G89.29 CHRONIC BILATERAL LOW BACK PAIN WITH RIGHT-SIDED SCIATICA: Primary | ICD-10-CM

## 2023-05-04 PROCEDURE — 97014 ELECTRIC STIMULATION THERAPY: CPT | Mod: PN | Performed by: PHYSICAL THERAPIST

## 2023-05-04 PROCEDURE — 97110 THERAPEUTIC EXERCISES: CPT | Mod: PN | Performed by: PHYSICAL THERAPIST

## 2023-05-04 PROCEDURE — 97035 APP MDLTY 1+ULTRASOUND EA 15: CPT | Mod: PN | Performed by: PHYSICAL THERAPIST

## 2023-05-04 PROCEDURE — 97140 MANUAL THERAPY 1/> REGIONS: CPT | Mod: PN | Performed by: PHYSICAL THERAPIST

## 2023-05-04 NOTE — PLAN OF CARE
Physical Therapy Progress and  Daily Treatment Note     Name: Danuta Santos  Clinic Number: [id number]  Diagnosis:   Encounter Diagnosis   Name Primary?    Chronic bilateral low back pain with right-sided sciatica Yes     Physician: Yumiko Davis MD  Treatment Orders: PT Eval and Treat  Past Medical History:   Diagnosis Date    Allergy     IBS (irritable bowel syndrome)     Pneumonia 02/2019     Precautions: as per diagnosis    Evaluation date: 1-19-23  Visit # authorized: 9/20  Authorization period:12-31-23  Plan of care expiration: 6-15-23  MD Follow up appt: none scheduled    Time In: 4:45   Time Out: 5:50  Billable time:   40 Min + ES    Subjective     Pt reports: feeling tight today and has continued with HEP, but feels she is able to level pelvis, but continue with pain and tightness with pain radiating down R LE    Pain Scale: before treatment:3-4/10  after treatment: 1/10 and level pelvis    Objective     AR R pelvis   Min-mod tightness L lumbar paraspinals, mod on R , min  R after Rx     Lumbar active range of motion in standing is: 5-4-23  - flexion - toes                     - extension -  75%                         - left side bending -  to knee         - right side bending -  to knee pain           Lumbar active range of motion in standing is: 10-27-22  - flexion - toes                     - extension -  100%                         - left side bending -  To knee         - right side bending -  To knee         Muscle Strength 5-4-23  MMT R L   Hip flexion 4+/5 4+/5   Hip abduction 5/5 5/5   Hip extension 5-/5 5/5   Glut max 4-/5 4/5   Knee extension 5/5 5/5   Knee flexion 5/5 5/5                  Muscle Strength 10-27-22  MMT R L   Hip flexion 5/5 5/5   Hip abduction 5/5 5/5   Hip extension 5/5 5/5   Glut max 4+/5 4+/5   Knee extension 5/5 5/5   Knee flexion 5/5 5/5           TREATMENT   Therapeutic exercises were performed to improve ROM, strength, flexibility and stabilization for 10  "minutes.      HS stretch supine   Glut stretch  Piriformis stretch  B QL stretch     B Knee to chest   Contract relax R glut x 3  and realigned and no leg symptoms at end of ex      Verbal review of below.    Pelvic tilt   x 10  Bridge x 10,   SLR x 10,   Trunk rotation supine x 10  Partial sit up x 10  Hip abd sidelying x 10 B,  Arm and Leg lift prone x 10, instructed pt to put pillow under stomach   Hip ext prone with bent knee x 10 instructed pt to put pillow under stomach  Sit to stand x 10  Instructed pt to hold press up for now and will note results  GSS standing x 10    Iron cross x 2 as needed  Plank x 30 sec x 1 Led to dysfunction so pt performed contract relax and realigned I   Contract relax R glut    Pt to start walking at gym or park and start planks again  Pt unable to try to swim until May due to pool upkeep.      Manual therapy: Danuta  received the following manual therapy  techniques x 20 min. to include soft tissue and joint mobilization were applied to the: low back and gluteals to include:     Performed STM to LB paraspinals and QL R>L  (NP)P/A mob to lumbar region Grade 1-2 , (NP)MET to QL R SL L  Sidelying L contract relax mobilization to L5-S1 region to level pelvis  at end of treatment and level pelvis achieved at end of session    Pt received tool-assisted massage with manual therapy techniques to B LB in prone to trigger an inflammatory healing response and stimulate the production of new collagen and proper, more functional, less painful healing.    Vacuum/cupping STM with manual therapy techniques was performed to back and gluteals to decrease muscle tightness, increase circulation and promote healing process.  The pt's skin was monitored for redness adjusting pressure as needed. The pt was instructed in possible side effects of bruising and/or soreness.      (NP)Kinesiotape with 6" star for pain relief was performed over the R lumbar paraspinals Instructed pt in use, care and " precautions with tape.       Ultrasound  for pain control and to decrease inflammation @ continuous duty cycle, 1 Mhz, applied to R lumbar paraspinals, intensity = 1.5 w/cm2 for 8 minutes. 2 min prep      Patient received pre-mod electrical stimulation to decrease muscle tightness and pain to Lumbar paraspinals/ sacral border of gluteals B for 15 minutes with MH supine with cycle time: continuous, beat frequency: , CC/CV: CV.      Written Home Exercises Provided: yes  Pt demo good understanding of the education provided. Danuta demonstrated good return demonstration of activities.     Pt. education:  Posture reeducation, body mechanics, HEP, proper posture in standing  No spiritual or educational barriers to learning provided  Pt has no cultural, educational or language barriers to learning provided.    Assessment   Pt has been seen for 5 visits since last progress report with last visit on 4-18-23.  Pt has increased consistency with HEP and displays improved strength.  Pt scott treatment well and able to self mobilize and has had less episodes of radicular symptoms  Pt has been doing more with knee to chest and may benefit from continued work on flexion principles.      Pt will continue to benefit from skilled outpatient physical therapy to address the remaining functional deficits, provide pt/family education, and to maximize pt's level of independence in the home and community environment. .     GOALS:   Short Term Goals:  3 weeks MET STG's  Increase range of motion 25%  Increase strength 1/2 muscle grade  Improve postural awareness of pelvis to independently identify dysfunction with min assist from PT  Be able to perform HEP with minimal cueing required     Long Term Goals: 6 weeks PARTIALLY MET LTG'S  Increase range of motion to 75% to 100% full   Improve muscle strength 1 muscle grade  Improve muscle strength with MMT to 4+/5 to 5/5  Improve and stabilize proper pelvic positioning  Restore ability to  sit for ADL and work with min to 0 pain  Restore normal sleep habits without disturbances due to pain  Restore ability to perform ADL's and household activities independently with min to 0 pain    Anticipated barriers to physical therapy: none  Pt's spiritual, cultural and educational needs considered and pt agreeable to plan of care and goals        Plan   If you concur, I recommend patient continue with physical therapy 1-2 times a week as needed  for 6 weeks.  Please advise us of your  recommendations. Thank you for allowing us to assist in the care of your patient.      Jacy Escobar, MS, PT          I certify the need for these services furnished under this plan of treatment and while under my care.    ____________________________________ Physician/Referring Practitioner                                Date of Signature

## 2023-05-04 NOTE — PROGRESS NOTES
Physical Therapy Progress and  Daily Treatment Note     Name: Danuta Santos  Clinic Number: [id number]  Diagnosis:   Encounter Diagnosis   Name Primary?    Chronic bilateral low back pain with right-sided sciatica Yes     Physician: Yumiko Davis MD  Treatment Orders: PT Eval and Treat  Past Medical History:   Diagnosis Date    Allergy     IBS (irritable bowel syndrome)     Pneumonia 02/2019     Precautions: as per diagnosis    Evaluation date: 1-19-23  Visit # authorized: 9/20  Authorization period:12-31-23  Plan of care expiration: 6-15-23  MD Follow up appt: none scheduled    Time In: 4:45   Time Out: 5:50  Billable time:   40 Min + ES    Subjective     Pt reports: feeling tight today and has continued with HEP, but feels she is able to level pelvis, but continue with pain and tightness with pain radiating down R LE    Pain Scale: before treatment:3-4/10  after treatment: 1/10 and level pelvis    Objective     AR R pelvis   Min-mod tightness L lumbar paraspinals, mod on R , min  R after Rx     Lumbar active range of motion in standing is: 5-4-23  - flexion - toes                     - extension -  75%                         - left side bending -  to knee         - right side bending -  to knee pain           Lumbar active range of motion in standing is: 10-27-22  - flexion - toes                     - extension -  100%                         - left side bending -  To knee         - right side bending -  To knee         Muscle Strength 5-4-23  MMT R L   Hip flexion 4+/5 4+/5   Hip abduction 5/5 5/5   Hip extension 5-/5 5/5   Glut max 4-/5 4/5   Knee extension 5/5 5/5   Knee flexion 5/5 5/5                  Muscle Strength 10-27-22  MMT R L   Hip flexion 5/5 5/5   Hip abduction 5/5 5/5   Hip extension 5/5 5/5   Glut max 4+/5 4+/5   Knee extension 5/5 5/5   Knee flexion 5/5 5/5           TREATMENT   Therapeutic exercises were performed to improve ROM, strength, flexibility and stabilization for 10  "minutes.      HS stretch supine   Glut stretch  Piriformis stretch  B QL stretch     B Knee to chest   Contract relax R glut x 3  and realigned and no leg symptoms at end of ex      Verbal review of below.    Pelvic tilt   x 10  Bridge x 10,   SLR x 10,   Trunk rotation supine x 10  Partial sit up x 10  Hip abd sidelying x 10 B,  Arm and Leg lift prone x 10, instructed pt to put pillow under stomach   Hip ext prone with bent knee x 10 instructed pt to put pillow under stomach  Sit to stand x 10  Instructed pt to hold press up for now and will note results  GSS standing x 10    Iron cross x 2 as needed  Plank x 30 sec x 1 Led to dysfunction so pt performed contract relax and realigned I   Contract relax R glut    Pt to start walking at gym or park and start planks again  Pt unable to try to swim until May due to pool upkeep.      Manual therapy: Danuta  received the following manual therapy  techniques x 20 min. to include soft tissue and joint mobilization were applied to the: low back and gluteals to include:     Performed STM to LB paraspinals and QL R>L  (NP)P/A mob to lumbar region Grade 1-2 , (NP)MET to QL R SL L  Sidelying L contract relax mobilization to L5-S1 region to level pelvis  at end of treatment and level pelvis achieved at end of session    Pt received tool-assisted massage with manual therapy techniques to B LB in prone to trigger an inflammatory healing response and stimulate the production of new collagen and proper, more functional, less painful healing.    Vacuum/cupping STM with manual therapy techniques was performed to back and gluteals to decrease muscle tightness, increase circulation and promote healing process.  The pt's skin was monitored for redness adjusting pressure as needed. The pt was instructed in possible side effects of bruising and/or soreness.      (NP)Kinesiotape with 6" star for pain relief was performed over the R lumbar paraspinals Instructed pt in use, care and " precautions with tape.       Ultrasound  for pain control and to decrease inflammation @ continuous duty cycle, 1 Mhz, applied to R lumbar paraspinals, intensity = 1.5 w/cm2 for 8 minutes. 2 min prep      Patient received pre-mod electrical stimulation to decrease muscle tightness and pain to Lumbar paraspinals/ sacral border of gluteals B for 15 minutes with MH supine with cycle time: continuous, beat frequency: , CC/CV: CV.      Written Home Exercises Provided: yes  Pt demo good understanding of the education provided. Danuta demonstrated good return demonstration of activities.     Pt. education:  Posture reeducation, body mechanics, HEP, proper posture in standing  No spiritual or educational barriers to learning provided  Pt has no cultural, educational or language barriers to learning provided.    Assessment   Pt has been seen for 5 visits since last progress report with last visit on 4-18-23.  Pt has increased consistency with HEP and displays improved strength.  Pt scott treatment well and able to self mobilize and has had less episodes of radicular symptoms  Pt has been doing more with knee to chest and may benefit from continued work on flexion principles.      Pt will continue to benefit from skilled outpatient physical therapy to address the remaining functional deficits, provide pt/family education, and to maximize pt's level of independence in the home and community environment. .     GOALS:   Short Term Goals:  3 weeks MET STG's  Increase range of motion 25%  Increase strength 1/2 muscle grade  Improve postural awareness of pelvis to independently identify dysfunction with min assist from PT  Be able to perform HEP with minimal cueing required     Long Term Goals: 6 weeks PARTIALLY MET LTG'S  Increase range of motion to 75% to 100% full   Improve muscle strength 1 muscle grade  Improve muscle strength with MMT to 4+/5 to 5/5  Improve and stabilize proper pelvic positioning  Restore ability to  sit for ADL and work with min to 0 pain  Restore normal sleep habits without disturbances due to pain  Restore ability to perform ADL's and household activities independently with min to 0 pain    Anticipated barriers to physical therapy: none  Pt's spiritual, cultural and educational needs considered and pt agreeable to plan of care and goals        Plan   If you concur, I recommend patient continue with physical therapy 1-2 times a week as needed  for 6 weeks.  Please advise us of your  recommendations. Thank you for allowing us to assist in the care of your patient.      Jacy Escobar, MS, PT          I certify the need for these services furnished under this plan of treatment and while under my care.    ____________________________________ Physician/Referring Practitioner                                Date of Signature         Jacy Escobar, MS, PT

## 2023-05-05 ENCOUNTER — HOSPITAL ENCOUNTER (OUTPATIENT)
Dept: RADIOLOGY | Facility: HOSPITAL | Age: 68
Discharge: HOME | End: 2023-05-05
Attending: INTERNAL MEDICINE
Payer: COMMERCIAL

## 2023-05-05 DIAGNOSIS — R10.2 PELVIC PAIN IN FEMALE: ICD-10-CM

## 2023-05-05 PROCEDURE — 76856 US PELVIS COMP WITH TRANSVAG NON-OB (XPD): ICD-10-PCS | Mod: 26,,, | Performed by: STUDENT IN AN ORGANIZED HEALTH CARE EDUCATION/TRAINING PROGRAM

## 2023-05-05 PROCEDURE — 76830 TRANSVAGINAL US NON-OB: CPT | Mod: 26,,, | Performed by: STUDENT IN AN ORGANIZED HEALTH CARE EDUCATION/TRAINING PROGRAM

## 2023-05-05 PROCEDURE — 76856 US EXAM PELVIC COMPLETE: CPT | Mod: TC

## 2023-05-05 PROCEDURE — 76830 US PELVIS COMP WITH TRANSVAG NON-OB (XPD): ICD-10-PCS | Mod: 26,,, | Performed by: STUDENT IN AN ORGANIZED HEALTH CARE EDUCATION/TRAINING PROGRAM

## 2023-05-05 PROCEDURE — 76856 US EXAM PELVIC COMPLETE: CPT | Mod: 26,,, | Performed by: STUDENT IN AN ORGANIZED HEALTH CARE EDUCATION/TRAINING PROGRAM

## 2023-05-07 DIAGNOSIS — E87.5 HYPERKALEMIA: Primary | ICD-10-CM

## 2023-05-16 ENCOUNTER — CLINICAL SUPPORT (OUTPATIENT)
Dept: REHABILITATION | Facility: HOSPITAL | Age: 68
End: 2023-05-16
Payer: COMMERCIAL

## 2023-05-16 DIAGNOSIS — M54.41 CHRONIC BILATERAL LOW BACK PAIN WITH RIGHT-SIDED SCIATICA: Primary | ICD-10-CM

## 2023-05-16 DIAGNOSIS — G89.29 CHRONIC BILATERAL LOW BACK PAIN WITH RIGHT-SIDED SCIATICA: Primary | ICD-10-CM

## 2023-05-16 PROCEDURE — 97035 APP MDLTY 1+ULTRASOUND EA 15: CPT | Mod: PN | Performed by: PHYSICAL THERAPIST

## 2023-05-16 PROCEDURE — 97110 THERAPEUTIC EXERCISES: CPT | Mod: PN | Performed by: PHYSICAL THERAPIST

## 2023-05-16 PROCEDURE — 97014 ELECTRIC STIMULATION THERAPY: CPT | Mod: PN | Performed by: PHYSICAL THERAPIST

## 2023-05-16 PROCEDURE — 97140 MANUAL THERAPY 1/> REGIONS: CPT | Mod: PN | Performed by: PHYSICAL THERAPIST

## 2023-05-16 NOTE — PROGRESS NOTES
Physical Therapy   Daily Treatment Note     Name: Danuta Santos  Clinic Number: [id number]  Diagnosis:   Encounter Diagnosis   Name Primary?    Chronic bilateral low back pain with right-sided sciatica Yes     Physician: Yumiko Davis MD  Treatment Orders: PT Eval and Treat  Past Medical History:   Diagnosis Date    Allergy     IBS (irritable bowel syndrome)     Pneumonia 02/2019     Precautions: as per diagnosis    Evaluation date: 1-19-23  Visit # authorized: 10/20  Authorization period:12-31-23  Plan of care expiration: 6-15-23  MD Follow up appt: none scheduled    Time In: 4:20  Time Out: 5:20  Billable time:   40 Min + ES    Subjective     Pt reports: feeling some increased tightness, end of school, so increased stress leads to increased pain  Pain Scale: before treatment:3-4/10  after treatment: 1/10 and level pelvis    Objective     AR R pelvis      TREATMENT   Therapeutic exercises were performed to improve ROM, strength, flexibility and stabilization for 10 minutes.      HS stretch supine   Glut stretch  Piriformis stretch  B QL stretch     B Knee to chest   Contract relax R glut x 3  and realigned and no leg symptoms at end of ex      Verbal review of below.    Pelvic tilt   x 10  Bridge x 10,   SLR x 10,   Trunk rotation supine x 10  Partial sit up x 10  Hip abd sidelying x 10 B,  Arm and Leg lift prone x 10, instructed pt to put pillow under stomach   Hip ext prone with bent knee x 10 instructed pt to put pillow under stomach  Sit to stand x 10  Instructed pt to hold press up for now and will note results  GSS standing x 10    Iron cross x 2 as needed  Plank x 30 sec x 1 Led to dysfunction so pt performed contract relax and realigned I   Contract relax R glut    Pt to start walking at gym or park and start planks again  Pt unable to try to swim until May due to pool upkeep.      Manual therapy: Danuta  received the following manual therapy  techniques x 20 min. to include soft tissue and  "joint mobilization were applied to the: low back and gluteals to include:     Performed STM to LB paraspinals and QL R>L  (NP)P/A mob to lumbar region Grade 1-2 , (NP)MET to QL R SL L  Sidelying L contract relax mobilization to L5-S1 region to level pelvis  at end of treatment and level pelvis achieved at end of session    Pt received tool-assisted massage with manual therapy techniques to B LB in prone to trigger an inflammatory healing response and stimulate the production of new collagen and proper, more functional, less painful healing.    Vacuum/cupping STM with manual therapy techniques was performed to back and gluteals to decrease muscle tightness, increase circulation and promote healing process.  The pt's skin was monitored for redness adjusting pressure as needed. The pt was instructed in possible side effects of bruising and/or soreness.      (NP)Kinesiotape with 6" star for pain relief was performed over the R lumbar paraspinals Instructed pt in use, care and precautions with tape.       Ultrasound  for pain control and to decrease inflammation @ continuous duty cycle, 1 Mhz, applied to R lumbar paraspinals, intensity = 1.5 w/cm2 for 8 minutes. 2 min prep      Patient received pre-mod electrical stimulation to decrease muscle tightness and pain to Lumbar paraspinals/ sacral border of gluteals B for 15 minutes with MH supine with cycle time: continuous, beat frequency: , CC/CV: CV.      Written Home Exercises Provided: continue prior HEP  Pt demo good understanding of the education provided. Danuta demonstrated good return demonstration of activities.     Pt. education:  Posture reeducation, body mechanics, HEP, proper posture in standing  No spiritual or educational barriers to learning provided  Pt has no cultural, educational or language barriers to learning provided.    Assessment   Pt with slight SI dysfunction and mod tightness this date.  Pt scott treatment well and further decreased " tightness and pain at end of session.  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 .     GOALS:   Short Term Goals:  3 weeks MET STG's  Increase range of motion 25%  Increase strength 1/2 muscle grade  Improve postural awareness of pelvis to independently identify dysfunction with min assist from PT  Be able to perform HEP with minimal cueing required     Long Term Goals: 6 weeks PARTIALLY MET LTG'S  Increase range of motion to 75% to 100% full   Improve muscle strength 1 muscle grade  Improve muscle strength with MMT to 4+/5 to 5/5  Improve and stabilize proper pelvic positioning  Restore ability to sit for ADL and work with min to 0 pain  Restore normal sleep habits without disturbances due to pain  Restore ability to perform ADL's and household activities independently with min to 0 pain    Anticipated barriers to physical therapy: none  Pt's spiritual, cultural and educational needs considered and pt agreeable to plan of care and goals        Plan   Continue with established plan of care towards PT goals.        Jacy Escobar, MS, PT

## 2023-05-21 ENCOUNTER — PATIENT MESSAGE (OUTPATIENT)
Dept: INTERNAL MEDICINE | Facility: CLINIC | Age: 68
End: 2023-05-21
Payer: COMMERCIAL

## 2023-05-22 ENCOUNTER — PROCEDURE VISIT (OUTPATIENT)
Dept: OBSTETRICS AND GYNECOLOGY | Facility: CLINIC | Age: 68
End: 2023-05-22
Payer: COMMERCIAL

## 2023-05-22 VITALS — DIASTOLIC BLOOD PRESSURE: 68 MMHG | SYSTOLIC BLOOD PRESSURE: 110 MMHG | BODY MASS INDEX: 21.79 KG/M2 | HEIGHT: 66 IN

## 2023-05-22 DIAGNOSIS — N95.2 ATROPHIC VAGINITIS: Primary | ICD-10-CM

## 2023-05-22 PROCEDURE — 99213 OFFICE O/P EST LOW 20 MIN: CPT | Mod: S$GLB,,, | Performed by: OBSTETRICS & GYNECOLOGY

## 2023-05-22 PROCEDURE — 99213 PR OFFICE/OUTPT VISIT, EST, LEVL III, 20-29 MIN: ICD-10-PCS | Mod: S$GLB,,, | Performed by: OBSTETRICS & GYNECOLOGY

## 2023-05-22 RX ORDER — ESTRADIOL 10 UG/1
10 INSERT VAGINAL
Qty: 24 TABLET | Refills: 3 | Status: SHIPPED | OUTPATIENT
Start: 2023-05-22 | End: 2024-05-21

## 2023-05-22 RX ORDER — MUPIROCIN 20 MG/G
OINTMENT TOPICAL 3 TIMES DAILY
Qty: 30 G | Refills: 0 | Status: SHIPPED | OUTPATIENT
Start: 2023-05-22

## 2023-05-22 NOTE — PROGRESS NOTES
SUBJECTIVE:   67 y.o. female   for vulvar cyst removal. No LMP recorded. Patient is postmenopausal.. Seen for [employer] and reported bothersome left vulvar cyst that got inflamed periodically. Desired removal. But not present today.   Also may become sexually soon and desires to start tx for vaginal dryness.          Past Medical History:   Diagnosis Date    ADHD (attention deficit hyperactivity disorder)     Allergy     wheat , diary, alcohol ,some vinegars    Arthritis     Bronchitis- bad twice 2018 and 2019 again ; admitted for bacterial pneumonia     IBS (irritable bowel syndrome)     Recurrent upper respiratory infection (URI)      Past Surgical History:   Procedure Laterality Date     SECTION       and     COLONOSCOPY N/A 2020    Procedure: COLONOSCOPY;  Surgeon: Liam Lipscomb MD;  Location: [facility] ([address] [place] [address]);  Service: Endoscopy;  Laterality: N/A;  Single balloon needed, can start with regular scope  COVID screening  20 - [facility] - [facility]    foot bunio Left     intial 2021 and then remoced 3/18/2022    NOSE SURGERY      right hand  2021    2nd digit tore flexer tendon ; dr smith    SINUS SURGERY  2019    shay--- after pneumonia then a yr later sinoplasty     Social History     Socioeconomic History    Marital status:    Tobacco Use    Smoking status: Never    Smokeless tobacco: Never   Substance and Sexual Activity    Alcohol use: No    Drug use: No    Sexual activity: Not Currently     Partners: Male     Birth control/protection: None, Post-menopausal     Family History   Problem Relation Age of Onset    COPD Mother     Cancer Father 54        pancreatic - ergonamist    Hypertension Brother     Cancer Paternal Aunt         breast    Cancer Maternal Grandmother         leukemia    Alcohol abuse Maternal Grandfather     Stroke Maternal Grandfather     Stroke Paternal Grandfather     Cancer Cousin         leukemia    Breast cancer Neg Hx      Colon cancer Neg Hx     Diabetes Neg Hx     Eclampsia Neg Hx     Miscarriages / Stillbirths Neg Hx     Ovarian cancer Neg Hx      labor Neg Hx     Allergic rhinitis Neg Hx     Allergies Neg Hx     Angioedema Neg Hx     Asthma Neg Hx     Eczema Neg Hx     Immunodeficiency Neg Hx     Rhinitis Neg Hx     Urticaria Neg Hx     Atopy Neg Hx      OB History    Para Term  AB Living   2 2 2     2   SAB IAB Ectopic Multiple Live Births           2      # Outcome Date GA Lbr Gio/2nd Weight Sex Delivery Anes PTL Lv   2 Term            1 Term                  Current Outpatient Medications   Medication Sig Dispense Refill    acetaminophen (TYLENOL) 500 MG tablet Take 500 mg by mouth every 6 (six) hours as needed.      ascorbic acid, vitamin C, (VITAMIN C) 500 MG tablet Take 500 mg by mouth once daily.      azelastine (ASTELIN) 137 mcg (0.1 %) nasal spray 1 spray (137 mcg total) by Nasal route 2 (two) times daily as needed for Rhinitis. 30 mL 2    calcium carbonate (CALCIUM 500 ORAL) Take by mouth.      cyanocobalamin 500 MCG tablet Take 1,000 mcg by mouth once daily.      glucosam/chond/collagen/hyalur (BCCSMPOG-JVLL-ZEOTDY-HYALUR AC ORAL) Take 1 tablet by mouth once daily at 6am.      LACTOBACILLUS COMBO NO.6 (PROBIOTIC COMPLEX ORAL) Take 1 capsule by mouth once.       pimecrolimus (ELIDEL) 1 % cream Apply topically 2 (two) times daily as needed.      polyethylene glycol (GLYCOLAX) 17 gram PwPk Take by mouth.      tiZANidine (ZANAFLEX) 2 MG tablet Take 4 mg by mouth nightly as needed.      vitamin D (VITAMIN D3) 1000 units Tab Take 2,000 Units by mouth once daily.      estradioL (VAGIFEM) 10 mcg Tab Place 1 tablet (10 mcg total) vaginally twice a week. 24 tablet 3    mupirocin (BACTROBAN) 2 % ointment Apply topically 3 (three) times daily. 30 g 0     No current facility-administered medications for this visit.     Allergies: Wheat containing prod, Advil [ibuprofen], Apple cider vinegar, Aspirin,  "Lactobacillus acidoph-lactase, Milk containing products (dairy), Nsaids (non-steroidal anti-inflammatory drug), and Alcohol     ROS:  Constitutional: no weight loss, weight gain, fever, fatigue  Eyes:  No vision changes, glasses/contacts  ENT/Mouth: No ulcers, sinus problems, ears ringing, headache  Cardiovascular: No inability to lie flat, chest pain, exercise intolerance, swelling, heart palpitations  Respiratory: No wheezing, coughing blood, shortness of breath, or cough  Gastrointestinal: No diarrhea, bloody stool, nausea/vomiting, constipation, gas, hemorrhoids  Genitourinary: No blood in urine, painful urination, urgency of urination, frequency of urination, incomplete emptying, incontinence, abnormal bleeding, painful periods, heavy periods, vaginal discharge, vaginal odor, painful intercourse, sexual problems, bleeding after intercourse.  Musculoskeletal: No muscle weakness  Skin/Breast: No painful breasts, nipple discharge, masses, rash, ulcers  Neurological: No passing out, seizures, numbness, headache  Endocrine: No diabetes, hypothyroid, hyperthyroid, hot flashes, hair loss, abnormal hair growth, ance  Psychiatric: No depression, crying  Hematologic: No bruises, bleeding, swollen lymph nodes, anemia.      OBJECTIVE:   The patient appears well, alert, oriented x 3, in no distress.  /68 (BP Location: Right arm, Patient Position: Sitting, BP Method: Medium (Manual))   Ht 5' 6" (1.676 m)   BMI 21.79 kg/m²   GENITALIA: normal external genitalia, no erythema, no discharge    ASSESSMENT:   1. Atrophic vaginitis            PLAN:   Orders Placed This Encounter    mupirocin (BACTROBAN) 2 % ointment    estradioL (VAGIFEM) 10 mcg Tab     Discussed resolved vulvar cyst/ folliculitis. Topical bactroban if problematic  Discussed atrophic vaginitis and mgt options. Trial of Vagifem  Return to clinic prn    "

## 2023-05-23 ENCOUNTER — OFFICE VISIT (OUTPATIENT)
Dept: INTERNAL MEDICINE | Facility: CLINIC | Age: 68
End: 2023-05-23
Payer: COMMERCIAL

## 2023-05-23 ENCOUNTER — CLINICAL SUPPORT (OUTPATIENT)
Dept: INTERNAL MEDICINE | Facility: CLINIC | Age: 68
End: 2023-05-23
Payer: COMMERCIAL

## 2023-05-23 VITALS — DIASTOLIC BLOOD PRESSURE: 68 MMHG | HEART RATE: 53 BPM | OXYGEN SATURATION: 99 % | SYSTOLIC BLOOD PRESSURE: 128 MMHG

## 2023-05-23 DIAGNOSIS — J20.8 ACUTE BACTERIAL BRONCHITIS: ICD-10-CM

## 2023-05-23 DIAGNOSIS — E87.1 HYPONATREMIA: ICD-10-CM

## 2023-05-23 DIAGNOSIS — J01.90 ACUTE BACTERIAL SINUSITIS: Primary | ICD-10-CM

## 2023-05-23 DIAGNOSIS — B96.89 ACUTE BACTERIAL BRONCHITIS: ICD-10-CM

## 2023-05-23 DIAGNOSIS — B96.89 ACUTE BACTERIAL SINUSITIS: Primary | ICD-10-CM

## 2023-05-23 DIAGNOSIS — E87.5 HYPERKALEMIA: ICD-10-CM

## 2023-05-23 LAB
ANION GAP SERPL CALC-SCNC: 10 MMOL/L (ref 8–16)
BUN SERPL-MCNC: 10 MG/DL (ref 8–23)
CALCIUM SERPL-MCNC: 10.3 MG/DL (ref 8.7–10.5)
CHLORIDE SERPL-SCNC: 100 MMOL/L (ref 95–110)
CO2 SERPL-SCNC: 26 MMOL/L (ref 23–29)
CREAT SERPL-MCNC: 0.9 MG/DL (ref 0.5–1.4)
EST. GFR  (NO RACE VARIABLE): >60 ML/MIN/1.73 M^2
GLUCOSE SERPL-MCNC: 61 MG/DL (ref 70–110)
POTASSIUM SERPL-SCNC: 4.4 MMOL/L (ref 3.5–5.1)
SODIUM SERPL-SCNC: 136 MMOL/L (ref 136–145)

## 2023-05-23 PROCEDURE — 1160F RVW MEDS BY RX/DR IN RCRD: CPT | Mod: CPTII,S$GLB,, | Performed by: INTERNAL MEDICINE

## 2023-05-23 PROCEDURE — 3044F PR MOST RECENT HEMOGLOBIN A1C LEVEL <7.0%: ICD-10-PCS | Mod: CPTII,S$GLB,, | Performed by: INTERNAL MEDICINE

## 2023-05-23 PROCEDURE — 1159F MED LIST DOCD IN RCRD: CPT | Mod: CPTII,S$GLB,, | Performed by: INTERNAL MEDICINE

## 2023-05-23 PROCEDURE — 99214 PR OFFICE/OUTPT VISIT, EST, LEVL IV, 30-39 MIN: ICD-10-PCS | Mod: S$GLB,,, | Performed by: INTERNAL MEDICINE

## 2023-05-23 PROCEDURE — 3074F SYST BP LT 130 MM HG: CPT | Mod: CPTII,S$GLB,, | Performed by: INTERNAL MEDICINE

## 2023-05-23 PROCEDURE — 1159F PR MEDICATION LIST DOCUMENTED IN MEDICAL RECORD: ICD-10-PCS | Mod: CPTII,S$GLB,, | Performed by: INTERNAL MEDICINE

## 2023-05-23 PROCEDURE — 1101F PT FALLS ASSESS-DOCD LE1/YR: CPT | Mod: CPTII,S$GLB,, | Performed by: INTERNAL MEDICINE

## 2023-05-23 PROCEDURE — 1101F PR PT FALLS ASSESS DOC 0-1 FALLS W/OUT INJ PAST YR: ICD-10-PCS | Mod: CPTII,S$GLB,, | Performed by: INTERNAL MEDICINE

## 2023-05-23 PROCEDURE — 99999 PR PBB SHADOW E&M-EST. PATIENT-LVL III: ICD-10-PCS | Mod: PBBFAC,,, | Performed by: INTERNAL MEDICINE

## 2023-05-23 PROCEDURE — 99214 OFFICE O/P EST MOD 30 MIN: CPT | Mod: S$GLB,,, | Performed by: INTERNAL MEDICINE

## 2023-05-23 PROCEDURE — 3044F HG A1C LEVEL LT 7.0%: CPT | Mod: CPTII,S$GLB,, | Performed by: INTERNAL MEDICINE

## 2023-05-23 PROCEDURE — 1160F PR REVIEW ALL MEDS BY PRESCRIBER/CLIN PHARMACIST DOCUMENTED: ICD-10-PCS | Mod: CPTII,S$GLB,, | Performed by: INTERNAL MEDICINE

## 2023-05-23 PROCEDURE — 3288F FALL RISK ASSESSMENT DOCD: CPT | Mod: CPTII,S$GLB,, | Performed by: INTERNAL MEDICINE

## 2023-05-23 PROCEDURE — 99999 PR PBB SHADOW E&M-EST. PATIENT-LVL I: ICD-10-PCS | Mod: PBBFAC,,,

## 2023-05-23 PROCEDURE — 3078F DIAST BP <80 MM HG: CPT | Mod: CPTII,S$GLB,, | Performed by: INTERNAL MEDICINE

## 2023-05-23 PROCEDURE — 1126F AMNT PAIN NOTED NONE PRSNT: CPT | Mod: CPTII,S$GLB,, | Performed by: INTERNAL MEDICINE

## 2023-05-23 PROCEDURE — 1126F PR PAIN SEVERITY QUANTIFIED, NO PAIN PRESENT: ICD-10-PCS | Mod: CPTII,S$GLB,, | Performed by: INTERNAL MEDICINE

## 2023-05-23 PROCEDURE — 3078F PR MOST RECENT DIASTOLIC BLOOD PRESSURE < 80 MM HG: ICD-10-PCS | Mod: CPTII,S$GLB,, | Performed by: INTERNAL MEDICINE

## 2023-05-23 PROCEDURE — 80048 BASIC METABOLIC PNL TOTAL CA: CPT | Performed by: INTERNAL MEDICINE

## 2023-05-23 PROCEDURE — 3288F PR FALLS RISK ASSESSMENT DOCUMENTED: ICD-10-PCS | Mod: CPTII,S$GLB,, | Performed by: INTERNAL MEDICINE

## 2023-05-23 PROCEDURE — 99999 PR PBB SHADOW E&M-EST. PATIENT-LVL I: CPT | Mod: PBBFAC,,,

## 2023-05-23 PROCEDURE — 3074F PR MOST RECENT SYSTOLIC BLOOD PRESSURE < 130 MM HG: ICD-10-PCS | Mod: CPTII,S$GLB,, | Performed by: INTERNAL MEDICINE

## 2023-05-23 PROCEDURE — 99999 PR PBB SHADOW E&M-EST. PATIENT-LVL III: CPT | Mod: PBBFAC,,, | Performed by: INTERNAL MEDICINE

## 2023-05-23 RX ORDER — AMOXICILLIN AND CLAVULANATE POTASSIUM 875; 125 MG/1; MG/1
1 TABLET, FILM COATED ORAL 2 TIMES DAILY
Qty: 14 TABLET | Refills: 0 | Status: SHIPPED | OUTPATIENT
Start: 2023-05-23 | End: 2023-06-13 | Stop reason: SDUPTHER

## 2023-05-23 RX ORDER — GUAIFENESIN AND DEXTROMETHORPHAN HYDROBROMIDE 600; 30 MG/1; MG/1
1 TABLET, EXTENDED RELEASE ORAL 2 TIMES DAILY
Qty: 20 TABLET | Refills: 0 | Status: SHIPPED | OUTPATIENT
Start: 2023-05-23 | End: 2023-06-02

## 2023-05-23 NOTE — PROGRESS NOTES
Subjective:       Patient ID: Danuta Santos is a 67 y.o. female who presents today for:    Chief Complaint:   Chief Complaint   Patient presents with    Cough       HPI:  Very pleasant 67-year-old nonsmoking female who although has no known allergies does have some issues with getting upper and lower respiratory tract infections.  She is here for an acute care visit.  She has had a wet postnasal drip cough that started over a week ago.  She has been using her Astelin and Flonase daily.  Her left nostril has been somewhat congested.  She never really had sinus headache or severe sore throat or even ear pain this time.  She denies any low-grade fever.  The mucus is thick and occasionally she can bring it up and it is stage green.  She did have her pneumococcal serology checked in May of 2022 and was within normal limits.  She has had both of her pneumococcal vaccinations when she turns 64 and 65 respectively.  Last CT scan of the sinuses did show some mucosal thickening again on the left ethmoid but otherwise clear.  She has had nasal surgery from trauma back in the 90s more focused on the left side as well.    Review of Systems   Constitutional:  Negative for chills, fever and weight loss.   HENT:  Negative for sore throat.    Eyes:  Negative for blurred vision and double vision.   Respiratory:  Positive for cough and sputum production. Negative for shortness of breath.    Cardiovascular:  Negative for chest pain and palpitations.   Gastrointestinal:  Negative for constipation, diarrhea, nausea and vomiting.   Genitourinary:  Negative for dysuria and hematuria.   Musculoskeletal:  Negative for joint pain and myalgias.   Skin:  Negative for itching and rash.   Neurological:  Negative for sensory change, focal weakness and headaches.   Endo/Heme/Allergies:  Does not bruise/bleed easily.   Psychiatric/Behavioral:  Negative for depression and suicidal ideas.       Medications:  Outpatient Encounter Medications  as of 5/23/2023   Medication Sig Dispense Refill    acetaminophen (TYLENOL) 500 MG tablet Take 500 mg by mouth every 6 (six) hours as needed.      amoxicillin-clavulanate 875-125mg (AUGMENTIN) 875-125 mg per tablet Take 1 tablet by mouth 2 (two) times daily. 14 tablet 0    ascorbic acid, vitamin C, (VITAMIN C) 500 MG tablet Take 500 mg by mouth once daily.      azelastine (ASTELIN) 137 mcg (0.1 %) nasal spray 1 spray (137 mcg total) by Nasal route 2 (two) times daily as needed for Rhinitis. 30 mL 2    calcium carbonate (CALCIUM 500 ORAL) Take by mouth.      cyanocobalamin 500 MCG tablet Take 1,000 mcg by mouth once daily.      dextromethorphan-guaiFENesin  mg (MUCINEX DM)  mg per 12 hr tablet Take 1 tablet by mouth 2 (two) times daily. for 10 days 20 tablet 0    estradioL (VAGIFEM) 10 mcg Tab Place 1 tablet (10 mcg total) vaginally twice a week. 24 tablet 3    glucosam/chond/collagen/hyalur (COKKPXEW-LDPR-BEXSYE-HYALUR AC ORAL) Take 1 tablet by mouth once daily at 6am.      LACTOBACILLUS COMBO NO.6 (PROBIOTIC COMPLEX ORAL) Take 1 capsule by mouth once.       mupirocin (BACTROBAN) 2 % ointment Apply topically 3 (three) times daily. 30 g 0    pimecrolimus (ELIDEL) 1 % cream Apply topically 2 (two) times daily as needed.      polyethylene glycol (GLYCOLAX) 17 gram PwPk Take by mouth.      tiZANidine (ZANAFLEX) 2 MG tablet Take 4 mg by mouth nightly as needed.      vitamin D (VITAMIN D3) 1000 units Tab Take 2,000 Units by mouth once daily.       No facility-administered encounter medications on file as of 5/23/2023.       Allergies:  Review of patient's allergies indicates:   Allergen Reactions    Wheat containing prod      Sinus      Advil [ibuprofen]     Apple cider vinegar Other (See Comments)    Aspirin Nausea And Vomiting    Lactobacillus acidoph-lactase      Other reaction(s): Other (See Comments)    Milk containing products (dairy) Other (See Comments)     Post nasal drip    Nsaids (non-steroidal  anti-inflammatory drug)      Other reaction(s): Other (See Comments)  Pt states she gets pain in her stomach when she takes nsaids due to IBS    Alcohol Other (See Comments) and Palpitations     Post nasal drip       Health Maintenance:  Immunization History   Administered Date(s) Administered    COVID-19, MRNA, LN-S, PF (Pfizer) (Gray Cap) 04/12/2022    COVID-19, MRNA, LN-S, PF (Pfizer) (Purple Cap) 02/19/2021, 03/12/2021, 09/28/2021    COVID-19, mRNA, LNP-S, bivalent booster, PF (PFIZER OMICRON) 09/24/2022, 05/01/2023    Hepatitis A, Adult 03/15/2017, 09/19/2017    Hepatitis B 07/12/2012, 09/12/2012    Hepatitis B, Adult 05/21/2015    Hepatitis B, Pediatric/Adolescent 07/12/2012, 09/12/2012    IPV 09/26/2012    Influenza (FLUAD) - Quadrivalent - Adjuvanted - PF *Preferred* (65+) 10/01/2021    Influenza - High Dose - PF (65 years and older) 10/03/2019    Influenza - Quadrivalent 09/18/2018    Influenza - Quadrivalent - PF *Preferred* (6 months and older) 09/22/2018, 09/22/2020    Influenza A (H1N1) 2009 Monovalent - IM 11/21/2009    Pneumococcal Conjugate - 13 Valent 09/30/2019    Pneumococcal Polysaccharide - 23 Valent 03/12/2019, 04/13/2022    Tdap 08/15/2016, 08/16/2022    Typhoid 09/26/2012    Typhoid - ViCPs 09/26/2012, 03/15/2017    Zoster 07/02/2015    Zoster Recombinant 09/18/2018, 09/18/2018, 12/15/2018      Health Maintenance   Topic Date Due    Mammogram  03/03/2024    DEXA Scan  03/15/2024    Lipid Panel  04/13/2028    TETANUS VACCINE  08/16/2032    Hepatitis C Screening  Completed          Objective:      Vital Signs  Pulse: (!) 53  SpO2: 99 %  BP: 128/68  Pain Score: 0-No pain]    Physical Exam  HENT:      Head: Normocephalic and atraumatic.      Salivary Glands: Left salivary gland is diffusely enlarged and tender.      Mouth/Throat:      Comments: For thin green gray posterior pharynx nasal drip visualized.  Eyes:      General: No scleral icterus.  Cardiovascular:      Rate and Rhythm: Normal rate  and regular rhythm.      Heart sounds: No murmur heard.  Pulmonary:      Effort: Pulmonary effort is normal.      Breath sounds: Normal breath sounds. No rales.   Abdominal:      General: Bowel sounds are normal.      Palpations: Abdomen is soft.      Tenderness: There is no abdominal tenderness.   Musculoskeletal:         General: No tenderness.      Cervical back: Neck supple.   Neurological:      Mental Status: She is alert and oriented to person, place, and time.   Psychiatric:         Mood and Affect: Mood normal.         Behavior: Behavior normal.         Thought Content: Thought content normal.         Judgment: Judgment normal.        Assessment/plan:     Danuta Santos is a 67 y.o.female with:    Acute bacterial sinusitis  -     amoxicillin-clavulanate 875-125mg (AUGMENTIN) 875-125 mg per tablet; Take 1 tablet by mouth 2 (two) times daily.  Dispense: 14 tablet; Refill: 0  -     dextromethorphan-guaiFENesin  mg (MUCINEX DM)  mg per 12 hr tablet; Take 1 tablet by mouth 2 (two) times daily. for 10 days  Dispense: 20 tablet; Refill: 0    Acute bacterial bronchitis  -     amoxicillin-clavulanate 875-125mg (AUGMENTIN) 875-125 mg per tablet; Take 1 tablet by mouth 2 (two) times daily.  Dispense: 14 tablet; Refill: 0  -     dextromethorphan-guaiFENesin  mg (MUCINEX DM)  mg per 12 hr tablet; Take 1 tablet by mouth 2 (two) times daily. for 10 days  Dispense: 20 tablet; Refill: 0    Hyponatremia  -     Basic Metabolic Panel; Future; Expected date: 05/23/2023        Future Appointments   Date Time Provider Department Center   5/26/2023 11:30 AM Jacy Escobar, PT [facility]   6/2/2023 11:30 AM Jacy Escobar, PT [facility]   6/8/2023 10:00 AM Jacy Escobar, PT [facility]   6/15/2023  4:00 PM Jacy Escobar, PT [facility]   6/20/2023  2:30 PM Jacy Escobar, PT [facility]   7/14/2023 11:30 AM  Jacy Escobar, PT [facility]       Yumiko Carias MD  [facility]

## 2023-05-25 NOTE — PROGRESS NOTES
Physical Therapy   Daily Treatment Note     Name: Danuta Santos  Clinic Number: [id number]  Diagnosis:   Encounter Diagnosis   Name Primary?    Chronic bilateral low back pain with right-sided sciatica Yes     Physician: Yumiko Davis MD  Treatment Orders: PT Eval and Treat  Past Medical History:   Diagnosis Date    Allergy     IBS (irritable bowel syndrome)     Pneumonia 02/2019     Precautions: as per diagnosis    Evaluation date: 1-19-23  Visit # authorized: 11/20  Authorization period:12-31-23  Plan of care expiration: 6-15-23  MD Follow up appt: none scheduled    Time In: 11:42  Time Out: 12:45  Billable time:   40 Min + ES    Subjective     Pt reports: feeling some pain, leaving tomorrow and plane ride generally leads to increased tightness  Pt has been sick with sinus infection and busy with school   Pain Scale: before treatment:4 10  after treatment: 1/10 and level pelvis    Objective     AR R pelvis      TREATMENT   Therapeutic exercises were performed to improve ROM, strength, flexibility and stabilization for 10 minutes.      HS stretch supine   Glut stretch  Piriformis stretch  B QL stretch     B Knee to chest   Contract relax R glut x 3  and realigned and no leg symptoms at end of ex      Verbal review of below.    Pelvic tilt   x 10  Bridge x 10,   SLR x 10,   Trunk rotation supine x 10  Partial sit up x 10  Hip abd sidelying x 10 B,  Arm and Leg lift prone x 10, instructed pt to put pillow under stomach   Hip ext prone with bent knee x 10 instructed pt to put pillow under stomach  Sit to stand x 10  Instructed pt to hold press up for now and will note results  GSS standing x 10    [employer] x 2 as needed  Plank x 30 sec x 1 Led to dysfunction so pt performed contract relax and realigned I   Contract relax R glut    Pt to start walking at gym or park and start planks again  Pt unable to try to swim until May due to pool upkeep.      Manual therapy: Danuta  received the following manual  "therapy  techniques x 20 min. to include soft tissue and joint mobilization were applied to the: low back and gluteals to include:     Performed STM to LB paraspinals and QL R>L  (NP)P/A mob to lumbar region Grade 1-2 , (NP)MET to QL R SL L  Sidelying L contract relax mobilization to L5-S1 region to level pelvis  at end of treatment and level pelvis achieved at end of session    Pt received tool-assisted massage with manual therapy techniques to B LB in prone to trigger an inflammatory healing response and stimulate the production of new collagen and proper, more functional, less painful healing.    Vacuum/cupping STM with manual therapy techniques was performed to back and gluteals to decrease muscle tightness, increase circulation and promote healing process.  The pt's skin was monitored for redness adjusting pressure as needed. The pt was instructed in possible side effects of bruising and/or soreness.      (NP)Kinesiotape with 6" star for pain relief was performed over the R lumbar paraspinals Instructed pt in use, care and precautions with tape.       Ultrasound  for pain control and to decrease inflammation @ continuous duty cycle, 1 Mhz, applied to R lumbar paraspinals, intensity = 1.5 w/cm2 for 8 minutes. 2 min prep      Patient received pre-mod electrical stimulation to decrease muscle tightness and pain to Lumbar paraspinals/ sacral border of gluteals B for 10 minutes with MH supine with cycle time: continuous, beat frequency: , CC/CV: CV.      Written Home Exercises Provided: continue prior HEP  Pt demo good understanding of the education provided. Danuta demonstrated good return demonstration of activities.     Pt. education:  Posture reeducation, body mechanics, HEP, proper posture in standing  No spiritual or educational barriers to learning provided  Pt has no cultural, educational or language barriers to learning provided.    Assessment   Pt with SI dysfunction, but able to self mobilize.  " Pt continues with mod tightness which is improved after manual therapy and modalities.  Pt getting ready for plane ride and decreased tightness will make ride more comfortable.  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 .     GOALS:   Short Term Goals:  3 weeks MET STG's  Increase range of motion 25%  Increase strength 1/2 muscle grade  Improve postural awareness of pelvis to independently identify dysfunction with min assist from PT  Be able to perform HEP with minimal cueing required     Long Term Goals: 6 weeks PARTIALLY MET LTG'S  Increase range of motion to 75% to 100% full   Improve muscle strength 1 muscle grade  Improve muscle strength with MMT to 4+/5 to 5/5  Improve and stabilize proper pelvic positioning  Restore ability to sit for ADL and work with min to 0 pain  Restore normal sleep habits without disturbances due to pain  Restore ability to perform ADL's and household activities independently with min to 0 pain    Anticipated barriers to physical therapy: none  Pt's spiritual, cultural and educational needs considered and pt agreeable to plan of care and goals        Plan   Continue with established plan of care towards PT goals.        Jacy Escobar, MS, PT

## 2023-05-26 ENCOUNTER — CLINICAL SUPPORT (OUTPATIENT)
Dept: REHABILITATION | Facility: HOSPITAL | Age: 68
End: 2023-05-26
Payer: COMMERCIAL

## 2023-05-26 DIAGNOSIS — G89.29 CHRONIC BILATERAL LOW BACK PAIN WITH RIGHT-SIDED SCIATICA: Primary | ICD-10-CM

## 2023-05-26 DIAGNOSIS — M54.41 CHRONIC BILATERAL LOW BACK PAIN WITH RIGHT-SIDED SCIATICA: Primary | ICD-10-CM

## 2023-05-26 PROCEDURE — 97035 APP MDLTY 1+ULTRASOUND EA 15: CPT | Mod: PN | Performed by: PHYSICAL THERAPIST

## 2023-05-26 PROCEDURE — 97110 THERAPEUTIC EXERCISES: CPT | Mod: PN | Performed by: PHYSICAL THERAPIST

## 2023-05-26 PROCEDURE — 97014 ELECTRIC STIMULATION THERAPY: CPT | Mod: PN | Performed by: PHYSICAL THERAPIST

## 2023-05-26 PROCEDURE — 97140 MANUAL THERAPY 1/> REGIONS: CPT | Mod: PN | Performed by: PHYSICAL THERAPIST

## 2023-06-01 NOTE — PROGRESS NOTES
Physical Therapy   Daily Treatment Note     Name: Danuta Santos  Clinic Number: [id number]  Diagnosis:   Encounter Diagnosis   Name Primary?    Chronic bilateral low back pain with right-sided sciatica Yes     Physician: Yumiko Davis MD  Treatment Orders: PT Eval and Treat  Past Medical History:   Diagnosis Date    Allergy     IBS (irritable bowel syndrome)     Pneumonia 02/2019     Precautions: as per diagnosis    Evaluation date: 1-19-23  Visit # authorized: 12/20  Authorization period:12-31-23  Plan of care expiration: 6-15-23  MD Follow up appt: none scheduled      Time In: 11:40  Time Out: 12:35  Total Appointment Time (timed & untimed codes): 55 minutes    Subjective     Pt reports:she has been busy with end of year activities for school  Pt with moderate pain,but concerned with activities that she needs to do pain will worsen without PT  Pain Scale: before treatment:4 10  after treatment: 1/10 and level pelvis    Objective     AR R pelvis      TREATMENT   Therapeutic exercises were performed to improve ROM, strength, flexibility and stabilization for 10 minutes.      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    Pelvic tilt   x 10  Bridge x 10,   SLR x 10,   Trunk rotation supine x 10  Partial sit up x 10  Hip abd sidelying x 10 B,  Arm and Leg lift prone x 10, instructed pt to put pillow under stomach   Hip ext prone with bent knee x 10 instructed pt to put pillow under stomach  Sit to stand x 10  Instructed pt to hold press up for now and will note results  GSS standing x 10    Iron cross x 2 as needed  Plank x 30 sec x 1 Led to dysfunction so pt performed contract relax and realigned I   Contract relax R glut    Pt to start walking at gym or park and start planks  "again  Pt unable to try to swim until May due to pool upkeep.      Manual therapy: Danuta  received the following manual therapy  techniques x 20 min. to include soft tissue and joint mobilization were applied to the: low back and gluteals to include:     Performed STM to LB paraspinals and QL R>L  (NP)P/A mob to lumbar region Grade 1-2 , (NP)MET to QL R SL L  Sidelying L contract relax mobilization to L5-S1 region to level pelvis  at end of treatment and level pelvis achieved at end of session    Pt received tool-assisted massage with manual therapy techniques to B LB in prone to trigger an inflammatory healing response and stimulate the production of new collagen and proper, more functional, less painful healing.    Vacuum/cupping STM with manual therapy techniques was performed to back and gluteals to decrease muscle tightness, increase circulation and promote healing process.  The pt's skin was monitored for redness adjusting pressure as needed. The pt was instructed in possible side effects of bruising and/or soreness.      (NP)Kinesiotape with 6" star for pain relief was performed over the R lumbar paraspinals Instructed pt in use, care and precautions with tape.       Ultrasound  for pain control and to decrease inflammation @ continuous duty cycle, 1 Mhz, applied to R lumbar paraspinals, intensity = 1.5 w/cm2 for 8 minutes. 2 min prep      Patient received pre-mod electrical stimulation to decrease muscle tightness and pain to Lumbar paraspinals/ sacral border of gluteals B for 10 minutes with MH supine with cycle time: continuous, beat frequency: , CC/CV: CV.      Written Home Exercises Provided: continue prior HEP  Pt demo good understanding of the education provided. Danuta demonstrated good return demonstration of activities.     Pt. education:  Posture reeducation, body mechanics, HEP, proper posture in standing  No spiritual or educational barriers to learning provided  Pt has no cultural, " educational or language barriers to learning provided.    Assessment   Pt with mod symptoms to start and is able to self mobilize still.  Pt with end of year activities that lead to increased symptoms.  Pt managing symptoms fairlywell despite busy schedule but appears to benefit from additional PT weekly at this time to better manage symptoms     Pt will continue to benefit from skilled outpatient physical therapy to address the remaining functional deficits, provide pt/family education, and to maximize pt's level of independence in the home and community environment. .     GOALS:   Short Term Goals:  3 weeks MET STG's  Increase range of motion 25%  Increase strength 1/2 muscle grade  Improve postural awareness of pelvis to independently identify dysfunction with min assist from PT  Be able to perform HEP with minimal cueing required     Long Term Goals: 6 weeks PARTIALLY MET LTG'S  Increase range of motion to 75% to 100% full   Improve muscle strength 1 muscle grade  Improve muscle strength with MMT to 4+/5 to 5/5  Improve and stabilize proper pelvic positioning  Restore ability to sit for ADL and work with min to 0 pain  Restore normal sleep habits without disturbances due to pain  Restore ability to perform ADL's and household activities independently with min to 0 pain    Anticipated barriers to physical therapy: none  Pt's spiritual, cultural and educational needs considered and pt agreeable to plan of care and goals        Plan   Continue with established plan of care towards PT goals.        Jacy Escobar, MS, PT

## 2023-06-02 ENCOUNTER — CLINICAL SUPPORT (OUTPATIENT)
Dept: REHABILITATION | Facility: HOSPITAL | Age: 68
End: 2023-06-02
Payer: COMMERCIAL

## 2023-06-02 DIAGNOSIS — G89.29 CHRONIC BILATERAL LOW BACK PAIN WITH RIGHT-SIDED SCIATICA: Primary | ICD-10-CM

## 2023-06-02 DIAGNOSIS — M54.41 CHRONIC BILATERAL LOW BACK PAIN WITH RIGHT-SIDED SCIATICA: Primary | ICD-10-CM

## 2023-06-02 PROCEDURE — 97110 THERAPEUTIC EXERCISES: CPT | Mod: PN | Performed by: PHYSICAL THERAPIST

## 2023-06-02 PROCEDURE — 97035 APP MDLTY 1+ULTRASOUND EA 15: CPT | Mod: PN | Performed by: PHYSICAL THERAPIST

## 2023-06-02 PROCEDURE — 97014 ELECTRIC STIMULATION THERAPY: CPT | Mod: PN | Performed by: PHYSICAL THERAPIST

## 2023-06-02 PROCEDURE — 97140 MANUAL THERAPY 1/> REGIONS: CPT | Mod: PN | Performed by: PHYSICAL THERAPIST

## 2023-06-07 NOTE — PROGRESS NOTES
Physical Therapy   Daily Treatment Note     Name: Danuta Santos  Clinic Number: [id number]  Diagnosis:   Encounter Diagnosis   Name Primary?    Chronic bilateral low back pain with right-sided sciatica Yes     Physician: Yumiko Davis MD  Treatment Orders: PT Eval and Treat  Past Medical History:   Diagnosis Date    Allergy     IBS (irritable bowel syndrome)     Pneumonia 02/2019     Precautions: as per diagnosis    Evaluation date: 1-19-23  Visit # authorized: 13/20  Authorization period:12-31-23  Plan of care expiration: 6-15-23  MD Follow up appt: none scheduled      Time In: 10:05  Time Out: 11:00  Total Appointment Time (timed & untimed codes): 55 minutes    Subjective     Pt reports:she lifted something and had increased pain and unable to self mobilize  Pain Scale: before treatment:5/ 10  after treatment: 1/10 and level pelvis    Objective     AR R pelvis   Severe tightness lumbar paraspinals B    TREATMENT   Therapeutic exercises were performed to improve ROM, strength, flexibility and stabilization for 5 minutes.    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    Pelvic tilt   x 10  Bridge x 10,   SLR x 10,   Trunk rotation supine x 10  Partial sit up x 10  Hip abd sidelying x 10 B,  Arm and Leg lift prone x 10, instructed pt to put pillow under stomach   Hip ext prone with bent knee x 10 instructed pt to put pillow under stomach  Sit to stand x 10  Instructed pt to hold press up for now and will note results  GSS standing x 10    Iron cross x 2 as needed  Plank x 30 sec x 1 Led to dysfunction so pt performed contract relax and realigned I   Contract relax R glut    Pt to start walking at gym or park and start planks again  Pt  "unable to try to swim until May due to pool upkeep.      Manual therapy: Danuta  received the following manual therapy  techniques x 25 min. to include soft tissue and joint mobilization were applied to the: low back and gluteals to include:     Performed STM to LB paraspinals and QL R>L  (NP)P/A mob to lumbar region Grade 1-2 , (NP)MET to QL R SL L  Sidelying L contract relax mobilization to L5-S1 region to level pelvis  at end of treatment and level pelvis achieved at end of session    Pt received tool-assisted massage with manual therapy techniques to B LB in prone to trigger an inflammatory healing response and stimulate the production of new collagen and proper, more functional, less painful healing.    Vacuum/cupping STM with manual therapy techniques was performed to back and gluteals to decrease muscle tightness, increase circulation and promote healing process.  The pt's skin was monitored for redness adjusting pressure as needed. The pt was instructed in possible side effects of bruising and/or soreness.      (NP)Kinesiotape with 6" star for pain relief was performed over the R lumbar paraspinals Instructed pt in use, care and precautions with tape.       Ultrasound  for pain control and to decrease inflammation @ continuous duty cycle, 1 Mhz, applied to R lumbar paraspinals, intensity = 1.5 w/cm2 for 8 minutes. 2 min prep      Patient received pre-mod electrical stimulation to decrease muscle tightness and pain to Lumbar paraspinals/ sacral border of gluteals B for 10 minutes with MH supine with cycle time: continuous, beat frequency: , CC/CV: CV.      Written Home Exercises Provided: continue prior HEP  Pt demo good understanding of the education provided. Danuta demonstrated good return demonstration of activities.     Pt. education:  Posture reeducation, body mechanics, HEP, proper posture in standing  No spiritual or educational barriers to learning provided  Pt has no cultural, " educational or language barriers to learning provided.    Assessment   Pt aggravated back with lifting resulting in severe muscle tightness and unable to self mobilize.  Pt scott treatment well and decreased muscle tightness and symptoms after treatment with level pelvis.  Pt understands to avoid lifting activities such as this and to do more pushing or pulling to avoid back strain     Pt will continue to benefit from skilled outpatient physical therapy to address the remaining functional deficits, provide pt/family education, and to maximize pt's level of independence in the home and community environment. .     GOALS:   Short Term Goals:  3 weeks MET STG's  Increase range of motion 25%  Increase strength 1/2 muscle grade  Improve postural awareness of pelvis to independently identify dysfunction with min assist from PT  Be able to perform HEP with minimal cueing required     Long Term Goals: 6 weeks PARTIALLY MET LTG'S  Increase range of motion to 75% to 100% full   Improve muscle strength 1 muscle grade  Improve muscle strength with MMT to 4+/5 to 5/5  Improve and stabilize proper pelvic positioning  Restore ability to sit for ADL and work with min to 0 pain  Restore normal sleep habits without disturbances due to pain  Restore ability to perform ADL's and household activities independently with min to 0 pain    Anticipated barriers to physical therapy: none  Pt's spiritual, cultural and educational needs considered and pt agreeable to plan of care and goals        Plan   Continue with established plan of care towards PT goals.        Jacy Escobar, MS, PT

## 2023-06-08 ENCOUNTER — CLINICAL SUPPORT (OUTPATIENT)
Dept: REHABILITATION | Facility: HOSPITAL | Age: 68
End: 2023-06-08
Attending: INTERNAL MEDICINE
Payer: COMMERCIAL

## 2023-06-08 DIAGNOSIS — G89.29 CHRONIC BILATERAL LOW BACK PAIN WITH RIGHT-SIDED SCIATICA: Primary | ICD-10-CM

## 2023-06-08 DIAGNOSIS — M54.41 CHRONIC BILATERAL LOW BACK PAIN WITH RIGHT-SIDED SCIATICA: Primary | ICD-10-CM

## 2023-06-08 PROCEDURE — 97014 ELECTRIC STIMULATION THERAPY: CPT | Mod: PN | Performed by: PHYSICAL THERAPIST

## 2023-06-08 PROCEDURE — 97035 APP MDLTY 1+ULTRASOUND EA 15: CPT | Mod: PN | Performed by: PHYSICAL THERAPIST

## 2023-06-08 PROCEDURE — 97140 MANUAL THERAPY 1/> REGIONS: CPT | Mod: PN | Performed by: PHYSICAL THERAPIST

## 2023-06-13 ENCOUNTER — PATIENT MESSAGE (OUTPATIENT)
Dept: INTERNAL MEDICINE | Facility: CLINIC | Age: 68
End: 2023-06-13
Payer: COMMERCIAL

## 2023-06-13 DIAGNOSIS — J06.9 VIRAL URI WITH COUGH: ICD-10-CM

## 2023-06-13 DIAGNOSIS — J20.8 ACUTE BACTERIAL BRONCHITIS: ICD-10-CM

## 2023-06-13 DIAGNOSIS — B96.89 ACUTE BACTERIAL SINUSITIS: ICD-10-CM

## 2023-06-13 DIAGNOSIS — R05.8 COUGH WITH CONGESTION OF PARANASAL SINUS: ICD-10-CM

## 2023-06-13 DIAGNOSIS — R09.81 COUGH WITH CONGESTION OF PARANASAL SINUS: ICD-10-CM

## 2023-06-13 DIAGNOSIS — J01.90 ACUTE BACTERIAL SINUSITIS: ICD-10-CM

## 2023-06-13 DIAGNOSIS — R09.82 PND (POST-NASAL DRIP): ICD-10-CM

## 2023-06-13 DIAGNOSIS — B96.89 ACUTE BACTERIAL BRONCHITIS: ICD-10-CM

## 2023-06-13 RX ORDER — AMOXICILLIN AND CLAVULANATE POTASSIUM 875; 125 MG/1; MG/1
1 TABLET, FILM COATED ORAL 2 TIMES DAILY
Qty: 14 TABLET | Refills: 0 | Status: SHIPPED | OUTPATIENT
Start: 2023-06-13 | End: 2023-12-06 | Stop reason: SDUPTHER

## 2023-06-13 RX ORDER — AZELASTINE 1 MG/ML
1 SPRAY, METERED NASAL 2 TIMES DAILY PRN
Qty: 30 ML | Refills: 2 | Status: SHIPPED | OUTPATIENT
Start: 2023-06-13 | End: 2023-09-11

## 2023-06-15 ENCOUNTER — CLINICAL SUPPORT (OUTPATIENT)
Dept: REHABILITATION | Facility: HOSPITAL | Age: 68
End: 2023-06-15
Payer: COMMERCIAL

## 2023-06-15 DIAGNOSIS — M54.41 CHRONIC BILATERAL LOW BACK PAIN WITH RIGHT-SIDED SCIATICA: Primary | ICD-10-CM

## 2023-06-15 DIAGNOSIS — G89.29 CHRONIC BILATERAL LOW BACK PAIN WITH RIGHT-SIDED SCIATICA: Primary | ICD-10-CM

## 2023-06-15 PROCEDURE — 97035 APP MDLTY 1+ULTRASOUND EA 15: CPT | Mod: PN | Performed by: PHYSICAL THERAPIST

## 2023-06-15 PROCEDURE — 97140 MANUAL THERAPY 1/> REGIONS: CPT | Mod: PN | Performed by: PHYSICAL THERAPIST

## 2023-06-15 PROCEDURE — 97014 ELECTRIC STIMULATION THERAPY: CPT | Mod: PN | Performed by: PHYSICAL THERAPIST

## 2023-06-15 PROCEDURE — 97110 THERAPEUTIC EXERCISES: CPT | Mod: PN | Performed by: PHYSICAL THERAPIST

## 2023-06-15 NOTE — PROGRESS NOTES
Physical Therapy Progress and  Daily Treatment Note     Name: Danuta Santos  Clinic Number: [id number]  Diagnosis:   Encounter Diagnosis   Name Primary?    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  Pt states she is varying her position between sitting, standing and even lying down.    Pain Scale: before treatment: 4-510  after treatment: 1/10 and level pelvis    Objective     AR R pelvis   Mod-Severe tightness lumbar paraspinals B    Lumbar active range of motion in standing is: 6-15-23  - flexion - toes                     - extension -  75%                         - left side bending -  to knee         - right side bending -  to knee           Lumbar active range of motion in standing is: 10-27-22  - flexion - toes                     - extension -  100%                         - left side bending -  To knee         - right side bending -  To knee         Muscle Strength 6-15-23  MMT R L   Hip flexion 4+/5 4+/5   Hip abduction 5/5 5/5   Hip extension 5-/5 5/5   Glut max 4-/5 4/5   Knee extension 5/5 5/5   Knee flexion 5/5 5/5                  Muscle Strength 10-27-22  MMT R L   Hip flexion 5/5 5/5   Hip abduction 5/5 5/5   Hip extension 5/5 5/5   Glut max 4+/5 4+/5   Knee extension 5/5 5/5   Knee flexion 5/5 5/5           TREATMENT   Therapeutic exercises were performed to improve ROM, strength, flexibility and stabilization  "for 10  minutes.    Reassessment   HS stretch supine   Glut stretch  Piriformis stretch  B QL stretch     B Knee to chest   Contract relax R glut x 3    Pt was able to self mobilize today    Verbal review of below    Pelvic tilt   x 10  Bridge x 10,   SLR x 10,   Trunk rotation supine x 10  Partial sit up x 10  Hip abd sidelying x 10 B,  Arm and Leg lift prone x 10, instructed pt to put pillow under stomach   Hip ext prone with bent knee x 10 instructed pt to put pillow under stomach  Sit to stand x 10  Instructed pt to hold press up for now and will note results  GSS standing x 10    Iron cross x 2 as needed  Plank x 30 sec x 1 Led to dysfunction so pt performed contract relax and realigned I   Contract relax R glut    Pt to start walking at gym or park and start planks again  Pt unable to try to swim until May due to pool upkeep.      Manual therapy: Danuta  received the following manual therapy  techniques x 20 min. to include soft tissue and joint mobilization were applied to the: low back and gluteals to include:     Performed STM to LB paraspinals and QL R>L  (NP)P/A mob to lumbar region Grade 1-2 , (NP)MET to QL R SL L  Sidelying L contract relax mobilization to L5-S1 region to level pelvis  at end of treatment and level pelvis achieved at end of session    Pt received tool-assisted massage with manual therapy techniques to B LB in prone to trigger an inflammatory healing response and stimulate the production of new collagen and proper, more functional, less painful healing.    Vacuum/cupping STM with manual therapy techniques was performed to back and gluteals to decrease muscle tightness, increase circulation and promote healing process.  The pt's skin was monitored for redness adjusting pressure as needed. The pt was instructed in possible side effects of bruising and/or soreness.      (NP)Kinesiotape with 6" star for pain relief was performed over the R lumbar paraspinals Instructed pt in use, care and " precautions with tape.       Ultrasound  for pain control and to decrease inflammation @ continuous duty cycle, 1 Mhz, applied to R lumbar paraspinals, intensity = 1.5 w/cm2 for 8 minutes. 2 min prep      Patient received pre-mod electrical stimulation to decrease muscle tightness and pain to Lumbar paraspinals/ sacral border of gluteals B for 15 minutes with MH supine with cycle time: continuous, beat frequency: , CC/CV: CV.      Written Home Exercises Provided: continue prior HEP  Pt demo good understanding of the education provided. Danuta demonstrated good return demonstration of activities.     Pt. education:  Posture reeducation, body mechanics, HEP, proper posture in standing  No spiritual or educational barriers to learning provided  Pt has no cultural, educational or language barriers to learning provided.    Assessment   Pt with similar strength and ROM as last progress report.  Pt was seen for 5 sessions since last progress note.  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  Pt was successful with self mobilization, but continues with muscle tightness that is improved with manual therapy in PT.  Pt had an episode of increased symptoms the week earlier after doing some lifting even though she thought the item was not that heavy and after that episode was unsuccessful with self mob.  Overall, with increased activity pt is self managing fairly well.      Pt will continue to benefit from skilled outpatient physical therapy to address the remaining functional deficits, provide pt/family education, and to maximize pt's level of independence in the home and community environment. .     GOALS:   Short Term Goals:  3 weeks MET STG's  Increase range of motion 25%  Increase strength 1/2 muscle grade  Improve postural awareness of pelvis to independently identify dysfunction with min assist from  PT  Be able to perform HEP with minimal cueing required     Long Term Goals: 6 weeks PARTIALLY MET LTG'S  Increase range of motion to 75% to 100% full   Improve muscle strength 1 muscle grade  Improve muscle strength with MMT to 4+/5 to 5/5  Improve and stabilize proper pelvic positioning  Restore ability to sit for ADL and work with min to 0 pain  Restore normal sleep habits without disturbances due to pain  Restore ability to perform ADL's and household activities independently with min to 0 pain    Anticipated barriers to physical therapy: none  Pt's spiritual, cultural and educational needs considered and pt agreeable to plan of care and goals        Plan   If you concur, I recommend patient continue with physical therapy 1-2 times a week as needed for 6 weeks.  Please advise us of your  recommendations. Thank you for allowing us to assist in the care of your patient.      Jacy Escobar, MS, PT

## 2023-06-15 NOTE — PLAN OF CARE
Physical Therapy Progress and  Daily Treatment Note     Name: Danuta Santos  Clinic Number: [id number]  Diagnosis:   Encounter Diagnosis   Name Primary?    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  Pt states she is varying her position between sitting, standing and even lying down.    Pain Scale: before treatment: 4-510  after treatment: 1/10 and level pelvis    Objective     AR R pelvis   Mod-Severe tightness lumbar paraspinals B    Lumbar active range of motion in standing is: 6-15-23  - flexion - toes                     - extension -  75%                         - left side bending -  to knee         - right side bending -  to knee           Lumbar active range of motion in standing is: 10-27-22  - flexion - toes                     - extension -  100%                         - left side bending -  To knee         - right side bending -  To knee         Muscle Strength 6-15-23  MMT R L   Hip flexion 4+/5 4+/5   Hip abduction 5/5 5/5   Hip extension 5-/5 5/5   Glut max 4-/5 4/5   Knee extension 5/5 5/5   Knee flexion 5/5 5/5                  Muscle Strength 10-27-22  MMT R L   Hip flexion 5/5 5/5   Hip abduction 5/5 5/5   Hip extension 5/5 5/5   Glut max 4+/5 4+/5   Knee extension 5/5 5/5   Knee flexion 5/5 5/5           TREATMENT   Therapeutic exercises were performed to improve ROM, strength, flexibility and stabilization  "for 10  minutes.    Reassessment   HS stretch supine   Glut stretch  Piriformis stretch  B QL stretch     B Knee to chest   Contract relax R glut x 3    Pt was able to self mobilize today    Verbal review of below    Pelvic tilt   x 10  Bridge x 10,   SLR x 10,   Trunk rotation supine x 10  Partial sit up x 10  Hip abd sidelying x 10 B,  Arm and Leg lift prone x 10, instructed pt to put pillow under stomach   Hip ext prone with bent knee x 10 instructed pt to put pillow under stomach  Sit to stand x 10  Instructed pt to hold press up for now and will note results  GSS standing x 10    Iron cross x 2 as needed  Plank x 30 sec x 1 Led to dysfunction so pt performed contract relax and realigned I   Contract relax R glut    Pt to start walking at gym or park and start planks again  Pt unable to try to swim until May due to pool upkeep.      Manual therapy: Danuta  received the following manual therapy  techniques x 20 min. to include soft tissue and joint mobilization were applied to the: low back and gluteals to include:     Performed STM to LB paraspinals and QL R>L  (NP)P/A mob to lumbar region Grade 1-2 , (NP)MET to QL R SL L  Sidelying L contract relax mobilization to L5-S1 region to level pelvis  at end of treatment and level pelvis achieved at end of session    Pt received tool-assisted massage with manual therapy techniques to B LB in prone to trigger an inflammatory healing response and stimulate the production of new collagen and proper, more functional, less painful healing.    Vacuum/cupping STM with manual therapy techniques was performed to back and gluteals to decrease muscle tightness, increase circulation and promote healing process.  The pt's skin was monitored for redness adjusting pressure as needed. The pt was instructed in possible side effects of bruising and/or soreness.      (NP)Kinesiotape with 6" star for pain relief was performed over the R lumbar paraspinals Instructed pt in use, care and " precautions with tape.       Ultrasound  for pain control and to decrease inflammation @ continuous duty cycle, 1 Mhz, applied to R lumbar paraspinals, intensity = 1.5 w/cm2 for 8 minutes. 2 min prep      Patient received pre-mod electrical stimulation to decrease muscle tightness and pain to Lumbar paraspinals/ sacral border of gluteals B for 15 minutes with MH supine with cycle time: continuous, beat frequency: , CC/CV: CV.      Written Home Exercises Provided: continue prior HEP  Pt demo good understanding of the education provided. Danuta demonstrated good return demonstration of activities.     Pt. education:  Posture reeducation, body mechanics, HEP, proper posture in standing  No spiritual or educational barriers to learning provided  Pt has no cultural, educational or language barriers to learning provided.    Assessment   Pt with similar strength and ROM as last progress report.  Pt was seen for 5 sessions since last progress note.  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  Pt was successful with self mobilization, but continues with muscle tightness that is improved with manual therapy in PT.  Pt had an episode of increased symptoms the week earlier after doing some lifting even though she thought the item was not that heavy and after that episode was unsuccessful with self mob.  Overall, with increased activity pt is self managing fairly well.      Pt will continue to benefit from skilled outpatient physical therapy to address the remaining functional deficits, provide pt/family education, and to maximize pt's level of independence in the home and community environment. .     GOALS:   Short Term Goals:  3 weeks MET STG's  Increase range of motion 25%  Increase strength 1/2 muscle grade  Improve postural awareness of pelvis to independently identify dysfunction with min assist from  PT  Be able to perform HEP with minimal cueing required     Long Term Goals: 6 weeks PARTIALLY MET LTG'S  Increase range of motion to 75% to 100% full   Improve muscle strength 1 muscle grade  Improve muscle strength with MMT to 4+/5 to 5/5  Improve and stabilize proper pelvic positioning  Restore ability to sit for ADL and work with min to 0 pain  Restore normal sleep habits without disturbances due to pain  Restore ability to perform ADL's and household activities independently with min to 0 pain    Anticipated barriers to physical therapy: none  Pt's spiritual, cultural and educational needs considered and pt agreeable to plan of care and goals        Plan   If you concur, I recommend patient continue with physical therapy 1-2 times a week as needed for 6 weeks.  Please advise us of your  recommendations. Thank you for allowing us to assist in the care of your patient.      Jacy Escobar, MS, PT

## 2023-06-16 ENCOUNTER — PATIENT MESSAGE (OUTPATIENT)
Dept: INTERNAL MEDICINE | Facility: CLINIC | Age: 68
End: 2023-06-16
Payer: COMMERCIAL

## 2023-06-20 ENCOUNTER — CLINICAL SUPPORT (OUTPATIENT)
Dept: REHABILITATION | Facility: HOSPITAL | Age: 68
End: 2023-06-20
Payer: COMMERCIAL

## 2023-06-20 DIAGNOSIS — G89.29 CHRONIC BILATERAL LOW BACK PAIN WITH RIGHT-SIDED SCIATICA: Primary | ICD-10-CM

## 2023-06-20 DIAGNOSIS — M54.41 CHRONIC BILATERAL LOW BACK PAIN WITH RIGHT-SIDED SCIATICA: Primary | ICD-10-CM

## 2023-06-20 PROCEDURE — 97014 ELECTRIC STIMULATION THERAPY: CPT | Mod: PN | Performed by: PHYSICAL THERAPIST

## 2023-06-20 PROCEDURE — 97035 APP MDLTY 1+ULTRASOUND EA 15: CPT | Mod: PN | Performed by: PHYSICAL THERAPIST

## 2023-06-20 PROCEDURE — 97140 MANUAL THERAPY 1/> REGIONS: CPT | Mod: PN | Performed by: PHYSICAL THERAPIST

## 2023-06-20 NOTE — PROGRESS NOTES
Physical Therapy Daily Treatment Note     Name: Danuta Santos  Clinic Number: [id number]  Diagnosis:   Encounter Diagnosis   Name Primary?    Chronic bilateral low back pain with right-sided sciatica Yes     Physician: Yumiko Davis MD  Treatment Orders: PT Eval and Treat  Past Medical History:   Diagnosis Date    Allergy     IBS (irritable bowel syndrome)     Pneumonia 02/2019     Precautions: as per diagnosis    Evaluation date: 1-19-23  Visit # authorized: 15/20  Authorization period:12-31-23  Plan of care expiration: 7-27-23   MD Follow up appt: none scheduled      Time In: 2:20  Time Out: 3:15  Total Appointment Time (timed & untimed codes): 55 minutes    Subjective     Pt reports:she completed her 9 hour work days and did alter her position a lot and did her ex as best she could, feeling tight and slight pain  Pain Scale: before treatment: 4-5/10  after treatment: 1/10 and level pelvis    Objective     AR R pelvis   Mod-Severe tightness lumbar paraspinals R, mod L    TREATMENT   Therapeutic exercises were performed to improve ROM, strength, flexibility and stabilization for 5 minutes.    HS stretch supine   Glut stretch  Piriformis stretch  B QL stretch     B Knee to chest   Contract relax R glut x 3    Pt was able to self mobilize today    Verbal review of below    Pelvic tilt   x 10  Bridge x 10,   SLR x 10,   Trunk rotation supine x 10  Partial sit up x 10  Hip abd sidelying x 10 B,  Arm and Leg lift prone x 10, instructed pt to put pillow under stomach   Hip ext prone with bent knee x 10 instructed pt to put pillow under stomach  Sit to stand x 10  Instructed pt to hold press up for now and will note results  GSS standing x 10    Iron cross x 2 as needed  Plank x 30 sec x 1 Led to dysfunction so pt performed contract relax and realigned I   Contract relax R glut    Pt to start walking at gym or park and start planks again  Pt unable to try to swim until May due to pool upkeep.      Manual  "therapy: Danuta  received the following manual therapy  techniques x 25 min. to include soft tissue and joint mobilization were applied to the: low back and gluteals to include:     Performed STM to LB paraspinals and QL R>L  (NP)P/A mob to lumbar region Grade 1-2 , (NP)MET to QL R SL L  Sidelying L contract relax mobilization to L5-S1 region to level pelvis  at end of treatment and level pelvis achieved at end of session    Pt received tool-assisted massage with manual therapy techniques to B LB in prone to trigger an inflammatory healing response and stimulate the production of new collagen and proper, more functional, less painful healing.    Vacuum/cupping STM with manual therapy techniques was performed to back and gluteals to decrease muscle tightness, increase circulation and promote healing process.  The pt's skin was monitored for redness adjusting pressure as needed. The pt was instructed in possible side effects of bruising and/or soreness.      (NP)Kinesiotape with 6" star for pain relief was performed over the R lumbar paraspinals Instructed pt in use, care and precautions with tape.       Ultrasound  for pain control and to decrease inflammation @ continuous duty cycle, 1 Mhz, applied to R lumbar paraspinals, intensity = 1.5 w/cm2 for 8 minutes. 2 min prep      Patient received pre-mod electrical stimulation to decrease muscle tightness and pain to Lumbar paraspinals/ sacral border of gluteals B for 15 minutes with MH supine with cycle time: continuous, beat frequency: , CC/CV: CV.      Written Home Exercises Provided: continue prior HEP  Pt demo good understanding of the education provided. Danuta demonstrated good return demonstration of activities.     Pt. education:  Posture reeducation, body mechanics, HEP, proper posture in standing  No spiritual or educational barriers to learning provided  Pt has no cultural, educational or language barriers to learning provided.    Assessment   Pt " just completed increased activity with work and will be going out of town for vacation.  Pt scott treatment well and though some increased tightness was able to self mobilize and will benefit from decreased muscle tightness after manual therapy and modalities to assist with ability to continue to self mobilize alek while out of town.      Pt will continue to benefit from skilled outpatient physical therapy to address the remaining functional deficits, provide pt/family education, and to maximize pt's level of independence in the home and community environment. .     GOALS:   Short Term Goals:  3 weeks MET STG's  Increase range of motion 25%  Increase strength 1/2 muscle grade  Improve postural awareness of pelvis to independently identify dysfunction with min assist from PT  Be able to perform HEP with minimal cueing required     Long Term Goals: 6 weeks PARTIALLY MET LTG'S  Increase range of motion to 75% to 100% full   Improve muscle strength 1 muscle grade  Improve muscle strength with MMT to 4+/5 to 5/5  Improve and stabilize proper pelvic positioning  Restore ability to sit for ADL and work with min to 0 pain  Restore normal sleep habits without disturbances due to pain  Restore ability to perform ADL's and household activities independently with min to 0 pain    Anticipated barriers to physical therapy: none  Pt's spiritual, cultural and educational needs considered and pt agreeable to plan of care and goals        Plan   Continue with established plan of care towards PT goals seeing pt as needed .

## 2023-06-22 ENCOUNTER — CLINICAL SUPPORT (OUTPATIENT)
Dept: REHABILITATION | Facility: HOSPITAL | Age: 68
End: 2023-06-22
Payer: COMMERCIAL

## 2023-06-22 DIAGNOSIS — G89.29 CHRONIC BILATERAL LOW BACK PAIN WITH RIGHT-SIDED SCIATICA: Primary | ICD-10-CM

## 2023-06-22 DIAGNOSIS — M54.41 CHRONIC BILATERAL LOW BACK PAIN WITH RIGHT-SIDED SCIATICA: Primary | ICD-10-CM

## 2023-06-22 PROCEDURE — 97035 APP MDLTY 1+ULTRASOUND EA 15: CPT | Mod: PN | Performed by: PHYSICAL THERAPIST

## 2023-06-22 PROCEDURE — 97014 ELECTRIC STIMULATION THERAPY: CPT | Mod: PN | Performed by: PHYSICAL THERAPIST

## 2023-06-22 PROCEDURE — 97140 MANUAL THERAPY 1/> REGIONS: CPT | Mod: PN | Performed by: PHYSICAL THERAPIST

## 2023-06-22 NOTE — PROGRESS NOTES
Physical Therapy Daily Treatment Note     Name: Danuta Santos  Clinic Number: [id number]  Diagnosis:   Encounter Diagnosis   Name Primary?    Chronic bilateral low back pain with right-sided sciatica Yes     Physician: Yumiko Davis MD  Treatment Orders: PT Eval and Treat  Past Medical History:   Diagnosis Date    Allergy     IBS (irritable bowel syndrome)     Pneumonia 02/2019     Precautions: as per diagnosis    Evaluation date: 1-19-23  Visit # authorized: 16/20  Authorization period:12-31-23  Plan of care expiration: 7-27-23   MD Follow up appt: none scheduled      Time In: 4:05  Time Out:  4:50  Total Appointment Time (timed & untimed codes): 45 minutes    Subjective     Pt reports: she was packing, leaned over to get something and felt increased pain and unable to self mobilize    Pain Scale: before treatment: 5-6/10  after treatment: 1/10 and level pelvis    Objective     AR R pelvis   Mod-Severe tightness lumbar paraspinals R, mod L    TREATMENT   Therapeutic exercises were performed to improve ROM, strength, flexibility and stabilization for 5 minutes.    HS stretch supine   Glut stretch  Piriformis stretch  B QL stretch     B Knee to chest   Contract relax R glut x 3    Pt was unable to self mobilize today    Verbal review of below    Pelvic tilt   x 10  Bridge x 10,   SLR x 10,   Trunk rotation supine x 10  Partial sit up x 10  Hip abd sidelying x 10 B,  Arm and Leg lift prone x 10, instructed pt to put pillow under stomach   Hip ext prone with bent knee x 10 instructed pt to put pillow under stomach  Sit to stand x 10  Instructed pt to hold press up for now and will note results  GSS standing x 10    Iron cross x 2 as needed  Plank x 30 sec x 1 Led to dysfunction so pt performed contract relax and realigned I   Contract relax R glut    Pt to start walking at gym or park and start planks again  Pt unable to try to swim until May due to pool upkeep.      Manual therapy: Danuta  received the  "following manual therapy  techniques x 10 min. to include soft tissue and joint mobilization were applied to the: low back and gluteals to include:     Performed STM to LB paraspinals and QL R>L  (NP)P/A mob to lumbar region Grade 1-2 , (NP)MET to QL R SL L  Sidelying L contract relax mobilization to L5-S1 region to level pelvis  at end of treatment and level pelvis achieved at beginning and end of session    (NP)Pt received tool-assisted massage with manual therapy techniques to B LB in prone to trigger an inflammatory healing response and stimulate the production of new collagen and proper, more functional, less painful healing.  (NP)  Vacuum/cupping STM with manual therapy techniques was performed to back and gluteals to decrease muscle tightness, increase circulation and promote healing process.  The pt's skin was monitored for redness adjusting pressure as needed. The pt was instructed in possible side effects of bruising and/or soreness.      (NP)Kinesiotape with 6" star for pain relief was performed over the R lumbar paraspinals Instructed pt in use, care and precautions with tape.       Ultrasound  for pain control and to decrease inflammation @ continuous duty cycle, 1 Mhz, applied to R lumbar paraspinals, intensity = 1.5 w/cm2 for 8 minutes. 2 min prep      Patient received pre-mod electrical stimulation to decrease muscle tightness and pain to Lumbar paraspinals/ sacral border of gluteals B for 15 minutes with MH supine with cycle time: continuous, beat frequency: , CC/CV: CV.      Written Home Exercises Provided: continue prior HEP  Pt demo good understanding of the education provided. Danuta demonstrated good return demonstration of activities.     Pt. education:  Posture reeducation, body mechanics, HEP, proper posture in standing  No spiritual or educational barriers to learning provided  Pt has no cultural, educational or language barriers to learning provided.    Assessment   Pt was packing " for trip and led to dysfunction which she was unable to correct.  Level pelvis at end of session and decreased pain     Pt will continue to benefit from skilled outpatient physical therapy to address the remaining functional deficits, provide pt/family education, and to maximize pt's level of independence in the home and community environment. .     GOALS:   Short Term Goals:  3 weeks MET STG's  Increase range of motion 25%  Increase strength 1/2 muscle grade  Improve postural awareness of pelvis to independently identify dysfunction with min assist from PT  Be able to perform HEP with minimal cueing required     Long Term Goals: 6 weeks PARTIALLY MET LTG'S  Increase range of motion to 75% to 100% full   Improve muscle strength 1 muscle grade  Improve muscle strength with MMT to 4+/5 to 5/5  Improve and stabilize proper pelvic positioning  Restore ability to sit for ADL and work with min to 0 pain  Restore normal sleep habits without disturbances due to pain  Restore ability to perform ADL's and household activities independently with min to 0 pain    Anticipated barriers to physical therapy: none  Pt's spiritual, cultural and educational needs considered and pt agreeable to plan of care and goals        Plan   Continue with established plan of care towards PT goals seeing pt as needed .

## 2023-07-14 ENCOUNTER — CLINICAL SUPPORT (OUTPATIENT)
Dept: REHABILITATION | Facility: HOSPITAL | Age: 68
End: 2023-07-14
Payer: COMMERCIAL

## 2023-07-14 DIAGNOSIS — M54.41 CHRONIC BILATERAL LOW BACK PAIN WITH RIGHT-SIDED SCIATICA: Primary | ICD-10-CM

## 2023-07-14 DIAGNOSIS — G89.29 CHRONIC BILATERAL LOW BACK PAIN WITH RIGHT-SIDED SCIATICA: Primary | ICD-10-CM

## 2023-07-14 PROCEDURE — 97140 MANUAL THERAPY 1/> REGIONS: CPT | Mod: PN | Performed by: PHYSICAL THERAPIST

## 2023-07-14 PROCEDURE — 97014 ELECTRIC STIMULATION THERAPY: CPT | Mod: PN | Performed by: PHYSICAL THERAPIST

## 2023-07-14 PROCEDURE — 97035 APP MDLTY 1+ULTRASOUND EA 15: CPT | Mod: PN | Performed by: PHYSICAL THERAPIST

## 2023-07-14 NOTE — PROGRESS NOTES
Physical Therapy Daily Treatment Note     Name: aDnuta Santos  Clinic Number: [id number]  Diagnosis:   Encounter Diagnosis   Name Primary?    Chronic bilateral low back pain with right-sided sciatica Yes     Physician: Yumiko Davis MD  Treatment Orders: PT Eval and Treat  Past Medical History:   Diagnosis Date    Allergy     IBS (irritable bowel syndrome)     Pneumonia 02/2019     Precautions: as per diagnosis    Evaluation date: 1-19-23  Visit # authorized: 17/20  Authorization period:12-31-23  Plan of care expiration: 7-27-23   MD Follow up appt: none scheduled      Time In: 11:30  Time Out: 12:35  Total Appointment Time (timed & untimed codes): 60 minutes    Subjective     Pt reports: back from her trip and had pain with long plane ride and did a lot of driving while on her trip and tried to stretch but more severe pain by end of week.  Pt is back home and feeling a little better and back to normal pain level    Pain Scale: before treatment: 5-6/10  after treatment: 1/10 and level pelvis    Objective     AR R pelvis   Mod-Severe tightness lumbar paraspinals R, mod L    TREATMENT   Therapeutic exercises were performed to improve ROM, strength, flexibility and stabilization for 5 minutes.    HS stretch supine   Glut stretch  Piriformis stretch  B QL stretch     B Knee to chest   Contract relax R glut x 3    Pt was able to self mobilize today, but still felt tight    Verbal review of below    Pelvic tilt   x 10  Bridge x 10,   SLR x 10,   Trunk rotation supine x 10  Partial sit up x 10  Hip abd sidelying x 10 B,  Arm and Leg lift prone x 10, instructed pt to put pillow under stomach   Hip ext prone with bent knee x 10 instructed pt to put pillow under stomach  Sit to stand x 10  Instructed pt to hold press up for now and will note results  GSS standing x 10    Iron cross x 2 as needed  Plank x 30 sec x 1 Led to dysfunction so pt performed contract relax and realigned I   Contract relax R glut    Pt to  "start walking at gym or park and start planks again  Pt unable to try to swim until May due to pool upkeep.      Manual therapy: Danuta  received the following manual therapy  techniques x  25 min. to include soft tissue and joint mobilization were applied to the: low back and gluteals to include:     Performed STM to LB paraspinals and QL R>L  (NP)P/A mob to lumbar region Grade 1-2 , MET to QL R SL L  Sidelying L contract relax mobilization to L5-S1 region to level pelvis  at end of treatment and level pelvis achieved at beginning and end of session    Pt received tool-assisted massage with manual therapy techniques to B LB in prone to trigger an inflammatory healing response and stimulate the production of new collagen and proper, more functional, less painful healing.    Vacuum/cupping STM with manual therapy techniques was performed to back and gluteals to decrease muscle tightness, increase circulation and promote healing process.  The pt's skin was monitored for redness adjusting pressure as needed. The pt was instructed in possible side effects of bruising and/or soreness.      (NP)Kinesiotape with 6" star for pain relief was performed over the R lumbar paraspinals Instructed pt in use, care and precautions with tape.       Ultrasound  for pain control and to decrease inflammation @ continuous duty cycle, 1 Mhz, applied to R lumbar paraspinals, intensity = 1.5 w/cm2 for 8 minutes. 2 min prep      Patient received pre-mod electrical stimulation to decrease muscle tightness and pain to Lumbar paraspinals/ sacral border of gluteals B for 15 minutes with MH supine with cycle time: continuous, beat frequency: , CC/CV: CV.      Written Home Exercises Provided: continue prior HEP  Pt demo good understanding of the education provided. Danuta demonstrated good return demonstration of activities.     Pt. education:  Posture reeducation, body mechanics, HEP, proper posture in standing  No spiritual or " educational barriers to learning provided  Pt has no cultural, educational or language barriers to learning provided.    Assessment   Pt was packing for trip and led to dysfunction which she was unable to correct.  Level pelvis at end of session and decreased pain     Pt will continue to benefit from skilled outpatient physical therapy to address the remaining functional deficits, provide pt/family education, and to maximize pt's level of independence in the home and community environment. .     GOALS:   Short Term Goals:  3 weeks MET STG's  Increase range of motion 25%  Increase strength 1/2 muscle grade  Improve postural awareness of pelvis to independently identify dysfunction with min assist from PT  Be able to perform HEP with minimal cueing required     Long Term Goals: 6 weeks PARTIALLY MET LTG'S  Increase range of motion to 75% to 100% full   Improve muscle strength 1 muscle grade  Improve muscle strength with MMT to 4+/5 to 5/5  Improve and stabilize proper pelvic positioning  Restore ability to sit for ADL and work with min to 0 pain  Restore normal sleep habits without disturbances due to pain  Restore ability to perform ADL's and household activities independently with min to 0 pain    Anticipated barriers to physical therapy: none  Pt's spiritual, cultural and educational needs considered and pt agreeable to plan of care and goals        Plan   Continue with established plan of care towards PT goals seeing pt as needed .

## 2023-07-20 NOTE — PROGRESS NOTES
Physical Therapy Daily Treatment Note     Name: Danuta Santos  Clinic Number: [id number]  Diagnosis:   Encounter Diagnosis   Name Primary?    Chronic bilateral low back pain with right-sided sciatica Yes     Physician: Yumiko Davis MD  Treatment Orders: PT Eval and Treat  Past Medical History:   Diagnosis Date    Allergy     IBS (irritable bowel syndrome)     Pneumonia 02/2019     Precautions: as per diagnosis    Evaluation date: 1-19-23  Visit # authorized: 18/20  Authorization period:12-31-23  Plan of care expiration: 7-27-23   MD Follow up appt: none scheduled      Time In: 10:07  Time Out: 11:15  Total Billing Time (timed & untimed codes): 60 minutes    Subjective     Pt reports: feeling less pain since back from trip  Pt leaving for trip tomorrow so wanted to help get to lowest level pain.    Pain Scale: before treatment: 4/10  after treatment: 1/10 and level pelvis    Objective     Pt with level pelvis prior to treatment AR R pelvis   Mod-Severe tightness lumbar paraspinals R, mod L    TREATMENT   Therapeutic exercises were performed to improve ROM, strength, flexibility and stabilization for 5 minutes.    HS stretch supine   Glut stretch  Piriformis stretch  B QL stretch     B Knee to chest   Contract relax R glut x 3    Pt was able to self mobilize today, but still felt tight    Verbal review of below    Pelvic tilt   x 10  Bridge x 10,   SLR x 10,   Trunk rotation supine x 10  Partial sit up x 10  Hip abd sidelying x 10 B,  Arm and Leg lift prone x 10, instructed pt to put pillow under stomach   Hip ext prone with bent knee x 10 instructed pt to put pillow under stomach  Sit to stand x 10  Instructed pt to hold press up for now and will note results  GSS standing x 10    Iron cross x 2 as needed  Plank x 30 sec x 1 Led to dysfunction so pt performed contract relax and realigned I   Contract relax R glut    Pt to start walking at gym or park and start planks again  Pt unable to try to swim until  "May due to pool upkeep.      Manual therapy: Danuta  received the following manual therapy  techniques x  25 min. to include soft tissue and joint mobilization were applied to the: low back and gluteals to include:     Performed STM to LB paraspinals and QL R>L  (NP)P/A mob to lumbar region Grade 1-2 , MET to QL R SL L  Sidelying L contract relax mobilization to L5-S1 region to level pelvis  at end of treatment and level pelvis achieved at beginning and end of session    Pt received tool-assisted massage with manual therapy techniques to B LB in prone to trigger an inflammatory healing response and stimulate the production of new collagen and proper, more functional, less painful healing.    Vacuum/cupping STM with manual therapy techniques was performed to back and gluteals to decrease muscle tightness, increase circulation and promote healing process.  The pt's skin was monitored for redness adjusting pressure as needed. The pt was instructed in possible side effects of bruising and/or soreness.      (NP)Kinesiotape with 6" star for pain relief was performed over the R lumbar paraspinals Instructed pt in use, care and precautions with tape.       Ultrasound  for pain control and to decrease inflammation @ continuous duty cycle, 1 Mhz, applied to R lumbar paraspinals, intensity = 1.5 w/cm2 for 8 minutes. 2 min prep      Patient received pre-mod electrical stimulation to decrease muscle tightness and pain to Lumbar paraspinals/ sacral border of gluteals B for 15 minutes with MH supine with cycle time: continuous, beat frequency: , CC/CV: CV.      Written Home Exercises Provided: continue prior HEP  Pt demo good understanding of the education provided. Danuta demonstrated good return demonstration of activities.     Pt. education:  Posture reeducation, body mechanics, HEP, proper posture in standing  No spiritual or educational barriers to learning provided  Pt has no cultural, educational or language " barriers to learning provided.    Assessment   Pt with improved symptoms overall and has been able to self mobilize. Pt getting ready for another trip and decreased symptoms at end of session.       Pt will continue to benefit from skilled outpatient physical therapy to address the remaining functional deficits, provide pt/family education, and to maximize pt's level of independence in the home and community environment. .     GOALS:   Short Term Goals:  3 weeks MET STG's  Increase range of motion 25%  Increase strength 1/2 muscle grade  Improve postural awareness of pelvis to independently identify dysfunction with min assist from PT  Be able to perform HEP with minimal cueing required     Long Term Goals: 6 weeks PARTIALLY MET LTG'S  Increase range of motion to 75% to 100% full   Improve muscle strength 1 muscle grade  Improve muscle strength with MMT to 4+/5 to 5/5  Improve and stabilize proper pelvic positioning  Restore ability to sit for ADL and work with min to 0 pain  Restore normal sleep habits without disturbances due to pain  Restore ability to perform ADL's and household activities independently with min to 0 pain    Anticipated barriers to physical therapy: none  Pt's spiritual, cultural and educational needs considered and pt agreeable to plan of care and goals        Plan   Continue with established plan of care towards PT goals seeing pt as needed .   Reassess next session

## 2023-07-21 ENCOUNTER — CLINICAL SUPPORT (OUTPATIENT)
Dept: REHABILITATION | Facility: HOSPITAL | Age: 68
End: 2023-07-21
Payer: COMMERCIAL

## 2023-07-21 DIAGNOSIS — G89.29 CHRONIC BILATERAL LOW BACK PAIN WITH RIGHT-SIDED SCIATICA: Primary | ICD-10-CM

## 2023-07-21 DIAGNOSIS — M54.41 CHRONIC BILATERAL LOW BACK PAIN WITH RIGHT-SIDED SCIATICA: Primary | ICD-10-CM

## 2023-07-21 PROCEDURE — 97035 APP MDLTY 1+ULTRASOUND EA 15: CPT | Mod: PN | Performed by: PHYSICAL THERAPIST

## 2023-07-21 PROCEDURE — 97140 MANUAL THERAPY 1/> REGIONS: CPT | Mod: PN | Performed by: PHYSICAL THERAPIST

## 2023-07-21 PROCEDURE — 97014 ELECTRIC STIMULATION THERAPY: CPT | Mod: PN | Performed by: PHYSICAL THERAPIST

## 2023-07-28 NOTE — PROGRESS NOTES
Physical Therapy Progress and  Daily Treatment Note     Name: Danuta Santos  Clinic Number: [id number]  Diagnosis:   Encounter Diagnosis   Name Primary?    Chronic bilateral low back pain with right-sided sciatica Yes     Physician: Yumiko Davis MD  Treatment Orders: PT Eval and Treat  Past Medical History:   Diagnosis Date    Allergy     IBS (irritable bowel syndrome)     Pneumonia 02/2019     Precautions: as per diagnosis    Evaluation date: 1-19-23  Visit # authorized: 19/40  Authorization period:12-31-23  Plan of care expiration: 9-12-23    MD Follow up appt: none scheduled      Time In: 8:38   Time Out: 9:35  Total Billing Time (timed & untimed codes): 57 minutes    Subjective     Pt reports: been traveling a lot and has manage symptoms as best she could Pt just returned from a trip this weekend and having some pain with tension.  Pt states she has been able to self mobilize but then does go into slight dysfunction and is easier to go into dysfunction when having increased tightness     Pain Scale: before treatment: 4.5 /10  at worst 6/10 at best 1/10 after treatment: 1/10 and level pelvis    Objective     Pt with level pelvis prior to treatment AR R pelvis   Mod-Severe tightness lumbar paraspinals R, mod L    AR R pelvis   Mod-Severe tightness lumbar paraspinals B     Lumbar active range of motion in standing is: 8-1-23  - flexion - toes                     - extension -  75%                         - left side bending -  to knee         - right side bending -  to knee           Lumbar active range of motion in standing is: 10-27-22  - flexion - toes                     - extension -  100%                         - left side bending -  To knee         - right side bending -  To knee         Muscle Strength same as 6-15-23  MMT R L   Hip flexion 4+/5 4+/5   Hip abduction 5/5 5/5   Hip extension 5-/5 5/5   Glut max 4-/5 4/5   Knee extension 5/5 5/5   Knee flexion 5/5 5/5                  Muscle  Strength 10-27-22  MMT R L   Hip flexion 5/5 5/5   Hip abduction 5/5 5/5   Hip extension 5/5 5/5   Glut max 4+/5 4+/5   Knee extension 5/5 5/5   Knee flexion 5/5 5/5              TREATMENT   Therapeutic exercises were performed to improve ROM, strength, flexibility and stabilization for 10 minutes.    Reassessment  HS stretch supine   Glut stretch  Piriformis stretch  B QL stretch     B Knee to chest   Contract relax R glut x 3    Pt was unable to self mobilize today    Verbal review of below    Pelvic tilt   x 10  Bridge x 10,   SLR x 10,   Trunk rotation supine x 10  Partial sit up x 10  Hip abd sidelying x 10 B,  Arm and Leg lift prone x 10, instructed pt to put pillow under stomach   Hip ext prone with bent knee x 10 instructed pt to put pillow under stomach  Sit to stand x 10  Instructed pt to hold press up for now and will note results  GSS standing x 10    Iron cross x 2 as needed  Plank x 30 sec x 1 Led to dysfunction so pt performed contract relax and realigned I   Contract relax R glut    Pt to start walking at gym or park and start planks again  Pt unable to try to swim until May due to pool upkeep.      Manual therapy: Danuta  received the following manual therapy  techniques x  15 min. to include soft tissue and joint mobilization were applied to the: low back and gluteals to include:     Performed STM to LB paraspinals and QL R>L  (NP)P/A mob to lumbar region Grade 1-2 , MET to QL R SL L  Sidelying L contract relax mobilization to L5-S1 region to level pelvis  at end of treatment and level pelvis achieved at beginning and end of session    Pt received tool-assisted massage with manual therapy techniques to B LB in prone to trigger an inflammatory healing response and stimulate the production of new collagen and proper, more functional, less painful healing.    Vacuum/cupping STM with manual therapy techniques was performed to back and gluteals to decrease muscle tightness, increase circulation and  "promote healing process.  The pt's skin was monitored for redness adjusting pressure as needed. The pt was instructed in possible side effects of bruising and/or soreness.      (NP)Kinesiotape with 6" star for pain relief was performed over the R lumbar paraspinals Instructed pt in use, care and precautions with tape.       Ultrasound  for pain control and to decrease inflammation @ continuous duty cycle, 1 Mhz, applied to R lumbar paraspinals, intensity = 1.5 w/cm2 for 8 minutes. 2 min prep      Patient received pre-mod electrical stimulation to decrease muscle tightness and pain to Lumbar paraspinals/ sacral border of gluteals B for 15 minutes with MH supine with cycle time: continuous, beat frequency: , CC/CV: CV.      Written Home Exercises Provided: continue prior HEP  Pt demo good understanding of the education provided. Danuta demonstrated good return demonstration of activities.     Pt. education:  Posture reeducation, body mechanics, HEP, proper posture in standing  No spiritual or educational barriers to learning provided  Pt has no cultural, educational or language barriers to learning provided.    Assessment   Pt has done a lot of traveling over the past 6 weeks resulting in increased need for PT to recover from travel and with increased activity with packing and getting ready for trip.  Pt is now back and getting ready to start school so will be able to get back on track with HEP and decreased stress with travel Pt will benefit from continued treatment to assist pt in meeting her goals.    GOALS:   Short Term Goals:  3 weeks MET STG's  Increase range of motion 25%  Increase strength 1/2 muscle grade  Improve postural awareness of pelvis to independently identify dysfunction with min assist from PT  Be able to perform HEP with minimal cueing required     Long Term Goals: 6 weeks PARTIALLY MET LTG'S  Increase range of motion to 75% to 100% full   Improve muscle strength 1 muscle grade  Improve " muscle strength with MMT to 4+/5 to 5/5  Improve and stabilize proper pelvic positioning  Restore ability to sit for ADL and work with min to 0 pain  Restore normal sleep habits without disturbances due to pain  Restore ability to perform ADL's and household activities independently with min to 0 pain    Anticipated barriers to physical therapy: none  Pt's spiritual, cultural and educational needs considered and pt agreeable to plan of care and goals        Plan   If you concur, I recommend patient continue with physical therapy 1-2 times a week as needed  for 6 weeks.  Please advise us of your  recommendations. Thank you for allowing us to assist in the care of your patient.      Jacy Escobar, PT, MSPT

## 2023-08-01 ENCOUNTER — CLINICAL SUPPORT (OUTPATIENT)
Dept: REHABILITATION | Facility: HOSPITAL | Age: 68
End: 2023-08-01
Payer: COMMERCIAL

## 2023-08-01 DIAGNOSIS — M54.41 CHRONIC BILATERAL LOW BACK PAIN WITH RIGHT-SIDED SCIATICA: Primary | ICD-10-CM

## 2023-08-01 DIAGNOSIS — G89.29 CHRONIC BILATERAL LOW BACK PAIN WITH RIGHT-SIDED SCIATICA: Primary | ICD-10-CM

## 2023-08-01 PROCEDURE — 97140 MANUAL THERAPY 1/> REGIONS: CPT | Mod: PN | Performed by: PHYSICAL THERAPIST

## 2023-08-01 PROCEDURE — 97014 ELECTRIC STIMULATION THERAPY: CPT | Mod: PN | Performed by: PHYSICAL THERAPIST

## 2023-08-01 PROCEDURE — 97110 THERAPEUTIC EXERCISES: CPT | Mod: PN | Performed by: PHYSICAL THERAPIST

## 2023-08-01 PROCEDURE — 97035 APP MDLTY 1+ULTRASOUND EA 15: CPT | Mod: PN | Performed by: PHYSICAL THERAPIST

## 2023-08-01 NOTE — PLAN OF CARE
Physical Therapy Progress and  Daily Treatment Note     Name: Danuta Santos  Clinic Number: [id number]  Diagnosis:   Encounter Diagnosis   Name Primary?    Chronic bilateral low back pain with right-sided sciatica Yes     Physician: Yumiko Davis MD  Treatment Orders: PT Eval and Treat  Past Medical History:   Diagnosis Date    Allergy     IBS (irritable bowel syndrome)     Pneumonia 02/2019     Precautions: as per diagnosis    Evaluation date: 1-19-23  Visit # authorized: 19/40  Authorization period:12-31-23  Plan of care expiration: 9-12-23    MD Follow up appt: none scheduled      Time In: 8:38   Time Out: 9:35  Total Billing Time (timed & untimed codes): 57 minutes    Subjective     Pt reports: been traveling a lot and has manage symptoms as best she could Pt just returned from a trip this weekend and having some pain with tension.  Pt states she has been able to self mobilize but then does go into slight dysfunction and is easier to go into dysfunction when having increased tightness     Pain Scale: before treatment: 4.5 /10  at worst 6/10 at best 1/10 after treatment: 1/10 and level pelvis    Objective     Pt with level pelvis prior to treatment AR R pelvis   Mod-Severe tightness lumbar paraspinals R, mod L    AR R pelvis   Mod-Severe tightness lumbar paraspinals B     Lumbar active range of motion in standing is: 8-1-23  - flexion - toes                     - extension -  75%                         - left side bending -  to knee         - right side bending -  to knee           Lumbar active range of motion in standing is: 10-27-22  - flexion - toes                     - extension -  100%                         - left side bending -  To knee         - right side bending -  To knee         Muscle Strength same as 6-15-23  MMT R L   Hip flexion 4+/5 4+/5   Hip abduction 5/5 5/5   Hip extension 5-/5 5/5   Glut max 4-/5 4/5   Knee extension 5/5 5/5   Knee flexion 5/5 5/5                  Muscle  Strength 10-27-22  MMT R L   Hip flexion 5/5 5/5   Hip abduction 5/5 5/5   Hip extension 5/5 5/5   Glut max 4+/5 4+/5   Knee extension 5/5 5/5   Knee flexion 5/5 5/5              TREATMENT   Therapeutic exercises were performed to improve ROM, strength, flexibility and stabilization for 10 minutes.    Reassessment  HS stretch supine   Glut stretch  Piriformis stretch  B QL stretch     B Knee to chest   Contract relax R glut x 3    Pt was unable to self mobilize today    Verbal review of below    Pelvic tilt   x 10  Bridge x 10,   SLR x 10,   Trunk rotation supine x 10  Partial sit up x 10  Hip abd sidelying x 10 B,  Arm and Leg lift prone x 10, instructed pt to put pillow under stomach   Hip ext prone with bent knee x 10 instructed pt to put pillow under stomach  Sit to stand x 10  Instructed pt to hold press up for now and will note results  GSS standing x 10    Iron cross x 2 as needed  Plank x 30 sec x 1 Led to dysfunction so pt performed contract relax and realigned I   Contract relax R glut    Pt to start walking at gym or park and start planks again  Pt unable to try to swim until May due to pool upkeep.      Manual therapy: Danuta  received the following manual therapy  techniques x  15 min. to include soft tissue and joint mobilization were applied to the: low back and gluteals to include:     Performed STM to LB paraspinals and QL R>L  (NP)P/A mob to lumbar region Grade 1-2 , MET to QL R SL L  Sidelying L contract relax mobilization to L5-S1 region to level pelvis  at end of treatment and level pelvis achieved at beginning and end of session    Pt received tool-assisted massage with manual therapy techniques to B LB in prone to trigger an inflammatory healing response and stimulate the production of new collagen and proper, more functional, less painful healing.    Vacuum/cupping STM with manual therapy techniques was performed to back and gluteals to decrease muscle tightness, increase circulation and  "promote healing process.  The pt's skin was monitored for redness adjusting pressure as needed. The pt was instructed in possible side effects of bruising and/or soreness.      (NP)Kinesiotape with 6" star for pain relief was performed over the R lumbar paraspinals Instructed pt in use, care and precautions with tape.       Ultrasound  for pain control and to decrease inflammation @ continuous duty cycle, 1 Mhz, applied to R lumbar paraspinals, intensity = 1.5 w/cm2 for 8 minutes. 2 min prep      Patient received pre-mod electrical stimulation to decrease muscle tightness and pain to Lumbar paraspinals/ sacral border of gluteals B for 15 minutes with MH supine with cycle time: continuous, beat frequency: , CC/CV: CV.      Written Home Exercises Provided: continue prior HEP  Pt demo good understanding of the education provided. Danuta demonstrated good return demonstration of activities.     Pt. education:  Posture reeducation, body mechanics, HEP, proper posture in standing  No spiritual or educational barriers to learning provided  Pt has no cultural, educational or language barriers to learning provided.    Assessment   Pt has done a lot of traveling over the past 6 weeks resulting in increased need for PT to recover from travel and with increased activity with packing and getting ready for trip.  Pt is now back and getting ready to start school so will be able to get back on track with HEP and decreased stress with travel Pt will benefit from continued treatment to assist pt in meeting her goals.    GOALS:   Short Term Goals:  3 weeks MET STG's  Increase range of motion 25%  Increase strength 1/2 muscle grade  Improve postural awareness of pelvis to independently identify dysfunction with min assist from PT  Be able to perform HEP with minimal cueing required     Long Term Goals: 6 weeks PARTIALLY MET LTG'S  Increase range of motion to 75% to 100% full   Improve muscle strength 1 muscle grade  Improve " muscle strength with MMT to 4+/5 to 5/5  Improve and stabilize proper pelvic positioning  Restore ability to sit for ADL and work with min to 0 pain  Restore normal sleep habits without disturbances due to pain  Restore ability to perform ADL's and household activities independently with min to 0 pain    Anticipated barriers to physical therapy: none  Pt's spiritual, cultural and educational needs considered and pt agreeable to plan of care and goals        Plan   If you concur, I recommend patient continue with physical therapy 1-2 times a week as needed  for 6 weeks.  Please advise us of your  recommendations. Thank you for allowing us to assist in the care of your patient.      Jacy Escobar, PT, MSPT

## 2023-08-07 NOTE — PROGRESS NOTES
Physical Therapy Daily Treatment Note     Name: Danuta Santos  Clinic Number: [id number]  Diagnosis:   Encounter Diagnosis   Name Primary?    Chronic bilateral low back pain with right-sided sciatica Yes     Physician: Yumiko Davis MD  Treatment Orders: PT Eval and Treat  Past Medical History:   Diagnosis Date    Allergy     IBS (irritable bowel syndrome)     Pneumonia 02/2019     Precautions: as per diagnosis    Evaluation date: 1-19-23  Visit # authorized: 20/40  Authorization period:12-31-23  Plan of care expiration: 9-12-23    MD Follow up appt: none scheduled      Time In: 9:20  Time Out: 10:20  Total Billing Time (timed & untimed codes): 55 minutes    Subjective     Pt reports: she is doing ok  pt states she has some new exercises she wants to try.  Pt has been able to get back to doing more of her exercises.      Pain Scale: before treatment: 4 /10 after treatment: 1/10 and level pelvis    Objective     Pt with level pelvis prior to treatment at IE AR R pelvis   Mod tightness lumbar paraspinals R, min-mod L       TREATMENT   Therapeutic exercises were performed to improve ROM, strength, flexibility and stabilization for 10 minutes.    Reviewed exercises pt found on line that she wishes to try which involved IR and ER of hips while prone. Also had a video of yoga poses to include activities such as downward dog and cobra and most of the exercises involved extreme rotation moves that may lead to pelvic dysfunction. Instructed pt in doing ex where back is stable is better such as hip add stretch supine instead of sitting up where pelvis and LB are not supported.    Instructed pt to continue with current HEP also   Verbal review of all  HS stretch supine   Glut stretch  Piriformis stretch  B QL stretch     B Knee to chest   Contract relax R glut x 3       Pelvic tilt   x 10  Bridge x 10,   SLR x 10,   Trunk rotation supine x 10  Partial sit up x 10  Hip abd sidelying x 10 B,  Arm and Leg lift prone x  "10, instructed pt to put pillow under stomach   Hip ext prone with bent knee x 10 instructed pt to put pillow under stomach  Sit to stand x 10  Instructed pt to hold press up for now and will note results  GSS standing x 10    Iron cross x 2 as needed  Plank x 30 sec x 1 Led to dysfunction so pt performed contract relax and realigned I   Contract relax R glut    Pt to start walking at gym or park and start planks again  Pt unable to try to swim until May due to pool upkeep.      Manual therapy: Danuta  received the following manual therapy  techniques x  15 min. to include soft tissue and joint mobilization were applied to the: low back and gluteals to include:     Performed STM to LB paraspinals and QL R>L  (NP)P/A mob to lumbar region Grade 1-2 , MET to QL R SL L  Sidelying L contract relax mobilization to L5-S1 region to level pelvis  at end of treatment and level pelvis achieved at beginning and end of session    Pt received tool-assisted massage with manual therapy techniques to B LB in prone to trigger an inflammatory healing response and stimulate the production of new collagen and proper, more functional, less painful healing.    Vacuum/cupping STM with manual therapy techniques was performed to back and gluteals to decrease muscle tightness, increase circulation and promote healing process.  The pt's skin was monitored for redness adjusting pressure as needed. The pt was instructed in possible side effects of bruising and/or soreness.      (NP)Kinesiotape with 6" star for pain relief was performed over the R lumbar paraspinals Instructed pt in use, care and precautions with tape.       Ultrasound  for pain control and to decrease inflammation @ continuous duty cycle, 1 Mhz, applied to R lumbar paraspinals, intensity = 1.5 w/cm2 for 8 minutes. 2 min prep      Patient received pre-mod electrical stimulation to decrease muscle tightness and pain to Lumbar paraspinals/ sacral border of gluteals B for 15 " minutes with MH supine with cycle time: continuous, beat frequency: , CC/CV: CV.      Written Home Exercises Provided: continue prior HEP  Pt demo good understanding of the education provided. Danuta demonstrated good return demonstration of activities.     Pt. education:  Posture reeducation, body mechanics, HEP, proper posture in standing  No spiritual or educational barriers to learning provided  Pt has no cultural, educational or language barriers to learning provided.    Assessment   Pt has done a lot of traveling over the past 6 weeks resulting in increased need for PT to recover from travel and with increased activity with packing and getting ready for trip.  Pt is now back and getting ready to start school so will be able to get back on track with HEP and decreased stress with travel Pt will benefit from continued treatment to assist pt in meeting her goals.    GOALS:   Short Term Goals:  3 weeks MET STG's  Increase range of motion 25%  Increase strength 1/2 muscle grade  Improve postural awareness of pelvis to independently identify dysfunction with min assist from PT  Be able to perform HEP with minimal cueing required     Long Term Goals: 6 weeks PARTIALLY MET LTG'S  Increase range of motion to 75% to 100% full   Improve muscle strength 1 muscle grade  Improve muscle strength with MMT to 4+/5 to 5/5  Improve and stabilize proper pelvic positioning  Restore ability to sit for ADL and work with min to 0 pain  Restore normal sleep habits without disturbances due to pain  Restore ability to perform ADL's and household activities independently with min to 0 pain    Anticipated barriers to physical therapy: none  Pt's spiritual, cultural and educational needs considered and pt agreeable to plan of care and goals        Plan   If you concur, I recommend patient continue with physical therapy 1-2 times a week as needed  for 6 weeks.  Please advise us of your  recommendations. Thank you for allowing us to  assist in the care of your patient.      Jacy Escobar, PT, MSPT

## 2023-08-08 ENCOUNTER — CLINICAL SUPPORT (OUTPATIENT)
Dept: REHABILITATION | Facility: HOSPITAL | Age: 68
End: 2023-08-08
Payer: COMMERCIAL

## 2023-08-08 DIAGNOSIS — M54.41 CHRONIC BILATERAL LOW BACK PAIN WITH RIGHT-SIDED SCIATICA: Primary | ICD-10-CM

## 2023-08-08 DIAGNOSIS — G89.29 CHRONIC BILATERAL LOW BACK PAIN WITH RIGHT-SIDED SCIATICA: Primary | ICD-10-CM

## 2023-08-08 PROCEDURE — 97140 MANUAL THERAPY 1/> REGIONS: CPT | Mod: PN | Performed by: PHYSICAL THERAPIST

## 2023-08-08 PROCEDURE — 97035 APP MDLTY 1+ULTRASOUND EA 15: CPT | Mod: PN | Performed by: PHYSICAL THERAPIST

## 2023-08-08 PROCEDURE — 97110 THERAPEUTIC EXERCISES: CPT | Mod: PN | Performed by: PHYSICAL THERAPIST

## 2023-08-08 PROCEDURE — 97014 ELECTRIC STIMULATION THERAPY: CPT | Mod: PN | Performed by: PHYSICAL THERAPIST

## 2023-08-11 ENCOUNTER — PATIENT MESSAGE (OUTPATIENT)
Dept: RESEARCH | Facility: HOSPITAL | Age: 68
End: 2023-08-11
Payer: COMMERCIAL

## 2023-08-15 NOTE — PROGRESS NOTES
Physical Therapy Daily Treatment Note     Name: Danuta Santos  Clinic Number: [id number]  Diagnosis:   Encounter Diagnosis   Name Primary?    Chronic bilateral low back pain with right-sided sciatica Yes     Physician: Yumiko Davis MD  Treatment Orders: PT Eval and Treat  Past Medical History:   Diagnosis Date    Allergy     IBS (irritable bowel syndrome)     Pneumonia 02/2019     Precautions: as per diagnosis    Evaluation date: 1-19-23  Visit # authorized: 21/40  Authorization period:12-31-23  Plan of care expiration: 9-12-23    MD Follow up appt: none scheduled      Time In: 12:30  Time Out: 1:00  Total Billing Time (timed & untimed codes): 30 minutes    Subjective     Pt reports: she is feeling increased tightness She states she had to paint a closet door at school and get her classroom ready for school to begin tomorrow       Pain Scale: before treatment: 4 /10 after treatment: 1/10 and level pelvis    Objective     Pt with AR R pelvis prior to treatment at  AR R pelvis   Mod tightness lumbar paraspinals R, min-mod L       TREATMENT   Therapeutic exercises were performed to improve ROM, strength, flexibility and stabilization for 5 minutes.    Pt has tried to do self mob but did not work  Instructed pt to continue with current HEP also   Verbal review of all  HS stretch supine   Glut stretch  Piriformis stretch  B QL stretch     B Knee to chest   Contract relax R glut x 3       Pelvic tilt   x 10  Bridge x 10,   SLR x 10,   Trunk rotation supine x 10  Partial sit up x 10  Hip abd sidelying x 10 B,  Arm and Leg lift prone x 10, instructed pt to put pillow under stomach   Hip ext prone with bent knee x 10 instructed pt to put pillow under stomach  Sit to stand x 10  Instructed pt to hold press up for now and will note results  GSS standing x 10    Iron cross x 2 as needed  Plank x 30 sec x 1 Led to dysfunction so pt performed contract relax and realigned I   Contract relax R glut    Pt to start  "walking at gym or park and start planks again  Pt unable to try to swim until May due to pool upkeep.      Manual therapy: Danuta  received the following manual therapy  techniques x  8 min. to include soft tissue and joint mobilization were applied to the: low back and gluteals to include:     Performed STM to LB paraspinals and QL R>L  (NP)P/A mob to lumbar region Grade 1-2 , MET to QL R SL L  Sidelying L contract relax mobilization to L5-S1 region to level pelvis  at end of treatment and level pelvis achieved at beginning and end of session    (NP)Pt received tool-assisted massage with manual therapy techniques to B LB in prone to trigger an inflammatory healing response and stimulate the production of new collagen and proper, more functional, less painful healing.    (NP)Vacuum/cupping STM with manual therapy techniques was performed to back and gluteals to decrease muscle tightness, increase circulation and promote healing process.  The pt's skin was monitored for redness adjusting pressure as needed. The pt was instructed in possible side effects of bruising and/or soreness.      (NP)Kinesiotape with 6" star for pain relief was performed over the R lumbar paraspinals Instructed pt in use, care and precautions with tape.       (NP)Ultrasound  for pain control and to decrease inflammation @ continuous duty cycle, 1 Mhz, applied to R lumbar paraspinals, intensity = 1.5 w/cm2 for 8 minutes. 2 min prep      Patient received pre-mod electrical stimulation to decrease muscle tightness and pain to Lumbar paraspinals/ sacral border of gluteals B for 15 minutes with MH supine with cycle time: continuous, beat frequency: , CC/CV: CV.      Written Home Exercises Provided: continue prior HEP  Pt demo good understanding of the education provided. Danuta demonstrated good return demonstration of activities.     Pt. education:  Posture reeducation, body mechanics, HEP, proper posture in standing  No spiritual or " educational barriers to learning provided  Pt has no cultural, educational or language barriers to learning provided.    Assessment   Pt had physical work to get classroom ready which led to increased pain.  Pt with level pelvis and decreased muscle tightness and pain after session.  Pt appears to understand need to continue work on HEP and get back to regular strengthening    GOALS:   Short Term Goals:  3 weeks MET STG's  Increase range of motion 25%  Increase strength 1/2 muscle grade  Improve postural awareness of pelvis to independently identify dysfunction with min assist from PT  Be able to perform HEP with minimal cueing required     Long Term Goals: 6 weeks PARTIALLY MET LTG'S  Increase range of motion to 75% to 100% full   Improve muscle strength 1 muscle grade  Improve muscle strength with MMT to 4+/5 to 5/5  Improve and stabilize proper pelvic positioning  Restore ability to sit for ADL and work with min to 0 pain  Restore normal sleep habits without disturbances due to pain  Restore ability to perform ADL's and household activities independently with min to 0 pain    Anticipated barriers to physical therapy: none  Pt's spiritual, cultural and educational needs considered and pt agreeable to plan of care and goals        Plan   Continue with established plan of care towards PT goals.        Jacy Escobar, PT, MSPT

## 2023-08-16 ENCOUNTER — CLINICAL SUPPORT (OUTPATIENT)
Dept: REHABILITATION | Facility: HOSPITAL | Age: 68
End: 2023-08-16
Payer: COMMERCIAL

## 2023-08-16 DIAGNOSIS — G89.29 CHRONIC BILATERAL LOW BACK PAIN WITH RIGHT-SIDED SCIATICA: Primary | ICD-10-CM

## 2023-08-16 DIAGNOSIS — M54.41 CHRONIC BILATERAL LOW BACK PAIN WITH RIGHT-SIDED SCIATICA: Primary | ICD-10-CM

## 2023-08-16 PROCEDURE — 97140 MANUAL THERAPY 1/> REGIONS: CPT | Mod: PN | Performed by: PHYSICAL THERAPIST

## 2023-08-16 PROCEDURE — 97014 ELECTRIC STIMULATION THERAPY: CPT | Mod: PN | Performed by: PHYSICAL THERAPIST

## 2023-08-23 NOTE — PROGRESS NOTES
Physical Therapy Daily Treatment Note     Name: Danuta Santos  Clinic Number: [id number]  Diagnosis:   Encounter Diagnosis   Name Primary?    Chronic bilateral low back pain with right-sided sciatica Yes     Physician: Yumiko Davis MD  Treatment Orders: PT Eval and Treat  Past Medical History:   Diagnosis Date    Allergy     IBS (irritable bowel syndrome)     Pneumonia 02/2019     Precautions: as per diagnosis    Evaluation date: 1-19-23  Visit # authorized: 22/40  Authorization period:12-31-23  Plan of care expiration: 9-12-23    MD Follow up appt: none scheduled      Time In: 1:59  Time Out: 3:10  Total Billing Time (timed & untimed codes): 60 minutes    Subjective     Pt reports: she is not able to self mobilize. Pt states feeling very tight and increased leg pain      Pain Scale: before treatment: 5.5 /10 after treatment: 1/10 and level pelvis    Objective     Pt with AR R pelvis prior to treatment at  AR R pelvis   Mod-severe tightness lumbar paraspinals R, mod L       TREATMENT   Therapeutic exercises were performed to improve ROM, strength, flexibility and stabilization for 5 minutes.    Pt has tried to do self mob but did not work  Instructed pt to continue with current HEP also   Verbal review of all  HS stretch supine   Glut stretch  Piriformis stretch  B QL stretch     B Knee to chest   Contract relax R glut x 3       Pelvic tilt   x 10  Bridge x 10,   SLR x 10,   Trunk rotation supine x 10  Partial sit up x 10  Hip abd sidelying x 10 B,  Arm and Leg lift prone x 10, instructed pt to put pillow under stomach   Hip ext prone with bent knee x 10 instructed pt to put pillow under stomach  Sit to stand x 10  Instructed pt to hold press up for now and will note results  GSS standing x 10    Iron cross x 2 as needed  Plank x 30 sec x 1 Led to dysfunction so pt performed contract relax and realigned I   Contract relax R glut    Pt to start walking at gym or park and start planks again  Pt unable to  try to swim until May due to pool upkeep.      Manual therapy: Danuta  received the following manual therapy  techniques x  25 min. to include soft tissue and joint mobilization were applied to the: low back and gluteals to include:     Performed STM to LB paraspinals and QL R>L  (NP)P/A mob to lumbar region Grade 1-2 , MET to QL R SL L  Sidelying L contract relax mobilization to L5-S1 region to level pelvis  at end of treatment and level pelvis achieved at beginning and end of session    Pt received tool-assisted massage with manual therapy techniques to B LB in prone to trigger an inflammatory healing response and stimulate the production of new collagen and proper, more functional, less painful healing.    Vacuum/cupping STM with manual therapy techniques was performed to back and gluteals to decrease muscle tightness, increase circulation and promote healing process.  The pt's skin was monitored for redness adjusting pressure as needed. The pt was instructed in possible side effects of bruising and/or soreness.        Ultrasound  for pain control and to decrease inflammation @ continuous duty cycle, 1 Mhz, applied to R lumbar paraspinals, intensity = 1.5 w/cm2 for 8 minutes. 2 min prep      Patient received pre-mod electrical stimulation to decrease muscle tightness and pain to Lumbar paraspinals/ sacral border of gluteals B for 15 minutes with MH supine with cycle time: continuous, beat frequency: , CC/CV: CV.      Written Home Exercises Provided: continue prior HEP  Pt demo good understanding of the education provided. Danuta demonstrated good return demonstration of activities.     Pt. education:  Posture reeducation, body mechanics, HEP, proper posture in standing  No spiritual or educational barriers to learning provided  Pt has no cultural, educational or language barriers to learning provided.    Assessment   Pt adjusting to school activities leading to increased pain and muscle tightness  which results in ability to self mobilize.  Pt with level pelvis and decreased symptoms at end of session.      GOALS:   Short Term Goals:  3 weeks MET STG's  Increase range of motion 25%  Increase strength 1/2 muscle grade  Improve postural awareness of pelvis to independently identify dysfunction with min assist from PT  Be able to perform HEP with minimal cueing required     Long Term Goals: 6 weeks PARTIALLY MET LTG'S  Increase range of motion to 75% to 100% full   Improve muscle strength 1 muscle grade  Improve muscle strength with MMT to 4+/5 to 5/5  Improve and stabilize proper pelvic positioning  Restore ability to sit for ADL and work with min to 0 pain  Restore normal sleep habits without disturbances due to pain  Restore ability to perform ADL's and household activities independently with min to 0 pain    Anticipated barriers to physical therapy: none  Pt's spiritual, cultural and educational needs considered and pt agreeable to plan of care and goals        Plan   Continue with established plan of care towards PT goals.        Jacy Escobar, PT, MSPT

## 2023-08-24 ENCOUNTER — CLINICAL SUPPORT (OUTPATIENT)
Dept: REHABILITATION | Facility: HOSPITAL | Age: 68
End: 2023-08-24
Payer: COMMERCIAL

## 2023-08-24 DIAGNOSIS — G89.29 CHRONIC BILATERAL LOW BACK PAIN WITH RIGHT-SIDED SCIATICA: Primary | ICD-10-CM

## 2023-08-24 DIAGNOSIS — M54.41 CHRONIC BILATERAL LOW BACK PAIN WITH RIGHT-SIDED SCIATICA: Primary | ICD-10-CM

## 2023-08-24 PROCEDURE — 97035 APP MDLTY 1+ULTRASOUND EA 15: CPT | Mod: PN | Performed by: PHYSICAL THERAPIST

## 2023-08-24 PROCEDURE — 97140 MANUAL THERAPY 1/> REGIONS: CPT | Mod: PN | Performed by: PHYSICAL THERAPIST

## 2023-08-24 PROCEDURE — 97014 ELECTRIC STIMULATION THERAPY: CPT | Mod: PN | Performed by: PHYSICAL THERAPIST

## 2023-08-28 NOTE — TELEPHONE ENCOUNTER
----- Message from Batsheva Ovalle sent at 2/23/2018 11:13 AM CST -----  Contact: self/[phone]  Pt is calling to speak with someone in the office in regards to some symptoms she has been having. She would like to either get an apt or get some advisement. Please advise.      Thank You  
Definitely appt, urgent care or me. GML  
spoke to the patient, had flu like symptoms for 3 weeks and still have a lingering cough.     Please advise if patient would need an appointment or can you call in something   
no

## 2023-08-29 NOTE — PROGRESS NOTES
Physical Therapy Daily Treatment Note     Name: Danuta Santos  Clinic Number: [id number]  Diagnosis:   Encounter Diagnosis   Name Primary?    Chronic bilateral low back pain with right-sided sciatica Yes     Physician: Yumiko Davis MD  Treatment Orders: PT Eval and Treat  Past Medical History:   Diagnosis Date    Allergy     IBS (irritable bowel syndrome)     Pneumonia 02/2019     Precautions: as per diagnosis    Evaluation date: 1-19-23  Visit # authorized: 23/40  Authorization period:12-31-23  Plan of care expiration: 9-12-23    MD Follow up appt: none scheduled      Time In: 1:00  Time Out: 2:15  Total Billing Time (timed & untimed codes): 65 minutes    Subjective     Pt reports: min back pain think she has been able to mobilize but feeling very tight and stiff     Pain Scale: before treatment: 4/10 after treatment: 1/10 and level pelvis    Objective     Pt with AR R pelvis prior to treatment at  AR R pelvis   Mod-severe tightness lumbar paraspinals R, mod L       TREATMENT   Therapeutic exercises were performed to improve ROM, strength, flexibility and stabilization for 5 minutes.    Pt was able to self mobilize   Instructed pt to continue with current HEP also   Verbal review of all  HS stretch supine   Glut stretch  Piriformis stretch  B QL stretch     B Knee to chest   Contract relax R glut x 3       Pelvic tilt   x 10  Bridge x 10,   SLR x 10,   Trunk rotation supine x 10  Partial sit up x 10  Hip abd sidelying x 10 B,  Arm and Leg lift prone x 10, instructed pt to put pillow under stomach   Hip ext prone with bent knee x 10 instructed pt to put pillow under stomach  Sit to stand x 10  Instructed pt to hold press up for now and will note results  GSS standing x 10    Iron cross x 2 as needed  Plank x 30 sec x 1 Led to dysfunction so pt performed contract relax and realigned I   Contract relax R glut    Pt to start walking at gym or park and start planks again  Pt unable to try to swim until May  due to pool upkeep.      Manual therapy: Danuta  received the following manual therapy  techniques x  25 min. to include soft tissue and joint mobilization were applied to the: low back and gluteals to include:     Performed STM to LB paraspinals and QL R>L  (NP)P/A mob to lumbar region Grade 1-2 , MET to QL R SL L  Sidelying L contract relax mobilization to L5-S1 region to level pelvis  at end of treatment and level pelvis achieved at beginning and end of session    Pt received tool-assisted massage with manual therapy techniques to B LB in prone to trigger an inflammatory healing response and stimulate the production of new collagen and proper, more functional, less painful healing.    Vacuum/cupping STM with manual therapy techniques was performed to back and gluteals to decrease muscle tightness, increase circulation and promote healing process.  The pt's skin was monitored for redness adjusting pressure as needed. The pt was instructed in possible side effects of bruising and/or soreness.        Ultrasound  for pain control and to decrease inflammation @ continuous duty cycle, 1 Mhz, applied to R lumbar paraspinals, intensity = 1.5 w/cm2 for 8 minutes. 2 min prep      Patient received pre-mod electrical stimulation to decrease muscle tightness and pain to Lumbar paraspinals/ sacral border of gluteals B for 15 minutes with MH supine with cycle time: continuous, beat frequency: , CC/CV: CV.      Written Home Exercises Provided: continue prior HEP  Pt demo good understanding of the education provided. Danuta demonstrated good return demonstration of activities.     Pt. education:  Posture reeducation, body mechanics, HEP, proper posture in standing  No spiritual or educational barriers to learning provided  Pt has no cultural, educational or language barriers to learning provided.    Assessment   Pt with less pain overall since successful with self mob  Pt continues to develop increased tightness and  stiffness which is improved with modalities and manual therapy.  Pt with level pelvis and decreased symptoms at end of session,  Pt adjusting to return to school.      GOALS:   Short Term Goals:  3 weeks MET STG's  Increase range of motion 25%  Increase strength 1/2 muscle grade  Improve postural awareness of pelvis to independently identify dysfunction with min assist from PT  Be able to perform HEP with minimal cueing required     Long Term Goals: 6 weeks PARTIALLY MET LTG'S  Increase range of motion to 75% to 100% full   Improve muscle strength 1 muscle grade  Improve muscle strength with MMT to 4+/5 to 5/5  Improve and stabilize proper pelvic positioning  Restore ability to sit for ADL and work with min to 0 pain  Restore normal sleep habits without disturbances due to pain  Restore ability to perform ADL's and household activities independently with min to 0 pain    Anticipated barriers to physical therapy: none  Pt's spiritual, cultural and educational needs considered and pt agreeable to plan of care and goals        Plan   Continue with established plan of care towards PT goals.        Jacy Escobar, PT, MSPT

## 2023-08-30 ENCOUNTER — CLINICAL SUPPORT (OUTPATIENT)
Dept: OTHER | Facility: CLINIC | Age: 68
End: 2023-08-30
Payer: COMMERCIAL

## 2023-08-30 DIAGNOSIS — Z00.8 ENCOUNTER FOR OTHER GENERAL EXAMINATION: ICD-10-CM

## 2023-08-30 PROCEDURE — 80061 PR  LIPID PANEL: ICD-10-PCS | Mod: QW,S$GLB,, | Performed by: INTERNAL MEDICINE

## 2023-08-30 PROCEDURE — 82947 PR  ASSAY QUANTITATIVE,BLOOD GLUCOSE: ICD-10-PCS | Mod: QW,S$GLB,, | Performed by: INTERNAL MEDICINE

## 2023-08-30 PROCEDURE — 82947 ASSAY GLUCOSE BLOOD QUANT: CPT | Mod: QW,S$GLB,, | Performed by: INTERNAL MEDICINE

## 2023-08-30 PROCEDURE — 99401 PREV MED CNSL INDIV APPRX 15: CPT | Mod: S$GLB,,, | Performed by: INTERNAL MEDICINE

## 2023-08-30 PROCEDURE — 99401 PR PREVENT COUNSEL,INDIV,15 MIN: ICD-10-PCS | Mod: S$GLB,,, | Performed by: INTERNAL MEDICINE

## 2023-08-30 PROCEDURE — 80061 LIPID PANEL: CPT | Mod: QW,S$GLB,, | Performed by: INTERNAL MEDICINE

## 2023-08-31 ENCOUNTER — CLINICAL SUPPORT (OUTPATIENT)
Dept: REHABILITATION | Facility: HOSPITAL | Age: 68
End: 2023-08-31
Payer: COMMERCIAL

## 2023-08-31 DIAGNOSIS — G89.29 CHRONIC BILATERAL LOW BACK PAIN WITH RIGHT-SIDED SCIATICA: Primary | ICD-10-CM

## 2023-08-31 DIAGNOSIS — M54.41 CHRONIC BILATERAL LOW BACK PAIN WITH RIGHT-SIDED SCIATICA: Primary | ICD-10-CM

## 2023-08-31 PROCEDURE — 97140 MANUAL THERAPY 1/> REGIONS: CPT | Mod: PN | Performed by: PHYSICAL THERAPIST

## 2023-08-31 PROCEDURE — 97014 ELECTRIC STIMULATION THERAPY: CPT | Mod: PN | Performed by: PHYSICAL THERAPIST

## 2023-08-31 PROCEDURE — 97035 APP MDLTY 1+ULTRASOUND EA 15: CPT | Mod: PN | Performed by: PHYSICAL THERAPIST

## 2023-09-09 ENCOUNTER — PATIENT MESSAGE (OUTPATIENT)
Dept: INTERNAL MEDICINE | Facility: CLINIC | Age: 68
End: 2023-09-09
Payer: COMMERCIAL

## 2023-09-11 DIAGNOSIS — R05.8 COUGH WITH CONGESTION OF PARANASAL SINUS: ICD-10-CM

## 2023-09-11 DIAGNOSIS — J06.9 VIRAL URI WITH COUGH: ICD-10-CM

## 2023-09-11 DIAGNOSIS — R09.82 PND (POST-NASAL DRIP): ICD-10-CM

## 2023-09-11 DIAGNOSIS — R09.81 COUGH WITH CONGESTION OF PARANASAL SINUS: ICD-10-CM

## 2023-09-11 RX ORDER — AZELASTINE 1 MG/ML
SPRAY, METERED NASAL
Qty: 60 ML | Refills: 2 | Status: SHIPPED | OUTPATIENT
Start: 2023-09-11

## 2023-09-11 NOTE — TELEPHONE ENCOUNTER
Refill Authorization Note   Danuta Tom  is requesting a refill authorization.  Brief Assessment and Rationale for Refill:  Approve     Medication Therapy Plan:       Medication Reconciliation Completed: No   Comments:     No Care Gaps recommended.     Note composed:12:29 PM 09/11/2023

## 2023-09-11 NOTE — TELEPHONE ENCOUNTER
No care due was identified.  [facility] Embedded Care Due Messages. Reference number: [id number].   9/11/2023 12:28:14 AM CDT

## 2023-09-12 ENCOUNTER — CLINICAL SUPPORT (OUTPATIENT)
Dept: REHABILITATION | Facility: HOSPITAL | Age: 68
End: 2023-09-12
Payer: COMMERCIAL

## 2023-09-12 DIAGNOSIS — M54.41 CHRONIC BILATERAL LOW BACK PAIN WITH RIGHT-SIDED SCIATICA: Primary | ICD-10-CM

## 2023-09-12 DIAGNOSIS — G89.29 CHRONIC BILATERAL LOW BACK PAIN WITH RIGHT-SIDED SCIATICA: Primary | ICD-10-CM

## 2023-09-12 PROCEDURE — 97035 APP MDLTY 1+ULTRASOUND EA 15: CPT | Mod: PN | Performed by: PHYSICAL THERAPIST

## 2023-09-12 PROCEDURE — 97140 MANUAL THERAPY 1/> REGIONS: CPT | Mod: PN | Performed by: PHYSICAL THERAPIST

## 2023-09-12 PROCEDURE — 97110 THERAPEUTIC EXERCISES: CPT | Mod: PN | Performed by: PHYSICAL THERAPIST

## 2023-09-12 PROCEDURE — 97014 ELECTRIC STIMULATION THERAPY: CPT | Mod: PN | Performed by: PHYSICAL THERAPIST

## 2023-09-12 NOTE — PROGRESS NOTES
Physical Therapy Progress and Daily Treatment Note     Name: Danuta Santos  Clinic Number: [id number]  Diagnosis:   Encounter Diagnosis   Name Primary?    Chronic bilateral low back pain with right-sided sciatica Yes     Physician: Yumiko Davis MD  Treatment Orders: PT Eval and Treat  Past Medical History:   Diagnosis Date    Allergy     IBS (irritable bowel syndrome)     Pneumonia 02/2019     Precautions: as per diagnosis    Evaluation date: 1-19-23  Visit # authorized: 24/40  Authorization period:12-31-23  Plan of care expiration: 10-24-23    MD Follow up appt: none scheduled      Time In: 4:50  Time Out: 5:50  Total Billing Time (timed & untimed codes): 60   minutes    Subjective     Pt reports: she had to lift some water and was able to perform without increased symptoms and was feeling tight already.  Pt states feeling tight due to long days with school and personal issues. Pt states rough week so far.  Pt states over the weekend did pretty good and was able to self mobilize     Pain Scale: before treatment: 5/10 after treatment: 1/10 and level pelvis    Objective     Pt with AR R pelvis prior to treatment at  AR R pelvis   Mod-severe tightness lumbar paraspinals R, mod L    Lumbar active range of motion in standing is: 9-12-23  - flexion - toes                     - extension -  100%                         - left side bending -  to knee         - right side bending -  to knee           Lumbar active range of motion in standing is: 10-27-22  - flexion - toes                     - extension -  100%                         - left side bending -  To knee         - right side bending -  To knee         Muscle Strength same as 6-15-23  MMT R L   Hip flexion 4+/5 4+/5   Hip abduction 5/5 5/5   Hip extension 5-/5 5/5   Glut max 4-/5 4/5   Knee extension 5/5 5/5   Knee flexion 5/5 5/5                  Muscle Strength 10-27-22  MMT R L   Hip flexion 5/5 5/5   Hip abduction 5/5 5/5   Hip extension 5/5 5/5    Glut max 4+/5 4+/5   Knee extension 5/5 5/5   Knee flexion 5/5 5/5              TREATMENT   Therapeutic exercises were performed to improve ROM, strength, flexibility and stabilization for 15 minutes.    Reassessment  Pt was able to self mobilize   Instructed pt to continue with current HEP also   Verbal review of all  HS stretch supine   Glut stretch  Piriformis stretch  B QL stretch     B Knee to chest   Contract relax R glut x 3       Pelvic tilt   x 10  Bridge x 10,   SLR x 10,   Trunk rotation supine x 10  Partial sit up x 10  Hip abd sidelying x 10 B,  Arm and Leg lift prone x 10, instructed pt to put pillow under stomach   Hip ext prone with bent knee x 10 instructed pt to put pillow under stomach  Sit to stand x 10  Instructed pt to hold press up for now and will note results  GSS standing x 10    Iron cross x 2 as needed  Plank x 30 sec x 1 Led to dysfunction so pt performed contract relax and realigned I   Contract relax R glut    Pt to start walking at gym or park and start planks again  Pt unable to try to swim until May due to pool upkeep.      Manual therapy: Danuta  received the following manual therapy  techniques x  15 min. to include soft tissue and joint mobilization were applied to the: low back and gluteals to include:     Performed STM to LB paraspinals and QL R>L  (NP)P/A mob to lumbar region Grade 1-2 , MET to QL R SL L  Sidelying L contract relax mobilization to L5-S1 region to level pelvis  at end of treatment and level pelvis achieved at beginning and end of session    Pt received tool-assisted massage with manual therapy techniques to B LB in prone to trigger an inflammatory healing response and stimulate the production of new collagen and proper, more functional, less painful healing.    Vacuum/cupping STM with manual therapy techniques was performed to back and gluteals to decrease muscle tightness, increase circulation and promote healing process.  The pt's skin was monitored for  redness adjusting pressure as needed. The pt was instructed in possible side effects of bruising and/or soreness.        Ultrasound  for pain control and to decrease inflammation @ continuous duty cycle, 1 Mhz, applied to R lumbar paraspinals, intensity = 1.5 w/cm2 for 8 minutes. 2 min prep      Patient received pre-mod electrical stimulation to decrease muscle tightness and pain to Lumbar paraspinals/ sacral border of gluteals B for 15 minutes with MH supine with cycle time: continuous, beat frequency: , CC/CV: CV.      Written Home Exercises Provided: continue prior HEP  Pt demo good understanding of the education provided. Danuta demonstrated good return demonstration of activities.     Pt. education:  Posture reeducation, body mechanics, HEP, proper posture in standing  No spiritual or educational barriers to learning provided  Pt has no cultural, educational or language barriers to learning provided.    Assessment   Pt has been seen for 5 visits since last session.  Pt has not been as compliant  with HEP and realizes she has to stay consistent with her HEP to improve strength. Pt may benefit from continued treatment as needed to assist her in improving functional abilities and to be able to maintain ability to self mobilize pelvis to decreased pain.  Pt with decreased pain at end of session and level pelvis.      GOALS:   Short Term Goals:  3 weeks MET STG's  Increase range of motion 25%  Increase strength 1/2 muscle grade  Improve postural awareness of pelvis to independently identify dysfunction with min assist from PT  Be able to perform HEP with minimal cueing required     Long Term Goals: 6 weeks PARTIALLY MET LTG'S  Increase range of motion to 75% to 100% full   Improve muscle strength 1 muscle grade  Improve muscle strength with MMT to 4+/5 to 5/5  Improve and stabilize proper pelvic positioning  Restore ability to sit for ADL and work with min to 0 pain  Restore normal sleep habits without  disturbances due to pain  Restore ability to perform ADL's and household activities independently with min to 0 pain    Anticipated barriers to physical therapy: none  Pt's spiritual, cultural and educational needs considered and pt agreeable to plan of care and goals        Plan   If you concur, I recommend patient continue with physical therapy 1-2 times a week as needed for 6 weeks.  Please advise us of your  recommendations. Thank you for allowing us to assist in the care of your patient.      Jacy Escobar, PT, MSPT

## 2023-09-13 VITALS
HEIGHT: 66 IN | DIASTOLIC BLOOD PRESSURE: 83 MMHG | BODY MASS INDEX: 21.69 KG/M2 | WEIGHT: 135 LBS | SYSTOLIC BLOOD PRESSURE: 141 MMHG

## 2023-09-13 LAB
HDLC SERPL-MCNC: 65 MG/DL
POC CHOLESTEROL, TOTAL: 185 MG/DL
POC GLUCOSE, FASTING: 85 MG/DL (ref 60–110)
TRIGL SERPL-MCNC: 44 MG/DL

## 2023-09-13 NOTE — PLAN OF CARE
Physical Therapy Progress and Daily Treatment Note     Name: Danuta Santos  Clinic Number: [id number]  Diagnosis:   Encounter Diagnosis   Name Primary?    Chronic bilateral low back pain with right-sided sciatica Yes     Physician: Yumiko Davis MD  Treatment Orders: PT Eval and Treat  Past Medical History:   Diagnosis Date    Allergy     IBS (irritable bowel syndrome)     Pneumonia 02/2019     Precautions: as per diagnosis    Evaluation date: 1-19-23  Visit # authorized: 24/40  Authorization period:12-31-23  Plan of care expiration: 10-24-23    MD Follow up appt: none scheduled      Time In: 4:50  Time Out: 5:50  Total Billing Time (timed & untimed codes): 60   minutes    Subjective     Pt reports: she had to lift some water and was able to perform without increased symptoms and was feeling tight already.  Pt states feeling tight due to long days with school and personal issues. Pt states rough week so far.  Pt states over the weekend did pretty good and was able to self mobilize     Pain Scale: before treatment: 5/10 after treatment: 1/10 and level pelvis    Objective     Pt with AR R pelvis prior to treatment at  AR R pelvis   Mod-severe tightness lumbar paraspinals R, mod L    Lumbar active range of motion in standing is: 9-12-23  - flexion - toes                     - extension -  100%                         - left side bending -  to knee         - right side bending -  to knee           Lumbar active range of motion in standing is: 10-27-22  - flexion - toes                     - extension -  100%                         - left side bending -  To knee         - right side bending -  To knee         Muscle Strength same as 6-15-23  MMT R L   Hip flexion 4+/5 4+/5   Hip abduction 5/5 5/5   Hip extension 5-/5 5/5   Glut max 4-/5 4/5   Knee extension 5/5 5/5   Knee flexion 5/5 5/5                  Muscle Strength 10-27-22  MMT R L   Hip flexion 5/5 5/5   Hip abduction 5/5 5/5   Hip extension 5/5 5/5    Glut max 4+/5 4+/5   Knee extension 5/5 5/5   Knee flexion 5/5 5/5              TREATMENT   Therapeutic exercises were performed to improve ROM, strength, flexibility and stabilization for 15 minutes.    Reassessment  Pt was able to self mobilize   Instructed pt to continue with current HEP also   Verbal review of all  HS stretch supine   Glut stretch  Piriformis stretch  B QL stretch     B Knee to chest   Contract relax R glut x 3       Pelvic tilt   x 10  Bridge x 10,   SLR x 10,   Trunk rotation supine x 10  Partial sit up x 10  Hip abd sidelying x 10 B,  Arm and Leg lift prone x 10, instructed pt to put pillow under stomach   Hip ext prone with bent knee x 10 instructed pt to put pillow under stomach  Sit to stand x 10  Instructed pt to hold press up for now and will note results  GSS standing x 10    Iron cross x 2 as needed  Plank x 30 sec x 1 Led to dysfunction so pt performed contract relax and realigned I   Contract relax R glut    Pt to start walking at gym or park and start planks again  Pt unable to try to swim until May due to pool upkeep.      Manual therapy: Danuta  received the following manual therapy  techniques x  15 min. to include soft tissue and joint mobilization were applied to the: low back and gluteals to include:     Performed STM to LB paraspinals and QL R>L  (NP)P/A mob to lumbar region Grade 1-2 , MET to QL R SL L  Sidelying L contract relax mobilization to L5-S1 region to level pelvis  at end of treatment and level pelvis achieved at beginning and end of session    Pt received tool-assisted massage with manual therapy techniques to B LB in prone to trigger an inflammatory healing response and stimulate the production of new collagen and proper, more functional, less painful healing.    Vacuum/cupping STM with manual therapy techniques was performed to back and gluteals to decrease muscle tightness, increase circulation and promote healing process.  The pt's skin was monitored for  redness adjusting pressure as needed. The pt was instructed in possible side effects of bruising and/or soreness.        Ultrasound  for pain control and to decrease inflammation @ continuous duty cycle, 1 Mhz, applied to R lumbar paraspinals, intensity = 1.5 w/cm2 for 8 minutes. 2 min prep      Patient received pre-mod electrical stimulation to decrease muscle tightness and pain to Lumbar paraspinals/ sacral border of gluteals B for 15 minutes with MH supine with cycle time: continuous, beat frequency: , CC/CV: CV.      Written Home Exercises Provided: continue prior HEP  Pt demo good understanding of the education provided. Danuta demonstrated good return demonstration of activities.     Pt. education:  Posture reeducation, body mechanics, HEP, proper posture in standing  No spiritual or educational barriers to learning provided  Pt has no cultural, educational or language barriers to learning provided.    Assessment   Pt has been seen for 5 visits since last session.  Pt has not been as compliant  with HEP and realizes she has to stay consistent with her HEP to improve strength. Pt may benefit from continued treatment as needed to assist her in improving functional abilities and to be able to maintain ability to self mobilize pelvis to decreased pain.  Pt with decreased pain at end of session and level pelvis.      GOALS:   Short Term Goals:  3 weeks MET STG's  Increase range of motion 25%  Increase strength 1/2 muscle grade  Improve postural awareness of pelvis to independently identify dysfunction with min assist from PT  Be able to perform HEP with minimal cueing required     Long Term Goals: 6 weeks PARTIALLY MET LTG'S  Increase range of motion to 75% to 100% full   Improve muscle strength 1 muscle grade  Improve muscle strength with MMT to 4+/5 to 5/5  Improve and stabilize proper pelvic positioning  Restore ability to sit for ADL and work with min to 0 pain  Restore normal sleep habits without  disturbances due to pain  Restore ability to perform ADL's and household activities independently with min to 0 pain    Anticipated barriers to physical therapy: none  Pt's spiritual, cultural and educational needs considered and pt agreeable to plan of care and goals        Plan   If you concur, I recommend patient continue with physical therapy 1-2 times a week as needed for 6 weeks.  Please advise us of your  recommendations. Thank you for allowing us to assist in the care of your patient.      Jacy Escobar, PT, MSPT

## 2023-09-18 ENCOUNTER — PATIENT MESSAGE (OUTPATIENT)
Dept: INTERNAL MEDICINE | Facility: CLINIC | Age: 68
End: 2023-09-18

## 2023-09-18 ENCOUNTER — CLINICAL SUPPORT (OUTPATIENT)
Dept: INTERNAL MEDICINE | Facility: CLINIC | Age: 68
End: 2023-09-18
Payer: COMMERCIAL

## 2023-09-18 DIAGNOSIS — Z23 IMMUNIZATION DUE: Primary | ICD-10-CM

## 2023-09-18 PROCEDURE — 90471 IMMUNIZATION ADMIN: CPT | Mod: S$GLB,,, | Performed by: INTERNAL MEDICINE

## 2023-09-18 PROCEDURE — 90694 VACC AIIV4 NO PRSRV 0.5ML IM: CPT | Mod: S$GLB,,, | Performed by: INTERNAL MEDICINE

## 2023-09-18 PROCEDURE — 90694 FLU VACCINE - QUADRIVALENT - ADJUVANTED: ICD-10-PCS | Mod: S$GLB,,, | Performed by: INTERNAL MEDICINE

## 2023-09-18 PROCEDURE — 90471 FLU VACCINE - QUADRIVALENT - ADJUVANTED: ICD-10-PCS | Mod: S$GLB,,, | Performed by: INTERNAL MEDICINE

## 2023-09-22 NOTE — PROGRESS NOTES
Physical Therapy Daily Treatment Note     Name: Danuta Santos  Clinic Number: [id number]  Diagnosis:   Encounter Diagnosis   Name Primary?    Chronic bilateral low back pain with right-sided sciatica Yes     Physician: Yumiko Davis MD  Treatment Orders: PT Eval and Treat  Past Medical History:   Diagnosis Date    Allergy     IBS (irritable bowel syndrome)     Pneumonia 02/2019     Precautions: as per diagnosis    Evaluation date: 1-19-23  Visit # authorized: 25/40  Authorization period:12-31-23  Plan of care expiration: 10-24-23    MD Follow up appt: none scheduled      Time In: 10:12  Time Out: 11:10  Total Billing Time (timed & untimed codes): 55 minutes    Subjective     Pt reports: she is feeling ok, just feeling tight Pt is swimming 1 time a week    Pain Scale: before treatment: 3.5-4/10 after treatment: 1/10 and level pelvis    Objective     Pt with level pelvis prior to treatment at  AR R pelvis   Mod-severe tightness lumbar paraspinals R, mod        TREATMENT   Therapeutic exercises were performed to improve ROM, strength, flexibility and stabilization for 5 minutes.    Pt was able to self mobilize   Instructed pt to continue with current HEP also   Verbal review of all  HS stretch supine   Glut stretch  Piriformis stretch  B QL stretch     B Knee to chest   Contract relax R glut x 3       Pelvic tilt   x 10  Bridge x 10,   SLR x 10,   Trunk rotation supine x 10  Partial sit up x 10  Hip abd sidelying x 10 B,  Arm and Leg lift prone x 10, instructed pt to put pillow under stomach   Hip ext prone with bent knee x 10 instructed pt to put pillow under stomach  Sit to stand x 10  Instructed pt to hold press up for now and will note results  GSS standing x 10    Iron cross x 2 as needed  Plank x 30 sec x 1 Led to dysfunction so pt performed contract relax and realigned I   Contract relax R glut    Pt to start walking at gym or park and start planks again  Pt unable to try to swim until May due to  pool upkeep.      Manual therapy: Danuta  received the following manual therapy  techniques x  25 min. to include soft tissue and joint mobilization were applied to the: low back and gluteals to include:     Performed STM to LB paraspinals and QL R>L  (NP)P/A mob to lumbar region Grade 1-2 , MET to QL R SL L  Sidelying L contract relax mobilization to L5-S1 region to level pelvis  at end of treatment and level pelvis achieved at beginning and end of session    Pt received tool-assisted massage with manual therapy techniques to B LB in prone to trigger an inflammatory healing response and stimulate the production of new collagen and proper, more functional, less painful healing.    Vacuum/cupping STM with manual therapy techniques was performed to back and gluteals to decrease muscle tightness, increase circulation and promote healing process.  The pt's skin was monitored for redness adjusting pressure as needed. The pt was instructed in possible side effects of bruising and/or soreness.        Ultrasound  for pain control and to decrease inflammation @ continuous duty cycle, 1 Mhz, applied to R lumbar paraspinals, intensity = 1.5 w/cm2 for 8 minutes. 2 min prep      Patient received pre-mod electrical stimulation to decrease muscle tightness and pain to Lumbar paraspinals/ sacral border of gluteals B for 15 minutes with MH supine with cycle time: continuous, beat frequency: , CC/CV: CV.      Written Home Exercises Provided: continue prior HEP  Pt demo good understanding of the education provided. Danuta demonstrated good return demonstration of activities.     Pt. education:  Posture reeducation, body mechanics, HEP, proper posture in standing  No spiritual or educational barriers to learning provided  Pt has no cultural, educational or language barriers to learning provided.    Assessment   Pt with level pelvis feeling tight but not as severe pain as usual.  Pt scott treatment well and improved symptoms  at end of session  Resuming ex and adding swimming may be helping maintain pelvic stabilization    GOALS:   Short Term Goals:  3 weeks MET STG's  Increase range of motion 25%  Increase strength 1/2 muscle grade  Improve postural awareness of pelvis to independently identify dysfunction with min assist from PT  Be able to perform HEP with minimal cueing required     Long Term Goals: 6 weeks PARTIALLY MET LTG'S  Increase range of motion to 75% to 100% full   Improve muscle strength 1 muscle grade  Improve muscle strength with MMT to 4+/5 to 5/5  Improve and stabilize proper pelvic positioning  Restore ability to sit for ADL and work with min to 0 pain  Restore normal sleep habits without disturbances due to pain  Restore ability to perform ADL's and household activities independently with min to 0 pain    Anticipated barriers to physical therapy: none  Pt's spiritual, cultural and educational needs considered and pt agreeable to plan of care and goals        Plan   Continue with established plan of care towards PT goals.        Jacy Escobar, PT, MSPT

## 2023-09-25 ENCOUNTER — CLINICAL SUPPORT (OUTPATIENT)
Dept: REHABILITATION | Facility: HOSPITAL | Age: 68
End: 2023-09-25
Payer: COMMERCIAL

## 2023-09-25 DIAGNOSIS — G89.29 CHRONIC BILATERAL LOW BACK PAIN WITH RIGHT-SIDED SCIATICA: Primary | ICD-10-CM

## 2023-09-25 DIAGNOSIS — M54.41 CHRONIC BILATERAL LOW BACK PAIN WITH RIGHT-SIDED SCIATICA: Primary | ICD-10-CM

## 2023-09-25 PROCEDURE — 97140 MANUAL THERAPY 1/> REGIONS: CPT | Mod: PN | Performed by: PHYSICAL THERAPIST

## 2023-09-25 PROCEDURE — 97035 APP MDLTY 1+ULTRASOUND EA 15: CPT | Mod: PN | Performed by: PHYSICAL THERAPIST

## 2023-09-25 PROCEDURE — 97014 ELECTRIC STIMULATION THERAPY: CPT | Mod: PN | Performed by: PHYSICAL THERAPIST

## 2023-10-09 NOTE — PROGRESS NOTES
Physical Therapy Daily Treatment Note     Name: Danuta Santos  Clinic Number: [id number]  Diagnosis:   Encounter Diagnosis   Name Primary?    Chronic bilateral low back pain with right-sided sciatica Yes     Physician: Yumiko Davis MD  Treatment Orders: PT Eval and Treat  Past Medical History:   Diagnosis Date    Allergy     IBS (irritable bowel syndrome)     Pneumonia 02/2019     Precautions: as per diagnosis    Evaluation date: 1-19-23  Visit # authorized: 26/40  Authorization period:12-31-23  Plan of care expiration: 10-24-23    MD Follow up appt: none scheduled      Time In: 4:30  Time Out: 5:15  Total Billing Time (timed & untimed codes): 28 minutes    Subjective     Pt reports: she having increased pain but unable to self mobilize Pt is swimming 1 time a week    Pain Scale: before treatment: 5/10 after treatment: 1/10 and level pelvis    Objective     Pt with level pelvis prior to treatment at  AR R pelvis   Mod-severe tightness lumbar paraspinals R, mod        TREATMENT   Therapeutic exercises were performed to improve ROM, strength, flexibility and stabilization for 0 minutes.    Pt was unable to self mobilize   Instructed pt to continue with current HEP also   Verbal review of all  HS stretch supine   Glut stretch  Piriformis stretch  B QL stretch     B Knee to chest   Contract relax R glut x 3       Pelvic tilt   x 10  Bridge x 10,   SLR x 10,   Trunk rotation supine x 10  Partial sit up x 10  Hip abd sidelying x 10 B,  Arm and Leg lift prone x 10, instructed pt to put pillow under stomach   Hip ext prone with bent knee x 10 instructed pt to put pillow under stomach  Sit to stand x 10  Instructed pt to hold press up for now and will note results  GSS standing x 10    Iron cross x 2 as needed  Plank x 30 sec x 1 Led to dysfunction so pt performed contract relax and realigned I   Contract relax R glut    Pt to start walking at gym or park and start planks again  Pt unable to try to swim until  May due to pool upkeep.      Manual therapy: Danuta  received the following manual therapy  techniques x  8 min. to include soft tissue and joint mobilization were applied to the: low back and gluteals to include:     Performed (NP)STM to LB paraspinals and QL R>L  (NP)P/A mob to lumbar region Grade 1-2 , MET to QL R SL L  Sidelying L contract relax mobilization to L5-S1 region to level pelvis  at end of treatment and level pelvis achieved at beginning and end of session    (NP)Pt received tool-assisted massage with manual therapy techniques to B LB in prone to trigger an inflammatory healing response and stimulate the production of new collagen and proper, more functional, less painful healing.    (NP)Vacuum/cupping STM with manual therapy techniques was performed to back and gluteals to decrease muscle tightness, increase circulation and promote healing process.  The pt's skin was monitored for redness adjusting pressure as needed. The pt was instructed in possible side effects of bruising and/or soreness.        (NP)Ultrasound  for pain control and to decrease inflammation @ continuous duty cycle, 1 Mhz, applied to R lumbar paraspinals, intensity = 1.5 w/cm2 for 8 minutes. 2 min prep      Patient received pre-mod electrical stimulation to decrease muscle tightness and pain to Lumbar paraspinals/ sacral border of gluteals B for 15 minutes with MH supine with cycle time: continuous, beat frequency: , CC/CV: CV.      Written Home Exercises Provided: continue prior HEP  Pt demo good understanding of the education provided. Danuta demonstrated good return demonstration of activities.     Pt. education:  Posture reeducation, body mechanics, HEP, proper posture in standing  No spiritual or educational barriers to learning provided  Pt has no cultural, educational or language barriers to learning provided.    Assessment   Pt unable to self mobilize, time limited for full treatment but able to obtain level  pelvis and decreased pain.      GOALS:   Short Term Goals:  3 weeks MET STG's  Increase range of motion 25%  Increase strength 1/2 muscle grade  Improve postural awareness of pelvis to independently identify dysfunction with min assist from PT  Be able to perform HEP with minimal cueing required     Long Term Goals: 6 weeks PARTIALLY MET LTG'S  Increase range of motion to 75% to 100% full   Improve muscle strength 1 muscle grade  Improve muscle strength with MMT to 4+/5 to 5/5  Improve and stabilize proper pelvic positioning  Restore ability to sit for ADL and work with min to 0 pain  Restore normal sleep habits without disturbances due to pain  Restore ability to perform ADL's and household activities independently with min to 0 pain    Anticipated barriers to physical therapy: none  Pt's spiritual, cultural and educational needs considered and pt agreeable to plan of care and goals        Plan   Continue with established plan of care towards PT goals.        Jacy Escobar, PT, MSPT

## 2023-10-10 ENCOUNTER — CLINICAL SUPPORT (OUTPATIENT)
Dept: REHABILITATION | Facility: HOSPITAL | Age: 68
End: 2023-10-10
Payer: COMMERCIAL

## 2023-10-10 ENCOUNTER — PATIENT MESSAGE (OUTPATIENT)
Dept: INTERNAL MEDICINE | Facility: CLINIC | Age: 68
End: 2023-10-10
Payer: COMMERCIAL

## 2023-10-10 DIAGNOSIS — G89.29 CHRONIC BILATERAL LOW BACK PAIN WITH RIGHT-SIDED SCIATICA: Primary | ICD-10-CM

## 2023-10-10 DIAGNOSIS — M54.41 CHRONIC BILATERAL LOW BACK PAIN WITH RIGHT-SIDED SCIATICA: Primary | ICD-10-CM

## 2023-10-10 PROCEDURE — 97140 MANUAL THERAPY 1/> REGIONS: CPT | Mod: PN | Performed by: PHYSICAL THERAPIST

## 2023-10-10 PROCEDURE — 97014 ELECTRIC STIMULATION THERAPY: CPT | Mod: PN | Performed by: PHYSICAL THERAPIST

## 2023-10-12 ENCOUNTER — PATIENT MESSAGE (OUTPATIENT)
Dept: INTERNAL MEDICINE | Facility: CLINIC | Age: 68
End: 2023-10-12
Payer: COMMERCIAL

## 2023-10-13 ENCOUNTER — TELEPHONE (OUTPATIENT)
Dept: INTERNAL MEDICINE | Facility: CLINIC | Age: 68
End: 2023-10-13
Payer: COMMERCIAL

## 2023-10-13 DIAGNOSIS — Z23 IMMUNIZATION DUE: ICD-10-CM

## 2023-10-13 DIAGNOSIS — E87.1 HYPONATREMIA: ICD-10-CM

## 2023-10-13 DIAGNOSIS — G89.29 CHRONIC BILATERAL LOW BACK PAIN WITH RIGHT-SIDED SCIATICA: ICD-10-CM

## 2023-10-13 DIAGNOSIS — M54.41 CHRONIC BILATERAL LOW BACK PAIN WITH RIGHT-SIDED SCIATICA: ICD-10-CM

## 2023-10-13 DIAGNOSIS — R53.83 FATIGUE, UNSPECIFIED TYPE: ICD-10-CM

## 2023-10-13 DIAGNOSIS — Z86.2 HISTORY OF ANEMIA: ICD-10-CM

## 2023-10-13 DIAGNOSIS — J01.90 ACUTE BACTERIAL SINUSITIS: Primary | ICD-10-CM

## 2023-10-13 DIAGNOSIS — B96.89 ACUTE BACTERIAL SINUSITIS: Primary | ICD-10-CM

## 2023-10-13 NOTE — TELEPHONE ENCOUNTER
pt needs to be scheduled for labs tomorrow in [facility].  Labs in epic.  Please call the patient with the scheduled time.

## 2023-10-14 ENCOUNTER — LAB VISIT (OUTPATIENT)
Dept: LAB | Facility: HOSPITAL | Age: 68
End: 2023-10-14
Attending: INTERNAL MEDICINE
Payer: COMMERCIAL

## 2023-10-14 DIAGNOSIS — G89.29 CHRONIC BILATERAL LOW BACK PAIN WITH RIGHT-SIDED SCIATICA: ICD-10-CM

## 2023-10-14 DIAGNOSIS — Z86.2 HISTORY OF ANEMIA: ICD-10-CM

## 2023-10-14 DIAGNOSIS — M54.41 CHRONIC BILATERAL LOW BACK PAIN WITH RIGHT-SIDED SCIATICA: ICD-10-CM

## 2023-10-14 DIAGNOSIS — R53.83 FATIGUE, UNSPECIFIED TYPE: ICD-10-CM

## 2023-10-14 DIAGNOSIS — E87.1 HYPONATREMIA: ICD-10-CM

## 2023-10-14 LAB
ALBUMIN SERPL BCP-MCNC: 3.9 G/DL (ref 3.5–5.2)
ALP SERPL-CCNC: 58 U/L (ref 55–135)
ALT SERPL W/O P-5'-P-CCNC: 15 U/L (ref 10–44)
ANION GAP SERPL CALC-SCNC: 7 MMOL/L (ref 8–16)
AST SERPL-CCNC: 20 U/L (ref 10–40)
BASOPHILS # BLD AUTO: 0.03 K/UL (ref 0–0.2)
BASOPHILS NFR BLD: 0.7 % (ref 0–1.9)
BILIRUB SERPL-MCNC: 0.8 MG/DL (ref 0.1–1)
BUN SERPL-MCNC: 16 MG/DL (ref 8–23)
CALCIUM SERPL-MCNC: 9.8 MG/DL (ref 8.7–10.5)
CHLORIDE SERPL-SCNC: 101 MMOL/L (ref 95–110)
CO2 SERPL-SCNC: 27 MMOL/L (ref 23–29)
CREAT SERPL-MCNC: 0.8 MG/DL (ref 0.5–1.4)
DIFFERENTIAL METHOD: ABNORMAL
EOSINOPHIL # BLD AUTO: 0 K/UL (ref 0–0.5)
EOSINOPHIL NFR BLD: 0.7 % (ref 0–8)
ERYTHROCYTE [DISTWIDTH] IN BLOOD BY AUTOMATED COUNT: 12.9 % (ref 11.5–14.5)
ERYTHROCYTE [SEDIMENTATION RATE] IN BLOOD BY PHOTOMETRIC METHOD: 5 MM/HR (ref 0–36)
EST. GFR  (NO RACE VARIABLE): >60 ML/MIN/1.73 M^2
FERRITIN SERPL-MCNC: 84 NG/ML (ref 20–300)
GLUCOSE SERPL-MCNC: 81 MG/DL (ref 70–110)
HCT VFR BLD AUTO: 38 % (ref 37–48.5)
HGB BLD-MCNC: 12.3 G/DL (ref 12–16)
IMM GRANULOCYTES # BLD AUTO: 0 K/UL (ref 0–0.04)
IMM GRANULOCYTES NFR BLD AUTO: 0 % (ref 0–0.5)
LYMPHOCYTES # BLD AUTO: 0.9 K/UL (ref 1–4.8)
LYMPHOCYTES NFR BLD: 22.5 % (ref 18–48)
MCH RBC QN AUTO: 29.3 PG (ref 27–31)
MCHC RBC AUTO-ENTMCNC: 32.4 G/DL (ref 32–36)
MCV RBC AUTO: 91 FL (ref 82–98)
MONOCYTES # BLD AUTO: 0.3 K/UL (ref 0.3–1)
MONOCYTES NFR BLD: 7 % (ref 4–15)
NEUTROPHILS # BLD AUTO: 2.9 K/UL (ref 1.8–7.7)
NEUTROPHILS NFR BLD: 69.1 % (ref 38–73)
NRBC BLD-RTO: 0 /100 WBC
PLATELET # BLD AUTO: 211 K/UL (ref 150–450)
PMV BLD AUTO: 11.7 FL (ref 9.2–12.9)
POTASSIUM SERPL-SCNC: 4.1 MMOL/L (ref 3.5–5.1)
PROT SERPL-MCNC: 6.8 G/DL (ref 6–8.4)
RBC # BLD AUTO: 4.2 M/UL (ref 4–5.4)
SODIUM SERPL-SCNC: 135 MMOL/L (ref 136–145)
TSH SERPL DL<=0.005 MIU/L-ACNC: 1.52 UIU/ML (ref 0.4–4)
VIT B12 SERPL-MCNC: 1278 PG/ML (ref 210–950)
WBC # BLD AUTO: 4.13 K/UL (ref 3.9–12.7)

## 2023-10-14 PROCEDURE — 85025 COMPLETE CBC W/AUTO DIFF WBC: CPT | Performed by: INTERNAL MEDICINE

## 2023-10-14 PROCEDURE — 36415 COLL VENOUS BLD VENIPUNCTURE: CPT | Performed by: INTERNAL MEDICINE

## 2023-10-14 PROCEDURE — 85652 RBC SED RATE AUTOMATED: CPT | Performed by: INTERNAL MEDICINE

## 2023-10-14 PROCEDURE — 80053 COMPREHEN METABOLIC PANEL: CPT | Performed by: INTERNAL MEDICINE

## 2023-10-14 PROCEDURE — 82607 VITAMIN B-12: CPT | Performed by: INTERNAL MEDICINE

## 2023-10-14 PROCEDURE — 84443 ASSAY THYROID STIM HORMONE: CPT | Performed by: INTERNAL MEDICINE

## 2023-10-14 PROCEDURE — 82728 ASSAY OF FERRITIN: CPT | Performed by: INTERNAL MEDICINE

## 2023-10-16 NOTE — PROGRESS NOTES
Physical Therapy Daily Treatment Note     Name: Danuat Santos  Clinic Number: [id number]  Diagnosis:   Encounter Diagnosis   Name Primary?    Chronic bilateral low back pain with right-sided sciatica Yes     Physician: Yumiko Davis MD  Treatment Orders: PT Eval and Treat  Past Medical History:   Diagnosis Date    Allergy     IBS (irritable bowel syndrome)     Pneumonia 02/2019     Precautions: as per diagnosis    Evaluation date: 1-19-23  Visit # authorized: 27/40  Authorization period:12-31-23  Plan of care expiration: 10-24-23    MD Follow up appt: none scheduled      Time In: 4:40  Time Out: 5:40  Total Billing Time (timed & untimed codes): 60 minutes    Subjective     Pt reports: having some pain but has been busy with work and has not time to do exercises today    Pain Scale: before treatment: 5/10 after treatment: 1/10 and level pelvis    Objective     AR R at IE AR R pelvis   Mod-severe tightness lumbar paraspinals R, mod        TREATMENT   Therapeutic exercises were performed to improve ROM, strength, flexibility and stabilization for 5 minutes.    Pt was unable to self mobilize     HS stretch supine   Glut stretch  Piriformis stretch  B QL stretch     B Knee to chest   Contract relax R glut x 3       Pelvic tilt   x 10  Bridge x 10,   SLR x 10,   Trunk rotation supine x 10  Partial sit up x 10  Hip abd sidelying x 10 B,  Arm and Leg lift prone x 10, instructed pt to put pillow under stomach   Hip ext prone with bent knee x 10 instructed pt to put pillow under stomach  Sit to stand x 10  Instructed pt to hold press up for now and will note results  GSS standing x 10    Iron cross x 2 as needed  Plank x 30 sec x 1 Led to dysfunction so pt performed contract relax and realigned I   Contract relax R glut    Pt to start walking at gym or park and start planks again  Pt unable to try to swim until May due to pool upkeep.      Manual therapy: Danuta  received the following manual therapy  techniques  x  25 min. to include soft tissue and joint mobilization were applied to the: low back and gluteals to include:     Performed STM to LB paraspinals and QL R>L  (NP)P/A mob to lumbar region Grade 1-2 , MET to QL R SL L  Sidelying L contract relax mobilization to L5-S1 region to level pelvis  at end of treatment and level pelvis achieved at beginning and end of session    Pt received tool-assisted massage with manual therapy techniques to B LB in prone to trigger an inflammatory healing response and stimulate the production of new collagen and proper, more functional, less painful healing.    Vacuum/cupping STM with manual therapy techniques was performed to back and gluteals to decrease muscle tightness, increase circulation and promote healing process.  The pt's skin was monitored for redness adjusting pressure as needed. The pt was instructed in possible side effects of bruising and/or soreness.      Ultrasound  for pain control and to decrease inflammation @ continuous duty cycle, 1 Mhz, applied to R lumbar paraspinals, intensity = 1.5 w/cm2 for 8 minutes. 2 min prep      Patient received pre-mod electrical stimulation to decrease muscle tightness and pain to Lumbar paraspinals/ sacral border of gluteals B for 15 minutes with MH supine with cycle time: continuous, beat frequency: , CC/CV: CV.      Written Home Exercises Provided: continue prior HEP  Pt demo good understanding of the education provided. Danuta demonstrated good return demonstration of activities.     Pt. education:  Posture reeducation, body mechanics, HEP, proper posture in standing  No spiritual or educational barriers to learning provided  Pt has no cultural, educational or language barriers to learning provided.    Assessment   Pt unable to self mobilize which may be due to increased tightness due to not being able to focus on HEP fully and address dysfunction at onset. Pt with increased activity at school this week which should  improve next week.  Pt with level pelvis and decreased symptoms at end of session.      GOALS:   Short Term Goals:  3 weeks MET STG's  Increase range of motion 25%  Increase strength 1/2 muscle grade  Improve postural awareness of pelvis to independently identify dysfunction with min assist from PT  Be able to perform HEP with minimal cueing required     Long Term Goals: 6 weeks PARTIALLY MET LTG'S  Increase range of motion to 75% to 100% full   Improve muscle strength 1 muscle grade  Improve muscle strength with MMT to 4+/5 to 5/5  Improve and stabilize proper pelvic positioning  Restore ability to sit for ADL and work with min to 0 pain  Restore normal sleep habits without disturbances due to pain  Restore ability to perform ADL's and household activities independently with min to 0 pain    Anticipated barriers to physical therapy: none  Pt's spiritual, cultural and educational needs considered and pt agreeable to plan of care and goals        Plan   Continue with established plan of care towards PT goals.        Jacy Escobar, PT, MSPT

## 2023-10-17 ENCOUNTER — CLINICAL SUPPORT (OUTPATIENT)
Dept: REHABILITATION | Facility: HOSPITAL | Age: 68
End: 2023-10-17
Payer: COMMERCIAL

## 2023-10-17 DIAGNOSIS — G89.29 CHRONIC BILATERAL LOW BACK PAIN WITH RIGHT-SIDED SCIATICA: Primary | ICD-10-CM

## 2023-10-17 DIAGNOSIS — M54.41 CHRONIC BILATERAL LOW BACK PAIN WITH RIGHT-SIDED SCIATICA: Primary | ICD-10-CM

## 2023-10-17 PROCEDURE — 97014 ELECTRIC STIMULATION THERAPY: CPT | Mod: PN | Performed by: PHYSICAL THERAPIST

## 2023-10-17 PROCEDURE — 97140 MANUAL THERAPY 1/> REGIONS: CPT | Mod: PN | Performed by: PHYSICAL THERAPIST

## 2023-10-17 PROCEDURE — 97035 APP MDLTY 1+ULTRASOUND EA 15: CPT | Mod: PN | Performed by: PHYSICAL THERAPIST

## 2023-10-25 NOTE — PROGRESS NOTES
Physical Therapy Progress and  Daily Treatment Note     Name: Danuta Santos  Clinic Number: [id number]  Diagnosis:   Encounter Diagnosis   Name Primary?    Chronic bilateral low back pain with right-sided sciatica Yes     Physician: Yumiko Davis MD  Treatment Orders: PT Eval and Treat  Past Medical History:   Diagnosis Date    Allergy     IBS (irritable bowel syndrome)     Pneumonia 02/2019     Precautions: as per diagnosis    Evaluation date: 1-19-23  Visit # authorized: 27/40  Authorization period:12-31-23  Plan of care expiration: 12-7-23    MD Follow up appt: none scheduled      Time In: 4:50    Time Out: 5:40  Total Billing Time (timed & untimed codes): 50 minutes    Subjective     Pt reports: managing fairly well but feels tight  Pt states she has been doing a lot of walking which helps    Pain Scale: before treatment: 4/10 after treatment: 1/10 and level pelvis    Objective     AR R at IE AR R pelvis   Mod-severe tightness lumbar paraspinals R, mod      Lumbar active range of motion in standing is: 9-12-23  - flexion - toes                     - extension -  100%                         - left side bending -  to knee         - right side bending -  to knee           Lumbar active range of motion in standing is: 10-27-22  - flexion - toes                     - extension -  100%                         - left side bending -  To knee         - right side bending -  To knee         Muscle Strength 10-26-23  MMT R L   Hip flexion 4/5 4/5   Hip abduction 5/5 5/5   Hip extension 5-/5 5/5   Glut max 3+/5 4-/5   Knee extension 5/5 5/5   Knee flexion 5/5 5/5        Muscle Strength same as 6-15-23  MMT R L   Hip flexion 4+/5 4+/5   Hip abduction 5/5 5/5   Hip extension 5-/5 5/5   Glut max 4-/5 4/5   Knee extension 5/5 5/5   Knee flexion 5/5 5/5             Muscle Strength 10-27-22  MMT R L   Hip flexion 5/5 5/5   Hip abduction 5/5 5/5   Hip extension 5/5 5/5   Glut max 4+/5 4+/5   Knee extension 5/5 5/5    Knee flexion 5/5 5/5        TREATMENT   Therapeutic exercises were performed to improve ROM, strength, flexibility and stabilization for 10 minutes.    Pt was able to self mobilize     HS stretch supine   Glut stretch  Piriformis stretch  B QL stretch     B Knee to chest   Contract relax R glut x 3      Pt is only performing 10 reps when she does ex    Pelvic tilt   x 10  Bridge x 10,   SLR x 10,   Trunk rotation supine x 10  Partial sit up x 10  Hip abd sidelying x 10 B,  Arm and Leg lift prone x 10, instructed pt to put pillow under stomach   Hip ext prone with bent knee x 10 instructed pt to put pillow under stomach  Sit to stand x 10  Instructed pt to hold press up for now and will note results  GSS standing x 10    Iron cross x 2 as needed  Plank x 30 sec x 1 Led to dysfunction so pt performed contract relax and realigned I   Contract relax R glut    Pt to start walking at gym or park and start planks again  Pt unable to try to swim until May due to pool upkeep.      Manual therapy: Danuta  received the following manual therapy  techniques x  20 min. to include soft tissue and joint mobilization were applied to the: low back and gluteals to include:     Performed STM to LB paraspinals and QL R>L  (NP)P/A mob to lumbar region Grade 1-2 , MET to QL R SL L  Sidelying L contract relax mobilization to L5-S1 region to level pelvis  at end of treatment and level pelvis achieved at beginning and end of session    Pt received tool-assisted massage with manual therapy techniques to B LB in prone to trigger an inflammatory healing response and stimulate the production of new collagen and proper, more functional, less painful healing.    Vacuum/cupping STM with manual therapy techniques was performed to back and gluteals to decrease muscle tightness, increase circulation and promote healing process.  The pt's skin was monitored for redness adjusting pressure as needed. The pt was instructed in possible side effects of  bruising and/or soreness.      Ultrasound  for pain control and to decrease inflammation @ continuous duty cycle, 1 Mhz, applied to R lumbar paraspinals, intensity = 1.5 w/cm2 for 8 minutes. 2 min prep      Patient received pre-mod electrical stimulation to decrease muscle tightness and pain to Lumbar paraspinals/ sacral border of gluteals B for 8 minutes with MH supine with cycle time: continuous, beat frequency: , CC/CV: CV.      Written Home Exercises Provided: continue prior HEP  Pt demo good understanding of the education provided. Danuta demonstrated good return demonstration of activities.     Pt. education:  Posture reeducation, body mechanics, HEP, proper posture in standing  No spiritual or educational barriers to learning provided  Pt has no cultural, educational or language barriers to learning provided.    Assessment   Pt with loss of strength in hip flexors and gluts and appears to understand that walking alone is not the answer that she needs to increased compliance with HEP and work on increasing reps with strengthening to further improve stabilization that will help with tightness that she experiences despite SI instability Pt will benefit from continued treatment to assist pt in self management of symptoms and to work further on progressing with I HEP     GOALS:   Short Term Goals:  3 weeks MET STG's  Increase range of motion 25%  Increase strength 1/2 muscle grade  Improve postural awareness of pelvis to independently identify dysfunction with min assist from PT  Be able to perform HEP with minimal cueing required     Long Term Goals: 6 weeks PARTIALLY MET LTG'S  Increase range of motion to 75% to 100% full   Improve muscle strength 1 muscle grade  Improve muscle strength with MMT to 4+/5 to 5/5  Improve and stabilize proper pelvic positioning  Restore ability to sit for ADL and work with min to 0 pain  Restore normal sleep habits without disturbances due to pain  Restore ability to  perform ADL's and household activities independently with min to 0 pain    Anticipated barriers to physical therapy: none  Pt's spiritual, cultural and educational needs considered and pt agreeable to plan of care and goals        Plan   If you concur, I recommend patient continue with physical therapy 1-2 times a week  for 6 weeks as needed .  Please advise us of your  recommendations. Thank you for allowing us to assist in the care of your patient.      Jacy Escobar, PT, MSPT

## 2023-10-26 ENCOUNTER — CLINICAL SUPPORT (OUTPATIENT)
Dept: REHABILITATION | Facility: HOSPITAL | Age: 68
End: 2023-10-26
Payer: COMMERCIAL

## 2023-10-26 DIAGNOSIS — M54.41 CHRONIC BILATERAL LOW BACK PAIN WITH RIGHT-SIDED SCIATICA: Primary | ICD-10-CM

## 2023-10-26 DIAGNOSIS — G89.29 CHRONIC BILATERAL LOW BACK PAIN WITH RIGHT-SIDED SCIATICA: Primary | ICD-10-CM

## 2023-10-26 PROCEDURE — 97110 THERAPEUTIC EXERCISES: CPT | Mod: PN | Performed by: PHYSICAL THERAPIST

## 2023-10-26 PROCEDURE — 97035 APP MDLTY 1+ULTRASOUND EA 15: CPT | Mod: PN | Performed by: PHYSICAL THERAPIST

## 2023-10-26 PROCEDURE — 97014 ELECTRIC STIMULATION THERAPY: CPT | Mod: PN | Performed by: PHYSICAL THERAPIST

## 2023-10-26 PROCEDURE — 97140 MANUAL THERAPY 1/> REGIONS: CPT | Mod: PN | Performed by: PHYSICAL THERAPIST

## 2023-10-26 NOTE — PLAN OF CARE
Physical Therapy Progress and  Daily Treatment Note     Name: Danuta Santos  Clinic Number: [id number]  Diagnosis:   Encounter Diagnosis   Name Primary?    Chronic bilateral low back pain with right-sided sciatica Yes     Physician: Yumiko Davis MD  Treatment Orders: PT Eval and Treat  Past Medical History:   Diagnosis Date    Allergy     IBS (irritable bowel syndrome)     Pneumonia 02/2019     Precautions: as per diagnosis    Evaluation date: 1-19-23  Visit # authorized: 27/40  Authorization period:12-31-23  Plan of care expiration: 12-7-23    MD Follow up appt: none scheduled      Time In: 4:50    Time Out: 5:40  Total Billing Time (timed & untimed codes): 50 minutes    Subjective     Pt reports: managing fairly well but feels tight  Pt states she has been doing a lot of walking which helps    Pain Scale: before treatment: 4/10 after treatment: 1/10 and level pelvis    Objective     AR R at IE AR R pelvis   Mod-severe tightness lumbar paraspinals R, mod      Lumbar active range of motion in standing is: 9-12-23  - flexion - toes                     - extension -  100%                         - left side bending -  to knee         - right side bending -  to knee           Lumbar active range of motion in standing is: 10-27-22  - flexion - toes                     - extension -  100%                         - left side bending -  To knee         - right side bending -  To knee         Muscle Strength 10-26-23  MMT R L   Hip flexion 4/5 4/5   Hip abduction 5/5 5/5   Hip extension 5-/5 5/5   Glut max 3+/5 4-/5   Knee extension 5/5 5/5   Knee flexion 5/5 5/5        Muscle Strength same as 6-15-23  MMT R L   Hip flexion 4+/5 4+/5   Hip abduction 5/5 5/5   Hip extension 5-/5 5/5   Glut max 4-/5 4/5   Knee extension 5/5 5/5   Knee flexion 5/5 5/5             Muscle Strength 10-27-22  MMT R L   Hip flexion 5/5 5/5   Hip abduction 5/5 5/5   Hip extension 5/5 5/5   Glut max 4+/5 4+/5   Knee extension 5/5 5/5    Knee flexion 5/5 5/5        TREATMENT   Therapeutic exercises were performed to improve ROM, strength, flexibility and stabilization for 10 minutes.    Pt was able to self mobilize     HS stretch supine   Glut stretch  Piriformis stretch  B QL stretch     B Knee to chest   Contract relax R glut x 3      Pt is only performing 10 reps when she does ex    Pelvic tilt   x 10  Bridge x 10,   SLR x 10,   Trunk rotation supine x 10  Partial sit up x 10  Hip abd sidelying x 10 B,  Arm and Leg lift prone x 10, instructed pt to put pillow under stomach   Hip ext prone with bent knee x 10 instructed pt to put pillow under stomach  Sit to stand x 10  Instructed pt to hold press up for now and will note results  GSS standing x 10    Iron cross x 2 as needed  Plank x 30 sec x 1 Led to dysfunction so pt performed contract relax and realigned I   Contract relax R glut    Pt to start walking at gym or park and start planks again  Pt unable to try to swim until May due to pool upkeep.      Manual therapy: Danuta  received the following manual therapy  techniques x  20 min. to include soft tissue and joint mobilization were applied to the: low back and gluteals to include:     Performed STM to LB paraspinals and QL R>L  (NP)P/A mob to lumbar region Grade 1-2 , MET to QL R SL L  Sidelying L contract relax mobilization to L5-S1 region to level pelvis  at end of treatment and level pelvis achieved at beginning and end of session    Pt received tool-assisted massage with manual therapy techniques to B LB in prone to trigger an inflammatory healing response and stimulate the production of new collagen and proper, more functional, less painful healing.    Vacuum/cupping STM with manual therapy techniques was performed to back and gluteals to decrease muscle tightness, increase circulation and promote healing process.  The pt's skin was monitored for redness adjusting pressure as needed. The pt was instructed in possible side effects of  bruising and/or soreness.      Ultrasound  for pain control and to decrease inflammation @ continuous duty cycle, 1 Mhz, applied to R lumbar paraspinals, intensity = 1.5 w/cm2 for 8 minutes. 2 min prep      Patient received pre-mod electrical stimulation to decrease muscle tightness and pain to Lumbar paraspinals/ sacral border of gluteals B for 8 minutes with MH supine with cycle time: continuous, beat frequency: , CC/CV: CV.      Written Home Exercises Provided: continue prior HEP  Pt demo good understanding of the education provided. Danuta demonstrated good return demonstration of activities.     Pt. education:  Posture reeducation, body mechanics, HEP, proper posture in standing  No spiritual or educational barriers to learning provided  Pt has no cultural, educational or language barriers to learning provided.    Assessment   Pt with loss of strength in hip flexors and gluts and appears to understand that walking alone is not the answer that she needs to increased compliance with HEP and work on increasing reps with strengthening to further improve stabilization that will help with tightness that she experiences despite SI instability Pt will benefit from continued treatment to assist pt in self management of symptoms and to work further on progressing with I HEP     GOALS:   Short Term Goals:  3 weeks MET STG's  Increase range of motion 25%  Increase strength 1/2 muscle grade  Improve postural awareness of pelvis to independently identify dysfunction with min assist from PT  Be able to perform HEP with minimal cueing required     Long Term Goals: 6 weeks PARTIALLY MET LTG'S  Increase range of motion to 75% to 100% full   Improve muscle strength 1 muscle grade  Improve muscle strength with MMT to 4+/5 to 5/5  Improve and stabilize proper pelvic positioning  Restore ability to sit for ADL and work with min to 0 pain  Restore normal sleep habits without disturbances due to pain  Restore ability to  perform ADL's and household activities independently with min to 0 pain    Anticipated barriers to physical therapy: none  Pt's spiritual, cultural and educational needs considered and pt agreeable to plan of care and goals        Plan   If you concur, I recommend patient continue with physical therapy 1-2 times a week  for 6 weeks as needed .  Please advise us of your  recommendations. Thank you for allowing us to assist in the care of your patient.      Jacy Escobar, PT, MSPT

## 2023-11-03 ENCOUNTER — PATIENT MESSAGE (OUTPATIENT)
Dept: INTERNAL MEDICINE | Facility: CLINIC | Age: 68
End: 2023-11-03
Payer: COMMERCIAL

## 2023-11-08 ENCOUNTER — TELEPHONE (OUTPATIENT)
Dept: INTERNAL MEDICINE | Facility: CLINIC | Age: 68
End: 2023-11-08
Payer: COMMERCIAL

## 2023-11-08 DIAGNOSIS — Z86.2 HISTORY OF ANEMIA: ICD-10-CM

## 2023-11-08 DIAGNOSIS — E87.1 HYPONATREMIA: ICD-10-CM

## 2023-11-08 DIAGNOSIS — E87.5 HYPERKALEMIA: Primary | ICD-10-CM

## 2023-11-08 NOTE — TELEPHONE ENCOUNTER
Can we call dr parish santillan's office and see if he will take a written script with clearance or does he have a preop clearance form we can fill out and fax back ?

## 2023-11-08 NOTE — PROGRESS NOTES
Physical Therapy Daily Treatment Note     Name: Danuta Santos  Clinic Number: [id number]  Diagnosis:   Encounter Diagnosis   Name Primary?    Chronic bilateral low back pain with right-sided sciatica Yes     Physician: Yumiko Davis MD  Treatment Orders: PT Eval and Treat  Past Medical History:   Diagnosis Date    Allergy     IBS (irritable bowel syndrome)     Pneumonia 02/2019     Precautions: as per diagnosis    Evaluation date: 1-19-23  Visit # authorized: 28/40  Authorization period:12-31-23  Plan of care expiration: 12-7-23    MD Follow up appt: none scheduled      Time In: 3:18  Time Out: 4:15  Total Billing Time (timed & untimed codes): 57 minutes    Subjective     Pt reports: back has been fairly well and has resumed HEP and is helping a little so just now getting some increased tightness and pain as of yesterday so pain did not start as quickly between sessions .      Pain Scale: before treatment: 4-5/10 after treatment: 1/10 and level pelvis    Objective     AR R at IE AR R pelvis   Mod-severe tightness lumbar paraspinals R,  mod L     TREATMENT   Therapeutic exercises were performed to improve ROM, strength, flexibility and stabilization for 10 minutes.    Pt was able to self mobilize     HS stretch supine   Glut stretch  Piriformis stretch  B QL stretch     B Knee to chest   Contract relax R glut x 3      Pt is only performing 10 reps when she does ex    Pelvic tilt   x 10  Bridge x 10,   SLR x 10,   Trunk rotation supine x 10  Partial sit up x 10  Hip abd sidelying x 10 B,  Arm and Leg lift prone x 10, instructed pt to put pillow under stomach   Hip ext prone with bent knee x 10 instructed pt to put pillow under stomach  Sit to stand x 10  Instructed pt to hold press up for now and will note results  GSS standing x 10    Iron cross x 2 as needed  Plank x 30 sec x 1 Led to dysfunction so pt performed contract relax and realigned I   Contract relax R glut    Pt to start walking at gym or park  and start planks again  Pt unable to try to swim until May due to pool upkeep.      Manual therapy: Danuta  received the following manual therapy  techniques x  22 min. to include soft tissue and joint mobilization were applied to the: low back and gluteals to include:     Performed STM to LB paraspinals and QL R>L  (NP)P/A mob to lumbar region Grade 1-2 , MET to QL R SL L  Sidelying L contract relax mobilization to L5-S1 region to level pelvis  at end of treatment and level pelvis achieved at beginning and end of session    Pt received tool-assisted massage with manual therapy techniques to B LB in prone to trigger an inflammatory healing response and stimulate the production of new collagen and proper, more functional, less painful healing.    Vacuum/cupping STM with manual therapy techniques was performed to back and gluteals to decrease muscle tightness, increase circulation and promote healing process.  The pt's skin was monitored for redness adjusting pressure as needed. The pt was instructed in possible side effects of bruising and/or soreness.      Ultrasound  for pain control and to decrease inflammation @ continuous duty cycle, 1 Mhz, applied to R lumbar paraspinals, intensity = 1.5 w/cm2 for 8 minutes. 2 min prep      Patient received pre-mod electrical stimulation to decrease muscle tightness and pain to Lumbar paraspinals/ sacral border of gluteals B for 10 minutes with MH supine with cycle time: continuous, beat frequency: , CC/CV: CV.      Written Home Exercises Provided: continue prior HEP  Pt demo good understanding of the education provided. Danuta demonstrated good return demonstration of activities.     Pt. education:  Posture reeducation, body mechanics, HEP, proper posture in standing  No spiritual or educational barriers to learning provided  Pt has no cultural, educational or language barriers to learning provided.    Assessment   Pt with improving compliance with HEP resulting in  improved symptoms lasting for longer period as per pt Pt appears motivated to continue with strengthening program and was successful with self mob  Pt continues to benefit from modalities and manual therapy to further decrease muscle tightness and pain and decreased symptoms at end of session      GOALS:   Short Term Goals:  3 weeks MET STG's  Increase range of motion 25%  Increase strength 1/2 muscle grade  Improve postural awareness of pelvis to independently identify dysfunction with min assist from PT  Be able to perform HEP with minimal cueing required     Long Term Goals: 6 weeks PARTIALLY MET LTG'S  Increase range of motion to 75% to 100% full   Improve muscle strength 1 muscle grade  Improve muscle strength with MMT to 4+/5 to 5/5  Improve and stabilize proper pelvic positioning  Restore ability to sit for ADL and work with min to 0 pain  Restore normal sleep habits without disturbances due to pain  Restore ability to perform ADL's and household activities independently with min to 0 pain    Anticipated barriers to physical therapy: none  Pt's spiritual, cultural and educational needs considered and pt agreeable to plan of care and goals        Plan   Continue with established plan of care towards PT goals.      Jacy Escoabr, PT, MSPT

## 2023-11-09 ENCOUNTER — CLINICAL SUPPORT (OUTPATIENT)
Dept: INTERNAL MEDICINE | Facility: CLINIC | Age: 68
End: 2023-11-09
Payer: COMMERCIAL

## 2023-11-09 ENCOUNTER — HOSPITAL ENCOUNTER (OUTPATIENT)
Dept: CARDIOLOGY | Facility: CLINIC | Age: 68
Discharge: HOME | End: 2023-11-09
Payer: COMMERCIAL

## 2023-11-09 ENCOUNTER — CLINICAL SUPPORT (OUTPATIENT)
Dept: REHABILITATION | Facility: HOSPITAL | Age: 68
End: 2023-11-09
Payer: COMMERCIAL

## 2023-11-09 DIAGNOSIS — E87.5 HYPERKALEMIA: ICD-10-CM

## 2023-11-09 DIAGNOSIS — M54.41 CHRONIC BILATERAL LOW BACK PAIN WITH RIGHT-SIDED SCIATICA: Primary | ICD-10-CM

## 2023-11-09 DIAGNOSIS — Z86.2 HISTORY OF ANEMIA: ICD-10-CM

## 2023-11-09 DIAGNOSIS — E87.1 HYPONATREMIA: ICD-10-CM

## 2023-11-09 DIAGNOSIS — G89.29 CHRONIC BILATERAL LOW BACK PAIN WITH RIGHT-SIDED SCIATICA: Primary | ICD-10-CM

## 2023-11-09 LAB
ANION GAP SERPL CALC-SCNC: 6 MMOL/L (ref 8–16)
BASOPHILS # BLD AUTO: 0.03 K/UL (ref 0–0.2)
BASOPHILS NFR BLD: 0.7 % (ref 0–1.9)
BILIRUB UR QL STRIP: NEGATIVE
BUN SERPL-MCNC: 22 MG/DL (ref 8–23)
CALCIUM SERPL-MCNC: 10.2 MG/DL (ref 8.7–10.5)
CHLORIDE SERPL-SCNC: 106 MMOL/L (ref 95–110)
CLARITY UR REFRACT.AUTO: CLEAR
CO2 SERPL-SCNC: 27 MMOL/L (ref 23–29)
COLOR UR AUTO: YELLOW
CREAT SERPL-MCNC: 0.8 MG/DL (ref 0.5–1.4)
DIFFERENTIAL METHOD: NORMAL
EOSINOPHIL # BLD AUTO: 0 K/UL (ref 0–0.5)
EOSINOPHIL NFR BLD: 0.9 % (ref 0–8)
ERYTHROCYTE [DISTWIDTH] IN BLOOD BY AUTOMATED COUNT: 12.7 % (ref 11.5–14.5)
EST. GFR  (NO RACE VARIABLE): >60 ML/MIN/1.73 M^2
GLUCOSE SERPL-MCNC: 89 MG/DL (ref 70–110)
GLUCOSE UR QL STRIP: NEGATIVE
HCT VFR BLD AUTO: 37.6 % (ref 37–48.5)
HGB BLD-MCNC: 12.3 G/DL (ref 12–16)
HGB UR QL STRIP: NEGATIVE
IMM GRANULOCYTES # BLD AUTO: 0.01 K/UL (ref 0–0.04)
IMM GRANULOCYTES NFR BLD AUTO: 0.2 % (ref 0–0.5)
KETONES UR QL STRIP: NEGATIVE
LEUKOCYTE ESTERASE UR QL STRIP: NEGATIVE
LYMPHOCYTES # BLD AUTO: 1.3 K/UL (ref 1–4.8)
LYMPHOCYTES NFR BLD: 28.3 % (ref 18–48)
MCH RBC QN AUTO: 28.9 PG (ref 27–31)
MCHC RBC AUTO-ENTMCNC: 32.7 G/DL (ref 32–36)
MCV RBC AUTO: 89 FL (ref 82–98)
MICROSCOPIC COMMENT: NORMAL
MONOCYTES # BLD AUTO: 0.4 K/UL (ref 0.3–1)
MONOCYTES NFR BLD: 7.7 % (ref 4–15)
NEUTROPHILS # BLD AUTO: 2.8 K/UL (ref 1.8–7.7)
NEUTROPHILS NFR BLD: 62.2 % (ref 38–73)
NITRITE UR QL STRIP: NEGATIVE
NRBC BLD-RTO: 0 /100 WBC
PH UR STRIP: 5 [PH] (ref 5–8)
PLATELET # BLD AUTO: 198 K/UL (ref 150–450)
PMV BLD AUTO: 11.4 FL (ref 9.2–12.9)
POTASSIUM SERPL-SCNC: 5 MMOL/L (ref 3.5–5.1)
PROT UR QL STRIP: NEGATIVE
RBC # BLD AUTO: 4.25 M/UL (ref 4–5.4)
RBC #/AREA URNS AUTO: 0 /HPF (ref 0–4)
SODIUM SERPL-SCNC: 139 MMOL/L (ref 136–145)
SP GR UR STRIP: 1.01 (ref 1–1.03)
URN SPEC COLLECT METH UR: NORMAL
WBC # BLD AUTO: 4.53 K/UL (ref 3.9–12.7)
WBC #/AREA URNS AUTO: 1 /HPF (ref 0–5)

## 2023-11-09 PROCEDURE — 85025 COMPLETE CBC W/AUTO DIFF WBC: CPT | Performed by: INTERNAL MEDICINE

## 2023-11-09 PROCEDURE — 97035 APP MDLTY 1+ULTRASOUND EA 15: CPT | Mod: PN | Performed by: PHYSICAL THERAPIST

## 2023-11-09 PROCEDURE — 97110 THERAPEUTIC EXERCISES: CPT | Mod: PN | Performed by: PHYSICAL THERAPIST

## 2023-11-09 PROCEDURE — 97014 ELECTRIC STIMULATION THERAPY: CPT | Mod: PN | Performed by: PHYSICAL THERAPIST

## 2023-11-09 PROCEDURE — 93010 EKG 12-LEAD: ICD-10-PCS | Mod: S$GLB,,, | Performed by: INTERNAL MEDICINE

## 2023-11-09 PROCEDURE — 93005 ELECTROCARDIOGRAM TRACING: CPT | Mod: S$GLB,,, | Performed by: INTERNAL MEDICINE

## 2023-11-09 PROCEDURE — 36415 COLL VENOUS BLD VENIPUNCTURE: CPT | Performed by: INTERNAL MEDICINE

## 2023-11-09 PROCEDURE — 97140 MANUAL THERAPY 1/> REGIONS: CPT | Mod: PN | Performed by: PHYSICAL THERAPIST

## 2023-11-09 PROCEDURE — 93005 EKG 12-LEAD: ICD-10-PCS | Mod: S$GLB,,, | Performed by: INTERNAL MEDICINE

## 2023-11-09 PROCEDURE — 81001 URINALYSIS AUTO W/SCOPE: CPT | Performed by: INTERNAL MEDICINE

## 2023-11-09 PROCEDURE — 80048 BASIC METABOLIC PNL TOTAL CA: CPT | Performed by: INTERNAL MEDICINE

## 2023-11-09 PROCEDURE — 93010 ELECTROCARDIOGRAM REPORT: CPT | Mod: S$GLB,,, | Performed by: INTERNAL MEDICINE

## 2023-11-22 NOTE — PROGRESS NOTES
Physical Therapy Daily Treatment Note     Name: Danuta Santos  Clinic Number: [id number]  Diagnosis:   Encounter Diagnosis   Name Primary?    Chronic bilateral low back pain with right-sided sciatica Yes     Physician: Yumiko Davis MD  Treatment Orders: PT Eval and Treat  Past Medical History:   Diagnosis Date    Allergy     IBS (irritable bowel syndrome)     Pneumonia 02/2019     Precautions: as per diagnosis    Evaluation date: 1-19-23  Visit # authorized: 29/40  Authorization period:12-31-23  Plan of care expiration: 12-7-23    MD Follow up appt: none scheduled      Time In: 10:40  Time Out: 11:42  Total Billing Time (timed & untimed codes): 60 minutes    Subjective     Pt reports: Pt had toe surgery to remove pin that was coming out which led to awkward walking for a few days Pt then lifted a walker for a friend to put walker in trunk of car and led to further increase in pain.      Pain Scale: before treatment: 5/10 after treatment: 1/10 and level pelvis    Objective     AR R at IE AR R pelvis   Mod-severe tightness lumbar paraspinals R,  mod L     TREATMENT   Therapeutic exercises were performed to improve ROM, strength, flexibility and stabilization for 5 minutes.    Pt did not attempt to mobilize in clinic today  pt reported she had done at home and had not been successful    HS stretch supine   Glut stretch  Piriformis stretch  B QL stretch     B Knee to chest   Contract relax R glut x 3      Pt is only performing 10 reps when she does ex    Pelvic tilt   x 10  Bridge x 10,   SLR x 10,   Trunk rotation supine x 10  Partial sit up x 10  Hip abd sidelying x 10 B,  Arm and Leg lift prone x 10, instructed pt to put pillow under stomach   Hip ext prone with bent knee x 10 instructed pt to put pillow under stomach  Sit to stand x 10  Instructed pt to hold press up for now and will note results  GSS standing x 10    Iron cross x 2 as needed  Plank x 30 sec x 1 Led to dysfunction so pt performed  contract relax and realigned I   Contract relax R glut    Pt to start walking at gym or park and start planks again  Pt unable to try to swim until May due to pool upkeep.      Manual therapy: Danuta  received the following manual therapy  techniques x  25 min. to include soft tissue and joint mobilization were applied to the: low back and gluteals to include:     Performed STM to LB paraspinals and QL R>L  (NP)P/A mob to lumbar region Grade 1-2 , MET to QL R SL L  Sidelying L contract relax mobilization to L5-S1 region to level pelvis  at end of treatment and level pelvis achieved at beginning and end of session    Pt received tool-assisted massage with manual therapy techniques to B LB in prone to trigger an inflammatory healing response and stimulate the production of new collagen and proper, more functional, less painful healing.    Vacuum/cupping STM with manual therapy techniques was performed to back and gluteals to decrease muscle tightness, increase circulation and promote healing process.  The pt's skin was monitored for redness adjusting pressure as needed. The pt was instructed in possible side effects of bruising and/or soreness.      Ultrasound  for pain control and to decrease inflammation @ continuous duty cycle, 1 Mhz, applied to R lumbar paraspinals, intensity = 1.5 w/cm2 for 8 minutes. 2 min prep      Patient received pre-mod electrical stimulation to decrease muscle tightness and pain to Lumbar paraspinals/ sacral border of gluteals B for 10 minutes with MH supine with cycle time: continuous, beat frequency: , CC/CV: CV.      Written Home Exercises Provided: continue prior HEP  Pt demo good understanding of the education provided. Danuta demonstrated good return demonstration of activities.     Pt. education:  Posture reeducation, body mechanics, HEP, proper posture in standing  No spiritual or educational barriers to learning provided  Pt has no cultural, educational or language  barriers to learning provided.    Assessment   Pt with increased pain after lifting when back was already aggravated after walking awkwardly  Pt with improved symptoms at end of session.  Pt appears to understand to resume HEP now that recovered from foot surgery    GOALS:   Short Term Goals:  3 weeks MET STG's  Increase range of motion 25%  Increase strength 1/2 muscle grade  Improve postural awareness of pelvis to independently identify dysfunction with min assist from PT  Be able to perform HEP with minimal cueing required     Long Term Goals: 6 weeks PARTIALLY MET LTG'S  Increase range of motion to 75% to 100% full   Improve muscle strength 1 muscle grade  Improve muscle strength with MMT to 4+/5 to 5/5  Improve and stabilize proper pelvic positioning  Restore ability to sit for ADL and work with min to 0 pain  Restore normal sleep habits without disturbances due to pain  Restore ability to perform ADL's and household activities independently with min to 0 pain    Anticipated barriers to physical therapy: none  Pt's spiritual, cultural and educational needs considered and pt agreeable to plan of care and goals        Plan   Continue with established plan of care towards PT goals.      Jacy Escobar, PT, MSPT

## 2023-11-24 ENCOUNTER — CLINICAL SUPPORT (OUTPATIENT)
Dept: REHABILITATION | Facility: HOSPITAL | Age: 68
End: 2023-11-24
Payer: COMMERCIAL

## 2023-11-24 DIAGNOSIS — G89.29 CHRONIC BILATERAL LOW BACK PAIN WITH RIGHT-SIDED SCIATICA: Primary | ICD-10-CM

## 2023-11-24 DIAGNOSIS — M54.41 CHRONIC BILATERAL LOW BACK PAIN WITH RIGHT-SIDED SCIATICA: Primary | ICD-10-CM

## 2023-11-24 PROCEDURE — 97014 ELECTRIC STIMULATION THERAPY: CPT | Mod: PN | Performed by: PHYSICAL THERAPIST

## 2023-11-24 PROCEDURE — 97035 APP MDLTY 1+ULTRASOUND EA 15: CPT | Mod: PN | Performed by: PHYSICAL THERAPIST

## 2023-11-24 PROCEDURE — 97140 MANUAL THERAPY 1/> REGIONS: CPT | Mod: PN | Performed by: PHYSICAL THERAPIST

## 2023-12-05 NOTE — PROGRESS NOTES
Physical Therapy Daily Treatment Note     Name: Danuta Santos  Clinic Number: [id number]  Diagnosis:   Encounter Diagnosis   Name Primary?    Chronic bilateral low back pain with right-sided sciatica Yes     Physician: Yumiko Davis MD  Treatment Orders: PT Eval and Treat  Past Medical History:   Diagnosis Date    Allergy     IBS (irritable bowel syndrome)     Pneumonia 02/2019     Precautions: as per diagnosis    Evaluation date: 1-19-23  Visit # authorized: 30/40  Authorization period:12-31-23  Plan of care expiration: 12-7-23    MD Follow up appt: none scheduled      Time In: 10:47  Time Out: 11:40  Total Billing Time (timed & untimed codes): 50 minutes    Subjective     Pt reports: back has been feeling pretty good and been able to stay level.  Pt doing her HEP and able to stay level more     Pain Scale: before treatment: 3/10 after treatment: 1/10 and level pelvis    Objective     Level at IE AR R pelvis   Mod-severe tightness lumbar paraspinals R,  mod L     TREATMENT   Therapeutic exercises were performed to improve ROM, strength, flexibility and stabilization for 5 minutes.    Pt did not attempt to mobilize in clinic today  pt reported she had done at home and had not been successful    HS stretch supine   Glut stretch  Piriformis stretch  B QL stretch     B Knee to chest   Contract relax R glut x 3      Pt is only performing 10 reps when she does ex    Pelvic tilt   x 10  Bridge x 10,   SLR x 10,   Trunk rotation supine x 10  Partial sit up x 10  Hip abd sidelying x 10 B,  Arm and Leg lift prone x 10, instructed pt to put pillow under stomach   Hip ext prone with bent knee x 10 instructed pt to put pillow under stomach  Sit to stand x 10  Instructed pt to hold press up for now and will note results  GSS standing x 10    Iron cross x 2 as needed  Plank x 30 sec x 1 Led to dysfunction so pt performed contract relax and realigned I   Contract relax R glut    Pt to start walking at gym or park and  start planks again  Pt unable to try to swim until May due to pool upkeep.      Manual therapy: Danuta  received the following manual therapy  techniques x  20 min. to include soft tissue and joint mobilization were applied to the: low back and gluteals to include:     Performed STM to LB paraspinals and QL R>L  (NP)P/A mob to lumbar region Grade 1-2 , MET to QL R SL L  Sidelying L contract relax mobilization to L5-S1 region to level pelvis  at end of treatment and level pelvis achieved at beginning and end of session    Pt received tool-assisted massage with manual therapy techniques to B LB in prone to trigger an inflammatory healing response and stimulate the production of new collagen and proper, more functional, less painful healing.    Vacuum/cupping STM with manual therapy techniques was performed to back and gluteals to decrease muscle tightness, increase circulation and promote healing process.  The pt's skin was monitored for redness adjusting pressure as needed. The pt was instructed in possible side effects of bruising and/or soreness.      Ultrasound  for pain control and to decrease inflammation @ continuous duty cycle, 1 Mhz, applied to R lumbar paraspinals, intensity = 1.5 w/cm2 for 8 minutes. 2 min prep      Patient received pre-mod electrical stimulation to decrease muscle tightness and pain to Lumbar paraspinals/ sacral border of gluteals B for 10 minutes with MH supine with cycle time: continuous, beat frequency: , CC/CV: CV.      Written Home Exercises Provided: continue prior HEP  Pt demo good understanding of the education provided. Danuta demonstrated good return demonstration of activities.     Pt. education:  Posture reeducation, body mechanics, HEP, proper posture in standing  No spiritual or educational barriers to learning provided  Pt has no cultural, educational or language barriers to learning provided.    Assessment   Pt with improved ability to maintain level pelvis.  Pt  has been able to self manage symptoms and level pelvis with min pain to start.  Pt appears to understand need to continue with HEP and walking and will continued Rx as needed     GOALS:   Short Term Goals:  3 weeks MET STG's  Increase range of motion 25%  Increase strength 1/2 muscle grade  Improve postural awareness of pelvis to independently identify dysfunction with min assist from PT  Be able to perform HEP with minimal cueing required     Long Term Goals: 6 weeks PARTIALLY MET LTG'S  Increase range of motion to 75% to 100% full   Improve muscle strength 1 muscle grade  Improve muscle strength with MMT to 4+/5 to 5/5  Improve and stabilize proper pelvic positioning  Restore ability to sit for ADL and work with min to 0 pain  Restore normal sleep habits without disturbances due to pain  Restore ability to perform ADL's and household activities independently with min to 0 pain    Anticipated barriers to physical therapy: none  Pt's spiritual, cultural and educational needs considered and pt agreeable to plan of care and goals        Plan   Continue with established plan of care towards PT goals.      Jacy Escobar, PT, MSPT

## 2023-12-06 ENCOUNTER — CLINICAL SUPPORT (OUTPATIENT)
Dept: REHABILITATION | Facility: HOSPITAL | Age: 68
End: 2023-12-06
Payer: COMMERCIAL

## 2023-12-06 DIAGNOSIS — G89.29 CHRONIC BILATERAL LOW BACK PAIN WITH RIGHT-SIDED SCIATICA: Primary | ICD-10-CM

## 2023-12-06 DIAGNOSIS — B96.89 ACUTE BACTERIAL BRONCHITIS: ICD-10-CM

## 2023-12-06 DIAGNOSIS — M54.41 CHRONIC BILATERAL LOW BACK PAIN WITH RIGHT-SIDED SCIATICA: Primary | ICD-10-CM

## 2023-12-06 DIAGNOSIS — J20.8 ACUTE BACTERIAL BRONCHITIS: ICD-10-CM

## 2023-12-06 DIAGNOSIS — J01.90 ACUTE BACTERIAL SINUSITIS: ICD-10-CM

## 2023-12-06 DIAGNOSIS — B96.89 ACUTE BACTERIAL SINUSITIS: ICD-10-CM

## 2023-12-06 PROCEDURE — 97035 APP MDLTY 1+ULTRASOUND EA 15: CPT | Mod: PN | Performed by: PHYSICAL THERAPIST

## 2023-12-06 PROCEDURE — 97014 ELECTRIC STIMULATION THERAPY: CPT | Mod: PN | Performed by: PHYSICAL THERAPIST

## 2023-12-06 PROCEDURE — 97140 MANUAL THERAPY 1/> REGIONS: CPT | Mod: PN | Performed by: PHYSICAL THERAPIST

## 2023-12-06 RX ORDER — AMOXICILLIN AND CLAVULANATE POTASSIUM 875; 125 MG/1; MG/1
1 TABLET, FILM COATED ORAL 2 TIMES DAILY
Qty: 14 TABLET | Refills: 0 | Status: SHIPPED | OUTPATIENT
Start: 2023-12-06

## 2023-12-08 ENCOUNTER — OFFICE VISIT (OUTPATIENT)
Dept: INTERNAL MEDICINE | Facility: CLINIC | Age: 68
End: 2023-12-08
Payer: COMMERCIAL

## 2023-12-08 VITALS — DIASTOLIC BLOOD PRESSURE: 76 MMHG | OXYGEN SATURATION: 98 % | HEART RATE: 62 BPM | SYSTOLIC BLOOD PRESSURE: 133 MMHG

## 2023-12-08 VITALS
BODY MASS INDEX: 21.78 KG/M2 | DIASTOLIC BLOOD PRESSURE: 76 MMHG | OXYGEN SATURATION: 98 % | WEIGHT: 134.94 LBS | HEART RATE: 62 BPM | SYSTOLIC BLOOD PRESSURE: 133 MMHG

## 2023-12-08 DIAGNOSIS — B96.89 ACUTE BACTERIAL SINUSITIS: Primary | ICD-10-CM

## 2023-12-08 DIAGNOSIS — J01.90 ACUTE BACTERIAL SINUSITIS: Primary | ICD-10-CM

## 2023-12-08 DIAGNOSIS — J01.90 ACUTE SINUSITIS, RECURRENCE NOT SPECIFIED, UNSPECIFIED LOCATION: Primary | ICD-10-CM

## 2023-12-08 PROCEDURE — 99999 PR PBB SHADOW E&M-EST. PATIENT-LVL III: ICD-10-PCS | Mod: PBBFAC,,, | Performed by: EMERGENCY MEDICINE

## 2023-12-08 PROCEDURE — 3075F SYST BP GE 130 - 139MM HG: CPT | Mod: CPTII,S$GLB,, | Performed by: EMERGENCY MEDICINE

## 2023-12-08 PROCEDURE — 99499 UNLISTED E&M SERVICE: CPT | Mod: S$GLB,,, | Performed by: INTERNAL MEDICINE

## 2023-12-08 PROCEDURE — 1126F PR PAIN SEVERITY QUANTIFIED, NO PAIN PRESENT: ICD-10-PCS | Mod: CPTII,S$GLB,, | Performed by: EMERGENCY MEDICINE

## 2023-12-08 PROCEDURE — 1101F PT FALLS ASSESS-DOCD LE1/YR: CPT | Mod: CPTII,S$GLB,, | Performed by: EMERGENCY MEDICINE

## 2023-12-08 PROCEDURE — 3288F FALL RISK ASSESSMENT DOCD: CPT | Mod: CPTII,S$GLB,, | Performed by: EMERGENCY MEDICINE

## 2023-12-08 PROCEDURE — 3075F PR MOST RECENT SYSTOLIC BLOOD PRESS GE 130-139MM HG: ICD-10-PCS | Mod: CPTII,S$GLB,, | Performed by: EMERGENCY MEDICINE

## 2023-12-08 PROCEDURE — 3008F BODY MASS INDEX DOCD: CPT | Mod: CPTII,S$GLB,, | Performed by: EMERGENCY MEDICINE

## 2023-12-08 PROCEDURE — 1101F PR PT FALLS ASSESS DOC 0-1 FALLS W/OUT INJ PAST YR: ICD-10-PCS | Mod: CPTII,S$GLB,, | Performed by: EMERGENCY MEDICINE

## 2023-12-08 PROCEDURE — 99213 PR OFFICE/OUTPT VISIT, EST, LEVL III, 20-29 MIN: ICD-10-PCS | Mod: S$GLB,,, | Performed by: EMERGENCY MEDICINE

## 2023-12-08 PROCEDURE — 3008F PR BODY MASS INDEX (BMI) DOCUMENTED: ICD-10-PCS | Mod: CPTII,S$GLB,, | Performed by: EMERGENCY MEDICINE

## 2023-12-08 PROCEDURE — 1126F AMNT PAIN NOTED NONE PRSNT: CPT | Mod: CPTII,S$GLB,, | Performed by: EMERGENCY MEDICINE

## 2023-12-08 PROCEDURE — 3078F PR MOST RECENT DIASTOLIC BLOOD PRESSURE < 80 MM HG: ICD-10-PCS | Mod: CPTII,S$GLB,, | Performed by: EMERGENCY MEDICINE

## 2023-12-08 PROCEDURE — 3044F HG A1C LEVEL LT 7.0%: CPT | Mod: CPTII,S$GLB,, | Performed by: EMERGENCY MEDICINE

## 2023-12-08 PROCEDURE — 3044F PR MOST RECENT HEMOGLOBIN A1C LEVEL <7.0%: ICD-10-PCS | Mod: CPTII,S$GLB,, | Performed by: EMERGENCY MEDICINE

## 2023-12-08 PROCEDURE — 3078F DIAST BP <80 MM HG: CPT | Mod: CPTII,S$GLB,, | Performed by: EMERGENCY MEDICINE

## 2023-12-08 PROCEDURE — 99213 OFFICE O/P EST LOW 20 MIN: CPT | Mod: S$GLB,,, | Performed by: EMERGENCY MEDICINE

## 2023-12-08 PROCEDURE — 3288F PR FALLS RISK ASSESSMENT DOCUMENTED: ICD-10-PCS | Mod: CPTII,S$GLB,, | Performed by: EMERGENCY MEDICINE

## 2023-12-08 PROCEDURE — 99999 PR PBB SHADOW E&M-EST. PATIENT-LVL III: CPT | Mod: PBBFAC,,, | Performed by: EMERGENCY MEDICINE

## 2023-12-08 RX ORDER — DEXAMETHASONE 4 MG/1
4 TABLET ORAL ONCE
Qty: 1 TABLET | Refills: 0 | Status: SHIPPED | OUTPATIENT
Start: 2023-12-08 | End: 2023-12-08

## 2023-12-08 RX ORDER — BUTALBITAL, ACETAMINOPHEN AND CAFFEINE 50; 325; 40 MG/1; MG/1; MG/1
1 TABLET ORAL EVERY 4 HOURS PRN
Qty: 11 TABLET | Refills: 0 | Status: SHIPPED | OUTPATIENT
Start: 2023-12-08 | End: 2024-01-07

## 2023-12-11 NOTE — PROGRESS NOTES
Physical Therapy Progress and Daily Treatment Note     Name: Danuta Santos  Clinic Number: [id number]  Diagnosis:   Encounter Diagnosis   Name Primary?    Chronic bilateral low back pain with right-sided sciatica Yes     Physician: Yumiko Davis MD  Treatment Orders: PT Eval and Treat  Past Medical History:   Diagnosis Date    Allergy     IBS (irritable bowel syndrome)     Pneumonia 02/2019     Precautions: as per diagnosis    Evaluation date: 1-19-23  Visit # authorized: 31/40  Authorization period:12-31-23  Plan of care expiration: 1-23-24    MD Follow up appt: none scheduled      Time In: 3:30 pt caught in traffic   Time Out: 4:20  Total Billing Time (timed & untimed codes): 30  minutes + ES    Subjective     Pt reports: pt had lifted her grandchild that is now weighing over 20# and developed increased back pain and was unable to self mobilize    Pain Scale: before treatment: 6/10 after treatment: 2/10 and level pelvis    Objective     AR R at IE AR R pelvis   severe tightness lumbar paraspinals R,  mod L         Lumbar active range of motion in standing is: 12-12-23  - flexion - ankle                     - extension -  75%                         - left side bending -  to knee         - right side bending -  to knee           Lumbar active range of motion in standing is: 10-27-22  - flexion - toes                     - extension -  100%                         - left side bending -  To knee         - right side bending -  To knee         Muscle Strength 10-26-23 deferred this date due to increased symptoms  MMT R L   Hip flexion 4/5 4/5   Hip abduction 5/5 5/5   Hip extension 5-/5 5/5   Glut max 3+/5 4-/5   Knee extension 5/5 5/5   Knee flexion 5/5 5/5        Muscle Strength same as 6-15-23  MMT R L   Hip flexion 4+/5 4+/5   Hip abduction 5/5 5/5   Hip extension 5-/5 5/5   Glut max 4-/5 4/5   Knee extension 5/5 5/5   Knee flexion 5/5 5/5             Muscle Strength 10-27-22  MMT R L   Hip flexion 5/5  5/5   Hip abduction 5/5 5/5   Hip extension 5/5 5/5   Glut max 4+/5 4+/5   Knee extension 5/5 5/5   Knee flexion 5/5 5/5         TREATMENT   Therapeutic exercises were performed to improve ROM, strength, flexibility and stabilization for 10 minutes.    Reassessment     HS stretch supine   Glut stretch  Piriformis stretch  B QL stretch     B Knee to chest   Contract relax R glut x 3      Pt is only performing 10 reps when she does ex    Pelvic tilt   x 10  Bridge x 10,   SLR x 10,   Trunk rotation supine x 10  Partial sit up x 10  Hip abd sidelying x 10 B,  Arm and Leg lift prone x 10, instructed pt to put pillow under stomach   Hip ext prone with bent knee x 10 instructed pt to put pillow under stomach  Sit to stand x 10  Instructed pt to hold press up for now and will note results  GSS standing x 10    Iron cross x 2 as needed  Plank x 30 sec x 1 Led to dysfunction so pt performed contract relax and realigned I   Contract relax R glut    Pt to start walking at gym or park and start planks again  Pt unable to try to swim until May due to pool upkeep.      Manual therapy: Danuta  received the following manual therapy  techniques x  10 min. to include soft tissue and joint mobilization were applied to the: low back and gluteals to include:     Performed STM to LB paraspinals and QL R>L  (NP)P/A mob to lumbar region Grade 1-2 , MET to QL R SL L  Sidelying L contract relax mobilization to L5-S1 region to level pelvis  at end of treatment and level pelvis achieved at beginning and end of session    Pt received tool-assisted massage with manual therapy techniques to B LB in prone to trigger an inflammatory healing response and stimulate the production of new collagen and proper, more functional, less painful healing.    Vacuum/cupping STM with manual therapy techniques was performed to back and gluteals to decrease muscle tightness, increase circulation and promote healing process.  The pt's skin was monitored for  redness adjusting pressure as needed. The pt was instructed in possible side effects of bruising and/or soreness.      Ultrasound  for pain control and to decrease inflammation @ continuous duty cycle, 1 Mhz, applied to R lumbar paraspinals, intensity = 1.5 w/cm2 for 8 minutes. 2 min prep      Patient received pre-mod electrical stimulation to decrease muscle tightness and pain to Lumbar paraspinals/ sacral border of gluteals B for 15 minutes with MH supine with cycle time: continuous, beat frequency: , CC/CV: CV.      Written Home Exercises Provided: continue prior HEP  Pt demo good understanding of the education provided. Danuta demonstrated good return demonstration of activities.     Pt. education:  Posture reeducation, body mechanics, HEP, proper posture in standing  No spiritual or educational barriers to learning provided  Pt has no cultural, educational or language barriers to learning provided.    Assessment   At last session pt had improved ability to maintain level pelvis.  Pt has been able to self manage symptoms and level pelvis with min pain to start.  Pt appears to understand need to continue with HEP and walking and will continued Rx as needed  Pt had a flare up today after lifting grandchild.  Pt with decreased symptoms at end of session, but understands need to use caution with attempting to lift grandchild.      GOALS:   Short Term Goals:  3 weeks MET STG's  Increase range of motion 25%  Increase strength 1/2 muscle grade  Improve postural awareness of pelvis to independently identify dysfunction with min assist from PT  Be able to perform HEP with minimal cueing required     Long Term Goals: 6 weeks PARTIALLY MET LTG'S  Increase range of motion to 75% to 100% full   Improve muscle strength 1 muscle grade  Improve muscle strength with MMT to 4+/5 to 5/5  Improve and stabilize proper pelvic positioning  Restore ability to sit for ADL and work with min to 0 pain  Restore normal sleep habits  without disturbances due to pain  Restore ability to perform ADL's and household activities independently with min to 0 pain    Anticipated barriers to physical therapy: none  Pt's spiritual, cultural and educational needs considered and pt agreeable to plan of care and goals        Plan   If you concur, I recommend patient continue with physical therapy 1-2 times a week as needed for 6 weeks.  Please advise us of your  recommendations. Thank you for allowing us to assist in the care of your patient.      Jacy Escobar, PT, MSPT

## 2023-12-12 ENCOUNTER — CLINICAL SUPPORT (OUTPATIENT)
Dept: REHABILITATION | Facility: HOSPITAL | Age: 68
End: 2023-12-12
Payer: COMMERCIAL

## 2023-12-12 DIAGNOSIS — M54.41 CHRONIC BILATERAL LOW BACK PAIN WITH RIGHT-SIDED SCIATICA: Primary | ICD-10-CM

## 2023-12-12 DIAGNOSIS — G89.29 CHRONIC BILATERAL LOW BACK PAIN WITH RIGHT-SIDED SCIATICA: Primary | ICD-10-CM

## 2023-12-12 PROCEDURE — 97035 APP MDLTY 1+ULTRASOUND EA 15: CPT | Mod: PN | Performed by: PHYSICAL THERAPIST

## 2023-12-12 PROCEDURE — 97014 ELECTRIC STIMULATION THERAPY: CPT | Mod: PN | Performed by: PHYSICAL THERAPIST

## 2023-12-12 PROCEDURE — 97140 MANUAL THERAPY 1/> REGIONS: CPT | Mod: PN | Performed by: PHYSICAL THERAPIST

## 2023-12-12 PROCEDURE — 97110 THERAPEUTIC EXERCISES: CPT | Mod: PN | Performed by: PHYSICAL THERAPIST

## 2023-12-12 NOTE — PLAN OF CARE
Physical Therapy Progress and Daily Treatment Note     Name: Danuta Santos  Clinic Number: [id number]  Diagnosis:   Encounter Diagnosis   Name Primary?    Chronic bilateral low back pain with right-sided sciatica Yes     Physician: Yumiko Davis MD  Treatment Orders: PT Eval and Treat  Past Medical History:   Diagnosis Date    Allergy     IBS (irritable bowel syndrome)     Pneumonia 02/2019     Precautions: as per diagnosis    Evaluation date: 1-19-23  Visit # authorized: 31/40  Authorization period:12-31-23  Plan of care expiration: 1-23-24    MD Follow up appt: none scheduled      Time In: 3:30 pt caught in traffic   Time Out: 4:20  Total Billing Time (timed & untimed codes): 30  minutes + ES    Subjective     Pt reports: pt had lifted her grandchild that is now weighing over 20# and developed increased back pain and was unable to self mobilize    Pain Scale: before treatment: 6/10 after treatment: 2/10 and level pelvis    Objective     AR R at IE AR R pelvis   severe tightness lumbar paraspinals R,  mod L         Lumbar active range of motion in standing is: 12-12-23  - flexion - ankle                     - extension -  75%                         - left side bending -  to knee         - right side bending -  to knee           Lumbar active range of motion in standing is: 10-27-22  - flexion - toes                     - extension -  100%                         - left side bending -  To knee         - right side bending -  To knee         Muscle Strength 10-26-23 deferred this date due to increased symptoms  MMT R L   Hip flexion 4/5 4/5   Hip abduction 5/5 5/5   Hip extension 5-/5 5/5   Glut max 3+/5 4-/5   Knee extension 5/5 5/5   Knee flexion 5/5 5/5        Muscle Strength same as 6-15-23  MMT R L   Hip flexion 4+/5 4+/5   Hip abduction 5/5 5/5   Hip extension 5-/5 5/5   Glut max 4-/5 4/5   Knee extension 5/5 5/5   Knee flexion 5/5 5/5             Muscle Strength 10-27-22  MMT R L   Hip flexion 5/5  5/5   Hip abduction 5/5 5/5   Hip extension 5/5 5/5   Glut max 4+/5 4+/5   Knee extension 5/5 5/5   Knee flexion 5/5 5/5         TREATMENT   Therapeutic exercises were performed to improve ROM, strength, flexibility and stabilization for 10 minutes.    Reassessment     HS stretch supine   Glut stretch  Piriformis stretch  B QL stretch     B Knee to chest   Contract relax R glut x 3      Pt is only performing 10 reps when she does ex    Pelvic tilt   x 10  Bridge x 10,   SLR x 10,   Trunk rotation supine x 10  Partial sit up x 10  Hip abd sidelying x 10 B,  Arm and Leg lift prone x 10, instructed pt to put pillow under stomach   Hip ext prone with bent knee x 10 instructed pt to put pillow under stomach  Sit to stand x 10  Instructed pt to hold press up for now and will note results  GSS standing x 10    Iron cross x 2 as needed  Plank x 30 sec x 1 Led to dysfunction so pt performed contract relax and realigned I   Contract relax R glut    Pt to start walking at gym or park and start planks again  Pt unable to try to swim until May due to pool upkeep.      Manual therapy: Danuta  received the following manual therapy  techniques x  10 min. to include soft tissue and joint mobilization were applied to the: low back and gluteals to include:     Performed STM to LB paraspinals and QL R>L  (NP)P/A mob to lumbar region Grade 1-2 , MET to QL R SL L  Sidelying L contract relax mobilization to L5-S1 region to level pelvis  at end of treatment and level pelvis achieved at beginning and end of session    Pt received tool-assisted massage with manual therapy techniques to B LB in prone to trigger an inflammatory healing response and stimulate the production of new collagen and proper, more functional, less painful healing.    Vacuum/cupping STM with manual therapy techniques was performed to back and gluteals to decrease muscle tightness, increase circulation and promote healing process.  The pt's skin was monitored for  redness adjusting pressure as needed. The pt was instructed in possible side effects of bruising and/or soreness.      Ultrasound  for pain control and to decrease inflammation @ continuous duty cycle, 1 Mhz, applied to R lumbar paraspinals, intensity = 1.5 w/cm2 for 8 minutes. 2 min prep      Patient received pre-mod electrical stimulation to decrease muscle tightness and pain to Lumbar paraspinals/ sacral border of gluteals B for 15 minutes with MH supine with cycle time: continuous, beat frequency: , CC/CV: CV.      Written Home Exercises Provided: continue prior HEP  Pt demo good understanding of the education provided. Danuta demonstrated good return demonstration of activities.     Pt. education:  Posture reeducation, body mechanics, HEP, proper posture in standing  No spiritual or educational barriers to learning provided  Pt has no cultural, educational or language barriers to learning provided.    Assessment   At last session pt had improved ability to maintain level pelvis.  Pt has been able to self manage symptoms and level pelvis with min pain to start.  Pt appears to understand need to continue with HEP and walking and will continued Rx as needed  Pt had a flare up today after lifting grandchild.  Pt with decreased symptoms at end of session, but understands need to use caution with attempting to lift grandchild.      GOALS:   Short Term Goals:  3 weeks MET STG's  Increase range of motion 25%  Increase strength 1/2 muscle grade  Improve postural awareness of pelvis to independently identify dysfunction with min assist from PT  Be able to perform HEP with minimal cueing required     Long Term Goals: 6 weeks PARTIALLY MET LTG'S  Increase range of motion to 75% to 100% full   Improve muscle strength 1 muscle grade  Improve muscle strength with MMT to 4+/5 to 5/5  Improve and stabilize proper pelvic positioning  Restore ability to sit for ADL and work with min to 0 pain  Restore normal sleep habits  without disturbances due to pain  Restore ability to perform ADL's and household activities independently with min to 0 pain    Anticipated barriers to physical therapy: none  Pt's spiritual, cultural and educational needs considered and pt agreeable to plan of care and goals        Plan   If you concur, I recommend patient continue with physical therapy 1-2 times a week as needed for 6 weeks.  Please advise us of your  recommendations. Thank you for allowing us to assist in the care of your patient.      Jacy Escobar, PT, MSPT

## 2023-12-13 PROBLEM — G56.01 CARPAL TUNNEL SYNDROME ON RIGHT: Status: ACTIVE | Noted: 2021-02-11

## 2023-12-13 NOTE — PROGRESS NOTES
[facility]      TODAY'S Date 12/13/2023  Patient ID: Danuta Santos is a 68 y.o. female   MRN: [id number]  Primary Physician: Yumiko Davis MD    SUBJECTIVE     Chief Complaint:   Chief Complaint   Patient presents with    Sinus Problem     HPI:   Reviewed medical, surgical, social and family history, medications, as well as vaccination history. Updates as noted below or in assessment and plan.    This is a very pleasant 68 y.o. nonsmoking female with PMHx Recurrent upper respiratory infection, bronchitis, s/p sinuplasty presents with mild to moderate sinus discomfort associated with runny nose and yellow phlegm x 5 days  Denies fever currently but had one a few days ago.  Feels mildly better not back to normal. Given antibiotics by PCP but wanted to be check prior to starting it.       Immunization History   Administered Date(s) Administered    COVID-19, MRNA, LN-S, PF (Pfizer) (Gray Cap) 04/12/2022    COVID-19, MRNA, LN-S, PF (Pfizer) (Purple Cap) 02/19/2021, 03/12/2021, 09/28/2021    COVID-19, mRNA, LNP-S, PF, jesenia-sucrose, 30 mcg/0.3 mL (Pfizer 2023 Ages 12+) 10/14/2023    COVID-19, mRNA, LNP-S, bivalent booster, PF (PFIZER OMICRON) 09/24/2022, 05/01/2023    Hepatitis A, Adult 03/15/2017, 09/19/2017    Hepatitis B 07/12/2012, 09/12/2012    Hepatitis B, Adult 05/21/2015    Hepatitis B, Pediatric/Adolescent 07/12/2012, 09/12/2012    IPV 09/26/2012    Influenza (FLUAD) - Quadrivalent - Adjuvanted - PF *Preferred* (65+) 10/01/2021, 09/18/2023    Influenza - High Dose - PF (65 years and older) 10/03/2019    Influenza - Quadrivalent 09/18/2018    Influenza - Quadrivalent - PF *Preferred* (6 months and older) 09/22/2018, 09/22/2020, 09/29/2022    Influenza A (H1N1) 2009 Monovalent - IM 11/21/2009    Pneumococcal Conjugate - 13 Valent 09/30/2019    Pneumococcal Polysaccharide - 23 Valent 03/12/2019, 04/13/2022    Tdap 08/15/2016, 08/16/2022    Typhoid 09/26/2012     Typhoid - ViCPs 2012, 03/15/2017    Zoster 2015    Zoster Recombinant 2018, 2018, 12/15/2018     Past Medical History:   Diagnosis Date    ADHD (attention deficit hyperactivity disorder)     Allergy     wheat , diary, alcohol ,some vinegars    Arthritis     Bronchitis- bad twice  and 2019 again ; admitted for bacterial pneumonia     IBS (irritable bowel syndrome)     Recurrent upper respiratory infection (URI)      Past Surgical History:   Procedure Laterality Date     SECTION       and     COLONOSCOPY N/A 2020    Procedure: COLONOSCOPY;  Surgeon: Liam Lipscomb MD;  Location: [facility] ENDO (2ND FLR);  Service: Endoscopy;  Laterality: N/A;  Single balloon needed, can start with regular scope  COVID screening  20 - [place] [facility] - ER[facility]    foot bunio Left     intial 2021 and then remoced 3/18/2022    NOSE SURGERY      right hand  2021    2nd digit tore flexer tendon ; dr smith    SINUS SURGERY  2019    shay--- after pneumonia then a yr later sinoplasty     Family History   Problem Relation Age of Onset    COPD Mother     Cancer Father 54        pancreatic - ergonamist    Hypertension Brother     Cancer Paternal Aunt         breast    Cancer Maternal Grandmother         leukemia    Alcohol abuse Maternal Grandfather     Stroke Maternal Grandfather     Stroke Paternal Grandfather     Cancer Cousin         leukemia    Breast cancer Neg Hx     Colon cancer Neg Hx     Diabetes Neg Hx     Eclampsia Neg Hx     Miscarriages / Stillbirths Neg Hx     Ovarian cancer Neg Hx      labor Neg Hx     Allergic rhinitis Neg Hx     Allergies Neg Hx     Angioedema Neg Hx     Asthma Neg Hx     Eczema Neg Hx     Immunodeficiency Neg Hx     Rhinitis Neg Hx     Urticaria Neg Hx     Atopy Neg Hx      Social History     Tobacco Use    Smoking status: Never    Smokeless tobacco: Never   Substance Use Topics    Alcohol use: No    Drug use: No     Past Medical,  Surgical and Social history reviewed and verified by me.     Review of patient's allergies indicates:   Allergen Reactions    Wheat containing prod      Sinus      Advil [ibuprofen]     Apple cider vinegar Other (See Comments)    Lactobacillus acidoph-lactase      Other reaction(s): Other (See Comments)    Milk containing products (dairy) Other (See Comments)     Post nasal drip    Nsaids (non-steroidal anti-inflammatory drug)      Other reaction(s): Other (See Comments)  Pt states she gets pain in her stomach when she takes nsaids due to IBS    Alcohol Other (See Comments) and Palpitations     Post nasal drip    Aspirin Other (See Comments)     Abdominal pain       Current Outpatient Medications on File Prior to Visit   Medication Sig Dispense Refill    acetaminophen (TYLENOL) 500 MG tablet Take 500 mg by mouth every 6 (six) hours as needed.      amoxicillin-clavulanate 875-125mg (AUGMENTIN) 875-125 mg per tablet Take 1 tablet by mouth 2 (two) times daily. (Patient not taking: Reported on 12/8/2023) 14 tablet 0    ascorbic acid, vitamin C, (VITAMIN C) 500 MG tablet Take 500 mg by mouth once daily.      azelastine (ASTELIN) 137 mcg (0.1 %) nasal spray SPRAY 1 SPRAY (137 MCG TOTAL) BY NASAL ROUTE TWICE A DAY AS NEEDED FOR RHINITIS 60 mL 2    butalbital-acetaminophen-caffeine -40 mg (FIORICET, ESGIC) -40 mg per tablet Take 1 tablet by mouth every 4 (four) hours as needed for Pain or Headaches. 11 tablet 0    calcium carbonate (CALCIUM 500 ORAL) Take by mouth.      cyanocobalamin 500 MCG tablet Take 1,000 mcg by mouth once daily.      estradioL (VAGIFEM) 10 mcg Tab Place 1 tablet (10 mcg total) vaginally twice a week. 24 tablet 3    glucosam/chond/collagen/hyalur (OWYPBDMQ-JPVL-KBTLUF-HYALUR AC ORAL) Take 1 tablet by mouth once daily at 6am.      LACTOBACILLUS COMBO NO.6 (PROBIOTIC COMPLEX ORAL) Take 1 capsule by mouth once.       mupirocin (BACTROBAN) 2 % ointment Apply topically 3 (three) times daily. 30  g 0    pimecrolimus (ELIDEL) 1 % cream Apply topically 2 (two) times daily as needed.      polyethylene glycol (GLYCOLAX) 17 gram PwPk Take by mouth.      tiZANidine (ZANAFLEX) 2 MG tablet Take 4 mg by mouth nightly as needed.      vitamin D (VITAMIN D3) 1000 units Tab Take 2,000 Units by mouth once daily.       No current facility-administered medications on file prior to visit.       Review of Systems as per HPI  Review of Systems   Constitutional:  Positive for malaise/fatigue.   HENT:  Positive for congestion and sinus pain. Negative for ear discharge, ear pain and tinnitus.    Eyes:  Negative for blurred vision, double vision, photophobia, pain, discharge and redness.   Respiratory:  Positive for cough. Negative for hemoptysis, shortness of breath and wheezing.    Neurological: Negative.    All other systems reviewed and are negative.    OBJECTIVE     PHYSICAL EXAM  Vitals:    12/08/23 1610   BP: 133/76   Pulse: 62   SpO2: 98%   Weight: 61.2 kg (134 lb 14.7 oz)     Vital Signs (Most Recent):  Pulse: 62 (12/08/23 1610)  BP: 133/76 (12/08/23 1610)  SpO2: 98 % (12/08/23 1610)   Weight:   Wt Readings from Last 1 Encounters:   12/08/23 61.2 kg (134 lb 14.7 oz)     Body mass index is 21.78 kg/m².      Vital signs and nursing assessment noted: normal oxygen saturation    GEN:   NAD, A & Ox3, atraumatic, well appearing, nontoxic appearing  HEENT:  PERRLA, EOMI, moist membranes, nl conjunctiva, no scleral icterus, no nystagmus, no nodes/nodules, soft, supple, FROM, no trachial deviation, nl TM bilaterally, nl pharynx  CV:   RRR no m/r/g, 2+ radial pulses, <2sec cap refill, no obvious JVD  Physical Exam  Pulmonary:      Effort: No tachypnea, bradypnea, accessory muscle usage, prolonged expiration, respiratory distress or retractions.      Breath sounds: Normal breath sounds and air entry. No stridor, decreased air movement or transmitted upper airway sounds. No decreased breath sounds, wheezing, rhonchi or rales.      ABD:   soft, Nontender, Nondistended, +BS, no guarding/rebound  :   Deferred  BACK:  FROM, no midline tenderness, no paraspinal tenderness  EXT:   FROM, LOUIE x 4, no swelling, no edema, no calf tenderness, no bony tenderness, no warmth or redness, no crepitus, no obvious deformity  LYMPH:  no gross adenopathy  NEURO:  GCS 15, CN II-XII grossly intact, no obvious motor/sensory deficit, no tremor  PSYCH:   no SI/HI, no anxiety, nl mood/affect, nl judgement/thought process  SKIN:  Warm, dry, intact, no rashes/lesions or masses, nl color, no pallor    Tests    none       ASSESSMENT:     1. Acute sinusitis, recurrence not specified, unspecified location      68 y.o. female PMHx Allergy, Bronchitis presents with sinus problem < 10 days. Home medications reviewed. Exam is relatively benign.     MDM  Reviewed: previous chart, nursing note and vitals      PLAN:   Supportive care. Continue Augmentin if symptoms worsen or prolonged > 10 days.  Encouraged healthy eating, rest and hydration. The results of physical exam findings were reviewed with the patient. Management of above assessments/visit diagnoses was discussed with patient.   Precautions for return discussed at length. Patient was given ample time for questions. All questions asked and answered to the satisfaction of the patient. Patient is in agreement with the above and verbalized understanding. Total time spent caring for the patient today was 27 minutes. This includes time spent before the visit reviewing the chart, time spent during the visit, and time spent after the visit on documentation.       Veda Stoll MD  [facility]

## 2023-12-15 NOTE — PROGRESS NOTES
Physical Therapy Daily Treatment Note     Name: Danuta Santos  Clinic Number: [id number]  Diagnosis:   Encounter Diagnosis   Name Primary?    Chronic bilateral low back pain with right-sided sciatica Yes     Physician: Yumiko Davis MD  Treatment Orders: PT Eval and Treat  Past Medical History:   Diagnosis Date    Allergy     IBS (irritable bowel syndrome)     Pneumonia 02/2019     Precautions: as per diagnosis    Evaluation date: 1-19-23  Visit # authorized: 32/40  Authorization period:12-31-23  Plan of care expiration: 1-23-24    MD Follow up appt: none scheduled      Time In: 3:20  Time Out: 4:20  Total Billing Time (timed & untimed codes): 40 minutes + ES    Subjective     Pt reports: she is doing her exercises and walking and being careful with back     Pain Scale: before treatment: 4.5/10 after treatment: 2/10 and level pelvis    Objective     AR R at IE AR R pelvis   severe tightness lumbar paraspinals R,  mod L         Lumbar active range of motion in standing is: 12-12-23  - flexion - ankle                     - extension -  75%                         - left side bending -  to knee         - right side bending -  to knee           Lumbar active range of motion in standing is: 10-27-22  - flexion - toes                     - extension -  100%                         - left side bending -  To knee         - right side bending -  To knee         Muscle Strength 10-26-23 deferred this date due to increased symptoms  MMT R L   Hip flexion 4/5 4/5   Hip abduction 5/5 5/5   Hip extension 5-/5 5/5   Glut max 3+/5 4-/5   Knee extension 5/5 5/5   Knee flexion 5/5 5/5        Muscle Strength same as 6-15-23  MMT R L   Hip flexion 4+/5 4+/5   Hip abduction 5/5 5/5   Hip extension 5-/5 5/5   Glut max 4-/5 4/5   Knee extension 5/5 5/5   Knee flexion 5/5 5/5             Muscle Strength 10-27-22  MMT R L   Hip flexion 5/5 5/5   Hip abduction 5/5 5/5   Hip extension 5/5 5/5   Glut max 4+/5 4+/5   Knee extension  5/5 5/5   Knee flexion 5/5 5/5         TREATMENT   Therapeutic exercises were performed to improve ROM, strength, flexibility and stabilization for 5 minutes.      HS stretch supine   Glut stretch  Piriformis stretch  B QL stretch     B Knee to chest   Contract relax R glut x 3      Pt able to self mobilize     Pt is only performing 10 reps when she does ex    Pelvic tilt   x 10  Bridge x 10,   SLR x 10,   Trunk rotation supine x 10  Partial sit up x 10  Hip abd sidelying x 10 B,  Arm and Leg lift prone x 10, instructed pt to put pillow under stomach   Hip ext prone with bent knee x 10 instructed pt to put pillow under stomach  Sit to stand x 10  Instructed pt to hold press up for now and will note results  GSS standing x 10    [employer] x 2 as needed  Plank x 30 sec x 1 Led to dysfunction so pt performed contract relax and realigned I   Contract relax R glut    Pt to start walking at gym or park and start planks again  Pt unable to try to swim until May due to pool upkeep.      Manual therapy: Danuta  received the following manual therapy  techniques x  25 min. to include soft tissue and joint mobilization were applied to the: low back and gluteals to include:     Performed STM to LB paraspinals and QL R>L  (NP)P/A mob to lumbar region Grade 1-2 , MET to QL R SL L  Sidelying L contract relax mobilization to L5-S1 region to level pelvis  at end of treatment and level pelvis achieved at beginning and end of session    Pt received tool-assisted massage with manual therapy techniques to B LB in prone to trigger an inflammatory healing response and stimulate the production of new collagen and proper, more functional, less painful healing.    Vacuum/cupping STM with manual therapy techniques was performed to back and gluteals to decrease muscle tightness, increase circulation and promote healing process.  The pt's skin was monitored for redness adjusting pressure as needed. The pt was instructed in possible side  effects of bruising and/or soreness.      Ultrasound  for pain control and to decrease inflammation @ continuous duty cycle, 1 Mhz, applied to R lumbar paraspinals, intensity = 1.5 w/cm2 for 8 minutes. 2 min prep      Patient received pre-mod electrical stimulation to decrease muscle tightness and pain to Lumbar paraspinals/ sacral border of gluteals B for 15 minutes with MH supine with cycle time: continuous, beat frequency: , CC/CV: CV.      Written Home Exercises Provided: continue prior HEP  Pt demo good understanding of the education provided. Danuta demonstrated good return demonstration of activities.     Pt. education:  Posture reeducation, body mechanics, HEP, proper posture in standing  No spiritual or educational barriers to learning provided  Pt has no cultural, educational or language barriers to learning provided.    Assessment    Pt with less symptoms to start as compared to last session and able to self mobilize.  Pt continues with R sided back tightness and under some stress with holidays which makes a difference and getting ready for travel.  Pt encouraged to remember she is able to self manage and keep working on strengthening and using self mob skills. Pt with decreased symptoms at end of session       GOALS:   Short Term Goals:  3 weeks MET STG's  Increase range of motion 25%  Increase strength 1/2 muscle grade  Improve postural awareness of pelvis to independently identify dysfunction with min assist from PT  Be able to perform HEP with minimal cueing required     Long Term Goals: 6 weeks PARTIALLY MET LTG'S  Increase range of motion to 75% to 100% full   Improve muscle strength 1 muscle grade  Improve muscle strength with MMT to 4+/5 to 5/5  Improve and stabilize proper pelvic positioning  Restore ability to sit for ADL and work with min to 0 pain  Restore normal sleep habits without disturbances due to pain  Restore ability to perform ADL's and household activities independently with  min to 0 pain    Anticipated barriers to physical therapy: none  Pt's spiritual, cultural and educational needs considered and pt agreeable to plan of care and goals        Plan   Continue with established plan of care towards PT goals.        Jacy Escobar, PT, MSPT

## 2023-12-19 ENCOUNTER — CLINICAL SUPPORT (OUTPATIENT)
Dept: REHABILITATION | Facility: HOSPITAL | Age: 68
End: 2023-12-19
Payer: COMMERCIAL

## 2023-12-19 DIAGNOSIS — G89.29 CHRONIC BILATERAL LOW BACK PAIN WITH RIGHT-SIDED SCIATICA: Primary | ICD-10-CM

## 2023-12-19 DIAGNOSIS — M54.41 CHRONIC BILATERAL LOW BACK PAIN WITH RIGHT-SIDED SCIATICA: Primary | ICD-10-CM

## 2023-12-19 PROCEDURE — 97035 APP MDLTY 1+ULTRASOUND EA 15: CPT | Mod: PN | Performed by: PHYSICAL THERAPIST

## 2023-12-19 PROCEDURE — 97014 ELECTRIC STIMULATION THERAPY: CPT | Mod: PN | Performed by: PHYSICAL THERAPIST

## 2023-12-19 PROCEDURE — 97140 MANUAL THERAPY 1/> REGIONS: CPT | Mod: PN | Performed by: PHYSICAL THERAPIST

## 2023-12-21 ENCOUNTER — PATIENT MESSAGE (OUTPATIENT)
Dept: INTERNAL MEDICINE | Facility: CLINIC | Age: 68
End: 2023-12-21
Payer: COMMERCIAL

## 2023-12-21 RX ORDER — OSELTAMIVIR PHOSPHATE 75 MG/1
75 CAPSULE ORAL 2 TIMES DAILY
Qty: 10 CAPSULE | Refills: 0 | Status: SHIPPED | OUTPATIENT
Start: 2023-12-21 | End: 2023-12-26

## 2024-01-01 ENCOUNTER — PATIENT MESSAGE (OUTPATIENT)
Dept: INTERNAL MEDICINE | Facility: CLINIC | Age: 69
End: 2024-01-01
Payer: COMMERCIAL

## 2024-01-10 ENCOUNTER — OFFICE VISIT (OUTPATIENT)
Dept: INTERNAL MEDICINE | Facility: CLINIC | Age: 69
End: 2024-01-10
Payer: COMMERCIAL

## 2024-01-10 VITALS
SYSTOLIC BLOOD PRESSURE: 124 MMHG | WEIGHT: 134 LBS | BODY MASS INDEX: 21.53 KG/M2 | OXYGEN SATURATION: 100 % | HEART RATE: 65 BPM | HEIGHT: 66 IN | DIASTOLIC BLOOD PRESSURE: 74 MMHG

## 2024-01-10 DIAGNOSIS — Z12.31 SCREENING MAMMOGRAM FOR BREAST CANCER: ICD-10-CM

## 2024-01-10 DIAGNOSIS — G93.31 POST-INFLUENZA SYNDROME: Primary | ICD-10-CM

## 2024-01-10 DIAGNOSIS — K59.09 CHRONIC CONSTIPATION: ICD-10-CM

## 2024-01-10 DIAGNOSIS — R09.82 PND (POST-NASAL DRIP): ICD-10-CM

## 2024-01-10 PROCEDURE — 3008F BODY MASS INDEX DOCD: CPT | Mod: CPTII,S$GLB,, | Performed by: INTERNAL MEDICINE

## 2024-01-10 PROCEDURE — 1101F PT FALLS ASSESS-DOCD LE1/YR: CPT | Mod: CPTII,S$GLB,, | Performed by: INTERNAL MEDICINE

## 2024-01-10 PROCEDURE — 99213 OFFICE O/P EST LOW 20 MIN: CPT | Mod: S$GLB,,, | Performed by: INTERNAL MEDICINE

## 2024-01-10 PROCEDURE — 3078F DIAST BP <80 MM HG: CPT | Mod: CPTII,S$GLB,, | Performed by: INTERNAL MEDICINE

## 2024-01-10 PROCEDURE — 3074F SYST BP LT 130 MM HG: CPT | Mod: CPTII,S$GLB,, | Performed by: INTERNAL MEDICINE

## 2024-01-10 PROCEDURE — 99999 PR PBB SHADOW E&M-EST. PATIENT-LVL III: CPT | Mod: PBBFAC,,, | Performed by: INTERNAL MEDICINE

## 2024-01-10 PROCEDURE — 3288F FALL RISK ASSESSMENT DOCD: CPT | Mod: CPTII,S$GLB,, | Performed by: INTERNAL MEDICINE

## 2024-01-10 PROCEDURE — 1126F AMNT PAIN NOTED NONE PRSNT: CPT | Mod: CPTII,S$GLB,, | Performed by: INTERNAL MEDICINE

## 2024-01-10 RX ORDER — AMOXICILLIN AND CLAVULANATE POTASSIUM 875; 125 MG/1; MG/1
1 TABLET, FILM COATED ORAL EVERY 12 HOURS
Qty: 14 TABLET | Refills: 0 | Status: SHIPPED | OUTPATIENT
Start: 2024-01-10 | End: 2024-01-17

## 2024-01-10 RX ORDER — LUBIPROSTONE 8 UG/1
8 CAPSULE ORAL 2 TIMES DAILY WITH MEALS
Qty: 60 CAPSULE | Refills: 0 | Status: SHIPPED | OUTPATIENT
Start: 2024-01-10

## 2024-01-14 PROBLEM — D70.8 OTHER NEUTROPENIA: Status: RESOLVED | Noted: 2019-10-07 | Resolved: 2024-01-14

## 2024-01-14 NOTE — PROGRESS NOTES
Subjective:       Patient ID: Danuta Santos is a 68 y.o. female who presents today for:    Chief Complaint:   Chief Complaint   Patient presents with    Follow-up     Follow up cough, dry and unable to sleep       HPI:  68-year-old nonsmoking female here for follow-up after fady influenza virus while [place]  She was diagnosed at urgent care on 12/30 with influenza.  She is recovering the still has a postnasal drip.  She does quickly get bronchitis and has had bronchial pneumonia in the past.  She is using Astelin and Flonase as well as nasal saline.  She denies any fever or chills.  She denies any wheezing.    Review of Systems   Constitutional:  Negative for chills, fever and weight loss.   HENT:  Negative for sore throat.         Postnasal drip   Eyes:  Negative for blurred vision and double vision.   Respiratory:  Positive for cough. Negative for shortness of breath.    Cardiovascular:  Negative for chest pain and palpitations.   Gastrointestinal:  Positive for constipation. Negative for diarrhea, nausea and vomiting.   Genitourinary:  Negative for dysuria and hematuria.   Musculoskeletal:  Negative for joint pain and myalgias.   Skin:  Negative for itching and rash.   Neurological:  Negative for sensory change, focal weakness and headaches.   Endo/Heme/Allergies:  Does not bruise/bleed easily.   Psychiatric/Behavioral:  Negative for depression and suicidal ideas.         Medications:  Outpatient Encounter Medications as of 1/10/2024   Medication Sig Dispense Refill    acetaminophen (TYLENOL) 500 MG tablet Take 500 mg by mouth every 6 (six) hours as needed.      amoxicillin-clavulanate 875-125mg (AUGMENTIN) 875-125 mg per tablet Take 1 tablet by mouth 2 (two) times daily. (Patient not taking: Reported on 12/8/2023) 14 tablet 0    amoxicillin-clavulanate 875-125mg (AUGMENTIN) 875-125 mg per tablet Take 1 tablet by mouth every 12 (twelve)  hours. for 7 days 14 tablet 0    ascorbic acid, vitamin C, (VITAMIN C) 500 MG tablet Take 500 mg by mouth once daily.      azelastine (ASTELIN) 137 mcg (0.1 %) nasal spray SPRAY 1 SPRAY (137 MCG TOTAL) BY NASAL ROUTE TWICE A DAY AS NEEDED FOR RHINITIS 60 mL 2    [] butalbital-acetaminophen-caffeine -40 mg (FIORICET, ESGIC) -40 mg per tablet Take 1 tablet by mouth every 4 (four) hours as needed for Pain or Headaches. 11 tablet 0    calcium carbonate (CALCIUM 500 ORAL) Take by mouth.      cyanocobalamin 500 MCG tablet Take 1,000 mcg by mouth once daily.      estradioL (VAGIFEM) 10 mcg Tab Place 1 tablet (10 mcg total) vaginally twice a week. 24 tablet 3    glucosam/chond/collagen/hyalur (ZKHQTDZX-WNJT-LVUHSL-HYALUR AC ORAL) Take 1 tablet by mouth once daily at 6am.      LACTOBACILLUS COMBO NO.6 (PROBIOTIC COMPLEX ORAL) Take 1 capsule by mouth once.       lubiprostone (AMITIZA) 8 MCG Cap Take 1 capsule (8 mcg total) by mouth 2 (two) times daily with meals. 60 capsule 0    mupirocin (BACTROBAN) 2 % ointment Apply topically 3 (three) times daily. 30 g 0    pimecrolimus (ELIDEL) 1 % cream Apply topically 2 (two) times daily as needed.      polyethylene glycol (GLYCOLAX) 17 gram PwPk Take by mouth.      tiZANidine (ZANAFLEX) 2 MG tablet Take 4 mg by mouth nightly as needed.      vitamin D (VITAMIN D3) 1000 units Tab Take 2,000 Units by mouth once daily.       No facility-administered encounter medications on file as of 1/10/2024.       Allergies:  Review of patient's allergies indicates:   Allergen Reactions    Wheat containing prod      Sinus      Advil [ibuprofen]     Apple cider vinegar Other (See Comments)    Lactobacillus acidoph-lactase      Other reaction(s): Other (See Comments)    Milk containing products (dairy) Other (See Comments)     Post nasal drip    Nsaids (non-steroidal anti-inflammatory drug)      Other reaction(s): Other (See Comments)  Pt states she gets pain in her stomach when she  "takes nsaids due to IBS    Alcohol Other (See Comments) and Palpitations     Post nasal drip    Aspirin Other (See Comments)     Abdominal pain       Health Maintenance:  Immunization History   Administered Date(s) Administered    COVID-19, MRNA, LN-S, PF (Pfizer) (Gray Cap) 04/12/2022    COVID-19, MRNA, LN-S, PF (Pfizer) (Purple Cap) 02/19/2021, 03/12/2021, 09/28/2021    COVID-19, mRNA, LNP-S, PF, jesenia-sucrose, 30 mcg/0.3 mL (Pfizer 2023 Ages 12+) 10/14/2023    COVID-19, mRNA, LNP-S, bivalent booster, PF (PFIZER OMICRON) 09/24/2022, 05/01/2023    Hepatitis A, Adult 03/15/2017, 09/19/2017    Hepatitis B 07/12/2012, 09/12/2012    Hepatitis B, Adult 05/21/2015    Hepatitis B, Pediatric/Adolescent 07/12/2012, 09/12/2012    IPV 09/26/2012    Influenza (FLUAD) - Quadrivalent - Adjuvanted - PF *Preferred* (65+) 10/01/2021, 09/18/2023    Influenza - High Dose - PF (65 years and older) 10/03/2019    Influenza - Quadrivalent 09/18/2018    Influenza - Quadrivalent - PF *Preferred* (6 months and older) 09/22/2018, 09/22/2020, 09/29/2022    Influenza A (H1N1) 2009 Monovalent - IM 11/21/2009    Pneumococcal Conjugate - 13 Valent 09/30/2019    Pneumococcal Polysaccharide - 23 Valent 03/12/2019, 04/13/2022    Tdap 08/15/2016, 08/16/2022    Typhoid 09/26/2012    Typhoid - ViCPs 09/26/2012, 03/15/2017    Zoster 07/02/2015    Zoster Recombinant 09/18/2018, 09/18/2018, 12/15/2018      Health Maintenance   Topic Date Due    High Dose Statin  Never done    Mammogram  03/03/2024    DEXA Scan  03/15/2024    Colorectal Cancer Screening  07/20/2027    Lipid Panel  08/30/2028    TETANUS VACCINE  08/16/2032    Hepatitis C Screening  Completed    Shingles Vaccine  Completed          Objective:      Vital Signs  Pulse: 65  SpO2: 100 %  BP: 124/74  Pain Score: 0-No pain  Height and Weight  Height: 5' 6" (167.6 cm)  Weight: 60.8 kg (134 lb)  BSA (Calculated - sq m): 1.68 sq meters  BMI (Calculated): 21.6  Weight in (lb) to have BMI = 25: " 154.6]    Physical Exam  Constitutional:       General: She is not in acute distress.     Appearance: She is well-developed. She is not diaphoretic.   HENT:      Head: Normocephalic and atraumatic.      Right Ear: Tympanic membrane, ear canal and external ear normal. No drainage. Tympanic membrane is not scarred or retracted.      Left Ear: Tympanic membrane, ear canal and external ear normal. No drainage. Tympanic membrane is not scarred or retracted.      Nose: Mucosal edema present.      Right Sinus: No maxillary sinus tenderness or frontal sinus tenderness.      Left Sinus: No maxillary sinus tenderness or frontal sinus tenderness.      Mouth/Throat:      Pharynx: Posterior oropharyngeal erythema present. No oropharyngeal exudate or uvula swelling.      Tonsils: No tonsillar exudate.   Eyes:      General:         Right eye: No discharge.         Left eye: No discharge.      Conjunctiva/sclera: Conjunctivae normal.   Cardiovascular:      Rate and Rhythm: Normal rate and regular rhythm.   Pulmonary:      Effort: Pulmonary effort is normal. No respiratory distress.      Breath sounds: Normal breath sounds. No stridor. No wheezing or rales.   Chest:      Chest wall: No tenderness.   Musculoskeletal:      Cervical back: Normal range of motion and neck supple.   Lymphadenopathy:      Head:      Right side of head: No submandibular, preauricular or posterior auricular adenopathy.      Left side of head: No submandibular, preauricular or posterior auricular adenopathy.      Cervical: No cervical adenopathy.   Skin:     General: Skin is warm and dry.   Psychiatric:         Behavior: Behavior normal.        Lab Results   Component Value Date    WBC 4.53 11/09/2023    HGB 12.3 11/09/2023    HCT 37.6 11/09/2023     11/09/2023    CHOL 187 04/13/2023    TRIG 42 04/13/2023    HDL 82 (H) 04/13/2023    ALT 15 10/14/2023    AST 20 10/14/2023     11/09/2023    K 5.0 11/09/2023     11/09/2023    CREATININE 0.8  11/09/2023    BUN 22 11/09/2023    CO2 27 11/09/2023    TSH 1.516 10/14/2023    HGBA1C 5.5 04/13/2023       Assessment/plan:     Danuta Santos is a 68 y.o.female with:    Post-influenza syndrome  Comments:  Recovering but still with postnasal drip    PND (post-nasal drip)  Comments:  Would continue Astelin twice a day, nasal saline and Flonase twice a day at least for another week.  Mucinex DM vs mucinex +delysm fine    Chronic constipation  Comments:  Trial of low-dose Amitiza to help prevent daily use of Fleet's    Screening mammogram for breast cancer  -     Mammo Digital Screening Bilat; Future; Expected date: 03/04/2024    Other orders- on hold  -     amoxicillin-clavulanate 875-125mg (AUGMENTIN) 875-125 mg per tablet; Take 1 tablet by mouth every 12 (twelve) hours. for 7 days  Dispense: 14 tablet; Refill: 0        No future appointments.    Yumiko Carias MD  [facility]

## 2024-03-14 ENCOUNTER — PATIENT MESSAGE (OUTPATIENT)
Dept: INTERNAL MEDICINE | Facility: CLINIC | Age: 69
End: 2024-03-14
Payer: COMMERCIAL

## 2024-03-27 ENCOUNTER — CLINICAL SUPPORT (OUTPATIENT)
Dept: INTERNAL MEDICINE | Facility: CLINIC | Age: 69
End: 2024-03-27
Payer: COMMERCIAL

## 2024-03-27 DIAGNOSIS — Z86.2 HISTORY OF ANEMIA: ICD-10-CM

## 2024-03-27 DIAGNOSIS — R68.89 FLU-LIKE SYMPTOMS: Primary | ICD-10-CM

## 2024-03-27 DIAGNOSIS — E87.5 HYPERKALEMIA: ICD-10-CM

## 2024-03-27 DIAGNOSIS — Z86.2 HISTORY OF ANEMIA: Primary | ICD-10-CM

## 2024-03-27 LAB
ALBUMIN SERPL BCP-MCNC: 4.2 G/DL (ref 3.5–5.2)
ALP SERPL-CCNC: 59 U/L (ref 55–135)
ALT SERPL W/O P-5'-P-CCNC: 24 U/L (ref 10–44)
ANION GAP SERPL CALC-SCNC: 6 MMOL/L (ref 8–16)
AST SERPL-CCNC: 28 U/L (ref 10–40)
BASOPHILS # BLD AUTO: 0.03 K/UL (ref 0–0.2)
BASOPHILS NFR BLD: 0.7 % (ref 0–1.9)
BILIRUB SERPL-MCNC: 0.4 MG/DL (ref 0.1–1)
BUN SERPL-MCNC: 17 MG/DL (ref 8–23)
CALCIUM SERPL-MCNC: 10.1 MG/DL (ref 8.7–10.5)
CHLORIDE SERPL-SCNC: 98 MMOL/L (ref 95–110)
CO2 SERPL-SCNC: 28 MMOL/L (ref 23–29)
CREAT SERPL-MCNC: 0.8 MG/DL (ref 0.5–1.4)
CTP QC/QA: YES
CTP QC/QA: YES
DIFFERENTIAL METHOD BLD: NORMAL
EOSINOPHIL # BLD AUTO: 0 K/UL (ref 0–0.5)
EOSINOPHIL NFR BLD: 0.9 % (ref 0–8)
ERYTHROCYTE [DISTWIDTH] IN BLOOD BY AUTOMATED COUNT: 12.8 % (ref 11.5–14.5)
EST. GFR  (NO RACE VARIABLE): >60 ML/MIN/1.73 M^2
GLUCOSE SERPL-MCNC: 100 MG/DL (ref 70–110)
HCT VFR BLD AUTO: 38 % (ref 37–48.5)
HGB BLD-MCNC: 12.6 G/DL (ref 12–16)
IMM GRANULOCYTES # BLD AUTO: 0 K/UL (ref 0–0.04)
IMM GRANULOCYTES NFR BLD AUTO: 0 % (ref 0–0.5)
LYMPHOCYTES # BLD AUTO: 1.3 K/UL (ref 1–4.8)
LYMPHOCYTES NFR BLD: 29.6 % (ref 18–48)
MCH RBC QN AUTO: 29.9 PG (ref 27–31)
MCHC RBC AUTO-ENTMCNC: 33.2 G/DL (ref 32–36)
MCV RBC AUTO: 90 FL (ref 82–98)
MONOCYTES # BLD AUTO: 0.4 K/UL (ref 0.3–1)
MONOCYTES NFR BLD: 8.1 % (ref 4–15)
NEUTROPHILS # BLD AUTO: 2.7 K/UL (ref 1.8–7.7)
NEUTROPHILS NFR BLD: 60.7 % (ref 38–73)
NRBC BLD-RTO: 0 /100 WBC
PLATELET # BLD AUTO: 220 K/UL (ref 150–450)
PMV BLD AUTO: 10.9 FL (ref 9.2–12.9)
POC MOLECULAR INFLUENZA A AGN: NEGATIVE
POC MOLECULAR INFLUENZA B AGN: NEGATIVE
POTASSIUM SERPL-SCNC: 4.1 MMOL/L (ref 3.5–5.1)
PROT SERPL-MCNC: 7.1 G/DL (ref 6–8.4)
RBC # BLD AUTO: 4.21 M/UL (ref 4–5.4)
SARS-COV-2 RDRP RESP QL NAA+PROBE: NEGATIVE
SODIUM SERPL-SCNC: 132 MMOL/L (ref 136–145)
WBC # BLD AUTO: 4.42 K/UL (ref 3.9–12.7)

## 2024-03-27 PROCEDURE — 87502 INFLUENZA DNA AMP PROBE: CPT | Mod: QW,S$GLB,, | Performed by: INTERNAL MEDICINE

## 2024-03-27 PROCEDURE — 80053 COMPREHEN METABOLIC PANEL: CPT | Performed by: INTERNAL MEDICINE

## 2024-03-27 PROCEDURE — 85025 COMPLETE CBC W/AUTO DIFF WBC: CPT | Performed by: INTERNAL MEDICINE

## 2024-03-27 PROCEDURE — 87635 SARS-COV-2 COVID-19 AMP PRB: CPT | Mod: QW,S$GLB,, | Performed by: INTERNAL MEDICINE

## 2024-03-29 ENCOUNTER — PATIENT MESSAGE (OUTPATIENT)
Dept: INTERNAL MEDICINE | Facility: CLINIC | Age: 69
End: 2024-03-29
Payer: COMMERCIAL

## 2024-03-29 ENCOUNTER — TELEPHONE (OUTPATIENT)
Dept: INTERNAL MEDICINE | Facility: CLINIC | Age: 69
End: 2024-03-29
Payer: COMMERCIAL

## 2024-03-29 DIAGNOSIS — E87.1 HYPONATREMIA: Primary | ICD-10-CM

## 2024-03-29 DIAGNOSIS — M43.06 LUMBAR SPONDYLOLYSIS: Primary | ICD-10-CM

## 2024-04-04 ENCOUNTER — PATIENT MESSAGE (OUTPATIENT)
Dept: INTERNAL MEDICINE | Facility: CLINIC | Age: 69
End: 2024-04-04
Payer: COMMERCIAL

## 2024-04-05 ENCOUNTER — CLINICAL SUPPORT (OUTPATIENT)
Dept: REHABILITATION | Facility: HOSPITAL | Age: 69
End: 2024-04-05
Payer: COMMERCIAL

## 2024-04-05 ENCOUNTER — DOCUMENTATION ONLY (OUTPATIENT)
Dept: REHABILITATION | Facility: HOSPITAL | Age: 69
End: 2024-04-05

## 2024-04-05 DIAGNOSIS — M43.06 LUMBAR SPONDYLOLYSIS: ICD-10-CM

## 2024-04-05 DIAGNOSIS — M54.41 CHRONIC BILATERAL LOW BACK PAIN WITH RIGHT-SIDED SCIATICA: Primary | ICD-10-CM

## 2024-04-05 DIAGNOSIS — G89.29 CHRONIC BILATERAL LOW BACK PAIN WITH RIGHT-SIDED SCIATICA: Primary | ICD-10-CM

## 2024-04-05 DIAGNOSIS — M62.81 MUSCLE WEAKNESS: ICD-10-CM

## 2024-04-05 PROCEDURE — 97014 ELECTRIC STIMULATION THERAPY: CPT | Mod: PN | Performed by: PHYSICAL THERAPIST

## 2024-04-05 PROCEDURE — 97140 MANUAL THERAPY 1/> REGIONS: CPT | Mod: PN | Performed by: PHYSICAL THERAPIST

## 2024-04-05 PROCEDURE — 97035 APP MDLTY 1+ULTRASOUND EA 15: CPT | Mod: PN | Performed by: PHYSICAL THERAPIST

## 2024-04-05 PROCEDURE — 97164 PT RE-EVAL EST PLAN CARE: CPT | Mod: PN | Performed by: PHYSICAL THERAPIST

## 2024-04-05 PROCEDURE — 97110 THERAPEUTIC EXERCISES: CPT | Mod: PN | Performed by: PHYSICAL THERAPIST

## 2024-04-05 NOTE — PLAN OF CARE
[facility]  Physical Therapy Re-Evaluation    Name: Danuta Santos  Clinic Number: [id number]    Therapy Diagnosis:   Encounter Diagnoses   Name Primary?    Lumbar spondylolysis     Chronic bilateral low back pain with right-sided sciatica Yes    Muscle weakness      Physician: Yumiko Davis MD    Physician Orders: PT Eval and Treat   Medical Diagnosis from Referral: M43.06 (ICD-10-CM) - Lumbar spondylolysis   Evaluation Date: 2024  Authorization Period Expiration: 24  Plan of Care Expiration: 24  Progress Note Due: 23  Visit # / Visits authorized:   FOTO: Issued Visit #: 1    MD Follow up appointment: none scheduled     Precautions: Standard and sacralization of L5 R    Time In: 9:55  Time Out: 11:15  Total Billable Time: 60 minutes + ES    Subjective   Date of onset: chronic increased symptoms over past few weeks increased symptoms and unable to self mobilize  History of current condition - Danuta reports: has gone to massage therapist and had to sleep on floor to sleep with less pain       Medical History:   Past Medical History:   Diagnosis Date    ADHD (attention deficit hyperactivity disorder)     Allergy     wheat , diary, alcohol ,some vinegars    Arthritis     Bronchitis- bad twice  and 2019 again ; admitted for bacterial pneumonia     IBS (irritable bowel syndrome)     Recurrent upper respiratory infection (URI)        Surgical History:   Danuta Santos  has a past surgical history that includes Nose surgery ();  section; Sinus surgery (2019); Colonoscopy (N/A, 2020); right hand (2021); and foot bunio (Left).    Medications:   Danuta has a current medication list which includes the following prescription(s): acetaminophen, amoxicillin-clavulanate 875-125mg, ascorbic acid (vitamin c), azelastine, calcium carbonate, cyanocobalamin, estradiol, glucosam/chond/collagen/hyalur,  l.acid/b.animalis,bifidum/fos, lubiprostone, mupirocin, pimecrolimus, polyethylene glycol, tizanidine, and vitamin d.    Allergies:   Review of patient's allergies indicates:   Allergen Reactions    Wheat containing prod      Sinus      Advil [ibuprofen]     Apple cider vinegar Other (See Comments)    Lactobacillus acidoph-lactase      Other reaction(s): Other (See Comments)    Milk containing products (dairy) Other (See Comments)     Post nasal drip    Nsaids (non-steroidal anti-inflammatory drug)      Other reaction(s): Other (See Comments)  Pt states she gets pain in her stomach when she takes nsaids due to IBS    Alcohol Other (See Comments) and Palpitations     Post nasal drip    Aspirin Other (See Comments)     Abdominal pain        Imaging, none: recently  Prior Therapy: had PT for LB last year  Social History:  lives alone   Occupation: teacher  Prior Level of Function: no limitations  Current Level of Function: limited with lying down in bed, sitting standing  Exercise for Wellness: walks and does PT ex     Pain:  Current 4/10, worst 6/10, best 3/10   Location: right LB  Description: Dull and Sharp  Aggravating Factors: Sitting, Standing, Laying, and Walking  Easing Factors: walking and exercises  Sleep is disturbed. Sleeping position: recently has to sleep on floor, on back or R side  Saddle symptoms: neg  Bowel or bladder:  neg    Pts goals: keep pain as min as possible    Objective     Postural examination in standing:  - flattened lumbar lordosis  - forward head  - forward shoulders  - R hip high  - L shoulder high    Postural examination in sitting:   - normal lumbar lordosis  - forward head  - forward shoulders      Functional assessment: no deficits in areas noted below  - walking:   - sit to stand:   - sit to supine:        - supine to sit:   - supine to prone:     Lumbar active range of motion in standing is:  - flexion - floor                     - extension -  50%                        "  - left side bending -  2" above knee         - right side bending -  2" above knee          Pelvic positioning: AR R      Flexibility testing:  - hamstrings:     90/90 test R 5 L 0           - gastrocnemius:   DF ankle R 10 degrees L 10 degrees    Muscle Strength  MMT R L   Knee extension 5/5 5/5   Knee flexion 5/5 5/5   Hip flexion 4-/5 4-/5   Hip abduction 4+/5 4+/5   Hip extension 4+/5 4+/5   Glut max 3+/5 3+/5       Endurance is fair    Lumbar Special tests:  SLR neg    Palpation: min-mod TTP and severe tightness R lumbar paraspinals    Joint mobility: severe decreased lumbar mobility     Sensation: Intact  Reflexes: normal      TREATMENT   Treatment Time In: 10:20  Treatment Time Out: 11:15  Total Treatment time separate from Evaluation: 55 minutes    Danuta received the treatments listed below:      Therapeutic exercises were performed to improve ROM, strength, flexibility and stabilization for 30 minutes.       HS stretch supine   Glut stretch  Piriformis stretch  B QL stretch     B Knee to chest   Contract relax R glut x 3       Pt able to self mobilize      Verbal review Pt is only performing 10 reps when she does ex    Pelvic tilt   x 10  Bridge x 10,   SLR x 10,   Trunk rotation supine x 10  Partial sit up x 10  Hip abd sidelying x 10 B,  Arm and Leg lift prone x 10, instructed pt to put pillow under stomach   Hip ext prone with bent knee x 10 instructed pt to put pillow under stomach  Sit to stand x 10  Instructed pt to hold press up for now and will note results  GSS standing x 10     [employer] x 2 as needed  Plank x 30 sec x 1 Led to dysfunction so pt performed contract relax and realigned I   Contract relax R glut     Pt to start walking at gym or park and start planks again  Pt unable to try to swim until May due to pool upkeep.       Manual therapy: Danuta  received the following manual therapy  techniques x 20  min. to include soft tissue and joint mobilization were applied to the: low " back and gluteals to include:      Performed STM to LB paraspinals and QL R>L  (NP)P/A mob to lumbar region Grade 1-2 , MET to QL R SL L P/A and rot glides lumbar paraspinals  Sidelying L contract relax mobilization to L5-S1 region to level pelvis  at end of treatment and level pelvis achieved at beginning and end of session    Pt received tool-assisted massage with manual therapy techniques to B LB in prone to trigger an inflammatory healing response and stimulate the production of new collagen and proper, more functional, less painful healing.     Vacuum/cupping STM with manual therapy techniques was performed to back and gluteals to decrease muscle tightness, increase circulation and promote healing process.  The pt's skin was monitored for redness adjusting pressure as needed. The pt was instructed in possible side effects of bruising and/or soreness.       Ultrasound  for pain control and to decrease inflammation @ continuous duty cycle, 1 Mhz, applied to R lumbar paraspinals, intensity = 1.5 w/cm2 for 8 minutes. 2 min prep       Patient received pre-mod electrical stimulation to decrease muscle tightness and pain to Lumbar paraspinals/ sacral border of gluteals B for 15 minutes with MH supine with cycle time: continuous, beat frequency: , CC/CV: CV.        Home Exercises and Patient Education Provided    Education provided:   - Instructed pt in spine and pelvic girdle anatomy and biomechanics and effect of exercise to promote normal positioning, motion and to decrease pain.    - HEP   - stretch to point of tightness not pain   - exercise in pain free zone     Written Home Exercises Provided: Yes   Exercises were reviewed and Danuta was able to demonstrate them prior to the end of the session.  Danuta demonstrated good understanding of the education provided.     See EMR under Patient Instructions for exercises provided previously. Pt to continue with current HEP    Assessment   Danuta is a 68  y.o. female referred to outpatient Physical Therapy with a medical diagnosis of M43.06 (ICD-10-CM) - Lumbar spondylolysis . Pt presents with chronic LBP with SI dysfunction with loss of ROM and strength.      Pt prognosis is Good.   Pt will benefit from skilled outpatient Physical Therapy to address the deficits stated above and in the chart below, provide pt/family education, and to maximize pt's level of independence.     Plan of care discussed with patient: Yes  Pt's spiritual, cultural and educational needs considered and patient is agreeable to the plan of care and goals as stated below:     Anticipated Barriers for therapy: work schedule      GOALS:   Short Term Goals:  3 weeks  Increase range of motion 25%  Increase strength 1/2 muscle grade  Improve postural awareness of pelvis to independently identify dysfunction with min assist from PT  Be able to perform HEP with minimal cueing required    Long Term Goals: 6 weeks  Increase range of motion to 75% to 100% full   Improve muscle strength 1 muscle grade  Improve muscle strength with MMT to 4+/5 to 5/5  Improve and stabilize proper pelvic positioning  Restore ability to sit for work and ADL without increased pain   Restore ability to stand for work and ADL with very min to 0 increased pain  Restore normal sleep habits with min to 0 disturbances due to pain  Restore ability to perform ADL's and household activities independently and with very min to 0 increased pain       Anticipated barriers to physical therapy: none  Pt's spiritual, cultural and educational needs considered and pt agreeable to plan of care and goals     Plan   Plan of care Certification: 4/5/2024 to 5-17-24.    Outpatient Physical Therapy 1-2 times weekly for 6 weeks as needed  to include the following interventions: Therapeutic exercises, manual therapy, neuromuscular re-education, therapeutic activities, modalities such as moist heat, ice, ultrasound and electrical stimulation, lumbar  mechanical traction and dry needling will be considered and utilized as needed    Jacy Escobar, PT

## 2024-04-05 NOTE — PROGRESS NOTES
[facility]  Physical Therapy Discharge Note    Name: Danuta Burgos Tom  Clinic Number: [id number]    Therapy Diagnosis:   Encounter Diagnosis   Name Primary?    Chronic bilateral low back pain with right-sided sciatica Yes     Physician: Yumiko Davis MD     Physician Orders: eval and treat   Medical Diagnosis: lumbar spondylosis  Evaluation Date: 1-19-23      Date of Last visit: 12-19-23  Total Visits Received: 32    ASSESSMENT       Pt with less symptoms to start as compared to last session and able to self mobilize.  Pt continues with R sided back tightness and under some stress with holidays which makes a difference and getting ready for travel.  Pt encouraged to remember she is able to self manage and keep working on strengthening and using self mob skills. Pt with decreased symptoms at end of session       Discharge reason: Patient has completed the physician's prescription      Goals: GOALS:   Short Term Goals:  3 weeks MET STG's  Increase range of motion 25%  Increase strength 1/2 muscle grade  Improve postural awareness of pelvis to independently identify dysfunction with min assist from PT  Be able to perform HEP with minimal cueing required     Long Term Goals: 6 weeks PARTIALLY MET LTG'S  Increase range of motion to 75% to 100% full   Improve muscle strength 1 muscle grade  Improve muscle strength with MMT to 4+/5 to 5/5  Improve and stabilize proper pelvic positioning  Restore ability to sit for ADL and work with min to 0 pain  Restore normal sleep habits without disturbances due to pain  Restore ability to perform ADL's and household activities independently with min to 0 pain    PLAN   This patient is discharged from Physical Therapy.        Jacy Escobar, PT

## 2024-04-09 DIAGNOSIS — R09.81 COUGH WITH CONGESTION OF PARANASAL SINUS: ICD-10-CM

## 2024-04-09 DIAGNOSIS — R05.8 COUGH WITH CONGESTION OF PARANASAL SINUS: ICD-10-CM

## 2024-04-09 DIAGNOSIS — R09.82 PND (POST-NASAL DRIP): ICD-10-CM

## 2024-04-09 DIAGNOSIS — J06.9 VIRAL URI WITH COUGH: ICD-10-CM

## 2024-04-09 RX ORDER — AZELASTINE 1 MG/ML
SPRAY, METERED NASAL
Qty: 90 ML | Refills: 2 | Status: SHIPPED | OUTPATIENT
Start: 2024-04-09

## 2024-04-09 NOTE — PROGRESS NOTES
[facility]   Physical Therapy Treatment Note     Name: Danuta Burgos [place]  Clinic Number: [id number]    Therapy Diagnosis:   Encounter Diagnoses   Name Primary?    Muscle weakness Yes    Chronic bilateral low back pain with right-sided sciatica      Physician: Yumiko Davis MD    Visit Date: 4/11/2024    Physician Orders: PT Eval and Treat   Medical Diagnosis from Referral: M43.06 (ICD-10-CM) - Lumbar spondylolysis   Evaluation Date: 4/5/2024  Authorization Period Expiration: 12-31-24  Plan of Care Expiration: 5-17-24  Progress Note Due: 5-5-23  Visit # / Visits authorized: 2/20  FOTO: Issued Visit #: 1     MD Follow up appointment: none scheduled      Precautions: Standard and sacralization of L5 R       PTA Visit #: 0/5       Time In: 4:22 stuck in traffic  Time Out: 5:19  Total Billable Time: 40 minutes + ES    SUBJECTIVE     Pt reports: day before yesterday she began with L knee pain no known injury so limping a little having some pain  pt has a wedding this weekend out of town so concerned about LBP.   Pt was compliant with home exercise program. Pt reports doing well with strengthening ex   Response to previous treatment: felt better   Functional change: less pain with movement     Pain: 4/10  Location: right LB     OBJECTIVE     Pelvic positioning: AR R Pt with slight bruising from previous cupping noted so hold cupping  Mod-severe muscle tightness with min-mod tightness at end of session    Treatment     Danuta received the treatments listed below:      Therapeutic exercises were performed to improve ROM, strength, flexibility and stabilization for 10 minutes.     SL QL stretch to try at home with reach with leg and arms overhead with pillow at waist      HS stretch supine   Glut stretch  Piriformis stretch  B QL stretch     B Knee to chest   Contract relax R glut x 3       Pt able to self mobilize      Verbal review Pt is only performing 10 reps when she does ex     Pelvic tilt   x 10  Bridge x 10,   SLR x 10,   Trunk rotation supine x 10  Partial sit up x 10  Hip abd sidelying x 10 B,  Arm and Leg lift prone x 10, instructed pt to put pillow under stomach   Hip ext prone with bent knee x 10 instructed pt to put pillow under stomach  Sit to stand x 10  Instructed pt to hold press up for now and will note results  GSS standing x 10     Iron cross x 2 as needed  Plank x 30 sec x 1 Led to dysfunction so pt performed contract relax and realigned I   Contract relax R glut     Walking for exercise   Instructed pt to ice knee and hold walking for exercise for now       Manual therapy: Danuta  received the following manual therapy  techniques x 20  min. to include soft tissue and joint mobilization were applied to the: low back and gluteals to include:      Performed STM to LB paraspinals and QL R>L  Performed SB stretching of QL also with pillow at waist  P/A mob to lumbar region Grade 1-2 , MET to QL R SL L P/A and rot glides lumbar paraspinals  Sidelying L contract relax mobilization to L5-S1 region to level pelvis  at end of treatment and level pelvis achieved at beginning and end of session    Pt received tool-assisted massage with manual therapy techniques to B LB in prone to trigger an inflammatory healing response and stimulate the production of new collagen and proper, more functional, less painful healing.     (NP)Vacuum/cupping STM with manual therapy techniques was performed to back and gluteals to decrease muscle tightness, increase circulation and promote healing process.  The pt's skin was monitored for redness adjusting pressure as needed. The pt was instructed in possible side effects of bruising and/or soreness.       Ultrasound  for pain control and to decrease inflammation @ continuous duty cycle, 1 Mhz, applied to R lumbar paraspinals, intensity = 1.5 w/cm2 for 8 minutes. 2 min prep       Patient received pre-mod electrical stimulation to decrease muscle  tightness and pain to Lumbar paraspinals/ sacral border of gluteals B for 10 minutes with MH supine with cycle time: continuous, beat frequency: , CC/CV: CV.      Patient Education and Home Exercises     Home Exercises Provided and Patient Education Provided     Education provided:   - focus on strengthening   - HEP   - stretch to point of tightness not pain   - exercise in pain free zone       Written Home Exercises Provided: Patient instructed to cont prior HEP. Exercises were reviewed and Danuta was able to demonstrate them prior to the end of the session.  Danuta demonstrated good understanding of the education provided. See EMR under Patient Instructions for exercises provided during therapy sessions    ASSESSMENT     Pt with slight knee injury and understands to consider icing knee resulting in slight antalgia with gait.  Pt with decreased symptoms and muscle tightness at end of session.  Pt was able to self mobilize and will benefit from additional QL stretching     Danuta Is progressing well towards her goals.   Pt prognosis is Good.     Pt will continue to benefit from skilled outpatient physical therapy to address the goals listed in the initial evaluation, provide pt/family education and to maximize pt's level of independence in the home and community environment.     Pt's spiritual, cultural and educational needs considered and pt agreeable to plan of care and goals.     Anticipated barriers to physical therapy: work schedule    GOALS:   Short Term Goals:  3 weeks  Increase range of motion 25%  Increase strength 1/2 muscle grade  Improve postural awareness of pelvis to independently identify dysfunction with min assist from PT  Be able to perform HEP with minimal cueing required     Long Term Goals: 6 weeks  Increase range of motion to 75% to 100% full   Improve muscle strength 1 muscle grade  Improve muscle strength with MMT to 4+/5 to 5/5  Improve and stabilize proper pelvic  positioning  Restore ability to sit for work and ADL without increased pain   Restore ability to stand for work and ADL with very min to 0 increased pain  Restore normal sleep habits with min to 0 disturbances due to pain  Restore ability to perform ADL's and household activities independently and with very min to 0 increased pain    PLAN   Continue with established plan of care towards PT goals.        Jacy Escobar, PT

## 2024-04-09 NOTE — TELEPHONE ENCOUNTER
Refill Decision Note   Danuta Santos  is requesting a refill authorization.  Brief Assessment and Rationale for Refill:  Approve     Medication Therapy Plan:         Comments:     Note composed:11:42 AM 04/09/2024

## 2024-04-09 NOTE — TELEPHONE ENCOUNTER
No care due was identified.  [facility] Embedded Care Due Messages. Reference number: [id number].   4/09/2024 12:33:58 AM CDT

## 2024-04-11 ENCOUNTER — CLINICAL SUPPORT (OUTPATIENT)
Dept: REHABILITATION | Facility: HOSPITAL | Age: 69
End: 2024-04-11
Payer: COMMERCIAL

## 2024-04-11 DIAGNOSIS — M54.41 CHRONIC BILATERAL LOW BACK PAIN WITH RIGHT-SIDED SCIATICA: ICD-10-CM

## 2024-04-11 DIAGNOSIS — G89.29 CHRONIC BILATERAL LOW BACK PAIN WITH RIGHT-SIDED SCIATICA: ICD-10-CM

## 2024-04-11 DIAGNOSIS — M62.81 MUSCLE WEAKNESS: Primary | ICD-10-CM

## 2024-04-11 PROCEDURE — 97140 MANUAL THERAPY 1/> REGIONS: CPT | Mod: PN | Performed by: PHYSICAL THERAPIST

## 2024-04-11 PROCEDURE — 97014 ELECTRIC STIMULATION THERAPY: CPT | Mod: PN | Performed by: PHYSICAL THERAPIST

## 2024-04-11 PROCEDURE — 97035 APP MDLTY 1+ULTRASOUND EA 15: CPT | Mod: PN | Performed by: PHYSICAL THERAPIST

## 2024-04-11 PROCEDURE — 97110 THERAPEUTIC EXERCISES: CPT | Mod: PN | Performed by: PHYSICAL THERAPIST

## 2024-04-24 NOTE — PROGRESS NOTES
[facility]   Physical Therapy Treatment Note     Name: Danuta Burgos [place]  Clinic Number: [id number]    Therapy Diagnosis:   Encounter Diagnoses   Name Primary?    Muscle weakness Yes    Chronic bilateral low back pain with right-sided sciatica      Physician: Yumiko Davis MD    Visit Date: 4/25/2024    Physician Orders: PT Eval and Treat   Medical Diagnosis from Referral: M43.06 (ICD-10-CM) - Lumbar spondylolysis   Evaluation Date: 4/5/2024  Authorization Period Expiration: 12-31-24  Plan of Care Expiration: 5-17-24  Progress Note Due: 5-5-23  Visit # / Visits authorized: 3/20  FOTO: Issued Visit #: 1     MD Follow up appointment: none scheduled      Precautions: Standard and sacralization of L5 R       PTA Visit #: 0/5       Time In: 4:35 pt late she thought appt was 4:30  Time Out: 5:20  Total Billable Time: 25 minutes + ES    SUBJECTIVE     Pt reports: feeling very very tight, does not recall any particular incident    Pt was compliant with home exercise program. Pt reports doing well with strengthening ex   Response to previous treatment: felt better   Functional change: less pain with movement     Pain: 4.5/10 improved to 3/10 after session  Location: right LB     OBJECTIVE     Pelvic positioning: AR   Mod-severe muscle tightness with min-mod tightness at end of session    Treatment     Danuta received the treatments listed below:      (NP)Therapeutic exercises were performed to improve ROM, strength, flexibility and stabilization for 0 minutes.     SL QL stretch to try at home with reach with leg and arms overhead with pillow at waist      HS stretch supine   Glut stretch  Piriformis stretch  B QL stretch     B Knee to chest   Contract relax R glut x 3       Pt able to self mobilize      Verbal review Pt is only performing 10 reps when she does ex    Pelvic tilt   x 10  Bridge x 10,   SLR x 10,   Trunk rotation supine x 10  Partial sit up x 10  Hip abd sidelying x 10  B,  Arm and Leg lift prone x 10, instructed pt to put pillow under stomach   Hip ext prone with bent knee x 10 instructed pt to put pillow under stomach  Sit to stand x 10  Instructed pt to hold press up for now and will note results  GSS standing x 10     Iron cross x 2 as needed  Plank x 30 sec x 1 Led to dysfunction so pt performed contract relax and realigned I   Contract relax R glut     Walking for exercise   Instructed pt to ice knee and hold walking for exercise for now       Manual therapy: Danuta  received the following manual therapy  techniques x 15  min. to include soft tissue and joint mobilization were applied to the: low back and gluteals to include:      Performed STM to LB paraspinals and QL R>L  Performed SB stretching of QL also with pillow at waist  P/A mob to lumbar region Grade 1-2 , MET to QL R SL L P/A and rot glides lumbar paraspinals  Sidelying L contract relax mobilization to L5-S1 region to level pelvis  at end of treatment and level pelvis achieved at beginning and end of session    Pt received tool-assisted massage with manual therapy techniques to B LB in prone to trigger an inflammatory healing response and stimulate the production of new collagen and proper, more functional, less painful healing.     (NP)Vacuum/cupping STM with manual therapy techniques was performed to back and gluteals to decrease muscle tightness, increase circulation and promote healing process.  The pt's skin was monitored for redness adjusting pressure as needed. The pt was instructed in possible side effects of bruising and/or soreness.       Ultrasound  for pain control and to decrease inflammation @ continuous duty cycle, 1 Mhz, applied to R lumbar paraspinals, intensity = 1.5 w/cm2 for 8 minutes. 2 min prep       Patient received pre-mod electrical stimulation to decrease muscle tightness and pain to Lumbar paraspinals/ sacral border of gluteals B for 10 minutes with MH supine with cycle time:  continuous, beat frequency: , CC/CV: CV.      Patient Education and Home Exercises     Home Exercises Provided and Patient Education Provided     Education provided:   - focus on strengthening   - HEP   - stretch to point of tightness not pain   - exercise in pain free zone       Written Home Exercises Provided: Patient instructed to cont prior HEP. Exercises were reviewed and Danuta was able to demonstrate them prior to the end of the session.  Danuta demonstrated good understanding of the education provided. See EMR under Patient Instructions for exercises provided during therapy sessions    ASSESSMENT   Pt with increased tightness and will be seeing massage therapist soon.  Pt has not been able to get into massage therapist or PT leading to increased tightness and difficulty with self mob  Pt scott treatment well with decreased muscle tightness and pain after Rx    Danuta Is progressing well towards her goals.   Pt prognosis is Good.     Pt will continue to benefit from skilled outpatient physical therapy to address the goals listed in the initial evaluation, provide pt/family education and to maximize pt's level of independence in the home and community environment.     Pt's spiritual, cultural and educational needs considered and pt agreeable to plan of care and goals.     Anticipated barriers to physical therapy: work schedule    GOALS:   Short Term Goals:  3 weeks  Increase range of motion 25%  Increase strength 1/2 muscle grade  Improve postural awareness of pelvis to independently identify dysfunction with min assist from PT  Be able to perform HEP with minimal cueing required     Long Term Goals: 6 weeks  Increase range of motion to 75% to 100% full   Improve muscle strength 1 muscle grade  Improve muscle strength with MMT to 4+/5 to 5/5  Improve and stabilize proper pelvic positioning  Restore ability to sit for work and ADL without increased pain   Restore ability to stand for work and ADL  with very min to 0 increased pain  Restore normal sleep habits with min to 0 disturbances due to pain  Restore ability to perform ADL's and household activities independently and with very min to 0 increased pain    PLAN   Continue with established plan of care towards PT goals.        Jacy Escobar, PT

## 2024-04-25 ENCOUNTER — CLINICAL SUPPORT (OUTPATIENT)
Dept: REHABILITATION | Facility: HOSPITAL | Age: 69
End: 2024-04-25
Payer: COMMERCIAL

## 2024-04-25 DIAGNOSIS — M54.41 CHRONIC BILATERAL LOW BACK PAIN WITH RIGHT-SIDED SCIATICA: ICD-10-CM

## 2024-04-25 DIAGNOSIS — G89.29 CHRONIC BILATERAL LOW BACK PAIN WITH RIGHT-SIDED SCIATICA: ICD-10-CM

## 2024-04-25 DIAGNOSIS — M62.81 MUSCLE WEAKNESS: Primary | ICD-10-CM

## 2024-04-25 PROCEDURE — 97014 ELECTRIC STIMULATION THERAPY: CPT | Mod: PN | Performed by: PHYSICAL THERAPIST

## 2024-04-25 PROCEDURE — 97035 APP MDLTY 1+ULTRASOUND EA 15: CPT | Mod: PN | Performed by: PHYSICAL THERAPIST

## 2024-04-25 PROCEDURE — 97140 MANUAL THERAPY 1/> REGIONS: CPT | Mod: PN | Performed by: PHYSICAL THERAPIST

## 2024-05-22 NOTE — PROGRESS NOTES
"[facility]   Physical Therapy Progress and Treatment Note     Name: Danuta Santos  Clinic Number: [id number]    Therapy Diagnosis:   Encounter Diagnoses   Name Primary?    Muscle weakness Yes    Chronic bilateral low back pain with right-sided sciatica      Physician: Yumiko Davis MD    Visit Date: 5/23/2024    Physician Orders: PT Eval and Treat   Medical Diagnosis from Referral: M43.06 (ICD-10-CM) - Lumbar spondylolysis   Evaluation Date: 4/5/2024  Authorization Period Expiration: 12-31-24  Plan of Care Expiration: 7-4-24  Progress Note Due: 6-23-23  Visit # / Visits authorized: 4/20  FOTO: Issued Visit #: 1     MD Follow up appointment: none scheduled      Precautions: Standard and sacralization of L5 R     PTA Visit #: 0/5     Time In: 3:20   Time Out: 4:30  Total Billable Time: 50 minutes + ES    SUBJECTIVE     Pt reports:overall she has been able to manage back pain by going to massage therapist 1 time a week.  Pt states very tight this week as end school Pt reports she is walking and doing some of ex   Pt was somewhat compliant with home exercise program.   Response to previous treatment: felt better   Functional change: less pain with movement     Pain: 4.5/10 improved to 3/10 after session  Location: right LB     OBJECTIVE     Mod-severe muscle tightness with min-mod tightness at end of session    Lumbar active range of motion in standing is: 5-23-24  - flexion - floor                     - extension -  50%                         - left side bending -  to knee         - right side bending -  to knee            Lumbar active range of motion in standing is: at re-eval  - flexion - floor                     - extension -  50%                         - left side bending -  2" above knee         - right side bending -  2" above knee           Pelvic positioning: AR R at re-eval AR R      Flexibility testing:  - hamstrings:     90/90 test same as re-eval R 5 L 0         "   - gastrocnemius:   DF ankle R 10 degrees L 10 degrees    Muscle Strength 5-23-24  MMT R L   Knee extension 5/5 5/5   Knee flexion 5/5 5/5   Hip flexion 4/5 4/5   Hip abduction 4+/5 4+/5   Hip extension 4+/5 4+/5   Glut max 4-/5 4-/5      Muscle Strength re- eval  MMT R L   Knee extension 5/5 5/5   Knee flexion 5/5 5/5   Hip flexion 4-/5 4-/5   Hip abduction 4+/5 4+/5   Hip extension 4+/5 4+/5   Glut max 3+/5 3+/5       Palpation: min-mod TTP and severe tightness R lumbar paraspinals     Joint mobility: severe decreased lumbar mobility     Treatment     Danuta received the treatments listed below:      Therapeutic exercises were performed to improve ROM, strength, flexibility and stabilization for 15 minutes.   Reassessment   SL QL stretch to try at home with reach with leg and arms overhead with pillow at waist      HS stretch supine   Glut stretch  Piriformis stretch  B QL stretch     B Knee to chest   Contract relax R glut x 3       Pt able to self mobilize      Verbal review Pt is only performing 10 reps when she does ex    Pelvic tilt   x 10  Bridge x 10,   SLR x 10,   Trunk rotation supine x 10  Partial sit up x 10  Hip abd sidelying x 10 B,  Arm and Leg lift prone x 10, instructed pt to put pillow under stomach   Hip ext prone with bent knee x 10 instructed pt to put pillow under stomach  Sit to stand x 10  Instructed pt to hold press up for now and will note results  GSS standing x 10     Iron cross x 2 as needed  Plank x 30 sec x 1 Led to dysfunction so pt performed contract relax and realigned I   Contract relax R glut     Walking for exercise   Instructed pt to ice knee and hold walking for exercise for now       Manual therapy: Danuta  received the following manual therapy  techniques x 25  min. to include soft tissue and joint mobilization were applied to the: low back and gluteals to include:      Performed STM to LB paraspinals and QL R>L  Performed SB stretching of QL also with pillow at waist   P/A mob to lumbar region Grade 1-2 , MET to QL R SL L P/A and rot glides lumbar paraspinals  Sidelying L contract relax mobilization to L5-S1 region to level pelvis  at end of treatment and level pelvis achieved at beginning and end of session    Pt received tool-assisted massage with manual therapy techniques to B LB in prone to trigger an inflammatory healing response and stimulate the production of new collagen and proper, more functional, less painful healing.    Vacuum/cupping STM with manual therapy techniques was performed to back and gluteals to decrease muscle tightness, increase circulation and promote healing process.  The pt's skin was monitored for redness adjusting pressure as needed. The pt was instructed in possible side effects of bruising and/or soreness.       Ultrasound  for pain control and to decrease inflammation @ continuous duty cycle, 1 Mhz, applied to R lumbar paraspinals, intensity = 1.5 w/cm2 for 8 minutes. 2 min prep       Patient received pre-mod electrical stimulation to decrease muscle tightness and pain to Lumbar paraspinals/ sacral border of gluteals B for 15 minutes with MH supine with cycle time: continuous, beat frequency: , CC/CV: CV.      Patient Education and Home Exercises     Home Exercises Provided and Patient Education Provided     Education provided:   - focus on strengthening   - HEP   - stretch to point of tightness not pain   - exercise in pain free zone       Written Home Exercises Provided: Patient instructed to cont prior HEP. Exercises were reviewed and Danuta was able to demonstrate them prior to the end of the session.  Danuta demonstrated good understanding of the education provided. See EMR under Patient Instructions for exercises provided during therapy sessions    ASSESSMENT   Patient demonstrates improvement in range of motion, strength, stabilization and function.    Pt has been seen for 3 sessions prior to today since re-eval on 4-5-24.  Pt  has also tried to manage symptoms with massage therapist, but felt recently increased tightness was not going away and required PT.  Pt continues to try and self mobilize and once school ends should be able to be more consistent with HEP     Danuta Is progressing well towards her goals.   Pt prognosis is Good.     Pt will continue to benefit from skilled outpatient physical therapy to address the goals listed in the initial evaluation, provide pt/family education and to maximize pt's level of independence in the home and community environment.     Pt's spiritual, cultural and educational needs considered and pt agreeable to plan of care and goals.     Anticipated barriers to physical therapy: work schedule    GOALS:   Short Term Goals:  3 weeks (Progressing, not met)  Increase range of motion 25%  Increase strength 1/2 muscle grade  Improve postural awareness of pelvis to independently identify dysfunction with min assist from PT  Be able to perform HEP with minimal cueing required     Long Term Goals: 6 weeks (Progressing, not met)  Increase range of motion to 75% to 100% full   Improve muscle strength 1 muscle grade  Improve muscle strength with MMT to 4+/5 to 5/5  Improve and stabilize proper pelvic positioning  Restore ability to sit for work and ADL without increased pain   Restore ability to stand for work and ADL with very min to 0 increased pain  Restore normal sleep habits with min to 0 disturbances due to pain  Restore ability to perform ADL's and household activities independently and with very min to 0 increased pain    PLAN   If you concur, I recommend patient continue with physical therapy 1-2 times a week as needed  for 6 weeks.  Please advise us of your  recommendations. Thank you for allowing us to assist in the care of your patient.      Jacy Escobar, PT, MSPT

## 2024-05-23 ENCOUNTER — CLINICAL SUPPORT (OUTPATIENT)
Dept: REHABILITATION | Facility: HOSPITAL | Age: 69
End: 2024-05-23
Payer: COMMERCIAL

## 2024-05-23 DIAGNOSIS — G89.29 CHRONIC BILATERAL LOW BACK PAIN WITH RIGHT-SIDED SCIATICA: ICD-10-CM

## 2024-05-23 DIAGNOSIS — M54.41 CHRONIC BILATERAL LOW BACK PAIN WITH RIGHT-SIDED SCIATICA: ICD-10-CM

## 2024-05-23 DIAGNOSIS — M62.81 MUSCLE WEAKNESS: Primary | ICD-10-CM

## 2024-05-23 PROCEDURE — 97035 APP MDLTY 1+ULTRASOUND EA 15: CPT | Mod: PN | Performed by: PHYSICAL THERAPIST

## 2024-05-23 PROCEDURE — 97140 MANUAL THERAPY 1/> REGIONS: CPT | Mod: PN | Performed by: PHYSICAL THERAPIST

## 2024-05-23 PROCEDURE — 97014 ELECTRIC STIMULATION THERAPY: CPT | Mod: PN | Performed by: PHYSICAL THERAPIST

## 2024-05-23 PROCEDURE — 97110 THERAPEUTIC EXERCISES: CPT | Mod: PN | Performed by: PHYSICAL THERAPIST

## 2024-05-23 NOTE — PLAN OF CARE
"[facility]   Physical Therapy Progress and Treatment Note     Name: Danuta Santos  Clinic Number: [id number]    Therapy Diagnosis:   Encounter Diagnoses   Name Primary?    Muscle weakness Yes    Chronic bilateral low back pain with right-sided sciatica      Physician: Yumiko Davis MD    Visit Date: 5/23/2024    Physician Orders: PT Eval and Treat   Medical Diagnosis from Referral: M43.06 (ICD-10-CM) - Lumbar spondylolysis   Evaluation Date: 4/5/2024  Authorization Period Expiration: 12-31-24  Plan of Care Expiration: 7-4-24  Progress Note Due: 6-23-23  Visit # / Visits authorized: 4/20  FOTO: Issued Visit #: 1     MD Follow up appointment: none scheduled      Precautions: Standard and sacralization of L5 R     PTA Visit #: 0/5     Time In: 3:20   Time Out: 4:30  Total Billable Time: 50 minutes + ES    SUBJECTIVE     Pt reports:overall she has been able to manage back pain by going to massage therapist 1 time a week.  Pt states very tight this week as end school Pt reports she is walking and doing some of ex   Pt was somewhat compliant with home exercise program.   Response to previous treatment: felt better   Functional change: less pain with movement     Pain: 4.5/10 improved to 3/10 after session  Location: right LB     OBJECTIVE     Mod-severe muscle tightness with min-mod tightness at end of session    Lumbar active range of motion in standing is: 5-23-24  - flexion - floor                     - extension -  50%                         - left side bending -  to knee         - right side bending -  to knee            Lumbar active range of motion in standing is: at re-eval  - flexion - floor                     - extension -  50%                         - left side bending -  2" above knee         - right side bending -  2" above knee           Pelvic positioning: AR R at re-eval AR R      Flexibility testing:  - hamstrings:     90/90 test same as re-eval R 5 L 0         "   - gastrocnemius:   DF ankle R 10 degrees L 10 degrees    Muscle Strength 5-23-24  MMT R L   Knee extension 5/5 5/5   Knee flexion 5/5 5/5   Hip flexion 4/5 4/5   Hip abduction 4+/5 4+/5   Hip extension 4+/5 4+/5   Glut max 4-/5 4-/5      Muscle Strength re- eval  MMT R L   Knee extension 5/5 5/5   Knee flexion 5/5 5/5   Hip flexion 4-/5 4-/5   Hip abduction 4+/5 4+/5   Hip extension 4+/5 4+/5   Glut max 3+/5 3+/5       Palpation: min-mod TTP and severe tightness R lumbar paraspinals     Joint mobility: severe decreased lumbar mobility     Treatment     Danuta received the treatments listed below:      Therapeutic exercises were performed to improve ROM, strength, flexibility and stabilization for 15 minutes.   Reassessment   SL QL stretch to try at home with reach with leg and arms overhead with pillow at waist      HS stretch supine   Glut stretch  Piriformis stretch  B QL stretch     B Knee to chest   Contract relax R glut x 3       Pt able to self mobilize      Verbal review Pt is only performing 10 reps when she does ex    Pelvic tilt   x 10  Bridge x 10,   SLR x 10,   Trunk rotation supine x 10  Partial sit up x 10  Hip abd sidelying x 10 B,  Arm and Leg lift prone x 10, instructed pt to put pillow under stomach   Hip ext prone with bent knee x 10 instructed pt to put pillow under stomach  Sit to stand x 10  Instructed pt to hold press up for now and will note results  GSS standing x 10     Iron cross x 2 as needed  Plank x 30 sec x 1 Led to dysfunction so pt performed contract relax and realigned I   Contract relax R glut     Walking for exercise   Instructed pt to ice knee and hold walking for exercise for now       Manual therapy: Danuta  received the following manual therapy  techniques x 25  min. to include soft tissue and joint mobilization were applied to the: low back and gluteals to include:      Performed STM to LB paraspinals and QL R>L  Performed SB stretching of QL also with pillow at waist   P/A mob to lumbar region Grade 1-2 , MET to QL R SL L P/A and rot glides lumbar paraspinals  Sidelying L contract relax mobilization to L5-S1 region to level pelvis  at end of treatment and level pelvis achieved at beginning and end of session    Pt received tool-assisted massage with manual therapy techniques to B LB in prone to trigger an inflammatory healing response and stimulate the production of new collagen and proper, more functional, less painful healing.    Vacuum/cupping STM with manual therapy techniques was performed to back and gluteals to decrease muscle tightness, increase circulation and promote healing process.  The pt's skin was monitored for redness adjusting pressure as needed. The pt was instructed in possible side effects of bruising and/or soreness.       Ultrasound  for pain control and to decrease inflammation @ continuous duty cycle, 1 Mhz, applied to R lumbar paraspinals, intensity = 1.5 w/cm2 for 8 minutes. 2 min prep       Patient received pre-mod electrical stimulation to decrease muscle tightness and pain to Lumbar paraspinals/ sacral border of gluteals B for 15 minutes with MH supine with cycle time: continuous, beat frequency: , CC/CV: CV.      Patient Education and Home Exercises     Home Exercises Provided and Patient Education Provided     Education provided:   - focus on strengthening   - HEP   - stretch to point of tightness not pain   - exercise in pain free zone       Written Home Exercises Provided: Patient instructed to cont prior HEP. Exercises were reviewed and Danuta was able to demonstrate them prior to the end of the session.  Danuta demonstrated good understanding of the education provided. See EMR under Patient Instructions for exercises provided during therapy sessions    ASSESSMENT   Patient demonstrates improvement in range of motion, strength, stabilization and function.    Pt has been seen for 3 sessions prior to today since re-eval on 4-5-24.  Pt  has also tried to manage symptoms with massage therapist, but felt recently increased tightness was not going away and required PT.  Pt continues to try and self mobilize and once school ends should be able to be more consistent with HEP     Danuta Is progressing well towards her goals.   Pt prognosis is Good.     Pt will continue to benefit from skilled outpatient physical therapy to address the goals listed in the initial evaluation, provide pt/family education and to maximize pt's level of independence in the home and community environment.     Pt's spiritual, cultural and educational needs considered and pt agreeable to plan of care and goals.     Anticipated barriers to physical therapy: work schedule    GOALS:   Short Term Goals:  3 weeks (Progressing, not met)  Increase range of motion 25%  Increase strength 1/2 muscle grade  Improve postural awareness of pelvis to independently identify dysfunction with min assist from PT  Be able to perform HEP with minimal cueing required     Long Term Goals: 6 weeks (Progressing, not met)  Increase range of motion to 75% to 100% full   Improve muscle strength 1 muscle grade  Improve muscle strength with MMT to 4+/5 to 5/5  Improve and stabilize proper pelvic positioning  Restore ability to sit for work and ADL without increased pain   Restore ability to stand for work and ADL with very min to 0 increased pain  Restore normal sleep habits with min to 0 disturbances due to pain  Restore ability to perform ADL's and household activities independently and with very min to 0 increased pain    PLAN   If you concur, I recommend patient continue with physical therapy 1-2 times a week as needed  for 6 weeks.  Please advise us of your  recommendations. Thank you for allowing us to assist in the care of your patient.      Jacy Escobar, PT, MSPT

## 2024-05-24 NOTE — PROGRESS NOTES
[facility]   Physical Therapy Treatment Note     Name: Danuta Burgos [place]  Clinic Number: [id number]    Therapy Diagnosis:   Encounter Diagnoses   Name Primary?    Muscle weakness Yes    Chronic bilateral low back pain with right-sided sciatica      Physician: Yumiko Davis MD    Visit Date: 5/28/2024    Physician Orders: PT Eval and Treat   Medical Diagnosis from Referral: M43.06 (ICD-10-CM) - Lumbar spondylolysis   Evaluation Date: 4/5/2024  Authorization Period Expiration: 12-31-24  Plan of Care Expiration: 7-4-24  Progress Note Due: 6-23-23  Visit # / Visits authorized: 5/20  FOTO: Issued Visit #: 1     MD Follow up appointment: none scheduled      Precautions: Standard and sacralization of L5 R     PTA Visit #: 0/5     Time In: 2:32  Time Out: 3:35  Total Billable Time:  43 minutes + ES    SUBJECTIVE     Pt reports:she rushed out this AM and though knew in dysfunction did not stop to correct and did not perform at school.     Pt was somewhat compliant with home exercise program.   Response to previous treatment: felt better   Functional change: less pain with movement     Pain: 4/10 improved to 2/10 after session  Location: right LB     OBJECTIVE     Mod-severe muscle tightness with min-mod tightness at end of session    Pelvic positioning AR R     Treatment     Danuta received the treatments listed below:      Therapeutic exercises were performed to improve ROM, strength, flexibility and stabilization for 10 minutes.      HS stretch supine   Glut stretch  Piriformis stretch  B QL stretch     B Knee to chest   Contract relax R glut x 3       Pt unable to self mobilize     Instructed further need to perform self mob technique at onset of symptoms due to increased tightness developing during day resulting in inability to self mobilize      Verbal review Pt is only performing 10 reps when she does ex    Pelvic tilt   x 10  Bridge x 10,   SLR x 10,   Trunk rotation supine x  10  Partial sit up x 10  Hip abd sidelying x 10 B,  Arm and Leg lift prone x 10, instructed pt to put pillow under stomach   Hip ext prone with bent knee x 10 instructed pt to put pillow under stomach  Sit to stand x 10  Instructed pt to hold press up for now and will note results  GSS standing x 10     Iron cross x 2 as needed  Plank x 30 sec x 1 Led to dysfunction so pt performed contract relax and realigned I   Contract relax R glut     Walking for exercise   Instructed pt to ice knee and hold walking for exercise for now       Manual therapy: Danuta  received the following manual therapy  techniques x 23  min. to include soft tissue and joint mobilization were applied to the: low back and gluteals to include:      Performed STM to LB paraspinals and QL R>L  Performed SB stretching of QL also with pillow at waist  P/A mob to lumbar region Grade 1-2 , MET to QL R SL L P/A and rot glides lumbar paraspinals  Sidelying L contract relax mobilization to L5-S1 region to level pelvis  at end of treatment and level pelvis achieved at beginning and end of session    Pt received tool-assisted massage with manual therapy techniques to B LB in prone to trigger an inflammatory healing response and stimulate the production of new collagen and proper, more functional, less painful healing.    Vacuum/cupping STM with manual therapy techniques was performed to back and gluteals to decrease muscle tightness, increase circulation and promote healing process.  The pt's skin was monitored for redness adjusting pressure as needed. The pt was instructed in possible side effects of bruising and/or soreness.       Ultrasound  for pain control and to decrease inflammation @ continuous duty cycle, 1 Mhz, applied to R lumbar paraspinals, intensity = 1.5 w/cm2 for 8 minutes. 2 min prep       Patient received pre-mod electrical stimulation to decrease muscle tightness and pain to Lumbar paraspinals/ sacral border of gluteals B for 15  minutes with MH supine with cycle time: continuous, beat frequency: , CC/CV: CV.      Patient Education and Home Exercises     Home Exercises Provided and Patient Education Provided     Education provided:   - focus on strengthening   - HEP   - stretch to point of tightness not pain   - exercise in pain free zone       Written Home Exercises Provided: Patient instructed to cont prior HEP. Exercises were reviewed and Danuta was able to demonstrate them prior to the end of the session.  Danuta demonstrated good understanding of the education provided. See EMR under Patient Instructions for exercises provided during therapy sessions    ASSESSMENT   Patient appears to understand increased awareness of symptoms and to perform self mob at onset of increased symptoms.  Pt with decreased muscle tightness and symptoms at end of session and level pelvis noted.  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     Danuta Is progressing well towards her goals.   Pt prognosis is Good.     Pt will continue to benefit from skilled outpatient physical therapy to address the goals listed in the initial evaluation, provide pt/family education and to maximize pt's level of independence in the home and community environment.     Pt's spiritual, cultural and educational needs considered and pt agreeable to plan of care and goals.     Anticipated barriers to physical therapy: work schedule    GOALS:   Short Term Goals:  3 weeks (Progressing, not met)  Increase range of motion 25%  Increase strength 1/2 muscle grade  Improve postural awareness of pelvis to independently identify dysfunction with min assist from PT  Be able to perform HEP with minimal cueing required     Long Term Goals: 6 weeks (Progressing, not met)  Increase range of motion to 75% to 100% full   Improve muscle strength 1 muscle grade  Improve muscle strength with MMT to 4+/5 to 5/5  Improve and stabilize proper pelvic  positioning  Restore ability to sit for work and ADL without increased pain   Restore ability to stand for work and ADL with very min to 0 increased pain  Restore normal sleep habits with min to 0 disturbances due to pain  Restore ability to perform ADL's and household activities independently and with very min to 0 increased pain    PLAN   Continue with established plan of care towards PT goals.        Jacy Esocbar, PT, MSPT

## 2024-05-28 ENCOUNTER — CLINICAL SUPPORT (OUTPATIENT)
Dept: REHABILITATION | Facility: HOSPITAL | Age: 69
End: 2024-05-28
Payer: COMMERCIAL

## 2024-05-28 DIAGNOSIS — M62.81 MUSCLE WEAKNESS: Primary | ICD-10-CM

## 2024-05-28 DIAGNOSIS — G89.29 CHRONIC BILATERAL LOW BACK PAIN WITH RIGHT-SIDED SCIATICA: ICD-10-CM

## 2024-05-28 DIAGNOSIS — M54.41 CHRONIC BILATERAL LOW BACK PAIN WITH RIGHT-SIDED SCIATICA: ICD-10-CM

## 2024-05-28 PROCEDURE — 97140 MANUAL THERAPY 1/> REGIONS: CPT | Mod: PN | Performed by: PHYSICAL THERAPIST

## 2024-05-28 PROCEDURE — 97110 THERAPEUTIC EXERCISES: CPT | Mod: PN | Performed by: PHYSICAL THERAPIST

## 2024-05-28 PROCEDURE — 97014 ELECTRIC STIMULATION THERAPY: CPT | Mod: PN | Performed by: PHYSICAL THERAPIST

## 2024-05-28 PROCEDURE — 97035 APP MDLTY 1+ULTRASOUND EA 15: CPT | Mod: PN | Performed by: PHYSICAL THERAPIST

## 2024-06-11 NOTE — PROGRESS NOTES
[facility]   Physical Therapy Treatment Note     Name: Danuta Burgos [place]  Clinic Number: [id number]    Therapy Diagnosis:   Encounter Diagnoses   Name Primary?    Muscle weakness Yes    Chronic bilateral low back pain with right-sided sciatica      Physician: Yumiko Davis MD    Visit Date: 6/12/2024    Physician Orders: PT Eval and Treat   Medical Diagnosis from Referral: M43.06 (ICD-10-CM) - Lumbar spondylolysis   Evaluation Date: 4/5/2024  Authorization Period Expiration: 12-31-24  Plan of Care Expiration: 7-4-24  Progress Note Due: 6-23-23  Visit # / Visits authorized: 6/20  FOTO: Issued Visit #: 1     MD Follow up appointment: none scheduled      Precautions: Standard and sacralization of L5 R     PTA Visit #: 0/5     Time In: 11:15  Time Out: 12:15  Total Billable Time:  40 minutes + ES    SUBJECTIVE     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   Response to previous treatment: felt better   Functional change: less pain with movement     Pain: 3.5/10 improved to 1/10 after session  Location: right LB     OBJECTIVE     Mod-severe muscle tightness with min-mod tightness at end of session    Pelvic positioning AR R     Treatment     Danuta received the treatments listed below:      Therapeutic exercises were performed to improve ROM, strength, flexibility and stabilization for 5 minutes.      HS stretch supine   Glut stretch  Piriformis stretch  B QL stretch     B Knee to chest   Contract relax R glut x 3       Pt unable to self mobilize     Instructed further need to perform self mob technique at onset of symptoms due to increased tightness developing during day resulting in inability to self mobilize      Verbal review Pt is only performing 10 reps when she does ex    Pelvic tilt   x 10  Bridge x 10,   SLR x 10,   Trunk rotation supine x  10  Partial sit up x 10  Hip abd sidelying x 10 B,  Arm and Leg lift prone x 10, instructed pt to put pillow under stomach   Hip ext prone with bent knee x 10 instructed pt to put pillow under stomach  Sit to stand x 10  Instructed pt to hold press up for now and will note results  GSS standing x 10     Iron cross x 2 as needed  Plank x 30 sec x 1 Led to dysfunction so pt performed contract relax and realigned I   Contract relax R glut     Walking for exercise   Instructed pt to ice knee and hold walking for exercise for now       Manual therapy: Danuta  received the following manual therapy  techniques x 25  min. to include soft tissue and joint mobilization were applied to the: low back and gluteals to include:      Performed STM to LB paraspinals and QL R>L  Performed SB stretching of QL also with pillow at waist  P/A mob to lumbar region Grade 1-2 , MET to QL R SL L P/A and rot glides lumbar paraspinals  Sidelying L contract relax mobilization to L5-S1 region to level pelvis  at end of treatment and level pelvis achieved at beginning and end of session    Pt received tool-assisted massage with manual therapy techniques to B LB in prone to trigger an inflammatory healing response and stimulate the production of new collagen and proper, more functional, less painful healing.    Vacuum/cupping STM with manual therapy techniques was performed to back and gluteals to decrease muscle tightness, increase circulation and promote healing process.  The pt's skin was monitored for redness adjusting pressure as needed. The pt was instructed in possible side effects of bruising and/or soreness.       Ultrasound  for pain control and to decrease inflammation @ continuous duty cycle, 1 Mhz, applied to R lumbar paraspinals, intensity = 1.5 w/cm2 for 8 minutes. 2 min prep       Patient received pre-mod electrical stimulation to decrease muscle tightness and pain to Lumbar paraspinals/ sacral border of gluteals B for 15  minutes with MH supine with cycle time: continuous, beat frequency: , CC/CV: CV.      Patient Education and Home Exercises     Home Exercises Provided and Patient Education Provided     Education provided:   - focus on strengthening   - HEP   - stretch to point of tightness not pain   - exercise in pain free zone       Written Home Exercises Provided: Patient instructed to cont prior HEP. Exercises were reviewed and Danuta was able to demonstrate them prior to the end of the session.  Danuta demonstrated good understanding of the education provided. 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 Pt unable to self mobilize but understands to continue to focus on stretching and changing positions as often as she can.  Pt with decreased symptoms at end of session and decreased tightness and level pelvis with SL mob    aDnuta Is progressing well towards her goals.   Pt prognosis is Good.     Pt will continue to benefit from skilled outpatient physical therapy to address the goals listed in the initial evaluation, provide pt/family education and to maximize pt's level of independence in the home and community environment.     Pt's spiritual, cultural and educational needs considered and pt agreeable to plan of care and goals.     Anticipated barriers to physical therapy: work schedule    GOALS:   Short Term Goals:  3 weeks (Progressing, not met)  Increase range of motion 25%  Increase strength 1/2 muscle grade  Improve postural awareness of pelvis to independently identify dysfunction with min assist from PT  Be able to perform HEP with minimal cueing required     Long Term Goals: 6 weeks (Progressing, not met)  Increase range of motion to 75% to 100% full   Improve muscle strength 1 muscle grade  Improve muscle strength with MMT to 4+/5 to 5/5  Improve and  stabilize proper pelvic positioning  Restore ability to sit for work and ADL without increased pain   Restore ability to stand for work and ADL with very min to 0 increased pain  Restore normal sleep habits with min to 0 disturbances due to pain  Restore ability to perform ADL's and household activities independently and with very min to 0 increased pain    PLAN   Continue with established plan of care towards PT goals.        Jacy Escobar, PT, MSPT

## 2024-06-12 ENCOUNTER — PATIENT MESSAGE (OUTPATIENT)
Dept: INTERNAL MEDICINE | Facility: CLINIC | Age: 69
End: 2024-06-12
Payer: COMMERCIAL

## 2024-06-12 ENCOUNTER — CLINICAL SUPPORT (OUTPATIENT)
Dept: REHABILITATION | Facility: HOSPITAL | Age: 69
End: 2024-06-12
Payer: COMMERCIAL

## 2024-06-12 DIAGNOSIS — G89.29 CHRONIC BILATERAL LOW BACK PAIN WITH RIGHT-SIDED SCIATICA: ICD-10-CM

## 2024-06-12 DIAGNOSIS — M62.81 MUSCLE WEAKNESS: Primary | ICD-10-CM

## 2024-06-12 DIAGNOSIS — M54.41 CHRONIC BILATERAL LOW BACK PAIN WITH RIGHT-SIDED SCIATICA: ICD-10-CM

## 2024-06-12 PROCEDURE — 97014 ELECTRIC STIMULATION THERAPY: CPT | Mod: PN | Performed by: PHYSICAL THERAPIST

## 2024-06-12 PROCEDURE — 97140 MANUAL THERAPY 1/> REGIONS: CPT | Mod: PN | Performed by: PHYSICAL THERAPIST

## 2024-06-12 PROCEDURE — 97035 APP MDLTY 1+ULTRASOUND EA 15: CPT | Mod: PN | Performed by: PHYSICAL THERAPIST

## 2024-07-05 NOTE — PROGRESS NOTES
"[facility]   Physical Therapy Progress and  Treatment Note     Name: Danuta Santos  Clinic Number: [id number]    Therapy Diagnosis:   Encounter Diagnoses   Name Primary?    Muscle weakness Yes    Chronic bilateral low back pain with right-sided sciatica      Physician: Yumiko Davis MD    Visit Date: 7/8/2024    Physician Orders: PT Eval and Treat   Medical Diagnosis from Referral: M43.06 (ICD-10-CM) - Lumbar spondylolysis   Evaluation Date: 4/5/2024  Authorization Period Expiration: 12-31-24  Plan of Care Expiration: 9-2-24  Progress Note Due: 8-8-23  Visit # / Visits authorized: 7/20  FOTO: Issued Visit #: 1     MD Follow up appointment: none scheduled      Precautions: Standard and sacralization of L5 R     PTA Visit #: 0/5     Time In: 11:23  Time Out: 12:30  Total Billable Time:  47 minutes + ES    SUBJECTIVE     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   Response to previous treatment: felt better   Functional change: less pain with movement     Pain: 4/10 improved to 1/10 after session with no leg symptoms after Rx  Location: right LB with R LE to mid calf    OBJECTIVE     Mod-muscle tightness with min tightness at end of session    Lumbar active range of motion in standing is: 7-8-24  - flexion - floor                     - extension -  75%                         - left side bending -  to knee         - right side bending -  to knee              Lumbar active range of motion in standing is: at re-eval  - flexion - floor                     - extension -  50%                         - left side bending -  2" above knee         - right side bending -  2" above knee           Pelvic positioning: level at re-eval AR R      Flexibility testing:  - hamstrings:     90/90 test same as re-eval R 5 L 0           - gastrocnemius:   DF ankle R 10 degrees " L 10 degrees     Muscle Strength 7-8-24  MMT R L   Knee extension 5/5 5/5   Knee flexion 5/5 5/5   Hip flexion 4+/5 4/5   Hip abduction 5-/5 4+/5   Hip extension 5-/5 5-/5   Glut max 4-/5 4/5      Muscle Strength re- eval  MMT R L   Knee extension 5/5 5/5   Knee flexion 5/5 5/5   Hip flexion 4-/5 4-/5   Hip abduction 4+/5 4+/5   Hip extension 4+/5 4+/5   Glut max 3+/5 3+/5       Palpation: min-mod TTP and severe tightness R lumbar paraspinals     Joint mobility: severe decreased lumbar mobility      Treatment     Danuta received the treatments listed below:      Therapeutic exercises were performed to improve ROM, strength, flexibility and stabilization for 15 minutes.    Reassessment   Child's pose x 5   Prone press up x 10  Instructed pt to continue with self mob and strengthening   HS stretch supine   Glut stretch  Piriformis stretch  B QL stretch     B Knee to chest   Contract relax R glut x 3       Pt unable to self mobilize     Instructed further need to perform self mob technique at onset of symptoms due to increased tightness developing during day resulting in inability to self mobilize      Verbal review Pt is only performing 10 reps when she does ex    Pelvic tilt   x 10  Bridge x 10,   SLR x 10,   Trunk rotation supine x 10  Partial sit up x 10  Hip abd sidelying x 10 B,  Arm and Leg lift prone x 10, instructed pt to put pillow under stomach   Hip ext prone with bent knee x 10 instructed pt to put pillow under stomach  Sit to stand x 10  Instructed pt to hold press up for now and will note results  GSS standing x 10     Iron cross x 2 as needed  Plank x 30 sec x 1 Led to dysfunction so pt performed contract relax and realigned I   Contract relax R glut     Walking for exercise   Instructed pt to ice knee and hold walking for exercise for now       Manual therapy: Danuta  received the following manual therapy  techniques x 20  min. to include soft tissue and joint mobilization were applied to the:  low back and gluteals to include:      Performed STM to LB paraspinals and QL R>L  Performed SB stretching of QL also with pillow at waist  P/A mob to lumbar region Grade 1-2 , MET to QL R SL L P/A and rot glides lumbar paraspinals  Sidelying L contract relax mobilization to L5-S1 region to level pelvis  at end of treatment and level pelvis achieved at beginning and end of session    Pt received tool-assisted massage with manual therapy techniques to B LB in prone to trigger an inflammatory healing response and stimulate the production of new collagen and proper, more functional, less painful healing.    Vacuum/cupping STM with manual therapy techniques was performed to back and gluteals to decrease muscle tightness, increase circulation and promote healing process.  The pt's skin was monitored for redness adjusting pressure as needed. The pt was instructed in possible side effects of bruising and/or soreness.       Ultrasound  for pain control and to decrease inflammation @ continuous duty cycle, 1 Mhz, applied to R lumbar paraspinals, intensity = 1.5 w/cm2 for 8 minutes. 2 min prep       Patient received pre-mod electrical stimulation to decrease muscle tightness and pain to Lumbar paraspinals/ sacral border of gluteals B for 15 minutes with MH supine with cycle time: continuous, beat frequency: , CC/CV: CV.      Patient Education and Home Exercises     Home Exercises Provided and Patient Education Provided     Education provided:   - focus on strengthening   - HEP   - stretch to point of tightness not pain   - exercise in pain free zone       Written Home Exercises Provided: Patient instructed to cont prior HEP. Exercises were reviewed and Danuta was able to demonstrate them prior to the end of the session.  Danuta demonstrated good understanding of the education provided. See EMR under Patient Instructions for exercises provided during therapy sessions    ASSESSMENT   Pt seen for 7 visits since  re-evaluation on 4-5-24.  Pt with level pelvis to start after returning from vacation and had done increased sitting and riding in car which may have led to increased leg symptoms since symptoms appeared to respond well to extension principles. Pt appears to understand to resume extension principles since level pelvis did not alleviate radicular symptoms as it has in the past and responded to extension principles.      Danuta Is progressing well towards her goals.   Pt prognosis is Good.     Pt will continue to benefit from skilled outpatient physical therapy to address the goals listed in the initial evaluation, provide pt/family education and to maximize pt's level of independence in the home and community environment.     Pt's spiritual, cultural and educational needs considered and pt agreeable to plan of care and goals.     Anticipated barriers to physical therapy: work schedule    GOALS:   Short Term Goals:  3 weeks (Progressing, not met)  Increase range of motion 25%  Increase strength 1/2 muscle grade  Improve postural awareness of pelvis to independently identify dysfunction with min assist from PT  Be able to perform HEP with minimal cueing required     Long Term Goals: 6 weeks (Progressing, not met)  Increase range of motion to 75% to 100% full   Improve muscle strength 1 muscle grade  Improve muscle strength with MMT to 4+/5 to 5/5  Improve and stabilize proper pelvic positioning  Restore ability to sit for work and ADL without increased pain   Restore ability to stand for work and ADL with very min to 0 increased pain  Restore normal sleep habits with min to 0 disturbances due to pain  Restore ability to perform ADL's and household activities independently and with very min to 0 increased pain    PLAN   If you concur, I recommend patient continue with physical therapy 1-2 times a week for 8 weeks as needed   Please advise us of your findings and recommendations. Thank you for allowing us to assist in  the care of your patient.          Jacy Escobar, PT, MSPT

## 2024-07-08 ENCOUNTER — CLINICAL SUPPORT (OUTPATIENT)
Dept: REHABILITATION | Facility: HOSPITAL | Age: 69
End: 2024-07-08
Payer: COMMERCIAL

## 2024-07-08 DIAGNOSIS — M62.81 MUSCLE WEAKNESS: Primary | ICD-10-CM

## 2024-07-08 DIAGNOSIS — G89.29 CHRONIC BILATERAL LOW BACK PAIN WITH RIGHT-SIDED SCIATICA: ICD-10-CM

## 2024-07-08 DIAGNOSIS — M54.41 CHRONIC BILATERAL LOW BACK PAIN WITH RIGHT-SIDED SCIATICA: ICD-10-CM

## 2024-07-08 PROCEDURE — 97140 MANUAL THERAPY 1/> REGIONS: CPT | Mod: PN | Performed by: PHYSICAL THERAPIST

## 2024-07-08 PROCEDURE — 97014 ELECTRIC STIMULATION THERAPY: CPT | Mod: PN | Performed by: PHYSICAL THERAPIST

## 2024-07-08 PROCEDURE — 97035 APP MDLTY 1+ULTRASOUND EA 15: CPT | Mod: PN | Performed by: PHYSICAL THERAPIST

## 2024-07-08 PROCEDURE — 97110 THERAPEUTIC EXERCISES: CPT | Mod: PN | Performed by: PHYSICAL THERAPIST

## 2024-07-08 NOTE — PLAN OF CARE
"[facility]   Physical Therapy Progress and  Treatment Note     Name: Danuta Santos  Clinic Number: [id number]    Therapy Diagnosis:   Encounter Diagnoses   Name Primary?    Muscle weakness Yes    Chronic bilateral low back pain with right-sided sciatica      Physician: Yumiko Davis MD    Visit Date: 7/8/2024    Physician Orders: PT Eval and Treat   Medical Diagnosis from Referral: M43.06 (ICD-10-CM) - Lumbar spondylolysis   Evaluation Date: 4/5/2024  Authorization Period Expiration: 12-31-24  Plan of Care Expiration: 9-2-24  Progress Note Due: 8-8-23  Visit # / Visits authorized: 7/20  FOTO: Issued Visit #: 1     MD Follow up appointment: none scheduled      Precautions: Standard and sacralization of L5 R     PTA Visit #: 0/5     Time In: 11:23  Time Out: 12:30  Total Billable Time:  47 minutes + ES    SUBJECTIVE     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   Response to previous treatment: felt better   Functional change: less pain with movement     Pain: 4/10 improved to 1/10 after session with no leg symptoms after Rx  Location: right LB with R LE to mid calf    OBJECTIVE     Mod-muscle tightness with min tightness at end of session    Lumbar active range of motion in standing is: 7-8-24  - flexion - floor                     - extension -  75%                         - left side bending -  to knee         - right side bending -  to knee              Lumbar active range of motion in standing is: at re-eval  - flexion - floor                     - extension -  50%                         - left side bending -  2" above knee         - right side bending -  2" above knee           Pelvic positioning: level at re-eval AR R      Flexibility testing:  - hamstrings:     90/90 test same as re-eval R 5 L 0           - gastrocnemius:   DF ankle R 10 degrees " L 10 degrees     Muscle Strength 7-8-24  MMT R L   Knee extension 5/5 5/5   Knee flexion 5/5 5/5   Hip flexion 4+/5 4/5   Hip abduction 5-/5 4+/5   Hip extension 5-/5 5-/5   Glut max 4-/5 4/5      Muscle Strength re- eval  MMT R L   Knee extension 5/5 5/5   Knee flexion 5/5 5/5   Hip flexion 4-/5 4-/5   Hip abduction 4+/5 4+/5   Hip extension 4+/5 4+/5   Glut max 3+/5 3+/5       Palpation: min-mod TTP and severe tightness R lumbar paraspinals     Joint mobility: severe decreased lumbar mobility      Treatment     Danuta received the treatments listed below:      Therapeutic exercises were performed to improve ROM, strength, flexibility and stabilization for 15 minutes.    Reassessment   Child's pose x 5   Prone press up x 10  Instructed pt to continue with self mob and strengthening   HS stretch supine   Glut stretch  Piriformis stretch  B QL stretch     B Knee to chest   Contract relax R glut x 3       Pt unable to self mobilize     Instructed further need to perform self mob technique at onset of symptoms due to increased tightness developing during day resulting in inability to self mobilize      Verbal review Pt is only performing 10 reps when she does ex    Pelvic tilt   x 10  Bridge x 10,   SLR x 10,   Trunk rotation supine x 10  Partial sit up x 10  Hip abd sidelying x 10 B,  Arm and Leg lift prone x 10, instructed pt to put pillow under stomach   Hip ext prone with bent knee x 10 instructed pt to put pillow under stomach  Sit to stand x 10  Instructed pt to hold press up for now and will note results  GSS standing x 10     Iron cross x 2 as needed  Plank x 30 sec x 1 Led to dysfunction so pt performed contract relax and realigned I   Contract relax R glut     Walking for exercise   Instructed pt to ice knee and hold walking for exercise for now       Manual therapy: Danuta  received the following manual therapy  techniques x 20  min. to include soft tissue and joint mobilization were applied to the:  low back and gluteals to include:      Performed STM to LB paraspinals and QL R>L  Performed SB stretching of QL also with pillow at waist  P/A mob to lumbar region Grade 1-2 , MET to QL R SL L P/A and rot glides lumbar paraspinals  Sidelying L contract relax mobilization to L5-S1 region to level pelvis  at end of treatment and level pelvis achieved at beginning and end of session    Pt received tool-assisted massage with manual therapy techniques to B LB in prone to trigger an inflammatory healing response and stimulate the production of new collagen and proper, more functional, less painful healing.    Vacuum/cupping STM with manual therapy techniques was performed to back and gluteals to decrease muscle tightness, increase circulation and promote healing process.  The pt's skin was monitored for redness adjusting pressure as needed. The pt was instructed in possible side effects of bruising and/or soreness.       Ultrasound  for pain control and to decrease inflammation @ continuous duty cycle, 1 Mhz, applied to R lumbar paraspinals, intensity = 1.5 w/cm2 for 8 minutes. 2 min prep       Patient received pre-mod electrical stimulation to decrease muscle tightness and pain to Lumbar paraspinals/ sacral border of gluteals B for 15 minutes with MH supine with cycle time: continuous, beat frequency: , CC/CV: CV.      Patient Education and Home Exercises     Home Exercises Provided and Patient Education Provided     Education provided:   - focus on strengthening   - HEP   - stretch to point of tightness not pain   - exercise in pain free zone       Written Home Exercises Provided: Patient instructed to cont prior HEP. Exercises were reviewed and Danuta was able to demonstrate them prior to the end of the session.  Danuta demonstrated good understanding of the education provided. See EMR under Patient Instructions for exercises provided during therapy sessions    ASSESSMENT   Pt seen for 7 visits since  re-evaluation on 4-5-24.  Pt with level pelvis to start after returning from vacation and had done increased sitting and riding in car which may have led to increased leg symptoms since symptoms appeared to respond well to extension principles. Pt appears to understand to resume extension principles since level pelvis did not alleviate radicular symptoms as it has in the past and responded to extension principles.      Danuta Is progressing well towards her goals.   Pt prognosis is Good.     Pt will continue to benefit from skilled outpatient physical therapy to address the goals listed in the initial evaluation, provide pt/family education and to maximize pt's level of independence in the home and community environment.     Pt's spiritual, cultural and educational needs considered and pt agreeable to plan of care and goals.     Anticipated barriers to physical therapy: work schedule    GOALS:   Short Term Goals:  3 weeks (Progressing, not met)  Increase range of motion 25%  Increase strength 1/2 muscle grade  Improve postural awareness of pelvis to independently identify dysfunction with min assist from PT  Be able to perform HEP with minimal cueing required     Long Term Goals: 6 weeks (Progressing, not met)  Increase range of motion to 75% to 100% full   Improve muscle strength 1 muscle grade  Improve muscle strength with MMT to 4+/5 to 5/5  Improve and stabilize proper pelvic positioning  Restore ability to sit for work and ADL without increased pain   Restore ability to stand for work and ADL with very min to 0 increased pain  Restore normal sleep habits with min to 0 disturbances due to pain  Restore ability to perform ADL's and household activities independently and with very min to 0 increased pain    PLAN   If you concur, I recommend patient continue with physical therapy 1-2 times a week for 8 weeks as needed   Please advise us of your findings and recommendations. Thank you for allowing us to assist in  the care of your patient.          Jacy Escobar, PT, MSPT

## 2024-07-09 ENCOUNTER — PATIENT MESSAGE (OUTPATIENT)
Dept: ADMINISTRATIVE | Facility: HOSPITAL | Age: 69
End: 2024-07-09
Payer: COMMERCIAL

## 2024-07-12 DIAGNOSIS — Z78.0 ASYMPTOMATIC MENOPAUSE: ICD-10-CM

## 2024-07-12 DIAGNOSIS — M85.80 OSTEOPENIA, UNSPECIFIED LOCATION: Primary | ICD-10-CM

## 2024-07-22 NOTE — PROGRESS NOTES
[facility]   Physical Therapy Progress and  Treatment Note     Name: Danuta [place]  Clinic Number: [id number]    Therapy Diagnosis:   Encounter Diagnoses   Name Primary?    Muscle weakness Yes    Chronic bilateral low back pain with right-sided sciatica      Physician: Yumiko Dvais MD    Visit Date: 7/23/2024    Physician Orders: PT Eval and Treat   Medical Diagnosis from Referral: M43.06 (ICD-10-CM) - Lumbar spondylolysis   Evaluation Date: 4/5/2024  Authorization Period Expiration: 12-31-24  Plan of Care Expiration: 9-2-24  Progress Note Due: 8-8-23  Visit # / Visits authorized: 8/20  FOTO: Issued Visit #: 1     MD Follow up appointment: none scheduled      Precautions: Standard and sacralization of L5 R     PTA Visit #: 0/5     Time In: 10:32  Time Out: 11:55  Total Billable Time:  45 minutes + ES    SUBJECTIVE     Pt reports: she had to run in airport yesterday to make connection but was able to perform.  After running felt increased pain Pt states back feels some increased pain today Pt did not do ex last night too tired and just took shower and went to bed   Pt was somewhat compliant with home exercise program.   Response to previous treatment: felt better   Functional change: less pain with movement     Pain: 5/10 improved to 0/10 after session with no leg symptoms after Rx  Location: right LB with R LE to mid calf    OBJECTIVE     Mod-muscle tightness with min tightness at end of session    Pelvic positioning: AR R at re-eval AR R      Treatment     Danuta received the treatments listed below:      Therapeutic exercises were performed to improve ROM, strength, flexibility and stabilization for 15 minutes.     Child's pose x 5  Today press up led to increased pain so hold when      HS stretch supine   Glut stretch  Piriformis stretch  B QL stretch  Prone press up x 10 as tolerated        B Knee to chest   Contract relax R glut x 3       Pt unable to self mobilize      Instructed further need to perform self mob technique at onset of symptoms due to increased tightness developing during day resulting in inability to self mobilize      Verbal review Pt is only performing 10 reps when she does ex    Pelvic tilt   x 10  Bridge x 10,   SLR x 10,   Trunk rotation supine x 10  Partial sit up x 10  Hip abd sidelying x 10 B,  Arm and Leg lift prone x 10, instructed pt to put pillow under stomach   Hip ext prone with bent knee x 10 instructed pt to put pillow under stomach  Sit to stand x 10  Instructed pt to hold press up for now and will note results  GSS standing x 10     Iron cross x 2 as needed  Plank x 30 sec x 1 Led to dysfunction so pt performed contract relax and realigned I   Contract relax R glut     Walking for exercise   Instructed pt to ice knee and hold walking for exercise for now       Manual therapy: Danuta  received the following manual therapy  techniques x 20  min. to include soft tissue and joint mobilization were applied to the: low back and gluteals to include:      Performed STM to LB paraspinals and QL R>L  Performed SB stretching of QL also with pillow at waist  P/A mob to lumbar region Grade 1-2 , MET to QL R SL L P/A and rot glides lumbar paraspinals  Sidelying L contract relax mobilization to L5-S1 region to level pelvis  at end of treatment and level pelvis achieved at beginning and end of session    Pt received tool-assisted massage with manual therapy techniques to B LB in prone to trigger an inflammatory healing response and stimulate the production of new collagen and proper, more functional, less painful healing.    Vacuum/cupping STM with manual therapy techniques was performed to back and gluteals to decrease muscle tightness, increase circulation and promote healing process.  The pt's skin was monitored for redness adjusting pressure as needed. The pt was instructed in possible side effects of bruising and/or soreness.       Ultrasound  for  pain control and to decrease inflammation @ continuous duty cycle, 1 Mhz, applied to R lumbar paraspinals, intensity = 1.5 w/cm2 for 8 minutes. 2 min prep       Patient received pre-mod electrical stimulation to decrease muscle tightness and pain to Lumbar paraspinals/ sacral border of gluteals B for 15 minutes with MH supine with cycle time: continuous, beat frequency: , CC/CV: CV.      Patient Education and Home Exercises     Home Exercises Provided and Patient Education Provided     Education provided:   - focus on strengthening   - HEP   - stretch to point of tightness not pain   - exercise in pain free zone       Written Home Exercises Provided: Patient instructed to cont prior HEP. Exercises were reviewed and Danuta was able to demonstrate them prior to the end of the session.  Danuta demonstrated good understanding of the education provided. See EMR under Patient Instructions for exercises provided during therapy sessions    ASSESSMENT   Pt returning from trip and feeling increased tightness and pain.  Pt with improved symptoms at end of session  Pt with not a + response to press up today and will continue to monitor and utilize as needed Pt to continue with current stretches and work on self mob as she gets ready for return to school     Danuta Is progressing well towards her goals.   Pt prognosis is Good.     Pt will continue to benefit from skilled outpatient physical therapy to address the goals listed in the initial evaluation, provide pt/family education and to maximize pt's level of independence in the home and community environment.     Pt's spiritual, cultural and educational needs considered and pt agreeable to plan of care and goals.     Anticipated barriers to physical therapy: work schedule    GOALS:   Short Term Goals:  3 weeks (Progressing, not met)  Increase range of motion 25%  Increase strength 1/2 muscle grade  Improve postural awareness of pelvis to independently identify  dysfunction with min assist from PT  Be able to perform HEP with minimal cueing required     Long Term Goals: 6 weeks (Progressing, not met)  Increase range of motion to 75% to 100% full   Improve muscle strength 1 muscle grade  Improve muscle strength with MMT to 4+/5 to 5/5  Improve and stabilize proper pelvic positioning  Restore ability to sit for work and ADL without increased pain   Restore ability to stand for work and ADL with very min to 0 increased pain  Restore normal sleep habits with min to 0 disturbances due to pain  Restore ability to perform ADL's and household activities independently and with very min to 0 increased pain    PLAN   Continue with established plan of care towards PT goals.              Jacy Escobar, PT, MSPT

## 2024-07-23 ENCOUNTER — CLINICAL SUPPORT (OUTPATIENT)
Dept: REHABILITATION | Facility: HOSPITAL | Age: 69
End: 2024-07-23
Payer: COMMERCIAL

## 2024-07-23 DIAGNOSIS — M54.41 CHRONIC BILATERAL LOW BACK PAIN WITH RIGHT-SIDED SCIATICA: ICD-10-CM

## 2024-07-23 DIAGNOSIS — G89.29 CHRONIC BILATERAL LOW BACK PAIN WITH RIGHT-SIDED SCIATICA: ICD-10-CM

## 2024-07-23 DIAGNOSIS — M62.81 MUSCLE WEAKNESS: Primary | ICD-10-CM

## 2024-07-23 PROCEDURE — 97014 ELECTRIC STIMULATION THERAPY: CPT | Mod: PN | Performed by: PHYSICAL THERAPIST

## 2024-07-23 PROCEDURE — 97035 APP MDLTY 1+ULTRASOUND EA 15: CPT | Mod: PN | Performed by: PHYSICAL THERAPIST

## 2024-07-23 PROCEDURE — 97110 THERAPEUTIC EXERCISES: CPT | Mod: PN | Performed by: PHYSICAL THERAPIST

## 2024-07-23 PROCEDURE — 97140 MANUAL THERAPY 1/> REGIONS: CPT | Mod: PN | Performed by: PHYSICAL THERAPIST

## 2024-07-25 NOTE — PROGRESS NOTES
[facility]   Physical Therapy Treatment Note     Name: Danuta Santos  Clinic Number: [id number]    Therapy Diagnosis:   Encounter Diagnoses   Name Primary?    Muscle weakness Yes    Chronic bilateral low back pain with right-sided sciatica      Physician: Yumiko Davis MD    Visit Date: 7/31/2024    Physician Orders: PT Eval and Treat   Medical Diagnosis from Referral: M43.06 (ICD-10-CM) - Lumbar spondylolysis   Evaluation Date: 4/5/2024  Authorization Period Expiration: 12-31-24  Plan of Care Expiration: 9-2-24  Progress Note Due: 8-8-23  Visit # / Visits authorized: 9/20  FOTO: Issued Visit #: 1     MD Follow up appointment: none scheduled      Precautions: Standard and sacralization of L5 R     PTA Visit #: 0/5     Time In: 11:18  Time Out: 1:03  Total Billable Time:  45 minutes + ES    SUBJECTIVE     Pt reports: feeling tight, but not too bad no leg pain   Pt was somewhat compliant with home exercise program.   Response to previous treatment: felt better   Functional change: less pain with movement     Pain: 4/10 after stretching and push/pull 3/10 at end of session 0/10  Location: right LB with no R LE to mid calf    OBJECTIVE     Mod-muscle tightness with min tightness at end of session    Pelvic positioning: AR R at re-eval AR R      Treatment     Danuta received the treatments listed below:      Therapeutic exercises were performed to improve ROM, strength, flexibility and stabilization for 15 minutes.     Child's pose x 5      HS stretch supine   Glut stretch  Piriformis stretch  B QL stretch  (NP)Prone press up x 10 as tolerated       SL L prop on elbow to stretch R QL region    Sitting SB R     Child's pose x 5  Contract relax R glut x 3       Pt unable to self mobilize     Instructed further need to perform self mob technique at onset of symptoms due to increased tightness developing during day resulting in inability to self mobilize      Verbal review Pt is  only performing 10 reps when she does ex    Pelvic tilt   x 10  Bridge x 10,   SLR x 10,   Trunk rotation supine x 10  Partial sit up x 10  Hip abd sidelying x 10 B,  Arm and Leg lift prone x 10, instructed pt to put pillow under stomach   Hip ext prone with bent knee x 10 instructed pt to put pillow under stomach  Sit to stand x 10  Instructed pt to hold press up for now and will note results  GSS standing x 10     Iron cross x 2 as needed  Plank x 30 sec x 1 Led to dysfunction so pt performed contract relax and realigned I   Contract relax R glut     Walking for exercise   Instructed pt to ice knee and hold walking for exercise for now       Manual therapy: Danuta  received the following manual therapy  techniques x 20  min. to include soft tissue and joint mobilization were applied to the: low back and gluteals to include:      Performed STM to LB paraspinals and QL R>L  Performed SB stretching of QL also with pillow at waist  P/A mob to lumbar region Grade 1-2 , MET to QL R SL L P/A and rot glides lumbar paraspinals  Sidelying L contract relax mobilization to L5-S1 region to level pelvis  at end of treatment and level pelvis achieved at beginning and end of session    Pt received tool-assisted massage with manual therapy techniques to B LB in prone to trigger an inflammatory healing response and stimulate the production of new collagen and proper, more functional, less painful healing.    Vacuum/cupping STM with manual therapy techniques was performed to back and gluteals to decrease muscle tightness, increase circulation and promote healing process.  The pt's skin was monitored for redness adjusting pressure as needed. The pt was instructed in possible side effects of bruising and/or soreness.       Ultrasound  for pain control and to decrease inflammation @ continuous duty cycle, 1 Mhz, applied to R lumbar paraspinals, intensity = 1.5 w/cm2 for 8 minutes. 2 min prep       Patient received pre-mod electrical  stimulation to decrease muscle tightness and pain to Lumbar paraspinals/ sacral border of gluteals B for 15 minutes with MH supine with cycle time: continuous, beat frequency: , CC/CV: CV.      Patient Education and Home Exercises     Home Exercises Provided and Patient Education Provided     Education provided:   - focus on strengthening   - HEP   - stretch to point of tightness not pain   - exercise in pain free zone       Written Home Exercises Provided: yes. Exercises were reviewed and Danuta was able to demonstrate them prior to the end of the session.  Danuta demonstrated good understanding of the education provided. See EMR under Patient Instructions for exercises provided during therapy sessions    ASSESSMENT   Pt continues with tightness in R lumbar paraspinals and QL and may benefit from additional stretching to improve mobility and stability of pelvis and help maintain low level pain between PT sessions enabling her to space out PT sessions even further      Danuta Is progressing well towards her goals.   Pt prognosis is Good.     Pt will continue to benefit from skilled outpatient physical therapy to address the goals listed in the initial evaluation, provide pt/family education and to maximize pt's level of independence in the home and community environment.     Pt's spiritual, cultural and educational needs considered and pt agreeable to plan of care and goals.     Anticipated barriers to physical therapy: work schedule    GOALS:   Short Term Goals:  3 weeks (Progressing, not met)  Increase range of motion 25%  Increase strength 1/2 muscle grade  Improve postural awareness of pelvis to independently identify dysfunction with min assist from PT  Be able to perform HEP with minimal cueing required     Long Term Goals: 6 weeks (Progressing, not met)  Increase range of motion to 75% to 100% full   Improve muscle strength 1 muscle grade  Improve muscle strength with MMT to 4+/5 to  5/5  Improve and stabilize proper pelvic positioning  Restore ability to sit for work and ADL without increased pain   Restore ability to stand for work and ADL with very min to 0 increased pain  Restore normal sleep habits with min to 0 disturbances due to pain  Restore ability to perform ADL's and household activities independently and with very min to 0 increased pain    PLAN   Continue with established plan of care towards PT goals.              Jacy Escobar, PT, MSPT

## 2024-07-31 ENCOUNTER — CLINICAL SUPPORT (OUTPATIENT)
Dept: REHABILITATION | Facility: HOSPITAL | Age: 69
End: 2024-07-31
Payer: COMMERCIAL

## 2024-07-31 DIAGNOSIS — M54.41 CHRONIC BILATERAL LOW BACK PAIN WITH RIGHT-SIDED SCIATICA: ICD-10-CM

## 2024-07-31 DIAGNOSIS — G89.29 CHRONIC BILATERAL LOW BACK PAIN WITH RIGHT-SIDED SCIATICA: ICD-10-CM

## 2024-07-31 DIAGNOSIS — M62.81 MUSCLE WEAKNESS: Primary | ICD-10-CM

## 2024-07-31 PROCEDURE — 97140 MANUAL THERAPY 1/> REGIONS: CPT | Mod: PN | Performed by: PHYSICAL THERAPIST

## 2024-07-31 PROCEDURE — 97014 ELECTRIC STIMULATION THERAPY: CPT | Mod: PN | Performed by: PHYSICAL THERAPIST

## 2024-07-31 PROCEDURE — 97110 THERAPEUTIC EXERCISES: CPT | Mod: PN | Performed by: PHYSICAL THERAPIST

## 2024-07-31 PROCEDURE — 97035 APP MDLTY 1+ULTRASOUND EA 15: CPT | Mod: PN | Performed by: PHYSICAL THERAPIST

## 2024-08-01 ENCOUNTER — HOSPITAL ENCOUNTER (OUTPATIENT)
Dept: RADIOLOGY | Facility: HOSPITAL | Age: 69
Discharge: HOME | End: 2024-08-01
Attending: INTERNAL MEDICINE
Payer: COMMERCIAL

## 2024-08-01 DIAGNOSIS — Z12.31 SCREENING MAMMOGRAM FOR BREAST CANCER: ICD-10-CM

## 2024-08-01 PROCEDURE — 77067 SCR MAMMO BI INCL CAD: CPT | Mod: TC

## 2024-08-01 PROCEDURE — 77063 BREAST TOMOSYNTHESIS BI: CPT | Mod: TC

## 2024-08-02 ENCOUNTER — CLINICAL SUPPORT (OUTPATIENT)
Dept: INTERNAL MEDICINE | Facility: CLINIC | Age: 69
End: 2024-08-02
Payer: COMMERCIAL

## 2024-08-02 ENCOUNTER — OFFICE VISIT (OUTPATIENT)
Dept: INTERNAL MEDICINE | Facility: CLINIC | Age: 69
End: 2024-08-02
Payer: COMMERCIAL

## 2024-08-02 DIAGNOSIS — M85.80 OSTEOPENIA, UNSPECIFIED LOCATION: ICD-10-CM

## 2024-08-02 DIAGNOSIS — Z00.00 ANNUAL PHYSICAL EXAM: Primary | ICD-10-CM

## 2024-08-02 DIAGNOSIS — Z71.84 ENCOUNTER FOR COUNSELING FOR TRAVEL: ICD-10-CM

## 2024-08-02 DIAGNOSIS — J30.9 CHRONIC ALLERGIC RHINITIS: ICD-10-CM

## 2024-08-02 LAB
25(OH)D3+25(OH)D2 SERPL-MCNC: 46 NG/ML (ref 30–96)
ALBUMIN SERPL BCP-MCNC: 4.2 G/DL (ref 3.5–5.2)
ALP SERPL-CCNC: 56 U/L (ref 55–135)
ALT SERPL W/O P-5'-P-CCNC: 29 U/L (ref 10–44)
ANION GAP SERPL CALC-SCNC: 7 MMOL/L (ref 8–16)
AST SERPL-CCNC: 31 U/L (ref 10–40)
BACTERIA #/AREA URNS AUTO: NORMAL /HPF
BASOPHILS # BLD AUTO: 0.04 K/UL (ref 0–0.2)
BASOPHILS NFR BLD: 1.1 % (ref 0–1.9)
BILIRUB SERPL-MCNC: 0.8 MG/DL (ref 0.1–1)
BUN SERPL-MCNC: 17 MG/DL (ref 8–23)
CALCIUM SERPL-MCNC: 10 MG/DL (ref 8.7–10.5)
CHLORIDE SERPL-SCNC: 100 MMOL/L (ref 95–110)
CHOLEST SERPL-MCNC: 204 MG/DL (ref 120–199)
CHOLEST/HDLC SERPL: 2.8 {RATIO} (ref 2–5)
CO2 SERPL-SCNC: 27 MMOL/L (ref 23–29)
CREAT SERPL-MCNC: 0.9 MG/DL (ref 0.5–1.4)
DIFFERENTIAL METHOD BLD: ABNORMAL
EOSINOPHIL # BLD AUTO: 0 K/UL (ref 0–0.5)
EOSINOPHIL NFR BLD: 0.8 % (ref 0–8)
ERYTHROCYTE [DISTWIDTH] IN BLOOD BY AUTOMATED COUNT: 12.4 % (ref 11.5–14.5)
EST. GFR  (NO RACE VARIABLE): >60 ML/MIN/1.73 M^2
ESTIMATED AVG GLUCOSE: 111 MG/DL (ref 68–131)
FERRITIN SERPL-MCNC: 93 NG/ML (ref 20–300)
GLUCOSE SERPL-MCNC: 64 MG/DL (ref 70–110)
HAV IGG SER QL IA: REACTIVE
HBA1C MFR BLD: 5.5 % (ref 4–5.6)
HCT VFR BLD AUTO: 38.9 % (ref 37–48.5)
HDLC SERPL-MCNC: 73 MG/DL (ref 40–75)
HDLC SERPL: 35.8 % (ref 20–50)
HGB BLD-MCNC: 12.9 G/DL (ref 12–16)
IMM GRANULOCYTES # BLD AUTO: 0.01 K/UL (ref 0–0.04)
IMM GRANULOCYTES NFR BLD AUTO: 0.3 % (ref 0–0.5)
IRON SERPL-MCNC: 137 UG/DL (ref 30–160)
LDLC SERPL CALC-MCNC: 119.2 MG/DL (ref 63–159)
LYMPHOCYTES # BLD AUTO: 1.1 K/UL (ref 1–4.8)
LYMPHOCYTES NFR BLD: 31.2 % (ref 18–48)
MCH RBC QN AUTO: 29.9 PG (ref 27–31)
MCHC RBC AUTO-ENTMCNC: 33.2 G/DL (ref 32–36)
MCV RBC AUTO: 90 FL (ref 82–98)
MICROSCOPIC COMMENT: NORMAL
MONOCYTES # BLD AUTO: 0.3 K/UL (ref 0.3–1)
MONOCYTES NFR BLD: 7.6 % (ref 4–15)
NEUTROPHILS # BLD AUTO: 2.1 K/UL (ref 1.8–7.7)
NEUTROPHILS NFR BLD: 59 % (ref 38–73)
NONHDLC SERPL-MCNC: 131 MG/DL
NRBC BLD-RTO: 0 /100 WBC
PLATELET # BLD AUTO: 204 K/UL (ref 150–450)
PMV BLD AUTO: 10.9 FL (ref 9.2–12.9)
POTASSIUM SERPL-SCNC: 3.8 MMOL/L (ref 3.5–5.1)
PROT SERPL-MCNC: 7.3 G/DL (ref 6–8.4)
RBC # BLD AUTO: 4.31 M/UL (ref 4–5.4)
RBC #/AREA URNS AUTO: 1 /HPF (ref 0–4)
SATURATED IRON: 30 % (ref 20–50)
SODIUM SERPL-SCNC: 134 MMOL/L (ref 136–145)
SQUAMOUS #/AREA URNS AUTO: 0 /HPF
TOTAL IRON BINDING CAPACITY: 451 UG/DL (ref 250–450)
TRANSFERRIN SERPL-MCNC: 305 MG/DL (ref 200–375)
TRIGL SERPL-MCNC: 59 MG/DL (ref 30–150)
TSH SERPL DL<=0.005 MIU/L-ACNC: 1.5 UIU/ML (ref 0.4–4)
WBC # BLD AUTO: 3.53 K/UL (ref 3.9–12.7)
WBC #/AREA URNS AUTO: 0 /HPF (ref 0–5)

## 2024-08-02 PROCEDURE — 86790 VIRUS ANTIBODY NOS: CPT | Performed by: INTERNAL MEDICINE

## 2024-08-02 PROCEDURE — 80053 COMPREHEN METABOLIC PANEL: CPT | Performed by: INTERNAL MEDICINE

## 2024-08-02 PROCEDURE — 3288F FALL RISK ASSESSMENT DOCD: CPT | Mod: CPTII,S$GLB,, | Performed by: INTERNAL MEDICINE

## 2024-08-02 PROCEDURE — 99999 PR PBB SHADOW E&M-EST. PATIENT-LVL I: CPT | Mod: PBBFAC,,,

## 2024-08-02 PROCEDURE — 36415 COLL VENOUS BLD VENIPUNCTURE: CPT | Performed by: INTERNAL MEDICINE

## 2024-08-02 PROCEDURE — 83036 HEMOGLOBIN GLYCOSYLATED A1C: CPT | Performed by: INTERNAL MEDICINE

## 2024-08-02 PROCEDURE — 80061 LIPID PANEL: CPT | Performed by: INTERNAL MEDICINE

## 2024-08-02 PROCEDURE — 85025 COMPLETE CBC W/AUTO DIFF WBC: CPT | Performed by: INTERNAL MEDICINE

## 2024-08-02 PROCEDURE — 84443 ASSAY THYROID STIM HORMONE: CPT | Performed by: INTERNAL MEDICINE

## 2024-08-02 PROCEDURE — 99999 PR PBB SHADOW E&M-EST. PATIENT-LVL I: CPT | Mod: PBBFAC,,, | Performed by: INTERNAL MEDICINE

## 2024-08-02 PROCEDURE — 99397 PER PM REEVAL EST PAT 65+ YR: CPT | Mod: S$GLB,,, | Performed by: INTERNAL MEDICINE

## 2024-08-02 PROCEDURE — 83540 ASSAY OF IRON: CPT | Performed by: INTERNAL MEDICINE

## 2024-08-02 PROCEDURE — 81001 URINALYSIS AUTO W/SCOPE: CPT | Performed by: INTERNAL MEDICINE

## 2024-08-02 PROCEDURE — 3044F HG A1C LEVEL LT 7.0%: CPT | Mod: CPTII,S$GLB,, | Performed by: INTERNAL MEDICINE

## 2024-08-02 PROCEDURE — 1101F PT FALLS ASSESS-DOCD LE1/YR: CPT | Mod: CPTII,S$GLB,, | Performed by: INTERNAL MEDICINE

## 2024-08-02 PROCEDURE — 1159F MED LIST DOCD IN RCRD: CPT | Mod: CPTII,S$GLB,, | Performed by: INTERNAL MEDICINE

## 2024-08-02 PROCEDURE — 82728 ASSAY OF FERRITIN: CPT | Performed by: INTERNAL MEDICINE

## 2024-08-02 PROCEDURE — 84466 ASSAY OF TRANSFERRIN: CPT | Performed by: INTERNAL MEDICINE

## 2024-08-02 PROCEDURE — 82306 VITAMIN D 25 HYDROXY: CPT | Performed by: INTERNAL MEDICINE

## 2024-08-02 PROCEDURE — 1160F RVW MEDS BY RX/DR IN RCRD: CPT | Mod: CPTII,S$GLB,, | Performed by: INTERNAL MEDICINE

## 2024-08-03 NOTE — PROGRESS NOTES
Subjective:       Patient ID: Dnauta Santos is a 69 y.o. female who presents today for:    Chief Complaint:   Chief Complaint   Patient presents with    Annual Exam     Nothing new, H/A w stress       HPI:    Review of Systems   Constitutional:  Negative for chills, fever and weight loss.   HENT:  Negative for sore throat.    Eyes:  Negative for blurred vision and double vision.   Respiratory:  Negative for cough and shortness of breath.    Cardiovascular:  Negative for chest pain and palpitations.   Gastrointestinal:  Negative for constipation, diarrhea, nausea and vomiting.   Genitourinary:  Negative for dysuria and hematuria.   Musculoskeletal:  Negative for joint pain and myalgias.   Skin:  Negative for itching and rash.   Neurological:  Negative for sensory change, focal weakness and headaches.   Endo/Heme/Allergies:  Does not bruise/bleed easily.   Psychiatric/Behavioral:  Negative for depression and suicidal ideas.         Medications:  Outpatient Encounter Medications as of 8/2/2024   Medication Sig Dispense Refill    acetaminophen (TYLENOL) 500 MG tablet Take 500 mg by mouth every 6 (six) hours as needed.      ascorbic acid, vitamin C, (VITAMIN C) 500 MG tablet Take 500 mg by mouth once daily.      azelastine (ASTELIN) 137 mcg (0.1 %) nasal spray SPRAY 1 SPRAY (137 MCG TOTAL) BY NASAL ROUTE TWICE A DAY AS NEEDED FOR RHINITIS 90 mL 2    calcium carbonate (CALCIUM 500 ORAL) Take by mouth.      glucosam/chond/collagen/hyalur (YIMTRLXO-BDFH-SIGZZU-HYALUR AC ORAL) Take 1 tablet by mouth once daily at 6am.      LACTOBACILLUS COMBO NO.6 (PROBIOTIC COMPLEX ORAL) Take 1 capsule by mouth once.       mupirocin (BACTROBAN) 2 % ointment Apply topically 3 (three) times daily. 30 g 0    pimecrolimus (ELIDEL) 1 % cream Apply topically 2 (two) times daily as needed.      polyethylene glycol (GLYCOLAX) 17 gram PwPk Take by mouth.      tiZANidine (ZANAFLEX) 2 MG tablet Take 4 mg by mouth nightly as needed.       UNABLE TO FIND EB-N5      [DISCONTINUED] amoxicillin-clavulanate 875-125mg (AUGMENTIN) 875-125 mg per tablet Take 1 tablet by mouth 2 (two) times daily. (Patient not taking: Reported on 12/8/2023) 14 tablet 0    [DISCONTINUED] cyanocobalamin 500 MCG tablet Take 1,000 mcg by mouth once daily.      [DISCONTINUED] estradioL (VAGIFEM) 10 mcg Tab Place 1 tablet (10 mcg total) vaginally twice a week. 24 tablet 3    [DISCONTINUED] lubiprostone (AMITIZA) 8 MCG Cap Take 1 capsule (8 mcg total) by mouth 2 (two) times daily with meals. 60 capsule 0    [DISCONTINUED] vitamin D (VITAMIN D3) 1000 units Tab Take 2,000 Units by mouth once daily.       No facility-administered encounter medications on file as of 8/2/2024.       Allergies:  Review of patient's allergies indicates:   Allergen Reactions    Wheat containing prod      Sinus      Advil [ibuprofen]     Apple cider vinegar Other (See Comments)    Lactobacillus acidoph-lactase      Other reaction(s): Other (See Comments)    Milk containing products (dairy) Other (See Comments)     Post nasal drip    Nsaids (non-steroidal anti-inflammatory drug)      Other reaction(s): Other (See Comments)  Pt states she gets pain in her stomach when she takes nsaids due to IBS    Alcohol Other (See Comments) and Palpitations     Post nasal drip    Aspirin Other (See Comments)     Abdominal pain       Health Maintenance:  Immunization History   Administered Date(s) Administered    COVID-19, MRNA, LN-S, PF (Pfizer) (Gray Cap) 04/12/2022    COVID-19, MRNA, LN-S, PF (Pfizer) (Purple Cap) 02/19/2021, 03/12/2021, 09/28/2021    COVID-19, mRNA, LNP-S, PF, jesenia-sucrose, 30 mcg/0.3 mL (Pfizer 2023 Ages 12+) 10/14/2023    COVID-19, mRNA, LNP-S, bivalent booster, PF (PFIZER OMICRON) 09/24/2022, 05/01/2023    Hepatitis A, Adult 03/15/2017, 09/19/2017    Hepatitis B 07/12/2012, 09/12/2012    Hepatitis B, Adult 05/21/2015    Hepatitis B, Pediatric/Adolescent 07/12/2012, 09/12/2012    IPV 09/26/2012     Influenza (FLUAD) - Quadrivalent - Adjuvanted - PF *Preferred* (65+) 10/01/2021, 09/18/2023    Influenza - High Dose - PF (65 years and older) 10/03/2019    Influenza - Quadrivalent 09/18/2018    Influenza - Quadrivalent - PF *Preferred* (6 months and older) 09/22/2018, 09/22/2020, 09/29/2022    Influenza A (H1N1) 2009 Monovalent - IM 11/21/2009    Pneumococcal Conjugate - 13 Valent 09/30/2019    Pneumococcal Polysaccharide - 23 Valent 03/12/2019, 04/13/2022    Tdap 08/15/2016, 08/16/2022    Typhoid 09/26/2012    Typhoid - ViCPs 09/26/2012, 03/15/2017    Zoster 07/02/2015    Zoster Recombinant 09/18/2018, 09/18/2018, 12/15/2018      Health Maintenance   Topic Date Due    High Dose Statin  Never done    Mammogram  03/03/2024    DEXA Scan  03/15/2024    Colorectal Cancer Screening  07/20/2027    Lipid Panel  08/02/2029    TETANUS VACCINE  08/16/2032    Hepatitis C Screening  Completed    Shingles Vaccine  Completed          Objective:       ]    Physical Exam  HENT:      Head: Normocephalic and atraumatic.   Eyes:      General: No scleral icterus.  Cardiovascular:      Rate and Rhythm: Normal rate and regular rhythm.      Heart sounds: No murmur heard.  Pulmonary:      Effort: Pulmonary effort is normal.      Breath sounds: Normal breath sounds. No rales.   Abdominal:      General: Bowel sounds are normal.      Palpations: Abdomen is soft.      Tenderness: There is no abdominal tenderness.   Musculoskeletal:         General: No tenderness.      Cervical back: Neck supple.   Neurological:      Mental Status: She is alert and oriented to person, place, and time.   Psychiatric:         Mood and Affect: Mood normal.         Behavior: Behavior normal.         Thought Content: Thought content normal.         Judgment: Judgment normal.        Lab Results   Component Value Date    WBC 3.53 (L) 08/02/2024    HGB 12.9 08/02/2024    HCT 38.9 08/02/2024     08/02/2024    CHOL 204 (H) 08/02/2024    TRIG 59 08/02/2024    HDL  73 08/02/2024    ALT 29 08/02/2024    AST 31 08/02/2024     (L) 08/02/2024    K 3.8 08/02/2024     08/02/2024    CREATININE 0.9 08/02/2024    BUN 17 08/02/2024    CO2 27 08/02/2024    TSH 1.503 08/02/2024    HGBA1C 5.5 08/02/2024       Assessment/plan:     Danuta Santos is a 69 y.o.female with:    Annual physical exam     Health Maintenance   Topic Date Due         Mammogram  03/03/2024    DEXA Scan  03/15/2024    Colorectal Cancer Screening  07/20/2027    Lipid Panel  08/02/2029    TETANUS VACCINE  08/16/2032    Hepatitis C Screening  Completed    Shingles Vaccine  Completed     Health Maintenance Reviewed    Osteopenia, unspecified location   BMD scheduled , continue with present vitamin-D supplementation as you are doing with good vitamin-D level today.    Chronic allergic rhinitis   Continue with prevention with as needed Astelin nasal spray.    Encounter for counseling for travel   Add on hep A IgG to see if a immune to hepatitis a in view of travel to [place]      Future Appointments   Date Time Provider Department Center   8/5/2024  2:20 PM [facility], DEXA1 [facility]   8/6/2024 10:30 AM Jacy Escobar, PT [facility]       Yumiko Carias MD  [facility]

## 2024-08-05 ENCOUNTER — HOSPITAL ENCOUNTER (OUTPATIENT)
Dept: RADIOLOGY | Facility: CLINIC | Age: 69
Discharge: HOME | End: 2024-08-05
Attending: INTERNAL MEDICINE
Payer: COMMERCIAL

## 2024-08-05 DIAGNOSIS — Z78.0 ASYMPTOMATIC MENOPAUSE: ICD-10-CM

## 2024-08-05 PROCEDURE — 77080 DXA BONE DENSITY AXIAL: CPT | Mod: TC

## 2024-08-06 ENCOUNTER — CLINICAL SUPPORT (OUTPATIENT)
Dept: REHABILITATION | Facility: HOSPITAL | Age: 69
End: 2024-08-06
Payer: COMMERCIAL

## 2024-08-06 DIAGNOSIS — M54.41 CHRONIC BILATERAL LOW BACK PAIN WITH RIGHT-SIDED SCIATICA: ICD-10-CM

## 2024-08-06 DIAGNOSIS — G89.29 CHRONIC BILATERAL LOW BACK PAIN WITH RIGHT-SIDED SCIATICA: ICD-10-CM

## 2024-08-06 DIAGNOSIS — M62.81 MUSCLE WEAKNESS: Primary | ICD-10-CM

## 2024-08-06 PROCEDURE — 97140 MANUAL THERAPY 1/> REGIONS: CPT | Mod: PN | Performed by: PHYSICAL THERAPIST

## 2024-08-06 PROCEDURE — 97035 APP MDLTY 1+ULTRASOUND EA 15: CPT | Mod: PN | Performed by: PHYSICAL THERAPIST

## 2024-08-06 PROCEDURE — 97014 ELECTRIC STIMULATION THERAPY: CPT | Mod: PN | Performed by: PHYSICAL THERAPIST

## 2024-08-13 ENCOUNTER — CLINICAL SUPPORT (OUTPATIENT)
Dept: REHABILITATION | Facility: HOSPITAL | Age: 69
End: 2024-08-13
Payer: COMMERCIAL

## 2024-08-13 DIAGNOSIS — G89.29 CHRONIC BILATERAL LOW BACK PAIN WITH RIGHT-SIDED SCIATICA: ICD-10-CM

## 2024-08-13 DIAGNOSIS — M62.81 MUSCLE WEAKNESS: Primary | ICD-10-CM

## 2024-08-13 DIAGNOSIS — M54.41 CHRONIC BILATERAL LOW BACK PAIN WITH RIGHT-SIDED SCIATICA: ICD-10-CM

## 2024-08-13 PROCEDURE — 97014 ELECTRIC STIMULATION THERAPY: CPT | Mod: PN | Performed by: PHYSICAL THERAPIST

## 2024-08-13 PROCEDURE — 97140 MANUAL THERAPY 1/> REGIONS: CPT | Mod: PN | Performed by: PHYSICAL THERAPIST

## 2024-08-13 PROCEDURE — 97035 APP MDLTY 1+ULTRASOUND EA 15: CPT | Mod: PN | Performed by: PHYSICAL THERAPIST

## 2024-08-13 NOTE — PROGRESS NOTES
[facility]   Physical Therapy Treatment Note     Name: Danuta Burgos Tom  Clinic Number: [id number]    Therapy Diagnosis:   Encounter Diagnoses   Name Primary?    Muscle weakness Yes    Chronic bilateral low back pain with right-sided sciatica        Physician: Yumiko Davis MD    Visit Date: 8/13/2024    Physician Orders: PT Eval and Treat   Medical Diagnosis from Referral: M43.06 (ICD-10-CM) - Lumbar spondylolysis   Evaluation Date: 4/5/2024  Authorization Period Expiration: 12-31-24  Plan of Care Expiration: 9-2-24  Progress Note Due: 8-8-23  Visit # / Visits authorized: 10/20  FOTO: Issued Visit #: 1     MD Follow up appointment: none scheduled      Precautions: Standard and sacralization of L5 R     PTA Visit #: 0/5     Time In: 10:42  Time Out: 11:35  Total Billable Time:  33 minutes + ES    SUBJECTIVE     Pt reports: stretching in AM and at night is helping  Pt states less pain overall feeling more manageable and will not have to self mobilize as often Pt states back at school which is also aggravating back   Pt was more compliant with home exercise program.   Response to previous treatment: felt better   Functional change: less pain with movement     Pain: 4/10  at end of session 2/10  Location: right LB with no R LE to mid calf    OBJECTIVE     Mod-muscle tightness with min tightness at end of session    Pelvic positioning: AR R at re-eval AR R      Treatment     Danuta received the treatments listed below:      Therapeutic exercises were performed to improve ROM, strength, flexibility and stabilization for 5 minutes.     HS stretch supine   Glut stretch  Piriformis stretch  B QL stretch   (NP)Prone press up x 10 as tolerated      HOLD pt reported did not work as well  SL L prop on elbow to stretch R QL region    Sitting SB R     Child's pose x 5  Contract relax R glut x 3       Pt unable to self mobilize     Instructed further need to perform self mob technique at  onset of symptoms due to increased tightness developing during day resulting in inability to self mobilize      Verbal review Pt is only performing 10 reps when she does ex    Pelvic tilt   x 10  Bridge x 10,   SLR x 10,   Trunk rotation supine x 10  Partial sit up x 10  Hip abd sidelying x 10 B,  Arm and Leg lift prone x 10, instructed pt to put pillow under stomach   Hip ext prone with bent knee x 10 instructed pt to put pillow under stomach  Sit to stand x 10  Instructed pt to hold press up for now and will note results  GSS standing x 10     Iron cross x 2 as needed  Plank x 30 sec x 1 Led to dysfunction so pt performed contract relax and realigned I   Contract relax R glut     Walking for exercise   Instructed pt to ice knee and hold walking for exercise for now       Manual therapy: Danuta  received the following manual therapy  techniques x 15  min. to include soft tissue and joint mobilization were applied to the: low back and gluteals to include:      Performed STM to LB paraspinals and QL R>L  Performed SB stretching of QL also with pillow at waist  P/A mob to lumbar region Grade 1-2 , MET to QL R SL L P/A and rot glides lumbar paraspinals  Sidelying L contract relax mobilization to L5-S1 region to level pelvis  at end of treatment and level pelvis achieved at beginning and end of session    Pt received tool-assisted massage with manual therapy techniques to B LB in prone to trigger an inflammatory healing response and stimulate the production of new collagen and proper, more functional, less painful healing.    Vacuum/cupping STM with manual therapy techniques was performed to back and gluteals to decrease muscle tightness, increase circulation and promote healing process.  The pt's skin was monitored for redness adjusting pressure as needed. The pt was instructed in possible side effects of bruising and/or soreness.       Ultrasound  for pain control and to decrease inflammation @ continuous duty  cycle, 1 Mhz, applied to R lumbar paraspinals, intensity = 1.5 w/cm2 for 8 minutes. 2 min prep       Patient received pre-mod electrical stimulation to decrease muscle tightness and pain to Lumbar paraspinals/ sacral border of gluteals B for 15 minutes with MH supine with cycle time: continuous, beat frequency: , CC/CV: CV.      Patient Education and Home Exercises     Home Exercises Provided and Patient Education Provided     Education provided:   - focus on strengthening   - HEP   - stretch to point of tightness not pain   - exercise in pain free zone       Written Home Exercises Provided: yes. Exercises were reviewed and Danuta was able to demonstrate them prior to the end of the session.  Danuta demonstrated good understanding of the education provided. See EMR under Patient Instructions for exercises provided during therapy sessions    ASSESSMENT   Pt appears to benefit from additional stretching pt continues with pain but may be related to adjusting to back at school.  Pt tolerated treatment well and improved symptoms at end of session    Danuta Is progressing well towards her goals.   Pt prognosis is Good.     Pt will continue to benefit from skilled outpatient physical therapy to address the goals listed in the initial evaluation, provide pt/family education and to maximize pt's level of independence in the home and community environment.     Pt's spiritual, cultural and educational needs considered and pt agreeable to plan of care and goals.     Anticipated barriers to physical therapy: work schedule    GOALS:   Short Term Goals:  3 weeks (Progressing, not met)  Increase range of motion 25%  Increase strength 1/2 muscle grade  Improve postural awareness of pelvis to independently identify dysfunction with min assist from PT  Be able to perform HEP with minimal cueing required     Long Term Goals: 6 weeks (Progressing, not met)  Increase range of motion to 75% to 100% full   Improve muscle  strength 1 muscle grade  Improve muscle strength with MMT to 4+/5 to 5/5  Improve and stabilize proper pelvic positioning  Restore ability to sit for work and ADL without increased pain   Restore ability to stand for work and ADL with very min to 0 increased pain  Restore normal sleep habits with min to 0 disturbances due to pain  Restore ability to perform ADL's and household activities independently and with very min to 0 increased pain    PLAN   Continue with established plan of care towards PT goals.              Jacy Escobar, PT, MSPT

## 2024-08-28 ENCOUNTER — CLINICAL SUPPORT (OUTPATIENT)
Dept: OTHER | Facility: CLINIC | Age: 69
End: 2024-08-28
Payer: COMMERCIAL

## 2024-08-28 DIAGNOSIS — Z00.8 ENCOUNTER FOR OTHER GENERAL EXAMINATION: ICD-10-CM

## 2024-08-28 PROCEDURE — 99401 PREV MED CNSL INDIV APPRX 15: CPT | Mod: S$GLB,,, | Performed by: INTERNAL MEDICINE

## 2024-08-28 PROCEDURE — 82947 ASSAY GLUCOSE BLOOD QUANT: CPT | Mod: QW,S$GLB,, | Performed by: INTERNAL MEDICINE

## 2024-08-28 PROCEDURE — 80061 LIPID PANEL: CPT | Mod: QW,S$GLB,, | Performed by: INTERNAL MEDICINE

## 2024-08-30 LAB
HDLC SERPL-MCNC: 80 MG/DL
POC CHOLESTEROL, TOTAL: 166 MG/DL
POC GLUCOSE, FASTING: 89 MG/DL (ref 60–110)
TRIGL SERPL-MCNC: 44 MG/DL

## 2024-09-12 NOTE — PROGRESS NOTES
"[facility]   Physical Therapy Progress and Treatment Note     Name: Danuta Santos  Clinic Number: [id number]    Therapy Diagnosis:   Encounter Diagnoses   Name Primary?    Muscle weakness Yes    Chronic bilateral low back pain with right-sided sciatica        Physician: Yumiko Davis MD    Visit Date: 9/13/2024    Physician Orders: PT Eval and Treat   Medical Diagnosis from Referral: M43.06 (ICD-10-CM) - Lumbar spondylolysis   Evaluation Date: 4/5/2024  Authorization Period Expiration: 12-31-24  Plan of Care Expiration: 11-8-24  Progress Note Due: 10-13-23  Visit # / Visits authorized: 11/20  FOTO: Issued Visit #: 1     MD Follow up appointment: none scheduled      Precautions: Standard and sacralization of L5 R     PTA Visit #: 0/5     Time In: 4:28   Time Out: 5:35  Total Billable Time:  45 minutes + ES    SUBJECTIVE     Pt reports: back has been doing pretty well, has been able to walk a lot and do ex and back in groove with school   Pt was more compliant with home exercise program.   Response to previous treatment: felt better   Functional change: less pain with movement     Pain: 4/10  at end of session 1-2/10  Location: right LB with no R LE to mid calf    OBJECTIVE     Mod-muscle tightness with min tightness at end of session    Pelvic positioning: AR R at re-eval AR R     Lumbar active range of motion in standing is: 9-13-24  - flexion - floor                     - extension -  75%                         - left side bending -  to knee         - right side bending -  to knee              Lumbar active range of motion in standing is: at re-eval  - flexion - floor                     - extension -  50%                         - left side bending -  2" above knee         - right side bending -  2" above knee           Pelvic positioning: level at re-eval AR R      Flexibility testing:  - hamstrings:     90/90 test same as re-eval R 5 L 0           - gastrocnemius:   DF " ankle R 10 degrees L 10 degrees     Muscle Strength 9-13-24  MMT R L   Knee extension 5/5 5/5   Knee flexion 5/5 5/5   Hip flexion 4+/5 4+/5   Hip abduction 5/5 5-/5   Hip extension 5/5 5/5   Glut max 4-/5 4/5      Muscle Strength re- eval  MMT R L   Knee extension 5/5 5/5   Knee flexion 5/5 5/5   Hip flexion 4-/5 4-/5   Hip abduction 4+/5 4+/5   Hip extension 4+/5 4+/5   Glut max 3+/5 3+/5       Palpation: min-mod TTP and severe tightness R lumbar paraspinals     Joint mobility: severe decreased lumbar mobility         Treatment     Danuta received the treatments listed below:      Therapeutic exercises were performed to improve ROM, strength, flexibility and stabilization for 15 minutes.   Reassessment   HS stretch supine   Glut stretch  Piriformis stretch  B QL stretch   (NP)Prone press up x 10 as tolerated      HOLD pt reported did not work as well    SL L prop on elbow to stretch R QL region    Sitting SB R     Child's pose x 5  Contract relax R glut x 3       Pt unable to self mobilize     Instructed further need to perform self mob technique at onset of symptoms due to increased tightness developing during day resulting in inability to self mobilize      Verbal review Pt is only performing 10 reps when she does ex    Pelvic tilt   x 10  Bridge x 10,   SLR x 10,   Trunk rotation supine x 10  Partial sit up x 10  Hip abd sidelying x 10 B,  Arm and Leg lift prone x 10, instructed pt to put pillow under stomach   Hip ext prone with bent knee x 10 instructed pt to put pillow under stomach  Sit to stand x 10  Instructed pt to hold press up for now and will note results  GSS standing x 10     Iron cross x 2 as needed  Plank x 30 sec x 1 Led to dysfunction so pt performed contract relax and realigned I   Contract relax R glut     Walking for exercise   Instructed pt to ice knee and hold walking for exercise for now       Manual therapy: Danuta  received the following manual therapy  techniques x 20  min. to  include soft tissue and joint mobilization were applied to the: low back and gluteals to include:      Performed STM to LB paraspinals and QL R>L  Performed SB stretching of QL also with pillow at waist  P/A mob to lumbar region Grade 1-2 , MET to QL R SL L P/A and rot glides lumbar paraspinals  Sidelying L contract relax mobilization to L5-S1 region to level pelvis  at end of treatment and level pelvis achieved at beginning and end of session    Pt received tool-assisted massage with manual therapy techniques to B LB in prone to trigger an inflammatory healing response and stimulate the production of new collagen and proper, more functional, less painful healing.    Vacuum/cupping STM with manual therapy techniques was performed to back and gluteals to decrease muscle tightness, increase circulation and promote healing process.  The pt's skin was monitored for redness adjusting pressure as needed. The pt was instructed in possible side effects of bruising and/or soreness.       Ultrasound  for pain control and to decrease inflammation @ continuous duty cycle, 1 Mhz, applied to R lumbar paraspinals, intensity = 1.5 w/cm2 for 8 minutes. 2 min prep       Patient received pre-mod electrical stimulation to decrease muscle tightness and pain to Lumbar paraspinals/ sacral border of gluteals B for 15 minutes with MH supine with cycle time: continuous, beat frequency: , CC/CV: CV.      Patient Education and Home Exercises     Home Exercises Provided and Patient Education Provided     Education provided:   - focus on strengthening   - HEP   - stretch to point of tightness not pain   - exercise in pain free zone       Written Home Exercises Provided: yes. Exercises were reviewed and Danuta was able to demonstrate them prior to the end of the session.  Danuta demonstrated good understanding of the education provided. See EMR under Patient Instructions for exercises provided during therapy sessions    ASSESSMENT    Pt has been seen for 5 visits since last progress note on 7-8-24. Pt displays improved strength as a result of increased compliance with HEP Pt with most difficulty improving with glut strength.  Emphasis on continued work on hip extension ex Pt unable to self mobilize today as a result of increased muscle tightness Pt continues to work on self management as able  Pt scott treatment well and may benefit from continued treatment to help manage symptoms     Danuta Is progressing well towards her goals.   Pt prognosis is Good.     Pt will continue to benefit from skilled outpatient physical therapy to address the goals listed in the initial evaluation, provide pt/family education and to maximize pt's level of independence in the home and community environment.     Pt's spiritual, cultural and educational needs considered and pt agreeable to plan of care and goals.     Anticipated barriers to physical therapy: work schedule    GOALS:   Short Term Goals:  4 weeks MET STG's  Increase range of motion 25%  Increase strength 1/2 muscle grade   Improve postural awareness of pelvis to independently identify dysfunction with min assist from PT  Be able to perform HEP with minimal cueing required     Long Term Goals: 8 weeks (Progressing, not met)  Increase range of motion to 75% to 100% full   Improve muscle strength 1 muscle grade  Improve muscle strength with MMT to 4+/5 to 5/5  Improve and stabilize proper pelvic positioning  Restore ability to sit for work and ADL without increased pain   Restore ability to stand for work and ADL with very min to 0 increased pain  Restore normal sleep habits with min to 0 disturbances due to pain  Restore ability to perform ADL's and household activities independently and with very min to 0 increased pain    PLAN   If you concur, I recommend patient continue with physical therapy 1-2 times a week for 8 weeks as needed   Please advise us of your findings and recommendations. Thank you for  allowing us to assist in the care of your patient.         Jacy Escobar, PT, MSPT

## 2024-09-13 ENCOUNTER — CLINICAL SUPPORT (OUTPATIENT)
Dept: REHABILITATION | Facility: HOSPITAL | Age: 69
End: 2024-09-13
Payer: COMMERCIAL

## 2024-09-13 DIAGNOSIS — M62.81 MUSCLE WEAKNESS: Primary | ICD-10-CM

## 2024-09-13 DIAGNOSIS — G89.29 CHRONIC BILATERAL LOW BACK PAIN WITH RIGHT-SIDED SCIATICA: ICD-10-CM

## 2024-09-13 DIAGNOSIS — M54.41 CHRONIC BILATERAL LOW BACK PAIN WITH RIGHT-SIDED SCIATICA: ICD-10-CM

## 2024-09-13 PROCEDURE — 97035 APP MDLTY 1+ULTRASOUND EA 15: CPT | Mod: PN | Performed by: PHYSICAL THERAPIST

## 2024-09-13 PROCEDURE — 97110 THERAPEUTIC EXERCISES: CPT | Mod: PN | Performed by: PHYSICAL THERAPIST

## 2024-09-13 PROCEDURE — 97140 MANUAL THERAPY 1/> REGIONS: CPT | Mod: PN | Performed by: PHYSICAL THERAPIST

## 2024-09-13 PROCEDURE — 97014 ELECTRIC STIMULATION THERAPY: CPT | Mod: PN | Performed by: PHYSICAL THERAPIST

## 2024-09-13 NOTE — PLAN OF CARE
"[facility]   Physical Therapy Progress and Treatment Note     Name: Danuta Santos  Clinic Number: [id number]    Therapy Diagnosis:   Encounter Diagnoses   Name Primary?    Muscle weakness Yes    Chronic bilateral low back pain with right-sided sciatica        Physician: Yumiko Davis MD    Visit Date: 9/13/2024    Physician Orders: PT Eval and Treat   Medical Diagnosis from Referral: M43.06 (ICD-10-CM) - Lumbar spondylolysis   Evaluation Date: 4/5/2024  Authorization Period Expiration: 12-31-24  Plan of Care Expiration: 11-8-24  Progress Note Due: 10-13-23  Visit # / Visits authorized: 11/20  FOTO: Issued Visit #: 1     MD Follow up appointment: none scheduled      Precautions: Standard and sacralization of L5 R     PTA Visit #: 0/5     Time In: 4:28   Time Out: 5:35  Total Billable Time:  45 minutes + ES    SUBJECTIVE     Pt reports: back has been doing pretty well, has been able to walk a lot and do ex and back in groove with school   Pt was more compliant with home exercise program.   Response to previous treatment: felt better   Functional change: less pain with movement     Pain: 4/10  at end of session 1-2/10  Location: right LB with no R LE to mid calf    OBJECTIVE     Mod-muscle tightness with min tightness at end of session    Pelvic positioning: AR R at re-eval AR R     Lumbar active range of motion in standing is: 9-13-24  - flexion - floor                     - extension -  75%                         - left side bending -  to knee         - right side bending -  to knee              Lumbar active range of motion in standing is: at re-eval  - flexion - floor                     - extension -  50%                         - left side bending -  2" above knee         - right side bending -  2" above knee           Pelvic positioning: level at re-eval AR R      Flexibility testing:  - hamstrings:     90/90 test same as re-eval R 5 L 0           - gastrocnemius:   DF " ankle R 10 degrees L 10 degrees     Muscle Strength 9-13-24  MMT R L   Knee extension 5/5 5/5   Knee flexion 5/5 5/5   Hip flexion 4+/5 4+/5   Hip abduction 5/5 5-/5   Hip extension 5/5 5/5   Glut max 4-/5 4/5      Muscle Strength re- eval  MMT R L   Knee extension 5/5 5/5   Knee flexion 5/5 5/5   Hip flexion 4-/5 4-/5   Hip abduction 4+/5 4+/5   Hip extension 4+/5 4+/5   Glut max 3+/5 3+/5       Palpation: min-mod TTP and severe tightness R lumbar paraspinals     Joint mobility: severe decreased lumbar mobility         Treatment     Danuta received the treatments listed below:      Therapeutic exercises were performed to improve ROM, strength, flexibility and stabilization for 15 minutes.   Reassessment   HS stretch supine   Glut stretch  Piriformis stretch  B QL stretch   (NP)Prone press up x 10 as tolerated      HOLD pt reported did not work as well    SL L prop on elbow to stretch R QL region    Sitting SB R     Child's pose x 5  Contract relax R glut x 3       Pt unable to self mobilize     Instructed further need to perform self mob technique at onset of symptoms due to increased tightness developing during day resulting in inability to self mobilize      Verbal review Pt is only performing 10 reps when she does ex    Pelvic tilt   x 10  Bridge x 10,   SLR x 10,   Trunk rotation supine x 10  Partial sit up x 10  Hip abd sidelying x 10 B,  Arm and Leg lift prone x 10, instructed pt to put pillow under stomach   Hip ext prone with bent knee x 10 instructed pt to put pillow under stomach  Sit to stand x 10  Instructed pt to hold press up for now and will note results  GSS standing x 10     Iron cross x 2 as needed  Plank x 30 sec x 1 Led to dysfunction so pt performed contract relax and realigned I   Contract relax R glut     Walking for exercise   Instructed pt to ice knee and hold walking for exercise for now       Manual therapy: Danuta  received the following manual therapy  techniques x 20  min. to  include soft tissue and joint mobilization were applied to the: low back and gluteals to include:      Performed STM to LB paraspinals and QL R>L  Performed SB stretching of QL also with pillow at waist  P/A mob to lumbar region Grade 1-2 , MET to QL R SL L P/A and rot glides lumbar paraspinals  Sidelying L contract relax mobilization to L5-S1 region to level pelvis  at end of treatment and level pelvis achieved at beginning and end of session    Pt received tool-assisted massage with manual therapy techniques to B LB in prone to trigger an inflammatory healing response and stimulate the production of new collagen and proper, more functional, less painful healing.    Vacuum/cupping STM with manual therapy techniques was performed to back and gluteals to decrease muscle tightness, increase circulation and promote healing process.  The pt's skin was monitored for redness adjusting pressure as needed. The pt was instructed in possible side effects of bruising and/or soreness.       Ultrasound  for pain control and to decrease inflammation @ continuous duty cycle, 1 Mhz, applied to R lumbar paraspinals, intensity = 1.5 w/cm2 for 8 minutes. 2 min prep       Patient received pre-mod electrical stimulation to decrease muscle tightness and pain to Lumbar paraspinals/ sacral border of gluteals B for 15 minutes with MH supine with cycle time: continuous, beat frequency: , CC/CV: CV.      Patient Education and Home Exercises     Home Exercises Provided and Patient Education Provided     Education provided:   - focus on strengthening   - HEP   - stretch to point of tightness not pain   - exercise in pain free zone       Written Home Exercises Provided: yes. Exercises were reviewed and Danuta was able to demonstrate them prior to the end of the session.  Danuta demonstrated good understanding of the education provided. See EMR under Patient Instructions for exercises provided during therapy sessions    ASSESSMENT    Pt has been seen for 5 visits since last progress note on 7-8-24. Pt displays improved strength as a result of increased compliance with HEP Pt with most difficulty improving with glut strength.  Emphasis on continued work on hip extension ex Pt unable to self mobilize today as a result of increased muscle tightness Pt continues to work on self management as able  Pt scott treatment well and may benefit from continued treatment to help manage symptoms     Danuta Is progressing well towards her goals.   Pt prognosis is Good.     Pt will continue to benefit from skilled outpatient physical therapy to address the goals listed in the initial evaluation, provide pt/family education and to maximize pt's level of independence in the home and community environment.     Pt's spiritual, cultural and educational needs considered and pt agreeable to plan of care and goals.     Anticipated barriers to physical therapy: work schedule    GOALS:   Short Term Goals:  4 weeks MET STG's  Increase range of motion 25%  Increase strength 1/2 muscle grade   Improve postural awareness of pelvis to independently identify dysfunction with min assist from PT  Be able to perform HEP with minimal cueing required     Long Term Goals: 8 weeks (Progressing, not met)  Increase range of motion to 75% to 100% full   Improve muscle strength 1 muscle grade  Improve muscle strength with MMT to 4+/5 to 5/5  Improve and stabilize proper pelvic positioning  Restore ability to sit for work and ADL without increased pain   Restore ability to stand for work and ADL with very min to 0 increased pain  Restore normal sleep habits with min to 0 disturbances due to pain  Restore ability to perform ADL's and household activities independently and with very min to 0 increased pain    PLAN   If you concur, I recommend patient continue with physical therapy 1-2 times a week for 8 weeks as needed   Please advise us of your findings and recommendations. Thank you for  allowing us to assist in the care of your patient.         Jacy Escobar, PT, MSPT

## 2024-09-23 NOTE — PROGRESS NOTES
[facility]   Physical Therapy Treatment Note     Name: Danuta Santos  Clinic Number: [id number]    Therapy Diagnosis:   Encounter Diagnoses   Name Primary?    Muscle weakness Yes    Chronic bilateral low back pain with right-sided sciatica        Physician: Yumiko Davis MD    Visit Date: 9/24/2024    Physician Orders: PT Eval and Treat   Medical Diagnosis from Referral: M43.06 (ICD-10-CM) - Lumbar spondylolysis   Evaluation Date: 4/5/2024  Authorization Period Expiration: 12-31-24  Plan of Care Expiration: 11-8-24  Progress Note Due: 10-13-23  Visit # / Visits authorized: 12/20  FOTO: Issued Visit #: 1     MD Follow up appointment: none scheduled      Precautions: Standard and sacralization of L5 R     PTA Visit #: 0/5     Time In: 3:13 handout  Time Out: 4:20  Total Billable Time:  30 minutes + ES    SUBJECTIVE     Pt reports: had to sit in bad chairs for a meeting for work and led to increased pain yesterday Pt unable to self mobilize   Pt was more compliant with home exercise program.  Pt has not done well with ex past 2 days due to busy schedule   Response to previous treatment: felt better   Functional change: less pain with movement     Pain: 5/10  at end of session 1-2/10  Location: right LB with no R LE to mid calf    OBJECTIVE     Mod-severe muscle tightness with min tightness at end of session    Pelvic positioning: AR R at re-eval AR R      Treatment     Danuta received the treatments listed below:      Therapeutic exercises were performed to improve ROM, strength, flexibility and stabilization for 5 minutes.     HS stretch supine   Glut stretch  Piriformis stretch  B QL stretch   (NP)Prone press up x 10 as tolerated      HOLD pt reported did not work as well    SL L prop on elbow to stretch R QL region    Sitting SB R     Child's pose x 5  Contract relax R glut x 3       Pt unable to self mobilize     Instructed further need to perform self mob technique at  onset of symptoms due to increased tightness developing during day resulting in inability to self mobilize      Verbal review Pt is only performing 10 reps when she does ex    Pelvic tilt   x 10  Bridge x 10,   SLR x 10,   Trunk rotation supine x 10  Partial sit up x 10  Hip abd sidelying x 10 B,  Arm and Leg lift prone x 10, instructed pt to put pillow under stomach   Hip ext prone with bent knee x 10 instructed pt to put pillow under stomach  Sit to stand x 10  Instructed pt to hold press up for now and will note results  GSS standing x 10     Iron cross x 2 as needed  Plank x 30 sec x 1 Led to dysfunction so pt performed contract relax and realigned I   Contract relax R glut     Walking for exercise   Instructed pt to ice knee and hold walking for exercise for now       Manual therapy: Danuta  received the following manual therapy  techniques x 15  min. to include soft tissue and joint mobilization were applied to the: low back and gluteals to include:      Performed STM to LB paraspinals and QL R>L  Performed SB stretching of QL also with pillow at waist  P/A mob to lumbar region Grade 1-2 , MET to QL R SL L P/A and rot glides lumbar paraspinals  Sidelying L contract relax mobilization to L5-S1 region to level pelvis  at end of treatment and level pelvis achieved at beginning and end of session    Pt received tool-assisted massage with manual therapy techniques to B LB in prone to trigger an inflammatory healing response and stimulate the production of new collagen and proper, more functional, less painful healing.    Vacuum/cupping STM with manual therapy techniques was performed to back and gluteals to decrease muscle tightness, increase circulation and promote healing process.  The pt's skin was monitored for redness adjusting pressure as needed. The pt was instructed in possible side effects of bruising and/or soreness.       Ultrasound  for pain control and to decrease inflammation @ continuous duty  cycle, 1 Mhz, applied to R lumbar paraspinals, intensity = 1.5 w/cm2 for 8 minutes. 2 min prep       Patient received pre-mod electrical stimulation to decrease muscle tightness and pain to Lumbar paraspinals/ sacral border of gluteals B for 15 minutes with MH supine with cycle time: continuous, beat frequency: , CC/CV: CV.      Patient Education and Home Exercises     Home Exercises Provided and Patient Education Provided     Education provided:   - focus on strengthening   - HEP   - stretch to point of tightness not pain   - exercise in pain free zone       Written Home Exercises Provided: yes. Exercises were reviewed and Danuta was able to demonstrate them prior to the end of the session.  Danuta demonstrated good understanding of the education provided. See EMR under Patient Instructions for exercises provided during therapy sessions    ASSESSMENT   Pt aggravated back when had to sit for prolonged period at a meeting  Pt with increased pain due to inability to self mobilize.  Pt with level pelvis at end of session  and decreased pain at end of session    Danuta Is progressing well towards her goals.   Pt prognosis is Good.     Pt will continue to benefit from skilled outpatient physical therapy to address the goals listed in the initial evaluation, provide pt/family education and to maximize pt's level of independence in the home and community environment.     Pt's spiritual, cultural and educational needs considered and pt agreeable to plan of care and goals.     Anticipated barriers to physical therapy: work schedule    GOALS:   Short Term Goals:  4 weeks MET STG's  Increase range of motion 25%  Increase strength 1/2 muscle grade   Improve postural awareness of pelvis to independently identify dysfunction with min assist from PT  Be able to perform HEP with minimal cueing required     Long Term Goals: 8 weeks (Progressing, not met)  Increase range of motion to 75% to 100% full   Improve muscle  strength 1 muscle grade  Improve muscle strength with MMT to 4+/5 to 5/5  Improve and stabilize proper pelvic positioning  Restore ability to sit for work and ADL without increased pain   Restore ability to stand for work and ADL with very min to 0 increased pain  Restore normal sleep habits with min to 0 disturbances due to pain  Restore ability to perform ADL's and household activities independently and with very min to 0 increased pain    PLAN   If you concur, I recommend patient continue with physical therapy 1-2 times a week for 8 weeks as needed   Please advise us of your findings and recommendations. Thank you for allowing us to assist in the care of your patient.         Jacy Escobar, PT, MSPT

## 2024-09-24 ENCOUNTER — CLINICAL SUPPORT (OUTPATIENT)
Dept: REHABILITATION | Facility: HOSPITAL | Age: 69
End: 2024-09-24
Payer: COMMERCIAL

## 2024-09-24 DIAGNOSIS — M62.81 MUSCLE WEAKNESS: Primary | ICD-10-CM

## 2024-09-24 DIAGNOSIS — M54.41 CHRONIC BILATERAL LOW BACK PAIN WITH RIGHT-SIDED SCIATICA: ICD-10-CM

## 2024-09-24 DIAGNOSIS — G89.29 CHRONIC BILATERAL LOW BACK PAIN WITH RIGHT-SIDED SCIATICA: ICD-10-CM

## 2024-09-24 PROCEDURE — 97035 APP MDLTY 1+ULTRASOUND EA 15: CPT | Mod: PN | Performed by: PHYSICAL THERAPIST

## 2024-09-24 PROCEDURE — 97140 MANUAL THERAPY 1/> REGIONS: CPT | Mod: PN | Performed by: PHYSICAL THERAPIST

## 2024-09-24 PROCEDURE — 97014 ELECTRIC STIMULATION THERAPY: CPT | Mod: PN | Performed by: PHYSICAL THERAPIST

## 2024-09-30 ENCOUNTER — PATIENT MESSAGE (OUTPATIENT)
Dept: INTERNAL MEDICINE | Facility: CLINIC | Age: 69
End: 2024-09-30
Payer: COMMERCIAL

## 2024-10-03 ENCOUNTER — CLINICAL SUPPORT (OUTPATIENT)
Dept: INTERNAL MEDICINE | Facility: CLINIC | Age: 69
End: 2024-10-03
Payer: COMMERCIAL

## 2024-10-03 DIAGNOSIS — Z23 NEEDS FLU SHOT: Primary | ICD-10-CM

## 2024-10-03 DIAGNOSIS — Z23 IMMUNIZATION DUE: Primary | ICD-10-CM

## 2024-10-03 PROCEDURE — 99999 PR PBB SHADOW E&M-EST. PATIENT-LVL I: CPT | Mod: PBBFAC,,,

## 2024-10-22 ENCOUNTER — CLINICAL SUPPORT (OUTPATIENT)
Dept: REHABILITATION | Facility: HOSPITAL | Age: 69
End: 2024-10-22
Payer: COMMERCIAL

## 2024-10-22 DIAGNOSIS — G89.29 CHRONIC BILATERAL LOW BACK PAIN WITH RIGHT-SIDED SCIATICA: ICD-10-CM

## 2024-10-22 DIAGNOSIS — M62.81 MUSCLE WEAKNESS: Primary | ICD-10-CM

## 2024-10-22 DIAGNOSIS — M54.41 CHRONIC BILATERAL LOW BACK PAIN WITH RIGHT-SIDED SCIATICA: ICD-10-CM

## 2024-10-22 PROCEDURE — 97110 THERAPEUTIC EXERCISES: CPT | Mod: PN | Performed by: PHYSICAL THERAPIST

## 2024-10-22 PROCEDURE — 97140 MANUAL THERAPY 1/> REGIONS: CPT | Mod: PN | Performed by: PHYSICAL THERAPIST

## 2024-10-22 PROCEDURE — 97035 APP MDLTY 1+ULTRASOUND EA 15: CPT | Mod: PN | Performed by: PHYSICAL THERAPIST

## 2024-10-22 PROCEDURE — 97014 ELECTRIC STIMULATION THERAPY: CPT | Mod: PN | Performed by: PHYSICAL THERAPIST

## 2024-10-22 NOTE — PLAN OF CARE
"[facility]   Physical Therapy Progress and Treatment Note     Name: Danuta Burgos Tom  Clinic Number: [id number]    Therapy Diagnosis:   Encounter Diagnoses   Name Primary?    Muscle weakness Yes    Chronic bilateral low back pain with right-sided sciatica        Physician: Yumiko Davis MD    Visit Date: 10/22/2024    Physician Orders: PT Eval and Treat   Medical Diagnosis from Referral: M43.06 (ICD-10-CM) - Lumbar spondylolysis   Evaluation Date: 4/5/2024  Authorization Period Expiration: 12-31-24  Plan of Care Expiration: 11-8-24  Progress Note Due: 11-8-23  Visit # / Visits authorized: 13/20  FOTO: Issued Visit #: 1     MD Follow up appointment: none scheduled      Precautions: Standard and sacralization of L5 R     PTA Visit #: 0/5     Time In: 2:35   Time Out: 3:55  Total Billable Time:  45 minutes + ES    SUBJECTIVE     Pt reports: getting massages every week which helps but continues with some pain R LB and increasing tightness noted with less success with self mob recently Pt states L bunion beginning to bother him at times   Pt was compliant with home exercise program.    Response to previous treatment: felt better   Functional change: less pain with movement     Pain: 4/10  at end of session 1-2/10  Location: right LB with no R LE to mid calf    OBJECTIVE     Mod-severe muscle tightness with min tightness at end of session    Pelvic positioning: AR R at re-eval AR R      Lumbar active range of motion in standing is: 10-22-24  - flexion - floor                     - extension -  75%                         - left side bending -  to knee         - right side bending -  to knee              Lumbar active range of motion in standing is: at re-eval  - flexion - floor                     - extension -  50%                         - left side bending -  2" above knee         - right side bending -  2" above knee           Pelvic positioning: level at re-eval AR R    "   Flexibility testing:  - hamstrings:     90/90 test same as re-eval R 5 L 0           - gastrocnemius:   DF ankle R 10 degrees L 10 degrees     Muscle Strength 10-22-24  MMT R L   Knee extension 5/5 5/5   Knee flexion 5/5 5/5   Hip flexion 4+/5 4+/5   Hip abduction 5/5 5-/5   Hip extension 5/5 5/5   Glut max 4-/5 4/5      Muscle Strength re- eval  MMT R L   Knee extension 5/5 5/5   Knee flexion 5/5 5/5   Hip flexion 4-/5 4-/5   Hip abduction 4+/5 4+/5   Hip extension 4+/5 4+/5   Glut max 3+/5 3+/5       Palpation: min-mod TTP and severe tightness R lumbar paraspinals     Joint mobility: severe decreased lumbar mobility         Treatment     Danuta received the treatments listed below:      Therapeutic exercises were performed to improve ROM, strength, flexibility and stabilization for 15 minutes.   Reassessment   HS stretch supine   Glut stretch  Piriformis stretch  B QL stretch   (NP)Prone press up x 10 as tolerated      HOLD pt reported did not work as well    SL L prop on elbow to stretch R QL region    Sitting SB R     Child's pose x 5  Contract relax R glut x 3       Pt unable to self mobilize     Instructed further need to perform self mob technique at onset of symptoms due to increased tightness developing during day resulting in inability to self mobilize      Verbal review Pt is only performing 10 reps when she does ex    Pelvic tilt   x 10  Bridge x 10,   SLR x 10,   Trunk rotation supine x 10  Partial sit up x 10  Hip abd sidelying x 10 B,  Arm and Leg lift prone x 10, instructed pt to put pillow under stomach   Hip ext prone with bent knee x 10 instructed pt to put pillow under stomach  Sit to stand x 10  Instructed pt to hold press up for now and will note results  GSS standing x 10     Iron cross x 2 as needed  Plank x 30 sec x 1 Led to dysfunction so pt performed contract relax and realigned I   Contract relax R glut     Walking for exercise   Instructed pt to ice knee and hold walking for  exercise for now       Manual therapy: Danuta  received the following manual therapy  techniques x 20  min. to include soft tissue and joint mobilization were applied to the: low back and gluteals to include:      Performed STM to LB paraspinals and QL R>L P/A mob to lumbar region Grade 1-2 , P/A and rot glides lumbar paraspinals  Sidelying L contract relax mobilization to L5-S1 region to level pelvis  at end of treatment and level pelvis achieved at beginning and end of session    Pt received tool-assisted massage with manual therapy techniques to B LB in prone to trigger an inflammatory healing response and stimulate the production of new collagen and proper, more functional, less painful healing.    Vacuum/cupping STM with manual therapy techniques was performed to back and gluteals to decrease muscle tightness, increase circulation and promote healing process.  The pt's skin was monitored for redness adjusting pressure as needed. The pt was instructed in possible side effects of bruising and/or soreness.       Ultrasound  for pain control and to decrease inflammation @ continuous duty cycle, 1 Mhz, applied to R lumbar paraspinals, intensity = 1.5 w/cm2 for 8 minutes. 2 min prep       Patient received pre-mod electrical stimulation to decrease muscle tightness and pain to Lumbar paraspinals/ sacral border of gluteals B for 15 minutes with MH supine with cycle time: continuous, beat frequency: , CC/CV: CV.      Patient Education and Home Exercises     Home Exercises Provided and Patient Education Provided     Education provided:   - focus on strengthening   - HEP   - stretch to point of tightness not pain   - exercise in pain free zone       Written Home Exercises Provided: continue prior HEP Exercises were reviewed and Danuta was able to demonstrate them prior to the end of the session.  Danuta demonstrated good understanding of the education provided. See EMR under Patient Instructions for  exercises provided during therapy sessions    ASSESSMENT   Pt has only been seen for 1 session since last progress report  Pt has been able to help manage symptoms with increased massage therapy sessions Pt having more difficulty recently with self mob technique lasting Pt exhibits similar ROM and strength from last session  and continues with some hip ext weakness with back pathology and pain Pt tolerated treatment well and able level pelvis and decreased pain at end of session     Danuta Is progressing well towards her goals.   Pt prognosis is Good.     Pt will continue to benefit from skilled outpatient physical therapy to address the goals listed in the initial evaluation, provide pt/family education and to maximize pt's level of independence in the home and community environment.     Pt's spiritual, cultural and educational needs considered and pt agreeable to plan of care and goals.     Anticipated barriers to physical therapy: work schedule    GOALS:   Short Term Goals:  4 weeks MET STG's  Increase range of motion 25%  Increase strength 1/2 muscle grade   Improve postural awareness of pelvis to independently identify dysfunction with min assist from PT  Be able to perform HEP with minimal cueing required     Long Term Goals: 8 weeks (Progressing, not met)  Increase range of motion to 75% to 100% full   Improve muscle strength 1 muscle grade  Improve muscle strength with MMT to 4+/5 to 5/5  Improve and stabilize proper pelvic positioning  Restore ability to sit for work and ADL without increased pain   Restore ability to stand for work and ADL with very min to 0 increased pain  Restore normal sleep habits with min to 0 disturbances due to pain  Restore ability to perform ADL's and household activities independently and with very min to 0 increased pain    PLAN   Continue with established plan of care towards PT goals.           Jacy Escobar, PT, MSPT

## 2024-10-22 NOTE — PROGRESS NOTES
"[facility]   Physical Therapy Progress and Treatment Note     Name: Danuta Burgos Tom  Clinic Number: [id number]    Therapy Diagnosis:   Encounter Diagnoses   Name Primary?    Muscle weakness Yes    Chronic bilateral low back pain with right-sided sciatica        Physician: Yumiko Davis MD    Visit Date: 10/22/2024    Physician Orders: PT Eval and Treat   Medical Diagnosis from Referral: M43.06 (ICD-10-CM) - Lumbar spondylolysis   Evaluation Date: 4/5/2024  Authorization Period Expiration: 12-31-24  Plan of Care Expiration: 11-8-24  Progress Note Due: 11-8-23  Visit # / Visits authorized: 13/20  FOTO: Issued Visit #: 1     MD Follow up appointment: none scheduled      Precautions: Standard and sacralization of L5 R     PTA Visit #: 0/5     Time In: 2:35   Time Out: 3:55  Total Billable Time:  50 minutes + ES    SUBJECTIVE     Pt reports: getting massages every week which helps but continues with some pain R LB and increasing tightness noted with less success with self mob recently Pt states L bunion beginning to bother him at times   Pt was compliant with home exercise program.    Response to previous treatment: felt better   Functional change: less pain with movement     Pain: 4/10  at end of session 1-2/10  Location: right LB with no R LE to mid calf    OBJECTIVE     Mod-severe muscle tightness with min tightness at end of session    Pelvic positioning: AR R at re-eval AR R      Lumbar active range of motion in standing is: 10-22-24  - flexion - floor                     - extension -  75%                         - left side bending -  to knee         - right side bending -  to knee              Lumbar active range of motion in standing is: at re-eval  - flexion - floor                     - extension -  50%                         - left side bending -  2" above knee         - right side bending -  2" above knee           Pelvic positioning: level at re-eval AR R    "   Flexibility testing:  - hamstrings:     90/90 test same as re-eval R 5 L 0           - gastrocnemius:   DF ankle R 10 degrees L 10 degrees     Muscle Strength 10-22-24  MMT R L   Knee extension 5/5 5/5   Knee flexion 5/5 5/5   Hip flexion 4+/5 4+/5   Hip abduction 5/5 5-/5   Hip extension 5/5 5/5   Glut max 4-/5 4/5      Muscle Strength re- eval  MMT R L   Knee extension 5/5 5/5   Knee flexion 5/5 5/5   Hip flexion 4-/5 4-/5   Hip abduction 4+/5 4+/5   Hip extension 4+/5 4+/5   Glut max 3+/5 3+/5       Palpation: min-mod TTP and severe tightness R lumbar paraspinals     Joint mobility: severe decreased lumbar mobility         Treatment     Danuta received the treatments listed below:      Therapeutic exercises were performed to improve ROM, strength, flexibility and stabilization for 15 minutes.   Reassessment   HS stretch supine   Glut stretch  Piriformis stretch  B QL stretch   (NP)Prone press up x 10 as tolerated      HOLD pt reported did not work as well    SL L prop on elbow to stretch R QL region    Sitting SB R     Child's pose x 5  Contract relax R glut x 3       Pt unable to self mobilize     Instructed further need to perform self mob technique at onset of symptoms due to increased tightness developing during day resulting in inability to self mobilize      Verbal review Pt is only performing 10 reps when she does ex    Pelvic tilt   x 10  Bridge x 10,   SLR x 10,   Trunk rotation supine x 10  Partial sit up x 10  Hip abd sidelying x 10 B,  Arm and Leg lift prone x 10, instructed pt to put pillow under stomach   Hip ext prone with bent knee x 10 instructed pt to put pillow under stomach  Sit to stand x 10  Instructed pt to hold press up for now and will note results  GSS standing x 10     Iron cross x 2 as needed  Plank x 30 sec x 1 Led to dysfunction so pt performed contract relax and realigned I   Contract relax R glut     Walking for exercise   Instructed pt to ice knee and hold walking for  exercise for now       Manual therapy: Danuta  received the following manual therapy  techniques x 25  min. to include soft tissue and joint mobilization were applied to the: low back and gluteals to include:      Performed STM to LB paraspinals and QL R>L P/A mob to lumbar region Grade 1-2 , P/A and rot glides lumbar paraspinals  Sidelying L contract relax mobilization to L5-S1 region to level pelvis  at end of treatment and level pelvis achieved at beginning and end of session    Pt received tool-assisted massage with manual therapy techniques to B LB in prone to trigger an inflammatory healing response and stimulate the production of new collagen and proper, more functional, less painful healing.    Vacuum/cupping STM with manual therapy techniques was performed to back and gluteals to decrease muscle tightness, increase circulation and promote healing process.  The pt's skin was monitored for redness adjusting pressure as needed. The pt was instructed in possible side effects of bruising and/or soreness.       Ultrasound  for pain control and to decrease inflammation @ continuous duty cycle, 1 Mhz, applied to R lumbar paraspinals, intensity = 1.5 w/cm2 for 8 minutes. 2 min prep       Patient received pre-mod electrical stimulation to decrease muscle tightness and pain to Lumbar paraspinals/ sacral border of gluteals B for 15 minutes with MH supine with cycle time: continuous, beat frequency: , CC/CV: CV.      Patient Education and Home Exercises     Home Exercises Provided and Patient Education Provided     Education provided:   - focus on strengthening   - HEP   - stretch to point of tightness not pain   - exercise in pain free zone       Written Home Exercises Provided: continue prior HEP Exercises were reviewed and Danuta was able to demonstrate them prior to the end of the session.  Danuta demonstrated good understanding of the education provided. See EMR under Patient Instructions for  exercises provided during therapy sessions    ASSESSMENT   Pt has only been seen for 1 session since last progress report  Pt has been able to help manage symptoms with increased massage therapy sessions Pt having more difficulty recently with self mob technique lasting Pt exhibits similar ROM and strength from last session  and continues with some hip ext weakness with back pathology and pain Pt tolerated treatment well and able level pelvis and decreased pain at end of session     Danuta Is progressing well towards her goals.   Pt prognosis is Good.     Pt will continue to benefit from skilled outpatient physical therapy to address the goals listed in the initial evaluation, provide pt/family education and to maximize pt's level of independence in the home and community environment.     Pt's spiritual, cultural and educational needs considered and pt agreeable to plan of care and goals.     Anticipated barriers to physical therapy: work schedule    GOALS:   Short Term Goals:  4 weeks MET STG's  Increase range of motion 25%  Increase strength 1/2 muscle grade   Improve postural awareness of pelvis to independently identify dysfunction with min assist from PT  Be able to perform HEP with minimal cueing required     Long Term Goals: 8 weeks (Progressing, not met)  Increase range of motion to 75% to 100% full   Improve muscle strength 1 muscle grade  Improve muscle strength with MMT to 4+/5 to 5/5  Improve and stabilize proper pelvic positioning  Restore ability to sit for work and ADL without increased pain   Restore ability to stand for work and ADL with very min to 0 increased pain  Restore normal sleep habits with min to 0 disturbances due to pain  Restore ability to perform ADL's and household activities independently and with very min to 0 increased pain    PLAN   Continue with established plan of care towards PT goals.           Jacy Escobar, PT, MSPT

## 2024-11-08 NOTE — PROGRESS NOTES
"[facility]   Physical Therapy Progress and Treatment Note     Name: Danuta Burgos Tom  Clinic Number: [id number]    Therapy Diagnosis:   Encounter Diagnoses   Name Primary?    Muscle weakness Yes    Chronic bilateral low back pain with right-sided sciatica        Physician: Yumiko Davis MD    Visit Date: 11/11/2024    Physician Orders: PT Eval and Treat   Medical Diagnosis from Referral: M43.06 (ICD-10-CM) - Lumbar spondylolysis   Evaluation Date: 4/5/2024  Authorization Period Expiration: 12-31-24  Plan of Care Expiration: 1-6-25  Progress Note Due: 12-11-24  Visit # / Visits authorized: 14/20  FOTO: Issued Visit #: 1     MD Follow up appointment: none scheduled      Precautions: Standard and sacralization of L5 R     PTA Visit #: 0/5     Time In: 2:32    Time Out: 3:55  Total Billable Time:  50 minutes + ES    SUBJECTIVE     Pt reports: continuing to get rolfing which has helped Pt has been able to self mobilize Even with the rolfing pt has developed pulling in leg because gets too tight  Pt was out of town and has not done strengthening recently Pt also to start swimming in the winter to help with strengthening   Pt was compliant with home exercise program.    Response to previous treatment: felt better   Functional change: less pain with movement     Pain: 4/10  at end of session 1/10  Location: right LB with no R LE to mid calf    OBJECTIVE     Mod-severe muscle tightness with min tightness at end of session    Pelvic positioning: AR R at re-eval AR R      Lumbar active range of motion in standing is: 11-11-24  - flexion - floor                     - extension -  75%                         - left side bending -  to knee         - right side bending -  to knee              Lumbar active range of motion in standing is: at re-eval  - flexion - floor                     - extension -  50%                         - left side bending -  2" above knee         - right side " "bending -  2" above knee           Pelvic positioning: level at re-eval AR R      Flexibility testing:  - hamstrings:     90/90 test same as re-eval R 5 L 0           - gastrocnemius:   DF ankle R 10 degrees L 10 degrees     Muscle Strength 11-11-24  MMT R L   Knee extension 5/5 5/5   Knee flexion 5/5 5/5   Hip flexion 4+/5 4+/5   Hip abduction 5/5 5-/5   Hip extension 5/5 5/5   Glut max 4-/5 4/5      Muscle Strength re- eval  MMT R L   Knee extension 5/5 5/5   Knee flexion 5/5 5/5   Hip flexion 4-/5 4-/5   Hip abduction 4+/5 4+/5   Hip extension 4+/5 4+/5   Glut max 3+/5 3+/5       Palpation: min-mod TTP and severe tightness R lumbar paraspinals     Joint mobility: severe decreased lumbar mobility         Treatment     Danuta received the treatments listed below:      Therapeutic exercises were performed to improve ROM, strength, flexibility and stabilization for 15 minutes.   Reassessment   HS stretch supine   Glut stretch  Piriformis stretch  B QL stretch   (NP)Prone press up x 10 as tolerated      HOLD pt reported did not work as well    SL L prop on elbow to stretch R QL region    Hold pt feels uncomfortable but doing SL Sitting SB R     Child's pose x 5  Contract relax R glut x 3       Pt unable to self mobilize     Instructed further need to perform self mob technique at onset of symptoms due to increased tightness developing during day resulting in inability to self mobilize      Verbal review Pt is only performing 10 reps when she does ex    Pelvic tilt   x 10  Bridge x 10,   SLR x 10,   Trunk rotation supine x 10  Partial sit up x 10  Hip abd sidelying x 10 B,  Arm and Leg lift prone x 10, instructed pt to put pillow under stomach   Hip ext prone with bent knee x 10 instructed pt to put pillow under stomach  Sit to stand x 10  Instructed pt to hold press up for now and will note results  GSS standing x 10     Iron cross x 2 as needed  Plank x 30 sec x 1 Led to dysfunction so pt performed contract relax " and realigned I   Contract relax R glut     Walking for exercise   Instructed pt to ice knee and hold walking for exercise for now       Manual therapy: Danuta  received the following manual therapy  techniques x 25  min. to include soft tissue and joint mobilization were applied to the: low back and gluteals to include:      Performed STM to LB paraspinals and QL R>L P/A mob to lumbar region Grade 1-2 , P/A and rot glides lumbar paraspinals  Sidelying L contract relax mobilization to L5-S1 region to level pelvis  at end of treatment and level pelvis achieved at beginning and end of session    Pt received tool-assisted massage with manual therapy techniques to B LB in prone to trigger an inflammatory healing response and stimulate the production of new collagen and proper, more functional, less painful healing.    Vacuum/cupping STM with manual therapy techniques was performed to back and gluteals to decrease muscle tightness, increase circulation and promote healing process.  The pt's skin was monitored for redness adjusting pressure as needed. The pt was instructed in possible side effects of bruising and/or soreness.       Ultrasound  for pain control and to decrease inflammation @ continuous duty cycle, 1 Mhz, applied to R lumbar paraspinals, intensity = 1.5 w/cm2 for 8 minutes. 2 min prep       Patient received pre-mod electrical stimulation to decrease muscle tightness and pain to Lumbar paraspinals/ sacral border of gluteals B for 15 minutes with MH supine with cycle time: continuous, beat frequency: , CC/CV: CV.      Patient Education and Home Exercises     Home Exercises Provided and Patient Education Provided     Education provided:   - focus on strengthening   - HEP   - stretch to point of tightness not pain   - exercise in pain free zone       Written Home Exercises Provided: continue prior HEP Exercises were reviewed and Danuta was able to demonstrate them prior to the end of the session.   Danuta demonstrated good understanding of the education provided. See EMR under Patient Instructions for exercises provided during therapy sessions    ASSESSMENT   Pt has only been seen for 2 sessions since reassessment on 9-13-24 Pt has been able to help manage symptoms with increased massage therapy sessions Pt having more difficulty recently with self mob technique lasting and developing pain down R LE Pt exhibits similar ROM and strength from last session  and continues with some hip ext weakness with back pathology and pain Pt tolerated treatment well and decreased pain at end of session and will continue focus on strength and stabilization     Danuta Is progressing well towards her goals.   Pt prognosis is Good.     Pt will continue to benefit from skilled outpatient physical therapy to address the goals listed in the initial evaluation, provide pt/family education and to maximize pt's level of independence in the home and community environment.     Pt's spiritual, cultural and educational needs considered and pt agreeable to plan of care and goals.     Anticipated barriers to physical therapy: work schedule    GOALS:   Short Term Goals:  4 weeks MET STG's  Increase range of motion 25%  Increase strength 1/2 muscle grade   Improve postural awareness of pelvis to independently identify dysfunction with min assist from PT  Be able to perform HEP with minimal cueing required     Long Term Goals: 8 weeks (Progressing, not met)  Increase range of motion to 75% to 100% full   Improve muscle strength 1 muscle grade  Improve muscle strength with MMT to 4+/5 to 5/5  Improve and stabilize proper pelvic positioning  Restore ability to sit for work and ADL without increased pain   Restore ability to stand for work and ADL with very min to 0 increased pain  Restore normal sleep habits with min to 0 disturbances due to pain  Restore ability to perform ADL's and household activities independently and with very min to 0  increased pain    PLAN   If you concur, I recommend patient continue with physical therapy 1-2 times a week as needed for 8 weeks.  Please advise us of your  recommendations. Thank you for allowing us to assist in the care of your patient.      Jacy Escobar, PT, MSPT

## 2024-11-11 ENCOUNTER — CLINICAL SUPPORT (OUTPATIENT)
Dept: REHABILITATION | Facility: HOSPITAL | Age: 69
End: 2024-11-11
Payer: COMMERCIAL

## 2024-11-11 DIAGNOSIS — M54.41 CHRONIC BILATERAL LOW BACK PAIN WITH RIGHT-SIDED SCIATICA: ICD-10-CM

## 2024-11-11 DIAGNOSIS — G89.29 CHRONIC BILATERAL LOW BACK PAIN WITH RIGHT-SIDED SCIATICA: ICD-10-CM

## 2024-11-11 DIAGNOSIS — M62.81 MUSCLE WEAKNESS: Primary | ICD-10-CM

## 2024-11-11 PROCEDURE — 97110 THERAPEUTIC EXERCISES: CPT | Mod: PN | Performed by: PHYSICAL THERAPIST

## 2024-11-11 PROCEDURE — 97014 ELECTRIC STIMULATION THERAPY: CPT | Mod: PN | Performed by: PHYSICAL THERAPIST

## 2024-11-11 PROCEDURE — 97140 MANUAL THERAPY 1/> REGIONS: CPT | Mod: PN | Performed by: PHYSICAL THERAPIST

## 2024-11-11 PROCEDURE — 97035 APP MDLTY 1+ULTRASOUND EA 15: CPT | Mod: PN | Performed by: PHYSICAL THERAPIST

## 2024-11-11 NOTE — PLAN OF CARE
"[facility]   Physical Therapy Progress and Treatment Note     Name: Danuta Burgos Tom  Clinic Number: [id number]    Therapy Diagnosis:   Encounter Diagnoses   Name Primary?    Muscle weakness Yes    Chronic bilateral low back pain with right-sided sciatica        Physician: Yumiko Davis MD    Visit Date: 11/11/2024    Physician Orders: PT Eval and Treat   Medical Diagnosis from Referral: M43.06 (ICD-10-CM) - Lumbar spondylolysis   Evaluation Date: 4/5/2024  Authorization Period Expiration: 12-31-24  Plan of Care Expiration: 1-6-25  Progress Note Due: 12-11-24  Visit # / Visits authorized: 14/20  FOTO: Issued Visit #: 1     MD Follow up appointment: none scheduled      Precautions: Standard and sacralization of L5 R     PTA Visit #: 0/5     Time In: 2:32    Time Out: 3:55  Total Billable Time:  50 minutes + ES    SUBJECTIVE     Pt reports: continuing to get rolfing which has helped Pt has been able to self mobilize Even with the rolfing pt has developed pulling in leg because gets too tight  Pt was out of town and has not done strengthening recently Pt also to start swimming in the winter to help with strengthening   Pt was compliant with home exercise program.    Response to previous treatment: felt better   Functional change: less pain with movement     Pain: 4/10  at end of session 1/10  Location: right LB with no R LE to mid calf    OBJECTIVE     Mod-severe muscle tightness with min tightness at end of session    Pelvic positioning: AR R at re-eval AR R      Lumbar active range of motion in standing is: 11-11-24  - flexion - floor                     - extension -  75%                         - left side bending -  to knee         - right side bending -  to knee              Lumbar active range of motion in standing is: at re-eval  - flexion - floor                     - extension -  50%                         - left side bending -  2" above knee         - right side " "bending -  2" above knee           Pelvic positioning: level at re-eval AR R      Flexibility testing:  - hamstrings:     90/90 test same as re-eval R 5 L 0           - gastrocnemius:   DF ankle R 10 degrees L 10 degrees     Muscle Strength 11-11-24  MMT R L   Knee extension 5/5 5/5   Knee flexion 5/5 5/5   Hip flexion 4+/5 4+/5   Hip abduction 5/5 5-/5   Hip extension 5/5 5/5   Glut max 4-/5 4/5      Muscle Strength re- eval  MMT R L   Knee extension 5/5 5/5   Knee flexion 5/5 5/5   Hip flexion 4-/5 4-/5   Hip abduction 4+/5 4+/5   Hip extension 4+/5 4+/5   Glut max 3+/5 3+/5       Palpation: min-mod TTP and severe tightness R lumbar paraspinals     Joint mobility: severe decreased lumbar mobility         Treatment     Danuta received the treatments listed below:      Therapeutic exercises were performed to improve ROM, strength, flexibility and stabilization for 15 minutes.   Reassessment   HS stretch supine   Glut stretch  Piriformis stretch  B QL stretch   (NP)Prone press up x 10 as tolerated      HOLD pt reported did not work as well    SL L prop on elbow to stretch R QL region    Hold pt feels uncomfortable but doing SL Sitting SB R     Child's pose x 5  Contract relax R glut x 3       Pt unable to self mobilize     Instructed further need to perform self mob technique at onset of symptoms due to increased tightness developing during day resulting in inability to self mobilize      Verbal review Pt is only performing 10 reps when she does ex    Pelvic tilt   x 10  Bridge x 10,   SLR x 10,   Trunk rotation supine x 10  Partial sit up x 10  Hip abd sidelying x 10 B,  Arm and Leg lift prone x 10, instructed pt to put pillow under stomach   Hip ext prone with bent knee x 10 instructed pt to put pillow under stomach  Sit to stand x 10  Instructed pt to hold press up for now and will note results  GSS standing x 10     Iron cross x 2 as needed  Plank x 30 sec x 1 Led to dysfunction so pt performed contract relax " and realigned I   Contract relax R glut     Walking for exercise   Instructed pt to ice knee and hold walking for exercise for now       Manual therapy: Danuta  received the following manual therapy  techniques x 25  min. to include soft tissue and joint mobilization were applied to the: low back and gluteals to include:      Performed STM to LB paraspinals and QL R>L P/A mob to lumbar region Grade 1-2 , P/A and rot glides lumbar paraspinals  Sidelying L contract relax mobilization to L5-S1 region to level pelvis  at end of treatment and level pelvis achieved at beginning and end of session    Pt received tool-assisted massage with manual therapy techniques to B LB in prone to trigger an inflammatory healing response and stimulate the production of new collagen and proper, more functional, less painful healing.    Vacuum/cupping STM with manual therapy techniques was performed to back and gluteals to decrease muscle tightness, increase circulation and promote healing process.  The pt's skin was monitored for redness adjusting pressure as needed. The pt was instructed in possible side effects of bruising and/or soreness.       Ultrasound  for pain control and to decrease inflammation @ continuous duty cycle, 1 Mhz, applied to R lumbar paraspinals, intensity = 1.5 w/cm2 for 8 minutes. 2 min prep       Patient received pre-mod electrical stimulation to decrease muscle tightness and pain to Lumbar paraspinals/ sacral border of gluteals B for 15 minutes with MH supine with cycle time: continuous, beat frequency: , CC/CV: CV.      Patient Education and Home Exercises     Home Exercises Provided and Patient Education Provided     Education provided:   - focus on strengthening   - HEP   - stretch to point of tightness not pain   - exercise in pain free zone       Written Home Exercises Provided: continue prior HEP Exercises were reviewed and Danuta was able to demonstrate them prior to the end of the session.   Danuta demonstrated good understanding of the education provided. See EMR under Patient Instructions for exercises provided during therapy sessions    ASSESSMENT   Pt has only been seen for 2 sessions since reassessment on 9-13-24 Pt has been able to help manage symptoms with increased massage therapy sessions Pt having more difficulty recently with self mob technique lasting and developing pain down R LE Pt exhibits similar ROM and strength from last session  and continues with some hip ext weakness with back pathology and pain Pt tolerated treatment well and decreased pain at end of session and will continue focus on strength and stabilization     Danuta Is progressing well towards her goals.   Pt prognosis is Good.     Pt will continue to benefit from skilled outpatient physical therapy to address the goals listed in the initial evaluation, provide pt/family education and to maximize pt's level of independence in the home and community environment.     Pt's spiritual, cultural and educational needs considered and pt agreeable to plan of care and goals.     Anticipated barriers to physical therapy: work schedule    GOALS:   Short Term Goals:  4 weeks MET STG's  Increase range of motion 25%  Increase strength 1/2 muscle grade   Improve postural awareness of pelvis to independently identify dysfunction with min assist from PT  Be able to perform HEP with minimal cueing required     Long Term Goals: 8 weeks (Progressing, not met)  Increase range of motion to 75% to 100% full   Improve muscle strength 1 muscle grade  Improve muscle strength with MMT to 4+/5 to 5/5  Improve and stabilize proper pelvic positioning  Restore ability to sit for work and ADL without increased pain   Restore ability to stand for work and ADL with very min to 0 increased pain  Restore normal sleep habits with min to 0 disturbances due to pain  Restore ability to perform ADL's and household activities independently and with very min to 0  increased pain    PLAN   If you concur, I recommend patient continue with physical therapy 1-2 times a week as needed for 8 weeks.  Please advise us of your  recommendations. Thank you for allowing us to assist in the care of your patient.      Jacy Escobar, PT, MSPT

## 2024-11-26 NOTE — PROGRESS NOTES
[facility]   Physical Therapy  Treatment Note     Name: Danuta Santos  Clinic Number: [id number]    Therapy Diagnosis:   Encounter Diagnoses   Name Primary?    Muscle weakness Yes    Chronic bilateral low back pain with right-sided sciatica        Physician: Yumiko Davis MD    Visit Date: 11/27/2024    Physician Orders: PT Eval and Treat   Medical Diagnosis from Referral: M43.06 (ICD-10-CM) - Lumbar spondylolysis   Evaluation Date: 4/5/2024  Authorization Period Expiration: 12-31-24  Plan of Care Expiration: 1-6-25  Progress Note Due: 12-11-24  Visit # / Visits authorized: 16/20  FOTO: Issued Visit #: 1     MD Follow up appointment: none scheduled      Precautions: Standard and sacralization of L5 R     PTA Visit #: 0/5     Time In: 9:45  Time Out: 10;45  Total Billable Time:  45 minutes + ES    SUBJECTIVE     Pt reports: doing pretty well doing walking and ex    Pt was compliant with home exercise program.    Response to previous treatment: felt better   Functional change: less pain with movement     Pain: 4/10  at end of session 1-2/10  Location: right LB with no R LE to mid calf    OBJECTIVE     Mod-severe muscle tightness with min tightness at end of session    Pelvic positioning: AR R at re-eval AR R       Treatment     Danuta received the treatments listed below:      Therapeutic exercises were performed to improve ROM, strength, flexibility and stabilization for 10 minutes.     HS stretch supine   Glut stretch  Piriformis stretch  B QL stretch       (NP)Prone press up x 10 as tolerated      HOLD pt reported did not work as well    SL L prop on elbow to stretch R QL region    Hold pt feels uncomfortable but doing SL Sitting SB R     Child's pose x 5  Contract relax R glut x 3       Pt able to self mobilize     Instructed further need to perform self mob technique at onset of symptoms due to increased tightness developing during day resulting in inability to self  mobilize      Verbal review Pt is only performing 10 reps when she does ex    Pelvic tilt   x 10  Bridge x 10,   SLR x 10,   Trunk rotation supine x 10  Partial sit up x 10  Hip abd sidelying x 10 B,  Arm and Leg lift prone x 10, instructed pt to put pillow under stomach   Hip ext prone with bent knee x 10 instructed pt to put pillow under stomach  Sit to stand x 10  Instructed pt to hold press up for now and will note results  GSS standing x 10     Iron cross x 2 as needed  Plank x 30 sec x 1 Led to dysfunction so pt performed contract relax and realigned I   Contract relax R glut     Walking for exercise   Instructed pt to ice knee and hold walking for exercise for now       Manual therapy: Danuta  received the following manual therapy  techniques x 25  min. to include soft tissue and joint mobilization were applied to the: low back and gluteals to include:      Performed STM to LB paraspinals and QL R>L P/A mob to lumbar region Grade 1-2 , P/A and rot glides lumbar paraspinals  Sidelying L contract relax mobilization to L5-S1 region to level pelvis  at end of treatment and level pelvis achieved at beginning and end of session    Pt received tool-assisted massage with manual therapy techniques to B LB in prone to trigger an inflammatory healing response and stimulate the production of new collagen and proper, more functional, less painful healing.    Vacuum/cupping STM with manual therapy techniques was performed to back and gluteals to decrease muscle tightness, increase circulation and promote healing process.  The pt's skin was monitored for redness adjusting pressure as needed. The pt was instructed in possible side effects of bruising and/or soreness.       Ultrasound  for pain control and to decrease inflammation @ continuous duty cycle, 1 Mhz, applied to R lumbar paraspinals, intensity = 1.5 w/cm2 for 8 minutes. 2 min prep       Patient received pre-mod electrical stimulation to decrease muscle tightness  and pain to Lumbar paraspinals/ sacral border of gluteals B for 15 minutes with MH supine with cycle time: continuous, beat frequency: , CC/CV: CV.      Patient Education and Home Exercises     Home Exercises Provided and Patient Education Provided     Education provided:   - focus on strengthening   - HEP   - stretch to point of tightness not pain   - exercise in pain free zone       Written Home Exercises Provided: continue prior HEP Exercises were reviewed and Danuta was able to demonstrate them prior to the end of the session.  Danuta demonstrated good understanding of the education provided. See EMR under Patient Instructions for exercises provided during therapy sessions    ASSESSMENT   Pt has continued with tightness which responds well to manual therapy.  Pt was able to self mobilize which kept symptoms low but able to get lower after manual therapy and modalities Pt scott treatment well and continue focus on HEP     Danuta Is progressing well towards her goals.   Pt prognosis is Good.     Pt will continue to benefit from skilled outpatient physical therapy to address the goals listed in the initial evaluation, provide pt/family education and to maximize pt's level of independence in the home and community environment.     Pt's spiritual, cultural and educational needs considered and pt agreeable to plan of care and goals.     Anticipated barriers to physical therapy: work schedule    GOALS:   Short Term Goals:  4 weeks MET STG's  Increase range of motion 25%  Increase strength 1/2 muscle grade   Improve postural awareness of pelvis to independently identify dysfunction with min assist from PT  Be able to perform HEP with minimal cueing required     Long Term Goals: 8 weeks (Progressing, not met)  Increase range of motion to 75% to 100% full   Improve muscle strength 1 muscle grade  Improve muscle strength with MMT to 4+/5 to 5/5  Improve and stabilize proper pelvic positioning  Restore ability  to sit for work and ADL without increased pain   Restore ability to stand for work and ADL with very min to 0 increased pain  Restore normal sleep habits with min to 0 disturbances due to pain  Restore ability to perform ADL's and household activities independently and with very min to 0 increased pain    PLAN   Continue with established plan of care towards PT goals.      Jacy Escobar, PT, MSPT

## 2024-11-27 ENCOUNTER — CLINICAL SUPPORT (OUTPATIENT)
Dept: REHABILITATION | Facility: HOSPITAL | Age: 69
End: 2024-11-27
Payer: COMMERCIAL

## 2024-11-27 DIAGNOSIS — M54.41 CHRONIC BILATERAL LOW BACK PAIN WITH RIGHT-SIDED SCIATICA: ICD-10-CM

## 2024-11-27 DIAGNOSIS — G89.29 CHRONIC BILATERAL LOW BACK PAIN WITH RIGHT-SIDED SCIATICA: ICD-10-CM

## 2024-11-27 DIAGNOSIS — M62.81 MUSCLE WEAKNESS: Primary | ICD-10-CM

## 2024-11-27 PROCEDURE — 97140 MANUAL THERAPY 1/> REGIONS: CPT | Mod: PN | Performed by: PHYSICAL THERAPIST

## 2024-11-27 PROCEDURE — 97014 ELECTRIC STIMULATION THERAPY: CPT | Mod: PN | Performed by: PHYSICAL THERAPIST

## 2024-11-27 PROCEDURE — 97110 THERAPEUTIC EXERCISES: CPT | Mod: PN | Performed by: PHYSICAL THERAPIST

## 2024-11-27 PROCEDURE — 97035 APP MDLTY 1+ULTRASOUND EA 15: CPT | Mod: PN | Performed by: PHYSICAL THERAPIST

## 2024-12-17 ENCOUNTER — PATIENT MESSAGE (OUTPATIENT)
Dept: INTERNAL MEDICINE | Facility: CLINIC | Age: 69
End: 2024-12-17
Payer: COMMERCIAL

## 2024-12-17 RX ORDER — VALACYCLOVIR HYDROCHLORIDE 500 MG/1
500 TABLET, FILM COATED ORAL 2 TIMES DAILY
Qty: 10 TABLET | Refills: 2 | Status: SHIPPED | OUTPATIENT
Start: 2024-12-17 | End: 2025-12-17

## 2024-12-20 NOTE — PROGRESS NOTES
"[facility]   Physical Therapy Progress and Treatment Note     Name: Danuta Santos  Clinic Number: [id number]    Therapy Diagnosis:   Encounter Diagnoses   Name Primary?    Muscle weakness Yes    Chronic bilateral low back pain with right-sided sciatica        Physician: Yumiko Davis MD    Visit Date: 12/23/2024    Physician Orders: PT Eval and Treat   Medical Diagnosis from Referral: M43.06 (ICD-10-CM) - Lumbar spondylolysis   Evaluation Date: 4/5/2024  Authorization Period Expiration: 12-31-24  Plan of Care Expiration: 1-6-25  Progress Note Due: 1-6-24  Visit # / Visits authorized: 17/20  FOTO: Issued Visit #: 1     MD Follow up appointment: none scheduled      Precautions: Standard and sacralization of L5 R     PTA Visit #: 0/5     Time In: 11:18   Time Out: 12:20  Total Billable Time:  45 minutes + ES    SUBJECTIVE     Pt reports: doing pretty well doing walking and ex  which helps when she does the work but tightens up quickly CC is tightness Pt states she gets to a point when she cannot stretch on her own   Pt was compliant with home exercise program.    Response to previous treatment: felt better   Functional change: less pain with movement     Pain: 3.5/10  at end of session 1-2/10  Location: right LB with no R LE to mid calf    OBJECTIVE     Mod-severe muscle tightness with min tightness at end of session    Pelvic positioning: AR R at re-eval AR R  Pt able to self mobilize today    Lumbar active range of motion in standing is: 12-23-24  - flexion - floor                     - extension -  75%                         - left side bending -  to knee         - right side bending -  to knee              Lumbar active range of motion in standing is: at re-eval  - flexion - floor                     - extension -  50%                         - left side bending -  2" above knee         - right side bending -  2" above knee              Flexibility testing:  - hamstrings:  "    90/90 test same as re-eval R 5 L 0           - gastrocnemius:   DF ankle R 10 degrees L 10 degrees     Muscle Strength 12-23-24  MMT R L   Knee extension 5/5 5/5   Knee flexion 5/5 5/5   Hip flexion 4+/5 4+/5   Hip abduction 5/5 5-/5   Hip extension 5/5 5/5   Glut max 4-/5 4/5      Muscle Strength re- eval  MMT R L   Knee extension 5/5 5/5   Knee flexion 5/5 5/5   Hip flexion 4-/5 4-/5   Hip abduction 4+/5 4+/5   Hip extension 4+/5 4+/5   Glut max 3+/5 3+/5       Palpation: min-mod TTP and severe tightness R lumbar paraspinals     Joint mobility: severe decreased lumbar mobility       Treatment     Danuta received the treatments listed below:      Therapeutic exercises were performed to improve ROM, strength, flexibility and stabilization for 15 minutes.     HS stretch supine   Glut stretch  Piriformis stretch  B QL stretch     Pt able to self mobilize    (NP)Prone press up x 10 as tolerated      HOLD pt reported did not work as well    SL L prop on elbow to stretch R QL region    Hold pt feels uncomfortable but doing SL Sitting SB R     Child's pose x 5  Contract relax R glut x 3       Pt able to self mobilize     Instructed further need to perform self mob technique at onset of symptoms due to increased tightness developing during day resulting in inability to self mobilize      Verbal review Pt is only performing 10 reps when she does ex    Pelvic tilt   x 10  Bridge x 10,   SLR x 10,   Trunk rotation supine x 10  Partial sit up x 10  Hip abd sidelying x 10 B,  Arm and Leg lift prone x 10, instructed pt to put pillow under stomach   Hip ext prone with bent knee x 10 instructed pt to put pillow under stomach  Sit to stand x 10  Instructed pt to hold press up for now and will note results  GSS standing x 10     Iron cross x 2 as needed  Plank x 30 sec x 1 Led to dysfunction so pt performed contract relax and realigned I   Contract relax R glut     Walking for exercise   Instructed pt to ice knee and hold  walking for exercise for now       Manual therapy: Danuta  received the following manual therapy  techniques x 20  min. to include soft tissue and joint mobilization were applied to the: low back and gluteals to include:    INstructed pt in use of theragun and provided info on OTC purchase  Performed STM to LB paraspinals and QL R>L P/A mob to lumbar region Grade 1-2 , P/A and rot glides lumbar paraspinals  Sidelying L contract relax mobilization to L5-S1 region to level pelvis  at end of treatment and level pelvis achieved at beginning and end of session    Pt received tool-assisted massage with manual therapy techniques to B LB in prone to trigger an inflammatory healing response and stimulate the production of new collagen and proper, more functional, less painful healing.    Vacuum/cupping STM with manual therapy techniques was performed to back and gluteals to decrease muscle tightness, increase circulation and promote healing process.  The pt's skin was monitored for redness adjusting pressure as needed. The pt was instructed in possible side effects of bruising and/or soreness.       Ultrasound  for pain control and to decrease inflammation @ continuous duty cycle, 1 Mhz, applied to R lumbar paraspinals, intensity = 1.5 w/cm2 for 8 minutes. 2 min prep       Patient received pre-mod electrical stimulation to decrease muscle tightness and pain to Lumbar paraspinals/ sacral border of gluteals B for 15 minutes with MH supine with cycle time: continuous, beat frequency: , CC/CV: CV.      Patient Education and Home Exercises     Home Exercises Provided and Patient Education Provided     Education provided:   - focus on strengthening   - HEP   - stretch to point of tightness not pain   - exercise in pain free zone       Written Home Exercises Provided: continue prior HEP Exercises were reviewed and Danuta was able to demonstrate them prior to the end of the session.  Danuta demonstrated good  understanding of the education provided. See EMR under Patient Instructions for exercises provided during therapy sessions    ASSESSMENT   Pt has been seen for 2 visits since last progress report 11-11-24 Pt has been sick simitlar findings from past progress report  Pt has been able to self manage symptoms fairly well but continues with tightness and now unable to sustain Introduced pt to theragun, since unable to self cup and may benefit from this massage tool to decrease symptoms and keep tightness more in control for self management.      Danuta Is progressing well towards her goals.   Pt prognosis is Good.     Pt will continue to benefit from skilled outpatient physical therapy to address the goals listed in the initial evaluation, provide pt/family education and to maximize pt's level of independence in the home and community environment.     Pt's spiritual, cultural and educational needs considered and pt agreeable to plan of care and goals.     Anticipated barriers to physical therapy: work schedule    GOALS:   Short Term Goals:  4 weeks MET STG's  Increase range of motion 25%  Increase strength 1/2 muscle grade   Improve postural awareness of pelvis to independently identify dysfunction with min assist from PT  Be able to perform HEP with minimal cueing required     Long Term Goals: 8 weeks (Progressing, not met)  Increase range of motion to 75% to 100% full   Improve muscle strength 1 muscle grade  Improve muscle strength with MMT to 4+/5 to 5/5  Improve and stabilize proper pelvic positioning  Restore ability to sit for work and ADL without increased pain   Restore ability to stand for work and ADL with very min to 0 increased pain  Restore normal sleep habits with min to 0 disturbances due to pain  Restore ability to perform ADL's and household activities independently and with very min to 0 increased pain    PLAN   Continue with established plan of care towards PT goals.      Jacy Escobar,  PT, MSPT

## 2024-12-23 ENCOUNTER — CLINICAL SUPPORT (OUTPATIENT)
Dept: REHABILITATION | Facility: HOSPITAL | Age: 69
End: 2024-12-23
Payer: COMMERCIAL

## 2024-12-23 DIAGNOSIS — M62.81 MUSCLE WEAKNESS: Primary | ICD-10-CM

## 2024-12-23 DIAGNOSIS — G89.29 CHRONIC BILATERAL LOW BACK PAIN WITH RIGHT-SIDED SCIATICA: ICD-10-CM

## 2024-12-23 DIAGNOSIS — M54.41 CHRONIC BILATERAL LOW BACK PAIN WITH RIGHT-SIDED SCIATICA: ICD-10-CM

## 2024-12-23 PROCEDURE — 97140 MANUAL THERAPY 1/> REGIONS: CPT | Mod: PN | Performed by: PHYSICAL THERAPIST

## 2024-12-23 PROCEDURE — 97110 THERAPEUTIC EXERCISES: CPT | Mod: PN | Performed by: PHYSICAL THERAPIST

## 2024-12-23 PROCEDURE — 97014 ELECTRIC STIMULATION THERAPY: CPT | Mod: PN | Performed by: PHYSICAL THERAPIST

## 2024-12-23 PROCEDURE — 97035 APP MDLTY 1+ULTRASOUND EA 15: CPT | Mod: PN | Performed by: PHYSICAL THERAPIST

## 2024-12-23 NOTE — PLAN OF CARE
"[facility]   Physical Therapy Progress and Treatment Note     Name: Danuta Santos  Clinic Number: [id number]    Therapy Diagnosis:   Encounter Diagnoses   Name Primary?    Muscle weakness Yes    Chronic bilateral low back pain with right-sided sciatica        Physician: Yumiko Davis MD    Visit Date: 12/23/2024    Physician Orders: PT Eval and Treat   Medical Diagnosis from Referral: M43.06 (ICD-10-CM) - Lumbar spondylolysis   Evaluation Date: 4/5/2024  Authorization Period Expiration: 12-31-24  Plan of Care Expiration: 1-6-25  Progress Note Due: 1-6-24  Visit # / Visits authorized: 17/20  FOTO: Issued Visit #: 1     MD Follow up appointment: none scheduled      Precautions: Standard and sacralization of L5 R     PTA Visit #: 0/5     Time In: 11:18   Time Out: 12:20  Total Billable Time:  45 minutes + ES    SUBJECTIVE     Pt reports: doing pretty well doing walking and ex  which helps when she does the work but tightens up quickly CC is tightness Pt states she gets to a point when she cannot stretch on her own   Pt was compliant with home exercise program.    Response to previous treatment: felt better   Functional change: less pain with movement     Pain: 3.5/10  at end of session 1-2/10  Location: right LB with no R LE to mid calf    OBJECTIVE     Mod-severe muscle tightness with min tightness at end of session    Pelvic positioning: AR R at re-eval AR R  Pt able to self mobilize today    Lumbar active range of motion in standing is: 12-23-24  - flexion - floor                     - extension -  75%                         - left side bending -  to knee         - right side bending -  to knee              Lumbar active range of motion in standing is: at re-eval  - flexion - floor                     - extension -  50%                         - left side bending -  2" above knee         - right side bending -  2" above knee              Flexibility testing:  - hamstrings:  "    90/90 test same as re-eval R 5 L 0           - gastrocnemius:   DF ankle R 10 degrees L 10 degrees     Muscle Strength 12-23-24  MMT R L   Knee extension 5/5 5/5   Knee flexion 5/5 5/5   Hip flexion 4+/5 4+/5   Hip abduction 5/5 5-/5   Hip extension 5/5 5/5   Glut max 4-/5 4/5      Muscle Strength re- eval  MMT R L   Knee extension 5/5 5/5   Knee flexion 5/5 5/5   Hip flexion 4-/5 4-/5   Hip abduction 4+/5 4+/5   Hip extension 4+/5 4+/5   Glut max 3+/5 3+/5       Palpation: min-mod TTP and severe tightness R lumbar paraspinals     Joint mobility: severe decreased lumbar mobility       Treatment     Danuta received the treatments listed below:      Therapeutic exercises were performed to improve ROM, strength, flexibility and stabilization for 15 minutes.     HS stretch supine   Glut stretch  Piriformis stretch  B QL stretch     Pt able to self mobilize    (NP)Prone press up x 10 as tolerated      HOLD pt reported did not work as well    SL L prop on elbow to stretch R QL region    Hold pt feels uncomfortable but doing SL Sitting SB R     Child's pose x 5  Contract relax R glut x 3       Pt able to self mobilize     Instructed further need to perform self mob technique at onset of symptoms due to increased tightness developing during day resulting in inability to self mobilize      Verbal review Pt is only performing 10 reps when she does ex    Pelvic tilt   x 10  Bridge x 10,   SLR x 10,   Trunk rotation supine x 10  Partial sit up x 10  Hip abd sidelying x 10 B,  Arm and Leg lift prone x 10, instructed pt to put pillow under stomach   Hip ext prone with bent knee x 10 instructed pt to put pillow under stomach  Sit to stand x 10  Instructed pt to hold press up for now and will note results  GSS standing x 10     Iron cross x 2 as needed  Plank x 30 sec x 1 Led to dysfunction so pt performed contract relax and realigned I   Contract relax R glut     Walking for exercise   Instructed pt to ice knee and hold  walking for exercise for now       Manual therapy: Danuta  received the following manual therapy  techniques x 20  min. to include soft tissue and joint mobilization were applied to the: low back and gluteals to include:    INstructed pt in use of theragun and provided info on OTC purchase  Performed STM to LB paraspinals and QL R>L P/A mob to lumbar region Grade 1-2 , P/A and rot glides lumbar paraspinals  Sidelying L contract relax mobilization to L5-S1 region to level pelvis  at end of treatment and level pelvis achieved at beginning and end of session    Pt received tool-assisted massage with manual therapy techniques to B LB in prone to trigger an inflammatory healing response and stimulate the production of new collagen and proper, more functional, less painful healing.    Vacuum/cupping STM with manual therapy techniques was performed to back and gluteals to decrease muscle tightness, increase circulation and promote healing process.  The pt's skin was monitored for redness adjusting pressure as needed. The pt was instructed in possible side effects of bruising and/or soreness.       Ultrasound  for pain control and to decrease inflammation @ continuous duty cycle, 1 Mhz, applied to R lumbar paraspinals, intensity = 1.5 w/cm2 for 8 minutes. 2 min prep       Patient received pre-mod electrical stimulation to decrease muscle tightness and pain to Lumbar paraspinals/ sacral border of gluteals B for 15 minutes with MH supine with cycle time: continuous, beat frequency: , CC/CV: CV.      Patient Education and Home Exercises     Home Exercises Provided and Patient Education Provided     Education provided:   - focus on strengthening   - HEP   - stretch to point of tightness not pain   - exercise in pain free zone       Written Home Exercises Provided: continue prior HEP Exercises were reviewed and Danuta was able to demonstrate them prior to the end of the session.  Danuta demonstrated good  understanding of the education provided. See EMR under Patient Instructions for exercises provided during therapy sessions    ASSESSMENT   Pt has been seen for 2 visits since last progress report 11-11-24 Pt has been sick simitlar findings from past progress report  Pt has been able to self manage symptoms fairly well but continues with tightness and now unable to sustain Introduced pt to theragun, since unable to self cup and may benefit from this massage tool to decrease symptoms and keep tightness more in control for self management.      Danuta Is progressing well towards her goals.   Pt prognosis is Good.     Pt will continue to benefit from skilled outpatient physical therapy to address the goals listed in the initial evaluation, provide pt/family education and to maximize pt's level of independence in the home and community environment.     Pt's spiritual, cultural and educational needs considered and pt agreeable to plan of care and goals.     Anticipated barriers to physical therapy: work schedule    GOALS:   Short Term Goals:  4 weeks MET STG's  Increase range of motion 25%  Increase strength 1/2 muscle grade   Improve postural awareness of pelvis to independently identify dysfunction with min assist from PT  Be able to perform HEP with minimal cueing required     Long Term Goals: 8 weeks (Progressing, not met)  Increase range of motion to 75% to 100% full   Improve muscle strength 1 muscle grade  Improve muscle strength with MMT to 4+/5 to 5/5  Improve and stabilize proper pelvic positioning  Restore ability to sit for work and ADL without increased pain   Restore ability to stand for work and ADL with very min to 0 increased pain  Restore normal sleep habits with min to 0 disturbances due to pain  Restore ability to perform ADL's and household activities independently and with very min to 0 increased pain    PLAN   Continue with established plan of care towards PT goals.      Jacy Escobar,  PT, MSPT

## 2024-12-24 RX ORDER — ACYCLOVIR 50 MG/G
OINTMENT TOPICAL
Qty: 15 G | Refills: 0 | Status: SHIPPED | OUTPATIENT
Start: 2024-12-24

## 2024-12-30 ENCOUNTER — CLINICAL SUPPORT (OUTPATIENT)
Dept: REHABILITATION | Facility: HOSPITAL | Age: 69
End: 2024-12-30
Payer: COMMERCIAL

## 2024-12-30 DIAGNOSIS — M54.41 CHRONIC BILATERAL LOW BACK PAIN WITH RIGHT-SIDED SCIATICA: ICD-10-CM

## 2024-12-30 DIAGNOSIS — M62.81 MUSCLE WEAKNESS: Primary | ICD-10-CM

## 2024-12-30 DIAGNOSIS — G89.29 CHRONIC BILATERAL LOW BACK PAIN WITH RIGHT-SIDED SCIATICA: ICD-10-CM

## 2024-12-30 PROCEDURE — 97140 MANUAL THERAPY 1/> REGIONS: CPT | Mod: PN | Performed by: PHYSICAL THERAPIST

## 2024-12-30 PROCEDURE — 97035 APP MDLTY 1+ULTRASOUND EA 15: CPT | Mod: PN | Performed by: PHYSICAL THERAPIST

## 2024-12-30 PROCEDURE — 97110 THERAPEUTIC EXERCISES: CPT | Mod: PN | Performed by: PHYSICAL THERAPIST

## 2024-12-30 NOTE — PROGRESS NOTES
"[facility]   Physical Therapy Progress and Treatment Note     Name: Danuta Burgos Cameron  Clinic Number: [id number]    Therapy Diagnosis:   Encounter Diagnoses   Name Primary?    Muscle weakness Yes    Chronic bilateral low back pain with right-sided sciatica        Physician: Yumiko Davis MD    Visit Date: 12/30/2024    Physician Orders: PT Eval and Treat   Medical Diagnosis from Referral: M43.06 (ICD-10-CM) - Lumbar spondylolysis   Evaluation Date: 4/5/2024  Authorization Period Expiration: 12-31-24  Plan of Care Expiration: 1-6-25  Progress Note Due: 1-6-24  Visit # / Visits authorized: 18/20  FOTO: Issued Visit #: 1     MD Follow up appointment: none scheduled      Precautions: Standard and sacralization of L5 R     PTA Visit #: 0/5     Time In: 11:22  Time Out: 12:05  Total Billable Time:  43 minutes     SUBJECTIVE     Pt reports: she had traveled to [place] and led to increased back pain Pt states she used a heated back massager for 2 hours  and led to increased pain in mid back     Pt was compliant with home exercise program.    Response to previous treatment: felt better   Functional change: less pain with movement     Pain: 4-5/10  at end of session 1-2/10  Location: right LB with no R LE to mid calf    OBJECTIVE     Mod-severe muscle tightness with min tightness at end of session  Noted mid back area and pt had an area about  T 10 that looked like she had a 1" vertical burn to the skin    Pelvic positioning: AR R at re-eval AR R  Pt able to self mobilize today        Treatment     Danuta received the treatments listed below:      Therapeutic exercises were performed to improve ROM, strength, flexibility and stabilization for 10 minutes.   Verbal review  HS stretch supine   Glut stretch  Piriformis stretch  B QL stretch     Pt unable to self mobilize at home and did not have pt perform supine ex on mat to avoid pressure on mid back area     (NP)Prone press up x 10 as " tolerated      HOLD pt reported did not work as well    SL L prop on elbow to stretch R QL region    Hold pt feels uncomfortable but doing SL Sitting SB R     Child's pose x 5  Contract relax R glut x 3       Pt able to self mobilize     Instructed further need to perform self mob technique at onset of symptoms due to increased tightness developing during day resulting in inability to self mobilize      Verbal review Pt is only performing 10 reps when she does ex    Pelvic tilt   x 10  Bridge x 10,   SLR x 10,   Trunk rotation supine x 10  Partial sit up x 10  Hip abd sidelying x 10 B,  Arm and Leg lift prone x 10, instructed pt to put pillow under stomach   Hip ext prone with bent knee x 10 instructed pt to put pillow under stomach  Sit to stand x 10  Instructed pt to hold press up for now and will note results  GSS standing x 10     Iron cross x 2 as needed  Plank x 30 sec x 1 Led to dysfunction so pt performed contract relax and realigned I   Contract relax R glut     Walking for exercise   Instructed pt to ice knee and hold walking for exercise for now       Manual therapy: Danuta  received the following manual therapy  techniques x 23  min. to include soft tissue and joint mobilization were applied to the: low back and gluteals to include:    INstructed pt in need to keep heating to 15-20 min  Performed STM to LB paraspinals and QL R>L P/A mob to lumbar region Grade 1-2 , P/A and rot glides lumbar paraspinals  Sidelying L contract relax mobilization to L5-S1 region to level pelvis  at end of treatment and level pelvis achieved at beginning and end of session    Pt received tool-assisted massage with manual therapy techniques to B LB in prone to trigger an inflammatory healing response and stimulate the production of new collagen and proper, more functional, less painful healing.    Vacuum/cupping STM with manual therapy techniques was performed to back and gluteals to decrease muscle tightness, increase  circulation and promote healing process.  The pt's skin was monitored for redness adjusting pressure as needed. The pt was instructed in possible side effects of bruising and/or soreness.       Ultrasound  for pain control and to decrease inflammation @ continuous duty cycle, 1 Mhz, applied to R lumbar paraspinals, intensity = 1.5 w/cm2 for 8 minutes. 2 min prep       (NP)Patient received pre-mod electrical stimulation to decrease muscle tightness and pain to Lumbar paraspinals/ sacral border of gluteals B for 15 minutes with MH supine with cycle time: continuous, beat frequency: , CC/CV: CV.      Patient Education and Home Exercises     Home Exercises Provided and Patient Education Provided     Education provided:   - focus on strengthening   - HEP   - stretch to point of tightness not pain   - exercise in pain free zone       Written Home Exercises Provided: continue prior HEP Exercises were reviewed and Danuta was able to demonstrate them prior to the end of the session.  Danuta demonstrated good understanding of the education provided. See EMR under Patient Instructions for exercises provided during therapy sessions    ASSESSMENT   Pt aggravated back with trip and overused heating back massager and appears to understand need to manage time with heating pad massager  Pt scott treatment well with level pelvis at end and decreased symptoms  Held ES to avoid muscle contraction of paraspinals that may aggravate thoracic region    Danuta Is progressing well towards her goals.   Pt prognosis is Good.     Pt will continue to benefit from skilled outpatient physical therapy to address the goals listed in the initial evaluation, provide pt/family education and to maximize pt's level of independence in the home and community environment.     Pt's spiritual, cultural and educational needs considered and pt agreeable to plan of care and goals.     Anticipated barriers to physical therapy: work schedule    GOALS:    Short Term Goals:  4 weeks MET STG's  Increase range of motion 25%  Increase strength 1/2 muscle grade   Improve postural awareness of pelvis to independently identify dysfunction with min assist from PT  Be able to perform HEP with minimal cueing required     Long Term Goals: 8 weeks (Progressing, not met)  Increase range of motion to 75% to 100% full   Improve muscle strength 1 muscle grade  Improve muscle strength with MMT to 4+/5 to 5/5  Improve and stabilize proper pelvic positioning  Restore ability to sit for work and ADL without increased pain   Restore ability to stand for work and ADL with very min to 0 increased pain  Restore normal sleep habits with min to 0 disturbances due to pain  Restore ability to perform ADL's and household activities independently and with very min to 0 increased pain    PLAN   Continue with established plan of care towards PT goals.      Jacy Escobar, PT, MSPT

## 2025-01-08 ENCOUNTER — OFFICE VISIT (OUTPATIENT)
Dept: INTERNAL MEDICINE | Facility: CLINIC | Age: 70
End: 2025-01-08
Payer: COMMERCIAL

## 2025-01-08 ENCOUNTER — CLINICAL SUPPORT (OUTPATIENT)
Dept: INTERNAL MEDICINE | Facility: CLINIC | Age: 70
End: 2025-01-08
Payer: COMMERCIAL

## 2025-01-08 VITALS
SYSTOLIC BLOOD PRESSURE: 134 MMHG | HEIGHT: 66 IN | WEIGHT: 135 LBS | BODY MASS INDEX: 21.69 KG/M2 | DIASTOLIC BLOOD PRESSURE: 80 MMHG | OXYGEN SATURATION: 98 % | HEART RATE: 57 BPM

## 2025-01-08 DIAGNOSIS — Z00.00 ANNUAL PHYSICAL EXAM: Primary | ICD-10-CM

## 2025-01-08 DIAGNOSIS — M54.41 CHRONIC BILATERAL LOW BACK PAIN WITH RIGHT-SIDED SCIATICA: ICD-10-CM

## 2025-01-08 DIAGNOSIS — H93.13 TINNITUS OF BOTH EARS: ICD-10-CM

## 2025-01-08 DIAGNOSIS — G89.29 CHRONIC BILATERAL LOW BACK PAIN WITH RIGHT-SIDED SCIATICA: ICD-10-CM

## 2025-01-08 DIAGNOSIS — M85.80 OSTEOPENIA, UNSPECIFIED LOCATION: Primary | ICD-10-CM

## 2025-01-08 LAB
ALBUMIN SERPL BCP-MCNC: 4.4 G/DL (ref 3.5–5.2)
ALP SERPL-CCNC: 52 U/L (ref 40–150)
ALT SERPL W/O P-5'-P-CCNC: 19 U/L (ref 10–44)
ANION GAP SERPL CALC-SCNC: 8 MMOL/L (ref 8–16)
AST SERPL-CCNC: 22 U/L (ref 10–40)
BASOPHILS # BLD AUTO: 0.03 K/UL (ref 0–0.2)
BASOPHILS NFR BLD: 0.6 % (ref 0–1.9)
BILIRUB SERPL-MCNC: 0.4 MG/DL (ref 0.1–1)
BUN SERPL-MCNC: 18 MG/DL (ref 8–23)
CALCIUM SERPL-MCNC: 10.1 MG/DL (ref 8.7–10.5)
CHLORIDE SERPL-SCNC: 100 MMOL/L (ref 95–110)
CO2 SERPL-SCNC: 30 MMOL/L (ref 23–29)
CREAT SERPL-MCNC: 0.7 MG/DL (ref 0.5–1.4)
DIFFERENTIAL METHOD BLD: ABNORMAL
EOSINOPHIL # BLD AUTO: 0.1 K/UL (ref 0–0.5)
EOSINOPHIL NFR BLD: 1 % (ref 0–8)
ERYTHROCYTE [DISTWIDTH] IN BLOOD BY AUTOMATED COUNT: 12.9 % (ref 11.5–14.5)
EST. GFR  (NO RACE VARIABLE): >60 ML/MIN/1.73 M^2
GLUCOSE SERPL-MCNC: 96 MG/DL (ref 70–110)
HCT VFR BLD AUTO: 38.2 % (ref 37–48.5)
HGB BLD-MCNC: 12.2 G/DL (ref 12–16)
IMM GRANULOCYTES # BLD AUTO: 0 K/UL (ref 0–0.04)
IMM GRANULOCYTES NFR BLD AUTO: 0 % (ref 0–0.5)
LYMPHOCYTES # BLD AUTO: 1.2 K/UL (ref 1–4.8)
LYMPHOCYTES NFR BLD: 23.1 % (ref 18–48)
MCH RBC QN AUTO: 29.5 PG (ref 27–31)
MCHC RBC AUTO-ENTMCNC: 31.9 G/DL (ref 32–36)
MCV RBC AUTO: 93 FL (ref 82–98)
MONOCYTES # BLD AUTO: 0.4 K/UL (ref 0.3–1)
MONOCYTES NFR BLD: 7.4 % (ref 4–15)
NEUTROPHILS # BLD AUTO: 3.5 K/UL (ref 1.8–7.7)
NEUTROPHILS NFR BLD: 67.9 % (ref 38–73)
NRBC BLD-RTO: 0 /100 WBC
PLATELET # BLD AUTO: 212 K/UL (ref 150–450)
PMV BLD AUTO: 11.4 FL (ref 9.2–12.9)
POTASSIUM SERPL-SCNC: 3.9 MMOL/L (ref 3.5–5.1)
PROT SERPL-MCNC: 7.5 G/DL (ref 6–8.4)
RBC # BLD AUTO: 4.13 M/UL (ref 4–5.4)
SODIUM SERPL-SCNC: 138 MMOL/L (ref 136–145)
WBC # BLD AUTO: 5.16 K/UL (ref 3.9–12.7)

## 2025-01-08 PROCEDURE — 1160F RVW MEDS BY RX/DR IN RCRD: CPT | Mod: CPTII,S$GLB,, | Performed by: INTERNAL MEDICINE

## 2025-01-08 PROCEDURE — 99999 PR PBB SHADOW E&M-EST. PATIENT-LVL I: CPT | Mod: PBBFAC,,,

## 2025-01-08 PROCEDURE — 99213 OFFICE O/P EST LOW 20 MIN: CPT | Mod: S$GLB,,, | Performed by: INTERNAL MEDICINE

## 2025-01-08 PROCEDURE — 3075F SYST BP GE 130 - 139MM HG: CPT | Mod: CPTII,S$GLB,, | Performed by: INTERNAL MEDICINE

## 2025-01-08 PROCEDURE — 1101F PT FALLS ASSESS-DOCD LE1/YR: CPT | Mod: CPTII,S$GLB,, | Performed by: INTERNAL MEDICINE

## 2025-01-08 PROCEDURE — 3288F FALL RISK ASSESSMENT DOCD: CPT | Mod: CPTII,S$GLB,, | Performed by: INTERNAL MEDICINE

## 2025-01-08 PROCEDURE — 3008F BODY MASS INDEX DOCD: CPT | Mod: CPTII,S$GLB,, | Performed by: INTERNAL MEDICINE

## 2025-01-08 PROCEDURE — 80053 COMPREHEN METABOLIC PANEL: CPT | Performed by: INTERNAL MEDICINE

## 2025-01-08 PROCEDURE — 99999 PR PBB SHADOW E&M-EST. PATIENT-LVL III: CPT | Mod: PBBFAC,,, | Performed by: INTERNAL MEDICINE

## 2025-01-08 PROCEDURE — 1159F MED LIST DOCD IN RCRD: CPT | Mod: CPTII,S$GLB,, | Performed by: INTERNAL MEDICINE

## 2025-01-08 PROCEDURE — 1126F AMNT PAIN NOTED NONE PRSNT: CPT | Mod: CPTII,S$GLB,, | Performed by: INTERNAL MEDICINE

## 2025-01-08 PROCEDURE — 85025 COMPLETE CBC W/AUTO DIFF WBC: CPT | Performed by: INTERNAL MEDICINE

## 2025-01-08 PROCEDURE — 3079F DIAST BP 80-89 MM HG: CPT | Mod: CPTII,S$GLB,, | Performed by: INTERNAL MEDICINE

## 2025-01-08 RX ORDER — OSELTAMIVIR PHOSPHATE 75 MG/1
75 CAPSULE ORAL 2 TIMES DAILY
Qty: 10 CAPSULE | Refills: 0 | Status: SHIPPED | OUTPATIENT
Start: 2025-01-08 | End: 2025-01-13

## 2025-01-08 RX ORDER — LANOLIN ALCOHOL/MO/W.PET/CERES
1000 CREAM (GRAM) TOPICAL DAILY
COMMUNITY

## 2025-01-08 RX ORDER — CHOLECALCIFEROL (VITAMIN D3) 25 MCG
1000 TABLET ORAL DAILY
COMMUNITY

## 2025-01-11 ENCOUNTER — PATIENT MESSAGE (OUTPATIENT)
Dept: INTERNAL MEDICINE | Facility: CLINIC | Age: 70
End: 2025-01-11
Payer: COMMERCIAL

## 2025-01-12 NOTE — PROGRESS NOTES
Subjective:       Patient ID: Danuta Santos is a 69 y.o. female who presents today for:    Chief Complaint:   Chief Complaint   Patient presents with    Semi Annual       History of Present Illness    CHIEF COMPLAINT:  Patient presents today for follow-up.    MUSCULOSKELETAL:  She has chronic back issues requiring ongoing physical therapy. She maintains an active lifestyle with regular walking, stretching exercises, and some weight lifting.    INTEGUMENTARY:  She recently developed a cold sore during Christmas break, with previous occurrence three years ago. She notes increased stress levels prior to the current outbreak. She has a history of cyst formation, with recent cyst removal by Dr. Flores.    MEDICATIONS AND SUPPLEMENTS:  Current medications include Valtrex for cold sores and EB N5 twice daily for nerves (as remembered). Supplements include vitamin B12, vitamin C 500mg daily, vitamin E 400 IU, glucosamine chondroitin, vitamin D, and calcium.    MEDICAL HISTORY:  She has osteopenia and tinnitus, which developed after starting Valtrex. She received flu, COVID, and whooping cough vaccinations in 2022.      ROS:  General: -fever, -chills, -fatigue, -weight gain, -weight loss  Eyes: -vision changes, -redness, -discharge  ENT: -ear pain, -nasal congestion, -sore throat, +tinnitus  Cardiovascular: -chest pain, -palpitations, -lower extremity edema  Respiratory: -cough, -shortness of breath  Gastrointestinal: -abdominal pain, -nausea, -vomiting, -diarrhea, -constipation, -blood in stool  Genitourinary: -dysuria, -hematuria, -frequency  Musculoskeletal: -joint pain, -muscle pain, +back pain  Skin: -rash, -lesion  Neurological: -headache, -dizziness, -numbness, -tingling  Psychiatric: -anxiety, -depression, -sleep difficulty           Medications:  Outpatient Encounter Medications as of 1/8/2025   Medication Sig Dispense Refill    acetaminophen (TYLENOL) 500 MG tablet Take 500 mg by mouth every 6 (six)  hours as needed.      acyclovir 5% (ZOVIRAX) 5 % ointment Apply topically 5 (five) times daily. 15 g 0    ascorbic acid, vitamin C, (VITAMIN C) 500 MG tablet Take 500 mg by mouth once daily.      azelastine (ASTELIN) 137 mcg (0.1 %) nasal spray SPRAY 1 SPRAY (137 MCG TOTAL) BY NASAL ROUTE TWICE A DAY AS NEEDED FOR RHINITIS 90 mL 2    calcium carbonate (CALCIUM 500 ORAL) Take by mouth 2 (two) times a day.      cyanocobalamin (VITAMIN B-12) 1000 MCG tablet Take 1,000 mcg by mouth once daily.      glucosam/chond/collagen/hyalur (TZSXPVYN-VCNA-WNYXIB-HYALUR AC ORAL) Take 1 tablet by mouth once daily at 6am.      LACTOBACILLUS COMBO NO.6 (PROBIOTIC COMPLEX ORAL) Take 1 capsule by mouth once.       mupirocin (BACTROBAN) 2 % ointment Apply topically 3 (three) times daily. 30 g 0    oseltamivir (TAMIFLU) 75 MG capsule Take 1 capsule (75 mg total) by mouth 2 (two) times daily. for 5 days 10 capsule 0    pimecrolimus (ELIDEL) 1 % cream Apply topically 2 (two) times daily as needed.      polyethylene glycol (GLYCOLAX) 17 gram PwPk Take by mouth.      tiZANidine (ZANAFLEX) 2 MG tablet Take 4 mg by mouth nightly as needed.      UNABLE TO FIND EB-N5      valACYclovir (VALTREX) 500 MG tablet Take 1 tablet (500 mg total) by mouth 2 (two) times daily. 10 tablet 2    vitamin D (VITAMIN D3) 1000 units Tab Take 1,000 Units by mouth once daily.       No facility-administered encounter medications on file as of 1/8/2025.       Allergies:  Review of patient's allergies indicates:   Allergen Reactions    Wheat containing prod      Sinus      Advil [ibuprofen]     Apple cider vinegar Other (See Comments)    Lactobacillus acidoph-lactase      Other reaction(s): Other (See Comments)    Milk containing products (dairy) Other (See Comments)     Post nasal drip    Nsaids (non-steroidal anti-inflammatory drug)      Other reaction(s): Other (See Comments)  Pt states she gets pain in her stomach when she takes nsaids due to IBS    Alcohol Other  (See Comments) and Palpitations     Post nasal drip    Aspirin Other (See Comments)     Abdominal pain       Health Maintenance:  Immunization History   Administered Date(s) Administered    COVID-19, MRNA, LN-S, PF (Pfizer) (Gray Cap) 04/12/2022    COVID-19, MRNA, LN-S, PF (Pfizer) (Purple Cap) 02/19/2021, 03/12/2021, 09/28/2021    COVID-19, mRNA, LNP-S, PF, jesenia-sucrose, 30 mcg/0.3 mL (Pfizer Ages 12+) 10/14/2023, 10/03/2024    COVID-19, mRNA, LNP-S, bivalent booster, PF (PFIZER OMICRON) 09/24/2022, 05/01/2023    Hepatitis A, Adult 03/15/2017, 09/19/2017    Hepatitis B 07/12/2012, 09/12/2012    Hepatitis B, Adult 05/21/2015    Hepatitis B, Pediatric/Adolescent 07/12/2012, 09/12/2012    IPV 09/26/2012    Influenza (FLUAD) - Quadrivalent - Adjuvanted - PF *Preferred* (65+) 10/01/2021, 09/18/2023    Influenza - Quadrivalent 09/18/2018    Influenza - Quadrivalent - PF *Preferred* (6 months and older) 09/22/2018, 09/22/2020, 09/29/2022    Influenza - Trivalent - Fluad - Adjuvanted - PF (65 years and older 10/03/2024    Influenza - Trivalent - Fluzone High Dose - PF (65 years and older) 10/03/2019    Influenza A (H1N1) 2009 Monovalent - IM 11/21/2009    Pneumococcal Conjugate - 13 Valent 09/30/2019    Pneumococcal Polysaccharide - 23 Valent 03/12/2019, 04/13/2022    Tdap 08/15/2016, 08/16/2022    Typhoid 09/26/2012    Typhoid - ViCPs 09/26/2012, 03/15/2017    Zoster 07/02/2015    Zoster Recombinant 09/18/2018, 09/18/2018, 12/15/2018      Health Maintenance   Topic Date Due    High Dose Statin  01/21/2025 (Originally 7/14/1976)    Mammogram  08/01/2025    DEXA Scan  08/05/2026    Colorectal Cancer Screening  07/20/2027    Lipid Panel  08/28/2029    TETANUS VACCINE  08/16/2032    Hepatitis C Screening  Completed    Shingles Vaccine  Completed    Influenza Vaccine  Completed    COVID-19 Vaccine  Completed    RSV Vaccine (Age 60+ and Pregnant patients)  Completed    Pneumococcal Vaccines (Age 50+)  Completed         "  Objective:      Vital Signs  Pulse: (!) 57  SpO2: 98 %  BP: 134/80  Patient Position: Sitting  Pain Score: 0-No pain  Height and Weight  Height: 5' 6" (167.6 cm)  Weight: 61.2 kg (135 lb)  BSA (Calculated - sq m): 1.69 sq meters  BMI (Calculated): 21.8  Weight in (lb) to have BMI = 25: 154.6]    Physical Exam   Physical Exam    General: No acute distress. Well-developed. Well-nourished.  Eyes: EOMI. Sclerae anicteric.  HENT: Normocephalic. Atraumatic.  Cardiovascular: Regular rate. Regular rhythm. No murmurs. No rubs. No gallops. Normal S1, S2.  Respiratory: Normal respiratory effort. Clear to auscultation bilaterally. No rales. No rhonchi. No wheezing.  Abdomen: Soft. Non-tender. Non-distended. Normoactive bowel sounds.  Musculoskeletal: No  obvious deformity.  Extremities: No lower extremity edema.  Neurological: Alert & oriented x3. No slurred speech. Normal gait.  Psychiatric: Normal mood. Normal affect. Good insight. Good judgment.  Skin: Warm. Dry. No rash.        Assessment/plan:     Danuta Santos is a 69 y.o.female with:    There are no diagnoses linked to this encounter.  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  - Instructed the patient to continue attending physical therapy once or twice a month.  - Encouraged the patient to perform prescribed exercises at home.  - Recommend regular exercise including walking, stretching, and weight lifting.  - " Noted that the patient's back issue is described as a technical issue that affects other areas.    OSTEOPENIA:  - Emphasized the importance of vitamin D and calcium supplementation for bone health.  - Continued prescription of vitamin D 1000 IU daily and calcium 500 mg twice daily.  - Reviewed bone density scan results, which showed FRAX scores of 8.8% for major osteoporotic fracture and 1.4% for hip fracture.  - Assessed that the patient does not need medication at this time based on bone density results.  - Noted that the patient's osteopenia is expected to worsen with age.    TINNITUS:  - Performed ear exam and found membranes to be clean.  - Discussed potential link between audiobook use at night and sleep disruption.  - Recommend adjusting nighttime audiobook habit to improve sleep quality.  - Considered potential causes of tinnitus, including medication side effects and sleep habits.  - Advised the patient to modify sleep habits, particularly the use of audiobooks at night.    INFLUENZA PREVENTION:  - Provided information on flu transmission and prevention in school settings.  - Instructed the patient to contact the office if flu-like symptoms develop, particularly after known exposure.    COLD SORES:  - Acknowledged the patient's history of cold sores and discussed potential triggers.  - Noted recent occurrence of cold sores during a stressful period.  - Prescribed Valtrex for treatment of cold sore.    MEDICATIONS/SUPPLEMENTS:  - Patient to continue current exercise routine, including daily walks to school.  - Continued vitamin B12 1000 mcg sublingual daily, vitamin C 500 mg daily, vitamin E 400 IU daily, EBN5 2 times daily, and glucosamine chondroitin daily.    LABS:  - Ordered CBC, metabolic panel, and blood sugar as routine labs.    FOLLOW UP:  - Follow up when labs results are available.        No future appointments.  This note was generated with the assistance of ambient listening technology. Verbal  consent was obtained by the patient and accompanying visitor(s) for the recording of patient appointment to facilitate this note. I attest to having reviewed and edited the generated note for accuracy, though some syntax or spelling errors may persist. Please contact the author of this note for any clarification.     Yumiko Carias MD  [facility]

## 2025-01-27 ENCOUNTER — CLINICAL SUPPORT (OUTPATIENT)
Dept: REHABILITATION | Facility: HOSPITAL | Age: 70
End: 2025-01-27
Payer: COMMERCIAL

## 2025-01-27 DIAGNOSIS — G89.29 CHRONIC BILATERAL LOW BACK PAIN WITH RIGHT-SIDED SCIATICA: Primary | ICD-10-CM

## 2025-01-27 DIAGNOSIS — M54.41 CHRONIC BILATERAL LOW BACK PAIN WITH RIGHT-SIDED SCIATICA: Primary | ICD-10-CM

## 2025-01-27 DIAGNOSIS — M62.81 MUSCLE WEAKNESS: ICD-10-CM

## 2025-01-27 PROCEDURE — 97164 PT RE-EVAL EST PLAN CARE: CPT | Mod: PN | Performed by: PHYSICAL THERAPIST

## 2025-01-27 PROCEDURE — 97014 ELECTRIC STIMULATION THERAPY: CPT | Mod: PN | Performed by: PHYSICAL THERAPIST

## 2025-01-27 PROCEDURE — 97140 MANUAL THERAPY 1/> REGIONS: CPT | Mod: PN | Performed by: PHYSICAL THERAPIST

## 2025-01-27 PROCEDURE — 97035 APP MDLTY 1+ULTRASOUND EA 15: CPT | Mod: PN | Performed by: PHYSICAL THERAPIST
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
## 2025-02-11 ENCOUNTER — CLINICAL SUPPORT (OUTPATIENT)
Dept: REHABILITATION | Facility: HOSPITAL | Age: 70
End: 2025-02-11
Payer: COMMERCIAL

## 2025-02-11 DIAGNOSIS — M54.41 CHRONIC BILATERAL LOW BACK PAIN WITH RIGHT-SIDED SCIATICA: ICD-10-CM

## 2025-02-11 DIAGNOSIS — G89.29 CHRONIC BILATERAL LOW BACK PAIN WITH RIGHT-SIDED SCIATICA: ICD-10-CM

## 2025-02-11 DIAGNOSIS — M62.81 MUSCLE WEAKNESS: Primary | ICD-10-CM

## 2025-02-11 PROCEDURE — 97110 THERAPEUTIC EXERCISES: CPT | Mod: PN | Performed by: PHYSICAL THERAPIST

## 2025-02-11 PROCEDURE — 97035 APP MDLTY 1+ULTRASOUND EA 15: CPT | Mod: PN | Performed by: PHYSICAL THERAPIST

## 2025-02-11 PROCEDURE — 97140 MANUAL THERAPY 1/> REGIONS: CPT | Mod: PN | Performed by: PHYSICAL THERAPIST

## 2025-02-11 PROCEDURE — 97014 ELECTRIC STIMULATION THERAPY: CPT | Mod: PN | Performed by: PHYSICAL THERAPIST

## 2025-02-11 NOTE — PROGRESS NOTES
[facility]   Physical Therapy Treatment  Note     Name: Danuta Burgos Tom  Clinic Number: [id number]    Therapy Diagnosis:   Encounter Diagnoses   Name Primary?    Muscle weakness Yes    Chronic bilateral low back pain with right-sided sciatica        Physician: Yumiko Davis MD     Visit Date: 2/11/2025    Physician Orders: PT Eval and Treat   Medical Diagnosis from Referral: M43.06 (ICD-10-CM) - Lumbar spondylolysis   Evaluation Date: 4/5/2024  Authorization Period Expiration: 12-31-24  Plan of Care Expiration: 13-24-25  Progress Note Due: 2-27-24  Visit # / Visits authorized: 2/20  FOTO: Issued Visit #: 1     MD Follow up appointment: none scheduled      Precautions: Standard and sacralization of L5 R     PTA Visit #: 0/5     Time In: 3:35  Time Out: 4:35    Total Billable Time:  35 minutes + ES    SUBJECTIVE     Pt reports: she had helped someone with a child who had a bike that fell and pt was leaning over and lifting the bike and other light items she dropped and felt increased pain later in day     Pt was compliant with home exercise program.    Response to previous treatment: felt better   Functional change: less pain with movement     Pain: 5.5/10  at end of session 3-4/10  Location: right LB with no R LE to mid calf    OBJECTIVE     Pelvic positioning: AR R   Pt unable to self mobilize today    Severe muscle tightness R lumbar paraspinals       Treatment     Danuta received the treatments listed below:      Therapeutic exercises were performed to improve ROM, strength, flexibility and stabilization for 10 minutes.     Verbal review  HS stretch supine   Glut stretch  Piriformis stretch  B QL stretch       (NP)Prone press up x 10 as tolerated      HOLD pt reported did not work as well    SL L prop on elbow to stretch R QL region    Hold pt feels uncomfortable but doing SL Sitting SB R     Child's pose x 5  Contract relax R glut x 3       Pt able to self mobilize      Instructed further need to perform self mob technique at onset of symptoms due to increased tightness developing during day resulting in inability to self mobilize      Verbal review Pt is only performing 10 reps when she does ex    Pelvic tilt   x 10  Bridge x 10,   SLR x 10,   Trunk rotation supine x 10  Partial sit up x 10  Hip abd sidelying x 10 B,  Arm and Leg lift prone x 10, instructed pt to put pillow under stomach   Hip ext prone with bent knee x 10 instructed pt to put pillow under stomach  Sit to stand x 10  Instructed pt to hold press up for now and will note results  GSS standing x 10     Iron cross x 2 as needed  Plank x 30 sec x 1 Led to dysfunction so pt performed contract relax and realigned I   Contract relax R glut     Walking for exercise   Instructed pt to ice knee and hold walking for exercise for now       Manual therapy: Danuta  received the following manual therapy  techniques x 15  min. to include soft tissue and joint mobilization were applied to the: low back and gluteals to include:     Performed STM to LB paraspinals and QL R>L P/A mob to lumbar region Grade 1-2 , P/A and rot glides lumbar paraspinals  MET R QL SL L   Sidelying L contract relax mobilization to L5-S1 region to level pelvis  at end of treatment and level pelvis achieved at beginning and end of session    (NP)Pt received tool-assisted massage with manual therapy techniques to B LB in prone to trigger an inflammatory healing response and stimulate the production of new collagen and proper, more functional, less painful healing.    (NP)Vacuum/cupping STM with manual therapy techniques was performed to back and gluteals to decrease muscle tightness, increase circulation and promote healing process.  The pt's skin was monitored for redness adjusting pressure as needed. The pt was instructed in possible side effects of bruising and/or soreness.       Ultrasound  for pain control and to decrease inflammation @ continuous  duty cycle, 1 Mhz, applied to R lumbar paraspinals, intensity = 1.5 w/cm2 for 8 minutes. 2 min prep       (NP)Patient received pre-mod electrical stimulation to decrease muscle tightness and pain to Lumbar paraspinals/ sacral border of gluteals B for 15 minutes with MH supine with cycle time: continuous, beat frequency: , CC/CV: CV.      Patient Education and Home Exercises     Home Exercises Provided and Patient Education Provided     Education provided:   - focus on strengthening   - HEP   - stretch to point of tightness not pain   - exercise in pain free zone       Written Home Exercises Provided: continue prior HEP Exercises were reviewed and Danuta was able to demonstrate them prior to the end of the session.  Danuta demonstrated good understanding of the education provided. See EMR under Patient Instructions for exercises provided during therapy sessions    ASSESSMENT   Pt flared up back lifting items even though light led to increased muscle tightness and unsuccessful with self mob techniques  Pt scott treatment well and improved symptoms at end of session     Pt prognosis is Good.     Pt will continue to benefit from skilled outpatient physical therapy to address the goals listed in the initial evaluation, provide pt/family education and to maximize pt's level of independence in the home and community environment.     Pt's spiritual, cultural and educational needs considered and pt agreeable to plan of care and goals.     Anticipated barriers to physical therapy: work schedule    GOALS:   Short Term Goals:  4 weeks   Increase range of motion 25%  Increase strength 1/2 muscle grade   Improve postural awareness of pelvis to independently identify dysfunction with min assist from PT  Be able to perform HEP with minimal cueing required     Long Term Goals: 8 weeks   Increase range of motion to 75% to 100% full   Improve muscle strength 1 muscle grade  Improve muscle strength with MMT to 4+/5 to  5/5  Improve and stabilize proper pelvic positioning  Restore ability to sit for work and ADL without increased pain   Restore ability to stand for work and ADL with very min to 0 increased pain  Restore normal sleep habits with min to 0 disturbances due to pain  Restore ability to perform ADL's and household activities independently and with very min to 0 increased pain    PLAN      Continue with established plan of care towards PT goals.      Plan of care Certification: 1-27-25 to 3-24-25.    Outpatient Physical Therapy 1-2 times weekly as needed for 8 weeks to include the following interventions: Therapeutic exercises, manual therapy, neuromuscular re-education, therapeutic activities, modalities such as moist heat, ice, ultrasound and electrical stimulation, lumbar mechanical traction and dry needling will be considered and utilized as needed    Jacy Escobar, PT, MSPT

## 2025-02-13 ENCOUNTER — CLINICAL SUPPORT (OUTPATIENT)
Dept: REHABILITATION | Facility: HOSPITAL | Age: 70
End: 2025-02-13
Payer: COMMERCIAL

## 2025-02-13 DIAGNOSIS — M54.41 CHRONIC BILATERAL LOW BACK PAIN WITH RIGHT-SIDED SCIATICA: ICD-10-CM

## 2025-02-13 DIAGNOSIS — M62.81 MUSCLE WEAKNESS: Primary | ICD-10-CM

## 2025-02-13 DIAGNOSIS — G89.29 CHRONIC BILATERAL LOW BACK PAIN WITH RIGHT-SIDED SCIATICA: ICD-10-CM

## 2025-02-13 PROCEDURE — 97140 MANUAL THERAPY 1/> REGIONS: CPT | Mod: PN | Performed by: PHYSICAL THERAPIST

## 2025-02-13 PROCEDURE — 97110 THERAPEUTIC EXERCISES: CPT | Mod: PN | Performed by: PHYSICAL THERAPIST

## 2025-02-13 PROCEDURE — 97014 ELECTRIC STIMULATION THERAPY: CPT | Mod: PN | Performed by: PHYSICAL THERAPIST

## 2025-02-13 PROCEDURE — 97035 APP MDLTY 1+ULTRASOUND EA 15: CPT | Mod: PN | Performed by: PHYSICAL THERAPIST
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
## 2025-02-18 NOTE — PROGRESS NOTES
[facility]   Physical Therapy Treatment  Note     Name: Danuta Burgos [place]  Clinic Number: [id number]    Therapy Diagnosis:   Encounter Diagnoses   Name Primary?    Muscle weakness Yes    Chronic bilateral low back pain with right-sided sciatica        Physician: Yumiko Davis MD     Visit Date: 2/20/2025    Physician Orders: PT Eval and Treat   Medical Diagnosis from Referral: M43.06 (ICD-10-CM) - Lumbar spondylolysis   Evaluation Date: 4/5/2024  Authorization Period Expiration: 12-31-24  Plan of Care Expiration: 13-24-25  Progress Note Due: 2-27-24  Visit # / Visits authorized: 4/20  FOTO: Issued Visit #: 1     MD Follow up appointment: none scheduled      Precautions: Standard and sacralization of L5 R     PTA Visit #: 0/5     Time In: 405  Time Out: 5:15  Total Billable Time:  45 minutes + ES    SUBJECTIVE     Pt reports: she bent over to  something from ground and felt increased pain    Pt was compliant with home exercise program.    Response to previous treatment: felt better   Functional change: less pain with movement     Pain: 4/10  after stretching and self mob decreased 3/10  at end of session 1-2/10  Location: right LB with no R LE to mid calf    OBJECTIVE     Pelvic positioning: AR R   Pt able to self mobilize today, but still feels tight       Treatment     Danuta received the treatments listed below:      Therapeutic exercises were performed to improve ROM, strength, flexibility and stabilization for 10 minutes.       HS stretch supine   Glut stretch  Piriformis stretch  B QL stretch     Pt unable to self mobilize at home and did not have pt perform supine ex on mat to avoid pressure on mid back area     (NP)Prone press up x 10 as tolerated      HOLD pt reported did not work as well    SL L prop on elbow to stretch R QL region    Hold pt feels uncomfortable but doing SL Sitting SB R     Child's pose x 5  Contract relax R glut x 3       Pt able to self  mobilize     Instructed further need to perform self mob technique at onset of symptoms due to increased tightness developing during day resulting in inability to self mobilize      Verbal review Pt is only performing 10 reps when she does ex    Pelvic tilt   x 10  Bridge x 10,   SLR x 10,   Trunk rotation supine x 10  Partial sit up x 10  Hip abd sidelying x 10 B,  Arm and Leg lift prone x 10, instructed pt to put pillow under stomach   Hip ext prone with bent knee x 10 instructed pt to put pillow under stomach  Sit to stand x 10  Instructed pt to hold press up for now and will note results  GSS standing x 10     Iron cross x 2 as needed  Plank x 30 sec x 1 Led to dysfunction so pt performed contract relax and realigned I   Contract relax R glut     Walking for exercise   Instructed pt to ice knee and hold walking for exercise for now      Therapeutic activity: Patient participated in dynamic functional therapeutic activities to improve functional performance for 10 minutes. Including:   Instructed pt in proper squatting and pt somewhat limited with ability to maintain lumbar lordosis for proper lifting and squatting     Sit to stand x 10      Manual therapy: Danuta  received the following manual therapy  techniques x 15  min. to include soft tissue and joint mobilization were applied to the: low back and gluteals to include:     (NP)Performed STM to LB paraspinals and QL R>L P/A mob to lumbar region Grade 1-2 , P/A and rot glides lumbar paraspinals  Sidelying L contract relax mobilization to L5-S1 region to level pelvis  at end of treatment and level pelvis achieved at beginning and end of session    Pt received tool-assisted massage with manual therapy techniques to B LB in prone to trigger an inflammatory healing response and stimulate the production of new collagen and proper, more functional, less painful healing.    Vacuum/cupping STM with manual therapy techniques was performed to back and gluteals to  decrease muscle tightness, increase circulation and promote healing process.  The pt's skin was monitored for redness adjusting pressure as needed. The pt was instructed in possible side effects of bruising and/or soreness.       Ultrasound  for pain control and to decrease inflammation @ continuous duty cycle, 1 Mhz, applied to R lumbar paraspinals, intensity = 1.5 w/cm2 for 8 minutes. 2 min prep      Patient received pre-mod electrical stimulation to decrease muscle tightness and pain to Lumbar paraspinals/ sacral border of gluteals B for 10 minutes with MH supine with cycle time: continuous, beat frequency: , CC/CV: CV.      Patient Education and Home Exercises     Home Exercises Provided and Patient Education Provided     Education provided:   - focus on strengthening   - HEP   - stretch to point of tightness not pain   - exercise in pain free zone       Written Home Exercises Provided: yes Exercises were reviewed and Danuta was able to demonstrate them prior to the end of the session.  Danuta demonstrated good understanding of the education provided. See EMR under Patient Instructions for exercises provided during therapy sessions    ASSESSMENT   Pt continues with tightness and has again irritated back with FB activities  Pt does not maintain lumbar lordosis with squatting and LE strength with ability to squat may be related to difficulty in performing the function  Worked with pt on sit to stand to help maintain lordosis and will add as an exercise to HEP  Pt scott treatment well and improved symptoms at end of session     Pt prognosis is Good.     Pt will continue to benefit from skilled outpatient physical therapy to address the goals listed in the initial evaluation, provide pt/family education and to maximize pt's level of independence in the home and community environment.     Pt's spiritual, cultural and educational needs considered and pt agreeable to plan of care and goals.     Anticipated  barriers to physical therapy: work schedule    GOALS:   Short Term Goals:  4 weeks (Progressing, not met)  Increase range of motion 25%  Increase strength 1/2 muscle grade   Improve postural awareness of pelvis to independently identify dysfunction with min assist from PT  Be able to perform HEP with minimal cueing required     Long Term Goals: 8 weeks (Progressing, not met)  Increase range of motion to 75% to 100% full   Improve muscle strength 1 muscle grade  Improve muscle strength with MMT to 4+/5 to 5/5  Improve and stabilize proper pelvic positioning  Restore ability to sit for work and ADL without increased pain   Restore ability to stand for work and ADL with very min to 0 increased pain  Restore normal sleep habits with min to 0 disturbances due to pain  Restore ability to perform ADL's and household activities independently and with very min to 0 increased pain    PLAN      Continue with established plan of care towards PT goals.  See pt as needed focus on self management techniques Continue education on proper lifting techniques as needed     Plan of care Certification: 1-27-25 to 3-24-25.    Outpatient Physical Therapy 1-2 times weekly as needed for 8 weeks to include the following interventions: Therapeutic exercises, manual therapy, neuromuscular re-education, therapeutic activities, modalities such as moist heat, ice, ultrasound and electrical stimulation, lumbar mechanical traction and dry needling will be considered and utilized as needed    Jacy Escobar, PT, MSPT

## 2025-02-20 ENCOUNTER — CLINICAL SUPPORT (OUTPATIENT)
Dept: REHABILITATION | Facility: HOSPITAL | Age: 70
End: 2025-02-20
Payer: COMMERCIAL

## 2025-02-20 DIAGNOSIS — M62.81 MUSCLE WEAKNESS: Primary | ICD-10-CM

## 2025-02-20 DIAGNOSIS — M54.41 CHRONIC BILATERAL LOW BACK PAIN WITH RIGHT-SIDED SCIATICA: ICD-10-CM

## 2025-02-20 DIAGNOSIS — G89.29 CHRONIC BILATERAL LOW BACK PAIN WITH RIGHT-SIDED SCIATICA: ICD-10-CM

## 2025-02-20 PROCEDURE — 97530 THERAPEUTIC ACTIVITIES: CPT | Mod: PN | Performed by: PHYSICAL THERAPIST

## 2025-02-20 PROCEDURE — 97035 APP MDLTY 1+ULTRASOUND EA 15: CPT | Mod: PN | Performed by: PHYSICAL THERAPIST

## 2025-02-20 PROCEDURE — 97140 MANUAL THERAPY 1/> REGIONS: CPT | Mod: PN | Performed by: PHYSICAL THERAPIST

## 2025-02-20 PROCEDURE — 97014 ELECTRIC STIMULATION THERAPY: CPT | Mod: PN | Performed by: PHYSICAL THERAPIST

## 2025-02-20 PROCEDURE — 97110 THERAPEUTIC EXERCISES: CPT | Mod: PN | Performed by: PHYSICAL THERAPIST

## 2025-02-24 ENCOUNTER — PATIENT MESSAGE (OUTPATIENT)
Dept: INTERNAL MEDICINE | Facility: CLINIC | Age: 70
End: 2025-02-24
Payer: COMMERCIAL

## 2025-02-24 NOTE — PROGRESS NOTES
[facility]   Physical Therapy Treatment  Note     Name: Danuta Burgos Tom  Clinic Number: [id number]    Therapy Diagnosis:   Encounter Diagnoses   Name Primary?    Muscle weakness Yes    Chronic bilateral low back pain with right-sided sciatica        Physician: Yumiko Davis MD     Visit Date: 2/25/2025    Physician Orders: PT Eval and Treat   Medical Diagnosis from Referral: M43.06 (ICD-10-CM) - Lumbar spondylolysis   Evaluation Date: 4/5/2024  Authorization Period Expiration: 3-29-25  Plan of Care Expiration: 13-24-25  Progress Note Due: 2-27-24  Visit # / Visits authorized: 5/20  FOTO: Issued Visit #: 1     MD Follow up appointment: none scheduled      Precautions: Standard and sacralization of L5 R     PTA Visit #: 0/5     Time In: 2:07  Time Out: 320  Total Billable Time:  40 minutes + ES    SUBJECTIVE     Pt reports: she is working on correctly squatting to lift items from floor but is feeling knee pain Pt states also difficulty getting off floor    Pt was compliant with home exercise program.    Response to previous treatment: felt better   Functional change: less pain with movement     Pain: 4/10  after self mobilize 2/10 after stretching and self mob decreased 3/10  at end of session 1-2/10  Location: right LB with no R LE to mid calf    OBJECTIVE     Pelvic positioning: AR R   Pt able to self mobilize and mod tightness of paraspinals       Treatment     Danuta received the treatments listed below:      Therapeutic exercises were performed to improve ROM, strength, flexibility and stabilization for 5   minutes.       HS stretch supine   Glut stretch  Piriformis stretch  B QL stretch     Pt unable to self mobilize at home and did not have pt perform supine ex on mat to avoid pressure on mid back area     (NP)Prone press up x 10 as tolerated      HOLD pt reported did not work as well    SL L prop on elbow to stretch R QL region    Hold pt feels uncomfortable but doing  SL Sitting SB R     Child's pose x 5  Contract relax R glut x 3       Pt able to self mobilize     Instructed further need to perform self mob technique at onset of symptoms due to increased tightness developing during day resulting in inability to self mobilize      Verbal review Pt is only performing 10 reps when she does ex    Pelvic tilt   x 10  Bridge x 10,   SLR x 10,   Trunk rotation supine x 10  Partial sit up x 10  Hip abd sidelying x 10 B,  Arm and Leg lift prone x 10, instructed pt to put pillow under stomach   Hip ext prone with bent knee x 10 instructed pt to put pillow under stomach  Sit to stand x 10  Instructed pt to hold press up for now and will note results  GSS standing x 10     Iron cross x 2 as needed  Plank x 30 sec x 1 Led to dysfunction so pt performed contract relax and realigned I   Contract relax R glut     Walking for exercise   Instructed pt to ice knee and hold walking for exercise for now      Therapeutic activity: Patient participated in dynamic functional therapeutic activities to improve functional performance for 8 minutes. Including:   Reviewed squatting and getting up from floor   Sit to stand x 10  Instructed pt in progression with sit to stand      Manual therapy: Danuta  received the following manual therapy  techniques x 17 min. to include soft tissue and joint mobilization were applied to the: low back and gluteals to include:     (NP)Performed STM to LB paraspinals and QL R>L P/A mob to lumbar region Grade 1-2 , P/A and rot glides lumbar paraspinals  Sidelying L contract relax mobilization to L5-S1 region to level pelvis  at end of treatment and level pelvis achieved at beginning and end of session    Pt received tool-assisted massage with manual therapy techniques to B LB in prone to trigger an inflammatory healing response and stimulate the production of new collagen and proper, more functional, less painful healing.    Vacuum/cupping STM with manual therapy  techniques was performed to back and gluteals to decrease muscle tightness, increase circulation and promote healing process.  The pt's skin was monitored for redness adjusting pressure as needed. The pt was instructed in possible side effects of bruising and/or soreness.       Ultrasound  for pain control and to decrease inflammation @ continuous duty cycle, 1 Mhz, applied to R lumbar paraspinals, intensity = 1.5 w/cm2 for 8 minutes. 2 min prep      Patient received pre-mod electrical stimulation to decrease muscle tightness and pain to Lumbar paraspinals/ sacral border of gluteals B for 10 minutes with MH supine with cycle time: continuous, beat frequency: , CC/CV: CV.      Patient Education and Home Exercises     Home Exercises Provided and Patient Education Provided     Education provided:   - focus on strengthening   - HEP   - stretch to point of tightness not pain   - exercise in pain free zone       Written Home Exercises Provided: continue prior HEP  Exercises were reviewed and Danuta was able to demonstrate them prior to the end of the session.  Danuta demonstrated good understanding of the education provided. See EMR under Patient Instructions for exercises provided during therapy sessions    ASSESSMENT   Pt with less pain overall as works to avoid FB and bend from hip maintaining lordosis with lifting activities Pt appears to be performing floor to stand transfer appropriately and improved ability to maintain lordosis and go into squat position   Pt scott treatment well and improved symptoms at end of session     Pt prognosis is Good.     Pt will continue to benefit from skilled outpatient physical therapy to address the goals listed in the initial evaluation, provide pt/family education and to maximize pt's level of independence in the home and community environment.     Pt's spiritual, cultural and educational needs considered and pt agreeable to plan of care and goals.     Anticipated  barriers to physical therapy: work schedule    GOALS:   Short Term Goals:  4 weeks (Progressing, not met)  Increase range of motion 25%  Increase strength 1/2 muscle grade   Improve postural awareness of pelvis to independently identify dysfunction with min assist from PT  Be able to perform HEP with minimal cueing required     Long Term Goals: 8 weeks (Progressing, not met)  Increase range of motion to 75% to 100% full   Improve muscle strength 1 muscle grade  Improve muscle strength with MMT to 4+/5 to 5/5  Improve and stabilize proper pelvic positioning  Restore ability to sit for work and ADL without increased pain   Restore ability to stand for work and ADL with very min to 0 increased pain  Restore normal sleep habits with min to 0 disturbances due to pain  Restore ability to perform ADL's and household activities independently and with very min to 0 increased pain    PLAN      Continue with established plan of care towards PT goals.  See pt as needed focus on self management techniques Continue education on proper lifting techniques as needed     Plan of care Certification: 1-27-25 to 3-24-25.    Outpatient Physical Therapy 1-2 times weekly as needed for 8 weeks to include the following interventions: Therapeutic exercises, manual therapy, neuromuscular re-education, therapeutic activities, modalities such as moist heat, ice, ultrasound and electrical stimulation, lumbar mechanical traction and dry needling will be considered and utilized as needed    Jacy Escobar, PT, MSPT

## 2025-02-25 ENCOUNTER — CLINICAL SUPPORT (OUTPATIENT)
Dept: REHABILITATION | Facility: HOSPITAL | Age: 70
End: 2025-02-25
Payer: COMMERCIAL

## 2025-02-25 DIAGNOSIS — M62.81 MUSCLE WEAKNESS: Primary | ICD-10-CM

## 2025-02-25 DIAGNOSIS — G89.29 CHRONIC BILATERAL LOW BACK PAIN WITH RIGHT-SIDED SCIATICA: ICD-10-CM

## 2025-02-25 DIAGNOSIS — M54.41 CHRONIC BILATERAL LOW BACK PAIN WITH RIGHT-SIDED SCIATICA: ICD-10-CM

## 2025-02-25 PROCEDURE — 97035 APP MDLTY 1+ULTRASOUND EA 15: CPT | Mod: PN | Performed by: PHYSICAL THERAPIST

## 2025-02-25 PROCEDURE — 97530 THERAPEUTIC ACTIVITIES: CPT | Mod: PN | Performed by: PHYSICAL THERAPIST

## 2025-02-25 PROCEDURE — 97140 MANUAL THERAPY 1/> REGIONS: CPT | Mod: PN | Performed by: PHYSICAL THERAPIST

## 2025-02-25 PROCEDURE — 97014 ELECTRIC STIMULATION THERAPY: CPT | Mod: PN | Performed by: PHYSICAL THERAPIST

## 2025-02-26 ENCOUNTER — PATIENT MESSAGE (OUTPATIENT)
Dept: INTERNAL MEDICINE | Facility: CLINIC | Age: 70
End: 2025-02-26
Payer: COMMERCIAL

## 2025-03-27 ENCOUNTER — CLINICAL SUPPORT (OUTPATIENT)
Dept: REHABILITATION | Facility: HOSPITAL | Age: 70
End: 2025-03-27
Attending: PHYSICAL THERAPIST
Payer: COMMERCIAL

## 2025-03-27 DIAGNOSIS — M54.41 CHRONIC BILATERAL LOW BACK PAIN WITH RIGHT-SIDED SCIATICA: ICD-10-CM

## 2025-03-27 DIAGNOSIS — G89.29 CHRONIC BILATERAL LOW BACK PAIN WITH RIGHT-SIDED SCIATICA: ICD-10-CM

## 2025-03-27 DIAGNOSIS — M62.81 MUSCLE WEAKNESS: Primary | ICD-10-CM

## 2025-03-27 PROCEDURE — 97014 ELECTRIC STIMULATION THERAPY: CPT | Mod: PN | Performed by: PHYSICAL THERAPIST

## 2025-03-27 PROCEDURE — 97035 APP MDLTY 1+ULTRASOUND EA 15: CPT | Mod: PN | Performed by: PHYSICAL THERAPIST

## 2025-03-27 PROCEDURE — 97110 THERAPEUTIC EXERCISES: CPT | Mod: PN | Performed by: PHYSICAL THERAPIST

## 2025-03-27 PROCEDURE — 97140 MANUAL THERAPY 1/> REGIONS: CPT | Mod: PN | Performed by: PHYSICAL THERAPIST

## 2025-03-27 NOTE — PROGRESS NOTES
[facility]   Physical Therapy Progress and Treatment  Note     Name: Danuta Santos  Clinic Number: [id number]    Therapy Diagnosis:   Encounter Diagnoses   Name Primary?    Muscle weakness Yes    Chronic bilateral low back pain with right-sided sciatica          Physician: Yumiko Davis MD     Visit Date: 3/27/2025    Physician Orders: PT Eval and Treat   Medical Diagnosis from Referral: M43.06 (ICD-10-CM) - Lumbar spondylolysis   Evaluation Date: 4/5/2024  Authorization Period Expiration: 3-29-25  Plan of Care Expiration: 5-22-25  Progress Note Due: 4-27-25  Visit # / Visits authorized: 6/20  FOTO: Issued Visit #: 1     MD Follow up appointment: none scheduled      Precautions: Standard and sacralization of L5 R     PTA Visit #: 0/5     Time In: 408  Time Out: 505  Total Billable Time:  45 minutes + ES    SUBJECTIVE     Pt reports: overall she is doing better and she has been using better body mechanics with reaching to floor and lifting and it has really helped     Pt was compliant with home exercise program.    Response to previous treatment: felt better   Functional change: less pain with movement     Pain: 4/10   at end of session 1-2/10  Location: right LB with no R LE to mid calf    OBJECTIVE     Pelvic positioning: AR R   Pt able to self mobilize and mod tightness of paraspinals     Lumbar active range of motion in standing is: 3-27-25  - flexion - floor                     - extension -  75%                         - left side bending -  to knee         - right side bending -  to knee           Lumbar active range of motion in standing is: initial eval   - flexion - floor                     - extension -  75%                         - left side bending -  to knee         - right side bending -  to knee               Flexibility testing:  - hamstrings:     90/90 test same as IE R 5 L 0           - gastrocnemius:   DF ankle R 10 degrees L 10 degrees           Muscle  Strength 3-27-25  MMT R L   Knee extension 5/5 5/5   Knee flexion 5/5 5/5   Hip flexion 4+/5 4+/5   Hip abduction 5/5 5-/5   Hip extension 5/5 5/5   Glut max 4-/5 4/5      Muscle Strength re- eval  MMT R L   Knee extension 5/5 5/5   Knee flexion 5/5 5/5   Hip flexion 4-/5 4-/5   Hip abduction 4+/5 4+/5   Hip extension 4+/5 4+/5   Glut max 3+/5 3+/5       Palpation: after Rx min-mod but does tighten at Re-eval mod-severe TTP and severe tightness R lumbar paraspinals     Joint mobility: severe decreased lumbar mobility           Treatment     Danuta received the treatments listed below:      Therapeutic exercises were performed to improve ROM, strength, flexibility and stabilization for 15   minutes.   Reassessment     HS stretch supine   Glut stretch  Piriformis stretch  B QL stretch       (NP)Prone press up x 10 as tolerated      HOLD pt reported did not work as well    SL L prop on elbow to stretch R QL region    Hold pt feels uncomfortable but doing SL Sitting SB R     Child's pose x 5  Contract relax R glut x 3       Pt able to self mobilize     Instructed further need to perform self mob technique at onset of symptoms due to increased tightness developing during day resulting in inability to self mobilize      Verbal review Pt is only performing 10 reps when she does ex    Pelvic tilt   x 10  Bridge x 10,   SLR x 10,   Trunk rotation supine x 10  Partial sit up x 10  Hip abd sidelying x 10 B,  Arm and Leg lift prone x 10, instructed pt to put pillow under stomach   Hip ext prone with bent knee x 10 instructed pt to put pillow under stomach  Sit to stand x 10  Instructed pt to hold press up for now and will note results  GSS standing x 10     Iron cross x 2 as needed  Plank x 30 sec x 1 Led to dysfunction so pt performed contract relax and realigned I   Contract relax R glut     Walking for exercise   Instructed pt to ice knee and hold walking for exercise for now      (NP)Therapeutic activity: Patient  participated in dynamic functional therapeutic activities to improve functional performance for 0 minutes. Including:   Reviewed squatting and getting up from floor   Sit to stand x 10  Instructed pt in progression with sit to stand      Manual therapy: Danuta  received the following manual therapy  techniques x 20 min. to include soft tissue and joint mobilization were applied to the: low back and gluteals to include:     (NP)Performed STM to LB paraspinals and QL R>L P/A mob to lumbar region Grade 1-2 , P/A and rot glides lumbar paraspinals  Sidelying L contract relax mobilization to L5-S1 region to level pelvis  at end of treatment and level pelvis achieved at beginning and end of session    Pt received tool-assisted massage with manual therapy techniques to B LB in prone to trigger an inflammatory healing response and stimulate the production of new collagen and proper, more functional, less painful healing.    Vacuum/cupping STM with manual therapy techniques was performed to back and gluteals to decrease muscle tightness, increase circulation and promote healing process.  The pt's skin was monitored for redness adjusting pressure as needed. The pt was instructed in possible side effects of bruising and/or soreness.       Ultrasound  for pain control and to decrease inflammation @ continuous duty cycle, 1 Mhz, applied to R lumbar paraspinals, intensity = 1.5 w/cm2 for 8 minutes. 2 min prep      Patient received pre-mod electrical stimulation to decrease muscle tightness and pain to Lumbar paraspinals/ sacral border of gluteals B for 15 minutes with MH supine with cycle time: continuous, beat frequency: , CC/CV: CV.      Patient Education and Home Exercises     Home Exercises Provided and Patient Education Provided     Education provided:   - focus on strengthening   - HEP   - stretch to point of tightness not pain   - exercise in pain free zone       Written Home Exercises Provided: continue prior HEP   Exercises were reviewed and Danuta was able to demonstrate them prior to the end of the session.  Danuta demonstrated good understanding of the education provided. See EMR under Patient Instructions for exercises provided during therapy sessions    ASSESSMENT   Pt has been seen for 6 visits since the beginning of the year.   Pt with less pain overall as works to avoid FB and bend from hip maintaining lordosis with lifting activities Pt appears to be performing floor to stand transfer appropriately and improved ability to maintain lordosis and go into squat position   Pt scott treatment well and improved symptoms at end of session Pt continues to work on self mobilization and stretching techniques to assist in maintaining level pelvis and managing symptoms I     Pt prognosis is Good.     Pt will continue to benefit from skilled outpatient physical therapy to address the goals listed in the initial evaluation, provide pt/family education and to maximize pt's level of independence in the home and community environment.     Pt's spiritual, cultural and educational needs considered and pt agreeable to plan of care and goals.     Anticipated barriers to physical therapy: work schedule    GOALS:   Short Term Goals:  4 weeks MET STG's  Increase range of motion 25%  Increase strength 1/2 muscle grade   Improve postural awareness of pelvis to independently identify dysfunction with min assist from PT  Be able to perform HEP with minimal cueing required     Long Term Goals: 8 weeks (Progressing, not met)  Increase range of motion to 75% to 100% full   Improve muscle strength 1 muscle grade  Improve muscle strength with MMT to 4+/5 to 5/5  Improve and stabilize proper pelvic positioning  Restore ability to sit for work and ADL without increased pain   Restore ability to stand for work and ADL with very min to 0 increased pain  Restore normal sleep habits with min to 0 disturbances due to pain  Restore ability to perform  ADL's and household activities independently and with very min to 0 increased pain    PLAN      If you concur, I recommend patient continue with physical therapy 1-2 times a week as needed for 8 weeks .  Please advise us of your findings and recommendations. Thank you for allowing us to assist in the care of your patient.       Plan of care Certification: 3-27-25 to 5-22-25    Outpatient Physical Therapy 1-2 times weekly as needed for 8 weeks to include the following interventions: Therapeutic exercises, manual therapy, neuromuscular re-education, therapeutic activities, modalities such as moist heat, ice, ultrasound and electrical stimulation, lumbar mechanical traction and dry needling will be considered and utilized as needed    Jacy Escobar, PT, MSPT

## 2025-04-01 ENCOUNTER — CLINICAL SUPPORT (OUTPATIENT)
Dept: REHABILITATION | Facility: HOSPITAL | Age: 70
End: 2025-04-01
Payer: COMMERCIAL

## 2025-04-01 DIAGNOSIS — G89.29 CHRONIC BILATERAL LOW BACK PAIN WITH RIGHT-SIDED SCIATICA: ICD-10-CM

## 2025-04-01 DIAGNOSIS — M54.41 CHRONIC BILATERAL LOW BACK PAIN WITH RIGHT-SIDED SCIATICA: ICD-10-CM

## 2025-04-01 DIAGNOSIS — M62.81 MUSCLE WEAKNESS: Primary | ICD-10-CM

## 2025-04-01 PROCEDURE — 97110 THERAPEUTIC EXERCISES: CPT | Mod: PN | Performed by: PHYSICAL THERAPIST

## 2025-04-01 PROCEDURE — 97140 MANUAL THERAPY 1/> REGIONS: CPT | Mod: PN | Performed by: PHYSICAL THERAPIST

## 2025-04-01 PROCEDURE — 97014 ELECTRIC STIMULATION THERAPY: CPT | Mod: PN | Performed by: PHYSICAL THERAPIST

## 2025-04-01 PROCEDURE — 97035 APP MDLTY 1+ULTRASOUND EA 15: CPT | Mod: PN | Performed by: PHYSICAL THERAPIST

## 2025-04-01 PROCEDURE — 97010 HOT OR COLD PACKS THERAPY: CPT | Mod: PN | Performed by: PHYSICAL THERAPIST

## 2025-04-01 NOTE — PROGRESS NOTES
[facility]   Physical Therapy Progress and Treatment  Note     Name: Danuta Burgos Tom  Clinic Number: [id number]    Therapy Diagnosis:   Encounter Diagnoses   Name Primary?    Muscle weakness Yes    Chronic bilateral low back pain with right-sided sciatica          Physician: Yumiko Davis MD     Visit Date: 4/1/2025    Physician Orders: PT Eval and Treat   Medical Diagnosis from Referral: M43.06 (ICD-10-CM) - Lumbar spondylolysis   Evaluation Date: 4/5/2024  Authorization Period Expiration: 3-29-25  Plan of Care Expiration: 5-22-25  Progress Note Due: 4-27-25  Visit # / Visits authorized: 7/20  FOTO: Issued Visit #: 1     MD Follow up appointment: none scheduled      Precautions: Standard and sacralization of L5 R     PTA Visit #: 0/5     Time In: 410  Time Out: 5:15  Total Billable Time:  40 minutes + ES    SUBJECTIVE     Pt reports: developing increased tightness but good body mechanics with lifting is helping and when go in dysfunction able to self mobilize with more ease but still continue with increased tightness     Pt was compliant with home exercise program.    Response to previous treatment: felt better   Functional change: less pain with movement     Pain: 4/10  after self mob 2.5 at end of session 1-2/10  Location: right LB with no R LE to mid calf    OBJECTIVE     Pelvic positioning: slight AR R   Pt able to self mobilize and mod tightness of paraspinals           Treatment     Danuta received the treatments listed below:      Therapeutic exercises were performed to improve ROM, strength, flexibility and stabilization for 10 minutes.     HS stretch supine   Glut stretch  Piriformis stretch  B QL stretch       (NP)Prone press up x 10 as tolerated      HOLD pt reported did not work as well    SL L prop on elbow to stretch R QL region    Hold pt feels uncomfortable but doing SL Sitting SB R     Child's pose x 5  Contract relax R glut x 3       Pt able to self  mobilize     Instructed further need to perform self mob technique at onset of symptoms due to increased tightness developing during day resulting in inability to self mobilize      Verbal review Pt is only performing 10 reps when she does ex    Pelvic tilt   x 10  Bridge x 10,   SLR x 10,   Trunk rotation supine x 10  Partial sit up x 10  Hip abd sidelying x 10 B,  Arm and Leg lift prone x 10, instructed pt to put pillow under stomach   Hip ext prone with bent knee x 10 instructed pt to put pillow under stomach  Sit to stand x 10  Instructed pt to hold press up for now and will note results  GSS standing x 10     Iron cross x 2 as needed  Plank x 30 sec x 1 Led to dysfunction so pt performed contract relax and realigned I   Contract relax R glut     Walking for exercise   Instructed pt to ice knee and hold walking for exercise for now      (NP)Therapeutic activity: Patient participated in dynamic functional therapeutic activities to improve functional performance for 0 minutes. Including:   Reviewed squatting and getting up from floor   Sit to stand x 10  Instructed pt in progression with sit to stand      Manual therapy: Danuta  received the following manual therapy  techniques x 20 min. to include soft tissue and joint mobilization were applied to the: low back and gluteals to include:     Performed STM to LB paraspinals and QL R>L P/A mob to lumbar region Grade 1-2 , P/A and rot glides lumbar paraspinals    Sidelying L contract relax mobilization to L5-S1 region to level pelvis  at end of treatment and level pelvis achieved at beginning and end of session    Pt received tool-assisted massage with manual therapy techniques to B LB in prone to trigger an inflammatory healing response and stimulate the production of new collagen and proper, more functional, less painful healing.    Vacuum/cupping STM with manual therapy techniques was performed to back and gluteals to decrease muscle tightness, increase  circulation and promote healing process.  The pt's skin was monitored for redness adjusting pressure as needed. The pt was instructed in possible side effects of bruising and/or soreness.       Ultrasound  for pain control and to decrease inflammation @ continuous duty cycle, 1 Mhz, applied to R lumbar paraspinals, intensity = 1.5 w/cm2 for 8 minutes. 2 min prep      Patient received pre-mod electrical stimulation to decrease muscle tightness and pain to Lumbar paraspinals/ sacral border of gluteals B for 15 minutes with MH supine with cycle time: continuous, beat frequency: , CC/CV: CV.      Patient Education and Home Exercises     Home Exercises Provided and Patient Education Provided     Education provided:   - focus on strengthening   - HEP   - stretch to point of tightness not pain   - exercise in pain free zone       Written Home Exercises Provided: continue prior HEP  Exercises were reviewed and Danuta was able to demonstrate them prior to the end of the session.  Danuta demonstrated good understanding of the education provided. See EMR under Patient Instructions for exercises provided during therapy sessions    ASSESSMENT   Pt continues with tightness despite using improved body mechanics with lifting requiring additional modalities and manual therapy techniques to achieve most improvement in symptoms Pt more successful with self mob lately due to less symptoms overall.   Pt scott treatment well and decreased symptoms at end of session     Pt prognosis is Good.     Pt will continue to benefit from skilled outpatient physical therapy to address the goals listed in the initial evaluation, provide pt/family education and to maximize pt's level of independence in the home and community environment.     Pt's spiritual, cultural and educational needs considered and pt agreeable to plan of care and goals.     Anticipated barriers to physical therapy: work schedule    GOALS:   Short Term Goals:  4 weeks  MET STG's  Increase range of motion 25%  Increase strength 1/2 muscle grade   Improve postural awareness of pelvis to independently identify dysfunction with min assist from PT  Be able to perform HEP with minimal cueing required     Long Term Goals: 8 weeks (Progressing, not met)  Increase range of motion to 75% to 100% full   Improve muscle strength 1 muscle grade  Improve muscle strength with MMT to 4+/5 to 5/5  Improve and stabilize proper pelvic positioning  Restore ability to sit for work and ADL without increased pain   Restore ability to stand for work and ADL with very min to 0 increased pain  Restore normal sleep habits with min to 0 disturbances due to pain  Restore ability to perform ADL's and household activities independently and with very min to 0 increased pain    PLAN      Continue with established plan of care towards PT goals.  See pt as needed     Plan of care Certification: 3-27-25 to 5-22-25    Outpatient Physical Therapy 1-2 times weekly as needed for 8 weeks to include the following interventions: Therapeutic exercises, manual therapy, neuromuscular re-education, therapeutic activities, modalities such as moist heat, ice, ultrasound and electrical stimulation, lumbar mechanical traction and dry needling will be considered and utilized as needed    Jacy Escobar, PT, MSPT

## 2025-04-02 ENCOUNTER — PATIENT MESSAGE (OUTPATIENT)
Dept: INTERNAL MEDICINE | Facility: CLINIC | Age: 70
End: 2025-04-02
Payer: COMMERCIAL

## 2025-04-03 RX ORDER — TIZANIDINE 2 MG/1
4 TABLET ORAL NIGHTLY PRN
Qty: 60 TABLET | Refills: 1 | Status: SHIPPED | OUTPATIENT
Start: 2025-04-03

## 2025-04-03 NOTE — TELEPHONE ENCOUNTER
No care due was identified.  [facility] Embedded Care Due Messages. Reference number: [id number].   4/03/2025 8:56:07 AM CDT

## 2025-04-07 ENCOUNTER — CLINICAL SUPPORT (OUTPATIENT)
Dept: REHABILITATION | Facility: HOSPITAL | Age: 70
End: 2025-04-07
Payer: COMMERCIAL

## 2025-04-07 DIAGNOSIS — G89.29 CHRONIC BILATERAL LOW BACK PAIN WITH RIGHT-SIDED SCIATICA: ICD-10-CM

## 2025-04-07 DIAGNOSIS — M62.81 MUSCLE WEAKNESS: Primary | ICD-10-CM

## 2025-04-07 DIAGNOSIS — M54.41 CHRONIC BILATERAL LOW BACK PAIN WITH RIGHT-SIDED SCIATICA: ICD-10-CM

## 2025-04-07 PROCEDURE — 97530 THERAPEUTIC ACTIVITIES: CPT | Mod: PN | Performed by: PHYSICAL THERAPIST

## 2025-04-07 PROCEDURE — 97035 APP MDLTY 1+ULTRASOUND EA 15: CPT | Mod: PN | Performed by: PHYSICAL THERAPIST

## 2025-04-07 PROCEDURE — 97010 HOT OR COLD PACKS THERAPY: CPT | Mod: PN | Performed by: PHYSICAL THERAPIST

## 2025-04-07 PROCEDURE — 97110 THERAPEUTIC EXERCISES: CPT | Mod: PN | Performed by: PHYSICAL THERAPIST

## 2025-04-07 PROCEDURE — 97140 MANUAL THERAPY 1/> REGIONS: CPT | Mod: PN | Performed by: PHYSICAL THERAPIST

## 2025-04-07 PROCEDURE — 97014 ELECTRIC STIMULATION THERAPY: CPT | Mod: PN | Performed by: PHYSICAL THERAPIST

## 2025-04-07 NOTE — PROGRESS NOTES
"[facility]   Physical Therapy Treatment  Note     Name: Danuta Burgos [place]  Clinic Number: [id number]    Therapy Diagnosis:   Encounter Diagnoses   Name Primary?    Muscle weakness Yes    Chronic bilateral low back pain with right-sided sciatica          Physician: Yumiko Davis MD     Visit Date: 4/7/2025    Physician Orders: PT Eval and Treat   Medical Diagnosis from Referral: M43.06 (ICD-10-CM) - Lumbar spondylolysis   Evaluation Date: 4/5/2024  Authorization Period Expiration: 3-29-25  Plan of Care Expiration: 5-22-25  Progress Note Due: 4-27-25  Visit # / Visits authorized: 8/20  FOTO: Issued Visit #: 1     MD Follow up appointment: none scheduled      Precautions: Standard and sacralization of L5 R     PTA Visit #: 0/5     Time In: 2:50  Time Out: 4:15  Total Billable Time:  50 minutes + ES    SUBJECTIVE     Pt reports: yesterday lifted ironing board from behind a piece of furniture and as carrying to another room felt increase pain in across LB      Pt was compliant with home exercise program.    Response to previous treatment: felt better   Functional change: less pain with movement     Pain: 5.5/10   at end of session 1-2/10  Location: right LB with no R LE to mid calf    OBJECTIVE     Pelvic positioning:  AR R   Pt able to self mobilize and mod tightness of paraspinals           Treatment     Danuta received the treatments listed below:      Therapeutic activity: Patient participated in dynamic functional therapeutic activities to improve functional performance for 10 minutes. Including: Analyzed pt performance of removing iron board and did some rotation to obtain ironing board which must have led to increased muscle tightness and dysfunction  Pt was unable to self mobilize  Discussed progressing program to include trunk rotation ex as noted below       Lateral step down x 6" and instructed pt in use of leading with heel vs toe based on height of step and ease of ex "     Therapeutic exercises were performed to improve ROM, strength, flexibility and stabilization for 10 minutes.      Lower trunk rotation supine x 10 Pt able to perform without increased pain   Pt reported being unsuccessful with self mob   HS stretch supine   Glut stretch  Piriformis stretch  B QL stretch       (NP)Prone press up x 10 as tolerated      HOLD pt reported did not work as well    SL L prop on elbow to stretch R QL region    Hold pt feels uncomfortable but doing SL Sitting SB R     Child's pose x 5  Contract relax R glut x 3       Pt able to self mobilize     Instructed further need to perform self mob technique at onset of symptoms due to increased tightness developing during day resulting in inability to self mobilize      Verbal review Pt is only performing 10 reps when she does ex    Pelvic tilt   x 10  Bridge x 10,   SLR x 10,   Trunk rotation supine x 10  Partial sit up x 10  Hip abd sidelying x 10 B,  Arm and Leg lift prone x 10, instructed pt to put pillow under stomach   Hip ext prone with bent knee x 10 instructed pt to put pillow under stomach  Sit to stand x 10  Instructed pt to hold press up for now and will note results  GSS standing x 10     Iron cross x 2 as needed  Plank x 30 sec x 1 Led to dysfunction so pt performed contract relax and realigned I   Contract relax R glut     Walking for exercise   Instructed pt to ice knee and hold walking for exercise for now       Manual therapy: Danuta  received the following manual therapy  techniques x 20 min. to include soft tissue and joint mobilization were applied to the: low back and gluteals to include:     Performed STM to LB paraspinals and QL R>L P/A mob to lumbar region Grade 1-2 , P/A and rot glides lumbar paraspinals    Sidelying L contract relax mobilization to L5-S1 region to level pelvis  at end of treatment and level pelvis achieved at beginning and end of session    Pt received tool-assisted massage with manual therapy  techniques to B LB in prone to trigger an inflammatory healing response and stimulate the production of new collagen and proper, more functional, less painful healing.    Vacuum/cupping STM with manual therapy techniques was performed to back and gluteals to decrease muscle tightness, increase circulation and promote healing process.  The pt's skin was monitored for redness adjusting pressure as needed. The pt was instructed in possible side effects of bruising and/or soreness.       Ultrasound  for pain control and to decrease inflammation @ continuous duty cycle, 1 Mhz, applied to R lumbar paraspinals, intensity = 1.5 w/cm2 for 8 minutes. 2 min prep      Patient received pre-mod electrical stimulation to decrease muscle tightness and pain to Lumbar paraspinals/ sacral border of gluteals B for 15 minutes with MH supine with cycle time: continuous, beat frequency: , CC/CV: CV.      Patient Education and Home Exercises     Home Exercises Provided and Patient Education Provided     Education provided:   - focus on strengthening   - HEP   - stretch to point of tightness not pain   - exercise in pain free zone       Written Home Exercises Provided: continue prior HEP  Exercises were reviewed and Danuta was able to demonstrate them prior to the end of the session.  Danuta demonstrated good understanding of the education provided. See EMR under Patient Instructions for exercises provided during therapy sessions    ASSESSMENT   Pt with increased muscle tightness and dysfunction that she was unable to self mobilize leading to increased pain  Pt has maintained more straight planes for strengthening and may benefit from addition of controlled lower body rotation work to improve function along with further LE strengthening Pt with improved symptoms at end of session and will continue Rx as needed     Pt prognosis is Good.     Pt will continue to benefit from skilled outpatient physical therapy to address the goals  listed in the initial evaluation, provide pt/family education and to maximize pt's level of independence in the home and community environment.     Pt's spiritual, cultural and educational needs considered and pt agreeable to plan of care and goals.     Anticipated barriers to physical therapy: work schedule    GOALS:   Short Term Goals:  4 weeks MET STG's  Increase range of motion 25%  Increase strength 1/2 muscle grade   Improve postural awareness of pelvis to independently identify dysfunction with min assist from PT  Be able to perform HEP with minimal cueing required     Long Term Goals: 8 weeks (Progressing, not met)  Increase range of motion to 75% to 100% full   Improve muscle strength 1 muscle grade  Improve muscle strength with MMT to 4+/5 to 5/5  Improve and stabilize proper pelvic positioning  Restore ability to sit for work and ADL without increased pain   Restore ability to stand for work and ADL with very min to 0 increased pain  Restore normal sleep habits with min to 0 disturbances due to pain  Restore ability to perform ADL's and household activities independently and with very min to 0 increased pain    PLAN      Continue with established plan of care towards PT goals.  See pt as needed     Plan of care Certification: 3-27-25 to 5-22-25    Outpatient Physical Therapy 1-2 times weekly as needed for 8 weeks to include the following interventions: Therapeutic exercises, manual therapy, neuromuscular re-education, therapeutic activities, modalities such as moist heat, ice, ultrasound and electrical stimulation, lumbar mechanical traction and dry needling will be considered and utilized as needed    Jacy Escobar, PT, MSPT

## 2025-04-09 DIAGNOSIS — M43.06 LUMBAR SPONDYLOLYSIS: Primary | ICD-10-CM

## 2025-04-14 ENCOUNTER — CLINICAL SUPPORT (OUTPATIENT)
Dept: REHABILITATION | Facility: HOSPITAL | Age: 70
End: 2025-04-14
Payer: COMMERCIAL

## 2025-04-14 DIAGNOSIS — M43.06 LUMBAR SPONDYLOLYSIS: ICD-10-CM

## 2025-04-14 DIAGNOSIS — G89.29 CHRONIC BILATERAL LOW BACK PAIN WITH RIGHT-SIDED SCIATICA: ICD-10-CM

## 2025-04-14 DIAGNOSIS — M62.81 MUSCLE WEAKNESS: Primary | ICD-10-CM

## 2025-04-14 DIAGNOSIS — M54.41 CHRONIC BILATERAL LOW BACK PAIN WITH RIGHT-SIDED SCIATICA: ICD-10-CM

## 2025-04-14 PROCEDURE — 97010 HOT OR COLD PACKS THERAPY: CPT | Mod: PN | Performed by: PHYSICAL THERAPIST

## 2025-04-14 PROCEDURE — 97035 APP MDLTY 1+ULTRASOUND EA 15: CPT | Mod: PN | Performed by: PHYSICAL THERAPIST

## 2025-04-14 PROCEDURE — 97014 ELECTRIC STIMULATION THERAPY: CPT | Mod: PN | Performed by: PHYSICAL THERAPIST

## 2025-04-14 PROCEDURE — 97530 THERAPEUTIC ACTIVITIES: CPT | Mod: PN | Performed by: PHYSICAL THERAPIST

## 2025-04-14 PROCEDURE — 97140 MANUAL THERAPY 1/> REGIONS: CPT | Mod: PN | Performed by: PHYSICAL THERAPIST

## 2025-04-14 PROCEDURE — 97110 THERAPEUTIC EXERCISES: CPT | Mod: PN | Performed by: PHYSICAL THERAPIST

## 2025-04-14 NOTE — PROGRESS NOTES
"[facility]   Physical Therapy Treatment  Note     Name: Danuta Burgos [place]  Clinic Number: [id number]    Therapy Diagnosis:   Encounter Diagnoses   Name Primary?    Lumbar spondylolysis     Muscle weakness Yes    Chronic bilateral low back pain with right-sided sciatica          Physician: Yumiko Davis MD     Visit Date: 4/14/2025    Physician Orders: PT Eval and Treat   Medical Diagnosis from Referral: M43.06 (ICD-10-CM) - Lumbar spondylolysis   Evaluation Date: 4/5/2024  Authorization Period Expiration: 3-29-25  Plan of Care Expiration: 5-22-25  Progress Note Due: 4-27-25  Visit # / Visits authorized: 9/20  FOTO: Issued Visit #: 1     MD Follow up appointment: none scheduled      Precautions: Standard and sacralization of L5 R     PTA Visit #: 0/5     Time In: 1105  Time Out: 1220  Total Billable Time:  52 minutes + ES    SUBJECTIVE     Pt reports: overall feeling a little tight did not think was in dysfunction  Pt felt tight she stated     Pt was compliant with home exercise program.    Response to previous treatment: felt better   Functional change: less pain with movement     Pain: 4/10 after self mob 3/10  at end of session 1-2/10  Location: right LB with no R LE to mid calf    OBJECTIVE     Pelvic positioning:  AR R   Pt able to self mobilize and mod tightness of paraspinals and noted slight decrease in symptoms           Treatment     Danuta received the treatments listed below:      Therapeutic activity: Patient participated in dynamic functional therapeutic activities to improve functional performance for 14 minutes. Including: worked with pt on self analysis of when in dysfunction for when she feels slight tightness, but be a sign of dysfunction and pt to start performing self mob techniques sooner  Pt had not done self mob since previous AM and pt to consider doing self mob AM and PM and will note response       Lateral step down x 6" and instructed pt in use of " leading with heel vs toe based on height of step and ease of ex     Therapeutic exercises were performed to improve ROM, strength, flexibility and stabilization for 8 minutes.      Lower trunk rotation supine x 10 Pt able to perform without increased pain   Pt reported being unsuccessful with self mob   HS stretch supine   Glut stretch  Piriformis stretch  B QL stretch       (NP)Prone press up x 10 as tolerated      HOLD pt reported did not work as well    SL L prop on elbow to stretch R QL region    Hold pt feels uncomfortable but doing SL Sitting SB R     Child's pose x 5  Contract relax R glut x 3       Pt able to self mobilize     Instructed further need to perform self mob technique at onset of symptoms due to increased tightness developing during day resulting in inability to self mobilize      Verbal review Pt is only performing 10 reps when she does ex    Pelvic tilt   x 10  Bridge x 10,   SLR x 10,   Trunk rotation supine x 10  Partial sit up x 10  Hip abd sidelying x 10 B,  Arm and Leg lift prone x 10, instructed pt to put pillow under stomach   Hip ext prone with bent knee x 10 instructed pt to put pillow under stomach  Sit to stand x 10  Instructed pt to hold press up for now and will note results  GSS standing x 10     Iron cross x 2 as needed  Plank x 30 sec x 1 Led to dysfunction so pt performed contract relax and realigned I   Contract relax R glut     Walking for exercise   Instructed pt to ice knee and hold walking for exercise for now       Manual therapy: Danuta  received the following manual therapy  techniques x 20 min. to include soft tissue and joint mobilization were applied to the: low back and gluteals to include:     Performed STM to LB paraspinals and QL R>L P/A mob to lumbar region Grade 1-2 , P/A and rot glides lumbar paraspinals    Sidelying L contract relax mobilization to L5-S1 region to level pelvis  at end of treatment and level pelvis achieved at beginning and end of session     Pt received tool-assisted massage with manual therapy techniques to B LB in prone to trigger an inflammatory healing response and stimulate the production of new collagen and proper, more functional, less painful healing.    Vacuum/cupping STM with manual therapy techniques was performed to back and gluteals to decrease muscle tightness, increase circulation and promote healing process.  The pt's skin was monitored for redness adjusting pressure as needed. The pt was instructed in possible side effects of bruising and/or soreness.       Ultrasound  for pain control and to decrease inflammation @ continuous duty cycle, 1 Mhz, applied to R lumbar paraspinals, intensity = 1.5 w/cm2 for 8 minutes. 2 min prep      Patient received pre-mod electrical stimulation to decrease muscle tightness and pain to Lumbar paraspinals/ sacral border of gluteals B for 15 minutes with MH supine with cycle time: continuous, beat frequency: , CC/CV: CV.      Patient Education and Home Exercises     Home Exercises Provided and Patient Education Provided     Education provided:   - focus on strengthening   - HEP   - stretch to point of tightness not pain   - exercise in pain free zone       Written Home Exercises Provided: continue prior HEP  Exercises were reviewed and Danuta was able to demonstrate them prior to the end of the session.  Danuta demonstrated good understanding of the education provided. See EMR under Patient Instructions for exercises provided during therapy sessions    ASSESSMENT   Pt with slight  increased muscle tightness and was in dysfunction not realizing that slight tightness pain is first sign of dysfunction for improved symptoms after self mob Patient appears to understand increased awareness of tightness symptoms and to perform push/pull at onset of increased symptoms.  Pt with decreased symptoms at end of session and will benefit from continued treatment as needed to assist pt in fully achieving her  goals       Pt prognosis is Good.     Pt will continue to benefit from skilled outpatient physical therapy to address the goals listed in the initial evaluation, provide pt/family education and to maximize pt's level of independence in the home and community environment.     Pt's spiritual, cultural and educational needs considered and pt agreeable to plan of care and goals.     Anticipated barriers to physical therapy: work schedule    GOALS:   Short Term Goals:  4 weeks MET STG's  Increase range of motion 25%  Increase strength 1/2 muscle grade   Improve postural awareness of pelvis to independently identify dysfunction with min assist from PT  Be able to perform HEP with minimal cueing required     Long Term Goals: 8 weeks (Progressing, not met)  Increase range of motion to 75% to 100% full   Improve muscle strength 1 muscle grade  Improve muscle strength with MMT to 4+/5 to 5/5  Improve and stabilize proper pelvic positioning  Restore ability to sit for work and ADL without increased pain   Restore ability to stand for work and ADL with very min to 0 increased pain  Restore normal sleep habits with min to 0 disturbances due to pain  Restore ability to perform ADL's and household activities independently and with very min to 0 increased pain    PLAN      Continue with established plan of care towards PT goals.  See pt as needed     Plan of care Certification: 3-27-25 to 5-22-25    Outpatient Physical Therapy 1-2 times weekly as needed for 8 weeks to include the following interventions: Therapeutic exercises, manual therapy, neuromuscular re-education, therapeutic activities, modalities such as moist heat, ice, ultrasound and electrical stimulation, lumbar mechanical traction and dry needling will be considered and utilized as needed    Jacy Escobar, PT, MSPT

## 2025-04-29 ENCOUNTER — CLINICAL SUPPORT (OUTPATIENT)
Dept: REHABILITATION | Facility: HOSPITAL | Age: 70
End: 2025-04-29
Payer: COMMERCIAL

## 2025-04-29 DIAGNOSIS — M54.41 CHRONIC BILATERAL LOW BACK PAIN WITH RIGHT-SIDED SCIATICA: ICD-10-CM

## 2025-04-29 DIAGNOSIS — M62.81 MUSCLE WEAKNESS: Primary | ICD-10-CM

## 2025-04-29 DIAGNOSIS — G89.29 CHRONIC BILATERAL LOW BACK PAIN WITH RIGHT-SIDED SCIATICA: ICD-10-CM

## 2025-04-29 PROCEDURE — 97140 MANUAL THERAPY 1/> REGIONS: CPT | Mod: PN | Performed by: PHYSICAL THERAPIST

## 2025-04-29 PROCEDURE — 97010 HOT OR COLD PACKS THERAPY: CPT | Mod: PN | Performed by: PHYSICAL THERAPIST

## 2025-04-29 PROCEDURE — 97110 THERAPEUTIC EXERCISES: CPT | Mod: PN | Performed by: PHYSICAL THERAPIST

## 2025-04-29 PROCEDURE — 97035 APP MDLTY 1+ULTRASOUND EA 15: CPT | Mod: PN | Performed by: PHYSICAL THERAPIST

## 2025-04-29 PROCEDURE — 97014 ELECTRIC STIMULATION THERAPY: CPT | Mod: PN | Performed by: PHYSICAL THERAPIST

## 2025-04-29 NOTE — PROGRESS NOTES
[facility]   Physical Therapy Progress and Treatment  Note     Name: Danuta Santos  Clinic Number: [id number]    Therapy Diagnosis:   Encounter Diagnoses   Name Primary?    Muscle weakness Yes    Chronic bilateral low back pain with right-sided sciatica          Physician: Yumiko Davis MD     Visit Date: 4/29/2025    Physician Orders: PT Eval and Treat   Medical Diagnosis from Referral: M43.06 (ICD-10-CM) - Lumbar spondylolysis   Evaluation Date: 4/5/2024  Authorization Period Expiration: 3-29-25  Plan of Care Expiration: 5-22-25  Progress Note Due: 5-22-25  Visit # / Visits authorized: 10/20  FOTO: Issued Visit #: 1     MD Follow up appointment: none scheduled      Precautions: Standard and sacralization of L5 R     PTA Visit #: 0/5     Time In: 410  Time Out: 515   Total Billable Time:  45 minutes + ES    SUBJECTIVE     Pt reports: overall feeling better continue with some tightness but lifting and bending with improved body mechanics and posture has really helped Pt states able to self mobilize easier and does not go in dysfunction as easy or as often     Pt was compliant with home exercise program.    Response to previous treatment: felt better   Functional change: less pain with movement     Pain: 4.5/10   at end of session 1-2/10  Location: right LB with no R LE to mid calf    OBJECTIVE     Pelvic positioning:  AR R   Pt able to self mobilize and mod tightness of paraspinals and noted slight decrease in symptoms     Lumbar active range of motion in standing is: 4-29-25  - flexion - floor                     - extension -  100%                         - left side bending -  to knee         - right side bending -  to knee            Lumbar active range of motion in standing is: initial eval   - flexion - floor                     - extension -  75%                         - left side bending -  to knee         - right side bending -  to knee               Flexibility  "testing:  - hamstrings:     90/90 test same as IE R 5 L 0           - gastrocnemius:   DF ankle R 10 degrees L 10 degrees              Muscle Strength 4-29-25  MMT R L   Knee extension 5/5 5/5   Knee flexion 5/5 5/5   Hip flexion 5/5 5/5   Hip abduction 5/5 5-/5   Hip extension 5/5 5/5   Glut max 4-/5 4/5      Muscle Strength re- eval  MMT R L   Knee extension 5/5 5/5   Knee flexion 5/5 5/5   Hip flexion 4-/5 4-/5   Hip abduction 4+/5 4+/5   Hip extension 4+/5 4+/5   Glut max 3+/5 3+/5       Palpation: after Rx min-mod but does tighten at Re-eval mod-severe TTP and severe tightness R lumbar paraspinals     Joint mobility: mod-severe at IE severe decreased lumbar mobility           Treatment     Danuta received the treatments listed below:      (NP)Therapeutic activity: Patient participated in dynamic functional therapeutic activities to improve functional performance for 0 minutes. Including: worked with pt on self analysis of when in dysfunction for when she feels slight tightness, but be a sign of dysfunction and pt to start performing self mob techniques sooner  Pt had not done self mob since previous AM and pt to consider doing self mob AM and PM and will note response       Lateral step down x 6" and instructed pt in use of leading with heel vs toe based on height of step and ease of ex     Therapeutic exercises were performed to improve ROM, strength, flexibility and stabilization for 15 minutes.      Lower trunk rotation supine x 10 Pt able to perform without increased pain   Pt reported being unsuccessful with self mob   HS stretch supine   Glut stretch  Piriformis stretch  B QL stretch       (NP)Prone press up x 10 as tolerated      HOLD pt reported did not work as well    SL L prop on elbow to stretch R QL region    Hold pt feels uncomfortable but doing SL Sitting SB R     Child's pose x 5  Contract relax R glut x 3        ER stretch figure 4 supine x 10     Instructed further need to perform self mob " technique at onset of symptoms due to increased tightness developing during day resulting in inability to self mobilize      Verbal review Pt is only performing 10 reps when she does ex    Pelvic tilt   x 10  Bridge x 10,   SLR x 10,   Trunk rotation supine x 10  Partial sit up x 10  Hip abd sidelying x 10 B,  Arm and Leg lift prone x 10, instructed pt to put pillow under stomach   Hip ext prone with bent knee x 10 instructed pt to put pillow under stomach  Sit to stand x 10  Instructed pt to hold press up for now and will note results  GSS standing x 10     Iron cross x 2 as needed  Plank x 30 sec x 1 Led to dysfunction so pt performed contract relax and realigned I   Contract relax R glut     Walking for exercise   Instructed pt to ice knee and hold walking for exercise for now       Manual therapy: Danuta  received the following manual therapy  techniques x 20 min. to include soft tissue and joint mobilization were applied to the: low back and gluteals to include:     Performed STM to LB paraspinals and QL R>L P/A mob to lumbar region Grade 1-2 , P/A and rot glides lumbar paraspinals    Sidelying L contract relax mobilization to L5-S1 region to level pelvis  at end of treatment and level pelvis achieved at beginning and end of session    Pt received tool-assisted massage with manual therapy techniques to B LB in prone to trigger an inflammatory healing response and stimulate the production of new collagen and proper, more functional, less painful healing.    Vacuum/cupping STM with manual therapy techniques was performed to back and gluteals to decrease muscle tightness, increase circulation and promote healing process.  The pt's skin was monitored for redness adjusting pressure as needed. The pt was instructed in possible side effects of bruising and/or soreness.       Ultrasound  for pain control and to decrease inflammation @ continuous duty cycle, 1 Mhz, applied to R lumbar paraspinals, intensity = 1.5  w/cm2 for 8 minutes. 2 min prep      Patient received pre-mod electrical stimulation to decrease muscle tightness and pain to Lumbar paraspinals/ sacral border of gluteals B for 15 minutes with MH supine with cycle time: continuous, beat frequency: , CC/CV: CV.      Patient Education and Home Exercises     Home Exercises Provided and Patient Education Provided     Education provided:   - focus on strengthening   - HEP   - stretch to point of tightness not pain   - exercise in pain free zone       Written Home Exercises Provided: continue prior HEP  Exercises were reviewed and Danuta was able to demonstrate them prior to the end of the session.  Danuta demonstrated good understanding of the education provided. See EMR under Patient Instructions for exercises provided during therapy sessions    ASSESSMENT   Pt with slight  increased muscle tightness and was in dysfunction not realizing that slight tightness pain is first sign of dysfunction for improved symptoms after self mob Patient appears to understand increased awareness of tightness symptoms and to perform push/pull at onset of increased symptoms.  Pt with decreased symptoms at end of session and will benefit from continued treatment as needed to assist pt in fully achieving her goals       Pt prognosis is Good.     Pt will continue to benefit from skilled outpatient physical therapy to address the goals listed in the initial evaluation, provide pt/family education and to maximize pt's level of independence in the home and community environment.     Pt's spiritual, cultural and educational needs considered and pt agreeable to plan of care and goals.     Anticipated barriers to physical therapy: work schedule    GOALS:   Short Term Goals:  4 weeks MET STG's  Increase range of motion 25%  Increase strength 1/2 muscle grade   Improve postural awareness of pelvis to independently identify dysfunction with min assist from PT  Be able to perform HEP with  minimal cueing required     Long Term Goals: 8 weeks (Progressing, not met)  Increase range of motion to 75% to 100% full   Improve muscle strength 1 muscle grade  Improve muscle strength with MMT to 4+/5 to 5/5  Improve and stabilize proper pelvic positioning  Restore ability to sit for work and ADL without increased pain   Restore ability to stand for work and ADL with very min to 0 increased pain  Restore normal sleep habits with min to 0 disturbances due to pain  Restore ability to perform ADL's and household activities independently and with very min to 0 increased pain    PLAN    Continue with established plan of care towards PT goals.        Plan of care Certification: 3-27-25 to 5-22-25    Outpatient Physical Therapy 1-2 times weekly as needed for 8 weeks to include the following interventions: Therapeutic exercises, manual therapy, neuromuscular re-education, therapeutic activities, modalities such as moist heat, ice, ultrasound and electrical stimulation, lumbar mechanical traction and dry needling will be considered and utilized as needed    Jacy Escobar, PT, MSPT

## 2025-05-15 ENCOUNTER — CLINICAL SUPPORT (OUTPATIENT)
Dept: REHABILITATION | Facility: HOSPITAL | Age: 70
End: 2025-05-15
Payer: COMMERCIAL

## 2025-05-15 DIAGNOSIS — M62.81 MUSCLE WEAKNESS: Primary | ICD-10-CM

## 2025-05-15 DIAGNOSIS — M54.41 CHRONIC BILATERAL LOW BACK PAIN WITH RIGHT-SIDED SCIATICA: ICD-10-CM

## 2025-05-15 DIAGNOSIS — G89.29 CHRONIC BILATERAL LOW BACK PAIN WITH RIGHT-SIDED SCIATICA: ICD-10-CM

## 2025-05-15 PROCEDURE — 97035 APP MDLTY 1+ULTRASOUND EA 15: CPT | Mod: PN | Performed by: PHYSICAL THERAPIST

## 2025-05-15 PROCEDURE — 97010 HOT OR COLD PACKS THERAPY: CPT | Mod: PN | Performed by: PHYSICAL THERAPIST

## 2025-05-15 PROCEDURE — 97014 ELECTRIC STIMULATION THERAPY: CPT | Mod: PN | Performed by: PHYSICAL THERAPIST

## 2025-05-15 PROCEDURE — 97110 THERAPEUTIC EXERCISES: CPT | Mod: PN | Performed by: PHYSICAL THERAPIST

## 2025-05-15 PROCEDURE — 97140 MANUAL THERAPY 1/> REGIONS: CPT | Mod: PN | Performed by: PHYSICAL THERAPIST

## 2025-05-15 NOTE — PROGRESS NOTES
[facility]   Physical Therapy Treatment  Note     Name: Danuta Burgos Parlin  Clinic Number: [id number]    Therapy Diagnosis:   Encounter Diagnoses   Name Primary?    Muscle weakness Yes    Chronic bilateral low back pain with right-sided sciatica          Physician: Yumiko Davis MD     Visit Date: 5/15/2025    Physician Orders: PT Eval and Treat   Medical Diagnosis from Referral: M43.06 (ICD-10-CM) - Lumbar spondylolysis   Evaluation Date: 4/5/2024  Authorization Period Expiration: 3-29-25  Plan of Care Expiration: 5-22-25  Progress Note Due: 5-22-25  Visit # / Visits authorized: 11/20  FOTO: Issued Visit #: 1     MD Follow up appointment: none scheduled      Precautions: Standard and sacralization of L5 R     PTA Visit #: 0/5     Time In: 305  Time Out: 4:15    Total Billable Time:  45 minutes + ES    SUBJECTIVE     Pt reports: she lifted her granddaughter and tried to maintain good BM but felt pain by Tuesday when she was unable to self mobilize and has continued to try and stretch     Pt was compliant with home exercise program.    Response to previous treatment: felt better   Functional change: less pain with movement     Pain: 4.5/10   at end of session 1-2/10  Location: right LB with  R LE to mid calf  Pt with improved symptoms after session     OBJECTIVE     Pelvic positioning:  AR R   Pt unable to self mobilize and mod tightness of paraspinals and noted slight decrease in symptoms         Palpation: Severe tightness B lumbar paraspinals after session min-mod              Treatment     Danuta received the treatments listed below:      (NP)Therapeutic activity: Patient participated in dynamic functional therapeutic activities to improve functional performance for 0 minutes. Including: worked with pt on self analysis of when in dysfunction for when she feels slight tightness, but be a sign of dysfunction and pt to start performing self mob techniques sooner  Pt had not  "done self mob since previous AM and pt to consider doing self mob AM and PM and will note response       Lateral step down x 6" and instructed pt in use of leading with heel vs toe based on height of step and ease of ex     Therapeutic exercises were performed to improve ROM, strength, flexibility and stabilization for 15 minutes.   Discussed pt HEP verbally  Pt to consider wall sit for strengthening LE with emphasis on heel WB to engage HS and gluts Instructed pt to give herself a couple of days before resuming strengthening due to current level of instability  pt to also consider adding 1# weight to LE for strengthening Pt did develop dysfunction as reviewed ex but able to self mobilize after manual therapy and modalities    Lower trunk rotation supine x 10 Pt able to perform without increased pain   Pt reported being unsuccessful with self mob   HS stretch supine   Glut stretch  Piriformis stretch  B QL stretch       (NP)Prone press up x 10 as tolerated      HOLD pt reported did not work as well    SL L prop on elbow to stretch R QL region    Hold pt feels uncomfortable but doing SL Sitting SB R     Child's pose x 5  Contract relax R glut x 3        ER stretch figure 4 supine x 10     Instructed further need to perform self mob technique at onset of symptoms due to increased tightness developing during day resulting in inability to self mobilize      Verbal review Pt is only performing 10 reps when she does ex    Pelvic tilt   x 10  Bridge x 10,   SLR x 10,   Trunk rotation supine x 10  Partial sit up x 10  Hip abd sidelying x 10 B,  Arm and Leg lift prone x 10, instructed pt to put pillow under stomach   Hip ext prone with bent knee x 10 instructed pt to put pillow under stomach  Sit to stand x 10  Instructed pt to hold press up for now and will note results  GSS standing x 10     Iron cross x 2 as needed  Plank x 30 sec x 1 Led to dysfunction so pt performed contract relax and realigned I   Contract relax R " glut     Walking for exercise   Instructed pt to ice knee and hold walking for exercise for now       Manual therapy: Danuta  received the following manual therapy  techniques x 20 min. to include soft tissue and joint mobilization were applied to the: low back and gluteals to include:     Performed STM to LB paraspinals and QL R>L P/A mob to lumbar region Grade 1-2 , P/A and rot glides lumbar paraspinals    Sidelying L contract relax mobilization to L5-S1 region to level pelvis  at end of treatment and level pelvis achieved at beginning and end of session    Pt received tool-assisted massage with manual therapy techniques to B LB in prone to trigger an inflammatory healing response and stimulate the production of new collagen and proper, more functional, less painful healing.    Vacuum/cupping STM with manual therapy techniques was performed to back and gluteals to decrease muscle tightness, increase circulation and promote healing process.  The pt's skin was monitored for redness adjusting pressure as needed. The pt was instructed in possible side effects of bruising and/or soreness.       Ultrasound  for pain control and to decrease inflammation @ continuous duty cycle, 1 Mhz, applied to R lumbar paraspinals, intensity = 1.5 w/cm2 for 8 minutes. 2 min prep      Patient received pre-mod electrical stimulation to decrease muscle tightness and pain to Lumbar paraspinals/ sacral border of gluteals B for 15 minutes with MH supine with cycle time: continuous, beat frequency: , CC/CV: CV.      Patient Education and Home Exercises     Home Exercises Provided and Patient Education Provided     Education provided:   - focus on strengthening   - HEP   - stretch to point of tightness not pain   - exercise in pain free zone       Written Home Exercises Provided: continue prior HEP  Exercises were reviewed and Danuta was able to demonstrate them prior to the end of the session.  Danuta demonstrated good  understanding of the education provided. See EMR under Patient Instructions for exercises provided during therapy sessions    ASSESSMENT   Pt with limited ability to perform functional lifting of her granddaughter and may benefit from additional strengthening to improve stability with lifting and will assess further at next session Pt with improved symptoms at end of session and able to self mobilize as needed     Pt prognosis is Good.     Pt will continue to benefit from skilled outpatient physical therapy to address the goals listed in the initial evaluation, provide pt/family education and to maximize pt's level of independence in the home and community environment.     Pt's spiritual, cultural and educational needs considered and pt agreeable to plan of care and goals.     Anticipated barriers to physical therapy: work schedule    GOALS:   Short Term Goals:  4 weeks MET STG's  Increase range of motion 25%  Increase strength 1/2 muscle grade   Improve postural awareness of pelvis to independently identify dysfunction with min assist from PT  Be able to perform HEP with minimal cueing required     Long Term Goals: 8 weeks (Progressing, not met)  Increase range of motion to 75% to 100% full   Improve muscle strength 1 muscle grade  Improve muscle strength with MMT to 4+/5 to 5/5  Improve and stabilize proper pelvic positioning  Restore ability to sit for work and ADL without increased pain   Restore ability to stand for work and ADL with very min to 0 increased pain  Restore normal sleep habits with min to 0 disturbances due to pain  Restore ability to perform ADL's and household activities independently and with very min to 0 increased pain    PLAN    Continue with established plan of care towards PT goals.        Plan of care Certification: 3-27-25 to 5-22-25    Outpatient Physical Therapy 1-2 times weekly as needed for 8 weeks to include the following interventions: Therapeutic exercises, manual therapy,  neuromuscular re-education, therapeutic activities, modalities such as moist heat, ice, ultrasound and electrical stimulation, lumbar mechanical traction and dry needling will be considered and utilized as needed    Jacy Escobar, PT, MSPT

## 2025-05-29 ENCOUNTER — CLINICAL SUPPORT (OUTPATIENT)
Dept: REHABILITATION | Facility: HOSPITAL | Age: 70
End: 2025-05-29
Payer: COMMERCIAL

## 2025-05-29 DIAGNOSIS — M62.81 MUSCLE WEAKNESS: Primary | ICD-10-CM

## 2025-05-29 DIAGNOSIS — M54.41 CHRONIC BILATERAL LOW BACK PAIN WITH RIGHT-SIDED SCIATICA: ICD-10-CM

## 2025-05-29 DIAGNOSIS — G89.29 CHRONIC BILATERAL LOW BACK PAIN WITH RIGHT-SIDED SCIATICA: ICD-10-CM

## 2025-05-29 PROCEDURE — 97035 APP MDLTY 1+ULTRASOUND EA 15: CPT | Mod: PN | Performed by: PHYSICAL THERAPIST

## 2025-05-29 PROCEDURE — 97140 MANUAL THERAPY 1/> REGIONS: CPT | Mod: PN | Performed by: PHYSICAL THERAPIST

## 2025-05-29 PROCEDURE — 97110 THERAPEUTIC EXERCISES: CPT | Mod: PN | Performed by: PHYSICAL THERAPIST

## 2025-05-29 PROCEDURE — 97014 ELECTRIC STIMULATION THERAPY: CPT | Mod: PN | Performed by: PHYSICAL THERAPIST

## 2025-05-29 NOTE — PROGRESS NOTES
Outpatient Rehab    Physical Therapy Progress Note : Updated Plan of Care    Patient Name: Danuta Santos  MRN: [id number]  YOB: 1955  Encounter Date: 5/29/2025    Therapy Diagnosis:   Encounter Diagnoses   Name Primary?    Muscle weakness Yes    Chronic bilateral low back pain with right-sided sciatica      Physician: Yumiko Davis MD    Physician Orders: Eval and Treat  Medical Diagnosis: Lumbar spondylolysis    Visit # / Visits Authorized:  11 / 32  Insurance Authorization Period: 1/29/2025 to 12/31/2025  Date of Evaluation: 1-29-26  Plan of Care Certification: 5-29-25 to 7-24-25     PT/PTA: PT   Number of PTA visits since last PT visit:0  Time In: 0200   Time Out: 0315  Total Time (in minutes): 75   Total Billable Time (in minutes): 75        Precautions:      Standard and sacralization of L5 R       Subjective   End of school activities led to increased pain with increased activitiy and some sustained FB activities looking for papers which led to dysfunction and unable to correct Pt also had to sit for prolonged period unsupported for school ativity led to increased tightness.  Pain reported as 5/10. LB with no R LE pain    Objective      Spinal Mobility  Lumbosacral Mobility Details: Pelvic positioning: AR R  Mod-severe decreased spring   Pt with severe tightness B  lumbar paraspinals, min-mod tightness after Rx         Lumbar Range of Motion   Lumbar active range of motion in standing is: - flexion - floor                    - extension -  100%                        - left side bending -  to knee   pain      - right side bending -  to knee              Hip Range of Motion    Flexibility testing: hamstrings:     90/90 test R 5 L 0           Ankle/Foot Range of Motion   Right Ankle/Foot   Active (deg) Passive (deg) Pain   Dorsiflexion (KE) 10       Dorsiflexion (KF)         Plantar Flexion         Ankle Inversion         Ankle Eversion         Subtalar Inversion         Subtalar  Eversion         Great Toe MTP Flexion         Great Toe MTP Extension         Great Toe IP Flexion             Left Ankle/Foot   Active (deg) Passive (deg) Pain   Dorsiflexion (KE) 10       Dorsiflexion (KF)         Plantar Flexion         Ankle Inversion         Ankle Eversion         Subtalar Inversion         Subtalar Eversion         Great Toe MTP Flexion         Great Toe MTP Extension         Great Toe IP Flexion                            Hip Strength - Planes of Motion   Right Strength Right Pain Left Strength Left  Pain   Flexion (L2) 5   5     Extension 5   5     ABduction 5   4+     ADduction           Internal Rotation           External Rotation               Hip Strength - Isolated Muscles   Right Strength Right Pain Left Strength Left  Pain   Gluteus Rylan 4-   4     Gluteus Medius           Gluteus Minimus           Tensor Fasciae Latae             Knee Strength   Right Strength Right Pain Left Strength Left  Pain   Flexion (S2) 5   5     Prone Flexion           Extension (L3) 5   5                   Treatment:  Therapeutic Exercise  TE 1: Reassessment  TE 2: HSS supine x 10  TE 3: glut stretch x 10  TE 4: Piriformis stretch x 10  TE 5: QL stretch x 10  TE 6: Child's pose x 5  TE 7: contract relax R glut x 3  Manual Therapy  MT 1: STM to B lumbar paraspinals and QL P/A lumbar mob Grade 1-2  MT 2: Sidelying MET mobilization to L5-S1 with Jimenez technique  MT 3: IASTM to lumbar region and cupping to LB and gluteals  Modalities  Moist Heat (min): to lumbar region for 15 min  Ultrasound: Ultrasound  for pain control and to decrease inflammation @ continuous duty cycle, 1 Mhz, applied to R lumbar paraspinals, intensity = 1.8 w/cm2 for 8 minutes.  Electrical Stimulation (Parameters): Patient received pre-mod electrical stimulation to decrease muscle tightness and pain to B lumbar paraspinals  for 15 minutes with  with cycle time: continuous, beat frequency: , CC/CV: CV.    Time Entry(in  minutes):  E-Stim (Unattended) Time Entry: 17  Hot/Cold Pack Time Entry: 15  Ultrasound Time Entry: 10  Manual Therapy Time Entry: 27  Therapeutic Exercise Time Entry: 15    Assessment & Plan   Assessment  Danuta presents with a condition of Low complexity.   Presentation of Symptoms: Stable  Will Comorbidities Impact Care: No       Functional Limitations: Activity tolerance, Completing work/school activities, Getting off the floor, Sitting tolerance, Standing tolerance  Impairments: Other (Comment)  Other Impairments: sacralization of L5    Patient Goal for Therapy (PT): restore highest level of function and decrease pain  Prognosis: Good    Plan  From a physical therapy perspective, the patient would benefit from: Skilled Rehab Services    Planned therapy interventions include: Therapeutic exercise, Therapeutic activities, Neuromuscular re-education, Manual therapy, and ADLs/IADLs.    Planned modalities to include: Cryotherapy (cold pack), Thermotherapy (hot pack), Electrical stimulation - attended, Electrical stimulation - passive/unattended, Ultrasound, and Other (Comment). Dry Needling      Visit Frequency: 2 times Per Week for 8 Weeks.       This plan was discussed with Patient.   Discussion participants: Agreed Upon Plan of Care  Plan details: Pt will continue to work on self management techniques and be seen as needed when unable to manage symptoms independently           The patient will continue to benefit from skilled outpatient physical therapy in order to address the deficits listed in the problem list on the initial evaluation, provide patient and family education, and maximize the patients level of independence in the home and community environments.     The patient's spiritual, cultural, and educational needs were considered, and the patient is agreeable to the plan of care and goals.           Goals:   Active       Long term goals       Improve and stabilize proper pelvic positioning          Start:  05/29/25    Expected End:  07/24/25            Maintain pain level 1/10        Start:  05/29/25    Expected End:  07/24/25            Restore ability to sit for ADL and work with pain 1/10 to 0 increased pain        Start:  05/29/25    Expected End:  07/24/25            Restore ability to stand for ADL and work with 1/10 or 0 increased pain        Start:  05/29/25    Expected End:  07/24/25            Restore ability to sleep without disturbances due to pain          Start:  05/29/25    Expected End:  07/24/25            Restore ability to perform ADL's and household activities independently and with 1/10 pain or 0  increased pain         Start:  05/29/25    Expected End:  07/24/25               Short term goals        Maintain pain level 3 or below        Start:  05/29/25    Expected End:  06/26/25            Be able to perform HEP with minimal cueing required         Start:  05/29/25    Expected End:  06/26/25            Be able to perform self mobilization techniques to stabilize pelvis effectively        Start:  05/29/25    Expected End:  06/26/25                Jacy Escobar, PT

## 2025-06-04 ENCOUNTER — CLINICAL SUPPORT (OUTPATIENT)
Dept: REHABILITATION | Facility: HOSPITAL | Age: 70
End: 2025-06-04
Payer: COMMERCIAL

## 2025-06-04 DIAGNOSIS — M54.41 CHRONIC BILATERAL LOW BACK PAIN WITH RIGHT-SIDED SCIATICA: ICD-10-CM

## 2025-06-04 DIAGNOSIS — G89.29 CHRONIC BILATERAL LOW BACK PAIN WITH RIGHT-SIDED SCIATICA: ICD-10-CM

## 2025-06-04 DIAGNOSIS — M62.81 MUSCLE WEAKNESS: Primary | ICD-10-CM

## 2025-06-04 PROCEDURE — 97035 APP MDLTY 1+ULTRASOUND EA 15: CPT | Mod: PN | Performed by: PHYSICAL THERAPIST

## 2025-06-04 PROCEDURE — 97014 ELECTRIC STIMULATION THERAPY: CPT | Mod: PN | Performed by: PHYSICAL THERAPIST

## 2025-06-04 PROCEDURE — 97530 THERAPEUTIC ACTIVITIES: CPT | Mod: PN | Performed by: PHYSICAL THERAPIST

## 2025-06-04 PROCEDURE — 97140 MANUAL THERAPY 1/> REGIONS: CPT | Mod: PN | Performed by: PHYSICAL THERAPIST

## 2025-06-04 PROCEDURE — 97110 THERAPEUTIC EXERCISES: CPT | Mod: PN | Performed by: PHYSICAL THERAPIST

## 2025-06-10 ENCOUNTER — CLINICAL SUPPORT (OUTPATIENT)
Dept: REHABILITATION | Facility: HOSPITAL | Age: 70
End: 2025-06-10
Attending: PHYSICAL THERAPIST
Payer: COMMERCIAL

## 2025-06-10 DIAGNOSIS — M62.81 MUSCLE WEAKNESS: Primary | ICD-10-CM

## 2025-06-10 DIAGNOSIS — G89.29 CHRONIC BILATERAL LOW BACK PAIN WITH RIGHT-SIDED SCIATICA: ICD-10-CM

## 2025-06-10 DIAGNOSIS — M54.41 CHRONIC BILATERAL LOW BACK PAIN WITH RIGHT-SIDED SCIATICA: ICD-10-CM

## 2025-06-10 PROCEDURE — 97110 THERAPEUTIC EXERCISES: CPT | Mod: PN | Performed by: PHYSICAL THERAPIST

## 2025-06-10 PROCEDURE — 97035 APP MDLTY 1+ULTRASOUND EA 15: CPT | Mod: PN | Performed by: PHYSICAL THERAPIST

## 2025-06-10 PROCEDURE — 97140 MANUAL THERAPY 1/> REGIONS: CPT | Mod: PN | Performed by: PHYSICAL THERAPIST

## 2025-06-10 PROCEDURE — 97014 ELECTRIC STIMULATION THERAPY: CPT | Mod: PN | Performed by: PHYSICAL THERAPIST

## 2025-06-10 NOTE — PROGRESS NOTES
Outpatient Rehab    Physical Therapy Visit    Patient Name: Danuta Santos  MRN: [id number]  YOB: 1955  Encounter Date: 6/10/2025    Therapy Diagnosis:   Encounter Diagnoses   Name Primary?    Muscle weakness Yes    Chronic bilateral low back pain with right-sided sciatica      Physician: Yumiko Davis MD    Physician Orders: Eval and Treat  Medical Diagnosis: Lumbar spondylolysis  Surgical Diagnosis: Not applicable for this Episode   Surgical Date: Not applicable for this Episode    Visit # / Visits Authorized:  13 / 32  Insurance Authorization Period: 1/29/2025 to 12/31/2025  Date of Evaluation: 1/27/2025  Plan of Care Certification: 5/29/2025 to 7/24/2025      PT/PTA: PT   Number of PTA visits since last PT visit:0  Time In: 1010   Time Out: 1125  Total Time (in minutes): 75   Total Billable Time (in minutes): 75    FOTO: pt refuses       Precautions:     Standard and sacralization of L5 R       Subjective   Pt states doing more stretching and has been feeling better  Pt states slight dysfunction but able to self mobilize  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  Pain reported as 4/10. LB with no R LE pain  After session improved mobility and decreased symptoms 1-2/10    Objective      Spinal Mobility  Lumbosacral Mobility Details: Pelvic positioning: AR R   Mod-severe decreased lumbar spring, with with mod-severe tightness B lumbar paraspinals R>L  after Rx  min tightness noted at IE AR R  Mod-severe decreased spring   Pt with severe tightness B  lumbar paraspinals, min tightness after Rx            Treatment:  Therapeutic Exercise  TE 2: HSS supine x 10  TE 3: glut stretch x 10  TE 4: Piriformis stretch x 10  TE 5: QL stretch x 10  TE 6: Child's pose x 5  and lateral standing SB stretch x 5  TE 7: contract relax R glut x 3   and was successful with mob routine in realigning pelvis  Manual Therapy  MT 1: STM to B lumbar paraspinals and QL P/A lumbar mob Grade  1-2  MT 2: Sidelying MET mobilization to L5-S1 with Jimenez technique  MT 3: IASTM to lumbar region and cupping to LB and gluteals  Therapeutic Activity  TA 2: Pt brought in a pair of shoes she was considering for walking  Tennis shoes were [employer] with no apparent support and instructed pt that those shoes do not provide proper support    Time Entry(in minutes):  E-Stim (Unattended) Time Entry: 17  Hot/Cold Pack Time Entry: 15  Ultrasound Time Entry: 10  Manual Therapy Time Entry: 28  Therapeutic Activity Time Entry: 5  Therapeutic Exercise Time Entry: 15    Assessment & Plan   Assessment: Pt with improved compliance with stretching, now that not in school and less tightness noted  pt with a busy upcoming week and then will be out of town  Upon return will benefit from more formal review of HEP to work on rest of summer  Evaluation/Treatment Tolerance: Patient tolerated treatment well    The patient will continue to benefit from skilled outpatient physical therapy in order to address the deficits listed in the problem list on the initial evaluation, provide patient and family education, and maximize the patients level of independence in the home and community environments.     The patient's spiritual, cultural, and educational needs were considered, and the patient is agreeable to the plan of care and goals.     Education  Education was done with Patient. The patient's learning style includes Demonstration and Listening. The patient Demonstrates understanding and Verbalizes understanding.         - exercise in pain free zone - stretch to point of tightness not pain         Plan:      Goals:   Active       Long term goals       Improve and stabilize proper pelvic positioning   (Progressing)       Start:  05/29/25    Expected End:  07/24/25            Maintain pain level 1/10  (Progressing)       Start:  05/29/25    Expected End:  07/24/25            Restore ability to sit for ADL and work with pain 1/10 to 0  increased pain  (Progressing)       Start:  05/29/25    Expected End:  07/24/25            Restore ability to stand for ADL and work with 1/10 or 0 increased pain  (Progressing)       Start:  05/29/25    Expected End:  07/24/25            Restore ability to sleep without disturbances due to pain    (Progressing)       Start:  05/29/25    Expected End:  07/24/25            Restore ability to perform ADL's and household activities independently and with 1/10 pain or 0  increased pain   (Progressing)       Start:  05/29/25    Expected End:  07/24/25               Short term goals        Maintain pain level 3 or below  (Progressing)       Start:  05/29/25    Expected End:  06/26/25            Be able to perform HEP with minimal cueing required   (Progressing)       Start:  05/29/25    Expected End:  06/26/25            Be able to perform self mobilization techniques to stabilize pelvis effectively  (Progressing)       Start:  05/29/25    Expected End:  06/26/25                Jacy Escobar, PT

## 2025-06-18 ENCOUNTER — CLINICAL SUPPORT (OUTPATIENT)
Dept: REHABILITATION | Facility: HOSPITAL | Age: 70
End: 2025-06-18
Payer: COMMERCIAL

## 2025-06-18 DIAGNOSIS — M62.81 MUSCLE WEAKNESS: Primary | ICD-10-CM

## 2025-06-18 DIAGNOSIS — G89.29 CHRONIC BILATERAL LOW BACK PAIN WITH RIGHT-SIDED SCIATICA: ICD-10-CM

## 2025-06-18 DIAGNOSIS — M54.41 CHRONIC BILATERAL LOW BACK PAIN WITH RIGHT-SIDED SCIATICA: ICD-10-CM

## 2025-06-18 PROCEDURE — 97014 ELECTRIC STIMULATION THERAPY: CPT | Mod: PN | Performed by: PHYSICAL THERAPIST

## 2025-06-18 PROCEDURE — 97140 MANUAL THERAPY 1/> REGIONS: CPT | Mod: PN | Performed by: PHYSICAL THERAPIST

## 2025-06-18 PROCEDURE — 97035 APP MDLTY 1+ULTRASOUND EA 15: CPT | Mod: PN | Performed by: PHYSICAL THERAPIST

## 2025-06-18 PROCEDURE — 97110 THERAPEUTIC EXERCISES: CPT | Mod: PN | Performed by: PHYSICAL THERAPIST

## 2025-06-18 NOTE — PROGRESS NOTES
Outpatient Rehab    Physical Therapy Visit    Patient Name: Danuta Santos  MRN: [id number]  YOB: 1955  Encounter Date: 6/18/2025    Therapy Diagnosis:   Encounter Diagnoses   Name Primary?    Muscle weakness Yes    Chronic bilateral low back pain with right-sided sciatica      Physician: Yumiko Davis MD    Physician Orders: Eval and Treat  Medical Diagnosis: Lumbar spondylolysis  Surgical Diagnosis: Not applicable for this Episode   Surgical Date: Not applicable for this Episode  Days Since Last Surgery: Not applicable for this Episode    Visit # / Visits Authorized:  14 / 44  Insurance Authorization Period: 1/29/2025 to 12/31/2025  Date of Evaluation: 1/27/2025  Plan of Care Certification: 5/29/2025 to 7/24/2025      PT/PTA: PT   Number of PTA visits since last PT visit:0  Time In: 0910   Time Out: 1015  Total Time (in minutes): 65   Total Billable Time (in minutes): 65        Precautions:     Standard and sacralization of L5 R       Subjective   Pt has been doing a lot of grading papers and having some discomfort and able to do exercises and walk as needed  Pt is leaving tomorrow to go out of town  Pt had 1 day of working 10 hours and was too much sitting leading to increased leg pain  Leg pain only occurs with sitting  Pt c/o pain in R groin region.  Pain reported as 4/10. LB with slight R leg pain with sitting    Objective      Spinal Mobility  Lumbosacral Mobility Details: Pelvic positioning: AR R   Mod-severe decreased lumbar spring, with with mod tightness B lumbar paraspinals R>L  after Rx  min tightness noted at IE AR R  Mod-severe decreased spring   Pt with severe tightness B  lumbar paraspinals, min tightness after Rx            Treatment:  Therapeutic Exercise  TE 2: HSS supine x 10  TE 3: glut stretch x 10  TE 4: Piriformis stretch x 10  TE 6: Child's pose x 5  and lateral standing SB stretch x 5  TE 7: contract relax R glut x 3   and was successful with mob routine in  realigning pelvis  TE 8: + Hip flexor stretch supine x 30 sec x 1  instructed pt how to perform, but did not provide HEP at this time to try to address groin symptoms  Manual Therapy  MT 1: STM to B lumbar paraspinals and QL P/A lumbar mob Grade 1-2  MT 2: Sidelying MET mobilization to L5-S1 with Jimenez technique  MT 3: IASTM to lumbar region and cupping to LB and gluteals      Time Entry(in minutes):  E-Stim (Unattended) Time Entry: 17  Hot/Cold Pack Time Entry: 15  Ultrasound Time Entry: 10  Manual Therapy Time Entry: 23  Therapeutic Exercise Time Entry: 15    Assessment & Plan   Assessment: Pt with c/o R hip flexor pain with hip is flexed and may benefit from hip flexor stretching after increased sitting with grading activities she was involved with that is now completed.  Pt to be out of town and to resume stretching and strengthening as tole  pt with less tightness noted in lumbar paraspinals overall and able to self mobilize.    Evaluation/Treatment Tolerance: Patient tolerated treatment well    The patient will continue to benefit from skilled outpatient physical therapy in order to address the deficits listed in the problem list on the initial evaluation, provide patient and family education, and maximize the patients level of independence in the home and community environments.     The patient's spiritual, cultural, and educational needs were considered, and the patient is agreeable to the plan of care and goals.     Education  Education was done with Patient. The patient's learning style includes Demonstration and Listening. The patient Demonstrates understanding and Verbalizes understanding.         - exercise in pain free zone - stretch to point of tightness not pain         Plan: Continue with treatment as tolerated as per POC.  Progress strength and stabilization as tolerated  Focus on self management of symptoms    Goals:   Active       Long term goals       Improve and stabilize proper pelvic  positioning   (Progressing)       Start:  05/29/25    Expected End:  07/24/25            Maintain pain level 1/10  (Progressing)       Start:  05/29/25    Expected End:  07/24/25            Restore ability to sit for ADL and work with pain 1/10 to 0 increased pain  (Progressing)       Start:  05/29/25    Expected End:  07/24/25            Restore ability to stand for ADL and work with 1/10 or 0 increased pain  (Progressing)       Start:  05/29/25    Expected End:  07/24/25            Restore ability to sleep without disturbances due to pain    (Progressing)       Start:  05/29/25    Expected End:  07/24/25            Restore ability to perform ADL's and household activities independently and with 1/10 pain or 0  increased pain   (Progressing)       Start:  05/29/25    Expected End:  07/24/25               Short term goals        Maintain pain level 3 or below  (Progressing)       Start:  05/29/25    Expected End:  06/26/25            Be able to perform HEP with minimal cueing required   (Progressing)       Start:  05/29/25    Expected End:  06/26/25            Be able to perform self mobilization techniques to stabilize pelvis effectively  (Progressing)       Start:  05/29/25    Expected End:  06/26/25                Jacy Escobar, PT

## 2025-07-10 ENCOUNTER — CLINICAL SUPPORT (OUTPATIENT)
Dept: REHABILITATION | Facility: HOSPITAL | Age: 70
End: 2025-07-10
Payer: COMMERCIAL

## 2025-07-10 DIAGNOSIS — M62.81 MUSCLE WEAKNESS: Primary | ICD-10-CM

## 2025-07-10 DIAGNOSIS — M54.41 CHRONIC BILATERAL LOW BACK PAIN WITH RIGHT-SIDED SCIATICA: ICD-10-CM

## 2025-07-10 DIAGNOSIS — G89.29 CHRONIC BILATERAL LOW BACK PAIN WITH RIGHT-SIDED SCIATICA: ICD-10-CM

## 2025-07-10 PROCEDURE — 97140 MANUAL THERAPY 1/> REGIONS: CPT | Mod: PN | Performed by: PHYSICAL THERAPIST

## 2025-07-10 PROCEDURE — 97014 ELECTRIC STIMULATION THERAPY: CPT | Mod: PN | Performed by: PHYSICAL THERAPIST

## 2025-07-10 PROCEDURE — 97035 APP MDLTY 1+ULTRASOUND EA 15: CPT | Mod: PN | Performed by: PHYSICAL THERAPIST

## 2025-07-10 PROCEDURE — 97110 THERAPEUTIC EXERCISES: CPT | Mod: PN | Performed by: PHYSICAL THERAPIST

## 2025-07-10 NOTE — PROGRESS NOTES
Outpatient Rehab    Physical Therapy Progress Note    Patient Name: Danuta Santos  MRN: [id number]  YOB: 1955  Encounter Date: 7/10/2025    Therapy Diagnosis:   Encounter Diagnoses   Name Primary?    Muscle weakness Yes    Chronic bilateral low back pain with right-sided sciatica      Physician: Yumiko Davis MD    Physician Orders: Eval and Treat  Medical Diagnosis: Lumbar spondylolysis  Surgical Diagnosis: Not applicable for this Episode   Surgical Date: Not applicable for this Episode  Days Since Last Surgery: Not applicable for this Episode    Visit # / Visits Authorized:  15 / 44  Insurance Authorization Period: 1/29/2025 to 12/31/2025  Date of Evaluation: 1/27/2025  Plan of Care Certification: 5/29/2025 to 7/24/2025      PT/PTA: PT   Number of PTA visits since last PT visit:0  Time In: 0205   Time Out: 0315  Total Time (in minutes): 70   Total Billable Time (in minutes): 70        Precautions:     Standard and sacralization of L5 R       Subjective   Pt was out of town and did a lot of traveling and just returned from long airplane flight yesterday feeling increased pain  pt also did some yard work this AM and has been unable to self mobilize.  Pain reported as 5/10.      Objective      Spinal Mobility  Lumbosacral Mobility Details: Pelvic positioning: AR R unable to self mobilize.  Mod-severe decreased lumbar srping, mod-severe tightness B Lumbar paraspinals R>L at IE     at IE AR R  Mod-severe decreased spring   Pt with severe tightness B  lumbar paraspinals, min tightness after Rx         Lumbar Range of Motion   Same as IE Lumbar active range of motion in standing is: - flexion - floor                    - extension -  100%                        - left side bending -  to knee   pain      - right side bending -  to knee              Hip Range of Motion    Flexibility testing: hamstrings:     90/90 test same as IE  R 5 L 0                     Hip Strength - Planes of Motion    Right Strength Right Pain Left Strength Left  Pain   Flexion (L2) 5   5     Extension 5   5     ABduction 5   5     ADduction           Internal Rotation           External Rotation               Hip Strength - Isolated Muscles   Right Strength Right Pain Left Strength Left  Pain   Gluteus Rylan 4   4     Gluteus Medius           Gluteus Minimus           Tensor Fasciae Latae             Knee Strength   Right Strength Right Pain Left Strength Left  Pain   Flexion (S2) 5   5     Prone Flexion           Extension (L3) 5   5                   Gait Analysis  Gait Analysis Details  No gait deficits noted          Treatment:  Therapeutic Exercise  TE 1: Reassessment and verbal review of HEP  TE 2: HSS supine x 10  TE 3: glut stretch x 10  TE 4: Piriformis stretch x 10  TE 6: Child's pose x 5  and lateral standing SB stretch x 5  TE 7: contract relax R glut x 3   and was successful with mob routine in realigning pelvis  TE 8: Hip flexor stretch supine x 30 sec x 1  instructed pt how to perform, but did not provide HEP at this time to try to address groin symptoms  Manual Therapy  MT 1: STM to B lumbar paraspinals and QL P/A lumbar mob Grade 1-2  MT 2: Sidelying MET mobilization to L5-S1 with Jimenez technique  MT 3: IASTM to lumbar region and cupping to LB and gluteals  Modalities  Moist Heat (min): LB for 15 min  Ultrasound: Ultrasound  for pain control and to decrease inflammation @ continuous duty cycle, 1 Mhz, applied to R lumbar paraspinals, intensity = 1.8 w/cm2 for 8 minutes.  Electrical Stimulation (Parameters): Patient received pre-mod electrical stimulation to decrease muscle tightness and pain to lumbar paraspinals B for 15 minutes with MH with cycle time: continuous, beat frequency: , CC/CV: CV.    Time Entry(in minutes):  E-Stim (Unattended) Time Entry: 17  Hot/Cold Pack Time Entry: 15  Ultrasound Time Entry: 10  Manual Therapy Time Entry: 25  Therapeutic Exercise Time Entry: 18    Assessment & Plan    Assessment: Pt had been seen for 4 visits since last updated POC  Pt has been very busy with travel and prior to that had a lot of computer work that led to increased symptoms.  Overall pt has been managing symptoms well, recent travel and yard work led to increased symptoms and increased difficulty with self mobilization.  Pt will benefit from continued Rx to assist pt in fully achieving her goals and continue with minimum PT required for relief of symptoms to be able to achieve a level pelvis   Evaluation/Treatment Tolerance: Patient tolerated treatment well    The patient will continue to benefit from skilled outpatient physical therapy in order to address the deficits listed in the problem list on the initial evaluation, provide patient and family education, and maximize the patients level of independence in the home and community environments.     The patient's spiritual, cultural, and educational needs were considered, and the patient is agreeable to the plan of care and goals.     Education  Education was done with Patient. The patient's learning style includes Demonstration and Listening. The patient Demonstrates understanding and Verbalizes understanding.         - exercise in pain free zone - stretch to point of tightness not pain         Plan: Continue with treatment as tolerated as per POC.  Progress strength and stabilization as tolerated  Focus on self management of symptoms    Goals:   Active       Long term goals       Improve and stabilize proper pelvic positioning   (Progressing)       Start:  05/29/25    Expected End:  07/24/25            Maintain pain level 1/10  (Progressing)       Start:  05/29/25    Expected End:  07/24/25            Restore ability to sit for ADL and work with pain 1/10 to 0 increased pain  (Progressing)       Start:  05/29/25    Expected End:  07/24/25            Restore ability to stand for ADL and work with 1/10 or 0 increased pain  (Progressing)       Start:  05/29/25     Expected End:  07/24/25            Restore ability to sleep without disturbances due to pain    (Progressing)       Start:  05/29/25    Expected End:  07/24/25            Restore ability to perform ADL's and household activities independently and with 1/10 pain or 0  increased pain   (Progressing)       Start:  05/29/25    Expected End:  07/24/25               Short term goals        Maintain pain level 3 or below  (Progressing)       Start:  05/29/25    Expected End:  07/24/25            Be able to perform HEP with minimal cueing required   (Met)       Start:  05/29/25    Expected End:  06/26/25    Resolved:  07/10/25         Be able to perform self mobilization techniques to stabilize pelvis effectively  (Progressing)       Start:  05/29/25    Expected End:  07/24/25                Jacy Escobar, PT

## 2025-07-22 ENCOUNTER — CLINICAL SUPPORT (OUTPATIENT)
Dept: REHABILITATION | Facility: HOSPITAL | Age: 70
End: 2025-07-22
Attending: PHYSICAL THERAPIST
Payer: COMMERCIAL

## 2025-07-22 DIAGNOSIS — G89.29 CHRONIC BILATERAL LOW BACK PAIN WITH RIGHT-SIDED SCIATICA: ICD-10-CM

## 2025-07-22 DIAGNOSIS — M62.81 MUSCLE WEAKNESS: Primary | ICD-10-CM

## 2025-07-22 DIAGNOSIS — M54.41 CHRONIC BILATERAL LOW BACK PAIN WITH RIGHT-SIDED SCIATICA: ICD-10-CM

## 2025-07-22 PROCEDURE — 97035 APP MDLTY 1+ULTRASOUND EA 15: CPT | Mod: PN | Performed by: PHYSICAL THERAPIST

## 2025-07-22 PROCEDURE — 97140 MANUAL THERAPY 1/> REGIONS: CPT | Mod: PN | Performed by: PHYSICAL THERAPIST

## 2025-07-22 PROCEDURE — 97014 ELECTRIC STIMULATION THERAPY: CPT | Mod: PN | Performed by: PHYSICAL THERAPIST

## 2025-07-22 PROCEDURE — 97110 THERAPEUTIC EXERCISES: CPT | Mod: PN | Performed by: PHYSICAL THERAPIST

## 2025-07-22 NOTE — PROGRESS NOTES
Outpatient Rehab    Physical Therapy Visit    Patient Name: Danuta Santos  MRN: [id number]  YOB: 1955  Encounter Date: 7/22/2025    Therapy Diagnosis:   Encounter Diagnoses   Name Primary?    Muscle weakness Yes    Chronic bilateral low back pain with right-sided sciatica      Physician: Yumiko Davis MD    Physician Orders: Eval and Treat  Medical Diagnosis: Lumbar spondylolysis  Surgical Diagnosis: Not applicable for this Episode   Surgical Date: Not applicable for this Episode  Days Since Last Surgery: Not applicable for this Episode    Visit # / Visits Authorized:  16 / 44  Insurance Authorization Period: 1/29/2025 to 12/31/2025  Date of Evaluation: 1/27/2025  Plan of Care Certification: 5/29/2025 to 7/24/2025      PT/PTA: PT   Number of PTA visits since last PT visit:0  Time In: 0209   Time Out: 0315  Total Time (in minutes): 66   Total Billable Time (in minutes): 66    FOTO:  Intake Score (%): Not applicable for this Episode  Survey Score 2 (%): Not applicable for this Episode  Survey Score 3 (%): Not applicable for this Episode    Precautions:  Additional Precautions and Protocol Details: Standard and sacralization of L5 R       Subjective   Pt states she is doing ok Pt states doing HEP and feels tight Pt states has to care for granddaughter tomorrow so felt needed to help address tightness and leg pain Pt states walking and stretching generally help leg pain.  Pain reported as 4/10. LB tightness with slight R leg pain feel a pulling down leg to mid thigh posterior after self mob    Objective      Spinal Mobility  Lumbosacral Mobility Details: Pelvic positioning: AR R able to self mobilize.  , mod-tightness B Lumbar paraspinals R>L at IE     at IE AR R  Mod-severe decreased spring   Pt with severe tightness B  lumbar paraspinals, min tightness after Rx            Treatment:  Therapeutic Exercise  TE 1: verbal review of strengthening and gym work Pt asked about elliptical and  recumbent bike and provided recommendations to assist no dysfunction  TE 2: HSS supine x 10  TE 3: glut stretch x 10  TE 4: Piriformis stretch x 10  TE 5: QL stretch x 10  TE 6: Child's pose x 5  and lateral standing SB stretch x 5  TE 7: contract relax R glut x 3   and was successful with mob routine in realigning pelvis  Pt ableto self mobilize  TE 8: Pt states no groin symptoms recently so did not perform Hip flexor stretch supine x 30 sec x 1  instructed pt how to perform, but did not provide HEP at this time to try to address groin symptoms  Manual Therapy  MT 1: STM to B lumbar paraspinals and QL P/A lumbar mob Grade 1-2  MT 2: Sidelying MET mobilization to L5-S1 with Jimenez technique  MT 3: IASTM to lumbar region and cupping to LB and gluteals  Modalities  Moist Heat (min): LB for 15 min  Ultrasound: Ultrasound  for pain control and to decrease inflammation @ continuous duty cycle, 1 Mhz, applied to R lumbar paraspinals, intensity = 1.8 w/cm2 for 8 minutes.  Electrical Stimulation (Parameters): Patient received pre-mod electrical stimulation to decrease muscle tightness and pain to lumbar paraspinals B for 15 minutes with MH with cycle time: continuous, beat frequency: , CC/CV: CV.    Time Entry(in minutes):  E-Stim (Unattended) Time Entry: 17  Hot/Cold Pack Time Entry: 15  Ultrasound Time Entry: 10  Manual Therapy Time Entry: 24  Therapeutic Exercise Time Entry: 15    Assessment & Plan   Assessment: Pt with increased exercise and resulting in more tightness so needs to watch progression with strengthening to self manage symptoms Increased activity with granddaughter also leads to increased tightness Consider DN  Evaluation/Treatment Tolerance: Patient tolerated treatment well    The patient will continue to benefit from skilled outpatient physical therapy in order to address the deficits listed in the problem list on the initial evaluation, provide patient and family education, and maximize the  patients level of independence in the home and community environments.     The patient's spiritual, cultural, and educational needs were considered, and the patient is agreeable to the plan of care and goals.     Education  Education was done with Patient. The patient's learning style includes Demonstration and Listening. The patient Demonstrates understanding and Verbalizes understanding.         - exercise in pain free zone - stretch to point of tightness not pain         Plan: Continue with treatment as tolerated as per POC.  Progress strength and stabilization as tolerated  Focus on self management of symptomsvvConsider DN    Goals:   Active       Long term goals       Improve and stabilize proper pelvic positioning   (Progressing)       Start:  05/29/25    Expected End:  07/24/25            Maintain pain level 1/10  (Progressing)       Start:  05/29/25    Expected End:  07/24/25            Restore ability to sit for ADL and work with pain 1/10 to 0 increased pain  (Progressing)       Start:  05/29/25    Expected End:  07/24/25            Restore ability to stand for ADL and work with 1/10 or 0 increased pain  (Progressing)       Start:  05/29/25    Expected End:  07/24/25            Restore ability to sleep without disturbances due to pain    (Progressing)       Start:  05/29/25    Expected End:  07/24/25            Restore ability to perform ADL's and household activities independently and with 1/10 pain or 0  increased pain   (Progressing)       Start:  05/29/25    Expected End:  07/24/25               Short term goals        Maintain pain level 3 or below  (Progressing)       Start:  05/29/25    Expected End:  07/24/25            Be able to perform HEP with minimal cueing required   (Met)       Start:  05/29/25    Expected End:  06/26/25    Resolved:  07/10/25         Be able to perform self mobilization techniques to stabilize pelvis effectively  (Progressing)       Start:  05/29/25    Expected End:   07/24/25                Jacy Escobar, PT

## 2025-07-25 ENCOUNTER — TELEPHONE (OUTPATIENT)
Dept: INTERNAL MEDICINE | Facility: CLINIC | Age: 70
End: 2025-07-25
Payer: COMMERCIAL

## 2025-07-25 NOTE — TELEPHONE ENCOUNTER
Pt called with c/o back pain, is going to try to take 1 Celebrex, to see if it is upsetting to her stomach.

## 2025-07-31 ENCOUNTER — CLINICAL SUPPORT (OUTPATIENT)
Dept: REHABILITATION | Facility: HOSPITAL | Age: 70
End: 2025-07-31
Payer: COMMERCIAL

## 2025-07-31 DIAGNOSIS — G89.29 CHRONIC BILATERAL LOW BACK PAIN WITH RIGHT-SIDED SCIATICA: ICD-10-CM

## 2025-07-31 DIAGNOSIS — M54.41 CHRONIC BILATERAL LOW BACK PAIN WITH RIGHT-SIDED SCIATICA: ICD-10-CM

## 2025-07-31 DIAGNOSIS — M62.81 MUSCLE WEAKNESS: Primary | ICD-10-CM

## 2025-07-31 PROCEDURE — 97140 MANUAL THERAPY 1/> REGIONS: CPT | Mod: PN | Performed by: PHYSICAL THERAPIST

## 2025-07-31 PROCEDURE — 97035 APP MDLTY 1+ULTRASOUND EA 15: CPT | Mod: PN | Performed by: PHYSICAL THERAPIST

## 2025-07-31 PROCEDURE — 97014 ELECTRIC STIMULATION THERAPY: CPT | Mod: PN | Performed by: PHYSICAL THERAPIST

## 2025-07-31 PROCEDURE — 97110 THERAPEUTIC EXERCISES: CPT | Mod: PN | Performed by: PHYSICAL THERAPIST

## 2025-07-31 NOTE — PROGRESS NOTES
Outpatient Rehab    Physical Therapy Progress Note : Updated Plan of Care    Patient Name: Danuta Santos  MRN: [id number]  YOB: 1955  Encounter Date: 7/31/2025    Therapy Diagnosis:   Encounter Diagnoses   Name Primary?    Muscle weakness Yes    Chronic bilateral low back pain with right-sided sciatica      Physician: Yumiko Davis MD    Physician Orders: Eval and Treat  Medical Diagnosis: Lumbar spondylolysis  Surgical Diagnosis: Not applicable for this Episode   Surgical Date: Not applicable for this Episode  Days Since Last Surgery: Not applicable for this Episode    Visit # / Visits Authorized: 17 / 44  Insurance Authorization Period: 1/29/2025 to 12/31/2025  Date of Evaluation: 1/27/2025   Plan of Care Certification: 7/31/2025 to 9-25-25     PT/PTA: PT   Number of PTA visits since last PT visit:0  Time In: 0900   Time Out: 1015  Total Time (in minutes): 75   Total Billable Time (in minutes): 75        Precautions:  Additional Precautions and Protocol Details: Standard and sacralization of L5 R     Subjective   Pt states she had to babysit granddaughter and has had increased pain and unable to self mobilize patient states that she was unable to perform all the activities she needed, but was very careful on how she handled her granddaughter.  Pain reported as 6/10. LB pain R hip and down leg to mid calf  Pt with improved symptoms at end of session did not provide number    Objective   Posture                 Winging scapula noted at rest     Spinal Mobility  Lumbosacral Mobility Details: Pelvic positioning: AR R unable to self mobilize.  , severe tightness B Lumbar paraspinals R>L min-mod tightness after Rx   at IE AR R  Mod-severe decreased spring   Pt with severe tightness B  lumbar paraspinals, min tightness after Rx         Lumbar Range of Motion   Same as IE Lumbar active range of motion in standing is: - flexion - floor                    - extension -  100%                         - left side bending -  to knee   pain      - right side bending -  to knee                   Shoulder Strength - Planes of Motion   Right Strength Right Pain Left Strength Left  Pain   Flexion 4   4     Extension           ABduction 4   4     ADduction           Horizontal ABduction           Horizontal ADduction           Internal Rotation 0° 4+   4+     Internal Rotation 90°           External Rotation 0° 3+   3+     External Rotation 90°               Shoulder Strength Details  Scapular instability noted with elevation and abduction with increased winging, lateral rotation and protraction of the scapula.       Elbow Strength   Right Strength Right Pain Left Strength Left  Pain   Flexion (C6) 4+   4+     Extension (C7) 4+   4+               Hip Strength - Planes of Motion   Right Strength Right Pain Left Strength Left  Pain   Flexion (L2) 5   5     Extension 5   5     ABduction 5   5     ADduction           Internal Rotation           External Rotation               Hip Strength - Isolated Muscles   Right Strength Right Pain Left Strength Left  Pain   Gluteus Rylan 4   4     Gluteus Medius           Gluteus Minimus           Tensor Fasciae Latae             Knee Strength   Right Strength Right Pain Left Strength Left  Pain   Flexion (S2) 5   5     Prone Flexion           Extension (L3) 5   5                   Treatment:  Therapeutic Exercise  TE 1: verbal review of strengthening Performed assessment of upper body strength  TE 5: QL stretch x 10  TE 6: Child's pose x 5  and lateral standing SB stretch x 5  TE 7: contract relax R glut x 3   and was unsuccessful with mob routine in realigning pelvis  Pt ableto self mobilize  Manual Therapy  MT 1: STM to B lumbar paraspinals and QL P/A lumbar mob Grade 1-2  MT 2: Sidelying MET mobilization to L5-S1 with Jimenez technique  MT 3: IASTM to lumbar region and cupping to LB and gluteals  Therapeutic Activity  TA 1: Reviewed activities performing with  granddaughter  Modalities  Moist Heat (min): LB for 15 min  Ultrasound: Ultrasound  for pain control and to decrease inflammation @ continuous duty cycle, 1 Mhz, applied to R lumbar paraspinals, intensity = 1.8 w/cm2 for 8 minutes.  Electrical Stimulation (Parameters): Patient received pre-mod electrical stimulation to decrease muscle tightness and pain to lumbar paraspinals B for 15 minutes with MH with cycle time: continuous, beat frequency: , CC/CV: CV.    Time Entry(in minutes):  E-Stim (Unattended) Time Entry: 17  Hot/Cold Pack Time Entry: 15  Ultrasound Time Entry: 10  Manual Therapy Time Entry: 23  Therapeutic Activity Time Entry: 5  Therapeutic Exercise Time Entry: 20    Assessment & Plan   Assessment  Pt is still having difficulty with activities with granddaughter despite LE strength and mobility which led to concern about UE strength and stability performed eval and noted decreased strength Pt needs upper body strength to assist with LB stability to care for granddaughter and will benefit from further core strengthening and UE strengthening to offset back issues     Evaluation/Treatment Tolerance: Patient tolerated treatment well  Plan  From a physical therapy perspective, the patient would benefit from: Skilled Rehab Services    Planned therapy interventions include: Therapeutic exercise, Therapeutic activities, Neuromuscular re-education, Manual therapy, and ADLs/IADLs.    Planned modalities to include: Cryotherapy (cold pack), Thermotherapy (hot pack), Electrical stimulation - attended, Electrical stimulation - passive/unattended, Ultrasound, and Other (Comment). Dry Needling      Visit Frequency: 2 times Per Week for 8 Weeks.       This plan was discussed with Patient.   Discussion participants: Agreed Upon Plan of Care  Plan details: Pt will continue to work on self management techniques and be seen as needed when unable to manage symptoms independently           The patient will continue to  benefit from skilled outpatient physical therapy in order to address the deficits listed in the problem list on the initial evaluation, provide patient and family education, and maximize the patients level of independence in the home and community environments.     The patient's spiritual, cultural, and educational needs were considered, and the patient is agreeable to the plan of care and goals.     Education  Education was done with Patient. The patient's learning style includes Demonstration, Listening, and Pictures/video. The patient Demonstrates understanding and Verbalizes understanding.         - exercise in pain free zone - stretch to point of tightness not pain         Goals:   Active       Long term goals       Improve and stabilize proper pelvic positioning   (Progressing)       Start:  05/29/25    Expected End:  09/25/25            Maintain pain level 1/10  (Progressing)       Start:  05/29/25    Expected End:  09/25/25            Restore ability to sit for ADL and work with pain 1/10 to 0 increased pain  (Progressing)       Start:  05/29/25    Expected End:  09/25/25            Restore ability to stand for ADL and work with 1/10 or 0 increased pain  (Progressing)       Start:  05/29/25    Expected End:  09/25/25            Restore ability to sleep without disturbances due to pain    (Progressing)       Start:  05/29/25    Expected End:  09/25/25            Restore ability to perform ADL's and household activities independently and with 1/10 pain or 0  increased pain   (Progressing)       Start:  05/29/25    Expected End:  09/25/25               Short term goals        Maintain pain level 3 or below  (Progressing)       Start:  05/29/25    Expected End:  08/28/25            Be able to perform HEP with minimal cueing required   (Met)       Start:  05/29/25    Expected End:  06/26/25    Resolved:  07/10/25         Be able to perform self mobilization techniques to stabilize pelvis effectively   (Progressing)       Start:  05/29/25    Expected End:  08/20/25                Jacy Escobar, PT

## 2025-08-04 ENCOUNTER — HOSPITAL ENCOUNTER (OUTPATIENT)
Dept: RADIOLOGY | Facility: HOSPITAL | Age: 70
Discharge: HOME | End: 2025-08-04
Attending: INTERNAL MEDICINE
Payer: COMMERCIAL

## 2025-08-04 ENCOUNTER — CLINICAL SUPPORT (OUTPATIENT)
Dept: REHABILITATION | Facility: HOSPITAL | Age: 70
End: 2025-08-04
Payer: COMMERCIAL

## 2025-08-04 DIAGNOSIS — Z12.31 ENCOUNTER FOR SCREENING MAMMOGRAM FOR BREAST CANCER: ICD-10-CM

## 2025-08-04 DIAGNOSIS — M54.41 CHRONIC BILATERAL LOW BACK PAIN WITH RIGHT-SIDED SCIATICA: ICD-10-CM

## 2025-08-04 DIAGNOSIS — G89.29 CHRONIC BILATERAL LOW BACK PAIN WITH RIGHT-SIDED SCIATICA: ICD-10-CM

## 2025-08-04 DIAGNOSIS — M62.81 MUSCLE WEAKNESS: Primary | ICD-10-CM

## 2025-08-04 PROCEDURE — 77063 BREAST TOMOSYNTHESIS BI: CPT | Mod: 26,,, | Performed by: RADIOLOGY

## 2025-08-04 PROCEDURE — 97035 APP MDLTY 1+ULTRASOUND EA 15: CPT | Mod: PN | Performed by: PHYSICAL THERAPIST

## 2025-08-04 PROCEDURE — 97140 MANUAL THERAPY 1/> REGIONS: CPT | Mod: PN | Performed by: PHYSICAL THERAPIST

## 2025-08-04 PROCEDURE — 97112 NEUROMUSCULAR REEDUCATION: CPT | Mod: PN | Performed by: PHYSICAL THERAPIST

## 2025-08-04 PROCEDURE — 97110 THERAPEUTIC EXERCISES: CPT | Mod: PN | Performed by: PHYSICAL THERAPIST

## 2025-08-04 PROCEDURE — 77067 SCR MAMMO BI INCL CAD: CPT | Mod: 26,,, | Performed by: RADIOLOGY

## 2025-08-04 PROCEDURE — 97014 ELECTRIC STIMULATION THERAPY: CPT | Mod: PN | Performed by: PHYSICAL THERAPIST

## 2025-08-04 PROCEDURE — 77063 BREAST TOMOSYNTHESIS BI: CPT | Mod: TC

## 2025-08-04 NOTE — PROGRESS NOTES
Outpatient Rehab    Physical Therapy Visit    Patient Name: Danuta Santos  MRN: [id number]  YOB: 1955  Encounter Date: 8/4/2025    Therapy Diagnosis:   Encounter Diagnoses   Name Primary?    Muscle weakness Yes    Chronic bilateral low back pain with right-sided sciatica      Physician: Yumiko Davis MD    Physician Orders: Eval and Treat  Medical Diagnosis: Lumbar spondylolysis  Surgical Diagnosis: Not applicable for this Episode   Surgical Date: Not applicable for this Episode  Days Since Last Surgery: Not applicable for this Episode    Visit # / Visits Authorized:  19 / 44  Insurance Authorization Period: 1/29/2025 to 12/31/2025  Date of Evaluation: 1/27/2025  Plan of Care Certification: 7/31/2025 to 9/25/2025      PT/PTA: PT   Number of PTA visits since last PT visit:0  Time In: 0310   Time Out: 0435  Total Time (in minutes): 85   Total Billable Time (in minutes): 85    FOTO:  Intake Score (%): Not applicable for this Episode  Survey Score 2 (%): Not applicable for this Episode  Survey Score 3 (%): Not applicable for this Episode    Precautions:  Additional Precautions and Protocol Details: Standard and sacralization of L5 R       Subjective   Pt states she has been very busy with visitors from out of town.  pt states she has not baby sat granddaughter, so not as sore as last session with no leg pain.  Pain reported as 5/10. LB pain R hip  Pt with improved symptoms at end of session 1-2/10    Objective      Spinal Mobility  Lumbosacral Mobility Details: Pelvic positioning: AR R able to self mobilize.  , severe tightness B Lumbar paraspinals R>L min-mod tightness after Rx   at IE AR R  Mod-severe decreased spring   Pt with severe tightness B  lumbar paraspinals, min tightness after Rx         Shoulder Strength - Planes of Motion   Right Strength Right Pain Left Strength Left  Pain   Flexion           Extension           ABduction           ADduction           Horizontal ABduction            Horizontal ADduction           Internal Rotation 0°           Internal Rotation 90°           External Rotation 0°           External Rotation 90°               Shoulder Strength - Scapular Stabilizing Muscles   Right Strength Right Pain Left Strength Left  Pain   Lower Trapezius 3   3+     Middle Trapezius 3   3     Rhomboids 3+   4-                    Treatment:  Therapeutic Exercise  TE 9: +Biceps with YTB x 10,  Triceps with YTB x 10  Manual Therapy  MT 1: STM to B lumbar paraspinals and QL P/A lumbar mob Grade 1-2  MT 2: Sidelying MET mobilization to L5-S1 with Jimenez technique  MT 3: IASTM to lumbar region and cupping to LB and gluteals  Balance/Neuromuscular Re-Education  NMR 1: +Pulldown with retraction with YTB x 10 Retraction with YTB x 10 ER with YTB x 10 Provided pt YTB for home use  NMR 2: + pelvic tilt x 10  NMR 3: + shoulder ext prone x 5/  horizontal abd prone x 5  Modalities  Moist Heat (min): LB for 15 min  Ultrasound: Ultrasound  for pain control and to decrease inflammation @ continuous duty cycle, 1 Mhz, applied to R lumbar paraspinals, intensity = 1.8 w/cm2 for 8 minutes.  Electrical Stimulation (Parameters): Patient received pre-mod electrical stimulation to decrease muscle tightness and pain to lumbar paraspinals B for 15 minutes with MH with cycle time: continuous, beat frequency: , CC/CV: CV.    Time Entry(in minutes):  E-Stim (Unattended) Time Entry: 17  Hot/Cold Pack Time Entry: 15  Ultrasound Time Entry: 10  Manual Therapy Time Entry: 20  Neuromuscular Re-Education Time Entry: 23  Therapeutic Exercise Time Entry: 15    Assessment & Plan   Assessment: Pt with less muscle tightness due to not having to help with granddaughter and able to self mobilize.  Pt may benefit from additional core stabilization and progression with upper body strengthening to help assist lumbar stabilization when caring for granddaughter.  Pt able to progress with strengthening and stabilization without  adverse effect   Pt with improved symptoms at end of session  Evaluation/Treatment Tolerance: Patient tolerated treatment well    The patient will continue to benefit from skilled outpatient physical therapy in order to address the deficits listed in the problem list on the initial evaluation, provide patient and family education, and maximize the patients level of independence in the home and community environments.     The patient's spiritual, cultural, and educational needs were considered, and the patient is agreeable to the plan of care and goals.     Education  Education was done with Patient. The patient's learning style includes Demonstration, Listening, and Pictures/video. The patient Demonstrates understanding and Verbalizes understanding.         - exercise in pain free zone - stretch to point of tightness not pain         Plan: Continue with treatment as tolerated as per POC.  Progress strength and stabilization as tolerated  Focus on self management of symptomsvvConsider DN and further strength and stabilization    Goals:   Active       Long term goals       Improve and stabilize proper pelvic positioning   (Progressing)       Start:  05/29/25    Expected End:  09/25/25            Maintain pain level 1/10  (Progressing)       Start:  05/29/25    Expected End:  09/25/25            Restore ability to sit for ADL and work with pain 1/10 to 0 increased pain  (Progressing)       Start:  05/29/25    Expected End:  09/25/25            Restore ability to stand for ADL and work with 1/10 or 0 increased pain  (Progressing)       Start:  05/29/25    Expected End:  09/25/25            Restore ability to sleep without disturbances due to pain    (Progressing)       Start:  05/29/25    Expected End:  09/25/25            Restore ability to perform ADL's and household activities independently and with 1/10 pain or 0  increased pain   (Progressing)       Start:  05/29/25    Expected End:  09/25/25               Short  term goals        Maintain pain level 3 or below  (Progressing)       Start:  05/29/25    Expected End:  08/28/25            Be able to perform HEP with minimal cueing required   (Met)       Start:  05/29/25    Expected End:  06/26/25    Resolved:  07/10/25         Be able to perform self mobilization techniques to stabilize pelvis effectively  (Progressing)       Start:  05/29/25    Expected End:  08/20/25                Jacy Escobar, PT

## 2025-08-06 ENCOUNTER — CLINICAL SUPPORT (OUTPATIENT)
Dept: INTERNAL MEDICINE | Facility: CLINIC | Age: 70
End: 2025-08-06
Payer: COMMERCIAL

## 2025-08-06 ENCOUNTER — OFFICE VISIT (OUTPATIENT)
Dept: INTERNAL MEDICINE | Facility: CLINIC | Age: 70
End: 2025-08-06
Payer: COMMERCIAL

## 2025-08-06 ENCOUNTER — HOSPITAL ENCOUNTER (OUTPATIENT)
Dept: CARDIOLOGY | Facility: HOSPITAL | Age: 70
Discharge: HOME | End: 2025-08-06
Attending: INTERNAL MEDICINE
Payer: COMMERCIAL

## 2025-08-06 VITALS
DIASTOLIC BLOOD PRESSURE: 74 MMHG | HEART RATE: 59 BPM | HEIGHT: 66 IN | BODY MASS INDEX: 21.69 KG/M2 | SYSTOLIC BLOOD PRESSURE: 162 MMHG | WEIGHT: 134.94 LBS

## 2025-08-06 VITALS
WEIGHT: 135 LBS | HEART RATE: 54 BPM | BODY MASS INDEX: 21.69 KG/M2 | HEIGHT: 66 IN | OXYGEN SATURATION: 98 % | DIASTOLIC BLOOD PRESSURE: 64 MMHG | SYSTOLIC BLOOD PRESSURE: 128 MMHG

## 2025-08-06 DIAGNOSIS — Z00.00 ANNUAL PHYSICAL EXAM: Primary | ICD-10-CM

## 2025-08-06 DIAGNOSIS — M54.41 CHRONIC BILATERAL LOW BACK PAIN WITH RIGHT-SIDED SCIATICA: ICD-10-CM

## 2025-08-06 DIAGNOSIS — H93.13 TINNITUS OF BOTH EARS: ICD-10-CM

## 2025-08-06 DIAGNOSIS — R01.1 NEWLY RECOGNIZED HEART MURMUR: ICD-10-CM

## 2025-08-06 DIAGNOSIS — G89.29 CHRONIC BILATERAL LOW BACK PAIN WITH RIGHT-SIDED SCIATICA: ICD-10-CM

## 2025-08-06 DIAGNOSIS — K59.09 CHRONIC CONSTIPATION: ICD-10-CM

## 2025-08-06 DIAGNOSIS — J30.9 CHRONIC ALLERGIC RHINITIS: ICD-10-CM

## 2025-08-06 LAB
25(OH)D3+25(OH)D2 SERPL-MCNC: 49 NG/ML (ref 30–96)
ABSOLUTE EOSINOPHIL (OHS): 0.04 K/UL
ABSOLUTE MONOCYTE (OHS): 0.25 K/UL (ref 0.3–1)
ABSOLUTE NEUTROPHIL COUNT (OHS): 2.35 K/UL (ref 1.8–7.7)
ALBUMIN SERPL BCP-MCNC: 4.3 G/DL (ref 3.5–5.2)
ALP SERPL-CCNC: 58 UNIT/L (ref 40–150)
ALT SERPL W/O P-5'-P-CCNC: 25 UNIT/L (ref 0–55)
ANION GAP (OHS): 9 MMOL/L (ref 8–16)
AORTIC SIZE INDEX (SOV): 1.8 CM/M2
AORTIC SIZE INDEX: 2 CM/M2
ASCENDING AORTA: 3.3 CM
AST SERPL-CCNC: 25 UNIT/L (ref 0–50)
AV AREA BY CONTINUOUS VTI: 3.3 CM2
AV INDEX (PROSTH): 0.88
AV LVOT MEAN GRADIENT: 2 MMHG
AV LVOT PEAK GRADIENT: 4 MMHG
AV MEAN GRADIENT: 3 MMHG
AV PEAK GRADIENT: 6 MMHG
AV REGURGITATION PRESSURE HALF TIME: 699 MS
AV VALVE AREA BY VELOCITY RATIO: 3.2 CM²
AV VALVE AREA: 3.4 CM2
AV VELOCITY RATIO: 0.83
BASOPHILS # BLD AUTO: 0.03 K/UL
BASOPHILS NFR BLD AUTO: 0.8 %
BILIRUB SERPL-MCNC: 0.7 MG/DL (ref 0.1–1)
BILIRUB UR QL STRIP.AUTO: NEGATIVE
BSA FOR ECHO PROCEDURE: 1.69 M2
BUN SERPL-MCNC: 16 MG/DL (ref 8–23)
CALCIUM SERPL-MCNC: 9.8 MG/DL (ref 8.7–10.5)
CHLORIDE SERPL-SCNC: 101 MMOL/L (ref 95–110)
CHOLEST SERPL-MCNC: 186 MG/DL (ref 120–199)
CHOLEST/HDLC SERPL: 2.7 {RATIO} (ref 2–5)
CLARITY UR: CLEAR
CO2 SERPL-SCNC: 27 MMOL/L (ref 23–29)
COLOR UR AUTO: YELLOW
CREAT SERPL-MCNC: 0.9 MG/DL (ref 0.5–1.4)
CV ECHO LV RWT: 0.29 CM
DOP CALC AO PEAK VEL: 1.2 M/S
DOP CALC AO VTI: 27.4 CM
DOP CALC LVOT AREA: 3.8 CM2
DOP CALC LVOT DIAMETER: 2.2 CM
DOP CALC LVOT PEAK VEL: 1 M/S
DOP CALCLVOT PEAK VEL VTI: 24.2 CM
E WAVE DECELERATION TIME: 253 MS
E/A RATIO: 0.9
E/E' RATIO: 6 M/S
EAG (OHS): 108 MG/DL (ref 68–131)
ECHO EF ESTIMATED: 64 %
ECHO LV POSTERIOR WALL: 0.7 CM (ref 0.6–1.1)
EJECTION FRACTION: 58 %
ERYTHROCYTE [DISTWIDTH] IN BLOOD BY AUTOMATED COUNT: 12.6 % (ref 11.5–14.5)
FRACTIONAL SHORTENING: 35.4 % (ref 28–44)
GFR SERPLBLD CREATININE-BSD FMLA CKD-EPI: >60 ML/MIN/1.73/M2
GLUCOSE SERPL-MCNC: 77 MG/DL (ref 70–110)
GLUCOSE UR QL STRIP: NEGATIVE
HBA1C MFR BLD: 5.4 % (ref 4–5.6)
HCT VFR BLD AUTO: 38.3 % (ref 37–48.5)
HDLC SERPL-MCNC: 69 MG/DL (ref 40–75)
HDLC SERPL: 37.1 % (ref 20–50)
HGB BLD-MCNC: 12.4 GM/DL (ref 12–16)
HGB UR QL STRIP: NEGATIVE
IMM GRANULOCYTES # BLD AUTO: 0.01 K/UL (ref 0–0.04)
IMM GRANULOCYTES NFR BLD AUTO: 0.3 % (ref 0–0.5)
INTERVENTRICULAR SEPTUM: 0.7 CM (ref 0.6–1.1)
IVC DIAMETER: 1.74 CM
KETONES UR QL STRIP: NEGATIVE
LA MAJOR: 6 CM
LA MINOR: 5.9 CM
LA WIDTH: 4.8 CM
LDLC SERPL CALC-MCNC: 103.8 MG/DL (ref 63–159)
LEFT ATRIUM SIZE: 3.6 CM
LEFT ATRIUM VOLUME INDEX MOD: 52 ML/M2
LEFT ATRIUM VOLUME INDEX: 52 ML/M2
LEFT ATRIUM VOLUME MOD: 88 ML
LEFT ATRIUM VOLUME: 87 CM3
LEFT INTERNAL DIMENSION IN SYSTOLE: 3.1 CM (ref 2.1–4)
LEFT VENTRICLE DIASTOLIC VOLUME INDEX: 63.31 ML/M2
LEFT VENTRICLE DIASTOLIC VOLUME: 107 ML
LEFT VENTRICLE MASS INDEX: 63.2 G/M2
LEFT VENTRICLE SYSTOLIC VOLUME INDEX: 23.1 ML/M2
LEFT VENTRICLE SYSTOLIC VOLUME: 39 ML
LEFT VENTRICULAR INTERNAL DIMENSION IN DIASTOLE: 4.8 CM (ref 3.5–6)
LEFT VENTRICULAR MASS: 106.9 G
LEUKOCYTE ESTERASE UR QL STRIP: NEGATIVE
LV LATERAL E/E' RATIO: 5
LV SEPTAL E/E' RATIO: 7.5
LYMPHOCYTES # BLD AUTO: 1 K/UL (ref 1–4.8)
Lab: 1.8 CM/M
Lab: 2 CM/M
MCH RBC QN AUTO: 29.2 PG (ref 27–31)
MCHC RBC AUTO-ENTMCNC: 32.4 G/DL (ref 32–36)
MCV RBC AUTO: 90 FL (ref 82–98)
MV PEAK A VEL: 0.5 M/S
MV PEAK E VEL: 0.45 M/S
NITRITE UR QL STRIP: NEGATIVE
NONHDLC SERPL-MCNC: 117 MG/DL
NUCLEATED RBC (/100WBC) (OHS): 0 /100 WBC
OHS CV CPX PATIENT HEIGHT IN: 66
OHS CV RV/LV RATIO: 0.85 CM
PH UR STRIP: 6 [PH]
PISA TR MAX VEL: 2.4 M/S
PLATELET # BLD AUTO: 192 K/UL (ref 150–450)
PMV BLD AUTO: 10.7 FL (ref 9.2–12.9)
POTASSIUM SERPL-SCNC: 4.2 MMOL/L (ref 3.5–5.1)
PROT SERPL-MCNC: 7 GM/DL (ref 6–8.4)
PROT UR QL STRIP: NEGATIVE
RA MAJOR: 5.33 CM
RA PRESSURE ESTIMATED: 3 MMHG
RA WIDTH: 4.18 CM
RBC # BLD AUTO: 4.25 M/UL (ref 4–5.4)
RELATIVE EOSINOPHIL (OHS): 1.1 %
RELATIVE LYMPHOCYTE (OHS): 27.2 % (ref 18–48)
RELATIVE MONOCYTE (OHS): 6.8 % (ref 4–15)
RELATIVE NEUTROPHIL (OHS): 63.8 % (ref 38–73)
RIGHT ATRIAL AREA: 19.9 CM2
RIGHT VENTRICLE DIASTOLIC BASEL DIMENSION: 4.1 CM
RV TB RVSP: 5 MMHG
RV TISSUE DOPPLER FREE WALL SYSTOLIC VELOCITY 1 (APICAL 4 CHAMBER VIEW): 14.12 CM/S
SINUS: 3.1 CM
SODIUM SERPL-SCNC: 137 MMOL/L (ref 136–145)
SP GR UR STRIP: 1.01
STJ: 2.8 CM
TDI LATERAL: 0.09 M/S
TDI SEPTAL: 0.06 M/S
TDI: 0.08 M/S
TRICUSPID ANNULAR PLANE SYSTOLIC EXCURSION: 3 CM
TRIGL SERPL-MCNC: 66 MG/DL (ref 30–150)
TSH SERPL-ACNC: 1.39 UIU/ML (ref 0.4–4)
TV PEAK GRADIENT: 23 MMHG
TV REST PULMONARY ARTERY PRESSURE: 26 MMHG
UROBILINOGEN UR STRIP-ACNC: NEGATIVE EU/DL
WBC # BLD AUTO: 3.68 K/UL (ref 3.9–12.7)
Z-SCORE OF LEFT VENTRICULAR DIMENSION IN END DIASTOLE: 0.2
Z-SCORE OF LEFT VENTRICULAR DIMENSION IN END SYSTOLE: 0.49

## 2025-08-06 PROCEDURE — 93306 TTE W/DOPPLER COMPLETE: CPT | Mod: 26,,, | Performed by: INTERNAL MEDICINE

## 2025-08-06 PROCEDURE — 83036 HEMOGLOBIN GLYCOSYLATED A1C: CPT

## 2025-08-06 PROCEDURE — 3008F BODY MASS INDEX DOCD: CPT | Mod: CPTII,S$GLB,, | Performed by: INTERNAL MEDICINE

## 2025-08-06 PROCEDURE — 84443 ASSAY THYROID STIM HORMONE: CPT

## 2025-08-06 PROCEDURE — 1160F RVW MEDS BY RX/DR IN RCRD: CPT | Mod: CPTII,S$GLB,, | Performed by: INTERNAL MEDICINE

## 2025-08-06 PROCEDURE — 3288F FALL RISK ASSESSMENT DOCD: CPT | Mod: CPTII,S$GLB,, | Performed by: INTERNAL MEDICINE

## 2025-08-06 PROCEDURE — 82306 VITAMIN D 25 HYDROXY: CPT

## 2025-08-06 PROCEDURE — 3078F DIAST BP <80 MM HG: CPT | Mod: CPTII,S$GLB,, | Performed by: INTERNAL MEDICINE

## 2025-08-06 PROCEDURE — 99397 PER PM REEVAL EST PAT 65+ YR: CPT | Mod: S$GLB,,, | Performed by: INTERNAL MEDICINE

## 2025-08-06 PROCEDURE — 80053 COMPREHEN METABOLIC PANEL: CPT

## 2025-08-06 PROCEDURE — 1101F PT FALLS ASSESS-DOCD LE1/YR: CPT | Mod: CPTII,S$GLB,, | Performed by: INTERNAL MEDICINE

## 2025-08-06 PROCEDURE — 93306 TTE W/DOPPLER COMPLETE: CPT

## 2025-08-06 PROCEDURE — 1159F MED LIST DOCD IN RCRD: CPT | Mod: CPTII,S$GLB,, | Performed by: INTERNAL MEDICINE

## 2025-08-06 PROCEDURE — 99999 PR PBB SHADOW E&M-EST. PATIENT-LVL I: CPT | Mod: PBBFAC,,,

## 2025-08-06 PROCEDURE — 85025 COMPLETE CBC W/AUTO DIFF WBC: CPT

## 2025-08-06 PROCEDURE — 3074F SYST BP LT 130 MM HG: CPT | Mod: CPTII,S$GLB,, | Performed by: INTERNAL MEDICINE

## 2025-08-06 PROCEDURE — 81003 URINALYSIS AUTO W/O SCOPE: CPT

## 2025-08-06 PROCEDURE — 3044F HG A1C LEVEL LT 7.0%: CPT | Mod: CPTII,S$GLB,, | Performed by: INTERNAL MEDICINE

## 2025-08-06 PROCEDURE — 1126F AMNT PAIN NOTED NONE PRSNT: CPT | Mod: CPTII,S$GLB,, | Performed by: INTERNAL MEDICINE

## 2025-08-06 PROCEDURE — 82465 ASSAY BLD/SERUM CHOLESTEROL: CPT

## 2025-08-06 PROCEDURE — 99999 PR PBB SHADOW E&M-EST. PATIENT-LVL III: CPT | Mod: PBBFAC,,, | Performed by: INTERNAL MEDICINE

## 2025-08-06 RX ORDER — CELECOXIB 100 MG/1
100 CAPSULE ORAL 2 TIMES DAILY PRN
Qty: 60 CAPSULE | Refills: 0 | Status: SHIPPED | OUTPATIENT
Start: 2025-08-06

## 2025-08-06 RX ORDER — METHOCARBAMOL 750 MG/1
750 TABLET, FILM COATED ORAL 2 TIMES DAILY PRN
Qty: 40 TABLET | Refills: 0 | Status: SHIPPED | OUTPATIENT
Start: 2025-08-06 | End: 2025-08-16

## 2025-08-06 RX ORDER — NITROFURANTOIN (MACROCRYSTALS) 100 MG/1
100 CAPSULE ORAL EVERY 12 HOURS
Qty: 10 CAPSULE | Refills: 0 | Status: SHIPPED | OUTPATIENT
Start: 2025-08-06

## 2025-08-07 ENCOUNTER — PATIENT MESSAGE (OUTPATIENT)
Dept: INTERNAL MEDICINE | Facility: CLINIC | Age: 70
End: 2025-08-07
Payer: COMMERCIAL

## 2025-08-08 ENCOUNTER — CLINICAL SUPPORT (OUTPATIENT)
Dept: REHABILITATION | Facility: HOSPITAL | Age: 70
End: 2025-08-08
Payer: COMMERCIAL

## 2025-08-08 ENCOUNTER — TELEPHONE (OUTPATIENT)
Dept: INTERNAL MEDICINE | Facility: CLINIC | Age: 70
End: 2025-08-08
Payer: COMMERCIAL

## 2025-08-08 DIAGNOSIS — G89.29 CHRONIC BILATERAL LOW BACK PAIN WITH RIGHT-SIDED SCIATICA: ICD-10-CM

## 2025-08-08 DIAGNOSIS — M62.81 MUSCLE WEAKNESS: Primary | ICD-10-CM

## 2025-08-08 DIAGNOSIS — I25.3 ATRIAL SEPTAL ANEURYSM: Primary | ICD-10-CM

## 2025-08-08 DIAGNOSIS — I51.7 ENLARGED LA (LEFT ATRIUM): ICD-10-CM

## 2025-08-08 DIAGNOSIS — M54.41 CHRONIC BILATERAL LOW BACK PAIN WITH RIGHT-SIDED SCIATICA: ICD-10-CM

## 2025-08-08 PROCEDURE — 97110 THERAPEUTIC EXERCISES: CPT | Mod: PN | Performed by: PHYSICAL THERAPIST

## 2025-08-08 PROCEDURE — 97014 ELECTRIC STIMULATION THERAPY: CPT | Mod: PN | Performed by: PHYSICAL THERAPIST

## 2025-08-08 PROCEDURE — 97035 APP MDLTY 1+ULTRASOUND EA 15: CPT | Mod: PN | Performed by: PHYSICAL THERAPIST

## 2025-08-08 PROCEDURE — 97140 MANUAL THERAPY 1/> REGIONS: CPT | Mod: PN | Performed by: PHYSICAL THERAPIST

## 2025-08-08 NOTE — PROGRESS NOTES
Outpatient Rehab    Physical Therapy Visit    Patient Name: Danuta Santos  MRN: [id number]  YOB: 1955  Encounter Date: 8/8/2025    Therapy Diagnosis:   Encounter Diagnoses   Name Primary?    Muscle weakness Yes    Chronic bilateral low back pain with right-sided sciatica      Physician: Yumiko Davis MD    Physician Orders: Eval and Treat  Medical Diagnosis: Lumbar spondylolysis  Surgical Diagnosis: Not applicable for this Episode   Surgical Date: Not applicable for this Episode  Days Since Last Surgery: Not applicable for this Episode    Visit # / Visits Authorized:  19 / 44  Insurance Authorization Period: 1/29/2025 to 12/31/2025  Date of Evaluation: 1/27/2025  Plan of Care Certification: 7/31/2025 to 9/25/2025      PT/PTA: PT   Number of PTA visits since last PT visit:0  Time In: 1112   Time Out: 1205  Total Time (in minutes): 53   Total Billable Time (in minutes): 50    FOTO:  Intake Score (%): Not applicable for this Episode  Survey Score 2 (%): Not applicable for this Episode  Survey Score 3 (%): Not applicable for this Episode    Precautions:  Additional Precautions and Protocol Details: Standard and sacralization of L5 R       Subjective   Pt was standing and leaned over to push something up and felt pain in LB.  Pt did all her exercises to level pelvis and continued with pain.  then pt sat in meetings in bad chairs all day and pain got bad and now gradually improving but still feel in dysfunction.  Pain reported as 5/10. LB pain R hip  Pt with improved symptoms at end of session 1-2/10    Objective      Spinal Mobility  Lumbosacral Mobility Details: Pelvic positioning: AR R unable to self mobilize.  , severe tightness B Lumbar paraspinals R>L min-mod tightness after Rx   at IE AR R  Mod-severe decreased spring   Pt with severe tightness B  lumbar paraspinals, min tightness after Rx            Treatment:  Therapeutic Exercise  TE 2: HSS  SUPINE x10  TE 3: glut stretch x10  TE 4:  piriformis stretch x10  TE 5: QL stretch x 10  TE 7: contract relax R glut x 3   and was unsuccessful with mob routine in realigning pelvis  TE 10: Attempted rotational stretch supine to assist with further stretching to allow relaxation for realignment, but was not successful  Manual Therapy  MT 1: STM to B lumbar paraspinals and QL P/A lumbar mob Grade 1-2  MT 2: Sidelying MET mobilization to L5-S1 with Jimenez technique  Modalities  Moist Heat (min): LB for 15 min  Ultrasound: Ultrasound  for pain control and to decrease inflammation @ continuous duty cycle, 1 Mhz, applied to R lumbar paraspinals, intensity = 1.8 w/cm2 for 8 minutes.  Electrical Stimulation (Parameters): Patient received pre-mod electrical stimulation to decrease muscle tightness and pain to lumbar paraspinals B for 15 minutes with MH with cycle time: continuous, beat frequency: , CC/CV: CV.    Time Entry(in minutes):  E-Stim (Unattended) Time Entry: 15  Hot/Cold Pack Time Entry: 10  Ultrasound Time Entry: 10  Manual Therapy Time Entry: 8  Therapeutic Exercise Time Entry: 17    Assessment & Plan   Assessment: Pt appears to have gone in dysfunction of pelvis with overreach and then prolonged sitting led to further tightening of muscles resulting in inability to self mobilize  Pt appears to understand need to be symmetrical with activity to avoid rotational pulls that can lead to dysfunction Pt with level pelvis and decreased pain at end of session       The patient will continue to benefit from skilled outpatient physical therapy in order to address the deficits listed in the problem list on the initial evaluation, provide patient and family education, and maximize the patients level of independence in the home and community environments.     The patient's spiritual, cultural, and educational needs were considered, and the patient is agreeable to the plan of care and goals.     Education  Education was done with Patient. The patient's  learning style includes Demonstration and Listening. The patient Demonstrates understanding and Verbalizes understanding.         - exercise in pain free zone - stretch to point of tightness not pain         Plan: Continue with treatment as tolerated as per POC.  Progress strength and stabilization as tolerated  Focus on self management of symptoms Consider DN and further strength and stabilization    Goals:   Active       Long term goals       Improve and stabilize proper pelvic positioning   (Progressing)       Start:  05/29/25    Expected End:  09/25/25            Maintain pain level 1/10  (Progressing)       Start:  05/29/25    Expected End:  09/25/25            Restore ability to sit for ADL and work with pain 1/10 to 0 increased pain  (Progressing)       Start:  05/29/25    Expected End:  09/25/25            Restore ability to stand for ADL and work with 1/10 or 0 increased pain  (Progressing)       Start:  05/29/25    Expected End:  09/25/25            Restore ability to sleep without disturbances due to pain    (Progressing)       Start:  05/29/25    Expected End:  09/25/25            Restore ability to perform ADL's and household activities independently and with 1/10 pain or 0  increased pain   (Progressing)       Start:  05/29/25    Expected End:  09/25/25               Short term goals        Maintain pain level 3 or below  (Progressing)       Start:  05/29/25    Expected End:  08/28/25            Be able to perform HEP with minimal cueing required   (Met)       Start:  05/29/25    Expected End:  06/26/25    Resolved:  07/10/25         Be able to perform self mobilization techniques to stabilize pelvis effectively  (Progressing)       Start:  05/29/25    Expected End:  08/20/25                Jacy Escobar, PT

## 2025-08-11 ENCOUNTER — TELEPHONE (OUTPATIENT)
Dept: RADIOLOGY | Facility: HOSPITAL | Age: 70
End: 2025-08-11
Payer: COMMERCIAL

## 2025-08-12 ENCOUNTER — HOSPITAL ENCOUNTER (OUTPATIENT)
Dept: RADIOLOGY | Facility: HOSPITAL | Age: 70
Discharge: HOME | End: 2025-08-12
Attending: INTERNAL MEDICINE
Payer: COMMERCIAL

## 2025-08-12 DIAGNOSIS — R92.8 ABNORMAL FINDING ON BREAST IMAGING: ICD-10-CM

## 2025-08-12 PROCEDURE — 77061 BREAST TOMOSYNTHESIS UNI: CPT | Mod: TC,LT

## 2025-08-12 PROCEDURE — 77065 DX MAMMO INCL CAD UNI: CPT | Mod: 26,LT,, | Performed by: RADIOLOGY

## 2025-08-12 PROCEDURE — 77061 BREAST TOMOSYNTHESIS UNI: CPT | Mod: 26,LT,, | Performed by: RADIOLOGY

## 2025-08-27 ENCOUNTER — CLINICAL SUPPORT (OUTPATIENT)
Dept: OTHER | Facility: CLINIC | Age: 70
End: 2025-08-27
Payer: COMMERCIAL

## 2025-08-27 DIAGNOSIS — Z00.8 ENCOUNTER FOR OTHER GENERAL EXAMINATION: ICD-10-CM

## 2025-08-27 PROCEDURE — 82947 ASSAY GLUCOSE BLOOD QUANT: CPT | Mod: QW,S$GLB,, | Performed by: INTERNAL MEDICINE

## 2025-08-27 PROCEDURE — 99401 PREV MED CNSL INDIV APPRX 15: CPT | Mod: S$GLB,,, | Performed by: INTERNAL MEDICINE

## 2025-08-27 PROCEDURE — 80061 LIPID PANEL: CPT | Mod: QW,S$GLB,, | Performed by: INTERNAL MEDICINE

## 2025-08-28 LAB
GLUCOSE SERPL-MCNC: 96 MG/DL (ref 60–140)
HDLC SERPL-MCNC: 56 MG/DL
POC CHOLESTEROL, LDL (DOCK): 86 MG/DL
POC CHOLESTEROL, TOTAL: 161 MG/DL
TRIGL SERPL-MCNC: 106 MG/DL

## 2025-09-02 ENCOUNTER — CLINICAL SUPPORT (OUTPATIENT)
Dept: REHABILITATION | Facility: HOSPITAL | Age: 70
End: 2025-09-02
Payer: COMMERCIAL

## 2025-09-02 DIAGNOSIS — G89.29 CHRONIC BILATERAL LOW BACK PAIN WITH RIGHT-SIDED SCIATICA: ICD-10-CM

## 2025-09-02 DIAGNOSIS — M62.81 MUSCLE WEAKNESS: Primary | ICD-10-CM

## 2025-09-02 DIAGNOSIS — M54.41 CHRONIC BILATERAL LOW BACK PAIN WITH RIGHT-SIDED SCIATICA: ICD-10-CM

## 2025-09-02 PROCEDURE — 97110 THERAPEUTIC EXERCISES: CPT | Mod: PN | Performed by: PHYSICAL THERAPIST

## 2025-09-02 PROCEDURE — 97014 ELECTRIC STIMULATION THERAPY: CPT | Mod: PN | Performed by: PHYSICAL THERAPIST

## 2025-09-02 PROCEDURE — 97140 MANUAL THERAPY 1/> REGIONS: CPT | Mod: PN | Performed by: PHYSICAL THERAPIST

## 2025-09-02 RX ORDER — CELECOXIB 100 MG/1
CAPSULE ORAL
Qty: 60 CAPSULE | Refills: 0 | Status: SHIPPED | OUTPATIENT
Start: 2025-09-02

## 2025-09-04 ENCOUNTER — OFFICE VISIT (OUTPATIENT)
Dept: CARDIOLOGY | Facility: CLINIC | Age: 70
End: 2025-09-04
Payer: COMMERCIAL

## 2025-09-04 VITALS
HEIGHT: 66 IN | SYSTOLIC BLOOD PRESSURE: 172 MMHG | HEART RATE: 59 BPM | BODY MASS INDEX: 22.6 KG/M2 | OXYGEN SATURATION: 98 % | DIASTOLIC BLOOD PRESSURE: 79 MMHG | WEIGHT: 140.63 LBS

## 2025-09-04 DIAGNOSIS — I25.3 ATRIAL SEPTAL ANEURYSM: Primary | ICD-10-CM

## 2025-09-04 PROCEDURE — 99999 PR PBB SHADOW E&M-EST. PATIENT-LVL V: CPT | Mod: PBBFAC,,, | Performed by: INTERNAL MEDICINE

## 2025-09-04 PROCEDURE — 3077F SYST BP >= 140 MM HG: CPT | Mod: CPTII,S$GLB,, | Performed by: INTERNAL MEDICINE

## 2025-09-04 PROCEDURE — 3044F HG A1C LEVEL LT 7.0%: CPT | Mod: CPTII,S$GLB,, | Performed by: INTERNAL MEDICINE

## 2025-09-04 PROCEDURE — 1159F MED LIST DOCD IN RCRD: CPT | Mod: CPTII,S$GLB,, | Performed by: INTERNAL MEDICINE

## 2025-09-04 PROCEDURE — 1126F AMNT PAIN NOTED NONE PRSNT: CPT | Mod: CPTII,S$GLB,, | Performed by: INTERNAL MEDICINE

## 2025-09-04 PROCEDURE — 3078F DIAST BP <80 MM HG: CPT | Mod: CPTII,S$GLB,, | Performed by: INTERNAL MEDICINE

## 2025-09-04 PROCEDURE — 1101F PT FALLS ASSESS-DOCD LE1/YR: CPT | Mod: CPTII,S$GLB,, | Performed by: INTERNAL MEDICINE

## 2025-09-04 PROCEDURE — 99204 OFFICE O/P NEW MOD 45 MIN: CPT | Mod: S$GLB,,, | Performed by: INTERNAL MEDICINE

## 2025-09-04 PROCEDURE — 3008F BODY MASS INDEX DOCD: CPT | Mod: CPTII,S$GLB,, | Performed by: INTERNAL MEDICINE

## 2025-09-04 PROCEDURE — 3288F FALL RISK ASSESSMENT DOCD: CPT | Mod: CPTII,S$GLB,, | Performed by: INTERNAL MEDICINE
